# Patient Record
Sex: MALE | Race: WHITE | NOT HISPANIC OR LATINO | Employment: UNEMPLOYED | URBAN - METROPOLITAN AREA
[De-identification: names, ages, dates, MRNs, and addresses within clinical notes are randomized per-mention and may not be internally consistent; named-entity substitution may affect disease eponyms.]

---

## 2017-03-30 ENCOUNTER — HOSPITAL ENCOUNTER (EMERGENCY)
Facility: HOSPITAL | Age: 27
Discharge: HOME | End: 2017-03-31
Attending: EMERGENCY MEDICINE
Payer: SUBSIDIZED

## 2017-03-30 VITALS — BODY MASS INDEX: 24.41 KG/M2 | WEIGHT: 175 LBS | TEMPERATURE: 99.4 F

## 2017-03-30 DIAGNOSIS — F32.A DEPRESSION: Primary | ICD-10-CM

## 2017-03-30 LAB
AMPHETAMINES SERPL QL SCN: NEGATIVE
ANION GAP SERPL CALCULATED.3IONS-SCNC: 9 MMOL/L (ref 4–13)
BARBITURATES UR QL: NEGATIVE
BASOPHILS # BLD AUTO: 0.1 THOUSANDS/ΜL (ref 0–0.1)
BASOPHILS NFR BLD AUTO: 1 % (ref 0–1)
BENZODIAZ UR QL: NEGATIVE
BUN SERPL-MCNC: 14 MG/DL (ref 5–25)
CALCIUM SERPL-MCNC: 8.3 MG/DL (ref 8.3–10.1)
CHLORIDE SERPL-SCNC: 103 MMOL/L (ref 100–108)
CLARITY, POC: NORMAL
CO2 SERPL-SCNC: 28 MMOL/L (ref 21–32)
COCAINE UR QL: NEGATIVE
COLOR, POC: NORMAL
CREAT SERPL-MCNC: 1.38 MG/DL (ref 0.6–1.3)
EOSINOPHIL # BLD AUTO: 0 THOUSAND/ΜL (ref 0–0.61)
EOSINOPHIL NFR BLD AUTO: 0 % (ref 0–6)
ERYTHROCYTE [DISTWIDTH] IN BLOOD BY AUTOMATED COUNT: 13.9 % (ref 11.6–15.1)
ETHANOL SERPL-MCNC: <3 MG/DL (ref 0–3)
EXT BLOOD URINE: NORMAL
EXT GLUCOSE, UA: NORMAL
EXT KETONES: 50
EXT NITRITE, UA: NORMAL
EXT PH, UA: 6
EXT PROTEIN, UA: 100
GFR SERPL CREATININE-BSD FRML MDRD: >60 ML/MIN/1.73SQ M
GLUCOSE SERPL-MCNC: 117 MG/DL (ref 65–140)
HCT VFR BLD AUTO: 46.7 % (ref 42–52)
HGB BLD-MCNC: 15.5 G/DL (ref 14–18)
LYMPHOCYTES # BLD AUTO: 1.3 THOUSANDS/ΜL (ref 0.6–4.47)
LYMPHOCYTES NFR BLD AUTO: 13 % (ref 14–44)
MCH RBC QN AUTO: 30.7 PG (ref 27–31)
MCHC RBC AUTO-ENTMCNC: 33.3 G/DL (ref 31.4–37.4)
MCV RBC AUTO: 92 FL (ref 82–98)
METHADONE UR QL: NEGATIVE
MONOCYTES # BLD AUTO: 0.4 THOUSAND/ΜL (ref 0.17–1.22)
MONOCYTES NFR BLD AUTO: 4 % (ref 4–12)
NEUTROPHILS # BLD AUTO: 8.3 THOUSANDS/ΜL (ref 1.85–7.62)
NEUTS SEG NFR BLD AUTO: 82 % (ref 43–75)
NRBC BLD AUTO-RTO: 0 /100 WBCS
OPIATES UR QL SCN: POSITIVE
PCP UR QL: NEGATIVE
PLATELET # BLD AUTO: 276 THOUSANDS/UL (ref 130–400)
PMV BLD AUTO: 8.4 FL (ref 8.9–12.7)
POTASSIUM SERPL-SCNC: 4.1 MMOL/L (ref 3.5–5.3)
RBC # BLD AUTO: 5.07 MILLION/UL (ref 4.7–6.1)
SODIUM SERPL-SCNC: 140 MMOL/L (ref 136–145)
THC UR QL: POSITIVE
WBC # BLD AUTO: 10 THOUSAND/UL (ref 4.8–10.8)
WBC # BLD EST: 25 10*3/UL

## 2017-03-30 PROCEDURE — 80320 DRUG SCREEN QUANTALCOHOLS: CPT | Performed by: EMERGENCY MEDICINE

## 2017-03-30 PROCEDURE — 85025 COMPLETE CBC W/AUTO DIFF WBC: CPT | Performed by: EMERGENCY MEDICINE

## 2017-03-30 PROCEDURE — 80307 DRUG TEST PRSMV CHEM ANLYZR: CPT | Performed by: EMERGENCY MEDICINE

## 2017-03-30 PROCEDURE — 36415 COLL VENOUS BLD VENIPUNCTURE: CPT | Performed by: EMERGENCY MEDICINE

## 2017-03-30 PROCEDURE — 80048 BASIC METABOLIC PNL TOTAL CA: CPT | Performed by: EMERGENCY MEDICINE

## 2017-03-30 PROCEDURE — 81002 URINALYSIS NONAUTO W/O SCOPE: CPT | Performed by: EMERGENCY MEDICINE

## 2017-03-30 RX ORDER — NICOTINE 21 MG/24HR
21 PATCH, TRANSDERMAL 24 HOURS TRANSDERMAL ONCE
Status: DISCONTINUED | OUTPATIENT
Start: 2017-03-30 | End: 2017-03-31 | Stop reason: HOSPADM

## 2017-03-30 RX ORDER — LORAZEPAM 1 MG/1
1 TABLET ORAL ONCE
Status: COMPLETED | OUTPATIENT
Start: 2017-03-30 | End: 2017-03-30

## 2017-03-30 RX ADMIN — NICOTINE 21 MG: 21 PATCH, EXTENDED RELEASE TRANSDERMAL at 21:45

## 2017-03-30 RX ADMIN — LORAZEPAM 1 MG: 1 TABLET ORAL at 21:45

## 2017-03-31 PROCEDURE — 99285 EMERGENCY DEPT VISIT HI MDM: CPT

## 2017-04-25 ENCOUNTER — HOSPITAL ENCOUNTER (EMERGENCY)
Facility: HOSPITAL | Age: 27
Discharge: HOME | End: 2017-04-25
Attending: EMERGENCY MEDICINE | Admitting: EMERGENCY MEDICINE

## 2017-04-25 VITALS
TEMPERATURE: 98.4 F | RESPIRATION RATE: 16 BRPM | SYSTOLIC BLOOD PRESSURE: 138 MMHG | OXYGEN SATURATION: 99 % | BODY MASS INDEX: 27.2 KG/M2 | HEART RATE: 74 BPM | DIASTOLIC BLOOD PRESSURE: 77 MMHG | HEIGHT: 70 IN | WEIGHT: 190 LBS

## 2017-04-25 DIAGNOSIS — Z76.0 MEDICATION REFILL: Primary | ICD-10-CM

## 2017-04-25 PROCEDURE — 99281 EMR DPT VST MAYX REQ PHY/QHP: CPT

## 2017-04-25 RX ORDER — QUETIAPINE FUMARATE 100 MG/1
100 TABLET, FILM COATED ORAL
Qty: 30 TABLET | Refills: 0 | Status: SHIPPED | OUTPATIENT
Start: 2017-04-25 | End: 2017-10-26

## 2017-04-25 RX ORDER — CARBAMAZEPINE 200 MG/1
200 TABLET ORAL 2 TIMES DAILY
Qty: 60 TABLET | Refills: 0 | Status: SHIPPED | OUTPATIENT
Start: 2017-04-25 | End: 2017-10-26

## 2017-10-26 ENCOUNTER — HOSPITAL ENCOUNTER (EMERGENCY)
Facility: HOSPITAL | Age: 27
Discharge: HOME | End: 2017-10-26
Admitting: EMERGENCY MEDICINE
Payer: COMMERCIAL

## 2017-10-26 VITALS
BODY MASS INDEX: 25.77 KG/M2 | SYSTOLIC BLOOD PRESSURE: 141 MMHG | OXYGEN SATURATION: 98 % | RESPIRATION RATE: 18 BRPM | DIASTOLIC BLOOD PRESSURE: 83 MMHG | WEIGHT: 180 LBS | HEIGHT: 70 IN | HEART RATE: 92 BPM | TEMPERATURE: 97.8 F

## 2017-10-26 DIAGNOSIS — R21 RASH AND NONSPECIFIC SKIN ERUPTION: ICD-10-CM

## 2017-10-26 DIAGNOSIS — R09.89 FOREIGN BODY SENSATION IN THROAT: ICD-10-CM

## 2017-10-26 DIAGNOSIS — J06.9 URI (UPPER RESPIRATORY INFECTION): Primary | ICD-10-CM

## 2017-10-26 PROCEDURE — 99283 EMERGENCY DEPT VISIT LOW MDM: CPT

## 2017-10-26 RX ORDER — SUCRALFATE 1 G/1
1 TABLET ORAL 4 TIMES DAILY
Qty: 40 TABLET | Refills: 0 | Status: SHIPPED | OUTPATIENT
Start: 2017-10-26 | End: 2019-03-04 | Stop reason: ALTCHOICE

## 2017-10-26 NOTE — DISCHARGE INSTRUCTIONS
Acute Rash   WHAT YOU NEED TO KNOW:   A rash is irritation, redness, or itchiness in the skin or mucus membranes  Mucus membranes are areas such as the lining of your nose or throat  Acute means the rash starts suddenly, worsens quickly, and lasts a short time  An acute rash may be caused by a disease, such as hepatitis or vasculitis  The rash may be a reaction to something you are allergic to, such as certain foods, or latex  Certain medicines, including antibiotics, NSAIDs, prescription pain medicine, and aspirin can also cause a rash  DISCHARGE INSTRUCTIONS:   Return to the emergency department if:   · You have sudden trouble breathing or chest pain  · You are vomiting, have a headache or muscle aches, and your throat hurts  Contact your healthcare provider if:   · You have a fever  · You get open wounds from scratching your skin, or you have a wound that is red, swollen, or painful  · Your rash lasts longer than 3 months  · You have swelling or pain in your joints  · You have questions or concerns about your condition or care  Medicines:  If your rash does not go away on its own, you may need the following medicines:  · Antihistamines  may be given to help decrease itching  · Steroids  may be given to decrease inflammation  · Antibiotics  help fight or prevent a bacterial infection  · Take your medicine as directed  Contact your healthcare provider if you think your medicine is not helping or if you have side effects  Tell him of her if you are allergic to any medicine  Keep a list of the medicines, vitamins, and herbs you take  Include the amounts, and when and why you take them  Bring the list or the pill bottles to follow-up visits  Carry your medicine list with you in case of an emergency  Prevent a rash or care for your skin when you have a rash:  Dry skin can lead to more problems  Do not scratch your skin if it itches  You may cause a skin infection by scratching   The following may prevent dry skin, and help your skin look better:  · Use thick cream lotions or petroleum jelly to help soothe your rash  These products work well on areas with thick skin, such as your feet  Cool compresses may also be used to soothe your skin  Apply a cool compress or a cool, wet towel, and then cover it with a dry towel  · Use lukewarm water when you bathe  Hot water may damage your skin more  Pat your skin dry  Do not rub your skin with a towel  · Use detergents, soaps, shampoos, and bubble baths made for sensitive skin  Wear clothes that are made of cotton instead of nylon or wool  Cotton is softer, so it will not hurt your skin as much  Follow up with your healthcare provider as directed: You may need to see a dermatologist if healthcare providers do not know what is causing your rash  You may also need to see a dermatologist if your rash does not get better even with treatment  You may need to see a dietitian if you have allergies to foods  Write down your questions so you remember to ask them during your visits  © 2017 [facility] Information is for End User's use only and may not be sold, redistributed or otherwise used for commercial purposes  All illustrations and images included in CareNotes® are the copyrighted property of [employer]  or [employer]  The above information is an  only  It is not intended as medical advice for individual conditions or treatments  Talk to your doctor, nurse or pharmacist before following any medical regimen to see if it is safe and effective for you

## 2017-10-26 NOTE — ED PROVIDER NOTES
History  Chief Complaint   Patient presents with    Cough     patient c/o cough x 2 weeks (initially green mucous, then brown), intermittent headache, dizziness, pimples on his back, and a rash on his right lateral ankle  also c/o getting a cough drop caught in his throat 1 week ago and states he has had trouble swallowing since then  also states he was shooting up steroids three months ago in his hip and is concerned he may have contracted staph  33 y/o male presenting with a mildly productive cough x 2 weeks that has overall gone better  States he had a cough drop stuck in his throat a week ago and feel as though he has had difficulty swallowing since that point  Has been able to tolerate liquids well and when swallowing food occasionally feels as though something is stuck in his throat  Has had intermittent lightheadedness as well as a headache  Also complaining of a diffuse rash thinking it may be eczema or a staph infection per patient  Rash on the right ankle is pruritic  rash on the back has been going on for the past 6 months  Concerned because he used to "shoot up steroids" and used a prior used needle and believes he may have staph due to this  No longer has any headaches  Denies fevers, nausea, vomiting, SOB, wheezing, diarrhea, constipation  Differential includes but is not limited to intracranial abnormality, vertigo, anemia, dehydration, eczema, staph infection, acne, insect bite, URI, pneumonia, acute bronchitis, strep pharyngitis  None       Past Medical History:   Diagnosis Date    ADHD (attention deficit hyperactivity disorder)     Bipolar 1 disorder ([facility] [address] )     Psychiatric disorder        History reviewed  No pertinent surgical history  History reviewed  No pertinent family history  I have reviewed and agree with the history as documented      Social History   Substance Use Topics    Smoking status: Current Every Day Smoker     Packs/day: 0 50     Types: Cigarettes  Smokeless tobacco: Never Used    Alcohol use No        Review of Systems   Constitutional: Negative  Negative for activity change, appetite change, chills and fever  HENT: Positive for sore throat  Negative for congestion, dental problem, ear pain, facial swelling, mouth sores, postnasal drip, sinus pressure and trouble swallowing  Eyes: Negative  Respiratory: Positive for cough  Negative for apnea, chest tightness, shortness of breath, wheezing and stridor  Cardiovascular: Negative  Negative for chest pain  Gastrointestinal: Negative for abdominal distention, abdominal pain, blood in stool, constipation, diarrhea, nausea and vomiting  Genitourinary: Negative  Musculoskeletal: Negative for arthralgias, neck pain and neck stiffness  Skin: Negative  Negative for rash  Neurological: Positive for dizziness and headaches  Negative for tremors, seizures, syncope, facial asymmetry, speech difficulty, weakness, light-headedness and numbness  All other systems reviewed and are negative  Physical Exam  ED Triage Vitals [10/26/17 1506]   Temperature Pulse Respirations Blood Pressure SpO2   97 8 °F (36 6 °C) 92 18 141/83 98 %      Temp Source Heart Rate Source Patient Position - Orthostatic VS BP Location FiO2 (%)   Tympanic Monitor Sitting -- --      Pain Score       6           Orthostatic Vital Signs  Vitals:    10/26/17 1506   BP: 141/83   Pulse: 92   Patient Position - Orthostatic VS: Sitting       Physical Exam   Constitutional: He appears well-developed and well-nourished  No distress  Patient appears anxious  However appears well  Ambulating without difficulty  Speaking in full sentences without difficulty  HENT:   Head: Normocephalic and atraumatic  Right Ear: External ear normal    Left Ear: External ear normal    Nose: Nose normal    Mouth/Throat: Oropharynx is clear and moist  No oropharyngeal exudate  Dry mucous membranes  Mild amount of erythema noted     No swelling, exudate, or uvula deviation noted of mouth  No discoloration, asymmetries or swelling of the face  No ulcerations, fluctuance, induration or drainage noted  No petechiae noted on palate  Able to swallow without difficulty  Full ROM of jaw  Eyes: Conjunctivae are normal  Pupils are equal, round, and reactive to light  Right eye exhibits no discharge  Left eye exhibits no discharge  No scleral icterus  Neck: Normal range of motion  Neck supple  No tracheal deviation present  Cardiovascular: Normal rate, regular rhythm, normal heart sounds and intact distal pulses  Exam reveals no friction rub  No murmur heard  Pulmonary/Chest: Effort normal and breath sounds normal  No stridor  No respiratory distress  He has no wheezes  He has no rales  He exhibits no tenderness  SpO2 is 98%, within normal limits, resting comfortably  No cyanosis or pallor  Abdominal: Soft  Bowel sounds are normal  He exhibits no distension  There is no tenderness  There is no rebound and no guarding  Lymphadenopathy:     He has no cervical adenopathy  Skin: Skin is warm and dry  Capillary refill takes less than 2 seconds  Rash noted  He is not diaphoretic  No erythema  No pallor  No sandpaper rash noted  No other rashes noted  Good coloration of skin  Nursing note and vitals reviewed        ED Medications  Medications - No data to display    Diagnostic Studies  Results Reviewed     None                 No orders to display              Procedures  Procedures       Phone Contacts  ED Phone Contact    ED Course  ED Course                                MDM  Number of Diagnoses or Management Options  Foreign body sensation in throat:   Rash and nonspecific skin eruption:   URI (upper respiratory infection):   Diagnosis management comments: Spoke with GI CAMILO Osorio would like patient to follow up in the outpatient office and endoscopies not warranted at this time as patient is tolerating foods and liquids and suggests carafate  Will start on Carafate and informed to eat soft foods  Discussed with patient that the lesions on his back do not appear to be MRSA were staff at this time and appear to be cystic acne  Rash on the right outer ankle appears to be more plaque-like and will have him continue using hydrocortisone  , possible eczema  Strict return precautions for any worsening of symptoms, otherwise would like patient to follow up with his PCP regarding these ongoing symptoms  Patient verbalizes understanding and agrees with the above assessment and plan  Amount and/or Complexity of Data Reviewed  Review and summarize past medical records: yes  Discuss the patient with other providers: yes  Independent visualization of images, tracings, or specimens: yes      CritCare Time    Disposition  Final diagnoses:   URI (upper respiratory infection)   Rash and nonspecific skin eruption   Foreign body sensation in throat     Time reflects when diagnosis was documented in both MDM as applicable and the Disposition within this note     Time User Action Codes Description Comment    10/26/2017  4:22 PM Liane Fus Add [J06 9] URI (upper respiratory infection)     10/26/2017  4:22 PM Liane Fus Add [R21] Rash and nonspecific skin eruption     10/26/2017  4:22 PM Liane Fus Add [R09 89] Foreign body sensation in throat       ED Disposition     ED Disposition Condition Comment    Discharge  [facility]  discharge to home/self care      Condition at discharge: Good        Follow-up Information     Follow up With Specialties Details Why Contact Info Additional [address] Emergency Department Emergency Medicine Go to If symptoms worsen [address] [facility]  [phone] [facility] ED, [place], Isid[address], DO Family Medicine Schedule an appointment as soon as possible for a visit As needed [address] [facility] [address] [facility]  [address] [facility] Heidy Heck MD Gastroenterology Schedule an appointment as soon as possible for a visit in 1 week  [address]   [phone]           Discharge Medication List as of 10/26/2017  4:32 PM      START taking these medications    Details   sucralfate (CARAFATE) 1 g tablet Take 1 tablet by mouth 4 (four) times a day for 10 days, Starting Thu 10/26/2017, Until Sun 11/5/2017, Print           No discharge procedures on file      ED Provider  Electronically Signed by           Christa Gunter PA-C  10/26/17 Jovanna Raya PA-C  10/26/17 8455

## 2017-11-01 ENCOUNTER — GENERIC CONVERSION - ENCOUNTER (OUTPATIENT)
Dept: OTHER | Facility: OTHER | Age: 27
End: 2017-11-01

## 2017-11-21 ENCOUNTER — GENERIC CONVERSION - ENCOUNTER (OUTPATIENT)
Dept: OTHER | Facility: OTHER | Age: 27
End: 2017-11-21

## 2018-01-09 NOTE — PROGRESS NOTES
Recorded as Task  Date: 11/20/2017 04:39 PM, Created By: Va July  Task Name: Care Coordination  Assigned To: Kemi Fontenot  Regarding Patient: Lou Frye, Status: Active  CommentMerlyernestina Gaines - 20 Nov 2017 4:39 PM    TASK CREATED  Caller: Self; Care Coordination; [phone] (Home)  DR KEMI FONTENOT - PT WANTS ORDERS TO SEE DERM  ALSO HE WOULD LIKE A SCRIPT FOR ADDERALL  HE WOULD LIKE TO SPEAK TO YOU  TODAY  Shandra,[place] - 20 Nov 2017 5:14 PM    TASK REASSIGNED: Previously Assigned To radha Mcguire Pritiben - 21 Nov 2017 8:57 AM    TASK REPLIED TO: Previously Assigned To Kemi Fontenot  Patient reports has been taking Adderall for a lifetime since age  8 or 4 was initiated by his child psychiatrist in [place]  Patient then moved to the [facility] and patient reports this being seen by family guidance  Patient reports has a history of ADHD and bipolar and last seen family guidance over an one year  as he lost insurance  Patient reports has been taking Adderall for over a year  Patient reports will be starting school in the fall and wants to initiate medications  Patient reports skin rash has improved however would like to see a dermatologist for  further recommendations  Discussed the patient will leave at the front  Patient also reports headacheesarree iimmproving  Discussed  with ptientt  Will need recorrds from [facility] and psychiatrist from [place]  Patient verbalized understanding  All questions answered  Discussed with Dr Gertrudis Harris  Electronically signed by:Kemi COPPOLA  Nov 21 2017  8:59AM EST Author       Electronically signed by: Rafael VILLASEÑOR    Nov 21 2017  9:19AM EST

## 2018-01-15 ENCOUNTER — GENERIC CONVERSION - ENCOUNTER (OUTPATIENT)
Dept: OTHER | Facility: OTHER | Age: 28
End: 2018-01-15

## 2018-01-22 VITALS
RESPIRATION RATE: 18 BRPM | DIASTOLIC BLOOD PRESSURE: 70 MMHG | BODY MASS INDEX: 25.77 KG/M2 | SYSTOLIC BLOOD PRESSURE: 110 MMHG | HEART RATE: 74 BPM | TEMPERATURE: 97.6 F | WEIGHT: 180 LBS | OXYGEN SATURATION: 99 % | HEIGHT: 70 IN

## 2018-01-23 NOTE — MISCELLANEOUS
Provider Comments  Provider Comments:   l/m about missed appt      Signatures   Electronically signed by : Yasir Bhatt MD; Jan 18 2018  3:59PM EST                       (Author)

## 2019-03-04 ENCOUNTER — HOSPITAL ENCOUNTER (EMERGENCY)
Facility: HOSPITAL | Age: 29
Discharge: HOME | End: 2019-03-04
Attending: EMERGENCY MEDICINE | Admitting: EMERGENCY MEDICINE
Payer: MEDICAID

## 2019-03-04 ENCOUNTER — APPOINTMENT (EMERGENCY)
Dept: RADIOLOGY | Facility: HOSPITAL | Age: 29
End: 2019-03-04
Payer: MEDICAID

## 2019-03-04 VITALS
BODY MASS INDEX: 27.46 KG/M2 | DIASTOLIC BLOOD PRESSURE: 79 MMHG | RESPIRATION RATE: 18 BRPM | SYSTOLIC BLOOD PRESSURE: 132 MMHG | HEART RATE: 80 BPM | WEIGHT: 190 LBS | TEMPERATURE: 99.2 F | OXYGEN SATURATION: 95 %

## 2019-03-04 DIAGNOSIS — R60.0 BILATERAL LOWER EXTREMITY EDEMA: Primary | ICD-10-CM

## 2019-03-04 LAB
ALBUMIN SERPL BCP-MCNC: 3.2 G/DL (ref 3.5–5)
ALP SERPL-CCNC: 56 U/L (ref 46–116)
ALT SERPL W P-5'-P-CCNC: 23 U/L (ref 12–78)
ANION GAP SERPL CALCULATED.3IONS-SCNC: 11 MMOL/L (ref 4–13)
APTT PPP: 30 SECONDS (ref 26–38)
AST SERPL W P-5'-P-CCNC: 41 U/L (ref 5–45)
ATRIAL RATE: 99 BPM
BASOPHILS # BLD AUTO: 0.04 THOUSANDS/ΜL (ref 0–0.1)
BASOPHILS NFR BLD AUTO: 0 % (ref 0–1)
BILIRUB SERPL-MCNC: 0.6 MG/DL (ref 0.2–1)
BUN SERPL-MCNC: 18 MG/DL (ref 5–25)
CALCIUM SERPL-MCNC: 8.6 MG/DL (ref 8.3–10.1)
CHLORIDE SERPL-SCNC: 98 MMOL/L (ref 100–108)
CO2 SERPL-SCNC: 28 MMOL/L (ref 21–32)
CREAT SERPL-MCNC: 1.26 MG/DL (ref 0.6–1.3)
EOSINOPHIL # BLD AUTO: 0.22 THOUSAND/ΜL (ref 0–0.61)
EOSINOPHIL NFR BLD AUTO: 2 % (ref 0–6)
ERYTHROCYTE [DISTWIDTH] IN BLOOD BY AUTOMATED COUNT: 12.9 % (ref 11.6–15.1)
GFR SERPL CREATININE-BSD FRML MDRD: 76 ML/MIN/1.73SQ M
GLUCOSE SERPL-MCNC: 108 MG/DL (ref 65–140)
HCT VFR BLD AUTO: 38.4 % (ref 36.5–49.3)
HGB BLD-MCNC: 12.6 G/DL (ref 12–17)
IMM GRANULOCYTES # BLD AUTO: 0.02 THOUSAND/UL (ref 0–0.2)
IMM GRANULOCYTES NFR BLD AUTO: 0 % (ref 0–2)
INR PPP: 0.96 (ref 0.86–1.16)
LACTATE SERPL-SCNC: 1.2 MMOL/L (ref 0.5–2)
LYMPHOCYTES # BLD AUTO: 1.18 THOUSANDS/ΜL (ref 0.6–4.47)
LYMPHOCYTES NFR BLD AUTO: 12 % (ref 14–44)
MCH RBC QN AUTO: 30.4 PG (ref 26.8–34.3)
MCHC RBC AUTO-ENTMCNC: 32.8 G/DL (ref 31.4–37.4)
MCV RBC AUTO: 93 FL (ref 82–98)
MONOCYTES # BLD AUTO: 0.96 THOUSAND/ΜL (ref 0.17–1.22)
MONOCYTES NFR BLD AUTO: 10 % (ref 4–12)
NEUTROPHILS # BLD AUTO: 7.35 THOUSANDS/ΜL (ref 1.85–7.62)
NEUTS SEG NFR BLD AUTO: 76 % (ref 43–75)
NRBC BLD AUTO-RTO: 0 /100 WBCS
NT-PROBNP SERPL-MCNC: 127 PG/ML
P AXIS: 17 DEGREES
PLATELET # BLD AUTO: 192 THOUSANDS/UL (ref 149–390)
PMV BLD AUTO: 10.7 FL (ref 8.9–12.7)
POTASSIUM SERPL-SCNC: 4.1 MMOL/L (ref 3.5–5.3)
PR INTERVAL: 148 MS
PROT SERPL-MCNC: 6.8 G/DL (ref 6.4–8.2)
PROTHROMBIN TIME: 10.1 SECONDS (ref 9.4–11.7)
QRS AXIS: 62 DEGREES
QRSD INTERVAL: 98 MS
QT INTERVAL: 330 MS
QTC INTERVAL: 423 MS
RBC # BLD AUTO: 4.14 MILLION/UL (ref 3.88–5.62)
SODIUM SERPL-SCNC: 137 MMOL/L (ref 136–145)
T WAVE AXIS: 37 DEGREES
VENTRICULAR RATE: 99 BPM
WBC # BLD AUTO: 9.77 THOUSAND/UL (ref 4.31–10.16)

## 2019-03-04 PROCEDURE — 87040 BLOOD CULTURE FOR BACTERIA: CPT | Performed by: PHYSICIAN ASSISTANT

## 2019-03-04 PROCEDURE — 93005 ELECTROCARDIOGRAM TRACING: CPT

## 2019-03-04 PROCEDURE — 36415 COLL VENOUS BLD VENIPUNCTURE: CPT | Performed by: PHYSICIAN ASSISTANT

## 2019-03-04 PROCEDURE — 80053 COMPREHEN METABOLIC PANEL: CPT | Performed by: PHYSICIAN ASSISTANT

## 2019-03-04 PROCEDURE — 96374 THER/PROPH/DIAG INJ IV PUSH: CPT

## 2019-03-04 PROCEDURE — 83605 ASSAY OF LACTIC ACID: CPT | Performed by: PHYSICIAN ASSISTANT

## 2019-03-04 PROCEDURE — 85730 THROMBOPLASTIN TIME PARTIAL: CPT | Performed by: PHYSICIAN ASSISTANT

## 2019-03-04 PROCEDURE — 85610 PROTHROMBIN TIME: CPT | Performed by: PHYSICIAN ASSISTANT

## 2019-03-04 PROCEDURE — 93970 EXTREMITY STUDY: CPT | Performed by: SURGERY

## 2019-03-04 PROCEDURE — 93970 EXTREMITY STUDY: CPT

## 2019-03-04 PROCEDURE — 83880 ASSAY OF NATRIURETIC PEPTIDE: CPT | Performed by: PHYSICIAN ASSISTANT

## 2019-03-04 PROCEDURE — 99284 EMERGENCY DEPT VISIT MOD MDM: CPT

## 2019-03-04 PROCEDURE — 85025 COMPLETE CBC W/AUTO DIFF WBC: CPT | Performed by: PHYSICIAN ASSISTANT

## 2019-03-04 PROCEDURE — 96361 HYDRATE IV INFUSION ADD-ON: CPT

## 2019-03-04 PROCEDURE — 93010 ELECTROCARDIOGRAM REPORT: CPT | Performed by: INTERNAL MEDICINE

## 2019-03-04 RX ORDER — KETOROLAC TROMETHAMINE 30 MG/ML
30 INJECTION, SOLUTION INTRAMUSCULAR; INTRAVENOUS ONCE
Status: COMPLETED | OUTPATIENT
Start: 2019-03-04 | End: 2019-03-04

## 2019-03-04 RX ORDER — SENNOSIDES 8.6 MG
650 CAPSULE ORAL EVERY 8 HOURS PRN
Qty: 30 TABLET | Refills: 0 | Status: SHIPPED | OUTPATIENT
Start: 2019-03-04 | End: 2019-03-04 | Stop reason: CLARIF

## 2019-03-04 RX ORDER — TRAMADOL HYDROCHLORIDE 50 MG/1
50 TABLET ORAL EVERY 6 HOURS PRN
Qty: 12 TABLET | Refills: 0 | Status: SHIPPED | OUTPATIENT
Start: 2019-03-04 | End: 2019-03-07

## 2019-03-04 RX ORDER — IBUPROFEN 800 MG/1
800 TABLET ORAL 3 TIMES DAILY
Qty: 21 TABLET | Refills: 0 | Status: SHIPPED | OUTPATIENT
Start: 2019-03-04 | End: 2021-03-27

## 2019-03-04 RX ORDER — SENNOSIDES 8.6 MG
650 CAPSULE ORAL EVERY 8 HOURS PRN
Qty: 30 TABLET | Refills: 0 | Status: SHIPPED | OUTPATIENT
Start: 2019-03-04 | End: 2021-03-27

## 2019-03-04 RX ORDER — IBUPROFEN 800 MG/1
800 TABLET ORAL 3 TIMES DAILY
Qty: 21 TABLET | Refills: 0 | Status: SHIPPED | OUTPATIENT
Start: 2019-03-04 | End: 2019-03-04 | Stop reason: CLARIF

## 2019-03-04 RX ADMIN — KETOROLAC TROMETHAMINE 30 MG: 30 INJECTION, SOLUTION INTRAMUSCULAR at 13:38

## 2019-03-04 RX ADMIN — SODIUM CHLORIDE 1000 ML: 0.9 INJECTION, SOLUTION INTRAVENOUS at 13:38

## 2019-03-04 NOTE — ED NOTES
Pt refused wheelchair that was offered and ambulated without difficulty to room 13 from the waiting room       [facility] [address], RN  03/04/19 7739

## 2019-03-04 NOTE — ED NOTES
Pt annoyed that he is going to be out of work, it was explained to pt that the swelling in his legs will not go away overnight and will take some time to go away, to take the medications and follow up with his PCP if he needs to    Pt states he was, "misdiagnosed here before and I had to go to [facility] "       Jeff Regan (R), RN  03/04/19 3106

## 2019-03-04 NOTE — ED NOTES
Dr Wynn Likens to pts bedside, pt not in room and belongings not at bedside         [address], RN  03/04/19 1730

## 2019-03-04 NOTE — ED NOTES
Pt agitated, demands to talk to the dr Dr Yolette Osorio made aware        Luz Maria Chawla, RN  03/04/19 8308

## 2019-03-04 NOTE — ED PROVIDER NOTES
History  Chief Complaint   Patient presents with    Leg Swelling     Pt states he started with bilateral leg swelling/redness on Saturday  75-year-old male presents with bilateral leg swelling x3 days  He first noticed his ankles were swollen a month ago  He started having redness move up his legs  He states his legs are painful  He has some closed sores on his feet  The pain is worsened with prolonged standing  He states he noticed his feet having an odor about a month ago  He states he wore his shoes without socks a few days ago and got multiple blisters on his feet  He states that has aggravated the odor  No fever, chills, chest pain, shortness of breath, difficulty breathing, abdominal pain, nausea, vomiting, diarrhea, constipation, headache, dizziness, lightheadedness  None       Past Medical History:   Diagnosis Date    ADHD (attention deficit hyperactivity disorder)     Anxiety     last assessed 3/23/15    Bipolar 1 disorder ([facility] [address] )     Genital warts     last assessed 12/7/15, resolved 11/1/17     Psychiatric disorder        History reviewed  No pertinent surgical history  Family History   Problem Relation Age of Onset    No Known Problems Father      I have reviewed and agree with the history as documented  Social History     Tobacco Use    Smoking status: Current Every Day Smoker     Packs/day: 0 50     Types: Cigarettes    Smokeless tobacco: Never Used   Substance Use Topics    Alcohol use: No    Drug use: Yes     Comment: states was "shooting up steroids" 3 months ago (3/4/19 states this was 3 years ago)        Review of Systems   Constitutional: Negative for chills and fever  HENT: Negative for sneezing and sore throat  Respiratory: Negative for cough and shortness of breath  Cardiovascular: Positive for leg swelling  Negative for chest pain and palpitations  Gastrointestinal: Negative for abdominal pain, constipation, diarrhea, nausea and vomiting  Musculoskeletal: Positive for myalgias  Negative for back pain, gait problem and joint swelling  Skin: Positive for color change  Negative for pallor, rash and wound  Neurological: Negative for dizziness, syncope, weakness, light-headedness, numbness and headaches  All other systems reviewed and are negative  Physical Exam  Physical Exam   Constitutional: He appears well-developed and well-nourished  No distress  HENT:   Head: Normocephalic and atraumatic  Nose: Nose normal    Eyes: EOM are normal    Neck: Normal range of motion  Cardiovascular: Normal rate, regular rhythm, normal heart sounds and intact distal pulses  Exam reveals no gallop and no friction rub  No murmur heard  Pulmonary/Chest: Effort normal and breath sounds normal  No stridor  No respiratory distress  He has no wheezes  He has no rales  Sp02 is 94% indicating adequate oxygenation on room air   Musculoskeletal: He exhibits edema and tenderness  Legs:  Skin: Skin is warm and dry  Capillary refill takes less than 2 seconds  No rash noted  He is not diaphoretic  No erythema  No pallor  Nursing note and vitals reviewed        Vital Signs  ED Triage Vitals [03/04/19 1309]   Temperature Pulse Respirations Blood Pressure SpO2   99 6 °F (37 6 °C) (!) 107 18 130/79 94 %      Temp Source Heart Rate Source Patient Position - Orthostatic VS BP Location FiO2 (%)   Tympanic Monitor Sitting Right arm --      Pain Score       Worst Possible Pain           Vitals:    03/04/19 1309 03/04/19 1621   BP: 130/79 132/79   Pulse: (!) 107 80   Patient Position - Orthostatic VS: Sitting Sitting       Visual Acuity      ED Medications  Medications   sodium chloride 0 9 % bolus 1,000 mL (0 mL Intravenous Stopped 3/4/19 1525)   ketorolac (TORADOL) injection 30 mg (30 mg Intravenous Given 3/4/19 1338)       Diagnostic Studies  Results Reviewed     Procedure Component Value Units Date/Time    B-type natriuretic peptide [[id number]]  (Abnormal) Collected:  03/04/19 1337    Lab Status:  Final result Specimen:  Blood from Arm, Right Updated:  03/04/19 1414     NT-proBNP 127 pg/mL     Comprehensive metabolic panel [[id number]]  (Abnormal) Collected:  03/04/19 1337    Lab Status:  Final result Specimen:  Blood from Arm, Right Updated:  03/04/19 1411     Sodium 137 mmol/L      Potassium 4 1 mmol/L      Chloride 98 mmol/L      CO2 28 mmol/L      ANION GAP 11 mmol/L      BUN 18 mg/dL      Creatinine 1 26 mg/dL      Glucose 108 mg/dL      Calcium 8 6 mg/dL      AST 41 U/L      ALT 23 U/L      Alkaline Phosphatase 56 U/L      Total Protein 6 8 g/dL      Albumin 3 2 g/dL      Total Bilirubin 0 60 mg/dL      eGFR 76 ml/min/1 73sq m     Narrative:       National Kidney Disease Education Program recommendations are as follows:  GFR calculation is accurate only with a steady state creatinine  Chronic Kidney disease less than 60 ml/min/1 73 sq  meters  Kidney failure less than 15 ml/min/1 73 sq  meters  Lactic acid, plasma [[id number]]  (Normal) Collected:  03/04/19 1337    Lab Status:  Final result Specimen:  Blood from Arm, Right Updated:  03/04/19 1410     LACTIC ACID 1 2 mmol/L     Narrative:       Result may be elevated if tourniquet was used during collection      APTT [[id number]]  (Normal) Collected:  03/04/19 1337    Lab Status:  Final result Specimen:  Blood from Arm, Right Updated:  03/04/19 1404     PTT 30 seconds     Protime-INR [[id number]]  (Normal) Collected:  03/04/19 1337    Lab Status:  Final result Specimen:  Blood from Arm, Right Updated:  03/04/19 1404     Protime 10 1 seconds      INR 0 96    CBC and differential [[id number]]  (Abnormal) Collected:  03/04/19 1337    Lab Status:  Final result Specimen:  Blood from Arm, Right Updated:  03/04/19 1349     WBC 9 77 Thousand/uL      RBC 4 14 Million/uL      Hemoglobin 12 6 g/dL      Hematocrit 38 4 %      MCV 93 fL      MCH 30 4 pg      MCHC 32 8 g/dL      RDW 12 9 %      MPV 10 7 fL      Platelets 365 Thousands/uL      nRBC 0 /100 WBCs      Neutrophils Relative 76 %      Immat GRANS % 0 %      Lymphocytes Relative 12 %      Monocytes Relative 10 %      Eosinophils Relative 2 %      Basophils Relative 0 %      Neutrophils Absolute 7 35 Thousands/µL      Immature Grans Absolute 0 02 Thousand/uL      Lymphocytes Absolute 1 18 Thousands/µL      Monocytes Absolute 0 96 Thousand/µL      Eosinophils Absolute 0 22 Thousand/µL      Basophils Absolute 0 04 Thousands/µL     Blood culture #1 [[id number]] Collected:  03/04/19 1337    Lab Status: In process Specimen:  Blood from Arm, Right Updated:  03/04/19 1346    Blood culture #2 [[id number]] Collected:  03/04/19 1337    Lab Status: In process Specimen:  Blood from Hand, Left Updated:  03/04/19 1346                 VAS lower limb venous duplex study, complete bilateral   Final Result by Alphonso Sanchez MD (03/04 8299)                 Procedures  ECG 12 Lead Documentation  Date/Time: 3/4/2019 1:42 PM  Performed by: Marcos Sharp PA-C  Authorized by:  Marcos Sharp PA-C     Indications / Diagnosis:  Leg swelling  Previous ECG:     Previous ECG:  Unavailable  Interpretation:     Interpretation: normal    Rate:     ECG rate:  99    ECG rate assessment: normal    Rhythm:     Rhythm: sinus rhythm    Ectopy:     Ectopy: none    QRS:     QRS axis:  Normal    QRS intervals:  Normal  Conduction:     Conduction: normal    ST segments:     ST segments:  Normal  T waves:     T waves: normal             Phone Contacts  ED Phone Contact    ED Course  ED Course as of Mar 04 1825   Mon Mar 04, 2019   1430 Vas at bedside      1449 Negative DVT study bilaterally                                  MDM  Number of Diagnoses or Management Options  Bilateral lower extremity edema:   Diagnosis management comments: Patient well appearing, in no acute distress, afebrile  Negative DVT study, no signs of infection on labs, ECG normal sinus rhythm, /79  Advised to raise legs while at home, use compression stockings, decrease salt intake, follow up pcp  Can take anti-inflammatories for pain and swelling  Given infolink number  Gave patient proper education regarding diagnosis  Answered all questions  Return to ED for any worsening of symptoms otherwise follow up with primary care physician for re-evaluation  Discussed plan with patient who verbalized understanding and agreed to plan  Amount and/or Complexity of Data Reviewed  Clinical lab tests: ordered and reviewed  Tests in the radiology section of CPT®: ordered and reviewed  Tests in the medicine section of CPT®: ordered and reviewed  Discussion of test results with the performing providers: yes  Review and summarize past medical records: yes  Discuss the patient with other providers: yes (Discussed case with Dr Marci Pittman who also saw patient)  Independent visualization of images, tracings, or specimens: yes        Disposition  Final diagnoses:   Bilateral lower extremity edema     Time reflects when diagnosis was documented in both MDM as applicable and the Disposition within this note     Time User Action Codes Description Comment    3/4/2019  3:06 PM Veronique Aguilar Add [R60 0] Bilateral lower extremity edema       ED Disposition     ED Disposition Condition Date/Time Comment    Discharge Good Mon Mar 4, 2019  3:06 PM Irma Alicea  discharge to home/self care              Follow-up Information     Follow up With Specialties Details Why Contact Info Additional Information    [employer]  Call today to make follow up appointment for leg swelling, if symptoms worsen [phone]       [address] [facility] Emergency Department Emergency Medicine Go to  As needed [address] [facility]  [phone] [place], [place], [address] [facility] Emergency Department Emergency Medicine  If symptoms worsen [address] [facility]  [phone] [facility] [place], [address]          Discharge Medication List as of 3/4/2019  4:28 PM      START taking these medications    Details   acetaminophen (TYLENOL) 650 mg CR tablet Take 1 tablet (650 mg total) by mouth every 8 (eight) hours as needed for mild pain, Starting Mon 3/4/2019, Print      ibuprofen (MOTRIN) 800 mg tablet Take 1 tablet (800 mg total) by mouth 3 (three) times a day, Starting Mon 3/4/2019, Print      traMADol (ULTRAM) 50 mg tablet Take 1 tablet (50 mg total) by mouth every 6 (six) hours as needed for moderate pain for up to 3 days, Starting Mon 3/4/2019, Until Thu 3/7/2019, Print           No discharge procedures on file      ED Provider  Electronically Signed by           Fredrick Rutledge PA-C  03/04/19 0683

## 2019-03-09 LAB
BACTERIA BLD CULT: NORMAL
BACTERIA BLD CULT: NORMAL

## 2019-07-29 ENCOUNTER — TELEPHONE (OUTPATIENT)
Dept: NEUROLOGY | Facility: CLINIC | Age: 29
End: 2019-07-29

## 2020-12-07 ENCOUNTER — TELEPHONE (OUTPATIENT)
Dept: SURGICAL ONCOLOGY | Facility: CLINIC | Age: 30
End: 2020-12-07

## 2021-03-27 ENCOUNTER — HOSPITAL ENCOUNTER (EMERGENCY)
Facility: HOSPITAL | Age: 31
Discharge: LEFT BEFORE DISCHARGE | End: 2021-03-27
Attending: EMERGENCY MEDICINE | Admitting: EMERGENCY MEDICINE
Payer: COMMERCIAL

## 2021-03-27 ENCOUNTER — APPOINTMENT (EMERGENCY)
Dept: RADIOLOGY | Facility: HOSPITAL | Age: 31
End: 2021-03-27
Payer: COMMERCIAL

## 2021-03-27 VITALS
TEMPERATURE: 97.9 F | DIASTOLIC BLOOD PRESSURE: 78 MMHG | SYSTOLIC BLOOD PRESSURE: 131 MMHG | OXYGEN SATURATION: 100 % | HEART RATE: 87 BPM | RESPIRATION RATE: 16 BRPM

## 2021-03-27 DIAGNOSIS — L03.90 CELLULITIS: Primary | ICD-10-CM

## 2021-03-27 DIAGNOSIS — M25.471 RIGHT ANKLE SWELLING: ICD-10-CM

## 2021-03-27 DIAGNOSIS — M25.472 LEFT ANKLE SWELLING: ICD-10-CM

## 2021-03-27 PROCEDURE — 99284 EMERGENCY DEPT VISIT MOD MDM: CPT | Performed by: EMERGENCY MEDICINE

## 2021-03-27 PROCEDURE — 99284 EMERGENCY DEPT VISIT MOD MDM: CPT

## 2021-03-27 RX ORDER — CLINDAMYCIN PHOSPHATE 600 MG/50ML
600 INJECTION INTRAVENOUS ONCE
Status: DISCONTINUED | OUTPATIENT
Start: 2021-03-27 | End: 2021-03-27 | Stop reason: HOSPADM

## 2021-03-27 NOTE — ED PROVIDER NOTES
History  Chief Complaint   Patient presents with    Ankle Swelling     Pt states that he has been "getting cellulitis off and on for the past year"       Pt in the ER with c/o b/l LE cellulitis and b/l ankle pain x 1 day  He denies trauma  Pt with recurrent cellulitis over the past year, but denies recent abx use  Pt denies drug abuse, although a recent note from [facility] states that he abuses opiates  History provided by:  Patient   used: No    Ankle Swelling  Location:  Ankle  Injury: no    Ankle location:  L ankle and R ankle  Pain details:     Quality:  Aching    Radiates to:  Does not radiate    Severity:  Moderate    Onset quality:  Sudden    Timing:  Constant    Progression:  Worsening  Chronicity:  New  Dislocation: no    Foreign body present:  No foreign bodies  Tetanus status:  Unknown  Relieved by:  None tried  Worsened by:  Bearing weight  Ineffective treatments:  None tried  Associated symptoms: no back pain and no fever        None       Past Medical History:   Diagnosis Date    ADHD (attention deficit hyperactivity disorder)     Anxiety     last assessed 3/23/15    Bipolar 1 disorder ([facility] [address] )     Genital warts     last assessed 12/7/15, resolved 11/1/17     Psychiatric disorder        History reviewed  No pertinent surgical history  Family History   Problem Relation Age of Onset    No Known Problems Father      I have reviewed and agree with the history as documented  E-Cigarette/Vaping     E-Cigarette/Vaping Substances     Social History     Tobacco Use    Smoking status: Current Every Day Smoker     Packs/day: 0 50     Types: Cigarettes    Smokeless tobacco: Never Used   Substance Use Topics    Alcohol use: No    Drug use: Yes     Comment: states was "shooting up steroids" 3 months ago (3/4/19 states this was 3 years ago)       Review of Systems   Constitutional: Negative for chills and fever  Respiratory: Negative for cough, shortness of breath and wheezing  Cardiovascular: Negative for chest pain and palpitations  Gastrointestinal: Negative for abdominal pain, constipation, diarrhea, nausea and vomiting  Genitourinary: Negative for dysuria, flank pain, hematuria and urgency  Musculoskeletal: Positive for gait problem and joint swelling  Negative for back pain  Skin: Positive for color change  Negative for pallor, rash and wound  All other systems reviewed and are negative  Physical Exam  Physical Exam  Vitals signs and nursing note reviewed  Constitutional:       Appearance: He is well-developed  HENT:      Head: Normocephalic and atraumatic  Eyes:      Extraocular Movements: Extraocular movements intact  Pupils: Pupils are equal, round, and reactive to light  Neck:      Musculoskeletal: Normal range of motion and neck supple  Cardiovascular:      Rate and Rhythm: Normal rate and regular rhythm  Heart sounds: Normal heart sounds  Pulmonary:      Effort: Pulmonary effort is normal       Breath sounds: Normal breath sounds  Abdominal:      General: Bowel sounds are normal  There is no distension  Palpations: Abdomen is soft  There is no mass  Tenderness: There is no abdominal tenderness  There is no guarding or rebound  Musculoskeletal:         General: Swelling and tenderness present  No signs of injury  Right ankle: He exhibits decreased range of motion and swelling  He exhibits no deformity and no laceration  Tenderness  Lateral malleolus and medial malleolus tenderness found  Achilles tendon normal       Left ankle: He exhibits decreased range of motion and swelling  He exhibits no ecchymosis, no deformity, no laceration and normal pulse  Tenderness  Lateral malleolus and medial malleolus tenderness found  Achilles tendon normal       Right lower leg: He exhibits tenderness, bony tenderness and swelling  He exhibits no deformity and no laceration  Legs:    Skin:     General: Skin is warm and dry  Capillary Refill: Capillary refill takes less than 2 seconds  Neurological:      Mental Status: He is alert and oriented to person, place, and time  Psychiatric:         Behavior: Behavior normal          Thought Content: Thought content normal          Judgment: Judgment normal          Vital Signs  ED Triage Vitals [03/27/21 1703]   Temperature Pulse Respirations Blood Pressure SpO2   97 9 °F (36 6 °C) 87 16 131/78 100 %      Temp Source Heart Rate Source Patient Position - Orthostatic VS BP Location FiO2 (%)   Tympanic Monitor Sitting Right arm --      Pain Score       Worst Possible Pain           Vitals:    03/27/21 1703   BP: 131/78   Pulse: 87   Patient Position - Orthostatic VS: Sitting         Visual Acuity      ED Medications  Medications - No data to display    Diagnostic Studies  Results Reviewed     Procedure Component Value Units Date/Time    Blood culture #1 [[id number]]     Lab Status: No result Specimen: Blood     Blood culture #2 [[id number]]     Lab Status: No result Specimen: Blood                  No orders to display              Procedures  Procedures         ED Course                                           MDM  Number of Diagnoses or Management Options  Cellulitis: new and requires workup  Left ankle swelling: new and requires workup  Right ankle swelling: new and requires workup  Diagnosis management comments: Pt in the ER with c/o b/l LE swelling and pain  Concern for septic arthritis  Labs, xrays and IV abx ordered  Pt refused all testing , got dressed and ambulated out of the ER with a steady gait          Amount and/or Complexity of Data Reviewed  Clinical lab tests: ordered  Tests in the radiology section of CPT®: ordered    Risk of Complications, Morbidity, and/or Mortality  Presenting problems: high  Diagnostic procedures: high  Management options: high    Patient Progress  Patient progress: other (comment) (unchanged)      Disposition  Final diagnoses:   Cellulitis   Left ankle swelling   Right ankle swelling     Time reflects when diagnosis was documented in both MDM as applicable and the Disposition within this note     Time User Action Codes Description Comment    3/27/2021  9:11 PM Johana Huston Add [L03 90] Cellulitis     3/27/2021  9:11 PM Johana Huston Add [M25 472] Left ankle swelling     3/27/2021  9:11 PM Johana Huston Add [M25 471] Right ankle swelling       ED Disposition     ED Disposition Condition Date/Time Comment    Left from Room after Provider Exam  Sat Mar 27, 2021  6:13 PM       Follow-up Information    None         There are no discharge medications for this patient  No discharge procedures on file      PDMP Review     None          ED Provider  Electronically Signed by           Tyler George DO  03/27/21 1669

## 2021-03-27 NOTE — ED NOTES
Pt is refusing to get off phone to allow nurse to take blood and place an IV    Pt is refusing to wear mask and states "fine than I'll just fucking leave, I hate this [facility]"  Pt than continues to curse at this nurse, "why are you so fucking stupid, I just want to talk to my fucking sister"     Alena Gonzalez, LORIN  03/27/21 6849

## 2021-10-28 ENCOUNTER — HOSPITAL ENCOUNTER (EMERGENCY)
Facility: HOSPITAL | Age: 31
Discharge: LEFT BEFORE DISCHARGE | End: 2021-10-28
Attending: EMERGENCY MEDICINE
Payer: COMMERCIAL

## 2021-10-28 ENCOUNTER — APPOINTMENT (EMERGENCY)
Dept: RADIOLOGY | Facility: HOSPITAL | Age: 31
End: 2021-10-28
Payer: COMMERCIAL

## 2021-10-28 VITALS
SYSTOLIC BLOOD PRESSURE: 127 MMHG | RESPIRATION RATE: 19 BRPM | HEART RATE: 108 BPM | DIASTOLIC BLOOD PRESSURE: 89 MMHG | TEMPERATURE: 96.8 F | OXYGEN SATURATION: 91 %

## 2021-10-28 DIAGNOSIS — N17.9 AKI (ACUTE KIDNEY INJURY) (HCC): ICD-10-CM

## 2021-10-28 DIAGNOSIS — F19.10 SUBSTANCE ABUSE (HCC): ICD-10-CM

## 2021-10-28 DIAGNOSIS — M62.82 RHABDOMYOLYSIS: Primary | ICD-10-CM

## 2021-10-28 LAB
ALBUMIN SERPL BCP-MCNC: 4 G/DL (ref 3.5–5)
ALP SERPL-CCNC: 89 U/L (ref 46–116)
ALT SERPL W P-5'-P-CCNC: 37 U/L (ref 12–78)
AMPHETAMINES SERPL QL SCN: POSITIVE
ANION GAP SERPL CALCULATED.3IONS-SCNC: 9 MMOL/L (ref 4–13)
APAP SERPL-MCNC: <2 UG/ML (ref 10–20)
AST SERPL W P-5'-P-CCNC: 51 U/L (ref 5–45)
BACTERIA UR QL AUTO: ABNORMAL /HPF
BARBITURATES UR QL: NEGATIVE
BASOPHILS # BLD AUTO: 0.08 THOUSANDS/ΜL (ref 0–0.1)
BASOPHILS NFR BLD AUTO: 1 % (ref 0–1)
BENZODIAZ UR QL: NEGATIVE
BILIRUB SERPL-MCNC: 0.51 MG/DL (ref 0.2–1)
BILIRUB UR QL STRIP: NEGATIVE
BUN SERPL-MCNC: 33 MG/DL (ref 5–25)
CALCIUM SERPL-MCNC: 8.5 MG/DL (ref 8.3–10.1)
CHLORIDE SERPL-SCNC: 103 MMOL/L (ref 100–108)
CK MB SERPL-MCNC: 1 % (ref 0–2.5)
CK MB SERPL-MCNC: 12.4 NG/ML (ref 0–5)
CK SERPL-CCNC: 1277 U/L (ref 39–308)
CLARITY UR: CLEAR
CO2 SERPL-SCNC: 27 MMOL/L (ref 21–32)
COCAINE UR QL: NEGATIVE
COLOR UR: YELLOW
CREAT SERPL-MCNC: 1.47 MG/DL (ref 0.6–1.3)
EOSINOPHIL # BLD AUTO: 0.2 THOUSAND/ΜL (ref 0–0.61)
EOSINOPHIL NFR BLD AUTO: 2 % (ref 0–6)
ERYTHROCYTE [DISTWIDTH] IN BLOOD BY AUTOMATED COUNT: 12.4 % (ref 11.6–15.1)
ETHANOL SERPL-MCNC: <3 MG/DL (ref 0–3)
GFR SERPL CREATININE-BSD FRML MDRD: 62 ML/MIN/1.73SQ M
GLUCOSE SERPL-MCNC: 70 MG/DL (ref 65–140)
GLUCOSE UR STRIP-MCNC: NEGATIVE MG/DL
HCT VFR BLD AUTO: 40.1 % (ref 36.5–49.3)
HGB BLD-MCNC: 13.1 G/DL (ref 12–17)
HGB UR QL STRIP.AUTO: NEGATIVE
HYALINE CASTS #/AREA URNS LPF: ABNORMAL /LPF
IMM GRANULOCYTES # BLD AUTO: 0.02 THOUSAND/UL (ref 0–0.2)
IMM GRANULOCYTES NFR BLD AUTO: 0 % (ref 0–2)
KETONES UR STRIP-MCNC: ABNORMAL MG/DL
LEUKOCYTE ESTERASE UR QL STRIP: NEGATIVE
LIPASE SERPL-CCNC: 59 U/L (ref 73–393)
LYMPHOCYTES # BLD AUTO: 2.52 THOUSANDS/ΜL (ref 0.6–4.47)
LYMPHOCYTES NFR BLD AUTO: 26 % (ref 14–44)
MAGNESIUM SERPL-MCNC: 2.5 MG/DL (ref 1.6–2.6)
MCH RBC QN AUTO: 31 PG (ref 26.8–34.3)
MCHC RBC AUTO-ENTMCNC: 32.7 G/DL (ref 31.4–37.4)
MCV RBC AUTO: 95 FL (ref 82–98)
METHADONE UR QL: NEGATIVE
MONOCYTES # BLD AUTO: 0.8 THOUSAND/ΜL (ref 0.17–1.22)
MONOCYTES NFR BLD AUTO: 8 % (ref 4–12)
NEUTROPHILS # BLD AUTO: 6.05 THOUSANDS/ΜL (ref 1.85–7.62)
NEUTS SEG NFR BLD AUTO: 63 % (ref 43–75)
NITRITE UR QL STRIP: NEGATIVE
NON-SQ EPI CELLS URNS QL MICRO: ABNORMAL /HPF
NRBC BLD AUTO-RTO: 0 /100 WBCS
OPIATES UR QL SCN: NEGATIVE
OXYCODONE+OXYMORPHONE UR QL SCN: NEGATIVE
PCP UR QL: NEGATIVE
PH UR STRIP.AUTO: 5 [PH]
PLATELET # BLD AUTO: 232 THOUSANDS/UL (ref 149–390)
PMV BLD AUTO: 9.8 FL (ref 8.9–12.7)
POTASSIUM SERPL-SCNC: 4.9 MMOL/L (ref 3.5–5.3)
PROT SERPL-MCNC: 6.7 G/DL (ref 6.4–8.2)
PROT UR STRIP-MCNC: ABNORMAL MG/DL
RBC # BLD AUTO: 4.22 MILLION/UL (ref 3.88–5.62)
RBC #/AREA URNS AUTO: ABNORMAL /HPF
SALICYLATES SERPL-MCNC: 3.1 MG/DL (ref 3–20)
SODIUM SERPL-SCNC: 139 MMOL/L (ref 136–145)
SP GR UR STRIP.AUTO: >=1.03 (ref 1–1.03)
THC UR QL: NEGATIVE
TROPONIN I SERPL-MCNC: <0.02 NG/ML
UROBILINOGEN UR QL STRIP.AUTO: 0.2 E.U./DL
WBC # BLD AUTO: 9.67 THOUSAND/UL (ref 4.31–10.16)
WBC #/AREA URNS AUTO: ABNORMAL /HPF

## 2021-10-28 PROCEDURE — 80053 COMPREHEN METABOLIC PANEL: CPT | Performed by: EMERGENCY MEDICINE

## 2021-10-28 PROCEDURE — 83735 ASSAY OF MAGNESIUM: CPT | Performed by: EMERGENCY MEDICINE

## 2021-10-28 PROCEDURE — 82553 CREATINE MB FRACTION: CPT | Performed by: EMERGENCY MEDICINE

## 2021-10-28 PROCEDURE — 80143 DRUG ASSAY ACETAMINOPHEN: CPT | Performed by: EMERGENCY MEDICINE

## 2021-10-28 PROCEDURE — 93005 ELECTROCARDIOGRAM TRACING: CPT

## 2021-10-28 PROCEDURE — 70450 CT HEAD/BRAIN W/O DYE: CPT

## 2021-10-28 PROCEDURE — 84484 ASSAY OF TROPONIN QUANT: CPT | Performed by: EMERGENCY MEDICINE

## 2021-10-28 PROCEDURE — 82550 ASSAY OF CK (CPK): CPT | Performed by: EMERGENCY MEDICINE

## 2021-10-28 PROCEDURE — 36415 COLL VENOUS BLD VENIPUNCTURE: CPT | Performed by: EMERGENCY MEDICINE

## 2021-10-28 PROCEDURE — 99285 EMERGENCY DEPT VISIT HI MDM: CPT

## 2021-10-28 PROCEDURE — 96361 HYDRATE IV INFUSION ADD-ON: CPT

## 2021-10-28 PROCEDURE — 80179 DRUG ASSAY SALICYLATE: CPT | Performed by: EMERGENCY MEDICINE

## 2021-10-28 PROCEDURE — 85025 COMPLETE CBC W/AUTO DIFF WBC: CPT | Performed by: EMERGENCY MEDICINE

## 2021-10-28 PROCEDURE — 80307 DRUG TEST PRSMV CHEM ANLYZR: CPT | Performed by: EMERGENCY MEDICINE

## 2021-10-28 PROCEDURE — 96360 HYDRATION IV INFUSION INIT: CPT

## 2021-10-28 PROCEDURE — 82077 ASSAY SPEC XCP UR&BREATH IA: CPT | Performed by: EMERGENCY MEDICINE

## 2021-10-28 PROCEDURE — 83690 ASSAY OF LIPASE: CPT | Performed by: EMERGENCY MEDICINE

## 2021-10-28 PROCEDURE — 99285 EMERGENCY DEPT VISIT HI MDM: CPT | Performed by: EMERGENCY MEDICINE

## 2021-10-28 RX ORDER — ESCITALOPRAM OXALATE 10 MG/1
10 TABLET ORAL DAILY
COMMUNITY

## 2021-10-28 RX ORDER — GABAPENTIN 300 MG/1
300 CAPSULE ORAL 3 TIMES DAILY
COMMUNITY

## 2021-10-28 RX ORDER — CARBAMAZEPINE 200 MG/1
200 TABLET ORAL
COMMUNITY

## 2021-10-28 RX ORDER — QUETIAPINE FUMARATE 100 MG/1
100 TABLET, FILM COATED ORAL
COMMUNITY

## 2021-10-28 RX ADMIN — SODIUM CHLORIDE 1000 ML: 0.9 INJECTION, SOLUTION INTRAVENOUS at 10:59

## 2021-10-28 RX ADMIN — SODIUM CHLORIDE 1000 ML: 0.9 INJECTION, SOLUTION INTRAVENOUS at 12:33

## 2021-11-11 LAB
ATRIAL RATE: 98 BPM
P AXIS: 36 DEGREES
PR INTERVAL: 122 MS
QRS AXIS: 75 DEGREES
QRSD INTERVAL: 108 MS
QT INTERVAL: 360 MS
QTC INTERVAL: 459 MS
T WAVE AXIS: 44 DEGREES
VENTRICULAR RATE: 98 BPM

## 2021-11-11 PROCEDURE — 93010 ELECTROCARDIOGRAM REPORT: CPT | Performed by: INTERNAL MEDICINE

## 2022-07-17 ENCOUNTER — APPOINTMENT (EMERGENCY)
Dept: RADIOLOGY | Facility: HOSPITAL | Age: 32
End: 2022-07-17
Payer: COMMERCIAL

## 2022-07-17 ENCOUNTER — HOSPITAL ENCOUNTER (EMERGENCY)
Facility: HOSPITAL | Age: 32
Discharge: HOME | End: 2022-07-18
Attending: EMERGENCY MEDICINE | Admitting: EMERGENCY MEDICINE
Payer: COMMERCIAL

## 2022-07-17 DIAGNOSIS — T50.901A OVERDOSE: Primary | ICD-10-CM

## 2022-07-17 LAB
ALBUMIN SERPL BCP-MCNC: 4.1 G/DL (ref 3.5–5)
ALP SERPL-CCNC: 79 U/L (ref 46–116)
ALT SERPL W P-5'-P-CCNC: 28 U/L (ref 12–78)
ANION GAP SERPL CALCULATED.3IONS-SCNC: 12 MMOL/L (ref 4–13)
AST SERPL W P-5'-P-CCNC: 28 U/L (ref 5–45)
BASOPHILS # BLD MANUAL: 0.29 THOUSAND/UL (ref 0–0.1)
BASOPHILS NFR MAR MANUAL: 2 % (ref 0–1)
BILIRUB SERPL-MCNC: 0.86 MG/DL (ref 0.2–1)
BUN SERPL-MCNC: 24 MG/DL (ref 5–25)
CALCIUM SERPL-MCNC: 8.2 MG/DL (ref 8.3–10.1)
CHLORIDE SERPL-SCNC: 107 MMOL/L (ref 100–108)
CO2 SERPL-SCNC: 26 MMOL/L (ref 21–32)
CREAT SERPL-MCNC: 1.39 MG/DL (ref 0.6–1.3)
EOSINOPHIL # BLD MANUAL: 0.29 THOUSAND/UL (ref 0–0.4)
EOSINOPHIL NFR BLD MANUAL: 2 % (ref 0–6)
ERYTHROCYTE [DISTWIDTH] IN BLOOD BY AUTOMATED COUNT: 13.1 % (ref 11.6–15.1)
GFR SERPL CREATININE-BSD FRML MDRD: 66 ML/MIN/1.73SQ M
GLUCOSE SERPL-MCNC: 110 MG/DL (ref 65–140)
GLUCOSE SERPL-MCNC: 169 MG/DL (ref 65–140)
HCT VFR BLD AUTO: 44.1 % (ref 36.5–49.3)
HGB BLD-MCNC: 14.4 G/DL (ref 12–17)
LYMPHOCYTES # BLD AUTO: 32 % (ref 14–44)
LYMPHOCYTES # BLD AUTO: 4.62 THOUSAND/UL (ref 0.6–4.47)
MCH RBC QN AUTO: 31.1 PG (ref 26.8–34.3)
MCHC RBC AUTO-ENTMCNC: 32.7 G/DL (ref 31.4–37.4)
MCV RBC AUTO: 95 FL (ref 82–98)
MONOCYTES # BLD AUTO: 0.29 THOUSAND/UL (ref 0–1.22)
MONOCYTES NFR BLD: 2 % (ref 4–12)
NEUTROPHILS # BLD MANUAL: 7.51 THOUSAND/UL (ref 1.85–7.62)
NEUTS BAND NFR BLD MANUAL: 4 % (ref 0–8)
NEUTS SEG NFR BLD AUTO: 48 % (ref 43–75)
PLATELET # BLD AUTO: 263 THOUSANDS/UL (ref 149–390)
PLATELET BLD QL SMEAR: ADEQUATE
PMV BLD AUTO: 10.6 FL (ref 8.9–12.7)
POTASSIUM SERPL-SCNC: 3.3 MMOL/L (ref 3.5–5.3)
PROT SERPL-MCNC: 7.1 G/DL (ref 6.4–8.2)
RBC # BLD AUTO: 4.63 MILLION/UL (ref 3.88–5.62)
RBC MORPH BLD: NORMAL
SMUDGE CELLS BLD QL SMEAR: PRESENT
SODIUM SERPL-SCNC: 145 MMOL/L (ref 136–145)
VARIANT LYMPHS # BLD AUTO: 10 %
WBC # BLD AUTO: 14.44 THOUSAND/UL (ref 4.31–10.16)

## 2022-07-17 PROCEDURE — 99285 EMERGENCY DEPT VISIT HI MDM: CPT

## 2022-07-17 PROCEDURE — G1004 CDSM NDSC: HCPCS

## 2022-07-17 PROCEDURE — 85027 COMPLETE CBC AUTOMATED: CPT | Performed by: EMERGENCY MEDICINE

## 2022-07-17 PROCEDURE — 36415 COLL VENOUS BLD VENIPUNCTURE: CPT | Performed by: EMERGENCY MEDICINE

## 2022-07-17 PROCEDURE — 96361 HYDRATE IV INFUSION ADD-ON: CPT

## 2022-07-17 PROCEDURE — 82948 REAGENT STRIP/BLOOD GLUCOSE: CPT

## 2022-07-17 PROCEDURE — 93005 ELECTROCARDIOGRAM TRACING: CPT

## 2022-07-17 PROCEDURE — 80053 COMPREHEN METABOLIC PANEL: CPT | Performed by: EMERGENCY MEDICINE

## 2022-07-17 PROCEDURE — 85007 BL SMEAR W/DIFF WBC COUNT: CPT | Performed by: EMERGENCY MEDICINE

## 2022-07-17 PROCEDURE — 70450 CT HEAD/BRAIN W/O DYE: CPT

## 2022-07-17 PROCEDURE — 99285 EMERGENCY DEPT VISIT HI MDM: CPT | Performed by: EMERGENCY MEDICINE

## 2022-07-17 RX ORDER — NALOXONE HYDROCHLORIDE 1 MG/ML
1 INJECTION PARENTERAL ONCE
Status: COMPLETED | OUTPATIENT
Start: 2022-07-17 | End: 2022-07-17

## 2022-07-17 RX ADMIN — SODIUM CHLORIDE 1000 ML: 0.9 INJECTION, SOLUTION INTRAVENOUS at 23:27

## 2022-07-18 ENCOUNTER — APPOINTMENT (EMERGENCY)
Dept: RADIOLOGY | Facility: HOSPITAL | Age: 32
End: 2022-07-18
Payer: COMMERCIAL

## 2022-07-18 VITALS
OXYGEN SATURATION: 100 % | DIASTOLIC BLOOD PRESSURE: 80 MMHG | SYSTOLIC BLOOD PRESSURE: 114 MMHG | RESPIRATION RATE: 17 BRPM | HEART RATE: 93 BPM

## 2022-07-18 LAB
ATRIAL RATE: 101 BPM
ATRIAL RATE: 101 BPM
P AXIS: 26 DEGREES
P AXIS: 38 DEGREES
PR INTERVAL: 128 MS
PR INTERVAL: 136 MS
QRS AXIS: 80 DEGREES
QRS AXIS: 81 DEGREES
QRSD INTERVAL: 110 MS
QRSD INTERVAL: 114 MS
QT INTERVAL: 378 MS
QT INTERVAL: 380 MS
QTC INTERVAL: 490 MS
QTC INTERVAL: 492 MS
T WAVE AXIS: 69 DEGREES
T WAVE AXIS: 70 DEGREES
VENTRICULAR RATE: 101 BPM
VENTRICULAR RATE: 101 BPM

## 2022-07-18 PROCEDURE — 72125 CT NECK SPINE W/O DYE: CPT

## 2022-07-18 PROCEDURE — 96374 THER/PROPH/DIAG INJ IV PUSH: CPT

## 2022-07-18 PROCEDURE — 93010 ELECTROCARDIOGRAM REPORT: CPT | Performed by: INTERNAL MEDICINE

## 2022-07-18 PROCEDURE — 96375 TX/PRO/DX INJ NEW DRUG ADDON: CPT

## 2022-07-18 PROCEDURE — G1004 CDSM NDSC: HCPCS

## 2022-07-18 RX ORDER — NALOXONE HYDROCHLORIDE 4 MG/.1ML
SPRAY NASAL
Qty: 2 EACH | Refills: 0 | Status: SHIPPED | OUTPATIENT
Start: 2022-07-18

## 2022-07-18 RX ORDER — METOCLOPRAMIDE HYDROCHLORIDE 5 MG/ML
10 INJECTION INTRAMUSCULAR; INTRAVENOUS ONCE
Status: COMPLETED | OUTPATIENT
Start: 2022-07-18 | End: 2022-07-18

## 2022-07-18 RX ORDER — ONDANSETRON 2 MG/ML
4 INJECTION INTRAMUSCULAR; INTRAVENOUS ONCE
Status: COMPLETED | OUTPATIENT
Start: 2022-07-18 | End: 2022-07-18

## 2022-07-18 RX ORDER — ONDANSETRON 4 MG/1
4 TABLET, ORALLY DISINTEGRATING ORAL EVERY 6 HOURS PRN
Qty: 20 TABLET | Refills: 0 | Status: SHIPPED | OUTPATIENT
Start: 2022-07-18

## 2022-07-18 RX ADMIN — ONDANSETRON 4 MG: 2 INJECTION INTRAMUSCULAR; INTRAVENOUS at 02:36

## 2022-07-18 RX ADMIN — METOCLOPRAMIDE HYDROCHLORIDE 10 MG: 5 INJECTION INTRAMUSCULAR; INTRAVENOUS at 02:36

## 2022-07-18 NOTE — ED NOTES
Pt seen, assessed and d/c by provider  Pt appeared to be in no acute distress upon discharge  Pt able to ambulate well without assistance upon exiting        Lourdes Garcia RN  07/18/22 6743

## 2022-07-18 NOTE — ED PROVIDER NOTES
History  Chief Complaint   Patient presents with    Overdose - Accidental     Pt found down unresponsive for EMS  Per ems pt witnessed to be staggering down road, fell, hit his head  Patient became unresponsive and a concerned neighbor called to report patient had fallen on his face  Patient was transported with EMS  Pupils with pinpoint, respirations were shallow and quite low  Patient was being bagged and had been administered Narcan intranasally prior to arrival   Patient arrived unresponsive to voice or noxious stimuli  We assisted respiration with BVM until lines were established and Narcan was given both intramuscularly and intravenous with eventual return to full alertness          None       No past medical history on file  No past surgical history on file  No family history on file  I have reviewed and agree with the history as documented  No existing history information found  No existing history information found  Review of Systems   Unable to perform ROS: Patient unresponsive       Physical Exam  Physical Exam  Vitals and nursing note reviewed  Constitutional:       Appearance: He is diaphoretic  Comments: Unresponsive   HENT:      Head: Normocephalic  Right Ear: External ear normal       Left Ear: External ear normal       Nose: Nose normal       Mouth/Throat:      Pharynx: Oropharynx is clear  Eyes:      Comments: Pupils pinpoint unresponsive   Neck:      Comments: C-collar applied  Cardiovascular:      Rate and Rhythm: Normal rate and regular rhythm  Pulmonary:      Comments: Shallow breath approximately 4 per minute  Abdominal:      Palpations: Abdomen is soft  Tenderness: There is no abdominal tenderness  Musculoskeletal:         General: No swelling  Cervical back: No rigidity  Right lower leg: No edema  Left lower leg: No edema  Lymphadenopathy:      Cervical: No cervical adenopathy  Skin:     General: Skin is warm  Capillary Refill: Capillary refill takes less than 2 seconds  Neurological:      Comments: Unresponsive to verbal or noxious stimuli   Psychiatric:      Comments: Unresponsive nonverbal         Vital Signs  ED Triage Vitals   Temp Pulse Respirations Blood Pressure SpO2   -- 07/17/22 2300 07/17/22 2300 07/17/22 2300 07/17/22 2300    104 (!) 4 105/55 100 %      Temp src Heart Rate Source Patient Position - Orthostatic VS BP Location FiO2 (%)   -- 07/17/22 2304 07/17/22 2304 07/17/22 2304 --    Monitor Lying Left arm       Pain Score       --                  Vitals:    07/17/22 2315 07/17/22 2330 07/17/22 2345 07/18/22 0030   BP: 113/58 115/59  114/80   Pulse: 104 97 95 93   Patient Position - Orthostatic VS:             Visual Acuity      ED Medications  Medications   naloxone (FOR EMS ONLY) (NARCAN) 2 MG/2ML injection 2 mg (0 mg Does not apply Given to EMS 7/17/22 2320)   sodium chloride 0 9 % bolus 1,000 mL (0 mL Intravenous Stopped 7/18/22 0025)   ondansetron (ZOFRAN) injection 4 mg (4 mg Intravenous Given 7/18/22 0236)   metoclopramide (REGLAN) injection 10 mg (10 mg Intravenous Given 7/18/22 0236)       Diagnostic Studies  Results Reviewed     Procedure Component Value Units Date/Time    CBC and differential [[id number]]  (Abnormal) Collected: 07/17/22 2327    Lab Status: Final result Specimen: Blood from Arm, Right Updated: 07/17/22 2351     WBC 14 44 Thousand/uL      RBC 4 63 Million/uL      Hemoglobin 14 4 g/dL      Hematocrit 44 1 %      MCV 95 fL      MCH 31 1 pg      MCHC 32 7 g/dL      RDW 13 1 %      MPV 10 6 fL      Platelets 285 Thousands/uL     Narrative: This is an appended report  These results have been appended to a previously verified report      Manual Differential(PHLEBS Do Not Order) [[id number]]  (Abnormal) Collected: 07/17/22 2327    Lab Status: Final result Specimen: Blood from Arm, Right Updated: 07/17/22 2351     Segmented % 48 %      Bands % 4 %      Lymphocytes % 32 % Monocytes % 2 %      Eosinophils, % 2 %      Basophils % 2 %      Atypical Lymphocytes % 10 %      Absolute Neutrophils 7 51 Thousand/uL      Lymphocytes Absolute 4 62 Thousand/uL      Monocytes Absolute 0 29 Thousand/uL      Eosinophils Absolute 0 29 Thousand/uL      Basophils Absolute 0 29 Thousand/uL      Total Counted --     Smudge Cells Present     RBC Morphology Normal     Platelet Estimate Adequate    Comprehensive metabolic panel [[id number]]  (Abnormal) Collected: 07/17/22 2327    Lab Status: Final result Specimen: Blood from Arm, Right Updated: 07/17/22 2348     Sodium 145 mmol/L      Potassium 3 3 mmol/L      Chloride 107 mmol/L      CO2 26 mmol/L      ANION GAP 12 mmol/L      BUN 24 mg/dL      Creatinine 1 39 mg/dL      Glucose 169 mg/dL      Calcium 8 2 mg/dL      AST 28 U/L      ALT 28 U/L      Alkaline Phosphatase 79 U/L      Total Protein 7 1 g/dL      Albumin 4 1 g/dL      Total Bilirubin 0 86 mg/dL      eGFR 66 ml/min/1 73sq m     Narrative:      [facility] guidelines for Chronic Kidney Disease (CKD):     Stage 1 with normal or high GFR (GFR > 90 mL/min/1 73 square meters)    Stage 2 Mild CKD (GFR = 60-89 mL/min/1 73 square meters)    Stage 3A Moderate CKD (GFR = 45-59 mL/min/1 73 square meters)    Stage 3B Moderate CKD (GFR = 30-44 mL/min/1 73 square meters)    Stage 4 Severe CKD (GFR = 15-29 mL/min/1 73 square meters)    Stage 5 End Stage CKD (GFR <15 mL/min/1 73 square meters)  Note: GFR calculation is accurate only with a steady state creatinine    Fingerstick Glucose (POCT) [[id number]]  (Normal) Collected: 07/17/22 2259    Lab Status: Final result Updated: 07/17/22 2342     POC Glucose 110 mg/dl                  CT cervical spine without contrast   Final Result by Charlie Espana DO (07/18 7893)      No cervical spine fracture or traumatic malalignment                     Workstation performed: [id number]         CT head without contrast   Final Result by Charlie Espana DO (07/18 0044)      No acute intracranial abnormality  Workstation performed: [id number]                    Procedures  ECG 12 Lead Documentation Only    Date/Time: 7/17/2022 11:21 PM  Performed by: Carl Montague MD  Authorized by: Carl Montague MD     Indications / Diagnosis:  Unresponsiveness  ECG reviewed by me, the ED Provider: yes    Patient location:  ED  Interpretation:     Interpretation: normal    Rate:     ECG rate:  101    ECG rate assessment: tachycardic    Rhythm:     Rhythm: sinus tachycardia    Ectopy:     Ectopy: none    QRS:     QRS axis:  Normal    QRS intervals:  Normal  Conduction:     Conduction: normal    ST segments:     ST segments:  Normal  T waves:     T waves: normal    Other findings:     Other findings: LVH               ED Course                                             MDM  Number of Diagnoses or Management Options  Diagnosis management comments: Suspect opiate overdose likely fentanyl due to  necessity for high-dose Narcan  Disposition  Final diagnoses:   Overdose     Time reflects when diagnosis was documented in both MDM as applicable and the Disposition within this note     Time User Action Codes Description Comment    7/18/2022  2:46 AM Lillie Mora [A46 900L] Overdose       ED Disposition     ED Disposition   Discharge    Condition   Stable    Date/Time   Mon Jul 18, 2022  2:46 AM    Comment   Camilo Green  discharge to home/self care  Follow-up Information     Follow up With Specialties Details Why [address] [id number] [facility] [id number] [phone]            Discharge Medication List as of 7/18/2022  2:48 AM      START taking these medications    Details   naloxone (NARCAN) 4 mg/0 1 mL nasal spray Administer 1 spray into a nostril   If no response after 2-3 minutes, give another dose in the other nostril using a new spray , Normal      ondansetron (Zofran ODT) 4 mg disintegrating tablet Take 1 tablet (4 mg total) by mouth every 6 (six) hours as needed for nausea or vomiting, Starting Mon 7/18/2022, Normal             No discharge procedures on file      PDMP Review     None          ED Provider  Electronically Signed by           Cr Paris MD  07/18/22 0112       Cr Paris MD  07/21/22 9767

## 2022-07-18 NOTE — ED NOTES
Representative from [facility] spoke with patient and he declines drug rehab assistance         Ova REKHA Betancur  07/18/22 4592

## 2022-08-02 ENCOUNTER — HOSPITAL ENCOUNTER (EMERGENCY)
Facility: HOSPITAL | Age: 32
End: 2022-08-03
Attending: EMERGENCY MEDICINE | Admitting: EMERGENCY MEDICINE
Payer: COMMERCIAL

## 2022-08-02 ENCOUNTER — APPOINTMENT (EMERGENCY)
Dept: RADIOLOGY | Facility: HOSPITAL | Age: 32
End: 2022-08-02
Payer: COMMERCIAL

## 2022-08-02 DIAGNOSIS — F31.9 BIPOLAR 1 DISORDER (HCC): ICD-10-CM

## 2022-08-02 DIAGNOSIS — F19.10 POLYSUBSTANCE ABUSE (HCC): ICD-10-CM

## 2022-08-02 DIAGNOSIS — T50.901A ACCIDENTAL OVERDOSE, INITIAL ENCOUNTER: Primary | ICD-10-CM

## 2022-08-02 LAB
ALBUMIN SERPL BCP-MCNC: 3.9 G/DL (ref 3.5–5)
ALP SERPL-CCNC: 78 U/L (ref 46–116)
ALT SERPL W P-5'-P-CCNC: 68 U/L (ref 12–78)
AMPHETAMINES SERPL QL SCN: POSITIVE
ANION GAP SERPL CALCULATED.3IONS-SCNC: 9 MMOL/L (ref 4–13)
APAP SERPL-MCNC: <2 UG/ML (ref 10–20)
AST SERPL W P-5'-P-CCNC: 51 U/L (ref 5–45)
BARBITURATES UR QL: NEGATIVE
BASOPHILS # BLD AUTO: 0.07 THOUSANDS/ΜL (ref 0–0.1)
BASOPHILS NFR BLD AUTO: 1 % (ref 0–1)
BENZODIAZ UR QL: NEGATIVE
BILIRUB SERPL-MCNC: 0.69 MG/DL (ref 0.2–1)
BILIRUB UR QL STRIP: NEGATIVE
BUN SERPL-MCNC: 19 MG/DL (ref 5–25)
CALCIUM SERPL-MCNC: 8.6 MG/DL (ref 8.3–10.1)
CHLORIDE SERPL-SCNC: 105 MMOL/L (ref 96–108)
CLARITY UR: CLEAR
CO2 SERPL-SCNC: 28 MMOL/L (ref 21–32)
COCAINE UR QL: NEGATIVE
COLOR UR: NORMAL
CREAT SERPL-MCNC: 1.12 MG/DL (ref 0.6–1.3)
EOSINOPHIL # BLD AUTO: 0.28 THOUSAND/ΜL (ref 0–0.61)
EOSINOPHIL NFR BLD AUTO: 3 % (ref 0–6)
ERYTHROCYTE [DISTWIDTH] IN BLOOD BY AUTOMATED COUNT: 12.9 % (ref 11.6–15.1)
ETHANOL SERPL-MCNC: <3 MG/DL (ref 0–3)
GFR SERPL CREATININE-BSD FRML MDRD: 85 ML/MIN/1.73SQ M
GLUCOSE SERPL-MCNC: 143 MG/DL (ref 65–140)
GLUCOSE UR STRIP-MCNC: NEGATIVE MG/DL
HCT VFR BLD AUTO: 41.2 % (ref 36.5–49.3)
HGB BLD-MCNC: 13.6 G/DL (ref 12–17)
HGB UR QL STRIP.AUTO: NEGATIVE
IMM GRANULOCYTES # BLD AUTO: 0.02 THOUSAND/UL (ref 0–0.2)
IMM GRANULOCYTES NFR BLD AUTO: 0 % (ref 0–2)
KETONES UR STRIP-MCNC: NEGATIVE MG/DL
LEUKOCYTE ESTERASE UR QL STRIP: NEGATIVE
LYMPHOCYTES # BLD AUTO: 2.97 THOUSANDS/ΜL (ref 0.6–4.47)
LYMPHOCYTES NFR BLD AUTO: 33 % (ref 14–44)
MCH RBC QN AUTO: 31.2 PG (ref 26.8–34.3)
MCHC RBC AUTO-ENTMCNC: 33 G/DL (ref 31.4–37.4)
MCV RBC AUTO: 95 FL (ref 82–98)
METHADONE UR QL: NEGATIVE
MONOCYTES # BLD AUTO: 0.55 THOUSAND/ΜL (ref 0.17–1.22)
MONOCYTES NFR BLD AUTO: 6 % (ref 4–12)
NEUTROPHILS # BLD AUTO: 5.03 THOUSANDS/ΜL (ref 1.85–7.62)
NEUTS SEG NFR BLD AUTO: 57 % (ref 43–75)
NITRITE UR QL STRIP: NEGATIVE
NRBC BLD AUTO-RTO: 0 /100 WBCS
OPIATES UR QL SCN: NEGATIVE
OXYCODONE+OXYMORPHONE UR QL SCN: NEGATIVE
PCP UR QL: NEGATIVE
PH UR STRIP.AUTO: 6 [PH]
PLATELET # BLD AUTO: 250 THOUSANDS/UL (ref 149–390)
PMV BLD AUTO: 9.7 FL (ref 8.9–12.7)
POTASSIUM SERPL-SCNC: 4.2 MMOL/L (ref 3.5–5.3)
PROT SERPL-MCNC: 6.7 G/DL (ref 6.4–8.4)
PROT UR STRIP-MCNC: NEGATIVE MG/DL
RBC # BLD AUTO: 4.36 MILLION/UL (ref 3.88–5.62)
SALICYLATES SERPL-MCNC: 3 MG/DL (ref 3–20)
SODIUM SERPL-SCNC: 142 MMOL/L (ref 135–147)
SP GR UR STRIP.AUTO: >=1.03 (ref 1–1.03)
THC UR QL: POSITIVE
UROBILINOGEN UR QL STRIP.AUTO: 0.2 E.U./DL
WBC # BLD AUTO: 8.92 THOUSAND/UL (ref 4.31–10.16)

## 2022-08-02 PROCEDURE — 80143 DRUG ASSAY ACETAMINOPHEN: CPT | Performed by: EMERGENCY MEDICINE

## 2022-08-02 PROCEDURE — 81003 URINALYSIS AUTO W/O SCOPE: CPT | Performed by: EMERGENCY MEDICINE

## 2022-08-02 PROCEDURE — 71045 X-RAY EXAM CHEST 1 VIEW: CPT

## 2022-08-02 PROCEDURE — 80053 COMPREHEN METABOLIC PANEL: CPT | Performed by: EMERGENCY MEDICINE

## 2022-08-02 PROCEDURE — 82077 ASSAY SPEC XCP UR&BREATH IA: CPT | Performed by: EMERGENCY MEDICINE

## 2022-08-02 PROCEDURE — 96361 HYDRATE IV INFUSION ADD-ON: CPT

## 2022-08-02 PROCEDURE — 80307 DRUG TEST PRSMV CHEM ANLYZR: CPT | Performed by: EMERGENCY MEDICINE

## 2022-08-02 PROCEDURE — 80179 DRUG ASSAY SALICYLATE: CPT | Performed by: EMERGENCY MEDICINE

## 2022-08-02 PROCEDURE — 85025 COMPLETE CBC W/AUTO DIFF WBC: CPT | Performed by: EMERGENCY MEDICINE

## 2022-08-02 PROCEDURE — 36415 COLL VENOUS BLD VENIPUNCTURE: CPT | Performed by: EMERGENCY MEDICINE

## 2022-08-02 PROCEDURE — 99285 EMERGENCY DEPT VISIT HI MDM: CPT

## 2022-08-02 PROCEDURE — 99285 EMERGENCY DEPT VISIT HI MDM: CPT | Performed by: EMERGENCY MEDICINE

## 2022-08-02 PROCEDURE — 93005 ELECTROCARDIOGRAM TRACING: CPT

## 2022-08-02 PROCEDURE — 96360 HYDRATION IV INFUSION INIT: CPT

## 2022-08-02 RX ORDER — NALOXONE HYDROCHLORIDE 1 MG/ML
INJECTION INTRAMUSCULAR; INTRAVENOUS; SUBCUTANEOUS
Status: COMPLETED
Start: 2022-08-02 | End: 2022-08-02

## 2022-08-02 RX ADMIN — SODIUM CHLORIDE 1000 ML: 0.9 INJECTION, SOLUTION INTRAVENOUS at 19:42

## 2022-08-02 RX ADMIN — NALOXONE HYDROCHLORIDE: 1 INJECTION PARENTERAL at 19:49

## 2022-08-02 NOTE — ED NOTES
Patient states he'd rather die than live when ask by Sergio Loay RN if he wanted to get help to recover from overdosing but states he does not want it         Charlene Hong RN  08/02/22 9588

## 2022-08-02 NOTE — ED PROVIDER NOTES
History  Chief Complaint   Patient presents with    Overdose - Accidental     Accidental overdose on heroin, patient found un responsive with agonal breathing  2 mg Narcan given nasal asp  As per ems patient had fallen and hit his face  Abrasion noted on nose  Patient awake and alert with PO2 of 95% on RA upon arrival       78-year-old male presents the ED with complaints from EMS that he overdosed on heroin  Patient was given 1 mg of Narcan intranasally  He was ventilated by way of ambulance bag on the way the hospital and then woke up is awake alert here in the ED  Does have some contusions on the face  EMS feels that he may have fallen on his face  Patient does not complain of any pain to his face at any point does have 1 loose tooth right upper no other complaints her signs of injury  Patient did states to me when I mentioned that he almost  he stated that that was a peaceful way to go  He states that he has been dealing with a lot at home and is not afraid of dying and felt like that would be a relief at this point  He states he he would either go out or take somebody with him asked him what he meant by that he stated that lot of people have been aggravating him and he feels that he could take 1 of those people out with him  History provided by:  Patient and EMS personnel   used: No        Prior to Admission Medications   Prescriptions Last Dose Informant Patient Reported? Taking?    QUEtiapine (SEROquel) 100 mg tablet Not Taking at Unknown time  Yes No   Sig: Take 100 mg by mouth daily at bedtime   Patient not taking: Reported on 2022   carBAMazepine (TEGretol) 200 mg tablet Not Taking at Unknown time  Yes No   Sig: Take 200 mg by mouth daily at bedtime   Patient not taking: Reported on 2022   escitalopram (LEXAPRO) 10 mg tablet Not Taking at Unknown time  Yes No   Sig: Take 10 mg by mouth daily   Patient not taking: Reported on 2022   gabapentin (NEURONTIN) 300 mg capsule Not Taking at Unknown time  Yes No   Sig: Take 300 mg by mouth 3 (three) times a day   Patient not taking: Reported on 8/2/2022      Facility-Administered Medications: None       Past Medical History:   Diagnosis Date    ADHD (attention deficit hyperactivity disorder)     Anxiety     last assessed 3/23/15    Bipolar 1 disorder ([facility] [address] )     Genital warts     last assessed 12/7/15, resolved 11/1/17     Psychiatric disorder        History reviewed  No pertinent surgical history  Family History   Problem Relation Age of Onset    No Known Problems Father      I have reviewed and agree with the history as documented  E-Cigarette/Vaping    E-Cigarette Use Never User      E-Cigarette/Vaping Substances    Nicotine No     THC No     CBD No     Flavoring No     Other No     Unknown No      Social History     Tobacco Use    Smoking status: Current Every Day Smoker     Packs/day: 0 50     Types: Cigarettes    Smokeless tobacco: Never Used   Vaping Use    Vaping Use: Never used   Substance Use Topics    Alcohol use: Yes     Comment: occ   Drug use: Yes     Types: Heroin, Marijuana, Methamphetamines     Comment: states was "shooting up steroids" 3 months ago (3/4/19 states this was 3 years ago)       Review of Systems   Constitutional: Negative for activity change, chills, diaphoresis and fever  HENT: Negative for congestion, ear pain, nosebleeds, sore throat, trouble swallowing and voice change  Eyes: Negative for pain, discharge and redness  Respiratory: Negative for apnea, cough, choking, shortness of breath, wheezing and stridor  Cardiovascular: Negative for chest pain and palpitations  Gastrointestinal: Negative for abdominal distention, abdominal pain, constipation, diarrhea, nausea and vomiting  Endocrine: Negative for polydipsia  Genitourinary: Negative for difficulty urinating, dysuria, flank pain, frequency, hematuria and urgency     Musculoskeletal: Negative for back pain, gait problem, joint swelling, myalgias, neck pain and neck stiffness  Skin: Negative for pallor and rash  Neurological: Negative for dizziness, tremors, syncope, speech difficulty, weakness, numbness and headaches  Hematological: Negative for adenopathy  Psychiatric/Behavioral: Negative for confusion, hallucinations, self-injury and suicidal ideas  The patient is not nervous/anxious  Physical Exam  Physical Exam  Vitals and nursing note reviewed  Constitutional:       General: He is not in acute distress  Appearance: He is well-developed  He is not diaphoretic  HENT:      Head: Normocephalic and atraumatic  Comments: Few facial abrasions     Right Ear: External ear normal       Left Ear: External ear normal       Nose: Nose normal    Eyes:      Conjunctiva/sclera: Conjunctivae normal       Pupils: Pupils are equal, round, and reactive to light  Cardiovascular:      Rate and Rhythm: Normal rate and regular rhythm  Heart sounds: Normal heart sounds  Pulmonary:      Effort: Pulmonary effort is normal       Breath sounds: Normal breath sounds  Abdominal:      General: Bowel sounds are normal       Palpations: Abdomen is soft  Musculoskeletal:         General: Normal range of motion  Cervical back: Normal range of motion and neck supple  Skin:     General: Skin is warm and dry  Neurological:      Mental Status: He is alert and oriented to person, place, and time  Deep Tendon Reflexes: Reflexes are normal and symmetric  Psychiatric:         Behavior: Behavior is cooperative           Vital Signs  ED Triage Vitals [08/02/22 1938]   Temperature Pulse Respirations Blood Pressure SpO2   99 1 °F (37 3 °C) (!) 110 18 120/69 96 %      Temp Source Heart Rate Source Patient Position - Orthostatic VS BP Location FiO2 (%)   Oral Monitor Lying Right arm --      Pain Score       No Pain           Vitals:    08/02/22 1938 08/02/22 1945 08/02/22 2210 08/03/22 0839   BP: 120/69 120/69 129/69 108/70   Pulse: (!) 110 104 81 89   Patient Position - Orthostatic VS: Lying  Sitting Sitting         Visual Acuity      ED Medications  Medications   naloxone (NARCAN) 2 MG/2ML injection **ADS Override Pull** (  Given 8/2/22 1949)   sodium chloride 0 9 % bolus 1,000 mL (0 mL Intravenous Stopped 8/2/22 2124)       Diagnostic Studies  Results Reviewed     Procedure Component Value Units Date/Time    COVID only [[id number]]  (Normal) Collected: 08/03/22 0708    Lab Status: Final result Specimen: Nares from Nose Updated: 08/03/22 0841     SARS-CoV-2 Negative    Narrative:      FOR PEDIATRIC PATIENTS - copy/paste COVID Guidelines URL to browser: [email or web address]  [employer]    SARS-CoV-2 assay is a Nucleic Acid Amplification assay intended for the  qualitative detection of nucleic acid from SARS-CoV-2 in nasopharyngeal  swabs  Results are for the presumptive identification of SARS-CoV-2 RNA  Positive results are indicative of infection with SARS-CoV-2, the virus  causing COVID-19, but do not rule out bacterial infection or co-infection  with other viruses  Laboratories within the [place] and its  territories are required to report all positive results to the appropriate  public health authorities  Negative results do not preclude SARS-CoV-2  infection and should not be used as the sole basis for treatment or other  patient management decisions  Negative results must be combined with  clinical observations, patient history, and epidemiological information  This test has not been FDA cleared or approved  This test has been authorized by FDA under an Emergency Use Authorization  (EUA)   This test is only authorized for the duration of time the  declaration that circumstances exist justifying the authorization of the  emergency use of an in vitro diagnostic tests for detection of SARS-CoV-2  virus and/or diagnosis of COVID-19 infection under section 564(b)(1) of  the Act, 21 U  S C  468JMA-6(L)(5), unless the authorization is terminated  or revoked sooner  The test has been validated but independent review by FDA  and CLIA is pending  Test performed using [employer] GeneXpert: This RT-PCR assay targets N2,  a region unique to SARS-CoV-2  A conserved region in the E-gene was chosen  for pan-Sarbecovirus detection which includes SARS-CoV-2  Rapid drug screen, urine [[id number]]  (Abnormal) Collected: 08/02/22 2219    Lab Status: Final result Specimen: Urine, Clean Catch Updated: 08/02/22 2239     Amph/Meth UR Positive     Barbiturate Ur Negative     Benzodiazepine Urine Negative     Cocaine Urine Negative     Methadone Urine Negative     Opiate Urine Negative     PCP Ur Negative     THC Urine Positive     Oxycodone Urine Negative    Narrative:      Presumptive report  If requested, specimen will be sent to reference lab for confirmation  FOR MEDICAL PURPOSES ONLY  IF CONFIRMATION NEEDED PLEASE CONTACT THE LAB WITHIN 5 DAYS      Drug Screen Cutoff Levels:  AMPHETAMINE/METHAMPHETAMINES  1000 ng/mL  BARBITURATES     200 ng/mL  BENZODIAZEPINES     200 ng/mL  COCAINE      300 ng/mL  METHADONE      300 ng/mL  OPIATES      300 ng/mL  PHENCYCLIDINE     25 ng/mL  THC       50 ng/mL  OXYCODONE      100 ng/mL    UA (URINE) with reflex to Scope [[id number]] Collected: 08/02/22 2219    Lab Status: Final result Specimen: Urine, Clean Catch Updated: 08/02/22 2226     Color, UA Light Yellow     Clarity, UA Clear     Specific Gravity, UA >=1 030     pH, UA 6 0     Leukocytes, UA Negative     Nitrite, UA Negative     Protein, UA Negative mg/dl      Glucose, UA Negative mg/dl      Ketones, UA Negative mg/dl      Urobilinogen, UA 0 2 E U /dl      Bilirubin, UA Negative     Occult Blood, UA Negative    Salicylate level [[id number]]  (Normal) Collected: 08/02/22 1941    Lab Status: Final result Specimen: Blood from Arm, Left Updated: 30/00/61 6391     Salicylate Lvl 3 0 mg/dL Acetaminophen level-If concentration is detectable, please discuss with medical  on call   [[id number]]  (Abnormal) Collected: 08/02/22 1941    Lab Status: Final result Specimen: Blood from Arm, Left Updated: 08/02/22 2030     Acetaminophen Level <2 0 ug/mL     Comprehensive metabolic panel [[id number]]  (Abnormal) Collected: 08/02/22 1941    Lab Status: Final result Specimen: Blood from Arm, Left Updated: 08/02/22 2020     Sodium 142 mmol/L      Potassium 4 2 mmol/L      Chloride 105 mmol/L      CO2 28 mmol/L      ANION GAP 9 mmol/L      BUN 19 mg/dL      Creatinine 1 12 mg/dL      Glucose 143 mg/dL      Calcium 8 6 mg/dL      AST 51 U/L      ALT 68 U/L      Alkaline Phosphatase 78 U/L      Total Protein 6 7 g/dL      Albumin 3 9 g/dL      Total Bilirubin 0 69 mg/dL      eGFR 85 ml/min/1 73sq m     Narrative:      [facility] guidelines for Chronic Kidney Disease (CKD):     Stage 1 with normal or high GFR (GFR > 90 mL/min/1 73 square meters)    Stage 2 Mild CKD (GFR = 60-89 mL/min/1 73 square meters)    Stage 3A Moderate CKD (GFR = 45-59 mL/min/1 73 square meters)    Stage 3B Moderate CKD (GFR = 30-44 mL/min/1 73 square meters)    Stage 4 Severe CKD (GFR = 15-29 mL/min/1 73 square meters)    Stage 5 End Stage CKD (GFR <15 mL/min/1 73 square meters)  Note: GFR calculation is accurate only with a steady state creatinine    Ethanol [[id number]]  (Normal) Collected: 08/02/22 1941    Lab Status: Final result Specimen: Blood from Arm, Left Updated: 08/02/22 1959     Ethanol Lvl <3 mg/dL     CBC and differential [[id number]] Collected: 08/02/22 1941    Lab Status: Final result Specimen: Blood from Arm, Left Updated: 08/02/22 1947     WBC 8 92 Thousand/uL      RBC 4 36 Million/uL      Hemoglobin 13 6 g/dL      Hematocrit 41 2 %      MCV 95 fL      MCH 31 2 pg      MCHC 33 0 g/dL      RDW 12 9 %      MPV 9 7 fL      Platelets 261 Thousands/uL      nRBC 0 /100 WBCs      Neutrophils Relative 57 %      Immat GRANS % 0 %      Lymphocytes Relative 33 %      Monocytes Relative 6 %      Eosinophils Relative 3 %      Basophils Relative 1 %      Neutrophils Absolute 5 03 Thousands/µL      Immature Grans Absolute 0 02 Thousand/uL      Lymphocytes Absolute 2 97 Thousands/µL      Monocytes Absolute 0 55 Thousand/µL      Eosinophils Absolute 0 28 Thousand/µL      Basophils Absolute 0 07 Thousands/µL                  XR chest 1 view portable   Final Result by Roslyn Rios MD (08/03 9045)      No acute cardiopulmonary disease  Workstation performed: [id number]                    Procedures  Procedures         ED Course                                             MDM  Number of Diagnoses or Management Options  Diagnosis management comments: Patient is medically clear for psychiatric treatment and or transport  Disposition  Final diagnoses:   Accidental overdose, initial encounter   Bipolar 1 disorder ([facility] [address] )   Polysubstance abuse ([facility] [address] )     Time reflects when diagnosis was documented in both MDM as applicable and the Disposition within this note     Time User Action Codes Description Comment    8/3/2022 10:16 AM Venessa SEGURA Add [T50 901A] Accidental overdose, initial encounter     8/3/2022 10:16 AM Venessa SEGURA Add [F31 9] Bipolar 1 disorder ([facility] [address] )     8/3/2022 10:16 AM Perla Parra Add [F19 10] Polysubstance abuse [facility])       ED Disposition     ED Disposition   Transfer to Behavioral Health Condition   --    Date/Time   Wed Aug 3, 2022 10:16 AM    Comment   Hussein Lerma  should be transferred out to [facility] and has been medically cleared             MD Documentation    [place] [facility] Most Recent Value   Patient Condition The patient has been stabilized such that within reasonable medical probability, no material deterioration of the patient condition or the condition of the unborn child(kaiden) is likely to result from the transfer   Reason for Transfer Level of Care needed not available at this facility   Benefits of Transfer Specialized equipment and/or services available at the receiving facility (Include comment)________________________   Risks of Transfer Potential for delay in receiving treatment   Accepting Physician Dr Sage Tripp Name, Bunny Khalil   Sending MD Dr Barrios Good   Provider Certification General risk, such as traffic hazards, adverse weather conditions, rough terrain or turbulence, possible failure of equipment (including vehicle or aircraft), or consequences of actions of persons outside the control of the transport personnel      RN Documentation    [address] Name, Bunny Khalil      Follow-up Information    None         Discharge Medication List as of 8/3/2022  2:58 PM      CONTINUE these medications which have NOT CHANGED    Details   carBAMazepine (TEGretol) 200 mg tablet Take 200 mg by mouth daily at bedtime, Historical Med      escitalopram (LEXAPRO) 10 mg tablet Take 10 mg by mouth daily, Historical Med      gabapentin (NEURONTIN) 300 mg capsule Take 300 mg by mouth 3 (three) times a day, Historical Med      QUEtiapine (SEROquel) 100 mg tablet Take 100 mg by mouth daily at bedtime, Historical Med             No discharge procedures on file      PDMP Review     None          ED Provider  Electronically Signed by           Obdulia Berry DO  08/04/22 2509

## 2022-08-03 VITALS
HEART RATE: 89 BPM | RESPIRATION RATE: 20 BRPM | SYSTOLIC BLOOD PRESSURE: 108 MMHG | OXYGEN SATURATION: 97 % | TEMPERATURE: 97.7 F | DIASTOLIC BLOOD PRESSURE: 70 MMHG

## 2022-08-03 LAB
ATRIAL RATE: 112 BPM
P AXIS: 69 DEGREES
PR INTERVAL: 126 MS
QRS AXIS: 70 DEGREES
QRSD INTERVAL: 98 MS
QT INTERVAL: 332 MS
QTC INTERVAL: 453 MS
SARS-COV-2 RNA RESP QL NAA+PROBE: NEGATIVE
T WAVE AXIS: 53 DEGREES
VENTRICULAR RATE: 112 BPM

## 2022-08-03 PROCEDURE — U0005 INFEC AGEN DETEC AMPLI PROBE: HCPCS | Performed by: EMERGENCY MEDICINE

## 2022-08-03 PROCEDURE — U0003 INFECTIOUS AGENT DETECTION BY NUCLEIC ACID (DNA OR RNA); SEVERE ACUTE RESPIRATORY SYNDROME CORONAVIRUS 2 (SARS-COV-2) (CORONAVIRUS DISEASE [COVID-19]), AMPLIFIED PROBE TECHNIQUE, MAKING USE OF HIGH THROUGHPUT TECHNOLOGIES AS DESCRIBED BY CMS-2020-01-R: HCPCS | Performed by: EMERGENCY MEDICINE

## 2022-08-03 PROCEDURE — 93010 ELECTROCARDIOGRAM REPORT: CPT | Performed by: INTERNAL MEDICINE

## 2022-08-03 NOTE — ED NOTES
Patient in secured holding area on observation with sitter present       Beena Wellington RN  08/03/22 0757

## 2022-08-03 NOTE — ED NOTES
8/3/22 @ 0730:  PES notes that patient was accepted at [facility], but no details  PES awaiting results from COVID-19 test, which will be forwarded to [facility]  At that point, PES will acquire all necessary information to complete precert  Maria MS    0845:  PES received and printed COVID-19 negative results; called [facility] for acceptance information and was informed that, "we want the patient and will take him, but we don't actually have a bed yet; we will know after 1000  PES was asked to hold off on precert until notified that patient actually has a bed  PES will await call from [facility] before proceeding  MariaMS    1013: PES received call from Heike at [facility]:  Patient is accepted at [facility]  Patient is accepted by Dr Brooke Parekh per [facility]  Nurse report is to be called to [phone], ask for unit 1 and admissions will transfer to unit prior to patient transfer  [facility] [address] [facility]: PES called [facility] for transport: Will call back with time; PES faxed medical necessity form to Pierre De Anda @ [phone]  Maria MS    Transportation is arranged with **     Transportation is scheduled for **  Patient may go to the floor at **

## 2022-08-03 NOTE — ED NOTES
Pt  Requests more food  [place] ([facility]) sandwich given  This pt  Is pleasant and cooperative  Appears sad  When asked if he feels suicidal , he does not reply  Comfort measures taken        Kemal Odonnell RN  08/03/22 6881

## 2022-08-03 NOTE — ED NOTES
Eli Angel from crisis in the room talking to the patient  Responsive and calm    RN rounded on this patient     Simon Melendrez RN  08/02/22 2033
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
## 2022-08-03 NOTE — ED NOTES
Patient belonging placed by Rita Saldivar at [facility] and has been accounted for in [facility]  08/02/22 2045

## 2022-08-03 NOTE — ED CARE HANDOFF
Emergency Department Sign Out Note        Sign out and transfer of care from previous provider See Separate Emergency Department note  The patient, Abdifatah Loomis , was evaluated by the previous provider for accidental overdose, psychiatric evaluation  Workup Completed:  Patient is medically cleared    ED Course / Workup Pending (followup): Awaiting placement on voluntary basis                                     Procedures  MDM        Disposition  Final diagnoses:   None     ED Disposition     None      Follow-up Information    None       Patient's Medications   Discharge Prescriptions    No medications on file     No discharge procedures on file         ED Provider  Electronically Signed by     Sarah Davidson MD  08/03/22 4401

## 2022-08-03 NOTE — ED NOTES
1:1 sitter in place  Patient is still talking on the phone with his sister from a cellphone of a male visitor which is also a family member       Sharmila Musa RN  08/02/22 1671

## 2022-08-03 NOTE — EMTALA/ACUTE CARE TRANSFER
[address] [facility] [facility] [address]  [place] [address]  Dept: [phone]      EMTALA TRANSFER CONSENT    NAME Mirza Denney   1990                              MRN [id number]    I have been informed of my rights regarding examination, treatment, and transfer   by Dr Sandip Tom MD    Benefits: Specialized equipment and/or services available at the receiving facility (Include comment)________________________    Risks: Potential for delay in receiving treatment      Transfer Request   I acknowledge that my medical condition has been evaluated and explained to me by the emergency department physician or other qualified medical person and/or my attending physician who has recommended and offered to me further medical examination and treatment  I understand the Hospital's obligation with respect to the treatment and stabilization of my emergency medical condition  I nevertheless request to be transferred  I release the Hospital, the doctor, and any other persons caring for me from all responsibility or liability for any injury or ill effects that may result from my transfer and agree to accept all responsibility for the consequences of my choice to transfer, rather than receive stabilizing treatment at the Hospital  I understand that because the transfer is my request, my insurance may not provide reimbursement for the services  The Hospital will assist and direct me and my family in how to make arrangements for transfer, but the hospital is not liable for any fees charged by the transport service  In spite of this understanding, I refuse to consent to further medical examination and treatment which has been offered to me, and request transfer to  Julietet Phillips Name, [address] : Niecy Quinteros   I authorize the performance of emergency medical procedures and treatments upon me in both transit and upon arrival at the receiving facility  Additionally, I authorize the release of any and all medical records to the receiving facility and request they be transported with me, if possible  I authorize the performance of emergency medical procedures and treatments upon me in both transit and upon arrival at the receiving facility  Additionally, I authorize the release of any and all medical records to the receiving facility and request they be transported with me, if possible  I understand that the safest mode of transportation during a medical emergency is an ambulance and that the Hospital advocates the use of this mode of transport  Risks of traveling to the receiving facility by car, including absence of medical control, life sustaining equipment, such as oxygen, and medical personnel has been explained to me and I fully understand them  (KIEL CORRECT BOX BELOW)  [  ]  I consent to the stated transfer and to be transported by ambulance/helicopter  [  ]  I consent to the stated transfer, but refuse transportation by ambulance and accept full responsibility for my transportation by car  I understand the risks of non-ambulance transfers and I exonerate the Hospital and its staff from any deterioration in my condition that results from this refusal     X___________________________________________    DATE  22  TIME________  Signature of patient or legally responsible individual signing on patient behalf           RELATIONSHIP TO PATIENT_________________________          Provider Certification    NAME Vielka Gibbs  [facility] 1990                              MRN [id number]    A medical screening exam was performed on the above named patient  Based on the examination:    Condition Necessitating Transfer The primary encounter diagnosis was Accidental overdose, initial encounter  Diagnoses of Bipolar 1 disorder ([facility] [address] ) and Polysubstance abuse ([facility] [address] ) were also pertinent to this visit      Patient Condition: The patient has been stabilized such that within reasonable medical probability, no material deterioration of the patient condition or the condition of the unborn child(kaiden) is likely to result from the transfer    Reason for Transfer: Level of Care needed not available at this facility    Transfer Requirements: [address]   · Space available and qualified personnel available for treatment as acknowledged by    · Agreed to accept transfer and to provide appropriate medical treatment as acknowledged by       Dr Ml Bedolla  · Appropriate medical records of the examination and treatment of the patient are provided at the time of transfer   [address], [address] _______  · Transfer will be performed by qualified personnel from    and appropriate transfer equipment as required, including the use of necessary and appropriate life support measures  Provider Certification: I have examined the patient and explained the following risks and benefits of being transferred/refusing transfer to the patient/family:  General risk, such as traffic hazards, adverse weather conditions, rough terrain or turbulence, possible failure of equipment (including vehicle or aircraft), or consequences of actions of persons outside the control of the transport personnel      Based on these reasonable risks and benefits to the patient and/or the unborn child(kaiden), and based upon the information available at the time of the patients examination, I certify that the medical benefits reasonably to be expected from the provision of appropriate medical treatments at another medical facility outweigh the increasing risks, if any, to the individuals medical condition, and in the case of labor to the unborn child, from effecting the transfer      X____________________________________________ DATE 08/03/22        TIME_______      ORIGINAL - SEND TO MEDICAL RECORDS   COPY - SEND WITH PATIENT DURING TRANSFER

## 2022-08-03 NOTE — ED NOTES
Patient is calm and not in distress at the moment  1:1 sitter will be in place for SI and safety  RN assigned is doing a close monitoring on patient  At the moment       Barbara Wright RN  08/02/22 2009

## 2022-08-03 NOTE — ED NOTES
Patient on 1:1 observation for SI in secured holding area with sitter present       Kenn Douglas RN  08/03/22 4427

## 2022-08-03 NOTE — ED NOTES
Pt  Prepared for transfer  Consent obtained from pt   Awaiting transport time     Kiel Hernandez RN  08/03/22 5037

## 2022-08-03 NOTE — ED NOTES
[facility] for bed availability @ 21:55 - no answer  [address] - voice mail was left  [facility] - willing to accept the referral   [facility] - voice mail was left  Faxed chart to: [facility]; [address]; [facility] & [facility] @ 22:45 - called each to verify receipt

## 2022-08-14 ENCOUNTER — APPOINTMENT (EMERGENCY)
Dept: RADIOLOGY | Facility: HOSPITAL | Age: 32
End: 2022-08-14
Payer: COMMERCIAL

## 2022-08-14 ENCOUNTER — HOSPITAL ENCOUNTER (EMERGENCY)
Facility: HOSPITAL | Age: 32
Discharge: HOME | End: 2022-08-15
Attending: EMERGENCY MEDICINE
Payer: COMMERCIAL

## 2022-08-14 DIAGNOSIS — F19.10 DRUG ABUSE (HCC): ICD-10-CM

## 2022-08-14 DIAGNOSIS — R41.82 ALTERED MENTAL STATUS: Primary | ICD-10-CM

## 2022-08-14 LAB
ALBUMIN SERPL BCP-MCNC: 4 G/DL (ref 3.5–5)
ALP SERPL-CCNC: 82 U/L (ref 46–116)
ALT SERPL W P-5'-P-CCNC: 26 U/L (ref 12–78)
ANION GAP SERPL CALCULATED.3IONS-SCNC: 11 MMOL/L (ref 4–13)
APAP SERPL-MCNC: <2 UG/ML (ref 10–20)
AST SERPL W P-5'-P-CCNC: 18 U/L (ref 5–45)
BASOPHILS # BLD AUTO: 0.06 THOUSANDS/ΜL (ref 0–0.1)
BASOPHILS NFR BLD AUTO: 1 % (ref 0–1)
BILIRUB SERPL-MCNC: 0.58 MG/DL (ref 0.2–1)
BUN SERPL-MCNC: 15 MG/DL (ref 5–25)
CALCIUM SERPL-MCNC: 8.6 MG/DL (ref 8.3–10.1)
CHLORIDE SERPL-SCNC: 109 MMOL/L (ref 96–108)
CK SERPL-CCNC: 161 U/L (ref 39–308)
CO2 SERPL-SCNC: 27 MMOL/L (ref 21–32)
CREAT SERPL-MCNC: 1.31 MG/DL (ref 0.6–1.3)
EOSINOPHIL # BLD AUTO: 0.29 THOUSAND/ΜL (ref 0–0.61)
EOSINOPHIL NFR BLD AUTO: 3 % (ref 0–6)
ERYTHROCYTE [DISTWIDTH] IN BLOOD BY AUTOMATED COUNT: 12.8 % (ref 11.6–15.1)
ETHANOL SERPL-MCNC: <3 MG/DL (ref 0–3)
GFR SERPL CREATININE-BSD FRML MDRD: 70 ML/MIN/1.73SQ M
GLUCOSE SERPL-MCNC: 75 MG/DL (ref 65–140)
GLUCOSE SERPL-MCNC: 80 MG/DL (ref 65–140)
HCT VFR BLD AUTO: 39.4 % (ref 36.5–49.3)
HGB BLD-MCNC: 13.3 G/DL (ref 12–17)
IMM GRANULOCYTES # BLD AUTO: 0.03 THOUSAND/UL (ref 0–0.2)
IMM GRANULOCYTES NFR BLD AUTO: 0 % (ref 0–2)
LYMPHOCYTES # BLD AUTO: 2.36 THOUSANDS/ΜL (ref 0.6–4.47)
LYMPHOCYTES NFR BLD AUTO: 27 % (ref 14–44)
MAGNESIUM SERPL-MCNC: 2.4 MG/DL (ref 1.6–2.6)
MCH RBC QN AUTO: 31.1 PG (ref 26.8–34.3)
MCHC RBC AUTO-ENTMCNC: 33.8 G/DL (ref 31.4–37.4)
MCV RBC AUTO: 92 FL (ref 82–98)
MONOCYTES # BLD AUTO: 0.55 THOUSAND/ΜL (ref 0.17–1.22)
MONOCYTES NFR BLD AUTO: 6 % (ref 4–12)
NEUTROPHILS # BLD AUTO: 5.55 THOUSANDS/ΜL (ref 1.85–7.62)
NEUTS SEG NFR BLD AUTO: 63 % (ref 43–75)
NRBC BLD AUTO-RTO: 0 /100 WBCS
PLATELET # BLD AUTO: 228 THOUSANDS/UL (ref 149–390)
PMV BLD AUTO: 10 FL (ref 8.9–12.7)
POTASSIUM SERPL-SCNC: 3.8 MMOL/L (ref 3.5–5.3)
PROT SERPL-MCNC: 6.5 G/DL (ref 6.4–8.4)
RBC # BLD AUTO: 4.28 MILLION/UL (ref 3.88–5.62)
SALICYLATES SERPL-MCNC: <3 MG/DL (ref 3–20)
SODIUM SERPL-SCNC: 147 MMOL/L (ref 135–147)
WBC # BLD AUTO: 8.84 THOUSAND/UL (ref 4.31–10.16)

## 2022-08-14 PROCEDURE — 80053 COMPREHEN METABOLIC PANEL: CPT | Performed by: EMERGENCY MEDICINE

## 2022-08-14 PROCEDURE — 82077 ASSAY SPEC XCP UR&BREATH IA: CPT | Performed by: EMERGENCY MEDICINE

## 2022-08-14 PROCEDURE — 70450 CT HEAD/BRAIN W/O DYE: CPT

## 2022-08-14 PROCEDURE — G1004 CDSM NDSC: HCPCS

## 2022-08-14 PROCEDURE — 82948 REAGENT STRIP/BLOOD GLUCOSE: CPT

## 2022-08-14 PROCEDURE — 82550 ASSAY OF CK (CPK): CPT | Performed by: EMERGENCY MEDICINE

## 2022-08-14 PROCEDURE — 36415 COLL VENOUS BLD VENIPUNCTURE: CPT | Performed by: EMERGENCY MEDICINE

## 2022-08-14 PROCEDURE — 85025 COMPLETE CBC W/AUTO DIFF WBC: CPT | Performed by: EMERGENCY MEDICINE

## 2022-08-14 PROCEDURE — 82553 CREATINE MB FRACTION: CPT | Performed by: EMERGENCY MEDICINE

## 2022-08-14 PROCEDURE — 96360 HYDRATION IV INFUSION INIT: CPT

## 2022-08-14 PROCEDURE — 99285 EMERGENCY DEPT VISIT HI MDM: CPT

## 2022-08-14 PROCEDURE — 83735 ASSAY OF MAGNESIUM: CPT | Performed by: EMERGENCY MEDICINE

## 2022-08-14 PROCEDURE — 96361 HYDRATE IV INFUSION ADD-ON: CPT

## 2022-08-14 PROCEDURE — 80179 DRUG ASSAY SALICYLATE: CPT | Performed by: EMERGENCY MEDICINE

## 2022-08-14 PROCEDURE — 80143 DRUG ASSAY ACETAMINOPHEN: CPT | Performed by: EMERGENCY MEDICINE

## 2022-08-14 RX ADMIN — SODIUM CHLORIDE 1000 ML: 0.9 INJECTION, SOLUTION INTRAVENOUS at 22:50

## 2022-08-15 VITALS
HEIGHT: 69 IN | BODY MASS INDEX: 28.14 KG/M2 | RESPIRATION RATE: 22 BRPM | DIASTOLIC BLOOD PRESSURE: 80 MMHG | WEIGHT: 190 LBS | TEMPERATURE: 97.9 F | SYSTOLIC BLOOD PRESSURE: 121 MMHG | OXYGEN SATURATION: 99 % | HEART RATE: 82 BPM

## 2022-08-15 LAB
AMPHETAMINES SERPL QL SCN: POSITIVE
AMPHETAMINES SERPL QL SCN: POSITIVE
BARBITURATES UR QL: NEGATIVE
BARBITURATES UR QL: NEGATIVE
BENZODIAZ UR QL: NEGATIVE
BENZODIAZ UR QL: NEGATIVE
BILIRUB UR QL STRIP: NEGATIVE
CK MB SERPL-MCNC: 1.2 % (ref 0–2.5)
CK MB SERPL-MCNC: 2 NG/ML (ref 0–5)
CLARITY UR: CLEAR
COCAINE UR QL: NEGATIVE
COCAINE UR QL: NEGATIVE
COLOR UR: YELLOW
GLUCOSE UR STRIP-MCNC: NEGATIVE MG/DL
HGB UR QL STRIP.AUTO: NEGATIVE
KETONES UR STRIP-MCNC: ABNORMAL MG/DL
LEUKOCYTE ESTERASE UR QL STRIP: NEGATIVE
METHADONE UR QL: NEGATIVE
METHADONE UR QL: NEGATIVE
NITRITE UR QL STRIP: NEGATIVE
OPIATES UR QL SCN: NEGATIVE
OXYCODONE+OXYMORPHONE UR QL SCN: NEGATIVE
OXYCODONE+OXYMORPHONE UR QL SCN: NEGATIVE
PCP UR QL: NEGATIVE
PCP UR QL: NEGATIVE
PH UR STRIP.AUTO: 6 [PH]
PROT UR STRIP-MCNC: NEGATIVE MG/DL
SP GR UR STRIP.AUTO: >=1.03 (ref 1–1.03)
THC UR QL: POSITIVE
THC UR QL: POSITIVE
UROBILINOGEN UR QL STRIP.AUTO: 0.2 E.U./DL

## 2022-08-15 PROCEDURE — 96361 HYDRATE IV INFUSION ADD-ON: CPT

## 2022-08-15 PROCEDURE — 99284 EMERGENCY DEPT VISIT MOD MDM: CPT | Performed by: EMERGENCY MEDICINE

## 2022-08-15 PROCEDURE — 80307 DRUG TEST PRSMV CHEM ANLYZR: CPT | Performed by: EMERGENCY MEDICINE

## 2022-08-15 PROCEDURE — 81003 URINALYSIS AUTO W/O SCOPE: CPT | Performed by: EMERGENCY MEDICINE

## 2022-08-15 NOTE — ED NOTES
Patient noted to be off stretcher, standing next to stretcher, drinking fluids, no distress noted, girlfriend at bedside       Geoffrey Kruse RN  08/15/22 9428
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
## 2022-09-07 ENCOUNTER — HOSPITAL ENCOUNTER (EMERGENCY)
Facility: HOSPITAL | Age: 32
Discharge: HOME | End: 2022-09-08
Attending: EMERGENCY MEDICINE
Payer: COMMERCIAL

## 2022-09-07 DIAGNOSIS — F19.10 POLYSUBSTANCE ABUSE (HCC): Primary | ICD-10-CM

## 2022-09-07 LAB
ALBUMIN SERPL BCP-MCNC: 3.9 G/DL (ref 3.5–5)
ALP SERPL-CCNC: 73 U/L (ref 46–116)
ALT SERPL W P-5'-P-CCNC: 25 U/L (ref 12–78)
ANION GAP SERPL CALCULATED.3IONS-SCNC: 7 MMOL/L (ref 4–13)
AST SERPL W P-5'-P-CCNC: 24 U/L (ref 5–45)
BASOPHILS # BLD AUTO: 0.07 THOUSANDS/ΜL (ref 0–0.1)
BASOPHILS NFR BLD AUTO: 1 % (ref 0–1)
BILIRUB SERPL-MCNC: 0.59 MG/DL (ref 0.2–1)
BUN SERPL-MCNC: 22 MG/DL (ref 5–25)
CALCIUM SERPL-MCNC: 7.9 MG/DL (ref 8.3–10.1)
CHLORIDE SERPL-SCNC: 109 MMOL/L (ref 96–108)
CO2 SERPL-SCNC: 30 MMOL/L (ref 21–32)
CREAT SERPL-MCNC: 1.2 MG/DL (ref 0.6–1.3)
EOSINOPHIL # BLD AUTO: 0.22 THOUSAND/ΜL (ref 0–0.61)
EOSINOPHIL NFR BLD AUTO: 2 % (ref 0–6)
ERYTHROCYTE [DISTWIDTH] IN BLOOD BY AUTOMATED COUNT: 12.6 % (ref 11.6–15.1)
ETHANOL SERPL-MCNC: <3 MG/DL (ref 0–3)
GFR SERPL CREATININE-BSD FRML MDRD: 78 ML/MIN/1.73SQ M
GLUCOSE SERPL-MCNC: 100 MG/DL (ref 65–140)
HCT VFR BLD AUTO: 38.9 % (ref 36.5–49.3)
HGB BLD-MCNC: 12.6 G/DL (ref 12–17)
IMM GRANULOCYTES # BLD AUTO: 0.03 THOUSAND/UL (ref 0–0.2)
IMM GRANULOCYTES NFR BLD AUTO: 0 % (ref 0–2)
LYMPHOCYTES # BLD AUTO: 3.81 THOUSANDS/ΜL (ref 0.6–4.47)
LYMPHOCYTES NFR BLD AUTO: 31 % (ref 14–44)
MCH RBC QN AUTO: 30.7 PG (ref 26.8–34.3)
MCHC RBC AUTO-ENTMCNC: 32.4 G/DL (ref 31.4–37.4)
MCV RBC AUTO: 95 FL (ref 82–98)
MONOCYTES # BLD AUTO: 0.86 THOUSAND/ΜL (ref 0.17–1.22)
MONOCYTES NFR BLD AUTO: 7 % (ref 4–12)
NEUTROPHILS # BLD AUTO: 7.24 THOUSANDS/ΜL (ref 1.85–7.62)
NEUTS SEG NFR BLD AUTO: 59 % (ref 43–75)
NRBC BLD AUTO-RTO: 0 /100 WBCS
PLATELET # BLD AUTO: 233 THOUSANDS/UL (ref 149–390)
PMV BLD AUTO: 9.8 FL (ref 8.9–12.7)
POTASSIUM SERPL-SCNC: 3.7 MMOL/L (ref 3.5–5.3)
PROT SERPL-MCNC: 6.3 G/DL (ref 6.4–8.4)
RBC # BLD AUTO: 4.11 MILLION/UL (ref 3.88–5.62)
SALICYLATES SERPL-MCNC: <3 MG/DL (ref 3–20)
SODIUM SERPL-SCNC: 146 MMOL/L (ref 135–147)
WBC # BLD AUTO: 12.23 THOUSAND/UL (ref 4.31–10.16)

## 2022-09-07 PROCEDURE — 96361 HYDRATE IV INFUSION ADD-ON: CPT

## 2022-09-07 PROCEDURE — 80143 DRUG ASSAY ACETAMINOPHEN: CPT | Performed by: EMERGENCY MEDICINE

## 2022-09-07 PROCEDURE — 85025 COMPLETE CBC W/AUTO DIFF WBC: CPT | Performed by: EMERGENCY MEDICINE

## 2022-09-07 PROCEDURE — 82550 ASSAY OF CK (CPK): CPT | Performed by: EMERGENCY MEDICINE

## 2022-09-07 PROCEDURE — 96372 THER/PROPH/DIAG INJ SC/IM: CPT

## 2022-09-07 PROCEDURE — 80053 COMPREHEN METABOLIC PANEL: CPT | Performed by: EMERGENCY MEDICINE

## 2022-09-07 PROCEDURE — 99291 CRITICAL CARE FIRST HOUR: CPT | Performed by: EMERGENCY MEDICINE

## 2022-09-07 PROCEDURE — 82077 ASSAY SPEC XCP UR&BREATH IA: CPT | Performed by: EMERGENCY MEDICINE

## 2022-09-07 PROCEDURE — 36415 COLL VENOUS BLD VENIPUNCTURE: CPT | Performed by: EMERGENCY MEDICINE

## 2022-09-07 PROCEDURE — 80179 DRUG ASSAY SALICYLATE: CPT | Performed by: EMERGENCY MEDICINE

## 2022-09-07 PROCEDURE — 99285 EMERGENCY DEPT VISIT HI MDM: CPT

## 2022-09-07 PROCEDURE — 93005 ELECTROCARDIOGRAM TRACING: CPT

## 2022-09-07 PROCEDURE — 82553 CREATINE MB FRACTION: CPT | Performed by: EMERGENCY MEDICINE

## 2022-09-07 RX ORDER — MIDAZOLAM HYDROCHLORIDE 2 MG/2ML
4 INJECTION, SOLUTION INTRAMUSCULAR; INTRAVENOUS ONCE
Status: COMPLETED | OUTPATIENT
Start: 2022-09-07 | End: 2022-09-07

## 2022-09-07 RX ADMIN — MIDAZOLAM 4 MG: 1 INJECTION INTRAMUSCULAR; INTRAVENOUS at 23:05

## 2022-09-07 RX ADMIN — SODIUM CHLORIDE 1000 ML: 0.9 INJECTION, SOLUTION INTRAVENOUS at 23:41

## 2022-09-08 VITALS
OXYGEN SATURATION: 97 % | BODY MASS INDEX: 28.06 KG/M2 | SYSTOLIC BLOOD PRESSURE: 112 MMHG | RESPIRATION RATE: 16 BRPM | DIASTOLIC BLOOD PRESSURE: 68 MMHG | WEIGHT: 190 LBS | HEART RATE: 78 BPM | TEMPERATURE: 98 F

## 2022-09-08 LAB
AMPHETAMINES SERPL QL SCN: POSITIVE
APAP SERPL-MCNC: <2 UG/ML (ref 10–20)
ATRIAL RATE: 113 BPM
BARBITURATES UR QL: NEGATIVE
BENZODIAZ UR QL: POSITIVE
BILIRUB UR QL STRIP: NEGATIVE
CK MB SERPL-MCNC: 1.5 % (ref 0–2.5)
CK MB SERPL-MCNC: 4.1 NG/ML (ref 0–5)
CK SERPL-CCNC: 278 U/L (ref 39–308)
CLARITY UR: NORMAL
COCAINE UR QL: NEGATIVE
COLOR UR: YELLOW
GLUCOSE UR STRIP-MCNC: NEGATIVE MG/DL
HGB UR QL STRIP.AUTO: NEGATIVE
KETONES UR STRIP-MCNC: NEGATIVE MG/DL
LEUKOCYTE ESTERASE UR QL STRIP: NEGATIVE
METHADONE UR QL: NEGATIVE
NITRITE UR QL STRIP: NEGATIVE
OPIATES UR QL SCN: NEGATIVE
OXYCODONE+OXYMORPHONE UR QL SCN: NEGATIVE
P AXIS: 50 DEGREES
PCP UR QL: NEGATIVE
PH UR STRIP.AUTO: 6 [PH]
PR INTERVAL: 144 MS
PROT UR STRIP-MCNC: NEGATIVE MG/DL
QRS AXIS: 64 DEGREES
QRSD INTERVAL: 112 MS
QT INTERVAL: 346 MS
QTC INTERVAL: 474 MS
SP GR UR STRIP.AUTO: >=1.03 (ref 1–1.03)
T WAVE AXIS: 46 DEGREES
THC UR QL: POSITIVE
UROBILINOGEN UR QL STRIP.AUTO: 0.2 E.U./DL
VENTRICULAR RATE: 113 BPM

## 2022-09-08 PROCEDURE — 80307 DRUG TEST PRSMV CHEM ANLYZR: CPT | Performed by: EMERGENCY MEDICINE

## 2022-09-08 PROCEDURE — 96374 THER/PROPH/DIAG INJ IV PUSH: CPT

## 2022-09-08 PROCEDURE — 81003 URINALYSIS AUTO W/O SCOPE: CPT | Performed by: EMERGENCY MEDICINE

## 2022-09-08 PROCEDURE — 93010 ELECTROCARDIOGRAM REPORT: CPT | Performed by: INTERNAL MEDICINE

## 2022-09-08 PROCEDURE — 96361 HYDRATE IV INFUSION ADD-ON: CPT

## 2022-09-08 PROCEDURE — 96372 THER/PROPH/DIAG INJ SC/IM: CPT

## 2022-09-08 RX ORDER — MIDAZOLAM HYDROCHLORIDE 2 MG/2ML
2 INJECTION, SOLUTION INTRAMUSCULAR; INTRAVENOUS ONCE
Status: COMPLETED | OUTPATIENT
Start: 2022-09-08 | End: 2022-09-08

## 2022-09-08 RX ORDER — OLANZAPINE 10 MG/1
10 INJECTION, POWDER, LYOPHILIZED, FOR SOLUTION INTRAMUSCULAR ONCE
Status: COMPLETED | OUTPATIENT
Start: 2022-09-08 | End: 2022-09-08

## 2022-09-08 RX ADMIN — SODIUM CHLORIDE 1000 ML: 0.9 INJECTION, SOLUTION INTRAVENOUS at 02:53

## 2022-09-08 RX ADMIN — MIDAZOLAM 2 MG: 1 INJECTION INTRAMUSCULAR; INTRAVENOUS at 02:16

## 2022-09-08 RX ADMIN — OLANZAPINE 10 MG: 10 INJECTION, POWDER, FOR SOLUTION INTRAMUSCULAR at 02:54

## 2022-09-08 NOTE — ED NOTES
After discussion with attending, patient 1:1 discontinued  Patient remains calm and cooperative  Patient continues to fall in and out of sleep   Will continue to monitor for appropriateness of discharge     Enmanuel Garsia RN  09/08/22 0932

## 2022-09-08 NOTE — ED NOTES
Patient continues resting at this time  Cannot stay awake during a conversation  DO aware  Will continue to monitor       Cecelia Daly RN  09/08/22 3358
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
## 2022-09-08 NOTE — ED NOTES
Patient refusing to wait for Eugenio Garcia RN and DO aware     Liz [place] with [facility] contacted to cancel Eugenio Moser RN  09/08/22 0219

## 2022-09-08 NOTE — ED NOTES
DO assessed patient  Patient appropriate for discharge  All belongings including shoes, phone, wallet, money, 2x chains, and cards etc returned to patient and patient verified completion  Patient requesting lyft home  AVS reviewed  No questions at this time        Amalia Talbot RN  09/08/22 7621

## 2022-09-08 NOTE — ED NOTES
DO at bedside for evaluation for discharge   Patient continues to refuse ORP or other substance abuse assisstance     Laura Moser RN  09/08/22 5469

## 2022-09-08 NOTE — ED NOTES
Attempted to speak to patient about discharge, but patient is falling in and out of sleep at this time  DO made aware        Rick Juarez RN  09/08/22 6670

## 2022-09-08 NOTE — ED CARE HANDOFF
Emergency Department Sign Out Note        Sign out and transfer of care from Dr Antwan Martinez  See Separate Emergency Department note  The patient, Val Veras , was evaluated by the previous provider for drug abuse  Workup Completed:  Labs/Eval    ED Course / Workup Pending (followup): Sobriety    Patient received in sign-out, pending sobriety  Patient now awake and alert, ambulating with a steady gait without assistance  He tolerated breakfast and will have a snack  Patient will be discharged to home to follow-up primary care physician  Patient offered Venessa Wright, however he declines at this time                                     Procedures  MDM        Disposition  Final diagnoses:   Polysubstance abuse ([facility] [address] )     Time reflects when diagnosis was documented in both MDM as applicable and the Disposition within this note     Time User Action Codes Description Comment    9/8/2022 12:01 PM Abbie Steen Add [F19 10] Polysubstance abuse [facility])       ED Disposition     ED Disposition   Discharge    Condition   Stable    Date/Time   Thu Sep 8, 2022 12:01 PM    Comment   Val Veras  discharge to home/self care  Follow-up Information     Follow up With Specialties Details Why Brisa Schedule an appointment as soon as possible for a visit in 2 days for follow up [address]  [phone]          Patient's Medications   Discharge Prescriptions    No medications on file     No discharge procedures on file         ED Provider  Electronically Signed by     Jhon Disla DO  09/09/22 5277

## 2022-09-08 NOTE — ED NOTES
This RN was able to speak to patient at start of shift  Patient unsure of the events of the previous night  Pt states he does not recall using meth last night, but he took meth a few days ago  Patient states he is unsure why there his urine is positive for benzodiazepines  Patient is agreeable to be cooperative and security was able to remove restraints with no incident  Patient appears to be cooperative at this time  Patient does appear sad but is calm at this time        Laura Moser RN  09/08/22 8252

## 2022-09-08 NOTE — ED PROCEDURE NOTE
PROCEDURE  ECG 12 Lead Documentation Only    Date/Time: 9/7/2022 11:37 PM  Performed by: Danny Dong DO  Authorized by: Danny Dong DO     ECG reviewed by me, the ED Provider: yes    Patient location:  ED  Interpretation:     Interpretation: normal    Rate:     ECG rate:  113    ECG rate assessment: tachycardic    Rhythm:     Rhythm: sinus rhythm    Ectopy:     Ectopy: none    ST segments:     ST segments:  Normal  T waves:     T waves: normal           Danny Dong DO  09/07/22 2333

## 2022-09-08 NOTE — RESTRAINT FACE TO FACE
Restraint Face to Face   Wilfred Carrel  27 y o  male MRN: [id number]  Unit/Bed#: ED CT1 Encounter: [id number]      Physical Evaluation agitated/intoxicated  Purpose for Restraints/ Seclusion High risk for causing significant disruption of treatment environment   Patient's reaction to the intervention agitated  Patient's medical condition intoxicated  Patient's Behavioral condition agitated  Restraints to be Continued

## 2022-10-30 ENCOUNTER — HOSPITAL ENCOUNTER (EMERGENCY)
Facility: HOSPITAL | Age: 32
Discharge: HOME | End: 2022-10-30
Attending: EMERGENCY MEDICINE

## 2022-10-30 VITALS
OXYGEN SATURATION: 99 % | WEIGHT: 170 LBS | HEIGHT: 70 IN | HEART RATE: 76 BPM | RESPIRATION RATE: 18 BRPM | BODY MASS INDEX: 24.34 KG/M2 | DIASTOLIC BLOOD PRESSURE: 83 MMHG | TEMPERATURE: 97.8 F | SYSTOLIC BLOOD PRESSURE: 124 MMHG

## 2022-10-30 DIAGNOSIS — F31.9 BIPOLAR 1 DISORDER (HCC): Primary | ICD-10-CM

## 2022-10-30 DIAGNOSIS — F15.91 HISTORY OF METHAMPHETAMINE USE: ICD-10-CM

## 2022-10-30 DIAGNOSIS — F29 PSYCHOSIS (HCC): ICD-10-CM

## 2022-10-30 LAB
ALBUMIN SERPL BCP-MCNC: 4 G/DL (ref 3.5–5)
ALP SERPL-CCNC: 84 U/L (ref 34–104)
ALT SERPL W P-5'-P-CCNC: 15 U/L (ref 7–52)
AMPHETAMINES SERPL QL SCN: POSITIVE
ANION GAP SERPL CALCULATED.3IONS-SCNC: 8 MMOL/L (ref 4–13)
AST SERPL W P-5'-P-CCNC: 18 U/L (ref 13–39)
BARBITURATES UR QL: NEGATIVE
BASOPHILS # BLD AUTO: 0.05 THOUSANDS/ÂΜL (ref 0–0.1)
BASOPHILS NFR BLD AUTO: 0 % (ref 0–1)
BENZODIAZ UR QL: NEGATIVE
BILIRUB SERPL-MCNC: 0.36 MG/DL (ref 0.2–1)
BILIRUB UR QL STRIP: NEGATIVE
BUN SERPL-MCNC: 25 MG/DL (ref 5–25)
CALCIUM SERPL-MCNC: 8.7 MG/DL (ref 8.4–10.2)
CHLORIDE SERPL-SCNC: 102 MMOL/L (ref 96–108)
CLARITY UR: CLEAR
CO2 SERPL-SCNC: 28 MMOL/L (ref 21–32)
COCAINE UR QL: NEGATIVE
COLOR UR: YELLOW
CREAT SERPL-MCNC: 1.15 MG/DL (ref 0.6–1.3)
EOSINOPHIL # BLD AUTO: 0.48 THOUSAND/ÂΜL (ref 0–0.61)
EOSINOPHIL NFR BLD AUTO: 4 % (ref 0–6)
ERYTHROCYTE [DISTWIDTH] IN BLOOD BY AUTOMATED COUNT: 12 % (ref 11.6–15.1)
ETHANOL SERPL-MCNC: <10 MG/DL
GFR SERPL CREATININE-BSD FRML MDRD: 82 ML/MIN/1.73SQ M
GLUCOSE SERPL-MCNC: 97 MG/DL (ref 65–140)
GLUCOSE UR STRIP-MCNC: NEGATIVE MG/DL
HCT VFR BLD AUTO: 37.1 % (ref 36.5–49.3)
HGB BLD-MCNC: 12.9 G/DL (ref 12–17)
HGB UR QL STRIP.AUTO: NEGATIVE
IMM GRANULOCYTES # BLD AUTO: 0.02 THOUSAND/UL (ref 0–0.2)
IMM GRANULOCYTES NFR BLD AUTO: 0 % (ref 0–2)
KETONES UR STRIP-MCNC: NEGATIVE MG/DL
LACTATE SERPL-SCNC: 0.5 MMOL/L (ref 0.5–2)
LEUKOCYTE ESTERASE UR QL STRIP: NEGATIVE
LYMPHOCYTES # BLD AUTO: 2.41 THOUSANDS/ÂΜL (ref 0.6–4.47)
LYMPHOCYTES NFR BLD AUTO: 21 % (ref 14–44)
MCH RBC QN AUTO: 31.5 PG (ref 26.8–34.3)
MCHC RBC AUTO-ENTMCNC: 34.8 G/DL (ref 31.4–37.4)
MCV RBC AUTO: 91 FL (ref 82–98)
METHADONE UR QL: NEGATIVE
MONOCYTES # BLD AUTO: 0.6 THOUSAND/ÂΜL (ref 0.17–1.22)
MONOCYTES NFR BLD AUTO: 5 % (ref 4–12)
NEUTROPHILS # BLD AUTO: 7.73 THOUSANDS/ÂΜL (ref 1.85–7.62)
NEUTS SEG NFR BLD AUTO: 70 % (ref 43–75)
NITRITE UR QL STRIP: NEGATIVE
NRBC BLD AUTO-RTO: 0 /100 WBCS
OPIATES UR QL SCN: NEGATIVE
OXYCODONE+OXYMORPHONE UR QL SCN: NEGATIVE
PCP UR QL: NEGATIVE
PH UR STRIP.AUTO: 6 [PH]
PLATELET # BLD AUTO: 251 THOUSANDS/UL (ref 149–390)
PMV BLD AUTO: 9.8 FL (ref 8.9–12.7)
POTASSIUM SERPL-SCNC: 4 MMOL/L (ref 3.5–5.3)
PROT SERPL-MCNC: 6.7 G/DL (ref 6.4–8.4)
PROT UR STRIP-MCNC: NEGATIVE MG/DL
RBC # BLD AUTO: 4.09 MILLION/UL (ref 3.88–5.62)
SARS-COV-2 RNA RESP QL NAA+PROBE: NEGATIVE
SODIUM SERPL-SCNC: 138 MMOL/L (ref 135–147)
SP GR UR STRIP.AUTO: >=1.03 (ref 1–1.03)
THC UR QL: NEGATIVE
UROBILINOGEN UR QL STRIP.AUTO: 0.2 E.U./DL
WBC # BLD AUTO: 11.29 THOUSAND/UL (ref 4.31–10.16)

## 2022-10-30 RX ORDER — HALOPERIDOL 5 MG/ML
5 INJECTION INTRAMUSCULAR ONCE
Status: DISCONTINUED | OUTPATIENT
Start: 2022-10-30 | End: 2022-10-30 | Stop reason: HOSPADM

## 2022-10-30 RX ORDER — LORAZEPAM 2 MG/ML
2 INJECTION INTRAMUSCULAR ONCE
Status: DISCONTINUED | OUTPATIENT
Start: 2022-10-30 | End: 2022-10-30 | Stop reason: HOSPADM

## 2022-10-30 RX ADMIN — SODIUM CHLORIDE 1000 ML: 0.9 INJECTION, SOLUTION INTRAVENOUS at 04:27

## 2022-10-30 NOTE — ED NOTES
Patient requested removal of IV  Discussed with Provider who informed patient is medically cleared       [place] Lopes  10/30/22 2674

## 2022-10-30 NOTE — ED NOTES
Pt upon arrival had strong odor  Nurses asked patient if he would like to shower pt agreed to shower  Pt made statement he is homeless and hasn't showered in weeks       May Orlando  10/30/22 1878

## 2022-10-30 NOTE — ED NOTES
This writer discussed the patients current presentation and recommended discharge plan with Jessica Del Rio  They agree with the patient being discharged at this time with referrals and/or information about Patient denied  The patient was Instructed to follow up with their patient denied   The patient was provided with referral information for:   Emergency numbers     The patient declined to complete a safety plan however a blank plan was provided for future use  SAFETY PLAN  Warning Signs (thoughts, images, mood, behavior, situations) of a potential crisis:     Coping Skills (what can I do to take my mind off the problem, or to keep myself safe):      Outside Support (who can I reach out to for support and help):         National Suicide Prevention Hotline:  [address]: [facility]: arez[address] [facility] [address] [phone] - Crisis   [phone] - Peer [address] [facility] (1-9pm daily)  [phone] - Teen Support Talk Line (1-9pm daily)  [address] ([place]) [phone] - [address] [facility]

## 2022-10-30 NOTE — DISCHARGE INSTRUCTIONS
SAFETY PLAN  Warning Signs (thoughts, images, mood, behavior, situations) of a potential crisis:     Coping Skills (what can I do to take my mind off the problem, or to keep myself safe):      Outside Support (who can I reach out to for support and help):         National Suicide Prevention Hotline:  [address]: [facility]: [facility] [address] [facility] [address] [place] [address] [address] [phone] - Crisis   [phone] - Peer [address] [facility] (1-9pm daily)  [phone] - Teen Support Talk Line (1-9pm daily)  [address] [place] ([place]) [phone] - [address] [facility]

## 2022-10-30 NOTE — ED CARE HANDOFF
Emergency Department Sign Out Note        Sign out and transfer of care from Dr Linda Ji  See Separate Emergency Department note  The patient, Chas Yusuf , was evaluated by the previous provider for acute psychosis and history of methamphetamine use  Workup Completed:  Blood work and urine drug screen was ordered which showed the patient had a leukocytosis and tested positive for methamphetamines  The patient was medically cleared for crisis evaluation  ED Course / Workup Pending (followup): The patient was signed out to me pending evaluation by the crisis counselor  The patient was re-evaluated and he was denying SI, HI and AVH  The patient had the capacity to make his own medical decisions and did not want to sign himself in for inpatient behavioral health treatment  No grounds for a 302 at this point  Patient requesting discharge and will be discharged per his request   Return precautions were discussed  Patient agrees with the plan for discharge and feels comfortable to go home with proper f/u  Advised to return for worsening or additional problems  Diagnostic tests were reviewed and questions answered  Diagnosis, care plan and treatment options were discussed  The patient understands instructions and will follow up as directed                                      ED Course as of 10/30/22 1602   Sun Oct 30, 2022   4398 SO: Pending evaluation by crisis     Procedures  MDM        Disposition  Final diagnoses:   Bipolar 1 disorder ([facility] [address] )   [facility]   History of methamphetamine use     Time reflects when diagnosis was documented in both MDM as applicable and the Disposition within this note     Time User Action Codes Description Comment    10/30/2022  2:24 AM Jacinto Georges Add [F31 9] Bipolar 1 disorder ([facility] [address] )     10/30/2022  7:30 AM Bhavin Edge Add [F29] Psychosis ([facility] [address] )     10/30/2022  7:31 AM Bhavin Edge Add [F15 91] History of methamphetamine use       ED Disposition ED Disposition   Discharge    Condition   Stable    Date/Time   Sun Oct 30, 2022 12:39 PM    Comment   Eugenia Rene  discharge to home/self care  MD Documentation    [address] Most Recent Value   Sending MD dr Keny Anderson up With Specialties Details Why Contact Info Additional Information    Lianne Chou  Schedule an appointment as soon as possible for a visit   [address] [facility] ([place])    [phone]     [address] Emergency Department Emergency Medicine Go to  If symptoms worsen [address] [facility],[address] [id number]  [address] [facility] Emergency Department, [address], [address] [facility] [address]        Discharge Medication List as of 10/30/2022 12:42 PM      CONTINUE these medications which have NOT CHANGED    Details   carBAMazepine (TEGretol) 200 mg tablet Take 200 mg by mouth daily at bedtime, Historical Med      escitalopram (LEXAPRO) 10 mg tablet Take 10 mg by mouth daily, Historical Med      gabapentin (NEURONTIN) 300 mg capsule Take 300 mg by mouth 3 (three) times a day, Historical Med      QUEtiapine (SEROquel) 100 mg tablet Take 100 mg by mouth daily at bedtime, Historical Med           No discharge procedures on file         ED Provider  Electronically Signed by     Andria Carpenter MD  10/30/22 4725

## 2022-10-30 NOTE — ED NOTES
Patient observed responding to an internal stimuli, observed patient mumbling to self     Keaton Tidwell, [place]  10/30/22 7137

## 2022-10-30 NOTE — ED NOTES
Pt observed awake sitting up in bed speaking to [facility] crisis couselor     Roula Rowe, [facility]  10/30/22 1225

## 2022-10-30 NOTE — ED NOTES
Met with patient to complete the crisis intake assessments  Patient tested positive for Meth and confirmed usage  Patient presented as tangential, paranoid and delusional but was oriented x4  Patient denied SI, HI, AVH, anxiety, and depression  Patient did not want rehab or inpatient treatment  He contracted for safety and did not appear to be a threat to himself or others at this time  Patient did not want a discharge plan  Under advisement of EDMD, patient was discharged

## 2022-10-30 NOTE — ED PROVIDER NOTES
History  Chief Complaint   Patient presents with   • Psychiatric Evaluation     Patient brought in via ems  Patient was found on the road screaming  Claims he did not do drugs tonight  Patient believes the government is after him and that the gov killed his parents  Patient made a statement of Si to ems  Patient brought in by EMS after he was found out in the community screaming that he believes the government is trying to kill him and that he is extremely afraid for his life  Patient has a history of polysubstance abuse but claims that he does not use any drugs recently  He is fully very poor historian at this time  Prior to Admission Medications   Prescriptions Last Dose Informant Patient Reported? Taking? QUEtiapine (SEROquel) 100 mg tablet   Yes No   Sig: Take 100 mg by mouth daily at bedtime   Patient not taking: Reported on 8/2/2022   carBAMazepine (TEGretol) 200 mg tablet   Yes No   Sig: Take 200 mg by mouth daily at bedtime   Patient not taking: Reported on 8/2/2022   escitalopram (LEXAPRO) 10 mg tablet   Yes No   Sig: Take 10 mg by mouth daily   Patient not taking: Reported on 8/2/2022   gabapentin (NEURONTIN) 300 mg capsule   Yes No   Sig: Take 300 mg by mouth 3 (three) times a day   Patient not taking: Reported on 8/2/2022      Facility-Administered Medications: None       Past Medical History:   Diagnosis Date   • ADHD (attention deficit hyperactivity disorder)    • Anxiety     last assessed 3/23/15   • Bipolar 1 disorder ([facility] [address] )    • Genital warts     last assessed 12/7/15, resolved 11/1/17    • Psychiatric disorder        History reviewed  No pertinent surgical history  Family History   Problem Relation Age of Onset   • No Known Problems Father      I have reviewed and agree with the history as documented      E-Cigarette/Vaping   • E-Cigarette Use Never User      E-Cigarette/Vaping Substances   • Nicotine No    • THC No    • CBD No    • Flavoring No    • Other No    • Unknown No Social History     Tobacco Use   • Smoking status: Current Every Day Smoker     Packs/day: 0 50     Types: Cigarettes   • Smokeless tobacco: Never Used   Vaping Use   • Vaping Use: Never used   Substance Use Topics   • Alcohol use: Yes     Comment: occ  • Drug use: Yes     Types: Heroin, Marijuana, Methamphetamines, Fentanyl     Comment: states was "shooting up steroids" 3 months ago (3/4/19 states this was 3 years ago)       Review of Systems   Unable to perform ROS: Mental status change       Physical Exam  Physical Exam  Vitals and nursing note reviewed  Constitutional:       Appearance: He is well-developed  Comments: Poor overall hygiene   HENT:      Head: Normocephalic and atraumatic  Eyes:      Conjunctiva/sclera: Conjunctivae normal    Cardiovascular:      Rate and Rhythm: Normal rate and regular rhythm  Heart sounds: No murmur heard  Pulmonary:      Effort: Pulmonary effort is normal  No respiratory distress  Breath sounds: Normal breath sounds  Abdominal:      Palpations: Abdomen is soft  Tenderness: There is no abdominal tenderness  Musculoskeletal:      Cervical back: Neck supple  Feet:      Right foot:      Skin integrity: Skin breakdown and erythema present  Toenail Condition: Right toenails are normal       Left foot:      Skin integrity: Skin breakdown and erythema present  Toenail Condition: Left toenails are normal    Skin:     General: Skin is warm and dry  Neurological:      Mental Status: He is alert           Vital Signs  ED Triage Vitals   Temperature Pulse Respirations Blood Pressure SpO2   10/30/22 0047 10/30/22 0047 10/30/22 0047 10/30/22 0211 10/30/22 0047   99 3 °F (37 4 °C) (!) 116 20 129/78 97 %      Temp Source Heart Rate Source Patient Position - Orthostatic VS BP Location FiO2 (%)   10/30/22 0047 10/30/22 0047 10/30/22 0047 10/30/22 0047 --   Oral Monitor Sitting Left arm       Pain Score       --                  Vitals:    10/30/22 0047 10/30/22 0211 10/30/22 0327   BP:  129/78 102/63   Pulse: (!) 116 (!) 106 (!) 111   Patient Position - Orthostatic VS: Sitting Lying Sitting         Visual Acuity      ED Medications  Medications   haloperidol lactate (HALDOL) injection 5 mg (0 mg Intramuscular Hold 10/30/22 0242)   LORazepam (ATIVAN) injection 2 mg (0 mg Intravenous Hold 10/30/22 0243)   sodium chloride 0 9 % bolus 1,000 mL (0 mL Intravenous Stopped 10/30/22 0524)       Diagnostic Studies  Results Reviewed     Procedure Component Value Units Date/Time    Rapid drug screen, urine [[id number]]  (Abnormal) Collected: 10/30/22 0525    Lab Status: Final result Specimen: Urine, Clean Catch Updated: 10/30/22 0603     Amph/Meth UR Positive     Barbiturate Ur Negative     Benzodiazepine Urine Negative     Cocaine Urine Negative     Methadone Urine Negative     Opiate Urine Negative     PCP Ur Negative     THC Urine Negative     Oxycodone Urine Negative    Narrative:      FOR MEDICAL PURPOSES ONLY  IF CONFIRMATION NEEDED PLEASE CONTACT THE LAB WITHIN 5 DAYS      Drug Screen Cutoff Levels:  AMPHETAMINE/METHAMPHETAMINES  1000 ng/mL  BARBITURATES     200 ng/mL  BENZODIAZEPINES     200 ng/mL  COCAINE      300 ng/mL  METHADONE      300 ng/mL  OPIATES      300 ng/mL  PHENCYCLIDINE     25 ng/mL  THC       50 ng/mL  OXYCODONE      100 ng/mL    UA w Reflex to Microscopic w Reflex to Culture [[id number]]  (Normal) Collected: 10/30/22 0525    Lab Status: Final result Specimen: Urine, Clean Catch Updated: 10/30/22 0540     Color, UA Yellow     Clarity, UA Clear     Specific Gravity, UA >=1 030     pH, UA 6 0     Leukocytes, UA Negative     Nitrite, UA Negative     Protein, UA Negative mg/dl      Glucose, UA Negative mg/dl      Ketones, UA Negative mg/dl      Urobilinogen, UA 0 2 E U /dl      Bilirubin, UA Negative     Occult Blood, UA Negative    COVID only [[id number]]  (Normal) Collected: 10/30/22 0226    Lab Status: Final result Specimen: Nares from Nose Updated: 10/30/22 0312     SARS-CoV-2 Negative    Narrative:      FOR PEDIATRIC PATIENTS - copy/paste COVID Guidelines URL to browser: [email or web address]    SARS-CoV-2 assay is a Nucleic Acid Amplification assay intended for the  qualitative detection of nucleic acid from SARS-CoV-2 in nasopharyngeal  swabs  Results are for the presumptive identification of SARS-CoV-2 RNA  Positive results are indicative of infection with SARS-CoV-2, the virus  causing COVID-19, but do not rule out bacterial infection or co-infection  with other viruses  Laboratories within the [place] and its  territories are required to report all positive results to the appropriate  public health authorities  Negative results do not preclude SARS-CoV-2  infection and should not be used as the sole basis for treatment or other  patient management decisions  Negative results must be combined with  clinical observations, patient history, and epidemiological information  This test has not been FDA cleared or approved  This test has been authorized by FDA under an Emergency Use Authorization  (EUA)  This test is only authorized for the duration of time the  declaration that circumstances exist justifying the authorization of the  emergency use of an in vitro diagnostic tests for detection of SARS-CoV-2  virus and/or diagnosis of COVID-19 infection under section 564(b)(1) of  the Act, 21 U  S C  250PMU-0(T)(2), unless the authorization is terminated  or revoked sooner  The test has been validated but independent review by FDA  and CLIA is pending  Test performed using [employer] GeneXpert: This RT-PCR assay targets N2,  a region unique to SARS-CoV-2  A conserved region in the E-gene was chosen  for pan-Sarbecovirus detection which includes SARS-CoV-2  According to CMS-2020-01-R, this platform meets the definition of high-Phosphagenics technology      Ethanol [[id number]]  (Normal) Collected: 10/30/22 0115    Lab Status: Final result Specimen: Blood from Arm, Right Updated: 10/30/22 0148     Ethanol Lvl <10 mg/dL     Comprehensive metabolic panel [[id number]] Collected: 10/30/22 0115    Lab Status: Final result Specimen: Blood from Arm, Right Updated: 10/30/22 0148     Sodium 138 mmol/L      Potassium 4 0 mmol/L      Chloride 102 mmol/L      CO2 28 mmol/L      ANION GAP 8 mmol/L      BUN 25 mg/dL      Creatinine 1 15 mg/dL      Glucose 97 mg/dL      Calcium 8 7 mg/dL      AST 18 U/L      ALT 15 U/L      Alkaline Phosphatase 84 U/L      Total Protein 6 7 g/dL      Albumin 4 0 g/dL      Total Bilirubin 0 36 mg/dL      eGFR 82 ml/min/1 73sq m     Narrative:      [place] guidelines for Chronic Kidney Disease (CKD):   •  Stage 1 with normal or high GFR (GFR > 90 mL/min/1 73 square meters)  •  Stage 2 Mild CKD (GFR = 60-89 mL/min/1 73 square meters)  •  Stage 3A Moderate CKD (GFR = 45-59 mL/min/1 73 square meters)  •  Stage 3B Moderate CKD (GFR = 30-44 mL/min/1 73 square meters)  •  Stage 4 Severe CKD (GFR = 15-29 mL/min/1 73 square meters)  •  Stage 5 End Stage CKD (GFR <15 mL/min/1 73 square meters)  Note: GFR calculation is accurate only with a steady state creatinine    Lactic acid [[id number]]  (Normal) Collected: 10/30/22 0115    Lab Status: Final result Specimen: Blood from Arm, Right Updated: 10/30/22 0148     LACTIC ACID 0 5 mmol/L     Narrative:      Result may be elevated if tourniquet was used during collection      CBC and differential [[id number]]  (Abnormal) Collected: 10/30/22 0115    Lab Status: Final result Specimen: Blood from Arm, Right Updated: 10/30/22 0129     WBC 11 29 Thousand/uL      RBC 4 09 Million/uL      Hemoglobin 12 9 g/dL      Hematocrit 37 1 %      MCV 91 fL      MCH 31 5 pg      MCHC 34 8 g/dL      RDW 12 0 %      MPV 9 8 fL      Platelets 462 Thousands/uL      nRBC 0 /100 WBCs      Neutrophils Relative 70 %      Immat GRANS % 0 %      Lymphocytes Relative 21 %      Monocytes Relative 5 %      Eosinophils Relative 4 %      Basophils Relative 0 %      Neutrophils Absolute 7 73 Thousands/µL      Immature Grans Absolute 0 02 Thousand/uL      Lymphocytes Absolute 2 41 Thousands/µL      Monocytes Absolute 0 60 Thousand/µL      Eosinophils Absolute 0 48 Thousand/µL      Basophils Absolute 0 05 Thousands/µL     Blood culture #2 [[id number]] Collected: 10/30/22 0108    Lab Status: In process Specimen: Blood from Arm, Left Updated: 10/30/22 0127    Blood culture #1 [[id number]] Collected: 10/30/22 0115    Lab Status: In process Specimen: Blood from Arm, Right Updated: 10/30/22 0126                 No orders to display              Procedures  Procedures         ED Course  ED Course as of 10/30/22 0730   Sun Oct 30, 2022   1659 Labs thus far review  Patient resting comfortably  1473 Patient's urine from to be positive for methamphetamines  He is still resting comfortably on a stretcher after a shower and clothing change  He is provided with dry socks for his feet as well  SBIRT 20yo+    Flowsheet Row Most Recent Value   SBIRT (25 yo +)    In order to provide better care to our patients, we are screening all of our patients for alcohol and drug use  Would it be okay to ask you these screening questions? Unable to answer at this time Filed at: 10/30/2022 0152                    MDM  Number of Diagnoses or Management Options     Amount and/or Complexity of Data Reviewed  Clinical lab tests: reviewed and ordered  Review and summarize past medical records: yes    Risk of Complications, Morbidity, and/or Mortality  Presenting problems: moderate  Diagnostic procedures: moderate  Management options: low  General comments: Patient is a 15-year-old male with a history of psychiatric illness and chronic methamphetamine abuse who presents to the emergency department and a possible psychotic break versus substance induced psychosis    He is hemodynamically stable and resting comfortably at this time  All labs reviewed  Patient did also make some comments suggesting suicidal thoughts/ideation  He has some clinical evidence of early trench foot and he is provided with skin care as well as dry clean socks  Plan is for him to be seen by crisis once he is clinically sober  Case will be endorsed to Dr Jeaneth Alegria as shift change pending crisis evaluation and disposition  Disposition  Final diagnoses:   Bipolar 1 disorder ([facility] [address] )     Time reflects when diagnosis was documented in both MDM as applicable and the Disposition within this note     Time User Action Codes Description Comment    10/30/2022  2:24 AM Gillermina Hatchet Add [F31 9] Bipolar 1 disorder [facility])       ED Disposition     None      Follow-up Information    None         Patient's Medications   Discharge Prescriptions    No medications on file       No discharge procedures on file      PDMP Review     None          ED Provider  Electronically Signed by           Yury Costa MD  10/30/22 7030

## 2022-10-30 NOTE — ED NOTES
Patient observed sleeping in bed; no distress noted  1:1 observation by staff maintained       [place]  10/30/22 0888

## 2022-11-01 LAB
ATRIAL RATE: 108 BPM
P AXIS: 16 DEGREES
PR INTERVAL: 129 MS
QRS AXIS: 58 DEGREES
QRSD INTERVAL: 99 MS
QT INTERVAL: 324 MS
QTC INTERVAL: 435 MS
T WAVE AXIS: 50 DEGREES
VENTRICULAR RATE: 108 BPM

## 2022-11-02 ENCOUNTER — APPOINTMENT (EMERGENCY)
Dept: RADIOLOGY | Facility: HOSPITAL | Age: 32
End: 2022-11-02

## 2022-11-02 ENCOUNTER — APPOINTMENT (EMERGENCY)
Dept: CT IMAGING | Facility: HOSPITAL | Age: 32
End: 2022-11-02

## 2022-11-02 ENCOUNTER — HOSPITAL ENCOUNTER (EMERGENCY)
Facility: HOSPITAL | Age: 32
Discharge: HOME | End: 2022-11-03
Attending: EMERGENCY MEDICINE

## 2022-11-02 DIAGNOSIS — R40.0 SOMNOLENCE: Primary | ICD-10-CM

## 2022-11-02 LAB
ALBUMIN SERPL BCP-MCNC: 4.1 G/DL (ref 3.5–5)
ALP SERPL-CCNC: 77 U/L (ref 34–104)
ALT SERPL W P-5'-P-CCNC: 11 U/L (ref 7–52)
ANION GAP SERPL CALCULATED.3IONS-SCNC: 7 MMOL/L (ref 4–13)
APAP SERPL-MCNC: <10 UG/ML (ref 10–20)
AST SERPL W P-5'-P-CCNC: 14 U/L (ref 13–39)
BASOPHILS # BLD AUTO: 0.07 THOUSANDS/ÂΜL (ref 0–0.1)
BASOPHILS NFR BLD AUTO: 1 % (ref 0–1)
BILIRUB SERPL-MCNC: 0.25 MG/DL (ref 0.2–1)
BUN SERPL-MCNC: 25 MG/DL (ref 5–25)
CALCIUM SERPL-MCNC: 8.7 MG/DL (ref 8.4–10.2)
CHLORIDE SERPL-SCNC: 106 MMOL/L (ref 96–108)
CO2 SERPL-SCNC: 26 MMOL/L (ref 21–32)
CREAT SERPL-MCNC: 1.06 MG/DL (ref 0.6–1.3)
EOSINOPHIL # BLD AUTO: 0.31 THOUSAND/ÂΜL (ref 0–0.61)
EOSINOPHIL NFR BLD AUTO: 2 % (ref 0–6)
ERYTHROCYTE [DISTWIDTH] IN BLOOD BY AUTOMATED COUNT: 12.2 % (ref 11.6–15.1)
ETHANOL EXG-MCNC: 0 MG/DL
ETHANOL SERPL-MCNC: <10 MG/DL
GFR SERPL CREATININE-BSD FRML MDRD: 91 ML/MIN/1.73SQ M
GLUCOSE SERPL-MCNC: 73 MG/DL (ref 65–140)
GLUCOSE SERPL-MCNC: 97 MG/DL (ref 65–140)
HCT VFR BLD AUTO: 42.8 % (ref 36.5–49.3)
HGB BLD-MCNC: 14.5 G/DL (ref 12–17)
IMM GRANULOCYTES # BLD AUTO: 0.03 THOUSAND/UL (ref 0–0.2)
IMM GRANULOCYTES NFR BLD AUTO: 0 % (ref 0–2)
LYMPHOCYTES # BLD AUTO: 2.91 THOUSANDS/ÂΜL (ref 0.6–4.47)
LYMPHOCYTES NFR BLD AUTO: 23 % (ref 14–44)
MAGNESIUM SERPL-MCNC: 2.1 MG/DL (ref 1.9–2.7)
MCH RBC QN AUTO: 31.4 PG (ref 26.8–34.3)
MCHC RBC AUTO-ENTMCNC: 33.9 G/DL (ref 31.4–37.4)
MCV RBC AUTO: 93 FL (ref 82–98)
MONOCYTES # BLD AUTO: 0.7 THOUSAND/ÂΜL (ref 0.17–1.22)
MONOCYTES NFR BLD AUTO: 6 % (ref 4–12)
NEUTROPHILS # BLD AUTO: 8.75 THOUSANDS/ÂΜL (ref 1.85–7.62)
NEUTS SEG NFR BLD AUTO: 68 % (ref 43–75)
NRBC BLD AUTO-RTO: 0 /100 WBCS
PLATELET # BLD AUTO: 326 THOUSANDS/UL (ref 149–390)
PMV BLD AUTO: 9.7 FL (ref 8.9–12.7)
POTASSIUM SERPL-SCNC: 4.1 MMOL/L (ref 3.5–5.3)
PROT SERPL-MCNC: 6.8 G/DL (ref 6.4–8.4)
RBC # BLD AUTO: 4.62 MILLION/UL (ref 3.88–5.62)
SALICYLATES SERPL-MCNC: <5 MG/DL (ref 3–20)
SODIUM SERPL-SCNC: 139 MMOL/L (ref 135–147)
WBC # BLD AUTO: 12.77 THOUSAND/UL (ref 4.31–10.16)

## 2022-11-02 RX ORDER — LORAZEPAM 2 MG/ML
0.5 INJECTION INTRAMUSCULAR ONCE
Status: DISCONTINUED | OUTPATIENT
Start: 2022-11-02 | End: 2022-11-02

## 2022-11-02 RX ORDER — NALOXONE HYDROCHLORIDE 0.4 MG/ML
0.1 INJECTION, SOLUTION INTRAMUSCULAR; INTRAVENOUS; SUBCUTANEOUS ONCE
Status: COMPLETED | OUTPATIENT
Start: 2022-11-02 | End: 2022-11-02

## 2022-11-02 RX ADMIN — NALXONE HYDROCHLORIDE 0.1 MG: 0.4 INJECTION INTRAMUSCULAR; INTRAVENOUS; SUBCUTANEOUS at 20:22

## 2022-11-02 RX ADMIN — SODIUM CHLORIDE 1000 ML: 0.9 INJECTION, SOLUTION INTRAVENOUS at 20:14

## 2022-11-03 VITALS
TEMPERATURE: 98.2 F | SYSTOLIC BLOOD PRESSURE: 97 MMHG | WEIGHT: 170 LBS | OXYGEN SATURATION: 99 % | DIASTOLIC BLOOD PRESSURE: 53 MMHG | RESPIRATION RATE: 18 BRPM | HEART RATE: 82 BPM | BODY MASS INDEX: 24.34 KG/M2 | HEIGHT: 70 IN

## 2022-11-03 LAB
AMPHETAMINES SERPL QL SCN: NEGATIVE
ATRIAL RATE: 96 BPM
BARBITURATES UR QL: NEGATIVE
BENZODIAZ UR QL: NEGATIVE
BILIRUB UR QL STRIP: NEGATIVE
CARBAMAZEPINE SERPL-MCNC: <0.5 UG/ML (ref 4–12)
CLARITY UR: CLEAR
COCAINE UR QL: NEGATIVE
COLOR UR: YELLOW
GLUCOSE UR STRIP-MCNC: NEGATIVE MG/DL
HGB UR QL STRIP.AUTO: NEGATIVE
KETONES UR STRIP-MCNC: NEGATIVE MG/DL
LEUKOCYTE ESTERASE UR QL STRIP: NEGATIVE
METHADONE UR QL: NEGATIVE
NITRITE UR QL STRIP: NEGATIVE
OPIATES UR QL SCN: NEGATIVE
OXYCODONE+OXYMORPHONE UR QL SCN: NEGATIVE
P AXIS: 3 DEGREES
PCP UR QL: NEGATIVE
PH UR STRIP.AUTO: 6 [PH]
PR INTERVAL: 134 MS
PROT UR STRIP-MCNC: NEGATIVE MG/DL
QRS AXIS: 66 DEGREES
QRSD INTERVAL: 102 MS
QT INTERVAL: 324 MS
QTC INTERVAL: 409 MS
SP GR UR STRIP.AUTO: >=1.03 (ref 1–1.03)
T WAVE AXIS: 65 DEGREES
THC UR QL: POSITIVE
UROBILINOGEN UR QL STRIP.AUTO: 0.2 E.U./DL
VENTRICULAR RATE: 96 BPM

## 2022-11-03 RX ADMIN — NALOXONE HYDROCHLORIDE 4 MG: 4 SPRAY NASAL at 05:38

## 2022-11-03 NOTE — ED PROVIDER NOTES
History  Chief Complaint   Patient presents with   • Alcohol Intoxication     PT ARRIVING VIA EMS  Police were called to the scene for bizarre behavior  Pt cooperative per ems drug use hx and smelled of ETOH     Patient is a 78-year-old male seen in the emergency department brought by EMS with concern for odd behavior  Patient states that he does not recall using drugs this evening, but states that he is not sure exactly what happened tonight  Review of records shows the patient has history of visits for substance abuse  Patient notes no chest pain, shortness of breath, abdominal pain, nausea, vomiting  Patient is oriented to person, place, and time, but is still very confused  Patient does not provide any additional coherent history  Prior to Admission Medications   Prescriptions Last Dose Informant Patient Reported? Taking? QUEtiapine (SEROquel) 100 mg tablet   Yes No   Sig: Take 100 mg by mouth daily at bedtime   Patient not taking: Reported on 8/2/2022   carBAMazepine (TEGretol) 200 mg tablet   Yes No   Sig: Take 200 mg by mouth daily at bedtime   Patient not taking: Reported on 8/2/2022   escitalopram (LEXAPRO) 10 mg tablet   Yes No   Sig: Take 10 mg by mouth daily   Patient not taking: Reported on 8/2/2022   gabapentin (NEURONTIN) 300 mg capsule   Yes No   Sig: Take 300 mg by mouth 3 (three) times a day   Patient not taking: Reported on 8/2/2022      Facility-Administered Medications: None       Past Medical History:   Diagnosis Date   • ADHD (attention deficit hyperactivity disorder)    • Anxiety     last assessed 3/23/15   • Bipolar 1 disorder ([facility] [address] )    • Genital warts     last assessed 12/7/15, resolved 11/1/17    • Psychiatric disorder        History reviewed  No pertinent surgical history  Family History   Problem Relation Age of Onset   • No Known Problems Father      I have reviewed and agree with the history as documented      E-Cigarette/Vaping   • E-Cigarette Use Never User E-Cigarette/Vaping Substances   • Nicotine No    • THC No    • CBD No    • Flavoring No    • Other No    • Unknown No      Social History     Tobacco Use   • Smoking status: Current Every Day Smoker     Packs/day: 0 50     Types: Cigarettes   • Smokeless tobacco: Never Used   Vaping Use   • Vaping Use: Never used   Substance Use Topics   • Alcohol use: Yes     Comment: occ  • Drug use: Yes     Types: Heroin, Marijuana, Methamphetamines, Fentanyl     Comment: states was "shooting up steroids" 3 months ago (3/4/19 states this was 3 years ago)       Review of Systems   Constitutional: Negative for chills and fever  HENT: Negative for ear pain and sore throat  Eyes: Negative for pain and visual disturbance  Respiratory: Negative for cough and shortness of breath  Cardiovascular: Negative for chest pain and palpitations  Gastrointestinal: Negative for abdominal pain and vomiting  Genitourinary: Negative for decreased urine volume and difficulty urinating  Musculoskeletal: Negative for arthralgias and back pain  Skin: Negative for color change and rash  Neurological: Negative for weakness and numbness  Psychiatric/Behavioral: Positive for confusion  Negative for suicidal ideas  All other systems reviewed and are negative  Physical Exam  Physical Exam  Vitals and nursing note reviewed  Constitutional:       General: He is not in acute distress  Appearance: He is well-developed  HENT:      Head: Normocephalic and atraumatic  Right Ear: External ear normal       Left Ear: External ear normal       Nose: Nose normal       Mouth/Throat:      Pharynx: Oropharynx is clear  Eyes:      General: No scleral icterus  Conjunctiva/sclera: Conjunctivae normal    Cardiovascular:      Rate and Rhythm: Regular rhythm  Tachycardia present  Heart sounds: No murmur heard  Pulmonary:      Effort: Pulmonary effort is normal  No respiratory distress        Breath sounds: Normal breath sounds  Abdominal:      General: There is no distension  Palpations: Abdomen is soft  Tenderness: There is no abdominal tenderness  Musculoskeletal:         General: No deformity or signs of injury  Cervical back: Normal range of motion and neck supple  Skin:     General: Skin is warm and dry  Neurological:      Mental Status: He is alert and oriented to person, place, and time  Cranial Nerves: No cranial nerve deficit  Sensory: No sensory deficit  Comments: somnolent, arousable to voice/light touch   Psychiatric:      Comments: Odd affect, no homicidal or suicidal ideation         Vital Signs  ED Triage Vitals   Temperature Pulse Respirations Blood Pressure SpO2   11/02/22 2002 11/02/22 2002 11/02/22 2002 11/02/22 2002 11/02/22 2000   98 2 °F (36 8 °C) (!) 112 14 108/70 (!) 86 %      Temp Source Heart Rate Source Patient Position - Orthostatic VS BP Location FiO2 (%)   11/02/22 2002 11/02/22 2002 11/02/22 2002 11/02/22 2002 --   Oral Monitor Sitting Right arm       Pain Score       11/02/22 2002       No Pain           Vitals:    11/02/22 2026 11/02/22 2228 11/03/22 0014 11/03/22 0220   BP:    97/53   Pulse: 104 95 86 82   Patient Position - Orthostatic VS:    Lying         Visual Acuity      ED Medications  Medications   sodium chloride 0 9 % bolus 1,000 mL (0 mL Intravenous Stopped 11/2/22 2228)   naloxone (NARCAN) injection 0 1 mg (0 1 mg Intravenous Given 11/2/22 2022)   naloxone nasal- Given to patient by provider at discharge   (NARCAN) 4 mg/0 1 mL nasal spray 4 mg (4 mg Does not apply Given by Other 11/3/22 0538)       Diagnostic Studies  Results Reviewed     Procedure Component Value Units Date/Time    Rapid drug screen, urine [[id number]]  (Abnormal) Collected: 11/03/22 0240    Lab Status: Final result Specimen: Urine, Clean Catch Updated: 11/03/22 0306     Amph/Meth UR Negative     Barbiturate Ur Negative     Benzodiazepine Urine Negative     Cocaine Urine Negative Methadone Urine Negative     Opiate Urine Negative     PCP Ur Negative     THC Urine Positive     Oxycodone Urine Negative    Narrative:      Presumptive report  If requested, specimen will be sent to reference lab for confirmation  FOR MEDICAL PURPOSES ONLY  IF CONFIRMATION NEEDED PLEASE CONTACT THE LAB WITHIN 5 DAYS      Drug Screen Cutoff Levels:  AMPHETAMINE/METHAMPHETAMINES  1000 ng/mL  BARBITURATES     200 ng/mL  BENZODIAZEPINES     200 ng/mL  COCAINE      300 ng/mL  METHADONE      300 ng/mL  OPIATES      300 ng/mL  PHENCYCLIDINE     25 ng/mL  THC       50 ng/mL  OXYCODONE      100 ng/mL    UA w Reflex to Microscopic w Reflex to Culture [[id number]]  (Normal) Collected: 11/03/22 0240    Lab Status: Final result Specimen: Urine, Clean Catch Updated: 11/03/22 0247     Color, UA Yellow     Clarity, UA Clear     Specific Gravity, UA >=1 030     pH, UA 6 0     Leukocytes, UA Negative     Nitrite, UA Negative     Protein, UA Negative mg/dl      Glucose, UA Negative mg/dl      Ketones, UA Negative mg/dl      Urobilinogen, UA 0 2 E U /dl      Bilirubin, UA Negative     Occult Blood, UA Negative    Carbamazepine level, total [[id number]]  (Abnormal) Collected: 11/02/22 2015    Lab Status: Final result Specimen: Blood from Arm, Right Updated: 11/03/22 0052     Carbamazepine Lvl <0 5 ug/mL     Fingerstick Glucose (POCT) [[id number]]  (Normal) Collected: 11/02/22 2049    Lab Status: Final result Updated: 11/02/22 2050     POC Glucose 73 mg/dl     Comprehensive metabolic panel [[id number]] Collected: 11/02/22 2015    Lab Status: Final result Specimen: Blood from Arm, Right Updated: 11/02/22 2042     Sodium 139 mmol/L      Potassium 4 1 mmol/L      Chloride 106 mmol/L      CO2 26 mmol/L      ANION GAP 7 mmol/L      BUN 25 mg/dL      Creatinine 1 06 mg/dL      Glucose 97 mg/dL      Calcium 8 7 mg/dL      AST 14 U/L      ALT 11 U/L      Alkaline Phosphatase 77 U/L      Total Protein 6 8 g/dL      Albumin 4 1 g/dL      Total Bilirubin 0 25 mg/dL      eGFR 91 ml/min/1 73sq m     Narrative:      [place] guidelines for Chronic Kidney Disease (CKD):   •  Stage 1 with normal or high GFR (GFR > 90 mL/min/1 73 square meters)  •  Stage 2 Mild CKD (GFR = 60-89 mL/min/1 73 square meters)  •  Stage 3A Moderate CKD (GFR = 45-59 mL/min/1 73 square meters)  •  Stage 3B Moderate CKD (GFR = 30-44 mL/min/1 73 square meters)  •  Stage 4 Severe CKD (GFR = 15-29 mL/min/1 73 square meters)  •  Stage 5 End Stage CKD (GFR <15 mL/min/1 73 square meters)  Note: GFR calculation is accurate only with a steady state creatinine    Magnesium [[id number]]  (Normal) Collected: 11/02/22 2015    Lab Status: Final result Specimen: Blood from Arm, Right Updated: 11/02/22 2042     Magnesium 2 1 mg/dL     Ethanol [[id number]]  (Normal) Collected: 11/02/22 2015    Lab Status: Final result Specimen: Blood from Arm, Right Updated: 11/02/22 2041     Ethanol Lvl <07 mg/dL     Salicylate level [[id number]]  (Normal) Collected: 11/02/22 2015    Lab Status: Final result Specimen: Blood from Arm, Right Updated: 03/31/44 3477     Salicylate Lvl <5 mg/dL     Acetaminophen level-"If concentration is detectable, please discuss with medical  on call " [[id number]]  (Abnormal) Collected: 11/02/22 2015    Lab Status: Final result Specimen: Blood from Arm, Right Updated: 11/02/22 2040     Acetaminophen Level <10 ug/mL     POCT alcohol breath test [[id number]]  (Normal) Resulted: 11/02/22 2038    Lab Status: Final result Updated: 11/02/22 2038     EXTBreath Alcohol 0 000    CBC and differential [[id number]]  (Abnormal) Collected: 11/02/22 2015    Lab Status: Final result Specimen: Blood from Arm, Right Updated: 11/02/22 2020     WBC 12 77 Thousand/uL      RBC 4 62 Million/uL      Hemoglobin 14 5 g/dL      Hematocrit 42 8 %      MCV 93 fL      MCH 31 4 pg      MCHC 33 9 g/dL      RDW 12 2 %      MPV 9 7 fL      Platelets 157 Thousands/uL      nRBC 0 /100 WBCs Neutrophils Relative 68 %      Immat GRANS % 0 %      Lymphocytes Relative 23 %      Monocytes Relative 6 %      Eosinophils Relative 2 %      Basophils Relative 1 %      Neutrophils Absolute 8 75 Thousands/µL      Immature Grans Absolute 0 03 Thousand/uL      Lymphocytes Absolute 2 91 Thousands/µL      Monocytes Absolute 0 70 Thousand/µL      Eosinophils Absolute 0 31 Thousand/µL      Basophils Absolute 0 07 Thousands/µL                  CT head without contrast   Final Result by Vern Elder DO (11/02 2131)   No acute intracranial abnormality                    Workstation performed: [id number]         XR chest 1 view portable   ED Interpretation by Naheed Perkins MD (11/02 2108)   No infiltrate or pneumothorax                 Procedures  ECG 12 Lead Documentation Only    Date/Time: 11/2/2022 8:30 PM  Performed by: Naheed Perkins MD  Authorized by: Naheed Perkins MD     Indications / Diagnosis:  Altered mental status  ECG reviewed by me, the ED Provider: yes    Patient location:  ED  Rate:     ECG rate:  96    ECG rate assessment: normal    Rhythm:     Rhythm: sinus rhythm    QRS:     QRS axis:  Normal  ST segments:     ST segments:  Normal  T waves:     T waves: normal    Comments:      Normal sinus rhythm at 96, normal axis, , , QTc 409, no ST-T wave abnormality, no evidence of acute ischemia    CriticalCare Time  Performed by: Naheed Perkins MD  Authorized by: Naheed Perkins MD     Critical care provider statement:     Critical care time (minutes):  30    Critical care start time:  11/2/2022 8:45 PM    Critical care end time:  11/2/2022 9:15 PM    Critical care time was exclusive of:  Separately billable procedures and treating other patients    Critical care was necessary to treat or prevent imminent or life-threatening deterioration of the following conditions:  Respiratory failure and toxidrome    Critical care was time spent personally by me on the following activities: Obtaining history from patient or surrogate, evaluation of patient's response to treatment, examination of patient, review of old charts, re-evaluation of patient's condition, ordering and review of laboratory studies, ordering and review of radiographic studies and ordering and performing treatments and interventions    I assumed direction of critical care for this patient from another provider in my specialty: no               ED Course  ED Course as of 11/03/22 0605   Wed Nov 02, 2022 2222 CT BRAIN - WITHOUT CONTRAST     INDICATION:   altered mental status      COMPARISON:  August 14, 2022     TECHNIQUE:  CT examination of the brain was performed  In addition to axial images, sagittal and coronal 2D reformatted images were created and submitted for interpretation      Radiation dose length product (DLP) for this visit:  747 mGy-cm   This examination, like all CT scans performed in the [facility], was performed utilizing techniques to minimize radiation dose exposure, including the use of iterative   reconstruction and automated exposure control        IMAGE QUALITY:  Diagnostic      FINDINGS:  PARENCHYMA: Decreased attenuation is noted in periventricular and subcortical white matter demonstrating an appearance that is statistically most likely to represent mild microangiopathic change      No CT signs of acute infarction  No intracranial mass, mass effect or midline shift  No acute parenchymal hemorrhage      VENTRICLES AND EXTRA-AXIAL SPACES:  Normal for the patient's age      VISUALIZED ORBITS AND PARANASAL SINUSES:  Unremarkable      CALVARIUM AND EXTRACRANIAL SOFT TISSUES:  Normal         IMPRESSION:  No acute intracranial abnormality                                   SBIRT 22yo+    Flowsheet Row Most Recent Value   SBIRT (23 yo +)    In order to provide better care to our patients, we are screening all of our patients for alcohol and drug use   Would it be okay to ask you these screening questions? No Filed at: 11/02/2022 2006                    [facility]  Number of Diagnoses or Management Options  Somnolence  Diagnosis management comments: Patient is a 78-year-old male seen in the emergency department with concern for odd behavior, altered mental status, somnolence  EKG was obtained and noted  Chest x-ray showed no infiltrate or pneumothorax  CT head showed no acute intracranial abnormality  Laboratory evaluation remarkable for elevated white blood cell count of 12 77, urine drug screen positive for THC  Patient was treated with naloxone for low respiratory rate/hypoxia, with improvement of symptoms noted  Evaluation is consistent with likely intoxication  Patient was monitored in the emergency department to clinically sober, ambulating with steady gait, and stable for discharge home  Patient denies suicidal and homicidal ideation, and appears to be at no imminent risk to himself or others  Patient declined evaluation for substance abuse  Discharge instructions were reviewed with patient  A referral call was made to [facility]  Amount and/or Complexity of Data Reviewed  Clinical lab tests: ordered and reviewed  Tests in the radiology section of CPT®: ordered and reviewed  Tests in the medicine section of CPT®: ordered and reviewed        Disposition  Final diagnoses:   Somnolence     Time reflects when diagnosis was documented in both MDM as applicable and the Disposition within this note     Time User Action Codes Description Comment    11/2/2022  9:16 PM Saundra Ya Add [R40 0] Somnolence       ED Disposition     ED Disposition   Discharge    Condition   Stable    Date/Time   Thu Nov 3, 2022  5:37 AM    Comment   Yarelis Tomlin  discharge to home/self care                 Follow-up Information     Follow up With Specialties Details Why Contact Info Additional Information    Your primary doctor  Call in 1 day       [place] Call  As needed [address] [id number]  [address] , [facility], [place], [place], [address]          Discharge Medication List as of 11/3/2022  5:37 AM      CONTINUE these medications which have NOT CHANGED    Details   carBAMazepine (TEGretol) 200 mg tablet Take 200 mg by mouth daily at bedtime, Historical Med      escitalopram (LEXAPRO) 10 mg tablet Take 10 mg by mouth daily, Historical Med      gabapentin (NEURONTIN) 300 mg capsule Take 300 mg by mouth 3 (three) times a day, Historical Med      QUEtiapine (SEROquel) 100 mg tablet Take 100 mg by mouth daily at bedtime, Historical Med             No discharge procedures on file      PDMP Review     None          ED Provider  Electronically Signed by           Christie Owen MD  11/03/22 315 Arturo Arvizu MD  11/03/22 1267

## 2022-11-04 LAB
BACTERIA BLD CULT: NORMAL
BACTERIA BLD CULT: NORMAL

## 2022-11-09 ENCOUNTER — HOSPITAL ENCOUNTER (EMERGENCY)
Facility: HOSPITAL | Age: 32
Discharge: HOME | End: 2022-11-09
Attending: EMERGENCY MEDICINE

## 2022-11-09 VITALS
BODY MASS INDEX: 22.96 KG/M2 | TEMPERATURE: 97.9 F | DIASTOLIC BLOOD PRESSURE: 56 MMHG | WEIGHT: 160 LBS | RESPIRATION RATE: 16 BRPM | HEART RATE: 72 BPM | SYSTOLIC BLOOD PRESSURE: 119 MMHG | OXYGEN SATURATION: 99 %

## 2022-11-09 VITALS
TEMPERATURE: 98.2 F | OXYGEN SATURATION: 100 % | HEART RATE: 70 BPM | SYSTOLIC BLOOD PRESSURE: 123 MMHG | RESPIRATION RATE: 20 BRPM | DIASTOLIC BLOOD PRESSURE: 74 MMHG

## 2022-11-09 DIAGNOSIS — R41.82 ALTERED MENTAL STATUS: Primary | ICD-10-CM

## 2022-11-09 DIAGNOSIS — Z00.8 ENCOUNTER FOR MEDICAL ASSESSMENT: Primary | ICD-10-CM

## 2022-11-09 DIAGNOSIS — M62.82 RHABDOMYOLYSIS: ICD-10-CM

## 2022-11-09 LAB
ALBUMIN SERPL BCP-MCNC: 3.3 G/DL (ref 3.5–5)
ALP SERPL-CCNC: 86 U/L (ref 46–116)
ALT SERPL W P-5'-P-CCNC: 31 U/L (ref 12–78)
ANION GAP SERPL CALCULATED.3IONS-SCNC: 5 MMOL/L (ref 4–13)
BASOPHILS # BLD AUTO: 0.07 THOUSANDS/ÂΜL (ref 0–0.1)
BASOPHILS NFR BLD AUTO: 1 % (ref 0–1)
BILIRUB SERPL-MCNC: 0.23 MG/DL (ref 0.2–1)
BUN SERPL-MCNC: 16 MG/DL (ref 5–25)
CALCIUM ALBUM COR SERPL-MCNC: 8.8 MG/DL (ref 8.3–10.1)
CALCIUM SERPL-MCNC: 8.2 MG/DL (ref 8.3–10.1)
CHLORIDE SERPL-SCNC: 109 MMOL/L (ref 96–108)
CK MB SERPL-MCNC: 2.2 % (ref 0–2.5)
CK MB SERPL-MCNC: 7.1 NG/ML (ref 0–5)
CK SERPL-CCNC: 322 U/L (ref 39–308)
CO2 SERPL-SCNC: 27 MMOL/L (ref 21–32)
CREAT SERPL-MCNC: 0.96 MG/DL (ref 0.6–1.3)
EOSINOPHIL # BLD AUTO: 0.38 THOUSAND/ÂΜL (ref 0–0.61)
EOSINOPHIL NFR BLD AUTO: 4 % (ref 0–6)
ERYTHROCYTE [DISTWIDTH] IN BLOOD BY AUTOMATED COUNT: 12.4 % (ref 11.6–15.1)
ETHANOL SERPL-MCNC: <3 MG/DL (ref 0–3)
GFR SERPL CREATININE-BSD FRML MDRD: 102 ML/MIN/1.73SQ M
GLUCOSE SERPL-MCNC: 101 MG/DL (ref 65–140)
GLUCOSE SERPL-MCNC: 99 MG/DL (ref 65–140)
HCT VFR BLD AUTO: 37.2 % (ref 36.5–49.3)
HGB BLD-MCNC: 12.3 G/DL (ref 12–17)
IMM GRANULOCYTES # BLD AUTO: 0.02 THOUSAND/UL (ref 0–0.2)
IMM GRANULOCYTES NFR BLD AUTO: 0 % (ref 0–2)
LYMPHOCYTES # BLD AUTO: 2.71 THOUSANDS/ÂΜL (ref 0.6–4.47)
LYMPHOCYTES NFR BLD AUTO: 31 % (ref 14–44)
MAGNESIUM SERPL-MCNC: 2 MG/DL (ref 1.6–2.6)
MCH RBC QN AUTO: 31.4 PG (ref 26.8–34.3)
MCHC RBC AUTO-ENTMCNC: 33.1 G/DL (ref 31.4–37.4)
MCV RBC AUTO: 95 FL (ref 82–98)
MONOCYTES # BLD AUTO: 0.51 THOUSAND/ÂΜL (ref 0.17–1.22)
MONOCYTES NFR BLD AUTO: 6 % (ref 4–12)
NEUTROPHILS # BLD AUTO: 5.17 THOUSANDS/ÂΜL (ref 1.85–7.62)
NEUTS SEG NFR BLD AUTO: 58 % (ref 43–75)
NRBC BLD AUTO-RTO: 0 /100 WBCS
PLATELET # BLD AUTO: 240 THOUSANDS/UL (ref 149–390)
PMV BLD AUTO: 9.3 FL (ref 8.9–12.7)
POTASSIUM SERPL-SCNC: 4.3 MMOL/L (ref 3.5–5.3)
PROT SERPL-MCNC: 6.2 G/DL (ref 6.4–8.4)
RBC # BLD AUTO: 3.92 MILLION/UL (ref 3.88–5.62)
SODIUM SERPL-SCNC: 141 MMOL/L (ref 135–147)
WBC # BLD AUTO: 8.86 THOUSAND/UL (ref 4.31–10.16)

## 2022-11-09 RX ORDER — NALOXONE HYDROCHLORIDE 1 MG/ML
2 INJECTION INTRAMUSCULAR; INTRAVENOUS; SUBCUTANEOUS ONCE
Status: COMPLETED | OUTPATIENT
Start: 2022-11-09 | End: 2022-11-09

## 2022-11-09 RX ORDER — ONDANSETRON 2 MG/ML
4 INJECTION INTRAMUSCULAR; INTRAVENOUS ONCE
Status: COMPLETED | OUTPATIENT
Start: 2022-11-09 | End: 2022-11-09

## 2022-11-09 RX ORDER — NALOXONE HYDROCHLORIDE 1 MG/ML
0.4 INJECTION INTRAMUSCULAR; INTRAVENOUS; SUBCUTANEOUS ONCE
Status: DISCONTINUED | OUTPATIENT
Start: 2022-11-09 | End: 2022-11-09

## 2022-11-09 RX ORDER — ONDANSETRON 2 MG/ML
INJECTION INTRAMUSCULAR; INTRAVENOUS
Status: COMPLETED
Start: 2022-11-09 | End: 2022-11-09

## 2022-11-09 RX ORDER — NALOXONE HYDROCHLORIDE 1 MG/ML
INJECTION INTRAMUSCULAR; INTRAVENOUS; SUBCUTANEOUS
Status: DISCONTINUED
Start: 2022-11-09 | End: 2022-11-09 | Stop reason: HOSPADM

## 2022-11-09 RX ORDER — LORAZEPAM 2 MG/ML
1 INJECTION INTRAMUSCULAR ONCE
Status: DISCONTINUED | OUTPATIENT
Start: 2022-11-09 | End: 2022-11-09

## 2022-11-09 RX ADMIN — NALOXONE HYDROCHLORIDE 2 MG: 1 INJECTION PARENTERAL at 10:02

## 2022-11-09 RX ADMIN — ONDANSETRON 4 MG: 2 INJECTION INTRAMUSCULAR; INTRAVENOUS at 10:03

## 2022-11-09 RX ADMIN — SODIUM CHLORIDE 1000 ML: 0.9 INJECTION, SOLUTION INTRAVENOUS at 10:07

## 2022-11-09 NOTE — ED PROVIDER NOTES
History  Chief Complaint   Patient presents with   • Altered Mental Status     Pt was founf by [employer] [place] front [employer] the [employer] altered and not making sense  26-year-old male who uses heroin and methamphetamines presents after he took some of these illicit drugs altered  He was seen in the ER today a few hours ago for the same thing currently somnolent  No signs of trauma  History provided by:  Patient   used: No        Prior to Admission Medications   Prescriptions Last Dose Informant Patient Reported? Taking? QUEtiapine (SEROquel) 100 mg tablet   Yes No   Sig: Take 100 mg by mouth daily at bedtime   Patient not taking: Reported on 8/2/2022   carBAMazepine (TEGretol) 200 mg tablet   Yes No   Sig: Take 200 mg by mouth daily at bedtime   Patient not taking: Reported on 8/2/2022   escitalopram (LEXAPRO) 10 mg tablet   Yes No   Sig: Take 10 mg by mouth daily   Patient not taking: Reported on 8/2/2022   gabapentin (NEURONTIN) 300 mg capsule   Yes No   Sig: Take 300 mg by mouth 3 (three) times a day   Patient not taking: Reported on 8/2/2022   naloxone (NARCAN) 4 mg/0 1 mL nasal spray   No No   Sig: Administer 1 spray into a nostril  If no response after 2-3 minutes, give another dose in the other nostril using a new spray  ondansetron (Zofran ODT) 4 mg disintegrating tablet   No No   Sig: Take 1 tablet (4 mg total) by mouth every 6 (six) hours as needed for nausea or vomiting      Facility-Administered Medications: None       Past Medical History:   Diagnosis Date   • ADHD (attention deficit hyperactivity disorder)    • Anxiety     last assessed 3/23/15   • Bipolar 1 disorder ([address] )    • Genital warts     last assessed 12/7/15, resolved 11/1/17    • Psychiatric disorder        No past surgical history on file  Family History   Problem Relation Age of Onset   • No Known Problems Father      I have reviewed and agree with the history as documented      E-Cigarette/Vaping   • E-Cigarette Use Never User      E-Cigarette/Vaping Substances   • Nicotine No    • THC No    • CBD No    • Flavoring No    • Other No    • Unknown No      Social History     Tobacco Use   • Smoking status: Current Every Day Smoker     Packs/day: 0 50     Types: Cigarettes   • Smokeless tobacco: Never Used   Vaping Use   • Vaping Use: Never used   Substance Use Topics   • Alcohol use: Yes     Comment: occ  • Drug use: Yes     Types: Heroin, Marijuana, Methamphetamines, Fentanyl     Comment: states was "shooting up steroids" 3 months ago (3/4/19 states this was 3 years ago)       Review of Systems   Constitutional: Negative  HENT: Negative  Eyes: Negative  Respiratory: Negative  Cardiovascular: Negative  Gastrointestinal: Negative  Endocrine: Negative  Genitourinary: Negative  Musculoskeletal: Negative  Skin: Negative  Allergic/Immunologic: Negative  Neurological: Negative  Hematological: Negative  Psychiatric/Behavioral: Positive for agitation, decreased concentration and dysphoric mood  Negative for confusion  All other systems reviewed and are negative  Physical Exam  Physical Exam  Constitutional:       Appearance: Normal appearance  HENT:      Head: Normocephalic and atraumatic  Nose: Nose normal       Mouth/Throat:      Mouth: Mucous membranes are moist    Eyes:      Extraocular Movements: Extraocular movements intact  Pupils: Pupils are equal, round, and reactive to light  Cardiovascular:      Rate and Rhythm: Normal rate and regular rhythm  Pulmonary:      Effort: Pulmonary effort is normal       Breath sounds: Normal breath sounds  Abdominal:      General: Abdomen is flat  Bowel sounds are normal       Palpations: Abdomen is soft  Musculoskeletal:         General: Normal range of motion  Cervical back: Normal range of motion and neck supple  Skin:     General: Skin is warm        Capillary Refill: Capillary refill takes less than 2 seconds  Neurological:      General: No focal deficit present  Mental Status: He is alert and oriented to person, place, and time  Mental status is at baseline  Psychiatric:         Mood and Affect: Mood normal          Thought Content: Thought content normal       Comments: Patient is somnolent arouses to verbal stimuli is agitated    Diaphoretic         Vital Signs  ED Triage Vitals [11/09/22 0926]   Temperature Pulse Respirations Blood Pressure SpO2   98 2 °F (36 8 °C) 70 20 123/74 100 %      Temp Source Heart Rate Source Patient Position - Orthostatic VS BP Location FiO2 (%)   Tympanic -- Lying Left arm --      Pain Score       --           Vitals:    11/09/22 0926   BP: 123/74   Pulse: 70   Patient Position - Orthostatic VS: Lying         Visual Acuity      ED Medications  Medications   sodium chloride 0 9 % bolus 1,000 mL (0 mL Intravenous Stopped 11/9/22 1226)   ondansetron (ZOFRAN) injection 4 mg (4 mg Intravenous Given 11/9/22 1003)   naloxone (NARCAN) injection 2 mg (2 mg Intravenous Given 11/9/22 1002)       Diagnostic Studies  Results Reviewed     Procedure Component Value Units Date/Time    CKMB [[id number]]  (Abnormal) Collected: 11/09/22 1007    Lab Status: Final result Specimen: Blood from Arm, Right Updated: 11/09/22 1205     CK-MB Index 2 2 %      CK-MB 7 1 ng/mL     Comprehensive metabolic panel [[id number]]  (Abnormal) Collected: 11/09/22 1007    Lab Status: Final result Specimen: Blood from Arm, Right Updated: 11/09/22 1120     Sodium 141 mmol/L      Potassium 4 3 mmol/L      Chloride 109 mmol/L      CO2 27 mmol/L      ANION GAP 5 mmol/L      BUN 16 mg/dL      Creatinine 0 96 mg/dL      Glucose 99 mg/dL      Calcium 8 2 mg/dL      Corrected Calcium 8 8 mg/dL      AST --     ALT 31 U/L      Alkaline Phosphatase 86 U/L      Total Protein 6 2 g/dL      Albumin 3 3 g/dL      Total Bilirubin 0 23 mg/dL      eGFR 102 ml/min/1 73sq m     Narrative:      [facility] guidelines for Chronic Kidney Disease (CKD):   •  Stage 1 with normal or high GFR (GFR > 90 mL/min/1 73 square meters)  •  Stage 2 Mild CKD (GFR = 60-89 mL/min/1 73 square meters)  •  Stage 3A Moderate CKD (GFR = 45-59 mL/min/1 73 square meters)  •  Stage 3B Moderate CKD (GFR = 30-44 mL/min/1 73 square meters)  •  Stage 4 Severe CKD (GFR = 15-29 mL/min/1 73 square meters)  •  Stage 5 End Stage CKD (GFR <15 mL/min/1 73 square meters)  Note: GFR calculation is accurate only with a steady state creatinine    Magnesium [[id number]]  (Normal) Collected: 11/09/22 1007    Lab Status: Final result Specimen: Blood from Arm, Right Updated: 11/09/22 1117     Magnesium 2 0 mg/dL     CK Total with Reflex CKMB [[id number]]  (Abnormal) Collected: 11/09/22 1007    Lab Status: Final result Specimen: Blood from Arm, Right Updated: 11/09/22 1117     Total  U/L     Ethanol [[id number]]  (Normal) Collected: 11/09/22 1007    Lab Status: Final result Specimen: Blood from Arm, Right Updated: 11/09/22 1052     Ethanol Lvl <3 0 mg/dL     CBC and differential [[id number]] Collected: 11/09/22 1007    Lab Status: Final result Specimen: Blood from Arm, Right Updated: 11/09/22 1013     WBC 8 86 Thousand/uL      RBC 3 92 Million/uL      Hemoglobin 12 3 g/dL      Hematocrit 37 2 %      MCV 95 fL      MCH 31 4 pg      MCHC 33 1 g/dL      RDW 12 4 %      MPV 9 3 fL      Platelets 243 Thousands/uL      nRBC 0 /100 WBCs      Neutrophils Relative 58 %      Immat GRANS % 0 %      Lymphocytes Relative 31 %      Monocytes Relative 6 %      Eosinophils Relative 4 %      Basophils Relative 1 %      Neutrophils Absolute 5 17 Thousands/µL      Immature Grans Absolute 0 02 Thousand/uL      Lymphocytes Absolute 2 71 Thousands/µL      Monocytes Absolute 0 51 Thousand/µL      Eosinophils Absolute 0 38 Thousand/µL      Basophils Absolute 0 07 Thousands/µL     Fingerstick Glucose (POCT) [[id number]]  (Normal) Collected: 11/09/22 0955    Lab Status: Final result Updated: 11/09/22 0956     POC Glucose 101 mg/dl                  No orders to display              Procedures  ECG 12 Lead Documentation Only    Date/Time: 11/9/2022 9:55 AM  Performed by: Timothy Guajardo DO  Authorized by: Timothy Guajardo DO     ECG reviewed by me, the ED Provider: yes    Patient location:  ED  Previous ECG:     Previous ECG:  Unavailable    Comparison to cardiac monitor: Yes    Interpretation:     Interpretation: non-specific    Rate:     ECG rate assessment: normal    Rhythm:     Rhythm: sinus rhythm    Ectopy:     Ectopy: none    QRS:     QRS axis:  Normal  Conduction:     Conduction: normal    ST segments:     ST segments:  Non-specific  T waves:     T waves: non-specific               ED Course                                             MDM  Number of Diagnoses or Management Options     Amount and/or Complexity of Data Reviewed  Clinical lab tests: ordered and reviewed  Tests in the medicine section of CPT®: ordered and reviewed    Patient Progress  Patient progress: stable      Disposition  Final diagnoses: Altered mental status   Rhabdomyolysis     Time reflects when diagnosis was documented in both MDM as applicable and the Disposition within this note     Time User Action Codes Description Comment    11/9/2022 11:39 AM Sandra Davis Add [R41 82] Altered mental status     11/9/2022 11:39 AM Jacqueline Davis Add [M62 82] Rhabdomyolysis       ED Disposition     ED Disposition   Discharge    Condition   Stable    Date/Time   Wed Nov 9, 2022 11:39 AM    Comment   Marcelene Lombard  discharge to home/self care                 Follow-up Information     Follow up With Specialties Details Why Contact Info Additional Information    Romayne Duet  Schedule an appointment as soon as possible for a visit   [address] [place] [address] Emergency Department Emergency Medicine  If symptoms worsen [address] [place] [address]  [phone] [facility] Emergency Department, [place], [place], [address]          Discharge Medication List as of 11/9/2022 11:51 AM      CONTINUE these medications which have NOT CHANGED    Details   carBAMazepine (TEGretol) 200 mg tablet Take 200 mg by mouth daily at bedtime, Historical Med      escitalopram (LEXAPRO) 10 mg tablet Take 10 mg by mouth daily, Historical Med      gabapentin (NEURONTIN) 300 mg capsule Take 300 mg by mouth 3 (three) times a day, Historical Med      naloxone (NARCAN) 4 mg/0 1 mL nasal spray Administer 1 spray into a nostril  If no response after 2-3 minutes, give another dose in the other nostril using a new spray , Normal      ondansetron (Zofran ODT) 4 mg disintegrating tablet Take 1 tablet (4 mg total) by mouth every 6 (six) hours as needed for nausea or vomiting, Starting Mon 7/18/2022, Normal      QUEtiapine (SEROquel) 100 mg tablet Take 100 mg by mouth daily at bedtime, Historical Med             No discharge procedures on file      PDMP Review     None          ED Provider  Electronically Signed by           Matthew Hunt DO  11/09/22 5162

## 2022-11-09 NOTE — ED NOTES
Patient has been noted to be sleeping for the past two hours in room, no distress noted       Jayme Virgen, LORIN  11/09/22 4801

## 2022-11-09 NOTE — ED NOTES
Pt eating lunch  Readily returns to sleep  Po tolerated well   Will continue to proceed towards discharge/     Pam Green RN  11/09/22 1100

## 2022-11-09 NOTE — ED PROVIDER NOTES
Final Diagnosis:  1  Encounter for medical assessment        Chief Complaint   Patient presents with   • Weakness - Generalized     Patient found laying on street, denies any recent drug use, patient unable to keep eyes open during triage, reports does use, meth and fentanyl at times     HPI  Patient presents after he was picked up by police  Reports he was using meth few d ago and now is super fatigued  Went to sleep on the side of the road  Lives in a houuse with a few guys that he doesn't like that keep him awake too  Denies now drug use  After sleeping here for a little while he's able to hold a normal convo  No hypopnea no desats on RA  Pupils are small  No narcan given  Patient has no medical issues he'd like to address   He reports "nobody can help me if I can't help myself "  Ultimately he's able to put on his clothes and await daylight d/c  In my estimation he's sober but bizarre  - No language barrier    - History obtained from patient  - There are no limitations to the history obtained  - Previous charting underwent limited review with attention to last ED visits, labs, ekgs, and prior imaging  PMH:   has a past medical history of ADHD (attention deficit hyperactivity disorder), Anxiety, Bipolar 1 disorder ([address] ), Genital warts, and Psychiatric disorder  PSH:   has no past surgical history on file  Social History:    Tobacco Use: High Risk   • Smoking Tobacco Use: Current Every Day Smoker   • Smokeless Tobacco Use: Never Used     Alcohol Use: Not on file     Patient with no illicit use    ROS:    Pertinent positives/negatives:   Review of Systems   Psychiatric/Behavioral: Positive for sleep disturbance  CONSTITUTIONAL:  No dizziness  No weakness  No unexpected weight loss  EYES:  No pain, erythema, or discharge  No loss of vision  ENT:  No tinnitus, decreased hearing  No epistaxis/purulent drainage  No voice change, airway closing, trismus  CARDIOVASCULAR:  No chest pain   No palpitations  No new lower extremity edema  RESPIRATORY:  No purulent cough  No hemoptysis  No dyspnea  No paroxysmal nocturnal dyspnea  No stridor, audible wheezing bedside  GASTROINTESTINAL:  Normal appetite  No vomiting, diarrhea  No pain  No bloating  No melena  GENITOURINARY:  No frequency, urgency, nocturia  No hematuria or dysuria  No discharge  No sores/adenopathy  MUSCULOSKELETAL:  No arthralgias or myalgias that are new  INTEGUMENTARY:  No swelling  No unexpected contusions  No abrasions  No lymphangitis  NEUROLOGIC:  No meningismus  No numbness of the extremities  No new focal weakness  No postural instability  PSYCHIATRIC:  No SI HI AVH  HEMATOLOGICAL:  No bleeding  No petechiae  No bruising  ALLERGIES:  No urticaria  No sudden abd cramping  No stridor  PE:     Physical exam highlights:   Physical Exam       Vitals:    11/09/22 0316   BP: 119/56   BP Location: Right arm   Pulse: 72   Resp: 16   Temp: 97 9 °F (36 6 °C)   TempSrc: Tympanic   SpO2: 99%   Weight: 72 6 kg (160 lb)     Vitals reviewed by me  Nursing note reviewed  Chaperone present for all sensitive exam   Const: No acute distress  Alert  Nontoxic  Not diaphoretic  HEENT: External ears normal  No protrusion drainage swelling  Nose normal  No drainage/traumatic deformity  MMM  Mouth with baseline/symmetric movement  No trismus  Eyes: No squinting  No icterus  Tracks through the room with normal EOM  No tearing/swelling/drainage  Neck: ROM normal  No rigidity  No meningismus  Cards: Rate as per vitals  Compared to monitor sinus unless documented above  Regular  Well perfused  Pulm: able to verbalize without additional effort  Effort and excursion normal  No disress  No audible wheezing/ stridor  Normal resp rate  Abd: No distension beyond baseline  No fluctuant wave  Patient without peritoneal pain with shifting/bumping the bed  MSK: ROM normal and baseline  No deformity  Skin: No new rashes visible  Well perfused  Neuro: Nonfocal  Baseline  CN grossly intact  Moving all four with coordination  Psych: Normal behavior but bizarre affect  A:  - Nursing note reviewed  Ddx and MDM  Post meth fatigue  Fentanyl abuse?    obs    AM d/c                         No orders to display     No orders of the defined types were placed in this encounter  Labs Reviewed - No data to display    Final Diagnosis:  1  Encounter for medical assessment        P:  - hospital tx includes   Medications - No data to display      - disposition  Time reflects when diagnosis was documented in both MDM as applicable and the Disposition within this note     Time User Action Codes Description Comment    11/9/2022  6:31 AM Saul Munson [Z00 8] Encounter for medical assessment       ED Disposition     ED Disposition   Discharge    Condition   Stable    Date/Time   Wed Nov 9, 2022  6:31 AM    Comment   Tay Kumar  discharge to home/self care  Follow-up Information     Follow up With Specialties Details Why [address] [place] [address]  [phone]            - patient will call their PCP to let them know they were in the emergency department  We discuss return precautions       - additional tx intended, if consistent with primary provider:  - patient to follow with :      Discharge Medication List as of 11/9/2022  6:32 AM      CONTINUE these medications which have NOT CHANGED    Details   carBAMazepine (TEGretol) 200 mg tablet Take 200 mg by mouth daily at bedtime, Historical Med      escitalopram (LEXAPRO) 10 mg tablet Take 10 mg by mouth daily, Historical Med      gabapentin (NEURONTIN) 300 mg capsule Take 300 mg by mouth 3 (three) times a day, Historical Med      naloxone (NARCAN) 4 mg/0 1 mL nasal spray Administer 1 spray into a nostril   If no response after 2-3 minutes, give another dose in the other nostril using a new spray , Normal      ondansetron (Zofran ODT) 4 mg disintegrating tablet Take 1 tablet (4 mg total) by mouth every 6 (six) hours as needed for nausea or vomiting, Starting Mon 7/18/2022, Normal      QUEtiapine (SEROquel) 100 mg tablet Take 100 mg by mouth daily at bedtime, Historical Med           No discharge procedures on file  Prior to Admission Medications   Prescriptions Last Dose Informant Patient Reported? Taking? QUEtiapine (SEROquel) 100 mg tablet   Yes No   Sig: Take 100 mg by mouth daily at bedtime   Patient not taking: Reported on 8/2/2022   carBAMazepine (TEGretol) 200 mg tablet   Yes No   Sig: Take 200 mg by mouth daily at bedtime   Patient not taking: Reported on 8/2/2022   escitalopram (LEXAPRO) 10 mg tablet   Yes No   Sig: Take 10 mg by mouth daily   Patient not taking: Reported on 8/2/2022   gabapentin (NEURONTIN) 300 mg capsule   Yes No   Sig: Take 300 mg by mouth 3 (three) times a day   Patient not taking: Reported on 8/2/2022   naloxone (NARCAN) 4 mg/0 1 mL nasal spray   No No   Sig: Administer 1 spray into a nostril  If no response after 2-3 minutes, give another dose in the other nostril using a new spray  ondansetron (Zofran ODT) 4 mg disintegrating tablet   No No   Sig: Take 1 tablet (4 mg total) by mouth every 6 (six) hours as needed for nausea or vomiting      Facility-Administered Medications: None       Portions of the record may have been created with voice recognition software  Occasional wrong word or "sound a like" substitutions may have occurred due to the inherent limitations of voice recognition software  Read the chart carefully and recognize, using context, where substitutions have occurred      Electronically signed by:  MD Lianne Phan MD  11/10/22 9455

## 2022-11-11 LAB
ATRIAL RATE: 72 BPM
P AXIS: -14 DEGREES
PR INTERVAL: 146 MS
QRS AXIS: 69 DEGREES
QRSD INTERVAL: 110 MS
QT INTERVAL: 414 MS
QTC INTERVAL: 453 MS
T WAVE AXIS: 72 DEGREES
VENTRICULAR RATE: 72 BPM

## 2022-11-15 ENCOUNTER — HOSPITAL ENCOUNTER (EMERGENCY)
Facility: HOSPITAL | Age: 32
Discharge: HOME | End: 2022-11-15
Attending: EMERGENCY MEDICINE

## 2022-11-15 VITALS
HEART RATE: 100 BPM | WEIGHT: 170 LBS | HEIGHT: 70 IN | SYSTOLIC BLOOD PRESSURE: 138 MMHG | DIASTOLIC BLOOD PRESSURE: 72 MMHG | TEMPERATURE: 97.1 F | BODY MASS INDEX: 24.34 KG/M2 | OXYGEN SATURATION: 97 % | RESPIRATION RATE: 18 BRPM

## 2022-11-15 DIAGNOSIS — H10.9 CONJUNCTIVITIS: ICD-10-CM

## 2022-11-15 DIAGNOSIS — S05.00XA CORNEAL ABRASION: Primary | ICD-10-CM

## 2022-11-15 RX ORDER — TOBRAMYCIN 3 MG/ML
1 SOLUTION/ DROPS OPHTHALMIC ONCE
Status: COMPLETED | OUTPATIENT
Start: 2022-11-15 | End: 2022-11-15

## 2022-11-15 RX ORDER — TETRACAINE HYDROCHLORIDE 5 MG/ML
1 SOLUTION OPHTHALMIC ONCE
Status: COMPLETED | OUTPATIENT
Start: 2022-11-15 | End: 2022-11-15

## 2022-11-15 RX ORDER — NAPROXEN 500 MG/1
500 TABLET ORAL ONCE
Status: COMPLETED | OUTPATIENT
Start: 2022-11-15 | End: 2022-11-15

## 2022-11-15 RX ORDER — KETOROLAC TROMETHAMINE 5 MG/ML
1 SOLUTION OPHTHALMIC 4 TIMES DAILY
Status: DISCONTINUED | OUTPATIENT
Start: 2022-11-15 | End: 2022-11-15 | Stop reason: HOSPADM

## 2022-11-15 RX ADMIN — KETOROLAC TROMETHAMINE 1 DROP: 5 SOLUTION OPHTHALMIC at 04:07

## 2022-11-15 RX ADMIN — TOBRAMYCIN 1 DROP: 3 SOLUTION/ DROPS OPHTHALMIC at 03:18

## 2022-11-15 RX ADMIN — NAPROXEN 500 MG: 500 TABLET ORAL at 03:17

## 2022-11-15 RX ADMIN — TETRACAINE HYDROCHLORIDE 1 DROP: 5 SOLUTION OPHTHALMIC at 03:18

## 2022-11-15 RX ADMIN — FLUORESCEIN SODIUM 1 STRIP: 1 STRIP OPHTHALMIC at 03:18

## 2022-11-15 NOTE — ED PROVIDER NOTES
Final Diagnosis:  1  Corneal abrasion    2  Conjunctivitis        Chief Complaint   Patient presents with   • Eye Pain     Pt states l eye got itchy something might have gotten in it while wearing contacts yesterday morning and it got worst, took the contacts four hours ago  Redness noted and blurry vision     HPI  Patient notes l eye itching and foreignbody sensation  Wears contacts, near sighted, has no glasses  Scratching eye  I encouragenot to  Has that contact removed  Conj injection  Has blurry vision but contact out  Pupil reactive symmetric  No photophobia  Improves with tetracaine  No keyhole deformity  When stained can appreciate no abrasion  Has some crusting in medial canthus  Right eye w/ contact in and w/o symptoms      - No language barrier    - History obtained from patient  - There are no limitations to the history obtained  - Previous charting underwent limited review with attention to last ED visits, labs, ekgs, and prior imaging  PMH:   has a past medical history of ADHD (attention deficit hyperactivity disorder), Anxiety, Bipolar 1 disorder ([address] ), Genital warts, and Psychiatric disorder  PSH:   has no past surgical history on file  Social History:    Tobacco Use: High Risk   • Smoking Tobacco Use: Every Day   • Smokeless Tobacco Use: Never   • Passive Exposure: Not on file     Alcohol Use: Not on file     Patient with no illicit use    ROS:    Pertinent positives/negatives:   Review of Systems   Eyes: Positive for pain, discharge, redness and itching  CONSTITUTIONAL:  No dizziness  No weakness  No unexpected weight loss  EYES:  No pain, erythema, or discharge  No loss of vision  ENT:  No tinnitus, decreased hearing  No epistaxis/purulent drainage  No voice change, airway closing, trismus  CARDIOVASCULAR:  No chest pain  No palpitations  No new lower extremity edema  RESPIRATORY:  No purulent cough  No hemoptysis  No dyspnea  No paroxysmal nocturnal dyspnea   No stridor, audible wheezing bedside  GASTROINTESTINAL:  Normal appetite  No vomiting, diarrhea  No pain  No bloating  No melena  GENITOURINARY:  No frequency, urgency, nocturia  No hematuria or dysuria  No discharge  No sores/adenopathy  MUSCULOSKELETAL:  No arthralgias or myalgias that are new  INTEGUMENTARY:  No swelling  No unexpected contusions  No abrasions  No lymphangitis  NEUROLOGIC:  No meningismus  No numbness of the extremities  No new focal weakness  No postural instability  PSYCHIATRIC:  No SI HI AVH  HEMATOLOGICAL:  No bleeding  No petechiae  No bruising  ALLERGIES:  No urticaria  No sudden abd cramping  No stridor  PE:     Physical exam highlights:   Physical Exam       Vitals:    11/15/22 0258   BP: 138/72   BP Location: Right arm   Pulse: 100   Resp: 18   Temp: (!) 97 1 °F (36 2 °C)   TempSrc: Tympanic   SpO2: 97%   Weight: 77 1 kg (170 lb)   Height: 5' 10" (1 778 m)     Vitals reviewed by me  Nursing note reviewed  Chaperone present for all sensitive exam   Const: No acute distress  Alert  Nontoxic  Not diaphoretic  HEENT: External ears normal  No protrusion drainage swelling  Nose normal  No drainage/traumatic deformity  MMM  Mouth with baseline/symmetric movement  No trismus  Eyes: No squinting  No icterus  Tracks through the room with normal EOM  No tearing/swelling/drainage  Neck: ROM normal  No rigidity  No meningismus  Cards: Rate as per vitals  Compared to monitor sinus unless documented above  Regular  Well perfused  Pulm: able to verbalize without additional effort  Effort and excursion normal  No disress  No audible wheezing/ stridor  Normal resp rate  Abd: No distension beyond baseline  No fluctuant wave  Patient without peritoneal pain with shifting/bumping the bed  MSK: ROM normal and baseline  No deformity  Skin: No new rashes visible  Well perfused  Neuro: Nonfocal  Baseline  CN grossly intact  Moving all four with coordination     Psych: Normal behavior and affect  A:  - Nursing note reviewed  Ddx and MDM  flourescein - negative tracy  No appreciated abrasion ulcer    Contact out  Keep out  Tobramycin - cover pseudomonal species    Conjunctivitis likely                            No orders to display     No orders of the defined types were placed in this encounter  Labs Reviewed - No data to display    Final Diagnosis:  1  Corneal abrasion    2  Conjunctivitis        P:  - hospital tx includes   Medications   fluorescein sodium sterile ophthalmic strip 1 strip (1 strip Both Eyes Given 11/15/22 0318)   tobramycin (TOBREX) 0 3 % ophthalmic solution 1 drop (1 drop Left Eye Given 11/15/22 0318)   naproxen (NAPROSYN) tablet 500 mg (500 mg Oral Given 11/15/22 0317)   tetracaine 0 5 % ophthalmic solution 1 drop (1 drop Left Eye Given 11/15/22 0318)         - disposition  Time reflects when diagnosis was documented in both MDM as applicable and the Disposition within this note     Time User Action Codes Description Comment    11/15/2022  3:55 AM Daniele Grier Add [S05 00XA] Corneal abrasion     11/15/2022  3:55 AM Daniele Grier Add [H10 9] Conjunctivitis       ED Disposition     ED Disposition   Discharge    Condition   Stable    Date/Time   Tue Nov 15, 2022  3:55 AM    Comment   Abdifatah Loomis  discharge to home/self care  Follow-up Information     Follow up With Specialties Details Why Contact Info    César Yoo MD Ophthalmology   [place] [address]  [address] [address]  [phone]            - patient will call their PCP to let them know they were in the emergency department   We discuss return precautions       - additional tx intended, if consistent with primary provider:  - patient to follow with :      Discharge Medication List as of 11/15/2022  3:56 AM      CONTINUE these medications which have NOT CHANGED    Details   carBAMazepine (TEGretol) 200 mg tablet Take 200 mg by mouth daily at bedtime, Historical Med escitalopram (LEXAPRO) 10 mg tablet Take 10 mg by mouth daily, Historical Med      gabapentin (NEURONTIN) 300 mg capsule Take 300 mg by mouth 3 (three) times a day, Historical Med      naloxone (NARCAN) 4 mg/0 1 mL nasal spray Administer 1 spray into a nostril  If no response after 2-3 minutes, give another dose in the other nostril using a new spray , Normal      ondansetron (Zofran ODT) 4 mg disintegrating tablet Take 1 tablet (4 mg total) by mouth every 6 (six) hours as needed for nausea or vomiting, Starting Mon 7/18/2022, Normal      QUEtiapine (SEROquel) 100 mg tablet Take 100 mg by mouth daily at bedtime, Historical Med           No discharge procedures on file  Prior to Admission Medications   Prescriptions Last Dose Informant Patient Reported? Taking? QUEtiapine (SEROquel) 100 mg tablet   Yes No   Sig: Take 100 mg by mouth daily at bedtime   Patient not taking: Reported on 8/2/2022   carBAMazepine (TEGretol) 200 mg tablet   Yes No   Sig: Take 200 mg by mouth daily at bedtime   Patient not taking: Reported on 8/2/2022   escitalopram (LEXAPRO) 10 mg tablet   Yes No   Sig: Take 10 mg by mouth daily   Patient not taking: Reported on 8/2/2022   gabapentin (NEURONTIN) 300 mg capsule   Yes No   Sig: Take 300 mg by mouth 3 (three) times a day   Patient not taking: Reported on 8/2/2022   naloxone (NARCAN) 4 mg/0 1 mL nasal spray   No No   Sig: Administer 1 spray into a nostril  If no response after 2-3 minutes, give another dose in the other nostril using a new spray  ondansetron (Zofran ODT) 4 mg disintegrating tablet   No No   Sig: Take 1 tablet (4 mg total) by mouth every 6 (six) hours as needed for nausea or vomiting      Facility-Administered Medications: None       Portions of the record may have been created with voice recognition software  Occasional wrong word or "sound a like" substitutions may have occurred due to the inherent limitations of voice recognition software   Read the chart carefully and recognize, using context, where substitutions have occurred      Electronically signed by:  MD Barbi Douglass MD  11/16/22 3391

## 2022-11-30 ENCOUNTER — APPOINTMENT (EMERGENCY)
Dept: RADIOLOGY | Facility: HOSPITAL | Age: 32
End: 2022-11-30
Attending: EMERGENCY MEDICINE

## 2022-11-30 ENCOUNTER — HOSPITAL ENCOUNTER (EMERGENCY)
Facility: HOSPITAL | Age: 32
Discharge: HOME | End: 2022-11-30
Attending: EMERGENCY MEDICINE

## 2022-11-30 VITALS
DIASTOLIC BLOOD PRESSURE: 59 MMHG | TEMPERATURE: 98.4 F | BODY MASS INDEX: 24.39 KG/M2 | WEIGHT: 170 LBS | RESPIRATION RATE: 20 BRPM | OXYGEN SATURATION: 98 % | SYSTOLIC BLOOD PRESSURE: 111 MMHG | HEART RATE: 74 BPM

## 2022-11-30 DIAGNOSIS — M79.605 LEFT LEG PAIN: Primary | ICD-10-CM

## 2022-11-30 DIAGNOSIS — R11.2 NAUSEA & VOMITING: ICD-10-CM

## 2022-11-30 LAB
ALBUMIN SERPL BCP-MCNC: 3.4 G/DL (ref 3.5–5)
ALP SERPL-CCNC: 97 U/L (ref 46–116)
ALT SERPL W P-5'-P-CCNC: 36 U/L (ref 12–78)
ANION GAP SERPL CALCULATED.3IONS-SCNC: 14 MMOL/L (ref 4–13)
AST SERPL W P-5'-P-CCNC: 72 U/L (ref 5–45)
BASOPHILS # BLD MANUAL: 0 THOUSAND/UL (ref 0–0.1)
BASOPHILS NFR MAR MANUAL: 0 % (ref 0–1)
BILIRUB SERPL-MCNC: 0.77 MG/DL (ref 0.2–1)
BUN SERPL-MCNC: 36 MG/DL (ref 5–25)
CALCIUM ALBUM COR SERPL-MCNC: 8.8 MG/DL (ref 8.3–10.1)
CALCIUM SERPL-MCNC: 8.3 MG/DL (ref 8.3–10.1)
CHLORIDE SERPL-SCNC: 107 MMOL/L (ref 96–108)
CK MB SERPL-MCNC: 1.5 % (ref 0–2.5)
CK MB SERPL-MCNC: 9 NG/ML (ref 0–5)
CK SERPL-CCNC: 584 U/L (ref 39–308)
CO2 SERPL-SCNC: 24 MMOL/L (ref 21–32)
CREAT SERPL-MCNC: 1.33 MG/DL (ref 0.6–1.3)
EOSINOPHIL # BLD MANUAL: 0 THOUSAND/UL (ref 0–0.4)
EOSINOPHIL NFR BLD MANUAL: 0 % (ref 0–6)
ERYTHROCYTE [DISTWIDTH] IN BLOOD BY AUTOMATED COUNT: 12.4 % (ref 11.6–15.1)
ETHANOL SERPL-MCNC: <3 MG/DL (ref 0–3)
FLUAV RNA RESP QL NAA+PROBE: NEGATIVE
FLUBV RNA RESP QL NAA+PROBE: NEGATIVE
GFR SERPL CREATININE-BSD FRML MDRD: 69 ML/MIN/1.73SQ M
GLUCOSE SERPL-MCNC: 106 MG/DL (ref 65–140)
HCT VFR BLD AUTO: 39 % (ref 36.5–49.3)
HGB BLD-MCNC: 13.4 G/DL (ref 12–17)
LYMPHOCYTES # BLD AUTO: 0.75 THOUSAND/UL (ref 0.6–4.47)
LYMPHOCYTES # BLD AUTO: 10 % (ref 14–44)
MAGNESIUM SERPL-MCNC: 1.7 MG/DL (ref 1.6–2.6)
MCH RBC QN AUTO: 31.4 PG (ref 26.8–34.3)
MCHC RBC AUTO-ENTMCNC: 34.4 G/DL (ref 31.4–37.4)
MCV RBC AUTO: 91 FL (ref 82–98)
MONOCYTES # BLD AUTO: 0 THOUSAND/UL (ref 0–1.22)
MONOCYTES NFR BLD: 0 % (ref 4–12)
NEUTROPHILS # BLD MANUAL: 6.47 THOUSAND/UL (ref 1.85–7.62)
NEUTS BAND NFR BLD MANUAL: 45 % (ref 0–8)
NEUTS SEG NFR BLD AUTO: 41 % (ref 43–75)
NRBC BLD AUTO-RTO: 1 /100 WBC (ref 0–2)
PLATELET # BLD AUTO: 200 THOUSANDS/UL (ref 149–390)
PLATELET BLD QL SMEAR: ADEQUATE
PMV BLD AUTO: 9.1 FL (ref 8.9–12.7)
POTASSIUM SERPL-SCNC: 3.5 MMOL/L (ref 3.5–5.3)
PROT SERPL-MCNC: 6.4 G/DL (ref 6.4–8.4)
RBC # BLD AUTO: 4.27 MILLION/UL (ref 3.88–5.62)
RBC MORPH BLD: NORMAL
RSV RNA RESP QL NAA+PROBE: NEGATIVE
SARS-COV-2 RNA RESP QL NAA+PROBE: NEGATIVE
SODIUM SERPL-SCNC: 145 MMOL/L (ref 135–147)
VARIANT LYMPHS # BLD AUTO: 4 %
WBC # BLD AUTO: 7.52 THOUSAND/UL (ref 4.31–10.16)

## 2022-11-30 RX ORDER — KETOROLAC TROMETHAMINE 10 MG/1
10 TABLET, FILM COATED ORAL EVERY 6 HOURS PRN
Qty: 9 TABLET | Refills: 0 | Status: SHIPPED | OUTPATIENT
Start: 2022-11-30 | End: 2022-12-03

## 2022-11-30 RX ORDER — ONDANSETRON 2 MG/ML
4 INJECTION INTRAMUSCULAR; INTRAVENOUS ONCE
Status: COMPLETED | OUTPATIENT
Start: 2022-11-30 | End: 2022-11-30

## 2022-11-30 RX ORDER — LORAZEPAM 2 MG/ML
1 INJECTION INTRAMUSCULAR ONCE
Status: COMPLETED | OUTPATIENT
Start: 2022-11-30 | End: 2022-11-30

## 2022-11-30 RX ORDER — ONDANSETRON 4 MG/1
4 TABLET, ORALLY DISINTEGRATING ORAL ONCE
Status: COMPLETED | OUTPATIENT
Start: 2022-11-30 | End: 2022-11-30

## 2022-11-30 RX ORDER — HYDROMORPHONE HCL/PF 1 MG/ML
1 SYRINGE (ML) INJECTION ONCE
Status: DISCONTINUED | OUTPATIENT
Start: 2022-11-30 | End: 2022-11-30

## 2022-11-30 RX ORDER — ACETAMINOPHEN 325 MG/1
650 TABLET ORAL ONCE
Status: DISCONTINUED | OUTPATIENT
Start: 2022-11-30 | End: 2022-11-30

## 2022-11-30 RX ORDER — LORAZEPAM 2 MG/ML
2 INJECTION INTRAMUSCULAR ONCE
Status: DISCONTINUED | OUTPATIENT
Start: 2022-11-30 | End: 2022-11-30

## 2022-11-30 RX ORDER — IBUPROFEN 600 MG/1
600 TABLET ORAL ONCE
Status: DISCONTINUED | OUTPATIENT
Start: 2022-11-30 | End: 2022-11-30

## 2022-11-30 RX ORDER — HYDROXYZINE HYDROCHLORIDE 25 MG/1
25 TABLET, FILM COATED ORAL EVERY 6 HOURS
Qty: 12 TABLET | Refills: 0 | Status: SHIPPED | OUTPATIENT
Start: 2022-11-30

## 2022-11-30 RX ORDER — KETOROLAC TROMETHAMINE 30 MG/ML
15 INJECTION, SOLUTION INTRAMUSCULAR; INTRAVENOUS ONCE
Status: COMPLETED | OUTPATIENT
Start: 2022-11-30 | End: 2022-11-30

## 2022-11-30 RX ORDER — ONDANSETRON 4 MG/1
4 TABLET, ORALLY DISINTEGRATING ORAL EVERY 6 HOURS PRN
Qty: 9 TABLET | Refills: 0 | Status: SHIPPED | OUTPATIENT
Start: 2022-11-30 | End: 2022-12-03

## 2022-11-30 RX ORDER — MORPHINE SULFATE 4 MG/ML
4 INJECTION, SOLUTION INTRAMUSCULAR; INTRAVENOUS ONCE
Status: COMPLETED | OUTPATIENT
Start: 2022-11-30 | End: 2022-11-30

## 2022-11-30 RX ADMIN — ONDANSETRON 4 MG: 2 INJECTION INTRAMUSCULAR; INTRAVENOUS at 08:52

## 2022-11-30 RX ADMIN — SODIUM CHLORIDE 1000 ML: 0.9 INJECTION, SOLUTION INTRAVENOUS at 08:51

## 2022-11-30 RX ADMIN — KETOROLAC TROMETHAMINE 15 MG: 30 INJECTION, SOLUTION INTRAMUSCULAR at 09:01

## 2022-11-30 RX ADMIN — ONDANSETRON 4 MG: 4 TABLET, ORALLY DISINTEGRATING ORAL at 08:23

## 2022-11-30 RX ADMIN — LORAZEPAM 1 MG: 2 INJECTION INTRAMUSCULAR; INTRAVENOUS at 09:58

## 2022-11-30 RX ADMIN — MORPHINE SULFATE 4 MG: 4 INJECTION INTRAVENOUS at 09:00

## 2022-11-30 RX ADMIN — LORAZEPAM 1 MG: 2 INJECTION INTRAMUSCULAR; INTRAVENOUS at 08:53

## 2022-11-30 NOTE — ED PROVIDER NOTES
History  Chief Complaint   Patient presents with   • Leg Pain   • Vomiting     Pt c/o l leg pain witjh swelling an hour ago,     40-year-old female presents with restlessness vomiting bilateral leg pain and cramping nausea since this morning  He says he drank something that his friend gave in started with the symptoms her denies any trauma  Denies any alcohol use no trauma noted  History provided by:  Patient   used: No        Prior to Admission Medications   Prescriptions Last Dose Informant Patient Reported? Taking? QUEtiapine (SEROquel) 100 mg tablet   Yes No   Sig: Take 100 mg by mouth daily at bedtime   Patient not taking: Reported on 8/2/2022   carBAMazepine (TEGretol) 200 mg tablet   Yes No   Sig: Take 200 mg by mouth daily at bedtime   Patient not taking: Reported on 8/2/2022   escitalopram (LEXAPRO) 10 mg tablet   Yes No   Sig: Take 10 mg by mouth daily   Patient not taking: Reported on 8/2/2022   gabapentin (NEURONTIN) 300 mg capsule   Yes No   Sig: Take 300 mg by mouth 3 (three) times a day   Patient not taking: Reported on 8/2/2022   naloxone (NARCAN) 4 mg/0 1 mL nasal spray   No No   Sig: Administer 1 spray into a nostril  If no response after 2-3 minutes, give another dose in the other nostril using a new spray  ondansetron (Zofran ODT) 4 mg disintegrating tablet   No No   Sig: Take 1 tablet (4 mg total) by mouth every 6 (six) hours as needed for nausea or vomiting      Facility-Administered Medications: None       Past Medical History:   Diagnosis Date   • ADHD (attention deficit hyperactivity disorder)    • Anxiety     last assessed 3/23/15   • Bipolar 1 disorder ([facility] [address] )    • Genital warts     last assessed 12/7/15, resolved 11/1/17    • Psychiatric disorder        History reviewed  No pertinent surgical history      Family History   Problem Relation Age of Onset   • No Known Problems Father      I have reviewed and agree with the history as documented  E-Cigarette/Vaping   • E-Cigarette Use Never User      E-Cigarette/Vaping Substances   • Nicotine No    • THC No    • CBD No    • Flavoring No    • Other No    • Unknown No      Social History     Tobacco Use   • Smoking status: Every Day     Packs/day: 0 50     Types: Cigarettes   • Smokeless tobacco: Never   Vaping Use   • Vaping Use: Never used   Substance Use Topics   • Alcohol use: Yes     Comment: occ  • Drug use: Not Currently     Types: Heroin, Marijuana, Methamphetamines, Fentanyl     Comment: states was "shooting up steroids" 3 months ago (3/4/19 states this was 3 years ago)       Review of Systems   Constitutional: Negative  HENT: Negative  Eyes: Negative  Respiratory: Negative  Cardiovascular: Negative  Gastrointestinal: Negative  Endocrine: Negative  Genitourinary: Negative  Musculoskeletal: Positive for arthralgias and myalgias  Skin: Negative  Allergic/Immunologic: Negative  Neurological: Negative  Hematological: Negative  Psychiatric/Behavioral: Positive for agitation  All other systems reviewed and are negative  Physical Exam  Physical Exam  Constitutional:       Appearance: Normal appearance  HENT:      Head: Normocephalic and atraumatic  Nose: Nose normal       Mouth/Throat:      Mouth: Mucous membranes are moist    Eyes:      Extraocular Movements: Extraocular movements intact  Pupils: Pupils are equal, round, and reactive to light  Cardiovascular:      Rate and Rhythm: Normal rate and regular rhythm  Pulmonary:      Effort: Pulmonary effort is normal       Breath sounds: Normal breath sounds  Abdominal:      General: Abdomen is flat  Bowel sounds are normal       Palpations: Abdomen is soft  Musculoskeletal:         General: Normal range of motion  Cervical back: Normal range of motion and neck supple  Skin:     General: Skin is warm  Capillary Refill: Capillary refill takes less than 2 seconds  Neurological:      General: No focal deficit present  Mental Status: He is alert and oriented to person, place, and time  Mental status is at baseline  Psychiatric:         Mood and Affect: Mood normal          Thought Content:  Thought content normal          Vital Signs  ED Triage Vitals   Temperature Pulse Respirations Blood Pressure SpO2   11/30/22 0735 11/30/22 0735 11/30/22 0735 11/30/22 0735 11/30/22 0735   98 4 °F (36 9 °C) 74 20 111/59 98 %      Temp Source Heart Rate Source Patient Position - Orthostatic VS BP Location FiO2 (%)   11/30/22 0735 11/30/22 0735 -- -- --   Tympanic Monitor         Pain Score       11/30/22 0900       10 - Worst Possible Pain           Vitals:    11/30/22 0735   BP: 111/59   Pulse: 74         Visual Acuity      ED Medications  Medications   ondansetron (ZOFRAN-ODT) dispersible tablet 4 mg (4 mg Oral Given 11/30/22 0823)   LORazepam (ATIVAN) injection 1 mg (1 mg Intravenous Given 11/30/22 0853)   sodium chloride 0 9 % bolus 1,000 mL (0 mL Intravenous Stopped 11/30/22 1017)   ondansetron (ZOFRAN) injection 4 mg (4 mg Intravenous Given 11/30/22 0852)   morphine injection 4 mg (4 mg Intravenous Given 11/30/22 0900)   ketorolac (TORADOL) injection 15 mg (15 mg Intravenous Given 11/30/22 0901)   LORazepam (ATIVAN) injection 1 mg (1 mg Intravenous Given 11/30/22 0958)       Diagnostic Studies  Results Reviewed     Procedure Component Value Units Date/Time    CKMB [[id number]]  (Abnormal) Collected: 11/30/22 0853    Lab Status: Final result Specimen: Blood from Arm, Left Updated: 11/30/22 1101     CK-MB Index 1 5 %      CK-MB 9 0 ng/mL     CBC and differential [[id number]]  (Normal) Collected: 11/30/22 0853    Lab Status: Final result Specimen: Blood from Arm, Left Updated: 11/30/22 1043     WBC 7 52 Thousand/uL      RBC 4 27 Million/uL      Hemoglobin 13 4 g/dL      Hematocrit 39 0 %      MCV 91 fL      MCH 31 4 pg      MCHC 34 4 g/dL      RDW 12 4 %      MPV 9 1 fL Platelets 621 Thousands/uL     Narrative: This is an appended report  These results have been appended to a previously verified report      Manual Differential(PHLEBS Do Not Order) [[id number]]  (Abnormal) Collected: 11/30/22 0853    Lab Status: Final result Specimen: Blood from Arm, Left Updated: 11/30/22 1043     Segmented % 41 %      Bands % 45 %      Lymphocytes % 10 %      Monocytes % 0 %      Eosinophils, % 0 %      Basophils % 0 %      Atypical Lymphocytes % 4 %      Absolute Neutrophils 6 47 Thousand/uL      Lymphocytes Absolute 0 75 Thousand/uL      Monocytes Absolute 0 00 Thousand/uL      Eosinophils Absolute 0 00 Thousand/uL      Basophils Absolute 0 00 Thousand/uL      Total Counted --     nRBC 1 /100 WBC      RBC Morphology Normal     Platelet Estimate Adequate    Ethanol [[id number]]  (Normal) Collected: 11/30/22 0853    Lab Status: Final result Specimen: Blood from Arm, Left Updated: 11/30/22 0955     Ethanol Lvl <3 mg/dL     Magnesium [[id number]]  (Normal) Collected: 11/30/22 0853    Lab Status: Final result Specimen: Blood from Arm, Left Updated: 11/30/22 0953     Magnesium 1 7 mg/dL     CK (with reflex to MB) [[id number]]  (Abnormal) Collected: 11/30/22 0853    Lab Status: Final result Specimen: Blood from Arm, Left Updated: 11/30/22 0953     Total  U/L     Comprehensive metabolic panel [[id number]]  (Abnormal) Collected: 11/30/22 0853    Lab Status: Final result Specimen: Blood from Arm, Left Updated: 11/30/22 0939     Sodium 145 mmol/L      Potassium 3 5 mmol/L      Chloride 107 mmol/L      CO2 24 mmol/L      ANION GAP 14 mmol/L      BUN 36 mg/dL      Creatinine 1 33 mg/dL      Glucose 106 mg/dL      Calcium 8 3 mg/dL      Corrected Calcium 8 8 mg/dL      AST 72 U/L      ALT 36 U/L      Alkaline Phosphatase 97 U/L      Total Protein 6 4 g/dL      Albumin 3 4 g/dL      Total Bilirubin 0 77 mg/dL      eGFR 69 ml/min/1 73sq m     Narrative:      [place] guidelines for Chronic Kidney Disease (CKD):   •  Stage 1 with normal or high GFR (GFR > 90 mL/min/1 73 square meters)  •  Stage 2 Mild CKD (GFR = 60-89 mL/min/1 73 square meters)  •  Stage 3A Moderate CKD (GFR = 45-59 mL/min/1 73 square meters)  •  Stage 3B Moderate CKD (GFR = 30-44 mL/min/1 73 square meters)  •  Stage 4 Severe CKD (GFR = 15-29 mL/min/1 73 square meters)  •  Stage 5 End Stage CKD (GFR <15 mL/min/1 73 square meters)  Note: GFR calculation is accurate only with a steady state creatinine    FLU/RSV/COVID - if FLU/RSV clinically relevant [[id number]]  (Normal) Collected: 11/30/22 0830    Lab Status: Final result Specimen: Nares from Nose Updated: 11/30/22 0916     SARS-CoV-2 Negative     INFLUENZA A PCR Negative     INFLUENZA B PCR Negative     RSV PCR Negative    Narrative:      FOR PEDIATRIC PATIENTS - copy/paste COVID Guidelines URL to browser: [email or web address]    SARS-CoV-2 assay is a Nucleic Acid Amplification assay intended for the  qualitative detection of nucleic acid from SARS-CoV-2 in nasopharyngeal  swabs  Results are for the presumptive identification of SARS-CoV-2 RNA  Positive results are indicative of infection with SARS-CoV-2, the virus  causing COVID-19, but do not rule out bacterial infection or co-infection  with other viruses  Laboratories within the [place] and its  territories are required to report all positive results to the appropriate  public health authorities  Negative results do not preclude SARS-CoV-2  infection and should not be used as the sole basis for treatment or other  patient management decisions  Negative results must be combined with  clinical observations, patient history, and epidemiological information  This test has not been FDA cleared or approved  This test has been authorized by FDA under an Emergency Use Authorization  (EUA)   This test is only authorized for the duration of time the  declaration that circumstances exist justifying the authorization of the  emergency use of an in vitro diagnostic tests for detection of SARS-CoV-2  virus and/or diagnosis of COVID-19 infection under section 564(b)(1) of  the Act, 21 U  S C  753EOL-5(N)(0), unless the authorization is terminated  or revoked sooner  The test has been validated but independent review by FDA  and CLIA is pending  Test performed using [employer] GeneXpert: This RT-PCR assay targets N2,  a region unique to SARS-CoV-2  A conserved region in the E-gene was chosen  for pan-Sarbecovirus detection which includes SARS-CoV-2  According to CMS-2020-01-R, this platform meets the definition of high-throughput technology  No orders to display              Procedures  Procedures         ED Course                                             MDM  Number of Diagnoses or Management Options     Amount and/or Complexity of Data Reviewed  Clinical lab tests: ordered and reviewed  Tests in the medicine section of CPT®: ordered and reviewed    Patient Progress  Patient progress: stable      Disposition  Final diagnoses:   Left leg pain   Nausea & vomiting     Time reflects when diagnosis was documented in both MDM as applicable and the Disposition within this note     Time User Action Codes Description Comment    11/30/2022 10:09 AM Sandra Davis [M79 605] Left leg pain     11/30/2022 10:09 AM Sandra Davis Add [R11 2] Nausea & vomiting       ED Disposition     ED Disposition   Discharge    Condition   Stable    Date/Time   Wed Nov 30, 2022 10:09 AM    Comment   Desiree Hansen  discharge to home/self care                 Follow-up Information     Follow up With Specialties Details Why Contact Info Additional Information    Gisselle Estrada  Schedule an appointment as soon as possible for a visit   [address] [place] [address] Ave       [address] [place] [address] Emergency Department Emergency Medicine  If symptoms worsen [address] [place] [address] Gino Keith [address]  [address] [address] Emergency Department, [place], [place], [address]          Discharge Medication List as of 11/30/2022 10:10 AM      START taking these medications    Details   hydrOXYzine HCL (ATARAX) 25 mg tablet Take 1 tablet (25 mg total) by mouth every 6 (six) hours, Starting Wed 11/30/2022, Normal      ketorolac (TORADOL) 10 mg tablet Take 1 tablet (10 mg total) by mouth every 6 (six) hours as needed for moderate pain for up to 3 days, Starting Wed 11/30/2022, Until Sat 12/3/2022 at 2359, Normal      !! ondansetron (ZOFRAN-ODT) 4 mg disintegrating tablet Take 1 tablet (4 mg total) by mouth every 6 (six) hours as needed for nausea for up to 3 days, Starting Wed 11/30/2022, Until Sat 12/3/2022 at 2359, Normal       !! - Potential duplicate medications found  Please discuss with provider  CONTINUE these medications which have NOT CHANGED    Details   carBAMazepine (TEGretol) 200 mg tablet Take 200 mg by mouth daily at bedtime, Historical Med      escitalopram (LEXAPRO) 10 mg tablet Take 10 mg by mouth daily, Historical Med      gabapentin (NEURONTIN) 300 mg capsule Take 300 mg by mouth 3 (three) times a day, Historical Med      naloxone (NARCAN) 4 mg/0 1 mL nasal spray Administer 1 spray into a nostril  If no response after 2-3 minutes, give another dose in the other nostril using a new spray , Normal      !! ondansetron (Zofran ODT) 4 mg disintegrating tablet Take 1 tablet (4 mg total) by mouth every 6 (six) hours as needed for nausea or vomiting, Starting Mon 7/18/2022, Normal      QUEtiapine (SEROquel) 100 mg tablet Take 100 mg by mouth daily at bedtime, Historical Med       !! - Potential duplicate medications found  Please discuss with provider  No discharge procedures on file      PDMP Review     None          ED Provider  Electronically Signed by           Tawnya Klinefelter, DO  12/04/22 5773

## 2022-12-27 ENCOUNTER — APPOINTMENT (EMERGENCY)
Dept: RADIOLOGY | Facility: HOSPITAL | Age: 32
End: 2022-12-27

## 2022-12-27 ENCOUNTER — HOSPITAL ENCOUNTER (OUTPATIENT)
Facility: HOSPITAL | Age: 32
Setting detail: OBSERVATION
Discharge: LEFT BEFORE DISCHARGE | End: 2022-12-27
Attending: EMERGENCY MEDICINE | Admitting: INTERNAL MEDICINE

## 2022-12-27 VITALS
BODY MASS INDEX: 23.8 KG/M2 | RESPIRATION RATE: 18 BRPM | HEIGHT: 71 IN | WEIGHT: 170 LBS | HEART RATE: 87 BPM | SYSTOLIC BLOOD PRESSURE: 124 MMHG | OXYGEN SATURATION: 99 % | DIASTOLIC BLOOD PRESSURE: 61 MMHG | TEMPERATURE: 97.8 F

## 2022-12-27 DIAGNOSIS — M25.471 ANKLE EFFUSION, RIGHT: Primary | ICD-10-CM

## 2022-12-27 DIAGNOSIS — L03.115 CELLULITIS OF RIGHT LEG: ICD-10-CM

## 2022-12-27 LAB
ANION GAP SERPL CALCULATED.3IONS-SCNC: 6 MMOL/L (ref 4–13)
BASOPHILS # BLD AUTO: 0.05 THOUSANDS/ÂΜL (ref 0–0.1)
BASOPHILS NFR BLD AUTO: 0 % (ref 0–1)
BUN SERPL-MCNC: 20 MG/DL (ref 5–25)
CALCIUM SERPL-MCNC: 8.2 MG/DL (ref 8.3–10.1)
CHLORIDE SERPL-SCNC: 99 MMOL/L (ref 96–108)
CO2 SERPL-SCNC: 28 MMOL/L (ref 21–32)
CREAT SERPL-MCNC: 0.91 MG/DL (ref 0.6–1.3)
CRP SERPL QL: 84.5 MG/L
D DIMER PPP FEU-MCNC: 0.48 UG/ML FEU
EOSINOPHIL # BLD AUTO: 0.34 THOUSAND/ÂΜL (ref 0–0.61)
EOSINOPHIL NFR BLD AUTO: 3 % (ref 0–6)
ERYTHROCYTE [DISTWIDTH] IN BLOOD BY AUTOMATED COUNT: 12.5 % (ref 11.6–15.1)
ERYTHROCYTE [SEDIMENTATION RATE] IN BLOOD: 11 MM/HOUR (ref 0–14)
FLUAV RNA RESP QL NAA+PROBE: NEGATIVE
FLUBV RNA RESP QL NAA+PROBE: NEGATIVE
GFR SERPL CREATININE-BSD FRML MDRD: 109 ML/MIN/1.73SQ M
GLUCOSE SERPL-MCNC: 88 MG/DL (ref 65–140)
HCT VFR BLD AUTO: 38.4 % (ref 36.5–49.3)
HGB BLD-MCNC: 12.9 G/DL (ref 12–17)
IMM GRANULOCYTES # BLD AUTO: 0.05 THOUSAND/UL (ref 0–0.2)
IMM GRANULOCYTES NFR BLD AUTO: 0 % (ref 0–2)
LYMPHOCYTES # BLD AUTO: 1.87 THOUSANDS/ÂΜL (ref 0.6–4.47)
LYMPHOCYTES NFR BLD AUTO: 14 % (ref 14–44)
MCH RBC QN AUTO: 31 PG (ref 26.8–34.3)
MCHC RBC AUTO-ENTMCNC: 33.6 G/DL (ref 31.4–37.4)
MCV RBC AUTO: 92 FL (ref 82–98)
MONOCYTES # BLD AUTO: 1.08 THOUSAND/ÂΜL (ref 0.17–1.22)
MONOCYTES NFR BLD AUTO: 8 % (ref 4–12)
NEUTROPHILS # BLD AUTO: 10.12 THOUSANDS/ÂΜL (ref 1.85–7.62)
NEUTS SEG NFR BLD AUTO: 75 % (ref 43–75)
NRBC BLD AUTO-RTO: 0 /100 WBCS
PLATELET # BLD AUTO: 220 THOUSANDS/UL (ref 149–390)
PMV BLD AUTO: 9.7 FL (ref 8.9–12.7)
POTASSIUM SERPL-SCNC: 4.3 MMOL/L (ref 3.5–5.3)
RBC # BLD AUTO: 4.16 MILLION/UL (ref 3.88–5.62)
RSV RNA RESP QL NAA+PROBE: NEGATIVE
SARS-COV-2 RNA RESP QL NAA+PROBE: NEGATIVE
SODIUM SERPL-SCNC: 133 MMOL/L (ref 135–147)
WBC # BLD AUTO: 13.51 THOUSAND/UL (ref 4.31–10.16)

## 2022-12-27 RX ORDER — CEFAZOLIN SODIUM 2 G/50ML
2000 SOLUTION INTRAVENOUS ONCE
Status: COMPLETED | OUTPATIENT
Start: 2022-12-27 | End: 2022-12-27

## 2022-12-27 RX ORDER — MORPHINE SULFATE 4 MG/ML
4 INJECTION, SOLUTION INTRAMUSCULAR; INTRAVENOUS ONCE
Status: COMPLETED | OUTPATIENT
Start: 2022-12-27 | End: 2022-12-27

## 2022-12-27 RX ADMIN — MORPHINE SULFATE 4 MG: 4 INJECTION INTRAVENOUS at 09:26

## 2022-12-27 RX ADMIN — CEFAZOLIN SODIUM 2000 MG: 2 SOLUTION INTRAVENOUS at 05:26

## 2022-12-27 NOTE — Clinical Note
Case was discussed with PHILIPPE and the patient's admission status was agreed to be Admission Status: observation status to the service of Dr Montserrat Davies

## 2022-12-27 NOTE — DISCHARGE INSTRUCTIONS
Please understand the risk of signing out against medical advice your joint infected and lose function  Return for worsening pain,fevers

## 2022-12-27 NOTE — Clinical Note
Grace Weiss was seen and treated in our emergency department on 12/27/2022  Diagnosis:     Amisha Johnson    He may return on this date: If you have any questions or concerns, please don't hesitate to call        Sandra Davis DO    ______________________________           _______________          _______________  Hospital Representative                              Date                                Time

## 2022-12-27 NOTE — PLAN OF CARE
Problem: PAIN - ADULT  Goal: Verbalizes/displays adequate comfort level or baseline comfort level  Description: Interventions:  - Encourage patient to monitor pain and request assistance  - Assess pain using appropriate pain scale  - Administer analgesics based on type and severity of pain and evaluate response  - Implement non-pharmacological measures as appropriate and evaluate response  - Consider cultural and social influences on pain and pain management  - Notify physician/advanced practitioner if interventions unsuccessful or patient reports new pain  Outcome: Progressing     Problem: INFECTION - ADULT  Goal: Absence or prevention of progression during hospitalization  Description: INTERVENTIONS:  - Assess and monitor for signs and symptoms of infection  - Monitor lab/diagnostic results  - Monitor all insertion sites, i e  indwelling lines, tubes, and drains  - Monitor endotracheal if appropriate and nasal secretions for changes in amount and color  - [place] appropriate cooling/warming therapies per order  - Administer medications as ordered  - Instruct and encourage patient and family to use good hand hygiene technique  - Identify and instruct in appropriate isolation precautions for identified infection/condition  Outcome: Progressing     Problem: SAFETY ADULT  Goal: Patient will remain free of falls  Description: INTERVENTIONS:  - Educate patient/family on patient safety including physical limitations  - Instruct patient to call for assistance with activity   - Consult OT/PT to assist with strengthening/mobility   - Keep Call bell within reach  - Keep bed low and locked with side rails adjusted as appropriate  - Keep care items and personal belongings within reach  - Initiate and maintain comfort rounds  - Make Fall Risk Sign visible to staff  - Offer Toileting every 2 Hours, in advance of need  - Initiate/Maintain bed/chair alarm  - Obtain necessary fall risk management equipment: bed/chair alarm  - Apply yellow socks and bracelet for high fall risk patients  - Consider moving patient to room near nurses station  Outcome: Progressing  Goal: Maintain or return to baseline ADL function  Description: INTERVENTIONS:  -  Assess patient's ability to carry out ADLs; assess patient's baseline for ADL function and identify physical deficits which impact ability to perform ADLs (bathing, care of mouth/teeth, toileting, grooming, dressing, etc )  - Assess/evaluate cause of self-care deficits   - Assess range of motion  - Assess patient's mobility; develop plan if impaired  - Assess patient's need for assistive devices and provide as appropriate  - Encourage maximum independence but intervene and supervise when necessary  - Involve family in performance of ADLs  - Assess for home care needs following discharge   - Consider OT consult to assist with ADL evaluation and planning for discharge  - Provide patient education as appropriate  Outcome: Progressing  Goal: Maintains/Returns to pre admission functional level  Description: INTERVENTIONS:  - Perform BMAT or MOVE assessment daily    - Set and communicate daily mobility goal to care team and patient/family/caregiver  - Collaborate with rehabilitation services on mobility goals if consulted  - Perform Range of Motion 3 times a day  - Reposition patient every 3 hours    - Dangle patient 3 times a day  - Stand patient 3 times a day  - Ambulate patient 3 times a day  - Out of bed to chair 3 times a day   - Out of bed for meals 3 times a day  - Out of bed for toileting  - Record patient progress and toleration of activity level   Outcome: Progressing     Problem: DISCHARGE PLANNING  Goal: Discharge to home or other facility with appropriate resources  Description: INTERVENTIONS:  - Identify barriers to discharge w/patient and caregiver  - Arrange for needed discharge resources and transportation as appropriate  - Identify discharge learning needs (meds, wound care, etc )  - Arrange for interpretive services to assist at discharge as needed  - Refer to Case Management Department for coordinating discharge planning if the patient needs post-hospital services based on physician/advanced practitioner order or complex needs related to functional status, cognitive ability, or social support system  Outcome: Progressing     Problem: Knowledge Deficit  Goal: Patient/family/caregiver demonstrates understanding of disease process, treatment plan, medications, and discharge instructions  Description: Complete learning assessment and assess knowledge base    Interventions:  - Provide teaching at level of understanding  - Provide teaching via preferred learning methods  Outcome: Progressing

## 2022-12-31 ENCOUNTER — HOSPITAL ENCOUNTER (EMERGENCY)
Facility: HOSPITAL | Age: 32
Discharge: HOME | End: 2022-12-31
Attending: EMERGENCY MEDICINE

## 2022-12-31 VITALS
DIASTOLIC BLOOD PRESSURE: 67 MMHG | BODY MASS INDEX: 23.8 KG/M2 | WEIGHT: 170 LBS | TEMPERATURE: 97.7 F | RESPIRATION RATE: 20 BRPM | OXYGEN SATURATION: 98 % | HEART RATE: 97 BPM | SYSTOLIC BLOOD PRESSURE: 140 MMHG | HEIGHT: 71 IN

## 2022-12-31 DIAGNOSIS — M79.672 PAIN IN BOTH FEET: Primary | ICD-10-CM

## 2022-12-31 DIAGNOSIS — M79.671 PAIN IN BOTH FEET: Primary | ICD-10-CM

## 2022-12-31 LAB
ALBUMIN SERPL BCP-MCNC: 3.3 G/DL (ref 3.5–5)
ALP SERPL-CCNC: 98 U/L (ref 46–116)
ALT SERPL W P-5'-P-CCNC: 22 U/L (ref 12–78)
ANION GAP SERPL CALCULATED.3IONS-SCNC: 8 MMOL/L (ref 4–13)
AST SERPL W P-5'-P-CCNC: 27 U/L (ref 5–45)
BASOPHILS # BLD AUTO: 0.08 THOUSANDS/ÂΜL (ref 0–0.1)
BASOPHILS NFR BLD AUTO: 1 % (ref 0–1)
BILIRUB SERPL-MCNC: 0.3 MG/DL (ref 0.2–1)
BUN SERPL-MCNC: 24 MG/DL (ref 5–25)
CALCIUM ALBUM COR SERPL-MCNC: 9.1 MG/DL (ref 8.3–10.1)
CALCIUM SERPL-MCNC: 8.5 MG/DL (ref 8.3–10.1)
CHLORIDE SERPL-SCNC: 105 MMOL/L (ref 96–108)
CO2 SERPL-SCNC: 26 MMOL/L (ref 21–32)
CREAT SERPL-MCNC: 0.85 MG/DL (ref 0.6–1.3)
EOSINOPHIL # BLD AUTO: 0.28 THOUSAND/ÂΜL (ref 0–0.61)
EOSINOPHIL NFR BLD AUTO: 3 % (ref 0–6)
ERYTHROCYTE [DISTWIDTH] IN BLOOD BY AUTOMATED COUNT: 12.2 % (ref 11.6–15.1)
GFR SERPL CREATININE-BSD FRML MDRD: 113 ML/MIN/1.73SQ M
GLUCOSE SERPL-MCNC: 123 MG/DL (ref 65–140)
HCT VFR BLD AUTO: 35.9 % (ref 36.5–49.3)
HGB BLD-MCNC: 12.3 G/DL (ref 12–17)
IMM GRANULOCYTES # BLD AUTO: 0.03 THOUSAND/UL (ref 0–0.2)
IMM GRANULOCYTES NFR BLD AUTO: 0 % (ref 0–2)
LYMPHOCYTES # BLD AUTO: 2.51 THOUSANDS/ÂΜL (ref 0.6–4.47)
LYMPHOCYTES NFR BLD AUTO: 24 % (ref 14–44)
MCH RBC QN AUTO: 31.2 PG (ref 26.8–34.3)
MCHC RBC AUTO-ENTMCNC: 34.3 G/DL (ref 31.4–37.4)
MCV RBC AUTO: 91 FL (ref 82–98)
MONOCYTES # BLD AUTO: 0.81 THOUSAND/ÂΜL (ref 0.17–1.22)
MONOCYTES NFR BLD AUTO: 8 % (ref 4–12)
NEUTROPHILS # BLD AUTO: 6.86 THOUSANDS/ÂΜL (ref 1.85–7.62)
NEUTS SEG NFR BLD AUTO: 64 % (ref 43–75)
NRBC BLD AUTO-RTO: 0 /100 WBCS
PLATELET # BLD AUTO: 253 THOUSANDS/UL (ref 149–390)
PMV BLD AUTO: 9.6 FL (ref 8.9–12.7)
POTASSIUM SERPL-SCNC: 3.5 MMOL/L (ref 3.5–5.3)
PROT SERPL-MCNC: 6.4 G/DL (ref 6.4–8.4)
RBC # BLD AUTO: 3.94 MILLION/UL (ref 3.88–5.62)
SODIUM SERPL-SCNC: 139 MMOL/L (ref 135–147)
WBC # BLD AUTO: 10.57 THOUSAND/UL (ref 4.31–10.16)

## 2022-12-31 NOTE — ED NOTES
Pt seen, assessed and d/c by provider  Pt appeared to be in no acute distress upon discharge  Pt able to ambulate well without assistance upon exiting        Mary Lai RN  12/31/22 3255

## 2022-12-31 NOTE — ED NOTES
Pt presented to ED dishelveled with very poor hygiene  Pt offered shower and given towels along with hygiene products  Pt accepting and understands this RN will get blood work when he is out       [address], RN  12/31/22 0699

## 2022-12-31 NOTE — ED PROVIDER NOTES
Final Diagnosis:  1  Ankle effusion, right    2  Cellulitis of right leg        Chief Complaint   Patient presents with   • Ankle Pain     Pt states that couple hours ago he was walking to [facility] and felt like he twisted his ankle 10/10 pain  Swelling and redness noted  No other complaints     HPI  reece presents w/ right aknle effusion and overlying erythema/cellulitis  He noticed a couple hours ago  Despite triage he tells me there's no trauma or twist  He's intoxicated w/ meth vs alcohol vs manic by speech  However capable of making medical decisions  Just eccentric  Alert and oriented however  H/o drug abuse denies any recent  Underside of foot w/ what looks like trench foot from wet shoes on right  Right ankle swollen and warm and red  Decreased but present ROM passive  Able tto ambulate on it  No fevers  Wouldn't be able to tap joint because of overlying ellulitis  Does have hx of IV drug use     - No language barrier    - History obtained from roni  - There are no limitations to the history obtained  - Previous charting underwent limited review with attention to last ED visits, labs, ekgs, and prior imaging  PMH:   has a past medical history of ADHD (attention deficit hyperactivity disorder), Anxiety, Bipolar 1 disorder ([address] ), Genital warts, and Psychiatric disorder  PSH:   has no past surgical history on file  Social History:    Tobacco Use: High Risk   • Smoking Tobacco Use: Every Day   • Smokeless Tobacco Use: Never   • Passive Exposure: Not on file     Alcohol Use: Not on file     Patient with no illicit use    ROS:    Pertinent positives/negatives:   Review of Systems   Constitutional: Negative for fever  Musculoskeletal: Positive for arthralgias, gait problem and joint swelling  Skin: Positive for color change, rash and wound  CONSTITUTIONAL:  No dizziness  No weakness  No unexpected weight loss  EYES:  No pain, erythema, or discharge  No loss of vision    ENT:  No tinnitus, decreased hearing  No epistaxis/purulent drainage  No voice change, airway closing, trismus  CARDIOVASCULAR:  No chest pain  No palpitations  No new lower extremity edema  RESPIRATORY:  No purulent cough  No hemoptysis  No dyspnea  No paroxysmal nocturnal dyspnea  No stridor, audible wheezing bedside  GASTROINTESTINAL:  Normal appetite  No vomiting, diarrhea  No pain  No bloating  No melena  GENITOURINARY:  No frequency, urgency, nocturia  No hematuria or dysuria  No discharge  No sores/adenopathy  MUSCULOSKELETAL:  No arthralgias or myalgias that are new  INTEGUMENTARY:  No swelling  No unexpected contusions  No abrasions  No lymphangitis  NEUROLOGIC:  No meningismus  No numbness of the extremities  No new focal weakness  No postural instability  PSYCHIATRIC:  No SI HI AVH  HEMATOLOGICAL:  No bleeding  No petechiae  No bruising  ALLERGIES:  No urticaria  No sudden abd cramping  No stridor  PE:     Physical exam highlights:   Physical Exam     Right ankle  Passive ROM intact  Warm effusion overlying cellulitis to mid shin into foot  Vitals:    12/27/22 0451   BP: 124/61   BP Location: Right arm   Pulse: 87   Resp: 18   Temp: 97 8 °F (36 6 °C)   TempSrc: Oral   SpO2: 99%   Weight: 77 1 kg (170 lb)   Height: 5' 11" (1 803 m)     Vitals reviewed by me  Nursing note reviewed  Chaperone present for all sensitive exam   Const: No acute distress  Alert  Nontoxic  Not diaphoretic  HEENT: External ears normal  No protrusion drainage swelling  Nose normal  No drainage/traumatic deformity  MMM  Mouth with baseline/symmetric movement  No trismus  Eyes: No squinting  No icterus  Tracks through the room with normal EOM  No tearing/swelling/drainage  Neck: ROM normal  No rigidity  No meningismus  Cards: Rate as per vitals  Compared to monitor sinus unless documented above  Regular  Well perfused  Pulm: able to verbalize without additional effort  Effort and excursion normal  No disress   No audible wheezing/ stridor  Normal resp rate  Abd: No distension beyond baseline  No fluctuant wave  Patient without peritoneal pain with shifting/bumping the bed  MSK: ROM normal and baseline  No deformity  Skin: No new rashes visible  Well perfused  Neuro: Nonfocal  Baseline  CN grossly intact  Moving all four with coordination  Psych: Normal behavior and affect  A:  - Nursing note reviewed  Ddx and MDM    Must r/o septic arthritis     Ho injection, warm swollen decreased but some ROM  Give ancef, check inflamm markers    Trauma  xr    Cellulitis  abx                   ED Course as of 12/30/22 2342   Tue Dec 27, 2022   0622 Patient declining xr  Either manic or methamphetamine     XR ankle 3+ views RIGHT   Final Result      No acute osseous abnormality              Workstation performed: [id number]           Orders Placed This Encounter   Procedures   • FLU/RSV/COVID - if FLU/RSV clinically relevant   • XR ankle 3+ views RIGHT   • CBC and differential   • Basic metabolic panel   • D-dimer, quantitative   • Sedimentation rate, automated   • C-reactive protein     Labs Reviewed   CBC AND DIFFERENTIAL - Abnormal       Result Value Ref Range Status    WBC 13 51 (*) 4 31 - 10 16 Thousand/uL Final    RBC 4 16  3 88 - 5 62 Million/uL Final    Hemoglobin 12 9  12 0 - 17 0 g/dL Final    Hematocrit 38 4  36 5 - 49 3 % Final    MCV 92  82 - 98 fL Final    MCH 31 0  26 8 - 34 3 pg Final    MCHC 33 6  31 4 - 37 4 g/dL Final    RDW 12 5  11 6 - 15 1 % Final    MPV 9 7  8 9 - 12 7 fL Final    Platelets 090  903 - 390 Thousands/uL Final    nRBC 0  /100 WBCs Final    Neutrophils Relative 75  43 - 75 % Final    Immat GRANS % 0  0 - 2 % Final    Lymphocytes Relative 14  14 - 44 % Final    Monocytes Relative 8  4 - 12 % Final    Eosinophils Relative 3  0 - 6 % Final    Basophils Relative 0  0 - 1 % Final    Neutrophils Absolute 10 12 (*) 1 85 - 7 62 Thousands/µL Final    Immature Grans Absolute 0 05  0 00 - 0 20 Thousand/uL Final    Lymphocytes Absolute 1 87  0 60 - 4 47 Thousands/µL Final    Monocytes Absolute 1 08  0 17 - 1 22 Thousand/µL Final    Eosinophils Absolute 0 34  0 00 - 0 61 Thousand/µL Final    Basophils Absolute 0 05  0 00 - 0 10 Thousands/µL Final   BASIC METABOLIC PANEL - Abnormal    Sodium 133 (*) 135 - 147 mmol/L Final    Potassium 4 3  3 5 - 5 3 mmol/L Final    Chloride 99  96 - 108 mmol/L Final    CO2 28  21 - 32 mmol/L Final    ANION GAP 6  4 - 13 mmol/L Final    BUN 20  5 - 25 mg/dL Final    Creatinine 0 91  0 60 - 1 30 mg/dL Final    Comment: Standardized to IDMS reference method    Glucose 88  65 - 140 mg/dL Final    Comment: If the patient is fasting, the ADA then defines impaired fasting glucose as > 100 mg/dL and diabetes as > or equal to 123 mg/dL  Specimen collection should occur prior to Sulfasalazine administration due to the potential for falsely depressed results  Specimen collection should occur prior to Sulfapyridine administration due to the potential for falsely elevated results      Calcium 8 2 (*) 8 3 - 10 1 mg/dL Final    eGFR 109  ml/min/1 73sq m Final    Narrative:     [facility] guidelines for Chronic Kidney Disease (CKD):   •  Stage 1 with normal or high GFR (GFR > 90 mL/min/1 73 square meters)  •  Stage 2 Mild CKD (GFR = 60-89 mL/min/1 73 square meters)  •  Stage 3A Moderate CKD (GFR = 45-59 mL/min/1 73 square meters)  •  Stage 3B Moderate CKD (GFR = 30-44 mL/min/1 73 square meters)  •  Stage 4 Severe CKD (GFR = 15-29 mL/min/1 73 square meters)  •  Stage 5 End Stage CKD (GFR <15 mL/min/1 73 square meters)  Note: GFR calculation is accurate only with a steady state creatinine   C-REACTIVE PROTEIN - Abnormal    CRP 84 5 (*) <3 0 mg/L Final   COVID19, INFLUENZA A/B, RSV PCR, [facility] - Normal    SARS-CoV-2 Negative  Negative Final    INFLUENZA A PCR Negative  Negative Final    INFLUENZA B PCR Negative  Negative Final    RSV PCR Negative  Negative Final    Narrative: FOR PEDIATRIC PATIENTS - copy/paste COVID Guidelines URL to browser: [email or web address]  [email or web address]    SARS-CoV-2 assay is a Nucleic Acid Amplification assay intended for the  qualitative detection of nucleic acid from SARS-CoV-2 in nasopharyngeal  swabs  Results are for the presumptive identification of SARS-CoV-2 RNA  Positive results are indicative of infection with SARS-CoV-2, the virus  causing COVID-19, but do not rule out bacterial infection or co-infection  with other viruses  Laboratories within the [place] and its  territories are required to report all positive results to the appropriate  public health authorities  Negative results do not preclude SARS-CoV-2  infection and should not be used as the sole basis for treatment or other  patient management decisions  Negative results must be combined with  clinical observations, patient history, and epidemiological information  This test has not been FDA cleared or approved  This test has been authorized by FDA under an Emergency Use Authorization  (EUA)  This test is only authorized for the duration of time the  declaration that circumstances exist justifying the authorization of the  emergency use of an in vitro diagnostic tests for detection of SARS-CoV-2  virus and/or diagnosis of COVID-19 infection under section 564(b)(1) of  the Act, 21 U  S C  449BUM-5(E)(4), unless the authorization is terminated  or revoked sooner  The test has been validated but independent review by FDA  and CLIA is pending  Test performed using [employer] GeneXpert: This RT-PCR assay targets N2,  a region unique to SARS-CoV-2  A conserved region in the E-gene was chosen  for pan-Sarbecovirus detection which includes SARS-CoV-2  According to CMS-2020-01-R, this platform meets the definition of high-throughput technology     D-DIMER, QUANTITATIVE - Normal    D-Dimer, Quant 0 48  <0 50 ug/ml FEU Final    Comment: Reference and upper limits to exclude DVT and PE are the same  Do not use to exclude if clinical symptoms are present  SEDIMENTATION RATE - Normal    Sed Rate 11  0 - 14 mm/hour Final       Final Diagnosis:  1  Ankle effusion, right    2  Cellulitis of right leg        P:  - hospital tx includes   Medications   ceFAZolin (ANCEF) IVPB (premix in dextrose) 2,000 mg 50 mL (0 mg Intravenous Stopped 12/27/22 1114)   morphine injection 4 mg (4 mg Intravenous Given 12/27/22 1297)         - disposition  Time reflects when diagnosis was documented in both MDM as applicable and the Disposition within this note     Time User Action Codes Description Comment    12/27/2022  7:53 AM Sara Rowland Add [[id number]] Ankle effusion, right     12/27/2022  7:53 AM Sara Rowland Add [[id number]] Cellulitis of right leg       ED Disposition     ED Disposition   AMA    Condition   --    Date/Time   Tue Dec 27, 2022 11:10 AM    Comment   Case was discussed with PHILIPPE and the patient's admission status was agreed to be Admission Status: observation status to the service of Dr Jf Espinoza   Follow-up Information    None         - patient will call their PCP to let them know they were in the emergency department   We discuss return precautions       - additional tx intended, if consistent with primary provider:  - patient to follow with :      Discharge Medication List as of 12/27/2022 11:11 AM      CONTINUE these medications which have NOT CHANGED    Details   carBAMazepine (TEGretol) 200 mg tablet Take 200 mg by mouth daily at bedtime, Historical Med      escitalopram (LEXAPRO) 10 mg tablet Take 10 mg by mouth daily, Historical Med      gabapentin (NEURONTIN) 300 mg capsule Take 300 mg by mouth 3 (three) times a day, Historical Med      hydrOXYzine HCL (ATARAX) 25 mg tablet Take 1 tablet (25 mg total) by mouth every 6 (six) hours, Starting Wed 11/30/2022, Normal      ketorolac (TORADOL) 10 mg tablet Take 1 tablet (10 mg total) by mouth every 6 (six) hours as needed for moderate pain for up to 3 days, Starting Wed 11/30/2022, Until Sat 12/3/2022 at 2359, Normal      naloxone (NARCAN) 4 mg/0 1 mL nasal spray Administer 1 spray into a nostril  If no response after 2-3 minutes, give another dose in the other nostril using a new spray , Normal      ondansetron (Zofran ODT) 4 mg disintegrating tablet Take 1 tablet (4 mg total) by mouth every 6 (six) hours as needed for nausea or vomiting, Starting Mon 7/18/2022, Normal      QUEtiapine (SEROquel) 100 mg tablet Take 100 mg by mouth daily at bedtime, Historical Med           No discharge procedures on file  Prior to Admission Medications   Prescriptions Last Dose Informant Patient Reported? Taking? QUEtiapine (SEROquel) 100 mg tablet   Yes No   Sig: Take 100 mg by mouth daily at bedtime   Patient not taking: Reported on 8/2/2022   carBAMazepine (TEGretol) 200 mg tablet   Yes No   Sig: Take 200 mg by mouth daily at bedtime   Patient not taking: Reported on 8/2/2022   escitalopram (LEXAPRO) 10 mg tablet   Yes No   Sig: Take 10 mg by mouth daily   Patient not taking: Reported on 8/2/2022   gabapentin (NEURONTIN) 300 mg capsule   Yes No   Sig: Take 300 mg by mouth 3 (three) times a day   Patient not taking: Reported on 8/2/2022   hydrOXYzine HCL (ATARAX) 25 mg tablet   No No   Sig: Take 1 tablet (25 mg total) by mouth every 6 (six) hours   ketorolac (TORADOL) 10 mg tablet   No No   Sig: Take 1 tablet (10 mg total) by mouth every 6 (six) hours as needed for moderate pain for up to 3 days   naloxone (NARCAN) 4 mg/0 1 mL nasal spray   No No   Sig: Administer 1 spray into a nostril  If no response after 2-3 minutes, give another dose in the other nostril using a new spray     ondansetron (ZOFRAN-ODT) 4 mg disintegrating tablet   No No   Sig: Take 1 tablet (4 mg total) by mouth every 6 (six) hours as needed for nausea for up to 3 days   ondansetron (Zofran ODT) 4 mg disintegrating tablet   No No   Sig: Take 1 tablet (4 mg total) by mouth every 6 (six) hours as needed for nausea or vomiting      Facility-Administered Medications: None       Portions of the record may have been created with voice recognition software  Occasional wrong word or "sound a like" substitutions may have occurred due to the inherent limitations of voice recognition software  Read the chart carefully and recognize, using context, where substitutions have occurred      Electronically signed by:  MD Regan Clay MD  12/30/22 7983

## 2023-01-01 ENCOUNTER — HOSPITAL ENCOUNTER (EMERGENCY)
Facility: HOSPITAL | Age: 33
Discharge: HOME | End: 2023-01-02
Attending: EMERGENCY MEDICINE

## 2023-01-01 VITALS
OXYGEN SATURATION: 99 % | RESPIRATION RATE: 18 BRPM | TEMPERATURE: 97 F | DIASTOLIC BLOOD PRESSURE: 91 MMHG | SYSTOLIC BLOOD PRESSURE: 146 MMHG | HEART RATE: 104 BPM

## 2023-01-01 DIAGNOSIS — F19.10 SUBSTANCE ABUSE (HCC): ICD-10-CM

## 2023-01-01 DIAGNOSIS — T40.1X1A HEROIN OVERDOSE (HCC): Primary | ICD-10-CM

## 2023-01-01 NOTE — ED PROVIDER NOTES
History  Chief Complaint   Patient presents with   • Overdose - Accidental     Pt found unresponsive on friends porch, narcan given by bystanders unknown dose  Pt awake and alert during triage      75-year-old male brought in by EMS found unresponsive on his porch by friends  Narcan given by bystanders unknown dose shortly thereafter when EMS was arriving patient was waking up  Patient currently is awake and alert no other complaints  No noticeable injuries  States he is this is happened before with heroin overdose  Patient willing to see [facility]      History provided by:  Patient   used: No        Prior to Admission Medications   Prescriptions Last Dose Informant Patient Reported? Taking? QUEtiapine (SEROquel) 100 mg tablet   Yes No   Sig: Take 100 mg by mouth daily at bedtime   Patient not taking: Reported on 8/2/2022   carBAMazepine (TEGretol) 200 mg tablet   Yes No   Sig: Take 200 mg by mouth daily at bedtime   Patient not taking: Reported on 8/2/2022   escitalopram (LEXAPRO) 10 mg tablet   Yes No   Sig: Take 10 mg by mouth daily   Patient not taking: Reported on 8/2/2022   gabapentin (NEURONTIN) 300 mg capsule   Yes No   Sig: Take 300 mg by mouth 3 (three) times a day   Patient not taking: Reported on 8/2/2022   hydrOXYzine HCL (ATARAX) 25 mg tablet   No No   Sig: Take 1 tablet (25 mg total) by mouth every 6 (six) hours   ketorolac (TORADOL) 10 mg tablet   No No   Sig: Take 1 tablet (10 mg total) by mouth every 6 (six) hours as needed for moderate pain for up to 3 days   naloxone (NARCAN) 4 mg/0 1 mL nasal spray   No No   Sig: Administer 1 spray into a nostril  If no response after 2-3 minutes, give another dose in the other nostril using a new spray     ondansetron (ZOFRAN-ODT) 4 mg disintegrating tablet   No No   Sig: Take 1 tablet (4 mg total) by mouth every 6 (six) hours as needed for nausea for up to 3 days   ondansetron (Zofran ODT) 4 mg disintegrating tablet   No No   Sig: Take 1 tablet (4 mg total) by mouth every 6 (six) hours as needed for nausea or vomiting      Facility-Administered Medications: None       Past Medical History:   Diagnosis Date   • ADHD (attention deficit hyperactivity disorder)    • Anxiety     last assessed 3/23/15   • Bipolar 1 disorder ([facility] [address] )    • Genital warts     last assessed 12/7/15, resolved 11/1/17    • Psychiatric disorder        History reviewed  No pertinent surgical history  Family History   Problem Relation Age of Onset   • No Known Problems Father      I have reviewed and agree with the history as documented  E-Cigarette/Vaping   • E-Cigarette Use Never User      E-Cigarette/Vaping Substances   • Nicotine No    • THC No    • CBD No    • Flavoring No    • Other No    • Unknown No      Social History     Tobacco Use   • Smoking status: Every Day     Packs/day: 0 50     Types: Cigarettes   • Smokeless tobacco: Never   Vaping Use   • Vaping Use: Never used   Substance Use Topics   • Alcohol use: Yes     Comment: occ  • Drug use: Not Currently     Types: Heroin, Marijuana, Methamphetamines, Fentanyl     Comment: states was "shooting up steroids" 3 months ago (3/4/19 states this was 3 years ago)       Review of Systems   Constitutional: Negative for activity change, chills, diaphoresis and fever  HENT: Negative for congestion, ear pain, nosebleeds, sore throat, trouble swallowing and voice change  Eyes: Negative for pain, discharge and redness  Respiratory: Negative for apnea, cough, choking, shortness of breath, wheezing and stridor  Cardiovascular: Negative for chest pain and palpitations  Gastrointestinal: Negative for abdominal distention, abdominal pain, constipation, diarrhea, nausea and vomiting  Endocrine: Negative for polydipsia  Genitourinary: Negative for difficulty urinating, dysuria, flank pain, frequency, hematuria and urgency     Musculoskeletal: Negative for back pain, gait problem, joint swelling, myalgias, neck pain and neck stiffness  Skin: Negative for pallor and rash  Neurological: Positive for syncope  Negative for dizziness, tremors, speech difficulty, weakness, numbness and headaches  Hematological: Negative for adenopathy  Psychiatric/Behavioral: Negative for confusion, hallucinations, self-injury and suicidal ideas  The patient is not nervous/anxious  Physical Exam  Physical Exam  Vitals and nursing note reviewed  Constitutional:       General: He is not in acute distress  Appearance: He is well-developed  He is not diaphoretic  HENT:      Head: Normocephalic and atraumatic  Right Ear: External ear normal       Left Ear: External ear normal       Nose: Nose normal    Eyes:      Conjunctiva/sclera: Conjunctivae normal       Pupils: Pupils are equal, round, and reactive to light  Cardiovascular:      Rate and Rhythm: Normal rate and regular rhythm  Heart sounds: Normal heart sounds  Pulmonary:      Effort: Pulmonary effort is normal       Breath sounds: Normal breath sounds  Abdominal:      General: Bowel sounds are normal       Palpations: Abdomen is soft  Musculoskeletal:         General: Normal range of motion  Cervical back: Normal range of motion and neck supple  Skin:     General: Skin is warm and dry  Neurological:      Mental Status: He is alert and oriented to person, place, and time  Deep Tendon Reflexes: Reflexes are normal and symmetric  Psychiatric:         Behavior: Behavior is cooperative           Vital Signs  ED Triage Vitals [01/01/23 1745]   Temperature Pulse Respirations Blood Pressure SpO2   (!) 97 °F (36 1 °C) 104 18 146/91 99 %      Temp Source Heart Rate Source Patient Position - Orthostatic VS BP Location FiO2 (%)   Tympanic Monitor Lying Right arm --      Pain Score       --           Vitals:    01/01/23 1745   BP: 146/91   Pulse: 104   Patient Position - Orthostatic VS: Lying         Visual Acuity      ED Medications  Medications - No data to display    Diagnostic Studies  Results Reviewed     None                 No orders to display              Procedures  Procedures         ED Course                               SBIRT 20yo+    Flowsheet Row Most Recent Value   SBIRT (23 yo +)    In order to provide better care to our patients, we are screening all of our patients for alcohol and drug use  Would it be okay to ask you these screening questions? No Filed at: 01/01/2023 1955                    Medical Decision Making  Amount and/or Complexity of Data Reviewed  Independent Historian: EMS     Details: gave narcan  Discussion of management or test interpretation with external provider(s): Monitor airway and have warm handoff to [facility]        Disposition  Final diagnoses:   Heroin overdose ([facility] [address] )   Substance abuse ([facility] [address] )     Time reflects when diagnosis was documented in both MDM as applicable and the Disposition within this note     Time User Action Codes Description Comment    1/2/2023 10:51 AM Carson ROLAND Add [T40 1X1A] Heroin overdose ([facility] [address] )     1/2/2023 11:02 AM Ehsan Marshall Add [F19 10] Substance abuse [facility])       ED Disposition     ED Disposition   Discharge    Condition   Stable    Date/Time   Mon Jan 2, 2023 10:51 AM    Comment   Guille Fontana  discharge to home/self care                 Follow-up Information     Follow up With Specialties Details Why Contact Info    [facility]  Schedule an appointment as soon as possible for a visit  As needed [facility]  [phone]      [facility]  Schedule an appointment as soon as possible for a visit   [facility] [employer] [facility]  [phone]      [facility]              Discharge Medication List as of 1/2/2023 11:02 AM      CONTINUE these medications which have NOT CHANGED    Details   carBAMazepine (TEGretol) 200 mg tablet Take 200 mg by mouth daily at bedtime, Historical Med      escitalopram (LEXAPRO) 10 mg tablet Take 10 mg by mouth daily, Historical Med      gabapentin (NEURONTIN) 300 mg capsule Take 300 mg by mouth 3 (three) times a day, Historical Med      hydrOXYzine HCL (ATARAX) 25 mg tablet Take 1 tablet (25 mg total) by mouth every 6 (six) hours, Starting Wed 11/30/2022, Normal      ketorolac (TORADOL) 10 mg tablet Take 1 tablet (10 mg total) by mouth every 6 (six) hours as needed for moderate pain for up to 3 days, Starting Wed 11/30/2022, Until Sat 12/3/2022 at 2359, Normal      naloxone (NARCAN) 4 mg/0 1 mL nasal spray Administer 1 spray into a nostril  If no response after 2-3 minutes, give another dose in the other nostril using a new spray , Normal      ondansetron (Zofran ODT) 4 mg disintegrating tablet Take 1 tablet (4 mg total) by mouth every 6 (six) hours as needed for nausea or vomiting, Starting Mon 7/18/2022, Normal      QUEtiapine (SEROquel) 100 mg tablet Take 100 mg by mouth daily at bedtime, Historical Med             No discharge procedures on file      PDMP Review     None          ED Provider  Electronically Signed by           [facility], DO  01/02/23 SHERRY/Juan Jane, DO  01/03/23 4054

## 2023-01-01 NOTE — ED NOTES
1/1/23 @ 1815:  LIEN met with patient who was a Narcan reversal and is willing to speak to Gino Leavitt  Although patient said, "I don't have a drug problem," he was willing to hear what [facility] has to offer  [facility] and Woodward Dancer will reach out to Gino EMMANUEL notified ED staff    Rufino Burt, MS

## 2023-01-02 NOTE — ED NOTES
Pt sleeping soundly at this time  Per report, patient denies SI/HI, states here for accidental overdose  Pt states recreational drug use, but denies addiction problems  No continual observation indicated, awaiting [facility] for voluntary assessment        Tamara Castro RN  01/02/23 9410

## 2023-01-02 NOTE — ED PROVIDER NOTES
History  Chief Complaint   Patient presents with   • Pain     Pt stating he's having pain from poison in his feet  Patient presents for evaluation of bilateral foot pain  Patient states he is having poison from his feet  Patient was here recently and told he had an infection was supposed to be admitted but he signed out [facility]  History provided by:  Patient   used: No        Prior to Admission Medications   Prescriptions Last Dose Informant Patient Reported? Taking? QUEtiapine (SEROquel) 100 mg tablet   Yes No   Sig: Take 100 mg by mouth daily at bedtime   Patient not taking: Reported on 8/2/2022   carBAMazepine (TEGretol) 200 mg tablet   Yes No   Sig: Take 200 mg by mouth daily at bedtime   Patient not taking: Reported on 8/2/2022   escitalopram (LEXAPRO) 10 mg tablet   Yes No   Sig: Take 10 mg by mouth daily   Patient not taking: Reported on 8/2/2022   gabapentin (NEURONTIN) 300 mg capsule   Yes No   Sig: Take 300 mg by mouth 3 (three) times a day   Patient not taking: Reported on 8/2/2022   hydrOXYzine HCL (ATARAX) 25 mg tablet   No No   Sig: Take 1 tablet (25 mg total) by mouth every 6 (six) hours   ketorolac (TORADOL) 10 mg tablet   No No   Sig: Take 1 tablet (10 mg total) by mouth every 6 (six) hours as needed for moderate pain for up to 3 days   naloxone (NARCAN) 4 mg/0 1 mL nasal spray   No No   Sig: Administer 1 spray into a nostril  If no response after 2-3 minutes, give another dose in the other nostril using a new spray     ondansetron (ZOFRAN-ODT) 4 mg disintegrating tablet   No No   Sig: Take 1 tablet (4 mg total) by mouth every 6 (six) hours as needed for nausea for up to 3 days   ondansetron (Zofran ODT) 4 mg disintegrating tablet   No No   Sig: Take 1 tablet (4 mg total) by mouth every 6 (six) hours as needed for nausea or vomiting      Facility-Administered Medications: None       Past Medical History:   Diagnosis Date   • ADHD (attention deficit hyperactivity disorder)    • Anxiety     last assessed 3/23/15   • Bipolar 1 disorder ([facility] [address] )    • Genital warts     last assessed 12/7/15, resolved 11/1/17    • Psychiatric disorder        History reviewed  No pertinent surgical history  Family History   Problem Relation Age of Onset   • No Known Problems Father      I have reviewed and agree with the history as documented  E-Cigarette/Vaping   • E-Cigarette Use Never User      E-Cigarette/Vaping Substances   • Nicotine No    • THC No    • CBD No    • Flavoring No    • Other No    • Unknown No      Social History     Tobacco Use   • Smoking status: Every Day     Packs/day: 0 50     Types: Cigarettes   • Smokeless tobacco: Never   Vaping Use   • Vaping Use: Never used   Substance Use Topics   • Alcohol use: Yes     Comment: occ  • Drug use: Not Currently     Types: Heroin, Marijuana, Methamphetamines, Fentanyl     Comment: states was "shooting up steroids" 3 months ago (3/4/19 states this was 3 years ago)       Review of Systems   Constitutional: Negative for chills and fever  HENT: Negative for ear pain and sore throat  Eyes: Negative for pain and visual disturbance  Respiratory: Negative for cough and shortness of breath  Cardiovascular: Negative for chest pain and palpitations  Gastrointestinal: Negative for abdominal pain and vomiting  Genitourinary: Negative for dysuria and hematuria  Musculoskeletal: Positive for arthralgias  Negative for back pain  Skin: Negative for color change and rash  Neurological: Negative for seizures and syncope  All other systems reviewed and are negative  Physical Exam  Physical Exam  Vitals and nursing note reviewed  Constitutional:       General: He is not in acute distress  HENT:      Head: Atraumatic  Right Ear: External ear normal       Left Ear: External ear normal       Nose: Nose normal       Mouth/Throat:      Mouth: Mucous membranes are moist       Pharynx: Oropharynx is clear     Eyes: General: No scleral icterus  Conjunctiva/sclera: Conjunctivae normal    Cardiovascular:      Rate and Rhythm: Normal rate and regular rhythm  Pulses: Normal pulses  Pulmonary:      Effort: Pulmonary effort is normal  No respiratory distress  Breath sounds: Normal breath sounds  Musculoskeletal:         General: No deformity  Normal range of motion  Feet:    Skin:     Capillary Refill: Capillary refill takes less than 2 seconds  Findings: No erythema or rash  Neurological:      General: No focal deficit present  Mental Status: He is alert and oriented to person, place, and time  Vital Signs  ED Triage Vitals [12/31/22 0106]   Temperature Pulse Respirations Blood Pressure SpO2   97 7 °F (36 5 °C) 97 20 140/67 98 %      Temp Source Heart Rate Source Patient Position - Orthostatic VS BP Location FiO2 (%)   Oral Monitor Sitting Left arm --      Pain Score       10 - Worst Possible Pain           Vitals:    12/31/22 0106   BP: 140/67   Pulse: 97   Patient Position - Orthostatic VS: Sitting         Visual Acuity      ED Medications  Medications - No data to display    Diagnostic Studies  Results Reviewed     Procedure Component Value Units Date/Time    Blood culture #1 [[id number]] Collected: 12/31/22 0248    Lab Status: Preliminary result Specimen: Blood from Arm, Right Updated: 01/01/23 1201     Blood Culture No Growth at 24 hrs  Blood culture #2 [[id number]] Collected: 12/31/22 0248    Lab Status: Preliminary result Specimen: Blood from Arm, Left Updated: 01/01/23 1201     Blood Culture No Growth at 24 hrs      Comprehensive metabolic panel [[id number]]  (Abnormal) Collected: 12/31/22 0248    Lab Status: Final result Specimen: Blood from Arm, Left Updated: 12/31/22 0310     Sodium 139 mmol/L      Potassium 3 5 mmol/L      Chloride 105 mmol/L      CO2 26 mmol/L      ANION GAP 8 mmol/L      BUN 24 mg/dL      Creatinine 0 85 mg/dL      Glucose 123 mg/dL      Calcium 8 5 mg/dL Corrected Calcium 9 1 mg/dL      AST 27 U/L      ALT 22 U/L      Alkaline Phosphatase 98 U/L      Total Protein 6 4 g/dL      Albumin 3 3 g/dL      Total Bilirubin 0 30 mg/dL      eGFR 113 ml/min/1 73sq m     Narrative:      National Kidney Disease Foundation guidelines for Chronic Kidney Disease (CKD):   •  Stage 1 with normal or high GFR (GFR > 90 mL/min/1 73 square meters)  •  Stage 2 Mild CKD (GFR = 60-89 mL/min/1 73 square meters)  •  Stage 3A Moderate CKD (GFR = 45-59 mL/min/1 73 square meters)  •  Stage 3B Moderate CKD (GFR = 30-44 mL/min/1 73 square meters)  •  Stage 4 Severe CKD (GFR = 15-29 mL/min/1 73 square meters)  •  Stage 5 End Stage CKD (GFR <15 mL/min/1 73 square meters)  Note: GFR calculation is accurate only with a steady state creatinine    CBC and differential [[id number]]  (Abnormal) Collected: 12/31/22 0248    Lab Status: Final result Specimen: Blood from Arm, Left Updated: 12/31/22 0254     WBC 10 57 Thousand/uL      RBC 3 94 Million/uL      Hemoglobin 12 3 g/dL      Hematocrit 35 9 %      MCV 91 fL      MCH 31 2 pg      MCHC 34 3 g/dL      RDW 12 2 %      MPV 9 6 fL      Platelets 576 Thousands/uL      nRBC 0 /100 WBCs      Neutrophils Relative 64 %      Immat GRANS % 0 %      Lymphocytes Relative 24 %      Monocytes Relative 8 %      Eosinophils Relative 3 %      Basophils Relative 1 %      Neutrophils Absolute 6 86 Thousands/µL      Immature Grans Absolute 0 03 Thousand/uL      Lymphocytes Absolute 2 51 Thousands/µL      Monocytes Absolute 0 81 Thousand/µL      Eosinophils Absolute 0 28 Thousand/µL      Basophils Absolute 0 08 Thousands/µL                  No orders to display              Procedures  Procedures         ED Course                                             Medical Decision Making  Pulse ox 98% on room air indicating adequate oxygenation        Reviewed previous note and there was concern for a had decreased range of motion of the ankle joint as well as erythematous representing cellulitis  Exam at this time is not consistent with cellulitis he has normal range of movement movement of the joint with no signs of effusion  We will recheck the blood work  Recheck of the blood work showed white count had decreased from prior visit  Patient showered in the ER was able to perform foot hygiene and was given fresh socks  Amount and/or Complexity of Data Reviewed  Labs: ordered  Disposition  Final diagnoses:   Pain in both feet     Time reflects when diagnosis was documented in both MDM as applicable and the Disposition within this note     Time User Action Codes Description Comment    12/31/2022  3:16 AM Montana Maynard Add [P60 445,  M79 672] Pain in both feet       ED Disposition     ED Disposition   Discharge    Condition   Stable    Date/Time   Sat Dec 31, 2022  3:16 AM    Comment   Christina Andrews  discharge to home/self care  Follow-up Information     Follow up With Specialties Details Why [address]  In 1 week  [address] [facility]  [phone]            Discharge Medication List as of 12/31/2022  3:17 AM      CONTINUE these medications which have NOT CHANGED    Details   carBAMazepine (TEGretol) 200 mg tablet Take 200 mg by mouth daily at bedtime, Historical Med      escitalopram (LEXAPRO) 10 mg tablet Take 10 mg by mouth daily, Historical Med      gabapentin (NEURONTIN) 300 mg capsule Take 300 mg by mouth 3 (three) times a day, Historical Med      hydrOXYzine HCL (ATARAX) 25 mg tablet Take 1 tablet (25 mg total) by mouth every 6 (six) hours, Starting Wed 11/30/2022, Normal      ketorolac (TORADOL) 10 mg tablet Take 1 tablet (10 mg total) by mouth every 6 (six) hours as needed for moderate pain for up to 3 days, Starting Wed 11/30/2022, Until Sat 12/3/2022 at 2359, Normal      naloxone (NARCAN) 4 mg/0 1 mL nasal spray Administer 1 spray into a nostril   If no response after 2-3 minutes, give another dose in the other nostril using a new spray , Normal      ondansetron (Zofran ODT) 4 mg disintegrating tablet Take 1 tablet (4 mg total) by mouth every 6 (six) hours as needed for nausea or vomiting, Starting Mon 7/18/2022, Normal      QUEtiapine (SEROquel) 100 mg tablet Take 100 mg by mouth daily at bedtime, Historical Med             No discharge procedures on file      PDMP Review     None          ED Provider  Electronically Signed by           Matt Turner DO  01/02/23 9594

## 2023-01-02 NOTE — ED CARE HANDOFF
Emergency Department Sign Out Note        Sign out and transfer of care from previous provider  See Separate Emergency Department note  The patient, Abdifatah Loomis , was evaluated by the previous provider for substance abuse evaluation  Workup Completed:      ED Course / Workup Pending (followup): Inpt rehab placement  ED Course as of 01/02/23 1107   Mon Jan 02, 2023   5102 Patient pending inpatient drug rehab placement but [facility]     1032 Patient was seen by our crisis worker this morning  Crisis worker reached out to [facility] staff who is currently working on inpatient drug rehab placement for patient at this time placement will take longer than anticipated  Patient can be discharged home and [facility] will follow-up in the community with patient  Procedures  Medical Decision Making  Patient was seen by our crisis worker this morning  Crisis worker reached out to [facility] staff who is currently working on inpatient drug rehab placement for patient at this time placement will take longer than anticipated  Patient can be discharged home and [facility] will follow-up in the community with patient  This time patient is discharged home with follow-up to PCP as well as [facility] staff  Close return instructions given to return to the ER for any worsening symptoms  Patient agrees with discharge plan  Patient well appearing at time of discharge  Please Note: Fluency Direct voice recognition software may have been used in the creation of this document  Wrong words or sound a like substitutions may have occurred due to the inherent limitations of the voice software                 Disposition  Final diagnoses:   Heroin overdose ([address] )   Substance abuse ([facility] [address] )     Time reflects when diagnosis was documented in both MDM as applicable and the Disposition within this note     Time User Action Codes Description Comment    1/2/2023 10:51 AM Eduardo ROLAND Add [T40 1X1A] Heroin overdose ([facility] [address] ) 1/2/2023 11:02 AM Bret Layne Add [F19 10] Substance abuse [facility])       ED Disposition     ED Disposition   Discharge    Condition   Stable    Date/Time   Mon Jan 2, 2023 10:51 AM    Todd Dueñas  discharge to home/self care  Follow-up Information     Follow up With Specialties Details Why Contact Info    Radha Cesar  Schedule an appointment as soon as possible for a visit  As needed [facility]  [phone]      Radha Cesar  Schedule an appointment as soon as possible for a visit   [facility] [employer]  [phone]      [facility]            Patient's Medications   Discharge Prescriptions    No medications on file     No discharge procedures on file         ED Provider  Electronically Signed by     Manuela Sanchez DO  01/02/23 1109

## 2023-01-02 NOTE — ED NOTES
1/2/23 @ [phone]:  [facility], and PES asked for [facility] follow up  Lisbet emery will contact [facility] on call  [address]: Belle Alfredito from [facility] called and reported that Simon Winchester is working on admission to [facility] and will update shortly  [address]: PES spoke to Simon Annabella from [facility] and she reported that admission will take longer than anticipated, so she suggested discharging patient home, and [facility] will work with patient in the community while continuing efforts to send to rehab at either [facility] or [facility]  PES discussed with ED MD, who was in agreement with discharge plan; patient discharged home and provide [address]        Opioid abuse disorder, moderate:  F11 20    Maria MS

## 2023-01-05 LAB
BACTERIA BLD CULT: NORMAL
BACTERIA BLD CULT: NORMAL

## 2023-01-22 ENCOUNTER — HOSPITAL ENCOUNTER (EMERGENCY)
Facility: HOSPITAL | Age: 33
Discharge: HOME | End: 2023-01-23
Attending: EMERGENCY MEDICINE

## 2023-01-22 DIAGNOSIS — F43.10 PTSD (POST-TRAUMATIC STRESS DISORDER): ICD-10-CM

## 2023-01-22 DIAGNOSIS — F19.10 POLYSUBSTANCE ABUSE (HCC): Primary | ICD-10-CM

## 2023-01-22 LAB
BASOPHILS # BLD AUTO: 0.07 THOUSANDS/ÂΜL (ref 0–0.1)
BASOPHILS NFR BLD AUTO: 1 % (ref 0–1)
EOSINOPHIL # BLD AUTO: 0.4 THOUSAND/ÂΜL (ref 0–0.61)
EOSINOPHIL NFR BLD AUTO: 5 % (ref 0–6)
ERYTHROCYTE [DISTWIDTH] IN BLOOD BY AUTOMATED COUNT: 12.8 % (ref 11.6–15.1)
HCT VFR BLD AUTO: 37.5 % (ref 36.5–49.3)
HGB BLD-MCNC: 12.4 G/DL (ref 12–17)
IMM GRANULOCYTES # BLD AUTO: 0.02 THOUSAND/UL (ref 0–0.2)
IMM GRANULOCYTES NFR BLD AUTO: 0 % (ref 0–2)
LYMPHOCYTES # BLD AUTO: 3.52 THOUSANDS/ÂΜL (ref 0.6–4.47)
LYMPHOCYTES NFR BLD AUTO: 40 % (ref 14–44)
MCH RBC QN AUTO: 30.8 PG (ref 26.8–34.3)
MCHC RBC AUTO-ENTMCNC: 33.1 G/DL (ref 31.4–37.4)
MCV RBC AUTO: 93 FL (ref 82–98)
MONOCYTES # BLD AUTO: 0.65 THOUSAND/ÂΜL (ref 0.17–1.22)
MONOCYTES NFR BLD AUTO: 7 % (ref 4–12)
NEUTROPHILS # BLD AUTO: 4.22 THOUSANDS/ÂΜL (ref 1.85–7.62)
NEUTS SEG NFR BLD AUTO: 47 % (ref 43–75)
NRBC BLD AUTO-RTO: 0 /100 WBCS
PLATELET # BLD AUTO: 219 THOUSANDS/UL (ref 149–390)
PMV BLD AUTO: 10 FL (ref 8.9–12.7)
RBC # BLD AUTO: 4.02 MILLION/UL (ref 3.88–5.62)
WBC # BLD AUTO: 8.88 THOUSAND/UL (ref 4.31–10.16)

## 2023-01-22 RX ORDER — DIAZEPAM 5 MG/1
5 TABLET ORAL ONCE
Status: DISCONTINUED | OUTPATIENT
Start: 2023-01-22 | End: 2023-01-23

## 2023-01-23 LAB
ALBUMIN SERPL BCP-MCNC: 3.7 G/DL (ref 3.5–5)
ALP SERPL-CCNC: 84 U/L (ref 46–116)
ALT SERPL W P-5'-P-CCNC: 30 U/L (ref 12–78)
AMPHETAMINES SERPL QL SCN: POSITIVE
ANION GAP SERPL CALCULATED.3IONS-SCNC: 9 MMOL/L (ref 4–13)
AST SERPL W P-5'-P-CCNC: 28 U/L (ref 5–45)
BARBITURATES UR QL: NEGATIVE
BENZODIAZ UR QL: NEGATIVE
BILIRUB SERPL-MCNC: 0.4 MG/DL (ref 0.2–1)
BUN SERPL-MCNC: 22 MG/DL (ref 5–25)
CALCIUM SERPL-MCNC: 8.3 MG/DL (ref 8.3–10.1)
CHLORIDE SERPL-SCNC: 106 MMOL/L (ref 96–108)
CK MB SERPL-MCNC: 1.8 % (ref 0–2.5)
CK MB SERPL-MCNC: 6.4 NG/ML (ref 0–5)
CK SERPL-CCNC: 352 U/L (ref 39–308)
CO2 SERPL-SCNC: 25 MMOL/L (ref 21–32)
COCAINE UR QL: NEGATIVE
CREAT SERPL-MCNC: 0.92 MG/DL (ref 0.6–1.3)
ETHANOL SERPL-MCNC: <3 MG/DL (ref 0–3)
GFR SERPL CREATININE-BSD FRML MDRD: 108 ML/MIN/1.73SQ M
GLUCOSE SERPL-MCNC: 83 MG/DL (ref 65–140)
LIPASE SERPL-CCNC: 534 U/L (ref 73–393)
METHADONE UR QL: NEGATIVE
OPIATES UR QL SCN: NEGATIVE
OXYCODONE+OXYMORPHONE UR QL SCN: NEGATIVE
PCP UR QL: NEGATIVE
POTASSIUM SERPL-SCNC: 4.6 MMOL/L (ref 3.5–5.3)
PROT SERPL-MCNC: 6 G/DL (ref 6.4–8.4)
SODIUM SERPL-SCNC: 140 MMOL/L (ref 135–147)
THC UR QL: POSITIVE

## 2023-01-23 RX ORDER — MIDAZOLAM HYDROCHLORIDE 2 MG/2ML
5 INJECTION, SOLUTION INTRAMUSCULAR; INTRAVENOUS ONCE
Status: DISCONTINUED | OUTPATIENT
Start: 2023-01-23 | End: 2023-01-23

## 2023-01-23 RX ORDER — MIDAZOLAM HYDROCHLORIDE 2 MG/2ML
5 INJECTION, SOLUTION INTRAMUSCULAR; INTRAVENOUS ONCE
Status: COMPLETED | OUTPATIENT
Start: 2023-01-23 | End: 2023-01-23

## 2023-01-23 RX ORDER — OLANZAPINE 10 MG/1
10 INJECTION, POWDER, LYOPHILIZED, FOR SOLUTION INTRAMUSCULAR ONCE
Status: COMPLETED | OUTPATIENT
Start: 2023-01-23 | End: 2023-01-23

## 2023-01-23 RX ADMIN — OLANZAPINE 10 MG: 10 INJECTION, POWDER, FOR SOLUTION INTRAMUSCULAR at 03:49

## 2023-01-23 RX ADMIN — MIDAZOLAM 5 MG: 1 INJECTION INTRAMUSCULAR; INTRAVENOUS at 04:27

## 2023-01-23 NOTE — ED NOTES
1/23/23 @ 1145:  PES met with patient, who was still drowsy, but able to participate in conversation  PES offered patient assistance with his drug problem, but patient says, "I don't have a drug problem; the Police made me come here; I will talk to a counselor because I've had some trauma in my life, but that's all I want "  Patient denies SI/HI at this time  PES didn't complete crisis assessment because there weren't any notes indicating need  PES provides patient with list of [facility] resources  Patient to be discharged home    Patient refused [facility] referral   Buddy Abarca, MS Patient : Sylvie Lyons Age: 20 year old Sex: female   MRN: [id number] Encounter Date: 12/7/2017      History     Chief Complaint   Patient presents with   • Abdominal Pain     HPI   20-year-old female here with the complaint of lower abdominal pain. He is a sharp pain in her suprapubic region which sometimes radiates to the left lower quadrant. He had 2 days ago tonight and More severe. Pain is intermittent without any triggering factors. She's never had similar pain before. Sometimes when the pain is bad she feels nauseous she is not currently nauseous and there's been no vomiting, she's had normal bowel movements, she has no dysuria or frequency last period was little over 3 weeks ago she's not having any vaginal bleeding or discharge she denies any history of STDs or PID. No previous abdominal surgeries. . She did report that she had some spontaneous lactation this week but did not believe she was pregnant    No Known Allergies    Prior to Admission Medications    BUPROPION (WELLBUTRIN XL) 300 MG 24 HR TABLET    Take 1 tablet by mouth daily.       New Prescriptions    No medications on file       Past Medical History:   Diagnosis Date   • High cholesterol    • Miscarriage 9/8/2017       No past surgical history on file.    Family History   Problem Relation Age of Onset   • Diabetes Maternal Grandmother    • Diabetes Maternal Grandfather    • Cancer Maternal Grandfather    • Cancer Paternal Grandmother      Breast   • High blood pressure Father    • High cholesterol Father    • Ulcerative Colitis Brother        Social History   Substance Use Topics   • Smoking status: Former Smoker     Packs/day: 0.25     Start date: 1/1/2014     Quit date: 8/8/2017   • Smokeless tobacco: Never Used   • Alcohol use No      Comment: rare        Review of Systems   Constitutional: Negative for fever.   HENT: Negative for congestion.    Respiratory: Negative for cough and shortness of breath.    Cardiovascular: Negative for chest pain.    Gastrointestinal: Positive for abdominal pain. Negative for abdominal distention, diarrhea and vomiting.   Genitourinary: Negative for flank pain, frequency, vaginal bleeding and vaginal discharge.   Skin: Negative for rash.   Neurological: Negative for headaches.   Psychiatric/Behavioral: The patient is not nervous/anxious.        Physical Exam     ED Triage Vitals [12/07/17 0021]   ED Triage Vitals Group      Temp 98 °F (36.7 °C)      Pulse 110      Resp 18      /85      SpO2 95 %      EtCO2 mmHg       Height       Weight       Weight Scale Used        Physical Exam   Constitutional: No distress.   Patient is awake and alert speech and mentation are normal she appears comfortable   HENT:   Mouth/Throat: Oropharynx is clear and moist.   Eyes: Conjunctivae are normal.   Neck: No JVD present.   Cardiovascular: Regular rhythm.    No murmur heard.  Pulmonary/Chest: Effort normal and breath sounds normal.   Abdominal: Soft. She exhibits no distension. There is tenderness.   There is mild suprapubic and left lower quadrant tenderness no guarding no hernias no rashes, no CVA tenderness   Genitourinary:   Genitourinary Comments: Pelvic exam was done with nurse alessandro chaperoning, sternal  looks normal, there is a very minimal clear discharge at the os and mild cervical motion tenderness but no chandelier sign, no palpable mass   Musculoskeletal: She exhibits no edema or tenderness.   Neurological: She is alert.   Skin: Skin is warm and dry. No rash noted.       ED Course     Procedures    Lab Results     Results for orders placed or performed during the hospital encounter of 12/07/17   Urinalysis & Reflex Micro with Culture if Indicated   Result Value Ref Range    COLOR YELLOW YELLOW    APPEARANCE CLEAR     GLUCOSE(URINE) NEGATIVE NEGATIVE mg/dL    BILIRUBIN NEGATIVE NEGATIVE    KETONES NEGATIVE NEGATIVE mg/dL    SPECIFIC GRAVITY <1.005 (L) 1.005 - 1.030    BLOOD NEGATIVE NEGATIVE    pH 6.5 5.0 - 7.0 Units     PROTEIN(URINE) NEGATIVE NEGATIVE mg/dL    UROBILINOGEN 0.2 0.0 - 1.0 mg/dL    NITRITE NEGATIVE NEGATIVE    LEUKOCYTE ESTERASE NEGATIVE NEGATIVE    SPECIMEN TYPE URINE, CLEAN CATCH/MIDSTREAM    CBC & Auto Differential   Result Value Ref Range    WBC 9.1 4.2 - 11.0 K/mcL    RBC 4.38 4.00 - 5.20 mil/mcL    HGB 13.3 12.0 - 15.5 g/dL    HCT 37.9 36.0 - 46.5 %    MCV 86.5 78.0 - 100.0 fl    MCH 30.4 26.0 - 34.0 pg    MCHC 35.1 32.0 - 36.5 g/dL    RDW-CV 12.8 11.0 - 15.0 %     140 - 450 K/mcL    DIFF TYPE AUTOMATED DIFFERENTIAL     Neutrophil 53 %    LYMPH 37 %    MONO 7 %    EOSIN 3 %    BASO 0 %    Absolute Neutrophil 4.9 1.8 - 7.7 K/mcL    Absolute Lymph 3.4 1.0 - 4.8 K/mcL    Absolute Mono 0.6 0.3 - 0.9 K/mcL    Absolute Eos 0.3 0.1 - 0.5 K/mcL    Absolute Baso 0.0 0.0 - 0.3 K/mcL   Comprehensive Metabolic Panel   Result Value Ref Range    Sodium 139 135 - 145 mmol/L    Potassium 3.5 3.4 - 5.1 mmol/L    Chloride 102 98 - 107 mmol/L    Carbon Dioxide 27 21 - 32 mmol/L    Anion Gap 14 10 - 20 mmol/L    Glucose 113 (H) 65 - 99 mg/dL    BUN 9 6 - 20 mg/dL    Creatinine 0.65 0.51 - 0.95 mg/dL    GFR Estimate,  >90     GFR Estimate, Non African American >90     BUN/Creatinine Ratio 14 7 - 25    CALCIUM 9.8 8.4 - 10.2 mg/dL    TOTAL BILIRUBIN 0.3 0.2 - 1.0 mg/dL    AST/SGOT 20 <38 Units/L    ALT/SGPT 40 <79 Units/L    ALK PHOSPHATASE 101 45 - 117 Units/L    TOTAL PROTEIN 7.1 6.4 - 8.2 g/dL    Albumin 3.7 3.6 - 5.1 g/dL    GLOBULIN 3.4 2.0 - 4.0 g/dL    A/G Ratio, Serum 1.1 1.0 - 2.4   Wet Mount   Result Value Ref Range    YEAST NONE SEEN NONE SEEN    Trichomonas NONE SEEN NONE SEEN    Clue Cells NONE SEEN NONE SEEN   Urine pregnancy POC   Result Value Ref Range    URINE PREGNANCY,QUAL NEGATIVE          Radiology Results     Imaging Results          CT Abdomen Pelvis w/ IV contrast only (Final result)  Result time 12/07/17 02:20:34    Final result                 Impression:    IMPRESSION:    No acute  abnormality in the abdomen or pelvis.               Narrative:    EXAM:  CT ABDOMEN PELVIS W CONTRAST    CLINICAL HISTORY:  ABDOMINAL PAIN.     COMPARISON:  None.    TECHNIQUE:  Following the administration of 125 mL of Isovue-300 IV  contrast material , CT images are obtained through the abdomen and pelvis  in the portal venous phase. Data were reformatted into coronal and sagittal  series for review.    FINDINGS:     Lung bases are clear. No pleural effusion or pneumothorax. Heart base and  pericardium are unremarkable. No hiatal hernia.    Liver, decompressed gallbladder, biliary tree, pancreas, spleen, and  adrenal glands appear unremarkable.    Kidneys, ureters, and urinary bladder appear unremarkable.    Uterus and adnexal structures appear unremarkable. Small amount of free  pelvic fluid, within normal physiologic limits. Stomach, small bowel,  colon, and appendix appear unremarkable.    Abdominal and pelvic vascular structures appear unremarkable. No mesenteric  or retroperitoneal lymphadenopathy.    Small fat-containing umbilical hernia. No other evident hernia.    Osseous structures appear unremarkable.                                ED Medication Orders     None            MDM   patient with lower abdominal pain she does have some suprapubic and left lower quadrant tenderness, we will rule out pregnancy and UTI, consider ovarian cyst or mass, fibroids, consider possible PID-lower exam is not particularly suggestive of this at this time. or diverticulitis or other intra-abdominal pathology    Pregnancy test is negative, still with some suprapubic and left lower quadrant tenderness we will order a CT scan, we did discuss the importance of following up with her primary care physician regarding her recent spontaneous lactation and we will continue to workup her abdominal pain here with the CT and serial examinations.     150, laboratory studies are nondiagnostic and benign appearing, cervical GC probe is of  course pending, she still declines the need for any pain medicine    Recheck at 235, CT scan is completely negative per the radiologist, patient appears comfortable minimal suprapubic tenderness, I don't see any red flags at this point in time prep she just has bad menstrual cramps or some endometriosis with let her go home if pain seems to be getting worse or is associated with fevers or vomiting she is going to come back for evaluation otherwise she'll check in with her primary care physician for this problem and her other issue such as the unexplained lactation she had earlier this week    Clinical Impression     ED Diagnosis   1. Lower abdominal pain         Disposition        Discharge 12/7/2017  2:35 AM  Sylvie Lyons discharge to home/self care.                    Jignesh Garcia MD  12/07/17 0233

## 2023-01-23 NOTE — ED NOTES
Patient pulled IV out and pacing in room, stamping feet, patient advised he will be medicated with intramuscular injections     Stephanie Arrieta RN  01/23/23 5243

## 2023-01-23 NOTE — ED NOTES
Patient allowed an IV to be started however is refusing medications and fluids     Ally Otto RN  01/22/23 6831

## 2023-01-23 NOTE — ED CARE HANDOFF
Emergency Department Sign Out Note        Sign out and transfer of care from previous provider  See Separate Emergency Department note  The patient, Valerie Fontana , was evaluated by the previous provider for psychiatric evaluation  Workup Completed:  Medical clearance work-up    ED Course / Workup Pending (followup): Awaiting sobriety as well as crisis evaluation                                  ED Course as of 01/23/23 1525   Mon Jan 23, 2023   9523 Patient is currently medically cleared for psychiatric admission and evaluation  Patient is currently awaiting crisis evaluation  Patient denied any suicidal or homicidal ideation  Patient admitted to be sedated for agitation earlier  When patient wakes up, if patient wants to be discharged without psych evaluation then patient can be discharged  1523 Patient is evaluated by our crisis worker  At this time patient continues to deny any suicidal or homicidal ideation  Patient does not want any help with his polysubstance abuse  Patient does note that he has history of PTSD and he would like to see somebody on an outpatient basis  Patient was given resources to therapist and psychiatrist to follow-up with on an outpatient basis  At this time patient is discharged home with follow-up to PCP as well as his outpatient resources  Procedures  Medical Decision Making  After patient was more clinically sober and awake, patient was evaluated by our crisis worker  At this time patient continues to deny any suicidal or homicidal ideation  Patient does not want any help with his polysubstance abuse  Patient does note that he has history of PTSD and he would like to see somebody on an outpatient basis  Patient was given resources to therapist and psychiatrist to follow-up with on an outpatient basis  At this time patient is discharged home with follow-up to PCP as well as his outpatient resources    Close return instructions given to return to the ER for any worsening symptoms  Patient agrees with discharge plan  Patient well appearing at time of discharge  Please Note: Fluency Direct voice recognition software may have been used in the creation of this document  Wrong words or sound a like substitutions may have occurred due to the inherent limitations of the voice software  Polysubstance abuse ([facility] [address] ): complicated acute illness or injury  PTSD (post-traumatic stress disorder): complicated acute illness or injury  Amount and/or Complexity of Data Reviewed  Labs: ordered  Risk  OTC drugs  Prescription drug management  Disposition  Final diagnoses:   Polysubstance abuse ([address] )   PTSD (post-traumatic stress disorder)     Time reflects when diagnosis was documented in both MDM as applicable and the Disposition within this note     Time User Action Codes Description Comment    1/23/2023  3:21 PM Delta Hugger Add [F19 10] Polysubstance abuse ([facility] [address] )     1/23/2023  3:21 PM Delta Hugger Add [F43 10] PTSD (post-traumatic stress disorder)       ED Disposition     ED Disposition   Discharge    Condition   Stable    Date/Time   Mon Jan 23, 2023  3:21 PM    Comment   Corin Randall  discharge to home/self care  Follow-up Information     Follow up With Specialties Details Why [address]  In 2 days  [address] [facility]  [phone]          Please follow-up with the resources we gave you today for further evaluation of a PTSD  Patient's Medications   Discharge Prescriptions    No medications on file     No discharge procedures on file         ED Provider  Electronically Signed by     Jose Sanches DO  01/23/23 4852

## 2023-01-23 NOTE — ED NOTES
Pt continues to sleep, but is rousable to light stimulation        Mohamud Jean-Baptiste, LORIN  01/23/23 6760

## 2023-01-27 VITALS
TEMPERATURE: 98.4 F | DIASTOLIC BLOOD PRESSURE: 98 MMHG | HEART RATE: 94 BPM | RESPIRATION RATE: 26 BRPM | BODY MASS INDEX: 23.71 KG/M2 | SYSTOLIC BLOOD PRESSURE: 126 MMHG | WEIGHT: 170 LBS | OXYGEN SATURATION: 95 %
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
## 2023-02-16 ENCOUNTER — HOSPITAL ENCOUNTER (EMERGENCY)
Facility: HOSPITAL | Age: 33
Discharge: HOME | End: 2023-02-16
Attending: EMERGENCY MEDICINE

## 2023-02-16 VITALS
OXYGEN SATURATION: 97 % | TEMPERATURE: 98.5 F | DIASTOLIC BLOOD PRESSURE: 68 MMHG | WEIGHT: 170 LBS | HEART RATE: 99 BPM | RESPIRATION RATE: 16 BRPM | SYSTOLIC BLOOD PRESSURE: 115 MMHG | BODY MASS INDEX: 23.71 KG/M2

## 2023-02-16 DIAGNOSIS — T50.901A OVERDOSE: Primary | ICD-10-CM

## 2023-02-16 DIAGNOSIS — F15.10 METHAMPHETAMINE ABUSE (HCC): ICD-10-CM

## 2023-02-16 LAB
ALBUMIN SERPL BCP-MCNC: 3.9 G/DL (ref 3.5–5)
ALP SERPL-CCNC: 77 U/L (ref 34–104)
ALT SERPL W P-5'-P-CCNC: 37 U/L (ref 7–52)
AMPHETAMINES SERPL QL SCN: POSITIVE
ANION GAP SERPL CALCULATED.3IONS-SCNC: 9 MMOL/L (ref 4–13)
AST SERPL W P-5'-P-CCNC: 33 U/L (ref 13–39)
ATRIAL RATE: 97 BPM
BARBITURATES UR QL: NEGATIVE
BASOPHILS # BLD AUTO: 0.03 THOUSANDS/ÂΜL (ref 0–0.1)
BASOPHILS NFR BLD AUTO: 1 % (ref 0–1)
BENZODIAZ UR QL: NEGATIVE
BILIRUB SERPL-MCNC: 0.37 MG/DL (ref 0.2–1)
BUN SERPL-MCNC: 33 MG/DL (ref 5–25)
CALCIUM SERPL-MCNC: 8 MG/DL (ref 8.4–10.2)
CHLORIDE SERPL-SCNC: 99 MMOL/L (ref 96–108)
CO2 SERPL-SCNC: 25 MMOL/L (ref 21–32)
COCAINE UR QL: NEGATIVE
CREAT SERPL-MCNC: 1.22 MG/DL (ref 0.6–1.3)
EOSINOPHIL # BLD AUTO: 0.15 THOUSAND/ÂΜL (ref 0–0.61)
EOSINOPHIL NFR BLD AUTO: 2 % (ref 0–6)
ERYTHROCYTE [DISTWIDTH] IN BLOOD BY AUTOMATED COUNT: 13.5 % (ref 11.6–15.1)
ETHANOL SERPL-MCNC: <10 MG/DL
GFR SERPL CREATININE-BSD FRML MDRD: 76 ML/MIN/1.73SQ M
GLUCOSE SERPL-MCNC: 75 MG/DL (ref 65–140)
GLUCOSE SERPL-MCNC: 81 MG/DL (ref 65–140)
HCT VFR BLD AUTO: 41.1 % (ref 36.5–49.3)
HGB BLD-MCNC: 13.5 G/DL (ref 12–17)
IMM GRANULOCYTES # BLD AUTO: 0.03 THOUSAND/UL (ref 0–0.2)
IMM GRANULOCYTES NFR BLD AUTO: 1 % (ref 0–2)
LYMPHOCYTES # BLD AUTO: 1.91 THOUSANDS/ÂΜL (ref 0.6–4.47)
LYMPHOCYTES NFR BLD AUTO: 30 % (ref 14–44)
MAGNESIUM SERPL-MCNC: 2.3 MG/DL (ref 1.9–2.7)
MCH RBC QN AUTO: 30.1 PG (ref 26.8–34.3)
MCHC RBC AUTO-ENTMCNC: 32.8 G/DL (ref 31.4–37.4)
MCV RBC AUTO: 92 FL (ref 82–98)
METHADONE UR QL: NEGATIVE
MONOCYTES # BLD AUTO: 0.98 THOUSAND/ÂΜL (ref 0.17–1.22)
MONOCYTES NFR BLD AUTO: 15 % (ref 4–12)
NEUTROPHILS # BLD AUTO: 3.27 THOUSANDS/ÂΜL (ref 1.85–7.62)
NEUTS SEG NFR BLD AUTO: 51 % (ref 43–75)
NRBC BLD AUTO-RTO: 0 /100 WBCS
OPIATES UR QL SCN: NEGATIVE
OXYCODONE+OXYMORPHONE UR QL SCN: NEGATIVE
P AXIS: -15 DEGREES
PCP UR QL: NEGATIVE
PLATELET # BLD AUTO: 234 THOUSANDS/UL (ref 149–390)
PMV BLD AUTO: 10.1 FL (ref 8.9–12.7)
POTASSIUM SERPL-SCNC: 4.2 MMOL/L (ref 3.5–5.3)
PR INTERVAL: 124 MS
PROT SERPL-MCNC: 6.5 G/DL (ref 6.4–8.4)
QRS AXIS: 73 DEGREES
QRSD INTERVAL: 106 MS
QT INTERVAL: 354 MS
QTC INTERVAL: 449 MS
RBC # BLD AUTO: 4.48 MILLION/UL (ref 3.88–5.62)
SODIUM SERPL-SCNC: 133 MMOL/L (ref 135–147)
T WAVE AXIS: 77 DEGREES
THC UR QL: POSITIVE
VENTRICULAR RATE: 97 BPM
WBC # BLD AUTO: 6.37 THOUSAND/UL (ref 4.31–10.16)

## 2023-02-16 RX ORDER — LORAZEPAM 2 MG/ML
2 INJECTION INTRAMUSCULAR ONCE
Status: COMPLETED | OUTPATIENT
Start: 2023-02-16 | End: 2023-02-16

## 2023-02-16 RX ORDER — ONDANSETRON 2 MG/ML
4 INJECTION INTRAMUSCULAR; INTRAVENOUS ONCE
Status: DISCONTINUED | OUTPATIENT
Start: 2023-02-16 | End: 2023-02-16 | Stop reason: HOSPADM

## 2023-02-16 RX ADMIN — LORAZEPAM 2 MG: 2 INJECTION INTRAMUSCULAR; INTRAVENOUS at 01:30

## 2023-02-16 RX ADMIN — SODIUM CHLORIDE 1000 ML: 0.9 INJECTION, SOLUTION INTRAVENOUS at 01:33

## 2023-02-16 NOTE — ED PROVIDER NOTES
History  Chief Complaint   Patient presents with   • Overdose - Accidental     Patient arrives squad and police, found on street incoherent     49-year-old male presents with police found on the road, after doing drugs  He was incoherent  No evidence of trauma noted  He is well-known to our ER department for drug abuse  He answers questions appropriately when asked  He is moaning currently  No evidence of alcohol use  History provided by:  Patient   used: No        Prior to Admission Medications   Prescriptions Last Dose Informant Patient Reported? Taking? QUEtiapine (SEROquel) 100 mg tablet   Yes No   Sig: Take 100 mg by mouth daily at bedtime   Patient not taking: Reported on 8/2/2022   carBAMazepine (TEGretol) 200 mg tablet   Yes No   Sig: Take 200 mg by mouth daily at bedtime   Patient not taking: Reported on 8/2/2022   escitalopram (LEXAPRO) 10 mg tablet   Yes No   Sig: Take 10 mg by mouth daily   Patient not taking: Reported on 8/2/2022   gabapentin (NEURONTIN) 300 mg capsule   Yes No   Sig: Take 300 mg by mouth 3 (three) times a day   Patient not taking: Reported on 8/2/2022   hydrOXYzine HCL (ATARAX) 25 mg tablet   No No   Sig: Take 1 tablet (25 mg total) by mouth every 6 (six) hours   ketorolac (TORADOL) 10 mg tablet   No No   Sig: Take 1 tablet (10 mg total) by mouth every 6 (six) hours as needed for moderate pain for up to 3 days   naloxone (NARCAN) 4 mg/0 1 mL nasal spray   No No   Sig: Administer 1 spray into a nostril  If no response after 2-3 minutes, give another dose in the other nostril using a new spray     ondansetron (ZOFRAN-ODT) 4 mg disintegrating tablet   No No   Sig: Take 1 tablet (4 mg total) by mouth every 6 (six) hours as needed for nausea for up to 3 days   ondansetron (Zofran ODT) 4 mg disintegrating tablet   No No   Sig: Take 1 tablet (4 mg total) by mouth every 6 (six) hours as needed for nausea or vomiting      Facility-Administered Medications: None Past Medical History:   Diagnosis Date   • ADHD (attention deficit hyperactivity disorder)    • Anxiety     last assessed 3/23/15   • Bipolar 1 disorder ([address] )    • Genital warts     last assessed 12/7/15, resolved 11/1/17    • Psychiatric disorder        History reviewed  No pertinent surgical history  Family History   Problem Relation Age of Onset   • No Known Problems Father      I have reviewed and agree with the history as documented  E-Cigarette/Vaping   • E-Cigarette Use Never User      E-Cigarette/Vaping Substances   • Nicotine No    • THC No    • CBD No    • Flavoring No    • Other No    • Unknown No      Social History     Tobacco Use   • Smoking status: Every Day     Packs/day: 0 50     Types: Cigarettes   • Smokeless tobacco: Never   Vaping Use   • Vaping Use: Never used   Substance Use Topics   • Alcohol use: Yes     Comment: occ  • Drug use: Not Currently     Types: Heroin, Marijuana, Methamphetamines, Fentanyl     Comment: states was "shooting up steroids" 3 months ago (3/4/19 states this was 3 years ago)       Review of Systems   Constitutional: Negative  HENT: Negative  Eyes: Negative  Respiratory: Negative  Cardiovascular: Negative  Gastrointestinal: Negative  Endocrine: Negative  Genitourinary: Negative  Musculoskeletal: Negative  Skin: Negative  Allergic/Immunologic: Negative  Neurological: Negative  Hematological: Negative  Psychiatric/Behavioral: Positive for agitation and decreased concentration  Negative for behavioral problems  All other systems reviewed and are negative  Physical Exam  Physical Exam  Constitutional:       Appearance: Normal appearance  HENT:      Head: Normocephalic and atraumatic  Nose: Nose normal       Mouth/Throat:      Mouth: Mucous membranes are moist    Eyes:      Extraocular Movements: Extraocular movements intact  Pupils: Pupils are equal, round, and reactive to light     Cardiovascular: Rate and Rhythm: Normal rate and regular rhythm  Pulmonary:      Effort: Pulmonary effort is normal       Breath sounds: Normal breath sounds  Abdominal:      General: Abdomen is flat  Bowel sounds are normal       Palpations: Abdomen is soft  Musculoskeletal:         General: Normal range of motion  Cervical back: Normal range of motion and neck supple  Skin:     General: Skin is warm  Capillary Refill: Capillary refill takes less than 2 seconds  Neurological:      General: No focal deficit present  Mental Status: He is alert and oriented to person, place, and time  Mental status is at baseline  Cranial Nerves: No cranial nerve deficit  Sensory: No sensory deficit  Motor: No weakness  Coordination: Coordination normal       Gait: Gait normal       Deep Tendon Reflexes: Reflexes normal    Psychiatric:         Mood and Affect: Mood normal          Thought Content:  Thought content normal          Vital Signs  ED Triage Vitals   Temp Pulse Respirations Blood Pressure SpO2   -- 02/16/23 0101 02/16/23 0101 02/16/23 0101 02/16/23 0101    (!) 109 18 110/62 99 %      Temp src Heart Rate Source Patient Position - Orthostatic VS BP Location FiO2 (%)   -- 02/16/23 0101 02/16/23 0101 02/16/23 0101 --    Monitor Lying Right arm       Pain Score       02/16/23 0500       10 - Worst Possible Pain           Vitals:    02/16/23 0101 02/16/23 0229 02/16/23 0245 02/16/23 0500   BP: 110/62 115/74  108/72   Pulse: (!) 109 103 104 104   Patient Position - Orthostatic VS: Lying Lying  Lying         Visual Acuity      ED Medications  Medications   ondansetron (ZOFRAN) injection 4 mg (has no administration in time range)   sodium chloride 0 9 % bolus 1,000 mL (1,000 mL Intravenous New Bag 2/16/23 0133)   LORazepam (ATIVAN) injection 2 mg (2 mg Intravenous Given 2/16/23 0130)       Diagnostic Studies  Results Reviewed     Procedure Component Value Units Date/Time    Rapid drug screen, urine [[id number]]  (Abnormal) Collected: 02/16/23 0500    Lab Status: Final result Specimen: Urine, Other Updated: 02/16/23 0528     Amph/Meth UR Positive     Barbiturate Ur Negative     Benzodiazepine Urine Negative     Cocaine Urine Negative     Methadone Urine Negative     Opiate Urine Negative     PCP Ur Negative     THC Urine Positive     Oxycodone Urine Negative    Narrative:      Presumptive report  If requested, specimen will be sent to reference lab for confirmation  FOR MEDICAL PURPOSES ONLY  IF CONFIRMATION NEEDED PLEASE CONTACT THE LAB WITHIN 5 DAYS      Drug Screen Cutoff Levels:  AMPHETAMINE/METHAMPHETAMINES  1000 ng/mL  BARBITURATES     200 ng/mL  BENZODIAZEPINES     200 ng/mL  COCAINE      300 ng/mL  METHADONE      300 ng/mL  OPIATES      300 ng/mL  PHENCYCLIDINE     25 ng/mL  THC       50 ng/mL  OXYCODONE      100 ng/mL    Ethanol [[id number]]  (Normal) Collected: 02/16/23 0233    Lab Status: Final result Specimen: Blood from Arm, Right Updated: 02/16/23 0256     Ethanol Lvl <10 mg/dL     Comprehensive metabolic panel [[id number]]  (Abnormal) Collected: 02/16/23 0128    Lab Status: Final result Specimen: Blood from Arm, Right Updated: 02/16/23 0158     Sodium 133 mmol/L      Potassium 4 2 mmol/L      Chloride 99 mmol/L      CO2 25 mmol/L      ANION GAP 9 mmol/L      BUN 33 mg/dL      Creatinine 1 22 mg/dL      Glucose 75 mg/dL      Calcium 8 0 mg/dL      AST 33 U/L      ALT 37 U/L      Alkaline Phosphatase 77 U/L      Total Protein 6 5 g/dL      Albumin 3 9 g/dL      Total Bilirubin 0 37 mg/dL      eGFR 76 ml/min/1 73sq m     Narrative:      [facility] guidelines for Chronic Kidney Disease (CKD):   •  Stage 1 with normal or high GFR (GFR > 90 mL/min/1 73 square meters)  •  Stage 2 Mild CKD (GFR = 60-89 mL/min/1 73 square meters)  •  Stage 3A Moderate CKD (GFR = 45-59 mL/min/1 73 square meters)  •  Stage 3B Moderate CKD (GFR = 30-44 mL/min/1 73 square meters)  •  Stage 4 Severe CKD (GFR = 15-29 mL/min/1 73 square meters)  •  Stage 5 End Stage CKD (GFR <15 mL/min/1 73 square meters)  Note: GFR calculation is accurate only with a steady state creatinine    Magnesium [[id number]]  (Normal) Collected: 02/16/23 0128    Lab Status: Final result Specimen: Blood from Arm, Right Updated: 02/16/23 0158     Magnesium 2 3 mg/dL     CBC and differential [[id number]]  (Abnormal) Collected: 02/16/23 0128    Lab Status: Final result Specimen: Blood from Arm, Right Updated: 02/16/23 0132     WBC 6 37 Thousand/uL      RBC 4 48 Million/uL      Hemoglobin 13 5 g/dL      Hematocrit 41 1 %      MCV 92 fL      MCH 30 1 pg      MCHC 32 8 g/dL      RDW 13 5 %      MPV 10 1 fL      Platelets 468 Thousands/uL      nRBC 0 /100 WBCs      Neutrophils Relative 51 %      Immat GRANS % 1 %      Lymphocytes Relative 30 %      Monocytes Relative 15 %      Eosinophils Relative 2 %      Basophils Relative 1 %      Neutrophils Absolute 3 27 Thousands/µL      Immature Grans Absolute 0 03 Thousand/uL      Lymphocytes Absolute 1 91 Thousands/µL      Monocytes Absolute 0 98 Thousand/µL      Eosinophils Absolute 0 15 Thousand/µL      Basophils Absolute 0 03 Thousands/µL     Fingerstick Glucose (POCT) [[id number]]  (Normal) Collected: 02/16/23 0106    Lab Status: Final result Updated: 02/16/23 0108     POC Glucose 81 mg/dl                  No orders to display              Procedures  ECG 12 Lead Documentation Only  Performed by: Agnes Armendariz DO  Authorized by: Agnes Armendariz DO     ECG reviewed by me, the ED Provider: yes    Patient location:  ED  Previous ECG:     Previous ECG:  Unavailable    Comparison to cardiac monitor: Yes    Interpretation:     Interpretation: non-specific    Rate:     ECG rate assessment: normal    Rhythm:     Rhythm: sinus rhythm    Ectopy:     Ectopy: none    QRS:     QRS axis:  Normal  Conduction:     Conduction: normal    ST segments:     ST segments:  Non-specific  T waves:     T waves: non-specific ED Course                                             Medical Decision Making  Patient was given 2 mg of IV Ativan following which he was much calmer sedate and sleeping through the night  Patient to wake up in the morning reassessed and discharged  OORP should be offered should the patient is interested in rehab    Amount and/or Complexity of Data Reviewed  Labs: ordered  Decision-making details documented in ED Course  Risk  Prescription drug management  Disposition  Final diagnoses:   Overdose   Methamphetamine abuse ([address] )     Time reflects when diagnosis was documented in both MDM as applicable and the Disposition within this note     Time User Action Codes Description Comment    2/16/2023  7:17 AM Sandra Davis Add [T50 901A] Overdose     2/16/2023  7:17 AM Anepu, Vick Koyanagi Add [F15 10] Methamphetamine abuse [facility])       ED Disposition     ED Disposition   Discharge    Condition   Stable    Date/Time   Thu Feb 16, 2023  7:17 AM    Comment   Valerie Fontana  discharge to home/self care  Follow-up Information     Follow up With Specialties Details Why Contact Info Additional Information    Radha Hanson  Schedule an appointment as soon as possible for a visit   [address] [place] [address] Emergency Department Emergency Medicine  If symptoms worsen [address] [address] Emergency Department, [facility], [facility]          Patient's Medications   Discharge Prescriptions    No medications on file       No discharge procedures on file      PDMP Review     None          ED Provider  Electronically Signed by           Johnathon Raygoza DO  02/16/23 5481

## 2023-04-28 ENCOUNTER — HOSPITAL ENCOUNTER (EMERGENCY)
Facility: HOSPITAL | Age: 33
Discharge: HOME | End: 2023-04-28
Attending: EMERGENCY MEDICINE

## 2023-04-28 ENCOUNTER — APPOINTMENT (EMERGENCY)
Dept: RADIOLOGY | Facility: HOSPITAL | Age: 33
End: 2023-04-28

## 2023-04-28 VITALS
RESPIRATION RATE: 20 BRPM | BODY MASS INDEX: 23.71 KG/M2 | DIASTOLIC BLOOD PRESSURE: 58 MMHG | TEMPERATURE: 98.7 F | OXYGEN SATURATION: 96 % | HEIGHT: 71 IN | HEART RATE: 114 BPM | SYSTOLIC BLOOD PRESSURE: 126 MMHG

## 2023-04-28 DIAGNOSIS — F19.10 SUBSTANCE ABUSE (HCC): Primary | ICD-10-CM

## 2023-04-28 NOTE — ED PROVIDER NOTES
"History  Chief Complaint   Patient presents with   • Overdose - Accidental     32yoM hx bipolar disorder, meth/heroin abuse, brought in by EMS after gf noted he collapsed and was shaky  No seizure disorder  Pt admits to sniffing \"something  \"  No SI  No traumatic injury or c/o pain  No narcan required en route  Prior to Admission Medications   Prescriptions Last Dose Informant Patient Reported? Taking? QUEtiapine (SEROquel) 100 mg tablet   Yes No   Sig: Take 100 mg by mouth daily at bedtime   Patient not taking: Reported on 8/2/2022   carBAMazepine (TEGretol) 200 mg tablet   Yes No   Sig: Take 200 mg by mouth daily at bedtime   Patient not taking: Reported on 8/2/2022   escitalopram (LEXAPRO) 10 mg tablet   Yes No   Sig: Take 10 mg by mouth daily   Patient not taking: Reported on 8/2/2022   gabapentin (NEURONTIN) 300 mg capsule   Yes No   Sig: Take 300 mg by mouth 3 (three) times a day   Patient not taking: Reported on 8/2/2022   hydrOXYzine HCL (ATARAX) 25 mg tablet   No No   Sig: Take 1 tablet (25 mg total) by mouth every 6 (six) hours   ketorolac (TORADOL) 10 mg tablet   No No   Sig: Take 1 tablet (10 mg total) by mouth every 6 (six) hours as needed for moderate pain for up to 3 days   naloxone (NARCAN) 4 mg/0 1 mL nasal spray   No No   Sig: Administer 1 spray into a nostril  If no response after 2-3 minutes, give another dose in the other nostril using a new spray     ondansetron (ZOFRAN-ODT) 4 mg disintegrating tablet   No No   Sig: Take 1 tablet (4 mg total) by mouth every 6 (six) hours as needed for nausea for up to 3 days   ondansetron (Zofran ODT) 4 mg disintegrating tablet   No No   Sig: Take 1 tablet (4 mg total) by mouth every 6 (six) hours as needed for nausea or vomiting      Facility-Administered Medications: None       Past Medical History:   Diagnosis Date   • ADHD (attention deficit hyperactivity disorder)    • Anxiety     last assessed 3/23/15   • Bipolar 1 disorder ([facility] [address] )    • " "Genital warts     last assessed 12/7/15, resolved 11/1/17    • Psychiatric disorder        History reviewed  No pertinent surgical history  Family History   Problem Relation Age of Onset   • No Known Problems Father      I have reviewed and agree with the history as documented  E-Cigarette/Vaping   • E-Cigarette Use Never User      E-Cigarette/Vaping Substances   • Nicotine No    • THC No    • CBD No    • Flavoring No    • Other No    • Unknown No      Social History     Tobacco Use   • Smoking status: Every Day     Packs/day: 0 50     Types: Cigarettes   • Smokeless tobacco: Never   Vaping Use   • Vaping Use: Never used   Substance Use Topics   • Alcohol use: Yes     Comment: occ  • Drug use: Yes     Types: Heroin, Marijuana, Methamphetamines, Fentanyl     Comment: states was \"shooting up steroids\" 3 months ago (3/4/19 states this was 3 years ago)       Review of Systems   Cardiovascular: Negative for chest pain  Neurological: Negative for headaches  Physical Exam  Physical Exam  Vitals reviewed  Constitutional:       Appearance: He is well-developed  HENT:      Head: Normocephalic and atraumatic  Right Ear: External ear normal       Left Ear: External ear normal       Nose: Nose normal  No rhinorrhea  Mouth/Throat:      Mouth: Mucous membranes are moist    Eyes:      Conjunctiva/sclera: Conjunctivae normal       Comments: pinpoint   Cardiovascular:      Rate and Rhythm: Normal rate and regular rhythm  Pulmonary:      Effort: Pulmonary effort is normal       Breath sounds: Normal breath sounds  No wheezing or rales  Abdominal:      Palpations: Abdomen is soft  Tenderness: There is no abdominal tenderness  Musculoskeletal:      Cervical back: Neck supple  Right lower leg: No edema  Left lower leg: No edema  Skin:     General: Skin is warm and dry  Neurological:      Mental Status: He is alert and oriented to person, place, and time     Psychiatric:         Mood " and Affect: Mood normal          Vital Signs  ED Triage Vitals [04/28/23 1120]   Temperature Pulse Respirations Blood Pressure SpO2   98 4 °F (36 9 °C) (!) 124 20 124/61 98 %      Temp Source Heart Rate Source Patient Position - Orthostatic VS BP Location FiO2 (%)   Oral Monitor Lying Left arm --      Pain Score       3           Vitals:    04/28/23 1120 04/28/23 1230   BP: 124/61 126/58   Pulse: (!) 124 (!) 114   Patient Position - Orthostatic VS: Lying Lying         Visual Acuity      ED Medications  Medications - No data to display    Diagnostic Studies  Results Reviewed     None                 CT head without contrast   Final Result by Any To MD (04/28 1254)      No acute intracranial abnormality  Workstation performed: [id number]                    Procedures  CriticalCare Time    Date/Time: 4/28/2023 11:23 AM  Performed by: Travis Alberto DO  Authorized by: Travis Alberto DO     Critical care provider statement:     Critical care time (minutes):  45    Critical care was time spent personally by me on the following activities:  Ordering and review of radiographic studies, re-evaluation of patient's condition, ordering and performing treatments and interventions and obtaining history from patient or surrogate             ED Course                               SBIRT 22yo+    Flowsheet Row Most Recent Value   Initial Alcohol Screen: US AUDIT-C     1  How often do you have a drink containing alcohol? 1 Filed at: 04/28/2023 1124   2  How many drinks containing alcohol do you have on a typical day you are drinking? 0 Filed at: 04/28/2023 1124   3a  Male UNDER 65: How often do you have five or more drinks on one occasion? 0 Filed at: 04/28/2023 1124   Audit-C Score 1 Filed at: 04/28/2023 1124   AP: How many times in the past year have you    Used an illegal drug or used a prescription medication for non-medical reasons?  Daily or Almost Daily Filed at: 04/28/2023 1124   DAST-10: "In the past 12 months       1  Have you used drugs other than those required for medical reasons? 1 Filed at: 04/28/2023 1124   2  Do you use more than one drug at a time? 1 Filed at: 04/28/2023 1124   3  Have you had medical problems as a result of your drug use (e g , memory loss, hepatitis, convulsions, bleeding, etc )? 1 Filed at: 04/28/2023 1124   4  Have you had \"blackouts\" or \"flashbacks\" as a result of drug use? YesNo 1 Filed at: 04/28/2023 1124   5  Do you ever feel bad or guilty about your drug use? 0 Filed at: 04/28/2023 1124   6  Does your spouse (or parent) ever complain about your involvement with drugs? 0 Filed at: 04/28/2023 1124   7  Have you neglected your family because of your use of drugs? 0 Filed at: 04/28/2023 1124   8  Have you engaged in illegal activities in order to obtain drugs? 0 Filed at: 04/28/2023 1124   9  Have you ever experienced withdrawal symptoms (felt sick) when you stopped taking drugs? 1 Filed at: 04/28/2023 1124   10  Are you always able to stop using drugs when you want to? 1 Filed at: 04/28/2023 1124   DAST-10 Score 6 Filed at: 04/28/2023 1124                    Medical Decision Making  Pt arrives after opiate use and fall at home  No obvious traumatic injury  CT head neg  Pt observed until more awake for safe discharge  Amount and/or Complexity of Data Reviewed  Radiology: ordered  Disposition  Final diagnoses:   Substance abuse ([address] )     Time reflects when diagnosis was documented in both MDM as applicable and the Disposition within this note     Time User Action Codes Description Comment    4/28/2023  2:04 PM Erma Finch Add [F19 10] Substance abuse [facility])       ED Disposition     ED Disposition   Discharge    Condition   Stable    Date/Time   Fri Apr 28, 2023  2:04 PM    [facility]  discharge to home/self care                 Follow-up Information     Follow up With Specialties Details Why Contact Jayleen Lozano  In 1 week  9 " [address]  [phone]            Patient's Medications   Discharge Prescriptions    No medications on file       No discharge procedures on file      PDMP Review     None          ED Provider  Electronically Signed by           Jerlyn Fabry, DO  04/28/23 2687

## 2023-06-24 ENCOUNTER — HOSPITAL ENCOUNTER (EMERGENCY)
Facility: HOSPITAL | Age: 33
Discharge: HOME | End: 2023-06-24
Attending: EMERGENCY MEDICINE
Payer: COMMERCIAL

## 2023-06-24 VITALS
RESPIRATION RATE: 16 BRPM | WEIGHT: 165 LBS | TEMPERATURE: 98.1 F | HEIGHT: 70 IN | HEART RATE: 90 BPM | OXYGEN SATURATION: 92 % | BODY MASS INDEX: 23.62 KG/M2 | SYSTOLIC BLOOD PRESSURE: 117 MMHG | DIASTOLIC BLOOD PRESSURE: 58 MMHG

## 2023-06-24 DIAGNOSIS — R53.83 LETHARGY: Primary | ICD-10-CM

## 2023-06-24 DIAGNOSIS — Z00.8 ENCOUNTER FOR MEDICAL ASSESSMENT: ICD-10-CM

## 2023-06-24 PROCEDURE — 99283 EMERGENCY DEPT VISIT LOW MDM: CPT

## 2023-06-24 PROCEDURE — 93005 ELECTROCARDIOGRAM TRACING: CPT

## 2023-06-25 LAB
ATRIAL RATE: 79 BPM
P AXIS: 1 DEGREES
PR INTERVAL: 158 MS
QRS AXIS: 67 DEGREES
QRSD INTERVAL: 102 MS
QT INTERVAL: 374 MS
QTC INTERVAL: 428 MS
T WAVE AXIS: 70 DEGREES
VENTRICULAR RATE: 79 BPM

## 2023-06-25 PROCEDURE — 93010 ELECTROCARDIOGRAM REPORT: CPT | Performed by: INTERNAL MEDICINE

## 2023-06-25 NOTE — ED PROVIDER NOTES
"History  Chief Complaint   Patient presents with   • Personal Problem     EMS called due to pt \"drifting off [facility]\"  Pt denies CP and SOB  53-year-old male with history of substance abuse brought in by ambulance for medical evaluation  Per EMS the patient was found outside of a [address] sleeping  EMS states that police were called to the scene and had a difficult time arousing the patient  Per EMS the patient denied using any illicit substances to them  The patient was oriented for EMS and did not want any treatments  Narcan was not given prior to arrival   On arrival the patient is sleepy but arousable  He is oriented x4  The patient reports that he has been feeling tired and has not been sleeping well but he does not believe he needs to be treated here in the emergency department  He denies drinking any alcohol today or using any illicit substances  Patient is agreeable to an EKG but states that he does not want any other medical treatments  Risks were discussed and the patient stated understanding the risks  Prior to Admission Medications   Prescriptions Last Dose Informant Patient Reported? Taking?    QUEtiapine (SEROquel) 100 mg tablet   Yes No   Sig: Take 100 mg by mouth daily at bedtime   Patient not taking: Reported on 8/2/2022   carBAMazepine (TEGretol) 200 mg tablet   Yes No   Sig: Take 200 mg by mouth daily at bedtime   Patient not taking: Reported on 8/2/2022   escitalopram (LEXAPRO) 10 mg tablet   Yes No   Sig: Take 10 mg by mouth daily   Patient not taking: Reported on 8/2/2022   gabapentin (NEURONTIN) 300 mg capsule   Yes No   Sig: Take 300 mg by mouth 3 (three) times a day   Patient not taking: Reported on 8/2/2022   hydrOXYzine HCL (ATARAX) 25 mg tablet Not Taking  No No   Sig: Take 1 tablet (25 mg total) by mouth every 6 (six) hours   Patient not taking: Reported on 6/24/2023   ketorolac (TORADOL) 10 mg tablet   No No   Sig: Take 1 tablet (10 mg total) by mouth every 6 " "(six) hours as needed for moderate pain for up to 3 days   naloxone (NARCAN) 4 mg/0 1 mL nasal spray   No No   Sig: Administer 1 spray into a nostril  If no response after 2-3 minutes, give another dose in the other nostril using a new spray  ondansetron (ZOFRAN-ODT) 4 mg disintegrating tablet   No No   Sig: Take 1 tablet (4 mg total) by mouth every 6 (six) hours as needed for nausea for up to 3 days   ondansetron (Zofran ODT) 4 mg disintegrating tablet   No No   Sig: Take 1 tablet (4 mg total) by mouth every 6 (six) hours as needed for nausea or vomiting      Facility-Administered Medications: None       Past Medical History:   Diagnosis Date   • ADHD (attention deficit hyperactivity disorder)    • Anxiety     last assessed 3/23/15   • Bipolar 1 disorder ([facility] [address] )    • Genital warts     last assessed 12/7/15, resolved 11/1/17    • Psychiatric disorder        No past surgical history on file  Family History   Problem Relation Age of Onset   • No Known Problems Father      I have reviewed and agree with the history as documented  E-Cigarette/Vaping   • E-Cigarette Use Never User      E-Cigarette/Vaping Substances   • Nicotine No    • THC No    • CBD No    • Flavoring No    • Other No    • Unknown No      Social History     Tobacco Use   • Smoking status: Every Day     Packs/day: 1 50     Types: Cigarettes   • Smokeless tobacco: Never   Vaping Use   • Vaping Use: Never used   Substance Use Topics   • Alcohol use: Yes     Comment: occ  • Drug use: Yes     Types: Heroin, Marijuana, Methamphetamines, Fentanyl     Comment: states was \"shooting up steroids\" 3 months ago (3/4/19 states this was 3 years ago)       Review of Systems   Constitutional: Negative for chills and fever  HENT: Negative for ear pain and sore throat  Eyes: Negative for pain and visual disturbance  Respiratory: Negative for cough and shortness of breath  Cardiovascular: Negative for chest pain and palpitations     Gastrointestinal: " Negative for abdominal pain and vomiting  Genitourinary: Negative for dysuria and hematuria  Musculoskeletal: Negative for arthralgias and back pain  Skin: Negative for color change and rash  Neurological: Negative for seizures and syncope  Psychiatric/Behavioral: Positive for sleep disturbance  Negative for suicidal ideas  All other systems reviewed and are negative  Physical Exam  Physical Exam  Vitals and nursing note reviewed  Constitutional:       General: He is not in acute distress  Appearance: He is well-developed  HENT:      Head: Normocephalic and atraumatic  Eyes:      Conjunctiva/sclera: Conjunctivae normal    Cardiovascular:      Rate and Rhythm: Normal rate and regular rhythm  Heart sounds: No murmur heard  Pulmonary:      Effort: Pulmonary effort is normal  No respiratory distress  Breath sounds: Normal breath sounds  Abdominal:      Palpations: Abdomen is soft  Tenderness: There is no abdominal tenderness  Musculoskeletal:         General: No swelling  Cervical back: Neck supple  Skin:     General: Skin is warm and dry  Capillary Refill: Capillary refill takes less than 2 seconds  Neurological:      Mental Status: He is oriented to person, place, and time  GCS: GCS eye subscore is 4  GCS verbal subscore is 5  GCS motor subscore is 6  Cranial Nerves: Cranial nerves 2-12 are intact  Sensory: Sensation is intact  Motor: Motor function is intact  Coordination: Coordination is intact  Gait: Gait is intact        Comments: Sleepy but easily arousable   Psychiatric:         Mood and Affect: Mood normal          Vital Signs  ED Triage Vitals [06/24/23 2129]   Temperature Pulse Respirations Blood Pressure SpO2   98 1 °F (36 7 °C) 90 16 117/58 92 %      Temp Source Heart Rate Source Patient Position - Orthostatic VS BP Location FiO2 (%)   Oral Monitor Sitting Left arm --      Pain Score       No Pain           Vitals: 06/24/23 2129   BP: 117/58   Pulse: 90   Patient Position - Orthostatic VS: Sitting         Visual Acuity  Visual Acuity    Flowsheet Row Most Recent Value   L Pupil Size (mm) 2   R Pupil Size (mm) 2          ED Medications  Medications - No data to display    Diagnostic Studies  Results Reviewed     None                 No orders to display              Procedures  ECG 12 Lead Documentation Only    Date/Time: 6/24/2023 9:55 PM    Performed by: Shanda Figueroa MD  Authorized by: Shanda Figueroa MD    ECG reviewed by me, the ED Provider: yes    Patient location:  ED  Previous ECG:     Previous ECG:  Unavailable    Comparison to cardiac monitor: Yes    Interpretation:     Interpretation: normal    Rate:     ECG rate assessment: normal    Rhythm:     Rhythm: sinus rhythm    Ectopy:     Ectopy: none    QRS:     QRS axis:  Normal  Conduction:     Conduction: normal    ST segments:     ST segments:  Normal  T waves:     T waves: normal               ED Course                           Medical Decision Making  55-year-old male presenting to the emergency department for evaluation of lethargy  On arrival the patient was awake and in no acute distress  On exam the patient was sleepy but he was easily arousable  Patient was alert and oriented x4  Patient was agreeable to an EKG but stated that he did not want any other medical treatments  Risks were discussed and the patient stated understanding the risks including worsening condition, respiratory failure, permanent disability and death  EKG was ordered to evaluate for arrhythmia/ischemia  EKG on my interpretation with a normal sinus rhythm with no ischemic changes  The patient was observed here in the emergency department for approximately 1 hour  The patient was provided with food and drinks  Patient tolerated p o  without difficulty    Again the patient was offered a further evaluation but he declined and stated that he would sign out   [facility] ADVICE  Patient was alert and oriented the entire time he was in the emergency department  He has the capacity to make his own medical decisions and understands the risks of signing out [address]  The patient was told that he could return to the emergency department at any point for further evaluation  Encounter for medical assessment: acute illness or injury  Lethargy: acute illness or injury  Amount and/or Complexity of Data Reviewed  ECG/medicine tests: ordered and independent interpretation performed  Disposition  Final diagnoses:   Encounter for medical assessment   Lethargy     Time reflects when diagnosis was documented in both MDM as applicable and the Disposition within this note     Time User Action Codes Description Comment    6/24/2023 10:34 PM Minor Peers Add [Z00 8] Encounter for medical assessment     6/24/2023 10:36 PM Rufina Carbone [R53 83] Lethargy     6/24/2023 10:36 PM Minor Peers Modify [Z00 8] Encounter for medical assessment     6/24/2023 10:36 PM Minor Peers Modify [T12 40] Lethargy       ED Disposition     ED Disposition   AMA    Condition   --    Date/Time   Sat Jun 24, 2023 10:34 PM    Comment   Date: 6/24/2023  Patient: Watson Lozano  Admitted: 6/24/2023  9:26 PM  Attending Provider: Maira Conley MD    Watson Lozano  or his authorized caregiver has made the decision for the patient to leave the emergency department against th e advice of his attending physician  He or his authorized caregiver has been informed and understands the inherent risks, including death, permanent disability and worsening condition  He or his authorized caregiver has decided to accept the respons ibility for this decision  Watson Lozano  and all necessary parties have been advised that he may return for further evaluation or treatment  His condition at time of discharge was stable    Watson Lozano  had current vital signs as "follows:  BP  117/58 (BP Location: Left arm)   Pulse 90   Temp 98 1 °F (36 7 °C) (Oral)   Resp 16   Ht 5' 10\" (1 778 m)   Wt 74 8 kg (165 lb)            Follow-up Information     Follow up With Specialties Details Why Contact Info Additional Information    Rachel Wilson  Schedule an appointment as soon as possible for a visit   [facility] [address] Emergency Department Emergency Medicine Go to  If symptoms worsen [address] [facility],[address] [id number]  [address] [place] [address] Emergency Department, [address], [address]          Discharge Medication List as of 6/24/2023 10:40 PM      CONTINUE these medications which have NOT CHANGED    Details   carBAMazepine (TEGretol) 200 mg tablet Take 200 mg by mouth daily at bedtime, Historical Med      escitalopram (LEXAPRO) 10 mg tablet Take 10 mg by mouth daily, Historical Med      gabapentin (NEURONTIN) 300 mg capsule Take 300 mg by mouth 3 (three) times a day, Historical Med      hydrOXYzine HCL (ATARAX) 25 mg tablet Take 1 tablet (25 mg total) by mouth every 6 (six) hours, Starting Wed 11/30/2022, Normal      ketorolac (TORADOL) 10 mg tablet Take 1 tablet (10 mg total) by mouth every 6 (six) hours as needed for moderate pain for up to 3 days, Starting Wed 11/30/2022, Until Sat 12/3/2022 at 2359, Normal      naloxone (NARCAN) 4 mg/0 1 mL nasal spray Administer 1 spray into a nostril  If no response after 2-3 minutes, give another dose in the other nostril using a new spray , Normal      ondansetron (Zofran ODT) 4 mg disintegrating tablet Take 1 tablet (4 mg total) by mouth every 6 (six) hours as needed for nausea or vomiting, Starting Mon 7/18/2022, Normal      QUEtiapine (SEROquel) 100 mg tablet Take 100 mg by mouth daily at bedtime, Historical Med             No discharge procedures on file      PDMP Review     None          ED Provider  Electronically Signed " by           Carlos Garcia MD  06/25/23 3577

## 2023-06-25 NOTE — ED NOTES
"Pt falling asleep while attending speaking with him   Pt adamant that his \"oxygen is dropping b/c I am really upset about some things that are going on in my life\"      Ximena Massey RN  06/24/23 5889    "

## 2023-07-21 ENCOUNTER — HOSPITAL ENCOUNTER (EMERGENCY)
Facility: HOSPITAL | Age: 33
Discharge: LEFT BEFORE DISCHARGE | End: 2023-07-21
Attending: STUDENT IN AN ORGANIZED HEALTH CARE EDUCATION/TRAINING PROGRAM | Admitting: STUDENT IN AN ORGANIZED HEALTH CARE EDUCATION/TRAINING PROGRAM
Payer: COMMERCIAL

## 2023-07-21 VITALS
HEIGHT: 70 IN | DIASTOLIC BLOOD PRESSURE: 72 MMHG | RESPIRATION RATE: 14 BRPM | TEMPERATURE: 98.2 F | WEIGHT: 165 LBS | BODY MASS INDEX: 23.62 KG/M2 | OXYGEN SATURATION: 94 % | SYSTOLIC BLOOD PRESSURE: 121 MMHG | HEART RATE: 109 BPM

## 2023-07-21 DIAGNOSIS — T50.901A ACCIDENTAL DRUG OVERDOSE, INITIAL ENCOUNTER: Primary | ICD-10-CM

## 2023-07-21 PROCEDURE — 99283 EMERGENCY DEPT VISIT LOW MDM: CPT

## 2023-07-21 PROCEDURE — 99284 EMERGENCY DEPT VISIT MOD MDM: CPT | Performed by: STUDENT IN AN ORGANIZED HEALTH CARE EDUCATION/TRAINING PROGRAM

## 2023-07-21 NOTE — DISCHARGE INSTRUCTIONS
You were seen in the emergency department for an accidental overdose. As discussed please refrain from using further drugs. Return to the emergency department for any new or concerning symptoms.

## 2023-07-22 NOTE — ED PROVIDER NOTES
History  Chief Complaint   Patient presents with   • Overdose - Accidental     EMS states OD on Fentanyl with 1 narcan given by bystander, pt unsure what he took and states no SI     Patient is a 38-year-old male, past medical history of bipolar disorder, anxiety, and ADHD, who presents to the emergency room for an accidental overdose. Per report, patient was found unresponsive by a bystander. He was given 1 of Narcan with subsequent improvement in mental status. On EMS arrival patient denied any drug use. Denied any suicidal ideation. Brought to the emergency department for further evaluation. Patient does admit to drug use but denies any drug use today. States he did take a pill yesterday. No other complaints or concerns at this time. Prior to Admission Medications   Prescriptions Last Dose Informant Patient Reported? Taking? QUEtiapine (SEROquel) 100 mg tablet   Yes No   Sig: Take 100 mg by mouth daily at bedtime   Patient not taking: Reported on 8/2/2022   carBAMazepine (TEGretol) 200 mg tablet   Yes No   Sig: Take 200 mg by mouth daily at bedtime   Patient not taking: Reported on 8/2/2022   escitalopram (LEXAPRO) 10 mg tablet   Yes No   Sig: Take 10 mg by mouth daily   Patient not taking: Reported on 8/2/2022   gabapentin (NEURONTIN) 300 mg capsule   Yes No   Sig: Take 300 mg by mouth 3 (three) times a day   Patient not taking: Reported on 8/2/2022   hydrOXYzine HCL (ATARAX) 25 mg tablet   No No   Sig: Take 1 tablet (25 mg total) by mouth every 6 (six) hours   Patient not taking: Reported on 6/24/2023   ketorolac (TORADOL) 10 mg tablet   No No   Sig: Take 1 tablet (10 mg total) by mouth every 6 (six) hours as needed for moderate pain for up to 3 days   naloxone (NARCAN) 4 mg/0.1 mL nasal spray   No No   Sig: Administer 1 spray into a nostril. If no response after 2-3 minutes, give another dose in the other nostril using a new spray.    ondansetron (ZOFRAN-ODT) 4 mg disintegrating tablet   No No   Sig: Take 1 tablet (4 mg total) by mouth every 6 (six) hours as needed for nausea for up to 3 days   ondansetron (Zofran ODT) 4 mg disintegrating tablet   No No   Sig: Take 1 tablet (4 mg total) by mouth every 6 (six) hours as needed for nausea or vomiting      Facility-Administered Medications: None       Past Medical History:   Diagnosis Date   • ADHD (attention deficit hyperactivity disorder)    • Anxiety     last assessed 3/23/15   • Bipolar 1 disorder ([address])    • Genital warts     last assessed 12/7/15, resolved 11/1/17    • Psychiatric disorder        History reviewed. No pertinent surgical history. Family History   Problem Relation Age of Onset   • No Known Problems Father      I have reviewed and agree with the history as documented. E-Cigarette/Vaping   • E-Cigarette Use Never User      E-Cigarette/Vaping Substances   • Nicotine No    • THC No    • CBD No    • Flavoring No    • Other No    • Unknown No      Social History     Tobacco Use   • Smoking status: Every Day     Packs/day: 1.50     Types: Cigarettes   • Smokeless tobacco: Never   Vaping Use   • Vaping Use: Never used   Substance Use Topics   • Alcohol use: Yes     Comment: occ. • Drug use: Yes     Types: Heroin, Marijuana, Methamphetamines, Fentanyl     Comment: states was "shooting up steroids" 3 months ago (3/4/19 states this was 3 years ago)       Review of Systems   Constitutional: Negative for chills and fever. Respiratory: Negative for shortness of breath. Cardiovascular: Negative for chest pain. Gastrointestinal: Negative for nausea and vomiting. All other systems reviewed and are negative. Physical Exam  Physical Exam  Vitals and nursing note reviewed. Constitutional:       General: He is not in acute distress. Appearance: He is well-developed. He is not diaphoretic. HENT:      Head: Normocephalic and atraumatic.       Right Ear: External ear normal.      Left Ear: External ear normal.      Nose: Nose normal.   Eyes:      General: Lids are normal. No scleral icterus. Comments: Pinpoint pupils bilaterally   Cardiovascular:      Rate and Rhythm: Normal rate and regular rhythm. Heart sounds: Normal heart sounds. No murmur heard. No friction rub. No gallop. Pulmonary:      Effort: Pulmonary effort is normal. No respiratory distress. Breath sounds: Normal breath sounds. No wheezing or rales. Musculoskeletal:         General: No deformity. Normal range of motion. Cervical back: Normal range of motion and neck supple. Skin:     General: Skin is warm and dry. Neurological:      General: No focal deficit present. Mental Status: He is alert. Psychiatric:         Mood and Affect: Mood normal.         Behavior: Behavior normal.         Vital Signs  ED Triage Vitals [07/21/23 1844]   Temperature Pulse Respirations Blood Pressure SpO2   98.2 °F (36.8 °C) (!) 109 14 121/72 94 %      Temp src Heart Rate Source Patient Position - Orthostatic VS BP Location FiO2 (%)   -- -- -- -- --      Pain Score       No Pain           Vitals:    07/21/23 1844   BP: 121/72   Pulse: (!) 109         Visual Acuity      ED Medications  Medications - No data to display    Diagnostic Studies  Results Reviewed     None                 No orders to display              Procedures  Procedures         ED Course                               SBIRT 22yo+    Flowsheet Row Most Recent Value   Initial Alcohol Screen: US AUDIT-C     1. How often do you have a drink containing alcohol? 0 Filed at: 07/21/2023 1846   2. How many drinks containing alcohol do you have on a typical day you are drinking? 0 Filed at: 07/21/2023 1846   3a. Male UNDER 65: How often do you have five or more drinks on one occasion? 0 Filed at: 07/21/2023 1846   3b. FEMALE Any Age, or MALE 65+: How often do you have 4 or more drinks on one occassion?  0 Filed at: 07/21/2023 1846   Audit-C Score 0 Filed at: 07/21/2023 1846   AP: How many times in the past year have you. .. Used an illegal drug or used a prescription medication for non-medical reasons? Never Filed at: 07/21/2023 1846                    Medical Decision Making  Patient is a 27 y.o. male who presents to the ED after an accidental drug overdose. Patient is nontoxic, well-appearing. He is mildly tachycardic but otherwise vitals are stable. Physical exam is unremarkable. Clinical impression is accidental opioid overdose. Patient declining OORP. Plan: Observation, discharge with PCP follow-up. Portions of the record may have been created with voice recognition software. Occasional wrong word or "sound a like" substitutions may have occurred due to the inherent limitations of voice recognition software. Read the chart carefully and recognize, using context, where substitutions have occurred. Accidental drug overdose, initial encounter: acute illness or injury      Disposition  Final diagnoses:   Accidental drug overdose, initial encounter     Time reflects when diagnosis was documented in both MDM as applicable and the Disposition within this note     Time User Action Codes Description Comment    7/21/2023  7:09 PM Junior Page Accidental drug overdose, initial encounter       ED Disposition     ED Disposition   Discharge    Condition   Stable    Date/Time   Fri Jul 21, 2023 [address] [facility]. discharge to home/self care.                Follow-up Information     Follow up With Specialties Details Why Contact Info Additional Information    Kan Adair    [address]  [phone]       [address] [place] [address] Emergency Department Emergency Medicine  As needed [address] [address]  [address] [facility] Emergency Department, [address] [facility] [address] , [facility], [address]          Discharge Medication List as of 7/21/2023  7:10 PM      CONTINUE these medications which have NOT CHANGED    Details   carBAMazepine (TEGretol) 200 mg tablet Take 200 mg by mouth daily at bedtime, Historical Med      escitalopram (LEXAPRO) 10 mg tablet Take 10 mg by mouth daily, Historical Med      gabapentin (NEURONTIN) 300 mg capsule Take 300 mg by mouth 3 (three) times a day, Historical Med      hydrOXYzine HCL (ATARAX) 25 mg tablet Take 1 tablet (25 mg total) by mouth every 6 (six) hours, Starting Wed 11/30/2022, Normal      ketorolac (TORADOL) 10 mg tablet Take 1 tablet (10 mg total) by mouth every 6 (six) hours as needed for moderate pain for up to 3 days, Starting Wed 11/30/2022, Until Sat 12/3/2022 at 2359, Normal      naloxone (NARCAN) 4 mg/0.1 mL nasal spray Administer 1 spray into a nostril. If no response after 2-3 minutes, give another dose in the other nostril using a new spray., Normal      ondansetron (Zofran ODT) 4 mg disintegrating tablet Take 1 tablet (4 mg total) by mouth every 6 (six) hours as needed for nausea or vomiting, Starting Mon 7/18/2022, Normal      QUEtiapine (SEROquel) 100 mg tablet Take 100 mg by mouth daily at bedtime, Historical Med             No discharge procedures on file.     PDMP Review     None          ED Provider  Electronically Signed by           Marito Lee,   07/21/23 [address] [facility], DO  07/21/23 7512

## 2023-08-08 ENCOUNTER — HOSPITAL ENCOUNTER (EMERGENCY)
Facility: HOSPITAL | Age: 33
Discharge: HOME | End: 2023-08-08
Attending: EMERGENCY MEDICINE
Payer: COMMERCIAL

## 2023-08-08 VITALS
RESPIRATION RATE: 16 BRPM | OXYGEN SATURATION: 97 % | TEMPERATURE: 98.9 F | SYSTOLIC BLOOD PRESSURE: 114 MMHG | DIASTOLIC BLOOD PRESSURE: 68 MMHG | HEART RATE: 77 BPM

## 2023-08-08 DIAGNOSIS — F19.10 POLYSUBSTANCE ABUSE (HCC): ICD-10-CM

## 2023-08-08 DIAGNOSIS — T50.901A ACCIDENTAL OVERDOSE, INITIAL ENCOUNTER: Primary | ICD-10-CM

## 2023-08-08 LAB
ALBUMIN SERPL BCP-MCNC: 3.8 G/DL (ref 3.5–5)
ALP SERPL-CCNC: 79 U/L (ref 34–104)
ALT SERPL W P-5'-P-CCNC: 12 U/L (ref 7–52)
AMPHETAMINES SERPL QL SCN: POSITIVE
ANION GAP SERPL CALCULATED.3IONS-SCNC: 8 MMOL/L
APAP SERPL-MCNC: <10 UG/ML (ref 10–20)
AST SERPL W P-5'-P-CCNC: 16 U/L (ref 13–39)
BACTERIA UR QL AUTO: ABNORMAL /HPF
BARBITURATES UR QL: NEGATIVE
BASOPHILS # BLD AUTO: 0.05 THOUSANDS/ÂΜL (ref 0–0.1)
BASOPHILS NFR BLD AUTO: 0 % (ref 0–1)
BENZODIAZ UR QL: NEGATIVE
BILIRUB SERPL-MCNC: 0.29 MG/DL (ref 0.2–1)
BILIRUB UR QL STRIP: NEGATIVE
BUN SERPL-MCNC: 18 MG/DL (ref 5–25)
CALCIUM SERPL-MCNC: 8.5 MG/DL (ref 8.4–10.2)
CHLORIDE SERPL-SCNC: 106 MMOL/L (ref 96–108)
CLARITY UR: CLEAR
CO2 SERPL-SCNC: 26 MMOL/L (ref 21–32)
COCAINE UR QL: POSITIVE
COLOR UR: YELLOW
CREAT SERPL-MCNC: 1.15 MG/DL (ref 0.6–1.3)
EOSINOPHIL # BLD AUTO: 0.13 THOUSAND/ÂΜL (ref 0–0.61)
EOSINOPHIL NFR BLD AUTO: 1 % (ref 0–6)
ERYTHROCYTE [DISTWIDTH] IN BLOOD BY AUTOMATED COUNT: 13.4 % (ref 11.6–15.1)
ETHANOL SERPL-MCNC: <10 MG/DL
GFR SERPL CREATININE-BSD FRML MDRD: 81 ML/MIN/1.73SQ M
GLUCOSE SERPL-MCNC: 101 MG/DL (ref 65–140)
GLUCOSE SERPL-MCNC: 107 MG/DL (ref 65–140)
GLUCOSE UR STRIP-MCNC: NEGATIVE MG/DL
HCT VFR BLD AUTO: 37.6 % (ref 36.5–49.3)
HGB BLD-MCNC: 12.3 G/DL (ref 12–17)
HGB UR QL STRIP.AUTO: NEGATIVE
HYALINE CASTS #/AREA URNS LPF: ABNORMAL /LPF
IMM GRANULOCYTES # BLD AUTO: 0.08 THOUSAND/UL (ref 0–0.2)
IMM GRANULOCYTES NFR BLD AUTO: 1 % (ref 0–2)
KETONES UR STRIP-MCNC: NEGATIVE MG/DL
LEUKOCYTE ESTERASE UR QL STRIP: NEGATIVE
LYMPHOCYTES # BLD AUTO: 1.86 THOUSANDS/ÂΜL (ref 0.6–4.47)
LYMPHOCYTES NFR BLD AUTO: 11 % (ref 14–44)
MCH RBC QN AUTO: 30.2 PG (ref 26.8–34.3)
MCHC RBC AUTO-ENTMCNC: 32.7 G/DL (ref 31.4–37.4)
MCV RBC AUTO: 92 FL (ref 82–98)
METHADONE UR QL: NEGATIVE
MONOCYTES # BLD AUTO: 0.62 THOUSAND/ÂΜL (ref 0.17–1.22)
MONOCYTES NFR BLD AUTO: 4 % (ref 4–12)
MUCOUS THREADS UR QL AUTO: ABNORMAL
NEUTROPHILS # BLD AUTO: 13.63 THOUSANDS/ÂΜL (ref 1.85–7.62)
NEUTS SEG NFR BLD AUTO: 83 % (ref 43–75)
NITRITE UR QL STRIP: NEGATIVE
NON-SQ EPI CELLS URNS QL MICRO: ABNORMAL /HPF
NRBC BLD AUTO-RTO: 0 /100 WBCS
OPIATES UR QL SCN: NEGATIVE
OXYCODONE+OXYMORPHONE UR QL SCN: NEGATIVE
PCP UR QL: NEGATIVE
PH UR STRIP.AUTO: 6.5 [PH]
PLATELET # BLD AUTO: 316 THOUSANDS/UL (ref 149–390)
PMV BLD AUTO: 9.9 FL (ref 8.9–12.7)
POTASSIUM SERPL-SCNC: 4.3 MMOL/L (ref 3.5–5.3)
PROT SERPL-MCNC: 6.3 G/DL (ref 6.4–8.4)
PROT UR STRIP-MCNC: ABNORMAL MG/DL
RBC # BLD AUTO: 4.07 MILLION/UL (ref 3.88–5.62)
RBC #/AREA URNS AUTO: ABNORMAL /HPF
SALICYLATES SERPL-MCNC: <5 MG/DL (ref 3–20)
SODIUM SERPL-SCNC: 140 MMOL/L (ref 135–147)
SP GR UR STRIP.AUTO: 1.02 (ref 1–1.03)
THC UR QL: POSITIVE
UROBILINOGEN UR QL STRIP.AUTO: 0.2 E.U./DL
WBC # BLD AUTO: 16.37 THOUSAND/UL (ref 4.31–10.16)
WBC #/AREA URNS AUTO: ABNORMAL /HPF

## 2023-08-08 PROCEDURE — 82077 ASSAY SPEC XCP UR&BREATH IA: CPT | Performed by: EMERGENCY MEDICINE

## 2023-08-08 PROCEDURE — 80307 DRUG TEST PRSMV CHEM ANLYZR: CPT | Performed by: EMERGENCY MEDICINE

## 2023-08-08 PROCEDURE — 81001 URINALYSIS AUTO W/SCOPE: CPT | Performed by: EMERGENCY MEDICINE

## 2023-08-08 PROCEDURE — 82948 REAGENT STRIP/BLOOD GLUCOSE: CPT

## 2023-08-08 PROCEDURE — 80053 COMPREHEN METABOLIC PANEL: CPT | Performed by: EMERGENCY MEDICINE

## 2023-08-08 PROCEDURE — 85025 COMPLETE CBC W/AUTO DIFF WBC: CPT | Performed by: EMERGENCY MEDICINE

## 2023-08-08 PROCEDURE — 80179 DRUG ASSAY SALICYLATE: CPT | Performed by: EMERGENCY MEDICINE

## 2023-08-08 PROCEDURE — 80143 DRUG ASSAY ACETAMINOPHEN: CPT | Performed by: EMERGENCY MEDICINE

## 2023-08-08 PROCEDURE — 93005 ELECTROCARDIOGRAM TRACING: CPT

## 2023-08-08 PROCEDURE — 36415 COLL VENOUS BLD VENIPUNCTURE: CPT | Performed by: EMERGENCY MEDICINE

## 2023-08-08 RX ORDER — NALOXONE HYDROCHLORIDE 1 MG/ML
1 INJECTION INTRAMUSCULAR; INTRAVENOUS; SUBCUTANEOUS ONCE
Status: COMPLETED | OUTPATIENT
Start: 2023-08-08 | End: 2023-08-08

## 2023-08-08 RX ORDER — ONDANSETRON 2 MG/ML
4 INJECTION INTRAMUSCULAR; INTRAVENOUS ONCE
Status: COMPLETED | OUTPATIENT
Start: 2023-08-08 | End: 2023-08-08

## 2023-08-08 RX ADMIN — ONDANSETRON 4 MG: 2 INJECTION INTRAMUSCULAR; INTRAVENOUS at 21:25

## 2023-08-08 RX ADMIN — SODIUM CHLORIDE 1000 ML: 0.9 INJECTION, SOLUTION INTRAVENOUS at 21:20

## 2023-08-08 RX ADMIN — NALOXONE HYDROCHLORIDE 1 MG: 1 INJECTION PARENTERAL at 21:25

## 2023-08-09 LAB
ATRIAL RATE: 111 BPM
P AXIS: 42 DEGREES
PR INTERVAL: 126 MS
QRS AXIS: 78 DEGREES
QRSD INTERVAL: 92 MS
QT INTERVAL: 320 MS
QTC INTERVAL: 435 MS
T WAVE AXIS: 39 DEGREES
VENTRICULAR RATE: 111 BPM

## 2023-08-09 PROCEDURE — 93010 ELECTROCARDIOGRAM REPORT: CPT | Performed by: INTERNAL MEDICINE

## 2023-08-09 NOTE — ED PROVIDER NOTES
History  Chief Complaint   Patient presents with   • Overdose - Accidental     Pt arrived via BLS due to overdose. Pt told BLS he took heroin. Pt able to maintain airway upon arrival.     Patient brought in by BLS for evaluation of potential overdose. Patient is a known substance abuser and was found unresponsive on the ground. He was responsive to painful stimuli and did not receive any Narcan prior to arrival.  Denies any suicidal ideations. History provided by:  Patient and EMS personnel   used: No        Prior to Admission Medications   Prescriptions Last Dose Informant Patient Reported? Taking? QUEtiapine (SEROquel) 100 mg tablet   Yes No   Sig: Take 100 mg by mouth daily at bedtime   Patient not taking: Reported on 8/2/2022   carBAMazepine (TEGretol) 200 mg tablet   Yes No   Sig: Take 200 mg by mouth daily at bedtime   Patient not taking: Reported on 8/2/2022   escitalopram (LEXAPRO) 10 mg tablet   Yes No   Sig: Take 10 mg by mouth daily   Patient not taking: Reported on 8/2/2022   gabapentin (NEURONTIN) 300 mg capsule   Yes No   Sig: Take 300 mg by mouth 3 (three) times a day   Patient not taking: Reported on 8/2/2022   hydrOXYzine HCL (ATARAX) 25 mg tablet   No No   Sig: Take 1 tablet (25 mg total) by mouth every 6 (six) hours   Patient not taking: Reported on 6/24/2023   ketorolac (TORADOL) 10 mg tablet   No No   Sig: Take 1 tablet (10 mg total) by mouth every 6 (six) hours as needed for moderate pain for up to 3 days   naloxone (NARCAN) 4 mg/0.1 mL nasal spray   No No   Sig: Administer 1 spray into a nostril. If no response after 2-3 minutes, give another dose in the other nostril using a new spray.    ondansetron (ZOFRAN-ODT) 4 mg disintegrating tablet   No No   Sig: Take 1 tablet (4 mg total) by mouth every 6 (six) hours as needed for nausea for up to 3 days   ondansetron (Zofran ODT) 4 mg disintegrating tablet   No No   Sig: Take 1 tablet (4 mg total) by mouth every 6 (six) hours as needed for nausea or vomiting      Facility-Administered Medications: None       Past Medical History:   Diagnosis Date   • ADHD (attention deficit hyperactivity disorder)    • Anxiety     last assessed 3/23/15   • Bipolar 1 disorder ([address])    • Genital warts     last assessed 12/7/15, resolved 11/1/17    • Psychiatric disorder        History reviewed. No pertinent surgical history. Family History   Problem Relation Age of Onset   • No Known Problems Father      I have reviewed and agree with the history as documented. E-Cigarette/Vaping   • E-Cigarette Use Never User      E-Cigarette/Vaping Substances   • Nicotine No    • THC No    • CBD No    • Flavoring No    • Other No    • Unknown No      Social History     Tobacco Use   • Smoking status: Every Day     Packs/day: 1.50     Types: Cigarettes   • Smokeless tobacco: Never   Vaping Use   • Vaping Use: Never used   Substance Use Topics   • Alcohol use: Yes     Comment: occ. • Drug use: Yes     Types: Heroin, Marijuana, Methamphetamines, Fentanyl     Comment: states was "shooting up steroids" 3 months ago (3/4/19 states this was 3 years ago)       Review of Systems   Constitutional: Negative for chills and fever. HENT: Negative for ear pain and sore throat. Eyes: Negative for pain and visual disturbance. Respiratory: Negative for cough and shortness of breath. Cardiovascular: Negative for chest pain and palpitations. Gastrointestinal: Negative for abdominal pain and vomiting. Genitourinary: Negative for dysuria and hematuria. Musculoskeletal: Negative for arthralgias and back pain. Skin: Negative for color change and rash. Neurological: Negative for seizures and syncope. All other systems reviewed and are negative. Physical Exam  Physical Exam  Vitals and nursing note reviewed. Constitutional:       General: He is in acute distress.       Comments: Patient is disheveled and covered in dirt, lethargic   HENT:      Head: Atraumatic. Eyes:      General: No scleral icterus. Conjunctiva/sclera: Conjunctivae normal.      Comments: Pupils are pinpoint bilaterally   Cardiovascular:      Rate and Rhythm: Normal rate and regular rhythm. Pulmonary:      Effort: Respiratory distress present. Breath sounds: Normal breath sounds. Comments: Breathing is shallow and irregular  Skin:     Capillary Refill: Capillary refill takes less than 2 seconds. Findings: No rash. Neurological:      General: No focal deficit present. Mental Status: He is oriented to person, place, and time.       Comments: Patient is lethargic and keeps falling asleep but will arouse to verbal and painful stimuli         Vital Signs  ED Triage Vitals   Temperature Pulse Respirations Blood Pressure SpO2   08/08/23 2150 08/08/23 2113 08/08/23 2113 08/08/23 2113 08/08/23 2113   98.9 °F (37.2 °C) (!) 135 20 114/57 95 %      Temp Source Heart Rate Source Patient Position - Orthostatic VS BP Location FiO2 (%)   08/08/23 2113 08/08/23 2113 08/08/23 2113 08/08/23 2113 --   Tympanic Monitor Sitting Left arm       Pain Score       08/08/23 2113       No Pain           Vitals:    08/08/23 2113 08/08/23 2300   BP: 114/57 114/68   Pulse: (!) 135 77   Patient Position - Orthostatic VS: Sitting Lying         Visual Acuity      ED Medications  Medications   sodium chloride 0.9 % bolus 1,000 mL (0 mL Intravenous Stopped 8/8/23 2220)   ondansetron (ZOFRAN) injection 4 mg (4 mg Intravenous Given 8/8/23 2125)   naloxone (NARCAN) injection 1 mg (1 mg Intravenous Given 8/8/23 2125)       Diagnostic Studies  Results Reviewed     Procedure Component Value Units Date/Time    Salicylate level [[id number]]  (Normal) Collected: 08/08/23 2120    Lab Status: Final result Specimen: Blood from Arm, Right Updated: 22/49/47 1615     Salicylate Lvl <5 mg/dL     Comprehensive metabolic panel [[id number]]  (Abnormal) Collected: 08/08/23 2120    Lab Status: Final result Specimen: Blood from Arm, Right Updated: 08/08/23 2203     Sodium 140 mmol/L      Potassium 4.3 mmol/L      Chloride 106 mmol/L      CO2 26 mmol/L      ANION GAP 8 mmol/L      BUN 18 mg/dL      Creatinine 1.15 mg/dL      Glucose 101 mg/dL      Calcium 8.5 mg/dL      AST 16 U/L      ALT 12 U/L      Alkaline Phosphatase 79 U/L      Total Protein 6.3 g/dL      Albumin 3.8 g/dL      Total Bilirubin 0.29 mg/dL      eGFR 81 ml/min/1.73sq m     Narrative:      [place] guidelines for Chronic Kidney Disease (CKD):   •  Stage 1 with normal or high GFR (GFR > 90 mL/min/1.73 square meters)  •  Stage 2 Mild CKD (GFR = 60-89 mL/min/1.73 square meters)  •  Stage 3A Moderate CKD (GFR = 45-59 mL/min/1.73 square meters)  •  Stage 3B Moderate CKD (GFR = 30-44 mL/min/1.73 square meters)  •  Stage 4 Severe CKD (GFR = 15-29 mL/min/1.73 square meters)  •  Stage 5 End Stage CKD (GFR <15 mL/min/1.73 square meters)  Note: GFR calculation is accurate only with a steady state creatinine    Acetaminophen level-If concentration is detectable, please discuss with medical  on call. [[id number]]  (Abnormal) Collected: 08/08/23 2120    Lab Status: Final result Specimen: Blood from Arm, Right Updated: 08/08/23 2203     Acetaminophen Level <10 ug/mL     Rapid drug screen, urine [[id number]]  (Abnormal) Collected: 08/08/23 2124    Lab Status: Final result Specimen: Urine, Clean Catch Updated: 08/08/23 2159     Amph/Meth UR Positive     Barbiturate Ur Negative     Benzodiazepine Urine Negative     Cocaine Urine Positive     Methadone Urine Negative     Opiate Urine Negative     PCP Ur Negative     THC Urine Positive     Oxycodone Urine Negative    Narrative:      Presumptive report. If requested, specimen will be sent to reference lab for confirmation. FOR MEDICAL PURPOSES ONLY. IF CONFIRMATION NEEDED PLEASE CONTACT THE LAB WITHIN 5 DAYS.     Drug Screen Cutoff Levels:  AMPHETAMINE/METHAMPHETAMINES  1000 ng/mL  BARBITURATES     200 ng/mL  BENZODIAZEPINES     200 ng/mL  COCAINE      300 ng/mL  METHADONE      300 ng/mL  OPIATES      300 ng/mL  PHENCYCLIDINE     25 ng/mL  THC       50 ng/mL  OXYCODONE      100 ng/mL    Ethanol [[id number]]  (Normal) Collected: 08/08/23 2120    Lab Status: Final result Specimen: Blood from Arm, Right Updated: 08/08/23 2158     Ethanol Lvl <10 mg/dL     Urine Microscopic [[id number]]  (Abnormal) Collected: 08/08/23 2124    Lab Status: Final result Specimen: Urine, Clean Catch Updated: 08/08/23 2144     RBC, UA 0-1 /hpf      WBC, UA 2-4 /hpf      Epithelial Cells None Seen /hpf      Bacteria, UA Occasional /hpf      Hyaline Casts, UA 1-2 /lpf      MUCUS THREADS Moderate    CBC and differential [[id number]]  (Abnormal) Collected: 08/08/23 2120    Lab Status: Final result Specimen: Blood from Arm, Right Updated: 08/08/23 2139     WBC 16.37 Thousand/uL      RBC 4.07 Million/uL      Hemoglobin 12.3 g/dL      Hematocrit 37.6 %      MCV 92 fL      MCH 30.2 pg      MCHC 32.7 g/dL      RDW 13.4 %      MPV 9.9 fL      Platelets 173 Thousands/uL      nRBC 0 /100 WBCs      Neutrophils Relative 83 %      Immat GRANS % 1 %      Lymphocytes Relative 11 %      Monocytes Relative 4 %      Eosinophils Relative 1 %      Basophils Relative 0 %      Neutrophils Absolute 13.63 Thousands/µL      Immature Grans Absolute 0.08 Thousand/uL      Lymphocytes Absolute 1.86 Thousands/µL      Monocytes Absolute 0.62 Thousand/µL      Eosinophils Absolute 0.13 Thousand/µL      Basophils Absolute 0.05 Thousands/µL     UA (URINE) with reflex to Scope [[id number]]  (Abnormal) Collected: 08/08/23 2124    Lab Status: Final result Specimen: Urine, Clean Catch Updated: 08/08/23 2137     Color, UA Yellow     Clarity, UA Clear     Specific Gravity, UA 1.025     pH, UA 6.5     Leukocytes, UA Negative     Nitrite, UA Negative     Protein, UA Trace mg/dl      Glucose, UA Negative mg/dl      Ketones, UA Negative mg/dl      Urobilinogen, UA 0.2 E.U./dl      Bilirubin, UA Negative     Occult Blood, UA Negative    Fingerstick Glucose (POCT) [[id number]]  (Normal) Collected: 08/08/23 2114    Lab Status: Final result Updated: 08/08/23 2115     POC Glucose 107 mg/dl                  No orders to display              Procedures  ECG 12 Lead Documentation Only    Date/Time: 8/8/2023 9:24 PM    Performed by: Henry Askew DO  Authorized by: Henry Askew DO    ECG reviewed by me, the ED Provider: yes    Patient location:  ED  Interpretation:     Interpretation: abnormal    Rate:     ECG rate:  111    ECG rate assessment: tachycardic    Rhythm:     Rhythm: sinus rhythm    Ectopy:     Ectopy: none    ST segments:     ST segments:  Normal  T waves:     T waves: normal    Other findings:     Other findings: LVH      CriticalCare Time    Date/Time: 8/8/2023 11:39 PM    Performed by: Henry Askew DO  Authorized by: Henry Askew DO    Critical care provider statement:     Critical care time (minutes):  40    Critical care time was exclusive of:  Separately billable procedures and treating other patients    Critical care was necessary to treat or prevent imminent or life-threatening deterioration of the following conditions:  Respiratory failure, toxidrome and CNS failure or compromise    Critical care was time spent personally by me on the following activities:  Blood draw for specimens, obtaining history from patient or surrogate, development of treatment plan with patient or surrogate, evaluation of patient's response to treatment, examination of patient, review of old charts, re-evaluation of patient's condition, ordering and review of radiographic studies, ordering and review of laboratory studies, ordering and performing treatments and interventions and interpretation of cardiac output measurements             ED Course                                             Medical Decision Making  Pulse ox is 88% on room air indicating poor oxygenation  Pulse ox 97% on room air indicating adequate oxygenation. Patient was lethargic on arrival with shallow bleeding and low pulse ox verbal and painful stimuli he was able to get up and take a deeper breath and oxygenation improved patient was given 1 mg Narcan IV and improved his mental status and breathing. [facility] was consulted. Patient eventually returned to baseline mental status and is able to ambulate with a steady gait at that time and requesting discharge. Accidental overdose, initial encounter: acute illness or injury  Polysubstance abuse [facility]): acute illness or injury  Amount and/or Complexity of Data Reviewed  Labs: ordered. ECG/medicine tests: ordered and independent interpretation performed. Risk  Prescription drug management. Disposition  Final diagnoses:   Accidental overdose, initial encounter   Polysubstance abuse ([address])     Time reflects when diagnosis was documented in both MDM as applicable and the Disposition within this note     Time User Action Codes Description Comment    8/8/2023 11:29 PM Lety, [address] Accidental overdose, initial encounter     8/8/2023 11:29 PM Johnathon Shaw Add [F19.10] Polysubstance abuse [facility])       ED Disposition     ED Disposition   Discharge    Condition   Stable    Date/Time   Tue Aug 8, 2023 11:29 PM    [facility]. discharge to home/self care.                Follow-up Information     Follow up With Specialties Details Why Contact Info Additional Information    Damon Cure  In 1 week  [address] [place] [address] Rd       [address] Emergency Department Emergency Medicine  If symptoms worsen [address] [address]  [address] [facility] Emergency Department, [facility], [address]          Discharge Medication List as of 8/8/2023 11:33 PM      CONTINUE these medications which have NOT CHANGED    Details   carBAMazepine (TEGretol) 200 mg tablet Take 200 mg by mouth daily at bedtime, Historical Med      escitalopram (LEXAPRO) 10 mg tablet Take 10 mg by mouth daily, Historical Med      gabapentin (NEURONTIN) 300 mg capsule Take 300 mg by mouth 3 (three) times a day, Historical Med      hydrOXYzine HCL (ATARAX) 25 mg tablet Take 1 tablet (25 mg total) by mouth every 6 (six) hours, Starting Wed 11/30/2022, Normal      ketorolac (TORADOL) 10 mg tablet Take 1 tablet (10 mg total) by mouth every 6 (six) hours as needed for moderate pain for up to 3 days, Starting Wed 11/30/2022, Until Sat 12/3/2022 at 2359, Normal      naloxone (NARCAN) 4 mg/0.1 mL nasal spray Administer 1 spray into a nostril. If no response after 2-3 minutes, give another dose in the other nostril using a new spray., Normal      ondansetron (Zofran ODT) 4 mg disintegrating tablet Take 1 tablet (4 mg total) by mouth every 6 (six) hours as needed for nausea or vomiting, Starting Mon 7/18/2022, Normal      QUEtiapine (SEROquel) 100 mg tablet Take 100 mg by mouth daily at bedtime, Historical Med             No discharge procedures on file.     PDMP Review     None          ED Provider  Electronically Signed by           Vinh Araujo DO  08/08/23 2679

## 2023-08-09 NOTE — ED NOTES
21:30 - PES called Dolores to inform her that PES would call her back in about 10 minutes from a hospital cordless phone - Patient was just given Narcan in the ER and woke up. 21:40 - PES called Dolores and gave the handset to the patient to speak with her. Deandre Palma called PES back - she asked that PES give the patient her cell phone # so they could text each other later - patient was accepting of this; ER staff advised.

## 2023-08-10 NOTE — ED CARE HANDOFF
[address] [facility] Warm Handoff Outcome Note    Patient name Katerin Lopez. Location ED CT1/ED CT1 MRN [id number]  Age: 26 y.o. Plan Type: Warm Handoff                                                                                    Plan Date: 8/10/2023  Service:  ED Warm Handoff      Substance Use History:  Poly Sub    Warm Handoff Update:  Discharged, unkown outcome.     Warm Handoff Outcome: Treatment Related Resources

## 2023-08-14 ENCOUNTER — APPOINTMENT (EMERGENCY)
Dept: RADIOLOGY | Facility: HOSPITAL | Age: 33
DRG: 724 | End: 2023-08-14
Payer: COMMERCIAL

## 2023-08-14 ENCOUNTER — HOSPITAL ENCOUNTER (INPATIENT)
Facility: HOSPITAL | Age: 33
LOS: 9 days | Discharge: HOME | DRG: 724 | End: 2023-08-24
Attending: EMERGENCY MEDICINE | Admitting: STUDENT IN AN ORGANIZED HEALTH CARE EDUCATION/TRAINING PROGRAM
Payer: COMMERCIAL

## 2023-08-14 DIAGNOSIS — R78.81 BACTEREMIA: Primary | ICD-10-CM

## 2023-08-14 DIAGNOSIS — F31.9 BIPOLAR 1 DISORDER (HCC): ICD-10-CM

## 2023-08-14 DIAGNOSIS — A41.9 SEPSIS (HCC): ICD-10-CM

## 2023-08-14 DIAGNOSIS — R50.9 FEVER: ICD-10-CM

## 2023-08-14 DIAGNOSIS — R65.10 SIRS (SYSTEMIC INFLAMMATORY RESPONSE SYNDROME) (HCC): ICD-10-CM

## 2023-08-14 DIAGNOSIS — F19.11 HISTORY OF DRUG ABUSE (HCC): ICD-10-CM

## 2023-08-14 LAB
ALBUMIN SERPL BCP-MCNC: 3.8 G/DL (ref 3.5–5)
ALP SERPL-CCNC: 95 U/L (ref 34–104)
ALT SERPL W P-5'-P-CCNC: 14 U/L (ref 7–52)
ANION GAP SERPL CALCULATED.3IONS-SCNC: 12 MMOL/L
AST SERPL W P-5'-P-CCNC: 22 U/L (ref 13–39)
ATRIAL RATE: 108 BPM
BASOPHILS # BLD MANUAL: 0 THOUSAND/UL (ref 0–0.1)
BASOPHILS NFR MAR MANUAL: 0 % (ref 0–1)
BILIRUB SERPL-MCNC: 0.73 MG/DL (ref 0.2–1)
BUN SERPL-MCNC: 24 MG/DL (ref 5–25)
CALCIUM SERPL-MCNC: 8.9 MG/DL (ref 8.4–10.2)
CHLORIDE SERPL-SCNC: 103 MMOL/L (ref 96–108)
CK SERPL-CCNC: 102 U/L (ref 39–308)
CO2 SERPL-SCNC: 21 MMOL/L (ref 21–32)
CREAT SERPL-MCNC: 1.08 MG/DL (ref 0.6–1.3)
EOSINOPHIL # BLD MANUAL: 0 THOUSAND/UL (ref 0–0.4)
EOSINOPHIL NFR BLD MANUAL: 0 % (ref 0–6)
ERYTHROCYTE [DISTWIDTH] IN BLOOD BY AUTOMATED COUNT: 13.3 % (ref 11.6–15.1)
ETHANOL SERPL-MCNC: <10 MG/DL
FOLATE SERPL-MCNC: 21.8 NG/ML
GFR SERPL CREATININE-BSD FRML MDRD: 88 ML/MIN/1.73SQ M
GLUCOSE SERPL-MCNC: 99 MG/DL (ref 65–140)
HCT VFR BLD AUTO: 37.5 % (ref 36.5–49.3)
HGB BLD-MCNC: 12.8 G/DL (ref 12–17)
LACTATE SERPL-SCNC: 1.3 MMOL/L (ref 0.5–2)
LACTATE SERPL-SCNC: 3.5 MMOL/L (ref 0.5–2)
LG PLATELETS BLD QL SMEAR: PRESENT
LYMPHOCYTES # BLD AUTO: 0.37 THOUSAND/UL (ref 0.6–4.47)
LYMPHOCYTES # BLD AUTO: 3 % (ref 14–44)
MCH RBC QN AUTO: 30.4 PG (ref 26.8–34.3)
MCHC RBC AUTO-ENTMCNC: 34.1 G/DL (ref 31.4–37.4)
MCV RBC AUTO: 89 FL (ref 82–98)
MONOCYTES # BLD AUTO: 0 THOUSAND/UL (ref 0–1.22)
MONOCYTES NFR BLD: 0 % (ref 4–12)
NEUTROPHILS # BLD MANUAL: 8.88 THOUSAND/UL (ref 1.85–7.62)
NEUTS BAND NFR BLD MANUAL: 3 % (ref 0–8)
NEUTS SEG NFR BLD AUTO: 93 % (ref 43–75)
P AXIS: -9 DEGREES
PLATELET # BLD AUTO: 268 THOUSANDS/UL (ref 149–390)
PLATELET BLD QL SMEAR: ADEQUATE
PMV BLD AUTO: 9.9 FL (ref 8.9–12.7)
POTASSIUM SERPL-SCNC: 3.6 MMOL/L (ref 3.5–5.3)
PR INTERVAL: 148 MS
PROCALCITONIN SERPL-MCNC: 12.28 NG/ML
PROT SERPL-MCNC: 6.4 G/DL (ref 6.4–8.4)
QRS AXIS: 75 DEGREES
QRSD INTERVAL: 102 MS
QT INTERVAL: 352 MS
QTC INTERVAL: 471 MS
RBC # BLD AUTO: 4.21 MILLION/UL (ref 3.88–5.62)
RBC MORPH BLD: NORMAL
SARS-COV-2 RNA RESP QL NAA+PROBE: NEGATIVE
SODIUM SERPL-SCNC: 136 MMOL/L (ref 135–147)
T WAVE AXIS: 79 DEGREES
TOXIC GRANULES BLD QL SMEAR: PRESENT
VARIANT LYMPHS # BLD AUTO: 1 %
VENTRICULAR RATE: 108 BPM
VIT B12 SERPL-MCNC: 284 PG/ML (ref 180–914)
WBC # BLD AUTO: 9.25 THOUSAND/UL (ref 4.31–10.16)

## 2023-08-14 PROCEDURE — 85027 COMPLETE CBC AUTOMATED: CPT | Performed by: EMERGENCY MEDICINE

## 2023-08-14 PROCEDURE — 87154 CUL TYP ID BLD PTHGN 6+ TRGT: CPT | Performed by: EMERGENCY MEDICINE

## 2023-08-14 PROCEDURE — 99284 EMERGENCY DEPT VISIT MOD MDM: CPT

## 2023-08-14 PROCEDURE — 99285 EMERGENCY DEPT VISIT HI MDM: CPT | Performed by: EMERGENCY MEDICINE

## 2023-08-14 PROCEDURE — 71045 X-RAY EXAM CHEST 1 VIEW: CPT

## 2023-08-14 PROCEDURE — 87040 BLOOD CULTURE FOR BACTERIA: CPT | Performed by: EMERGENCY MEDICINE

## 2023-08-14 PROCEDURE — 82746 ASSAY OF FOLIC ACID SERUM: CPT | Performed by: STUDENT IN AN ORGANIZED HEALTH CARE EDUCATION/TRAINING PROGRAM

## 2023-08-14 PROCEDURE — 87077 CULTURE AEROBIC IDENTIFY: CPT | Performed by: EMERGENCY MEDICINE

## 2023-08-14 PROCEDURE — 83605 ASSAY OF LACTIC ACID: CPT | Performed by: EMERGENCY MEDICINE

## 2023-08-14 PROCEDURE — 82550 ASSAY OF CK (CPK): CPT | Performed by: STUDENT IN AN ORGANIZED HEALTH CARE EDUCATION/TRAINING PROGRAM

## 2023-08-14 PROCEDURE — 82607 VITAMIN B-12: CPT

## 2023-08-14 PROCEDURE — 87635 SARS-COV-2 COVID-19 AMP PRB: CPT | Performed by: EMERGENCY MEDICINE

## 2023-08-14 PROCEDURE — 96365 THER/PROPH/DIAG IV INF INIT: CPT

## 2023-08-14 PROCEDURE — 93005 ELECTROCARDIOGRAM TRACING: CPT

## 2023-08-14 PROCEDURE — 87186 SC STD MICRODIL/AGAR DIL: CPT | Performed by: EMERGENCY MEDICINE

## 2023-08-14 PROCEDURE — 93010 ELECTROCARDIOGRAM REPORT: CPT | Performed by: INTERNAL MEDICINE

## 2023-08-14 PROCEDURE — 85007 BL SMEAR W/DIFF WBC COUNT: CPT | Performed by: EMERGENCY MEDICINE

## 2023-08-14 PROCEDURE — NC001 PR NO CHARGE: Performed by: EMERGENCY MEDICINE

## 2023-08-14 PROCEDURE — 36415 COLL VENOUS BLD VENIPUNCTURE: CPT | Performed by: EMERGENCY MEDICINE

## 2023-08-14 PROCEDURE — 99223 1ST HOSP IP/OBS HIGH 75: CPT | Performed by: STUDENT IN AN ORGANIZED HEALTH CARE EDUCATION/TRAINING PROGRAM

## 2023-08-14 PROCEDURE — 80053 COMPREHEN METABOLIC PANEL: CPT | Performed by: EMERGENCY MEDICINE

## 2023-08-14 PROCEDURE — 84145 PROCALCITONIN (PCT): CPT | Performed by: EMERGENCY MEDICINE

## 2023-08-14 PROCEDURE — 82077 ASSAY SPEC XCP UR&BREATH IA: CPT | Performed by: EMERGENCY MEDICINE

## 2023-08-14 PROCEDURE — 96361 HYDRATE IV INFUSION ADD-ON: CPT

## 2023-08-14 RX ORDER — SODIUM CHLORIDE 9 MG/ML
150 INJECTION, SOLUTION INTRAVENOUS CONTINUOUS
Status: DISCONTINUED | OUTPATIENT
Start: 2023-08-14 | End: 2023-08-17

## 2023-08-14 RX ORDER — ACETAMINOPHEN 325 MG/1
650 TABLET ORAL ONCE
Status: COMPLETED | OUTPATIENT
Start: 2023-08-14 | End: 2023-08-14

## 2023-08-14 RX ORDER — ACETAMINOPHEN 325 MG/1
650 TABLET ORAL EVERY 6 HOURS PRN
Status: DISCONTINUED | OUTPATIENT
Start: 2023-08-14 | End: 2023-08-24 | Stop reason: HOSPADM

## 2023-08-14 RX ORDER — OLANZAPINE 10 MG/1
5 INJECTION, POWDER, LYOPHILIZED, FOR SOLUTION INTRAMUSCULAR EVERY 4 HOURS PRN
Status: DISCONTINUED | OUTPATIENT
Start: 2023-08-14 | End: 2023-08-24

## 2023-08-14 RX ORDER — LORAZEPAM 2 MG/ML
1 INJECTION INTRAMUSCULAR EVERY 6 HOURS PRN
Status: DISCONTINUED | OUTPATIENT
Start: 2023-08-14 | End: 2023-08-24

## 2023-08-14 RX ORDER — NICOTINE 21 MG/24HR
1 PATCH, TRANSDERMAL 24 HOURS TRANSDERMAL DAILY
Status: DISCONTINUED | OUTPATIENT
Start: 2023-08-15 | End: 2023-08-24 | Stop reason: HOSPADM

## 2023-08-14 RX ORDER — ONDANSETRON 2 MG/ML
4 INJECTION INTRAMUSCULAR; INTRAVENOUS EVERY 6 HOURS PRN
Status: DISCONTINUED | OUTPATIENT
Start: 2023-08-14 | End: 2023-08-24 | Stop reason: HOSPADM

## 2023-08-14 RX ADMIN — SODIUM CHLORIDE 1000 ML: 0.9 INJECTION, SOLUTION INTRAVENOUS at 12:48

## 2023-08-14 RX ADMIN — SODIUM CHLORIDE 100 ML/HR: 0.9 INJECTION, SOLUTION INTRAVENOUS at 17:36

## 2023-08-14 RX ADMIN — ACETAMINOPHEN 650 MG: 325 TABLET ORAL at 13:23

## 2023-08-14 RX ADMIN — PIPERACILLIN AND TAZOBACTAM 3.38 G: 36; 4.5 INJECTION, POWDER, FOR SOLUTION INTRAVENOUS at 20:45

## 2023-08-14 RX ADMIN — PIPERACILLIN AND TAZOBACTAM 3.38 G: 3; .375 INJECTION, POWDER, LYOPHILIZED, FOR SOLUTION INTRAVENOUS at 14:42

## 2023-08-14 NOTE — ASSESSMENT & PLAN NOTE
· Hx of bipolar disorder requiring inpatient admission in 2018 for suicidal ideation secondary to opioid withdrawal  · Patient has not seen psychiatrist in a few years  · Used to be taking carbamazepine, escitalopram, hydroxyzine, gabapentin, and quetiapine  · Patient reports he is no longer taking these medications  · Psychiatry consulted

## 2023-08-14 NOTE — ASSESSMENT & PLAN NOTE
SIRS vs sepsis POA as evidenced by fever 102.3, tachycardia, unclear source at this time  · Patient brought in by police after found rolling around in the grass complaining of pain all over  · History of drug abuse, UDS on 8/8 positive for methamphetamine, cocaine, and THC  · UDS pending  · CXR: no acute cardiopulmonary disease  · procalcitonin 12.28  · Lactate 3. 5 > 1.3 s/p IVF  · UA pending  · Blood cultures pending  · Continue IVF  · Received zosyn in ED, continue for now
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
## 2023-08-14 NOTE — ED NOTES
Pt was not able oe willing to stay still in order to properly acquire an EKG.  MD made aware, will try again      Valdo Hampton  08/14/23 1400

## 2023-08-14 NOTE — ASSESSMENT & PLAN NOTE
· History of polysubstance abuse  · UDS 8/8/23 positive for methamphetamines, cocaine, and THC  · UDS pending  · Patient reports he used heroine and meth a few days ago  · Psychiatry consulted

## 2023-08-14 NOTE — PLAN OF CARE
Problem: PAIN - ADULT  Goal: Verbalizes/displays adequate comfort level or baseline comfort level  Description: Interventions:  - Encourage patient to monitor pain and request assistance  - Assess pain using appropriate pain scale  - Administer analgesics based on type and severity of pain and evaluate response  - Implement non-pharmacological measures as appropriate and evaluate response  - Consider cultural and social influences on pain and pain management  - Notify physician/advanced practitioner if interventions unsuccessful or patient reports new pain  Outcome: Progressing     Problem: INFECTION - ADULT  Goal: Absence or prevention of progression during hospitalization  Description: INTERVENTIONS:  - Assess and monitor for signs and symptoms of infection  - Monitor lab/diagnostic results  - Monitor all insertion sites, i.e. indwelling lines, tubes, and drains  - Monitor endotracheal if appropriate and nasal secretions for changes in amount and color  - [place] appropriate cooling/warming therapies per order  - Administer medications as ordered  - Instruct and encourage patient and family to use good hand hygiene technique  - Identify and instruct in appropriate isolation precautions for identified infection/condition  Outcome: Progressing  Goal: Absence of fever/infection during neutropenic period  Description: INTERVENTIONS:  - Monitor WBC    Outcome: Progressing     Problem: SAFETY ADULT  Goal: Patient will remain free of falls  Description: INTERVENTIONS:  - Educate patient/family on patient safety including physical limitations  - Instruct patient to call for assistance with activity   - Consult OT/PT to assist with strengthening/mobility   - Keep Call bell within reach  - Keep bed low and locked with side rails adjusted as appropriate  - Keep care items and personal belongings within reach  - Initiate and maintain comfort rounds  - Make Fall Risk Sign visible to staff  - Offer Toileting every 2 Hours, in advance of need  - Initiate/Maintain bed alarm  - Obtain necessary fall risk management equipment: call bell   - Apply yellow socks and bracelet for high fall risk patients  - Consider moving patient to room near nurses station  Outcome: Progressing  Goal: Maintain or return to baseline ADL function  Description: INTERVENTIONS:  -  Assess patient's ability to carry out ADLs; assess patient's baseline for ADL function and identify physical deficits which impact ability to perform ADLs (bathing, care of mouth/teeth, toileting, grooming, dressing, etc.)  - Assess/evaluate cause of self-care deficits   - Assess range of motion  - Assess patient's mobility; develop plan if impaired  - Assess patient's need for assistive devices and provide as appropriate  - Encourage maximum independence but intervene and supervise when necessary  - Involve family in performance of ADLs  - Assess for home care needs following discharge   - Consider OT consult to assist with ADL evaluation and planning for discharge  - Provide patient education as appropriate  Outcome: Progressing  Goal: Maintains/Returns to pre admission functional level  Description: INTERVENTIONS:  - Perform BMAT or MOVE assessment daily.   - Set and communicate daily mobility goal to care team and patient/family/caregiver. - Collaborate with rehabilitation services on mobility goals if consulted  - Perform Range of Motion 3 times a day. - Reposition patient every 3 hours.   - Dangle patient 3 times a day  - Stand patient 3 times a day  - Ambulate patient 3 times a day  - Out of bed to chair 3 times a day   - Out of bed for meals 3 times a day  - Out of bed for toileting  - Record patient progress and toleration of activity level   Outcome: Progressing     Problem: DISCHARGE PLANNING  Goal: Discharge to home or other facility with appropriate resources  Description: INTERVENTIONS:  - Identify barriers to discharge w/patient and caregiver  - Arrange for needed discharge resources and transportation as appropriate  - Identify discharge learning needs (meds, wound care, etc.)  - Arrange for interpretive services to assist at discharge as needed  - Refer to Case Management Department for coordinating discharge planning if the patient needs post-hospital services based on physician/advanced practitioner order or complex needs related to functional status, cognitive ability, or social support system  Outcome: Progressing     Problem: Knowledge Deficit  Goal: Patient/family/caregiver demonstrates understanding of disease process, treatment plan, medications, and discharge instructions  Description: Complete learning assessment and assess knowledge base.   Interventions:  - Provide teaching at level of understanding  - Provide teaching via preferred learning methods  Outcome: Progressing

## 2023-08-14 NOTE — H&P
[address]  H&P  Name: Kathryn Chavarria. 26 y.o. male I MRN: [id number]  Unit/Bed#: [address] [place] [address] Date of Admission: 8/14/2023   Date of Service: 8/14/2023 I Hospital Day: 0      Assessment/Plan   * SIRS (systemic inflammatory response syndrome) ([address])  Assessment & Plan  SIRS vs sepsis POA as evidenced by fever 102.3, tachycardia, unclear source at this time  · Patient brought in by police after found rolling around in the grass complaining of pain all over  · History of drug abuse, UDS on 8/8 positive for methamphetamine, cocaine, and THC  · UDS pending  · CXR: no acute cardiopulmonary disease  · procalcitonin 12.28  · Lactate 3. 5 > 1.3 s/p IVF  · UA pending  · Blood cultures pending  · Continue IVF  · Received zosyn in ED, continue for now    Bipolar 1 disorder [facility])  Assessment & Plan  · Hx of bipolar disorder requiring inpatient admission in 2018 for suicidal ideation secondary to opioid withdrawal  · Patient has not seen psychiatrist in a few years  · Used to be taking carbamazepine, escitalopram, hydroxyzine, gabapentin, and quetiapine  · Patient reports he is no longer taking these medications  · Psychiatry consulted    History of drug abuse ([address])  Assessment & Plan  · History of polysubstance abuse  · UDS 8/8/23 positive for methamphetamines, cocaine, and THC  · UDS pending  · Patient reports he used heroine and meth a few days ago  · Psychiatry consulted       VTE Pharmacologic Prophylaxis: VTE Score: 1 Low Risk (Score 0-2) - Encourage Ambulation. Code Status: Level 1 - Full Code   Discussion with family: Patient declined call to . Anticipated Length of Stay: Patient will be admitted on an observation basis with an anticipated length of stay of less than 2 midnights secondary to sepsis.     Total Time Spent on Date of Encounter in care of patient: 65 minutes This time was spent on one or more of the following: performing physical exam; counseling and coordination of care; obtaining or reviewing history; documenting in the medical record; reviewing/ordering tests, medications or procedures; communicating with other healthcare professionals and discussing with patient's family/caregivers. Chief Complaint: brought in by police, reports pain all over    History of Present Illness:  Malgorzata Gaspar is a 26 y.o. male with a PMH of IV drug abuse, bipolar 1 disorder who was brought into the ED by police after they report he was rolling around in the grass and complaining of pain all over. In the ED, patient was noted to have fever and tachycardia with elevated lactate and procalcitonin. CXR was negative. Patient reports history of IV drug use and reports he used meth and heroin a few days ago. He could not recall the events that brought him into the ED. He used to see a psychiatrist but reports he hasn't seen one in years and has not been on psychiatric medications in years. He reports he has been feeling pain everywhere including a headache, chest pain, pain in all limbs, and shortness of breath. He reports he has felt like this since his dad . He reports there are people trying to poison him and that drugs are the only thing that helps with his pain and his head. He has been eating well. He denies fever, chills, current pain, nausea, vomiting, or urinary symptoms. Review of Systems:  Review of Systems   Constitutional: Negative for chills and fever. Respiratory: Positive for shortness of breath. Negative for cough. Cardiovascular: Negative for palpitations. Gastrointestinal: Negative for diarrhea, nausea and vomiting. Genitourinary: Negative for difficulty urinating, dysuria and hematuria. Musculoskeletal: Positive for arthralgias and myalgias. Neurological: Positive for headaches. Negative for dizziness. Psychiatric/Behavioral: The patient is nervous/anxious.         Past Medical and Surgical History:   Past Medical History:   Diagnosis Date   • ADHD (attention deficit hyperactivity disorder)    • Anxiety     last assessed 3/23/15   • Bipolar 1 disorder ([address])    • Genital warts     last assessed 12/7/15, resolved 11/1/17    • Psychiatric disorder        History reviewed. No pertinent surgical history. Meds/Allergies:  Prior to Admission medications    Medication Sig Start Date End Date Taking? Authorizing Provider   carBAMazepine (TEGretol) 200 mg tablet Take 200 mg by mouth daily at bedtime  Patient not taking: Reported on 8/2/2022    Historical Provider, MD   escitalopram (LEXAPRO) 10 mg tablet Take 10 mg by mouth daily  Patient not taking: Reported on 8/2/2022    Historical Provider, MD   gabapentin (NEURONTIN) 300 mg capsule Take 300 mg by mouth 3 (three) times a day  Patient not taking: Reported on 8/2/2022    Historical Provider, MD   hydrOXYzine HCL (ATARAX) 25 mg tablet Take 1 tablet (25 mg total) by mouth every 6 (six) hours  Patient not taking: Reported on 6/24/2023 11/30/22   Sandra Davis DO   ketorolac (TORADOL) 10 mg tablet Take 1 tablet (10 mg total) by mouth every 6 (six) hours as needed for moderate pain for up to 3 days 11/30/22 12/3/22  Sandra Davis DO   naloxone (NARCAN) 4 mg/0.1 mL nasal spray Administer 1 spray into a nostril. If no response after 2-3 minutes, give another dose in the other nostril using a new spray. 7/18/22   Cornelius Worley MD   ondansetron (Zofran ODT) 4 mg disintegrating tablet Take 1 tablet (4 mg total) by mouth every 6 (six) hours as needed for nausea or vomiting 7/18/22   Cornelius Worley MD   ondansetron (ZOFRAN-ODT) 4 mg disintegrating tablet Take 1 tablet (4 mg total) by mouth every 6 (six) hours as needed for nausea for up to 3 days 11/30/22 12/3/22  Sandra Davis DO   QUEtiapine (SEROquel) 100 mg tablet Take 100 mg by mouth daily at bedtime  Patient not taking: Reported on 8/2/2022    Historical Provider, MD ARSHAD have reviewed home medications with patient personally.     Allergies: Allergies   Allergen Reactions   • Lamotrigine Rash       Social History:  Marital Status: Single   Patient Pre-hospital Living Situation:   Patient Pre-hospital Level of Mobility: walks  Patient Pre-hospital Diet Restrictions: none  Substance Use History:   Social History     Substance and Sexual Activity   Alcohol Use Yes    Comment: occ. Social History     Tobacco Use   Smoking Status Every Day   • Packs/day: 1.50   • Types: Cigarettes   Smokeless Tobacco Never     Social History     Substance and Sexual Activity   Drug Use Yes   • Types: Heroin, Marijuana, Methamphetamines, Fentanyl    Comment: states was "shooting up steroids" 3 months ago (3/4/19 states this was 3 years ago)       Family History:  Family History   Problem Relation Age of Onset   • No Known Problems Father        Physical Exam:     Vitals:   Blood Pressure: 118/75 (08/14/23 1445)  Pulse: 105 (08/14/23 1445)  Temperature: (!) 102.3 °F (39.1 °C) (08/14/23 1308)  Temp Source: Tympanic (08/14/23 1308)  Respirations: 20 (08/14/23 1445)  Weight - Scale: 73 kg (160 lb 15 oz) (08/14/23 1228)  SpO2: 100 % (08/14/23 1445)    Physical Exam  Vitals and nursing note reviewed. Constitutional:       General: He is not in acute distress. Appearance: He is well-developed. Comments: Resting comfortably in bed, does not appear anxious or in acute distress   Cardiovascular:      Rate and Rhythm: Regular rhythm. Tachycardia present. Heart sounds: No murmur heard. Pulmonary:      Effort: Pulmonary effort is normal. No respiratory distress. Breath sounds: Normal breath sounds. Abdominal:      Palpations: Abdomen is soft. Tenderness: There is no abdominal tenderness. Musculoskeletal:         General: No swelling. Neurological:      Mental Status: He is alert. Psychiatric:         Mood and Affect: Mood normal.         Speech: Speech is tangential.         Thought Content: Thought content is paranoid.          Additional Data: Lab Results:  Results from last 7 days   Lab Units 08/14/23  1248 08/08/23  2120   WBC Thousand/uL 9.25 16.37*   HEMOGLOBIN g/dL 12.8 12.3   HEMATOCRIT % 37.5 37.6   PLATELETS Thousands/uL 268 316   BANDS PCT % 3  --    NEUTROS PCT %  --  83*   LYMPHS PCT %  --  11*   LYMPHO PCT % 3*  --    MONOS PCT %  --  4   MONO PCT % 0*  --    EOS PCT % 0 1     Results from last 7 days   Lab Units 08/14/23  1248   SODIUM mmol/L 136   POTASSIUM mmol/L 3.6   CHLORIDE mmol/L 103   CO2 mmol/L 21   BUN mg/dL 24   CREATININE mg/dL 1.08   ANION GAP mmol/L 12   CALCIUM mg/dL 8.9   ALBUMIN g/dL 3.8   TOTAL BILIRUBIN mg/dL 0.73   ALK PHOS U/L 95   ALT U/L 14   AST U/L 22   GLUCOSE RANDOM mg/dL 99         Results from last 7 days   Lab Units 08/08/23  2114   POC GLUCOSE mg/dl 107         Results from last 7 days   Lab Units 08/14/23  1448 08/14/23  1248   LACTIC ACID mmol/L 1.3 3.5*   PROCALCITONIN ng/ml  --  12.28*       Lines/Drains:  Invasive Devices     Peripheral Intravenous Line  Duration           Peripheral IV 08/14/23 Right;Upper;Ventral (anterior) Arm <1 day                    Imaging: Reviewed radiology reports from this admission including: chest xray  XR chest 1 view portable   Final Result by Collette Bramble, MD (08/14 1356)      No acute cardiopulmonary disease. Workstation performed: [id number]             EKG and Other Studies Reviewed on Admission:   · EKG: Sinus Tachycardia. . ** Please Note: This note has been constructed using a voice recognition system.  **

## 2023-08-15 ENCOUNTER — APPOINTMENT (INPATIENT)
Dept: RADIOLOGY | Facility: HOSPITAL | Age: 33
DRG: 724 | End: 2023-08-15
Payer: COMMERCIAL

## 2023-08-15 PROBLEM — R78.81 BACTEREMIA: Status: ACTIVE | Noted: 2023-08-15

## 2023-08-15 PROBLEM — A41.9 SEPSIS (HCC): Status: ACTIVE | Noted: 2023-08-14

## 2023-08-15 LAB
AMPHETAMINES SERPL QL SCN: POSITIVE
ANION GAP SERPL CALCULATED.3IONS-SCNC: 5 MMOL/L
BARBITURATES UR QL: NEGATIVE
BENZODIAZ UR QL: NEGATIVE
BILIRUB UR QL STRIP: NEGATIVE
BUN SERPL-MCNC: 17 MG/DL (ref 5–25)
CALCIUM SERPL-MCNC: 7.8 MG/DL (ref 8.4–10.2)
CHLORIDE SERPL-SCNC: 110 MMOL/L (ref 96–108)
CLARITY UR: CLEAR
CO2 SERPL-SCNC: 22 MMOL/L (ref 21–32)
COCAINE UR QL: NEGATIVE
COLOR UR: YELLOW
CREAT SERPL-MCNC: 0.89 MG/DL (ref 0.6–1.3)
ERYTHROCYTE [DISTWIDTH] IN BLOOD BY AUTOMATED COUNT: 13.9 % (ref 11.6–15.1)
GFR SERPL CREATININE-BSD FRML MDRD: 110 ML/MIN/1.73SQ M
GLUCOSE SERPL-MCNC: 111 MG/DL (ref 65–140)
GLUCOSE UR STRIP-MCNC: NEGATIVE MG/DL
HCT VFR BLD AUTO: 33.8 % (ref 36.5–49.3)
HGB BLD-MCNC: 11.4 G/DL (ref 12–17)
HGB UR QL STRIP.AUTO: NEGATIVE
KETONES UR STRIP-MCNC: NEGATIVE MG/DL
LEUKOCYTE ESTERASE UR QL STRIP: NEGATIVE
MCH RBC QN AUTO: 30.6 PG (ref 26.8–34.3)
MCHC RBC AUTO-ENTMCNC: 33.7 G/DL (ref 31.4–37.4)
MCV RBC AUTO: 91 FL (ref 82–98)
METHADONE UR QL: NEGATIVE
NITRITE UR QL STRIP: NEGATIVE
OPIATES UR QL SCN: NEGATIVE
OXYCODONE+OXYMORPHONE UR QL SCN: NEGATIVE
PCP UR QL: NEGATIVE
PH UR STRIP.AUTO: 6.5 [PH]
PLATELET # BLD AUTO: 203 THOUSANDS/UL (ref 149–390)
PMV BLD AUTO: 10 FL (ref 8.9–12.7)
POTASSIUM SERPL-SCNC: 3.7 MMOL/L (ref 3.5–5.3)
PROCALCITONIN SERPL-MCNC: 89.56 NG/ML
PROT UR STRIP-MCNC: NEGATIVE MG/DL
RBC # BLD AUTO: 3.72 MILLION/UL (ref 3.88–5.62)
SODIUM SERPL-SCNC: 137 MMOL/L (ref 135–147)
SP GR UR STRIP.AUTO: <=1.005 (ref 1–1.03)
T4 FREE SERPL-MCNC: 0.85 NG/DL (ref 0.61–1.12)
THC UR QL: NEGATIVE
TSH SERPL DL<=0.05 MIU/L-ACNC: 0.41 UIU/ML (ref 0.45–4.5)
UROBILINOGEN UR QL STRIP.AUTO: 0.2 E.U./DL
WBC # BLD AUTO: 37.29 THOUSAND/UL (ref 4.31–10.16)

## 2023-08-15 PROCEDURE — 81003 URINALYSIS AUTO W/O SCOPE: CPT | Performed by: EMERGENCY MEDICINE

## 2023-08-15 PROCEDURE — 80048 BASIC METABOLIC PNL TOTAL CA: CPT

## 2023-08-15 PROCEDURE — 74177 CT ABD & PELVIS W/CONTRAST: CPT

## 2023-08-15 PROCEDURE — 84439 ASSAY OF FREE THYROXINE: CPT | Performed by: STUDENT IN AN ORGANIZED HEALTH CARE EDUCATION/TRAINING PROGRAM

## 2023-08-15 PROCEDURE — 93005 ELECTROCARDIOGRAM TRACING: CPT

## 2023-08-15 PROCEDURE — 80307 DRUG TEST PRSMV CHEM ANLYZR: CPT | Performed by: EMERGENCY MEDICINE

## 2023-08-15 PROCEDURE — 99232 SBSQ HOSP IP/OBS MODERATE 35: CPT

## 2023-08-15 PROCEDURE — 84145 PROCALCITONIN (PCT): CPT

## 2023-08-15 PROCEDURE — 84443 ASSAY THYROID STIM HORMONE: CPT | Performed by: STUDENT IN AN ORGANIZED HEALTH CARE EDUCATION/TRAINING PROGRAM

## 2023-08-15 PROCEDURE — 99223 1ST HOSP IP/OBS HIGH 75: CPT | Performed by: PSYCHIATRY & NEUROLOGY

## 2023-08-15 PROCEDURE — 85025 COMPLETE CBC W/AUTO DIFF WBC: CPT

## 2023-08-15 PROCEDURE — G1004 CDSM NDSC: HCPCS

## 2023-08-15 PROCEDURE — 99223 1ST HOSP IP/OBS HIGH 75: CPT | Performed by: INTERNAL MEDICINE

## 2023-08-15 RX ORDER — VANCOMYCIN HYDROCHLORIDE 1 G/200ML
15 INJECTION, SOLUTION INTRAVENOUS EVERY 12 HOURS
Status: DISCONTINUED | OUTPATIENT
Start: 2023-08-15 | End: 2023-08-15

## 2023-08-15 RX ORDER — KETOROLAC TROMETHAMINE 30 MG/ML
15 INJECTION, SOLUTION INTRAMUSCULAR; INTRAVENOUS EVERY 6 HOURS PRN
Status: DISCONTINUED | OUTPATIENT
Start: 2023-08-15 | End: 2023-08-15

## 2023-08-15 RX ORDER — OXYCODONE HYDROCHLORIDE 5 MG/1
5 TABLET ORAL EVERY 4 HOURS PRN
Status: DISCONTINUED | OUTPATIENT
Start: 2023-08-15 | End: 2023-08-24

## 2023-08-15 RX ORDER — ARIPIPRAZOLE 5 MG/1
5 TABLET ORAL DAILY
Status: DISCONTINUED | OUTPATIENT
Start: 2023-08-15 | End: 2023-08-24 | Stop reason: HOSPADM

## 2023-08-15 RX ADMIN — ACETAMINOPHEN 650 MG: 325 TABLET ORAL at 04:59

## 2023-08-15 RX ADMIN — SODIUM CHLORIDE 150 ML/HR: 0.9 INJECTION, SOLUTION INTRAVENOUS at 22:48

## 2023-08-15 RX ADMIN — ARIPIPRAZOLE 5 MG: 5 TABLET ORAL at 11:56

## 2023-08-15 RX ADMIN — PIPERACILLIN AND TAZOBACTAM 3.38 G: 36; 4.5 INJECTION, POWDER, FOR SOLUTION INTRAVENOUS at 10:50

## 2023-08-15 RX ADMIN — NICOTINE 1 PATCH: 14 PATCH, EXTENDED RELEASE TRANSDERMAL at 08:30

## 2023-08-15 RX ADMIN — VANCOMYCIN HYDROCHLORIDE 1750 MG: 10 INJECTION, POWDER, LYOPHILIZED, FOR SOLUTION INTRAVENOUS at 07:47

## 2023-08-15 RX ADMIN — PIPERACILLIN AND TAZOBACTAM 3.38 G: 36; 4.5 INJECTION, POWDER, FOR SOLUTION INTRAVENOUS at 02:23

## 2023-08-15 RX ADMIN — SODIUM CHLORIDE 150 ML/HR: 0.9 INJECTION, SOLUTION INTRAVENOUS at 07:26

## 2023-08-15 RX ADMIN — IOHEXOL 100 ML: 350 INJECTION, SOLUTION INTRAVENOUS at 15:40

## 2023-08-15 RX ADMIN — KETOROLAC TROMETHAMINE 15 MG: 30 INJECTION, SOLUTION INTRAMUSCULAR at 07:48

## 2023-08-15 RX ADMIN — KETOROLAC TROMETHAMINE 15 MG: 30 INJECTION, SOLUTION INTRAMUSCULAR at 15:52

## 2023-08-15 RX ADMIN — NICOTINE POLACRILEX 2 MG: 2 GUM, CHEWING BUCCAL at 15:50

## 2023-08-15 RX ADMIN — CEFEPIME 2000 MG: 2 INJECTION, POWDER, FOR SOLUTION INTRAVENOUS at 12:58

## 2023-08-15 RX ADMIN — OXYCODONE HYDROCHLORIDE 5 MG: 5 TABLET ORAL at 18:09

## 2023-08-15 RX ADMIN — SODIUM CHLORIDE 150 ML/HR: 0.9 INJECTION, SOLUTION INTRAVENOUS at 00:50

## 2023-08-15 RX ADMIN — LORAZEPAM 1 MG: 2 INJECTION INTRAMUSCULAR; INTRAVENOUS at 14:56

## 2023-08-15 NOTE — PROGRESS NOTES
[id number] [facility]  Progress Note  Name: Mercedez Hernandez I  MRN: [id number]  Unit/Bed#: [address] [address] I Date of Admission: 8/14/2023   Date of Service: 8/15/2023 I Hospital Day: 0    Assessment/Plan   * Sepsis [facility])  Assessment & Plan  POA as evidenced by fever 102.3 and tachycardia with positive blood cultures  · Patient brought in by police after found rolling around in the grass complaining of pain all over  · History of drug abuse, UDS positive for methamphetamines  · CXR: no acute cardiopulmonary disease  · procalcitonin 12.28 > 89  · WBC 9 > 37  · Lactate 3. 5 > 1.3 s/p IVF  · UA negative  · 2/2 blood cultures positive for Enterobacter  · Continue IVF  · Continue cefepime    Bacteremia  Assessment & Plan  · 2/2 blood cultures growing Enterobacter  · ID consulted  · Started on cefepime  · WBC 9 > 37  · procal 12 > 89  · Follow up CT a/p    Bipolar 1 disorder ([address])  Assessment & Plan  · Hx of bipolar disorder requiring inpatient admission in 2018 for suicidal ideation secondary to opioid withdrawal  · Patient has not seen psychiatrist or taken psychiatric medications for a few years  · Psychiatry consulted  · Recommend continual observation and suicide precautions  · Started on Abilify  · Patient considering inpatient psychiatric admission     History of drug abuse ([address])  Assessment & Plan  · History of polysubstance abuse  · UDS 8/8/23 positive for methamphetamines, cocaine, and THC  · UDS this admission positive for methamphetamines  · Patient reports he used heroine and meth a few days ago  · Psychiatry consulted         VTE Pharmacologic Prophylaxis: VTE Score: 1 Low Risk (Score 0-2) - Encourage Ambulation. Patient Centered Rounds: I performed bedside rounds with nursing staff today. Discussions with Specialists or Other Care Team Provider: psychiatry, infectious disease, rn, cm    Education and Discussions with Family / Patient: Updated  (sister) at bedside.     Total Time Spent on Date of Encounter in care of patient: 55 minutes This time was spent on one or more of the following: performing physical exam; counseling and coordination of care; obtaining or reviewing history; documenting in the medical record; reviewing/ordering tests, medications or procedures; communicating with other healthcare professionals and discussing with patient's family/caregivers. Current Length of Stay: 0 day(s)  Current Patient Status: Inpatient   Certification Statement: The patient will continue to require additional inpatient hospital stay due to bactremeia, CT a/p, IV abx, monitor labs  Discharge Plan: Anticipate discharge in 48-72 hrs to pending course    Code Status: Level 1 - Full Code    Subjective:   Patient reports he feels tired today. He is reporting restless legs and "pain all over."  Denies fever, chills, chest pain, difficulty breathing, cough, nausea, vomiting, or diarrhea. Toradol did not help his leg pain. Patient requesting nicotine gum. Objective:     Vitals:   Temp (24hrs), Av.2 °F (36.8 °C), Min:97.9 °F (36.6 °C), Max:98.5 °F (36.9 °C)    Temp:  [97.9 °F (36.6 °C)-98.5 °F (36.9 °C)] 97.9 °F (36.6 °C)  HR:  [73-95] 82  Resp:  [16-18] 18  BP: ()/(58-76) 113/71  SpO2:  [97 %-100 %] 97 %  Body mass index is 23.09 kg/m². Input and Output Summary (last 24 hours): Intake/Output Summary (Last 24 hours) at 8/15/2023 1510  Last data filed at 8/15/2023 1250  Gross per 24 hour   Intake 2235 ml   Output 2500 ml   Net -265 ml       Physical Exam:   Physical Exam  Vitals and nursing note reviewed. Constitutional:       General: He is not in acute distress. Appearance: He is well-developed. Comments: Resting comfortably in bed  Does not appear agitated or in distress   Cardiovascular:      Rate and Rhythm: Normal rate and regular rhythm. Pulmonary:      Effort: Pulmonary effort is normal.      Breath sounds: Normal breath sounds.    Abdominal: Palpations: Abdomen is soft. Tenderness: There is no abdominal tenderness. Musculoskeletal:         General: No swelling. Cervical back: Neck supple. Skin:     General: Skin is warm and dry. Neurological:      Mental Status: He is alert. Psychiatric:         Mood and Affect: Mood normal.         Speech: Speech is tangential.         Thought Content: Thought content is paranoid.          Additional Data:     Labs:  Results from last 7 days   Lab Units 08/15/23  0457 08/14/23  1248 08/08/23  2120   WBC Thousand/uL 37.29* 9.25 16.37*   HEMOGLOBIN g/dL 11.4* 12.8 12.3   HEMATOCRIT % 33.8* 37.5 37.6   PLATELETS Thousands/uL 203 268 316   BANDS PCT %  --  3  --    NEUTROS PCT %  --   --  83*   LYMPHS PCT %  --   --  11*   LYMPHO PCT %  --  3*  --    MONOS PCT %  --   --  4   MONO PCT %  --  0*  --    EOS PCT %  --  0 1     Results from last 7 days   Lab Units 08/15/23  0457 08/14/23  1248   SODIUM mmol/L 137 136   POTASSIUM mmol/L 3.7 3.6   CHLORIDE mmol/L 110* 103   CO2 mmol/L 22 21   BUN mg/dL 17 24   CREATININE mg/dL 0.89 1.08   ANION GAP mmol/L 5 12   CALCIUM mg/dL 7.8* 8.9   ALBUMIN g/dL  --  3.8   TOTAL BILIRUBIN mg/dL  --  0.73   ALK PHOS U/L  --  95   ALT U/L  --  14   AST U/L  --  22   GLUCOSE RANDOM mg/dL 111 99         Results from last 7 days   Lab Units 08/08/23  2114   POC GLUCOSE mg/dl 107         Results from last 7 days   Lab Units 08/15/23  0457 08/14/23  1448 08/14/23  1248   LACTIC ACID mmol/L  --  1.3 3.5*   PROCALCITONIN ng/ml 89.56*  --  12.28*       Lines/Drains:  Invasive Devices     Peripheral Intravenous Line  Duration           Peripheral IV 08/14/23 Right;Upper;Ventral (anterior) Arm 1 day                      Imaging: Reviewed radiology reports from this admission including: chest xray    Recent Cultures (last 7 days):   Results from last 7 days   Lab Units 08/14/23  1303 08/14/23  1259   GRAM STAIN RESULT  Gram negative rods* Gram negative rods*       Last 24 Hours Medication List:   Current Facility-Administered Medications   Medication Dose Route Frequency Provider Last Rate   • acetaminophen  650 mg Oral Q6H PRN Estrella Chris PA-C     • ARIPiprazole  5 mg Oral Daily Nate Bar DO     • cefepime  2,000 mg Intravenous Q12H Leverne Aschoff, PA-C 2,000 mg (08/15/23 1258)   • ketorolac  15 mg Intravenous Q6H PRN Estrella Chris PA-C     • LORazepam  1 mg Intravenous Q6H PRN Tami Togersone, DO     • nicotine  1 patch Transdermal Daily Estrella Chris PA-C     • OLANZapine  5 mg Intramuscular Q4H PRN Estrella Chris PA-C     • ondansetron  4 mg Intravenous Q6H PRN Estrella Chris PA-C     • sodium chloride  150 mL/hr Intravenous Continuous Pandi Todhe,  mL/hr (08/15/23 0726)        Today, Patient Was Seen By: Estrella Chris PA-C    **Please Note: This note may have been constructed using a voice recognition system. **

## 2023-08-15 NOTE — ASSESSMENT & PLAN NOTE
· 2/2 blood cultures growing Enterobacter  · ID consulted  · Started on cefepime  · WBC 9 > 37  · procal 12 > 89  · Follow up CT a/p

## 2023-08-15 NOTE — CONSULTS
The patient's vancomycin therapy has been discontinued. Pharmacy will sign off now. Thank you for this consult.      Cuate Ward, Pharmacist
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
## 2023-08-15 NOTE — CONSULTS
Consultation - Infectious Disease   Francisca Mayorgaiago. 26 y.o. male MRN: [id number]  Unit/Bed#: [facility] 408-01 Encounter: [id number]      IMPRESSION & RECOMMENDATIONS:   1. Severe Sepsis, POA with fever, tachycardia, lactic acidosis, high leukocytosis in setting of #2  -antibiotic as below  -follow-up cultures and adjust antibiotics as needed  -monitor temperature and hemodynamics  -serial exam  -recheck CBC and BMP in a.m. 2. Gram Negative Bacteremia. Admission blood cultures 2 of 2 sets growing Gram Neg Rods prelimilarily ID'd on BCID assay as Enterobacter cloacae. #3 possible source  -discontinue Vancomycin and Zosyn  -switch to Cefepime 2 g IV q 12 hours  -follow up final blood cultures  -check CT A/P     3. IVDU. 8/8/23 UDS + for methamphetamines, cocaine, THC. Patient reports use in right AC 2 weeks ago. He consented to HIV/Hepatitis testing. Patient not a candidate for outpatient PICC line.  -Check HIV/Hepatitis Panel with am labs  -Patient not a candidate for outpatient PICC line. 4. Bipolar 1 disorder  -monitor symptoms  -symptomatic management per primary care team  -psychiatry consulted    Antibiotics:  Cefepime D1    I have discussed the above management plan in detail with patient, RN, and Daryle Forbes of the primary service. I have spent a total time of 80 minutes on 08/15/23 in caring for this patient including Diagnostic results, Prognosis, Risks and benefits of tx options, Instructions for management, Patient and family education, Importance of tx compliance, Risk factor reductions, Impressions, Counseling / Coordination of care, Documenting in the medical record, Reviewing / ordering tests, medicine, procedures  , Obtaining or reviewing history   and Communicating with other healthcare professionals . Extensive review of the medical records in epic including review of the notes, radiographs, and laboratory results     HISTORY OF PRESENT ILLNESS:  Reason for Consult: 1. Sepsis 2.  IVDU    HPI: Mercedez Arango. is a 26y.o. year old male with IV drug abuse, bipolar 1 disorder without taking psychiatric medication or follow up in years who was brought into the ED by police  after they report he was rolling around in the grass and complaining of pain all over. In the ED, patient was noted to have fever and tachycardia with elevated lactate and procalcitonin. 23 PCXR was negative. Patient reports he used meth and heroin a few days ago. Blood cultures were drawn and IVFs given. Lactic acid came down and patient was admitted on Vancomycin and Zosyn. Blood cultures are now positive. Infectious Disease is now being consulted regarding evaluation and management of Sepsis and IVDU. He reports he has been feeling pain everywhere including a headache, chest pain, pain and tingling in all limbs, and shortness of breath on admission. He reports he has felt like this since his dad  3.5 years ago. He reports there are people trying to poison him and that drugs are the only thing that helps him leave the house. He has been eating well. He had some fever, chills, sweats outpatient. Asking for a muscle relaxant. No nausea, vomiting, diarrhea or urinary symptoms or skin wounds. REVIEW OF SYSTEMS:  A complete review of systems is negative other than that noted in the HPI. PAST MEDICAL HISTORY:  Past Medical History:   Diagnosis Date   • ADHD (attention deficit hyperactivity disorder)    • Anxiety     last assessed 3/23/15   • Bipolar 1 disorder ([address])    • Genital warts     last assessed 12/7/15, resolved 17    • Psychiatric disorder      History reviewed. No pertinent surgical history. FAMILY HISTORY:  Non-contributory    SOCIAL HISTORY:  Social History   Social History     Substance and Sexual Activity   Alcohol Use Yes    Comment: occ.      Social History     Substance and Sexual Activity   Drug Use Yes   • Types: Heroin, Marijuana, Methamphetamines, Fentanyl    Comment: states was "shooting up steroids" 3 months ago (3/4/19 states this was 3 years ago)     Social History     Tobacco Use   Smoking Status Every Day   • Packs/day: 1.50   • Types: Cigarettes   Smokeless Tobacco Never       ALLERGIES:  Allergies   Allergen Reactions   • Lamotrigine Rash       MEDICATIONS:  All current active medications have been reviewed. PHYSICAL EXAM:  Temp:  [97.9 °F (36.6 °C)-102.3 °F (39.1 °C)] 97.9 °F (36.6 °C)  HR:  [] 76  Resp:  [16-22] 18  BP: ()/(58-98) 113/71  SpO2:  [98 %-100 %] 98 %  Temp (24hrs), Av.3 °F (37.4 °C), Min:97.9 °F (36.6 °C), Max:102.3 °F (39.1 °C)  Current: Temperature: 97.9 °F (36.6 °C)    Intake/Output Summary (Last 24 hours) at 8/15/2023 1153  Last data filed at 8/15/2023 1050  Gross per 24 hour   Intake 3055 ml   Output 2500 ml   Net 555 ml       General Appearance:  26year old male, propped in bed, tearful, in no acute resp distress   Head:  Normocephalic, without obvious abnormality, atraumatic   Eyes:  Conjunctiva pink and sclera anicteric, both eyes   Nose: Nares normal, mucosa normal, no drainage   Throat: Oropharynx moist without lesions. Some broken teeth   Neck: Supple, symmetrical, no adenopathy, no tenderness/mass/nodules   Back:   Symmetric, no curvature, ROM normal, no CVA tenderness   Lungs:   Clear to auscultation bilaterally, respirations unlabored   Chest Wall:  No tenderness or deformity   Heart:  RRR; no murmur, rub or gallop   Abdomen:   Soft, non-tender, non-distended, positive bowel sounds    Extremities: No cyanosis, clubbing or edema. Skin: No rashes or lesions. No draining wounds noted. Multiple scabs of legs/feet. Dry, cracked plantar feet skin. IV site nontender. Lymph nodes: Cervical, supraclavicular nodes normal   Neurologic: Alert and oriented times 3, extremity strength 5/5 and symmetric       LABS, IMAGING, & OTHER STUDIES:  Lab Results:  I have personally reviewed pertinent labs.   Results from last 7 days   Lab Units 08/15/23  3988 08/14/23  1248 08/08/23  2120   WBC Thousand/uL 37.29* 9.25 16.37*   HEMOGLOBIN g/dL 11.4* 12.8 12.3   PLATELETS Thousands/uL 203 268 316     Results from last 7 days   Lab Units 08/15/23  0457 08/14/23  1248 08/08/23  2120   SODIUM mmol/L 137 136 140   POTASSIUM mmol/L 3.7 3.6 4.3   CHLORIDE mmol/L 110* 103 106   CO2 mmol/L 22 21 26   BUN mg/dL 17 24 18   CREATININE mg/dL 0.89 1.08 1.15   EGFR ml/min/1.73sq m 110 88 81   CALCIUM mg/dL 7.8* 8.9 8.5   AST U/L  --  22 16   ALT U/L  --  14 12   ALK PHOS U/L  --  95 79     Results from last 7 days   Lab Units 08/14/23  1303 08/14/23  1259   GRAM STAIN RESULT  Gram negative rods* Gram negative rods*     Results from last 7 days   Lab Units 08/15/23  0457 08/14/23  1248   PROCALCITONIN ng/ml 89.56* 12.28*                   Imaging Studies:   Imaging study reports and images in PACS reviewed personally. 8/14/23 PCXR lungs clear    Other Studies:   I have personally reviewed pertinent reports.

## 2023-08-15 NOTE — PLAN OF CARE
Problem: PAIN - ADULT  Goal: Verbalizes/displays adequate comfort level or baseline comfort level  Description: Interventions:  - Encourage patient to monitor pain and request assistance  - Assess pain using appropriate pain scale  - Administer analgesics based on type and severity of pain and evaluate response  - Implement non-pharmacological measures as appropriate and evaluate response  - Consider cultural and social influences on pain and pain management  - Notify physician/advanced practitioner if interventions unsuccessful or patient reports new pain  Outcome: Progressing     Problem: INFECTION - ADULT  Goal: Absence or prevention of progression during hospitalization  Description: INTERVENTIONS:  - Assess and monitor for signs and symptoms of infection  - Monitor lab/diagnostic results  - Monitor all insertion sites, i.e. indwelling lines, tubes, and drains  - Monitor endotracheal if appropriate and nasal secretions for changes in amount and color  - [place] appropriate cooling/warming therapies per order  - Administer medications as ordered  - Instruct and encourage patient and family to use good hand hygiene technique  - Identify and instruct in appropriate isolation precautions for identified infection/condition  Outcome: Progressing  Goal: Absence of fever/infection during neutropenic period  Description: INTERVENTIONS:  - Monitor WBC    Outcome: Progressing     Problem: SAFETY ADULT  Goal: Patient will remain free of falls  Description: INTERVENTIONS:  - Educate patient/family on patient safety including physical limitations  - Instruct patient to call for assistance with activity   - Consult OT/PT to assist with strengthening/mobility   - Keep Call bell within reach  - Keep bed low and locked with side rails adjusted as appropriate  - Keep care items and personal belongings within reach  - Initiate and maintain comfort rounds  - Make Fall Risk Sign visible to staff  - Offer Toileting every 2 Hours, in advance of need  - Initiate/Maintain bed alarm  - Obtain necessary fall risk management equipment: yellow socks  - Apply yellow socks and bracelet for high fall risk patients  - Consider moving patient to room near nurses station  Outcome: Progressing  Goal: Maintain or return to baseline ADL function  Description: INTERVENTIONS:  -  Assess patient's ability to carry out ADLs; assess patient's baseline for ADL function and identify physical deficits which impact ability to perform ADLs (bathing, care of mouth/teeth, toileting, grooming, dressing, etc.)  - Assess/evaluate cause of self-care deficits   - Assess range of motion  - Assess patient's mobility; develop plan if impaired  - Assess patient's need for assistive devices and provide as appropriate  - Encourage maximum independence but intervene and supervise when necessary  - Involve family in performance of ADLs  - Assess for home care needs following discharge   - Consider OT consult to assist with ADL evaluation and planning for discharge  - Provide patient education as appropriate  Outcome: Progressing  Goal: Maintains/Returns to pre admission functional level  Description: INTERVENTIONS:  - Perform BMAT or MOVE assessment daily.   - Set and communicate daily mobility goal to care team and patient/family/caregiver. - Collaborate with rehabilitation services on mobility goals if consulted  - Perform Range of Motion 3 times a day. - Reposition patient every 2 hours.   - Dangle patient 3 times a day  - Stand patient 3 times a day  - Ambulate patient 3 times a day  - Out of bed to chair 3 times a day   - Out of bed for meals 3 times a day  - Out of bed for toileting  - Record patient progress and toleration of activity level   Outcome: Progressing     Problem: DISCHARGE PLANNING  Goal: Discharge to home or other facility with appropriate resources  Description: INTERVENTIONS:  - Identify barriers to discharge w/patient and caregiver  - Arrange for needed discharge resources and transportation as appropriate  - Identify discharge learning needs (meds, wound care, etc.)  - Arrange for interpretive services to assist at discharge as needed  - Refer to Case Management Department for coordinating discharge planning if the patient needs post-hospital services based on physician/advanced practitioner order or complex needs related to functional status, cognitive ability, or social support system  Outcome: Progressing     Problem: Knowledge Deficit  Goal: Patient/family/caregiver demonstrates understanding of disease process, treatment plan, medications, and discharge instructions  Description: Complete learning assessment and assess knowledge base.   Interventions:  - Provide teaching at level of understanding  - Provide teaching via preferred learning methods  Outcome: Progressing     Problem: METABOLIC, FLUID AND ELECTROLYTES - ADULT  Goal: Electrolytes maintained within normal limits  Description: INTERVENTIONS:  - Monitor labs and assess patient for signs and symptoms of electrolyte imbalances  - Administer electrolyte replacement as ordered  - Monitor response to electrolyte replacements, including repeat lab results as appropriate  - Instruct patient on fluid and nutrition as appropriate  Outcome: Progressing  Goal: Fluid balance maintained  Description: INTERVENTIONS:  - Monitor labs   - Monitor I/O and WT  - Instruct patient on fluid and nutrition as appropriate  - Assess for signs & symptoms of volume excess or deficit  Outcome: Progressing

## 2023-08-15 NOTE — PROGRESS NOTES
Carlos Kwok. is a 26 y.o. male who is currently ordered Vancomycin IV with management by the Pharmacy Consult service. Relevant clinical data and objective / subjective history reviewed. Vancomycin Assessment:  Indication and Goal AUC/Trough: Bacteremia (goal -600, trough >10)  Clinical Status: stable  Micro:     Renal Function:  SCr: 0.89 mg/dL  CrCl: 119.6 mL/min  Renal replacement: Not on dialysis  Days of Therapy: 1  Current Dose: 1750 mg IV for one dose (as loading dose)  Vancomycin Plan:  New Dosin mg IV q12h  Estimated AUC: 443 mcg*hr/mL  Estimated Trough: 13.1 mcg/mL  Next Level: 2023 at 0600  Renal Function Monitoring: Daily BMP and [place] will continue to follow closely for s/sx of nephrotoxicity, infusion reactions and appropriateness of therapy. BMP and CBC will be ordered per protocol. We will continue to follow the patient’s culture results and clinical progress daily.     Jason Vaughn, Pharmacist

## 2023-08-15 NOTE — PLAN OF CARE
Problem: PAIN - ADULT  Goal: Verbalizes/displays adequate comfort level or baseline comfort level  Description: Interventions:  - Encourage patient to monitor pain and request assistance  - Assess pain using appropriate pain scale  - Administer analgesics based on type and severity of pain and evaluate response  - Implement non-pharmacological measures as appropriate and evaluate response  - Consider cultural and social influences on pain and pain management  - Notify physician/advanced practitioner if interventions unsuccessful or patient reports new pain  8/15/2023 0313 by Susan Richardson RN  Outcome: Progressing  8/15/2023 0313 by Susan Richardson RN  Outcome: Progressing     Problem: INFECTION - ADULT  Goal: Absence or prevention of progression during hospitalization  Description: INTERVENTIONS:  - Assess and monitor for signs and symptoms of infection  - Monitor lab/diagnostic results  - Monitor all insertion sites, i.e. indwelling lines, tubes, and drains  - Monitor endotracheal if appropriate and nasal secretions for changes in amount and color  - [place] appropriate cooling/warming therapies per order  - Administer medications as ordered  - Instruct and encourage patient and family to use good hand hygiene technique  - Identify and instruct in appropriate isolation precautions for identified infection/condition  8/15/2023 0313 by Susan Richardson RN  Outcome: Progressing  8/15/2023 0313 by Susan Richardson RN  Outcome: Progressing     Problem: INFECTION - ADULT  Goal: Absence of fever/infection during neutropenic period  Description: INTERVENTIONS:  - Monitor WBC    8/15/2023 0313 by Susan Richardson RN  Outcome: Progressing  8/15/2023 0313 by Susan Richardson RN  Outcome: Progressing     Problem: SAFETY ADULT  Goal: Patient will remain free of falls  Description: INTERVENTIONS:  - Educate patient/family on patient safety including physical limitations  - Instruct patient to call for assistance with activity   - Consult OT/PT to assist with strengthening/mobility   - Keep Call bell within reach  - Keep bed low and locked with side rails adjusted as appropriate  - Keep care items and personal belongings within reach  - Initiate and maintain comfort rounds  - Make Fall Risk Sign visible to staff  - Offer Toileting every 2 Hours, in advance of need  - Initiate/Maintain bed alarm  - Obtain necessary fall risk management equipment:   - Apply yellow socks and bracelet for high fall risk patients  - Consider moving patient to room near nurses station  8/15/2023 0313 by Sheldon Powers RN  Outcome: Progressing  8/15/2023 0313 by Sheldon Powers RN  Outcome: Progressing     Problem: SAFETY ADULT  Goal: Maintain or return to baseline ADL function  Description: INTERVENTIONS:  -  Assess patient's ability to carry out ADLs; assess patient's baseline for ADL function and identify physical deficits which impact ability to perform ADLs (bathing, care of mouth/teeth, toileting, grooming, dressing, etc.)  - Assess/evaluate cause of self-care deficits   - Assess range of motion  - Assess patient's mobility; develop plan if impaired  - Assess patient's need for assistive devices and provide as appropriate  - Encourage maximum independence but intervene and supervise when necessary  - Involve family in performance of ADLs  - Assess for home care needs following discharge   - Consider OT consult to assist with ADL evaluation and planning for discharge  - Provide patient education as appropriate  8/15/2023 0313 by Sheldon Powers RN  Outcome: Progressing  8/15/2023 0313 by Sheldon Powers RN  Outcome: Progressing     Problem: SAFETY ADULT  Goal: Maintains/Returns to pre admission functional level  Description: INTERVENTIONS:  - Perform BMAT or MOVE assessment daily.   - Set and communicate daily mobility goal to care team and patient/family/caregiver.    - Collaborate with rehabilitation services on mobility goals if consulted  - Perform Range of Motion 3 times a day. - Actively turns self. - Dangle patient 3 times a day  - Stand patient 3 times a day  - Ambulate patient 3 times a day  - Out of bed to chair 3 times a day   - Out of bed for meals 3 times a day  - Out of bed for toileting  - Record patient progress and toleration of activity level   8/15/2023 0313 by Adiel Chang RN  Outcome: Progressing  8/15/2023 0313 by Adiel Chang RN  Outcome: Progressing     Problem: DISCHARGE PLANNING  Goal: Discharge to home or other facility with appropriate resources  Description: INTERVENTIONS:  - Identify barriers to discharge w/patient and caregiver  - Arrange for needed discharge resources and transportation as appropriate  - Identify discharge learning needs (meds, wound care, etc.)  - Arrange for interpretive services to assist at discharge as needed  - Refer to Case Management Department for coordinating discharge planning if the patient needs post-hospital services based on physician/advanced practitioner order or complex needs related to functional status, cognitive ability, or social support system  8/15/2023 0313 by Adiel Chang RN  Outcome: Progressing  8/15/2023 0313 by Adiel Chang RN  Outcome: Progressing     Problem: Knowledge Deficit  Goal: Patient/family/caregiver demonstrates understanding of disease process, treatment plan, medications, and discharge instructions  Description: Complete learning assessment and assess knowledge base.   Interventions:  - Provide teaching at level of understanding  - Provide teaching via preferred learning methods  8/15/2023 0313 by Adiel Chang RN  Outcome: Progressing  8/15/2023 0313 by Adiel Chang RN  Outcome: Progressing     Problem: METABOLIC, FLUID AND ELECTROLYTES - ADULT  Goal: Electrolytes maintained within normal limits  Description: INTERVENTIONS:  - Monitor labs and assess patient for signs and symptoms of electrolyte imbalances  - Administer electrolyte replacement as ordered  - Monitor response to electrolyte replacements, including repeat lab results as appropriate  - Instruct patient on fluid and nutrition as appropriate  Outcome: Progressing     Problem: METABOLIC, FLUID AND ELECTROLYTES - ADULT  Goal: Fluid balance maintained  Description: INTERVENTIONS:  - Monitor labs   - Monitor I/O and WT  - Instruct patient on fluid and nutrition as appropriate  - Assess for signs & symptoms of volume excess or deficit  Outcome: Progressing

## 2023-08-15 NOTE — ASSESSMENT & PLAN NOTE
· Hx of bipolar disorder requiring inpatient admission in 2018 for suicidal ideation secondary to opioid withdrawal  · Patient has not seen psychiatrist or taken psychiatric medications for a few years  · Psychiatry consulted  · Recommend continual observation and suicide precautions  · Started on Abilify  · Patient considering inpatient psychiatric admission

## 2023-08-15 NOTE — ASSESSMENT & PLAN NOTE
· History of polysubstance abuse  · UDS 8/8/23 positive for methamphetamines, cocaine, and THC  · UDS this admission positive for methamphetamines  · Patient reports he used heroine and meth a few days ago  · Psychiatry consulted

## 2023-08-15 NOTE — ASSESSMENT & PLAN NOTE
POA as evidenced by fever 102.3 and tachycardia with positive blood cultures  · Patient brought in by police after found rolling around in the grass complaining of pain all over  · History of drug abuse, UDS positive for methamphetamines  · CXR: no acute cardiopulmonary disease  · procalcitonin 12.28 > 89  · WBC 9 > 37  · Lactate 3. 5 > 1.3 s/p IVF  · UA negative  · 2/2 blood cultures positive for Enterobacter  · Continue IVF  · Continue cefepime

## 2023-08-15 NOTE — CASE MANAGEMENT
Case Management Assessment    Patient name Milton Schaumann. Location 4 [place] 408/4 [place] 408-* MRN [id number]  : 1990 Date 8/15/2023       Current Admission Date: 2023  Current Admission Diagnosis:SIRS (systemic inflammatory response syndrome) [facility])   Patient Active Problem List    Diagnosis Date Noted   • Bipolar 1 disorder ([address] ) 2023   • History of drug abuse ([address]) 2023   • SIRS (systemic inflammatory response syndrome) ([address] [facility]) 2023      LOS (days): 0  Geometric Mean LOS (GMLOS) (days):   Days to GMLOS:     OBJECTIVE:        Current admission status: Observation  Referral Reason: Other (Discharge planning)    Preferred Pharmacy:   [address] [place] [address] [id number] [facility], [address] [facility] ([address])  [address] [facility] ([address])  [facility]  Phone: [phone] Fax: [phone]    Primary Care Provider: Loi Coffman    Primary Insurance: Juliet SHULTZ  Secondary Insurance:     ASSESSMENT:  Isaac Proxies    There are no active Health Care Proxies on file. Obs Notice Signed: 08/15/23 (Notice reviewed with and signed by patient.   Copy given to patient and copy placed in scan bin for chart.)    Readmission Root Cause  30 Day Readmission: No    Patient Information  Admitted from[de-identified] Home  Mental Status: Alert  During Assessment patient was accompanied by: Not accompanied during assessment  Assessment information provided by[de-identified] Patient  Primary Caregiver: Self  Support Systems: Family members  [place] of Residence: [address] [place] [address] do you live in?: [address] [place] [address] of [facility] Prior to Admission  Functional Status: Independent  Completes ADLs independently?: Yes  Ambulates independently?: Yes  Does patient use assisted devices?: No  Does patient currently own DME?: No  Does the patient have a history of Short-Term Rehab?: No  Does patient have a history of HHC?: No  Does patient currently have [facility]?: No    Patient Information Continued  Income Source: SSI/SSD  Does patient have prescription coverage?: Yes  Does patient have a history of substance abuse?: Yes (per chart)  Does patient have a history of Mental Health Diagnosis?: Yes (Bipolar disorder per chart)     SW spoke with patient at bedside to introduce role of CM and review Observation Notice. Patient is independent with no use of DME. Patient stated that emergency contact Mariana Bowen should be removed as his primary contact and requested that his sister Annika Reaves be listed as his contact. SW made the change in Epic. He declined a call to his sister at this time. Patient has a history of bipolar disorder and drug abuse. Psychiatry has been consulted to assess mental health needs. Patient has not been medically cleared and SW will follow with discharge planning needs.

## 2023-08-15 NOTE — UTILIZATION REVIEW
Initial Clinical Review  PATIENT ADMITTED OBSERVATION STATUS 8/14/23 CONVERTED TO INPATIENT STATUS FOR SEVERE SEPSIS POA , GM NEGATIVE BACTEREMIA CONTINUE IV ABX    Admission: Date/Time/Statement:   Admission Orders (From admission, onward)     Ordered        08/15/23 1506  Inpatient Admission  Once            08/14/23 1530  Place in Observation  Once                      Orders Placed This Encounter   Procedures   • Place in Observation     Standing Status:   Standing     Number of Occurrences:   1     Order Specific Question:   Level of Care     Answer:   Med Surg [16]   • Inpatient Admission     Standing Status:   Standing     Number of Occurrences:   1     Order Specific Question:   Level of Care     Answer:   Med Surg [16]     Order Specific Question:   Estimated length of stay     Answer:   More than 2 Midnights     Order Specific Question:   Certification     Answer:   I certify that inpatient services are medically necessary for this patient for a duration of greater than two midnights. See H&P and MD Progress Notes for additional information about the patient's course of treatment. ED Arrival Information     Expected   -    Arrival   8/14/2023 12:24    Acuity   Urgent            Means of arrival   Ambulance    Escorted by   [facility] Hospital[facility]    Admission type   Emergency            Arrival complaint   leg pain           Chief Complaint   Patient presents with   • Pain     Pt. Crying and moaning. Unable to understand pt complaint. Initial Presentation: 26 y.o. male PMH IV drug use, bipolar disorder brought to ED by police after found rolling on ground c/o pain all over. In ED found + fever,tachycardia and elevated lactic acidosis. He states that he is being controlled and being helped by his roommate. He states he cannot leave roommate is a cyber securiyt expert and has all his credentials.   Diaphoretic, tachcardia, Psych tangential speech, pressured speech, anxious   SIRS no source of infection at this point, s/p 1L IVF, IV Zosyn  Urine + methamphetaime . Found altered concern for aspiration pneumonia . Continue IVF and IV Zosyn. Follow up blood cx. Consult Psych prn zyprexa and ativan       Date:  8/15/23  Day 2: Converted to Inpatient sepsis bacteremia + blood cx and change IV Abx  ID CONSULT  Severe Sepsis POA fever tachycardia, lactic acidosis high leukocytosis in setting Gram Negative Bacteremia , 2 sets GM Neg rods . D/c vanco/zosyn started on IV Cefepime q12 f/u final bld cx. Check CT A/P  IVDU UDS + for methamphetamines, cocaine thc. Reports use right AC 2 weeks ago. Consented hiv hepatitis panel. Pt not candidate for PICC line  Bipolar1  disorder psychiatry consulted  Psychiatry Consult  Mood disorder unsp. Psychotic disorder unspec. Bipolar disorder ,severe with psychotic features vs substance induced mood/psychotic disorder vs schizoaffective   Continue medical mgt. recommend 1:1 observation due to reporting recent overdose  As being intentional. Start medications. Pt considering inpt psychiatric hosp tx. Pt cannot leave AMA at this time.  Repeat EKG for QT monitoring   Attending:  Continue IVF and IV Abx per ID  Bipolar per psych 1:1 observation for suicide precautions meds started  Poly substance abuse psychiatry  ED Triage Vitals   Temperature Pulse Respirations Blood Pressure SpO2   08/14/23 1228 08/14/23 1228 08/14/23 1228 08/14/23 1228 08/14/23 1228   100.5 °F (38.1 °C) (!) 115 22 125/98 100 %      Temp Source Heart Rate Source Patient Position - Orthostatic VS BP Location FiO2 (%)   08/14/23 1308 08/14/23 1430 08/14/23 1445 08/14/23 1445 --   Tympanic Monitor Lying Left arm       Pain Score       08/14/23 1228       10 - Worst Possible Pain          Wt Readings from Last 1 Encounters:   08/14/23 73 kg (160 lb 15 oz)     Additional Vital Signs:   08/14/23 1430 -- 109 Abnormal  22 -- -- 99 % None (Room air) --   08/14/23 1326 -- -- -- -- -- -- None (Room air) --   08/14/23 1308 102.3 °F (39.1 °C) Abnormal  --           08/15/23 07:24:19 97.9 °F (36.6 °C) 76 -- 113/71 85 98 % -- --   08/15/23 03:27:51 -- 73 16 95/58 70 98 % -- --   08/14/23 22:16:48 98.2 °F (36.8 °C) 92 18 110/76 87 100 % -- --   08/14/23 2015 -- -- -- -- -- -- None (Room air) --   08/14/23 18:05:18 98.5 °F (36.9 °C) 95 18 110/75 87 99 % None (Room air) Lying       Pertinent Labs/Diagnostic Test Results:   XR chest 1 view portable   Final Result by Aishwarya Beltran MD (08/14 1356)      No acute cardiopulmonary disease.                   Workstation performed: [id number]         CT abdomen pelvis w contrast    (Results Pending)     Results from last 7 days   Lab Units 08/14/23  1248   SARS-COV-2  Negative     Results from last 7 days   Lab Units 08/15/23  0457 08/14/23  1248 08/08/23  2120   WBC Thousand/uL 37.29* 9.25 16.37*   HEMOGLOBIN g/dL 11.4* 12.8 12.3   HEMATOCRIT % 33.8* 37.5 37.6   PLATELETS Thousands/uL 203 268 316   NEUTROS ABS Thousands/µL  --   --  13.63*   BANDS PCT %  --  3  --      Results from last 7 days   Lab Units 08/15/23  0457 08/14/23  1248 08/08/23  2120   SODIUM mmol/L 137 136 140   POTASSIUM mmol/L 3.7 3.6 4.3   CHLORIDE mmol/L 110* 103 106   CO2 mmol/L 22 21 26   ANION GAP mmol/L 5 12 8   BUN mg/dL 17 24 18   CREATININE mg/dL 0.89 1.08 1.15   EGFR ml/min/1.73sq m 110 88 81   CALCIUM mg/dL 7.8* 8.9 8.5     Results from last 7 days   Lab Units 08/14/23  1248 08/08/23  2120   AST U/L 22 16   ALT U/L 14 12   ALK PHOS U/L 95 79   TOTAL PROTEIN g/dL 6.4 6.3*   ALBUMIN g/dL 3.8 3.8   TOTAL BILIRUBIN mg/dL 0.73 0.29     Results from last 7 days   Lab Units 08/08/23  2114   POC GLUCOSE mg/dl 107     Results from last 7 days   Lab Units 08/15/23  0457 08/14/23  1248 08/08/23  2120   GLUCOSE RANDOM mg/dL 111 99 101     Results from last 7 days   Lab Units 08/14/23  1248   CK TOTAL U/L 102     Results from last 7 days   Lab Units 08/15/23  0457 08/14/23  1248   PROCALCITONIN ng/ml 89.56* 12.28*     Results from last 7 days   Lab Units 08/14/23  1448 08/14/23  1248   LACTIC ACID mmol/L 1.3 3.5*     Results from last 7 days   Lab Units 08/15/23  0321 08/08/23  2124   CLARITY UA  Clear Clear   COLOR UA  Yellow Yellow   SPEC GRAV UA  <=1.005 1.025   PH UA  6.5 6.5   GLUCOSE UA mg/dl Negative Negative   KETONES UA mg/dl Negative Negative   BLOOD UA  Negative Negative   PROTEIN UA mg/dl Negative Trace*   NITRITE UA  Negative Negative   BILIRUBIN UA  Negative Negative   UROBILINOGEN UA E.U./dl 0.2 0.2   LEUKOCYTES UA  Negative Negative   WBC UA /hpf  --  2-4   RBC UA /hpf  --  0-1   BACTERIA UA /hpf  --  Occasional   EPITHELIAL CELLS WET PREP /hpf  --  None Seen   MUCUS THREADS   --  Moderate*     Results from last 7 days   Lab Units 08/15/23  0321   AMPH/METH  Positive*   BARBITURATE UR  Negative   BENZODIAZEPINE UR  Negative   COCAINE UR  Negative   METHADONE URINE  Negative   OPIATE UR  Negative   PCP UR  Negative   THC UR  Negative     Results from last 7 days   Lab Units 08/14/23  1248 08/08/23  2120   ETHANOL LVL mg/dL <10 <10   ACETAMINOPHEN LVL ug/mL  --  <55*   SALICYLATE LVL mg/dL  --  <5     Results from last 7 days   Lab Units 08/14/23  1303 08/14/23  1259   GRAM STAIN RESULT  Gram negative rods* Gram negative rods*     ED Treatment:   Medication Administration from 08/14/2023 1224 to 08/14/2023 1606       Date/Time Order Dose Route Action Action by Comments     08/14/2023 1346 EDT sodium chloride 0.9 % bolus 1,000 mL 0 mL Intravenous Stopped Trae Carcamo RN --     08/14/2023 1248 EDT sodium chloride 0.9 % bolus 1,000 mL 1,000 mL Intravenous New Bag Trae Carcamo RN --     08/14/2023 1323 EDT acetaminophen (TYLENOL) tablet 650 mg 650 mg Oral Given Donovan Zaragoza RN --     08/14/2023 1246 EDT acetaminophen (TYLENOL) tablet 650 mg 650 mg Oral Not Given Trae Carcamo, LORIN --     08/14/2023 1512 EDT piperacillin-tazobactam (ZOSYN) IVPB 3.375 g 0 g Intravenous Akiko Paul Leanna Ricks RN --     08/14/2023 1442 EDT piperacillin-tazobactam (ZOSYN) IVPB 3.375 g 3.375 g Intravenous New Bag Elaine Sy RN --        Past Medical History:   Diagnosis Date   • ADHD (attention deficit hyperactivity disorder)    • Anxiety     last assessed 3/23/15   • Bipolar 1 disorder ([address] [facility])    • Genital warts     last assessed 12/7/15, resolved 11/1/17    • Psychiatric disorder      Present on Admission:  **None**      Admitting Diagnosis: Pain [R52]  Fever [R50.9]  Sepsis ([address]) [A41.9]  Age/Sex: 26 y.o. male  Admission Orders:  Med surg  1:1 observation for suicide precautions  HIV   Chronic hepatitis panel  Bmp cbc diff  procalcitonin T4,free  tsh 3rd generation free T4  CT abd pelvis   Scheduled Medications:  ARIPiprazole, 5 mg, Oral, Daily  cefepime, 2,000 mg, Intravenous, Q12H  nicotine, 1 patch, Transdermal, Daily      Continuous IV Infusions:  sodium chloride, 150 mL/hr, Intravenous, Continuous      PRN Meds:  acetaminophen, 650 mg, Oral, Q6H PRN  ketorolac, 15 mg, Intravenous, Q6H PRN  LORazepam, 1 mg, Intravenous, Q6H PRN  nicotine polacrilex, 2 mg, Oral, Q2H PRN  OLANZapine, 5 mg, Intramuscular, Q4H PRN  ondansetron, 4 mg, Intravenous, Q6H PRN        IP CONSULT TO PSYCHIATRY  IP CONSULT TO PHARMACY  IP CONSULT TO INFECTIOUS DISEASES    Network Utilization Review Department  ATTENTION: Please call with any questions or concerns to [phone] and carefully listen to the prompts so that you are directed to the right person. All voicemails are confidential.  Mor Landry all requests for admission clinical reviews, approved or denied determinations and any other requests to dedicated fax number below belonging to the campus where the patient is receiving treatment.  List of dedicated fax numbers for the Facilities:  [place] DENIALS (Administrative/Medical Necessity) [phone] [address] [facility] (Maternity/NICU/Pediatrics) [phone]   [address] [place] [address] [place] [phone]   [facility] [phone]   [address] [facility] [address] [address] [facility] [address] [place] [address] [phone]

## 2023-08-16 LAB
ANION GAP SERPL CALCULATED.3IONS-SCNC: 6 MMOL/L
BASOPHILS # BLD AUTO: 0.06 THOUSANDS/ÂΜL (ref 0–0.1)
BASOPHILS NFR BLD AUTO: 0 % (ref 0–1)
BUN SERPL-MCNC: 11 MG/DL (ref 5–25)
CALCIUM SERPL-MCNC: 7.9 MG/DL (ref 8.4–10.2)
CHLORIDE SERPL-SCNC: 113 MMOL/L (ref 96–108)
CO2 SERPL-SCNC: 21 MMOL/L (ref 21–32)
CREAT SERPL-MCNC: 0.69 MG/DL (ref 0.6–1.3)
EOSINOPHIL # BLD AUTO: 0.35 THOUSAND/ÂΜL (ref 0–0.61)
EOSINOPHIL NFR BLD AUTO: 2 % (ref 0–6)
ERYTHROCYTE [DISTWIDTH] IN BLOOD BY AUTOMATED COUNT: 14.1 % (ref 11.6–15.1)
GFR SERPL CREATININE-BSD FRML MDRD: 122 ML/MIN/1.73SQ M
GLUCOSE SERPL-MCNC: 98 MG/DL (ref 65–140)
HBV CORE AB SER QL: REACTIVE
HBV CORE IGM SER QL: ABNORMAL
HBV SURFACE AG SER QL: ABNORMAL
HCT VFR BLD AUTO: 33.2 % (ref 36.5–49.3)
HCV AB SER QL: ABNORMAL
HGB BLD-MCNC: 11 G/DL (ref 12–17)
HIV 1+2 AB+HIV1 P24 AG SERPL QL IA: NORMAL
HIV 2 AB SERPL QL IA: NORMAL
HIV1 AB SERPL QL IA: NORMAL
HIV1 P24 AG SERPL QL IA: NORMAL
IMM GRANULOCYTES # BLD AUTO: 0.09 THOUSAND/UL (ref 0–0.2)
IMM GRANULOCYTES NFR BLD AUTO: 1 % (ref 0–2)
LYMPHOCYTES # BLD AUTO: 1.99 THOUSANDS/ÂΜL (ref 0.6–4.47)
LYMPHOCYTES NFR BLD AUTO: 10 % (ref 14–44)
MCH RBC QN AUTO: 30.3 PG (ref 26.8–34.3)
MCHC RBC AUTO-ENTMCNC: 33.1 G/DL (ref 31.4–37.4)
MCV RBC AUTO: 92 FL (ref 82–98)
MONOCYTES # BLD AUTO: 0.71 THOUSAND/ÂΜL (ref 0.17–1.22)
MONOCYTES NFR BLD AUTO: 4 % (ref 4–12)
NEUTROPHILS # BLD AUTO: 16.28 THOUSANDS/ÂΜL (ref 1.85–7.62)
NEUTS SEG NFR BLD AUTO: 83 % (ref 43–75)
NRBC BLD AUTO-RTO: 0 /100 WBCS
PLATELET # BLD AUTO: 192 THOUSANDS/UL (ref 149–390)
PMV BLD AUTO: 10.2 FL (ref 8.9–12.7)
POTASSIUM SERPL-SCNC: 3.5 MMOL/L (ref 3.5–5.3)
PROCALCITONIN SERPL-MCNC: 41.89 NG/ML
RBC # BLD AUTO: 3.63 MILLION/UL (ref 3.88–5.62)
SODIUM SERPL-SCNC: 140 MMOL/L (ref 135–147)
WBC # BLD AUTO: 19.48 THOUSAND/UL (ref 4.31–10.16)

## 2023-08-16 PROCEDURE — 80048 BASIC METABOLIC PNL TOTAL CA: CPT

## 2023-08-16 PROCEDURE — 86803 HEPATITIS C AB TEST: CPT | Performed by: PHYSICIAN ASSISTANT

## 2023-08-16 PROCEDURE — 99232 SBSQ HOSP IP/OBS MODERATE 35: CPT | Performed by: NURSE PRACTITIONER

## 2023-08-16 PROCEDURE — 84145 PROCALCITONIN (PCT): CPT

## 2023-08-16 PROCEDURE — 99232 SBSQ HOSP IP/OBS MODERATE 35: CPT | Performed by: PSYCHIATRY & NEUROLOGY

## 2023-08-16 PROCEDURE — 86704 HEP B CORE ANTIBODY TOTAL: CPT | Performed by: PHYSICIAN ASSISTANT

## 2023-08-16 PROCEDURE — 99232 SBSQ HOSP IP/OBS MODERATE 35: CPT | Performed by: PHYSICIAN ASSISTANT

## 2023-08-16 PROCEDURE — 86705 HEP B CORE ANTIBODY IGM: CPT | Performed by: PHYSICIAN ASSISTANT

## 2023-08-16 PROCEDURE — 85025 COMPLETE CBC W/AUTO DIFF WBC: CPT

## 2023-08-16 PROCEDURE — 87389 HIV-1 AG W/HIV-1&-2 AB AG IA: CPT | Performed by: PHYSICIAN ASSISTANT

## 2023-08-16 PROCEDURE — 87340 HEPATITIS B SURFACE AG IA: CPT | Performed by: PHYSICIAN ASSISTANT

## 2023-08-16 RX ORDER — DIAPER,BRIEF,INFANT-TODD,DISP
EACH MISCELLANEOUS 4 TIMES DAILY PRN
Status: DISCONTINUED | OUTPATIENT
Start: 2023-08-16 | End: 2023-08-24 | Stop reason: HOSPADM

## 2023-08-16 RX ADMIN — SODIUM CHLORIDE 150 ML/HR: 0.9 INJECTION, SOLUTION INTRAVENOUS at 16:24

## 2023-08-16 RX ADMIN — SODIUM CHLORIDE 150 ML/HR: 0.9 INJECTION, SOLUTION INTRAVENOUS at 02:53

## 2023-08-16 RX ADMIN — OXYCODONE HYDROCHLORIDE 5 MG: 5 TABLET ORAL at 04:47

## 2023-08-16 RX ADMIN — HYDROCORTISONE: 1 CREAM TOPICAL at 16:16

## 2023-08-16 RX ADMIN — SODIUM CHLORIDE 150 ML/HR: 0.9 INJECTION, SOLUTION INTRAVENOUS at 23:49

## 2023-08-16 RX ADMIN — ARIPIPRAZOLE 5 MG: 5 TABLET ORAL at 08:22

## 2023-08-16 RX ADMIN — CEFEPIME 2000 MG: 2 INJECTION, POWDER, FOR SOLUTION INTRAVENOUS at 00:47

## 2023-08-16 RX ADMIN — SODIUM CHLORIDE 150 ML/HR: 0.9 INJECTION, SOLUTION INTRAVENOUS at 09:23

## 2023-08-16 RX ADMIN — NICOTINE 1 PATCH: 14 PATCH, EXTENDED RELEASE TRANSDERMAL at 08:22

## 2023-08-16 RX ADMIN — CEFEPIME 2000 MG: 2 INJECTION, POWDER, FOR SOLUTION INTRAVENOUS at 13:17

## 2023-08-16 RX ADMIN — OXYCODONE HYDROCHLORIDE 5 MG: 5 TABLET ORAL at 13:42

## 2023-08-16 RX ADMIN — MORPHINE SULFATE 2 MG: 2 INJECTION, SOLUTION INTRAMUSCULAR; INTRAVENOUS at 15:40

## 2023-08-16 NOTE — PROGRESS NOTES
[address] [facility]  Progress Note  Name: Nereyda Napier I  MRN: [id number]  Unit/Bed#: [address] [place] [address]-01 I Date of Admission: 8/14/2023   Date of Service: 8/16/2023 I Hospital Day: 1    Assessment/Plan   * Sepsis [facility])  Assessment & Plan  POA as evidenced by fever 102.3 and tachycardia with positive blood cultures  · Patient brought in by police after found rolling around in the grass complaining of pain all over  · History of drug abuse, UDS positive for methamphetamines  · CXR: no acute cardiopulmonary disease  · procalcitonin 12.28 > 89>42  · WBC 9 > 37>19.48  · Lactate 3. 5 > 1.3 s/p IVF  · UA negative  · 2/2 blood cultures positive for Enterobacter  · Continue IVF  · ID following  · Continue cefepime    Bacteremia  Assessment & Plan  · 2/2 blood cultures growing Enterobacter  · ID consulted  · Started on cefepime  · WBC 9 > 37> 19.48  · procal 12 > 89> 42  · CT a/p negative    History of drug abuse ([address])  Assessment & Plan  · History of polysubstance abuse  · UDS 8/8/23 positive for methamphetamines, cocaine, and THC  · UDS this admission positive for methamphetamines  · Patient reports he used heroine and meth a few days ago  · Psychiatry consulted, when medically cleared recommending inpatient psychiatry admission which patient is agreeable to at this point    Bipolar 1 disorder ([address])  Assessment & Plan  · Hx of bipolar disorder requiring inpatient admission in 2018 for suicidal ideation secondary to opioid withdrawal  · Patient has not seen psychiatrist or taken psychiatric medications for a few years  · Psychiatry consulted  · Recommend continual observation and suicide precautions  · Started on Abilify  · Patient currently agreeable to inpatient psychiatric admission when medically cleared                VTE Pharmacologic Prophylaxis: VTE Score: 1 Low Risk (Score 0-2) - Encourage Ambulation. Patient Centered Rounds: I performed bedside rounds with nursing staff today.   Discussions with Specialists or Other Care Team Provider: Psychiatry    Education and Discussions with Family / Patient: Patient declined call to . Total Time Spent on Date of Encounter in care of patient: 45 minutes This time was spent on one or more of the following: performing physical exam; counseling and coordination of care; obtaining or reviewing history; documenting in the medical record; reviewing/ordering tests, medications or procedures; communicating with other healthcare professionals and discussing with patient's family/caregivers. Current Length of Stay: 1 day(s)  Current Patient Status: Inpatient   Certification Statement: The patient will continue to require additional inpatient hospital stay due to IV antibiotics, pending medical clearance we will have crisis evaluate patient for inpatient psychiatry admission  Discharge Plan: Anticipate discharge in 24-48 hrs to Possible inpatient psychiatric admission    Code Status: Level 1 - Full Code    Subjective:   Patient seen Monisha Bartholomew in bed sleeping, awakens when spoken to. Pleasant cooperative with exam.  Reports that he does have overall body aches and is hopeful that he can have something for the pain. No nausea or vomiting. Objective:     Vitals:   Temp (24hrs), Av.1 °F (36.7 °C), Min:98 °F (36.7 °C), Max:98.3 °F (36.8 °C)    Temp:  [98 °F (36.7 °C)-98.3 °F (36.8 °C)] 98.1 °F (36.7 °C)  HR:  [58-81] 72  Resp:  [18] 18  BP: (112-126)/(65-83) 126/80  SpO2:  [96 %-98 %] 96 %  Body mass index is 23.09 kg/m². Input and Output Summary (last 24 hours): Intake/Output Summary (Last 24 hours) at 2023  Last data filed at 2023 0253  Gross per 24 hour   Intake 3907.5 ml   Output --   Net 3907.5 ml       Physical Exam:   Physical Exam  Vitals and nursing note reviewed. HENT:      Head: Normocephalic.       Nose: Nose normal.      Mouth/Throat:      Mouth: Mucous membranes are moist.   Eyes:      Extraocular Movements: Extraocular movements intact. Conjunctiva/sclera: Conjunctivae normal.   Cardiovascular:      Rate and Rhythm: Normal rate and regular rhythm. Pulses: Normal pulses. Heart sounds: Normal heart sounds. Pulmonary:      Effort: Pulmonary effort is normal.      Breath sounds: Normal breath sounds. Abdominal:      General: Bowel sounds are normal. There is no distension. Palpations: Abdomen is soft. Tenderness: There is no abdominal tenderness. Genitourinary:     Comments: Voiding spontaneously  Musculoskeletal:      Cervical back: Normal range of motion. Comments: Complains of overall body aches   Skin:     General: Skin is warm and dry. Capillary Refill: Capillary refill takes less than 2 seconds. Neurological:      General: No focal deficit present. Mental Status: He is alert and oriented to person, place, and time. Psychiatric:         Mood and Affect: Mood normal.         Behavior: Behavior normal.      Comments: Polite and cooperative with exam           Additional Data:     Labs:  Results from last 7 days   Lab Units 08/16/23  0442 08/15/23  0457 08/14/23  1248   WBC Thousand/uL 19.48*   < > 9.25   HEMOGLOBIN g/dL 11.0*   < > 12.8   HEMATOCRIT % 33.2*   < > 37.5   PLATELETS Thousands/uL 192   < > 268   BANDS PCT %  --   --  3   NEUTROS PCT % 83*  --   --    LYMPHS PCT % 10*  --   --    LYMPHO PCT %  --   --  3*   MONOS PCT % 4  --   --    MONO PCT %  --   --  0*   EOS PCT % 2  --  0    < > = values in this interval not displayed.      Results from last 7 days   Lab Units 08/16/23  0442 08/15/23  0457 08/14/23  1248   SODIUM mmol/L 140   < > 136   POTASSIUM mmol/L 3.5   < > 3.6   CHLORIDE mmol/L 113*   < > 103   CO2 mmol/L 21   < > 21   BUN mg/dL 11   < > 24   CREATININE mg/dL 0.69   < > 1.08   ANION GAP mmol/L 6   < > 12   CALCIUM mg/dL 7.9*   < > 8.9   ALBUMIN g/dL  --   --  3.8   TOTAL BILIRUBIN mg/dL  --   --  0.73   ALK PHOS U/L  --   --  95   ALT U/L  --   --  14   AST U/L --   --  22   GLUCOSE RANDOM mg/dL 98   < > 99    < > = values in this interval not displayed. Results from last 7 days   Lab Units 08/16/23  0442 08/15/23  0457 08/14/23  1448 08/14/23  1248   LACTIC ACID mmol/L  --   --  1.3 3.5*   PROCALCITONIN ng/ml 41.89* 89.56*  --  12.28*       Lines/Drains:  Invasive Devices     Peripheral Intravenous Line  Duration           Peripheral IV 08/14/23 Right;Upper;Ventral (anterior) Arm 2 days                      Imaging: Reviewed radiology reports from this admission including: MRI abdomen/MRCP    Recent Cultures (last 7 days):   Results from last 7 days   Lab Units 08/14/23  1303 08/14/23  1259   BLOOD CULTURE  Enterobacter cloacae complex* Klebsiella oxytoca*  Lactococcus lactis*   GRAM STAIN RESULT  Gram negative rods* Gram negative rods*  Gram positive cocci in pairs*       Last 24 Hours Medication List:   Current Facility-Administered Medications   Medication Dose Route Frequency Provider Last Rate   • acetaminophen  650 mg Oral Q6H PRN Daisy Reis, PA-C     • ARIPiprazole  5 mg Oral Daily Azeem Espinal DO     • cefepime  2,000 mg Intravenous Q12H Darwin Powers PA-C 2,000 mg (08/16/23 1317)   • hydrocortisone   Topical 4x Daily PRN JEOVANNY Harris     • LORazepam  1 mg Intravenous Q6H PRN John Togersone, DO     • nicotine  1 patch Transdermal Daily Daisy McKinley, PA-C     • nicotine polacrilex  2 mg Oral Q2H PRN Daisy Smiley, PA-C     • OLANZapine  5 mg Intramuscular Q4H PRN Daisy Smiley, PA-C     • ondansetron  4 mg Intravenous Q6H PRN Daisy Smiley, PA-C     • oxyCODONE  5 mg Oral Q4H PRN Daisy McKinley, PA-C     • sodium chloride  150 mL/hr Intravenous Continuous John Togersone,  mL/hr (08/16/23 1624)        Today, Patient Was Seen By: JEOVANNY Harris    **Please Note: This note may have been constructed using a voice recognition system. **

## 2023-08-16 NOTE — PLAN OF CARE
Problem: PAIN - ADULT  Goal: Verbalizes/displays adequate comfort level or baseline comfort level  Description: Interventions:  - Encourage patient to monitor pain and request assistance  - Assess pain using appropriate pain scale  - Administer analgesics based on type and severity of pain and evaluate response  - Implement non-pharmacological measures as appropriate and evaluate response  - Consider cultural and social influences on pain and pain management  - Notify physician/advanced practitioner if interventions unsuccessful or patient reports new pain  Outcome: Progressing     Problem: INFECTION - ADULT  Goal: Absence or prevention of progression during hospitalization  Description: INTERVENTIONS:  - Assess and monitor for signs and symptoms of infection  - Monitor lab/diagnostic results  - Monitor all insertion sites, i.e. indwelling lines, tubes, and drains  - Monitor endotracheal if appropriate and nasal secretions for changes in amount and color  - [place] appropriate cooling/warming therapies per order  - Administer medications as ordered  - Instruct and encourage patient and family to use good hand hygiene technique  - Identify and instruct in appropriate isolation precautions for identified infection/condition  Outcome: Progressing  Goal: Absence of fever/infection during neutropenic period  Description: INTERVENTIONS:  - Monitor WBC    Outcome: Progressing     Problem: DISCHARGE PLANNING  Goal: Discharge to home or other facility with appropriate resources  Description: INTERVENTIONS:  - Identify barriers to discharge w/patient and caregiver  - Arrange for needed discharge resources and transportation as appropriate  - Identify discharge learning needs (meds, wound care, etc.)  - Arrange for interpretive services to assist at discharge as needed  - Refer to Case Management Department for coordinating discharge planning if the patient needs post-hospital services based on physician/advanced practitioner order or complex needs related to functional status, cognitive ability, or social support system  Outcome: Progressing     Problem: Knowledge Deficit  Goal: Patient/family/caregiver demonstrates understanding of disease process, treatment plan, medications, and discharge instructions  Description: Complete learning assessment and assess knowledge base.   Interventions:  - Provide teaching at level of understanding  - Provide teaching via preferred learning methods  Outcome: Progressing     Problem: SAFETY ADULT  Goal: Patient will remain free of falls  Description: INTERVENTIONS:  - Educate patient/family on patient safety including physical limitations  - Instruct patient to call for assistance with activity   - Consult OT/PT to assist with strengthening/mobility   - Keep Call bell within reach  - Keep bed low and locked with side rails adjusted as appropriate  - Keep care items and personal belongings within reach  - Initiate and maintain comfort rounds  - Make Fall Risk Sign visible to staff  - Offer Toileting every *Hours, in advance of need  - Initiate/Maintain ***alarm  - Obtain necessary fall risk management equipment: ***  - Apply yellow socks and bracelet for high fall risk patients  - Consider moving patient to room near nurses station  Outcome: Progressing

## 2023-08-16 NOTE — PROGRESS NOTES
Progress Note - Shweta Larson. 26 y.o. male MRN: [id number]  Unit/Bed#: 913 [facility] [place] 408-01 Encounter: [id number]          I came to see the patient today for continuity of care. This patient reports that his mood is "irritable" today however would not discuss why. He reports anxiety. Denies feeling depressed. He reports having poor sleep overnight because his "legs hurt" and states that they are "restless". Reports decreased energy. He denies auditory or visual hallucinations. Denies suicidal or homicidal ideation, plan, or intent. He did not want to discuss his roommate that he was discussing yesterday. He reports tolerating the Abilify and denies experiencing adverse effects. He reports that he is interested in dual diagnosis psychiatric and substance abuse treatment. Behavior over the last 24 hours: Slight improvement  Sleep: Decreased  Appetite: normal  Medication side effects: Denies  ROS: Reports "legs hurt" and are "restless". All other systems reviewed and are negative    Mental Status Evaluation:  Appearance:  age appropriate, bearded and tattooed   Behavior:  Calm and cooperative   Speech:  Soft, slowed, scant   Mood:  Irritable, anxious   Affect:  blunted   Language: naming objects and repeating phrases   Thought Process:  Poverty of thought   Associations: intact associations   Thought Content:  No delusions elicited today   Perceptual Disturbances: Denies auditory or visual hallucinations. Appears to be internally preoccupied   Risk Potential: Denies current suicidal or homicidal ideation, plan, or intent. Sensorium:  person and place   Memory:  Unable to assess due to patient factors   Cognition:  Unable to assess due to patient factors   Consciousness:  alert and awake    Attention: attention span appeared shorter than expected for age   Intellect: within normal limits   Fund of Knowledge: awareness of current events: Moderate, past history:  Moderate and vocabulary: Fair   Insight:  Poor   Judgment: Poor   Muscle Strength and Tone: Not assessed   Gait/Station: Not assessed, patient in bed   Motor Activity: no abnormal movements         Assessment/Plan   Bryan Peña is a 26 y.o. male with past medical history of bipolar 1 disorder, polysubstance abuse, previous overdoses who presented to the ED. Per ED documentation, patient had been found by police on a grassy area near a gas station and patient was reportedly rolling around on the grass complaining of body aches. Patient is admitted for SIRS. Patient was evaluated yesterday by psychiatry. Patient is psychotic and had reported feeling depressed and endorsed symptoms of depression. He denies suicidal or homicidal ideation, plan, or intent however had made comments such as "I hate life", " I don't care if I live or die", and reports that recent presentations to the ED for overdoses were not accidental but rather intentional and states that "I've  many times" and that it is "peaceful", and that overdosing on drugs is an "easy way to get away". Patient reports feeling "irritable" and anxious today. Denies feeling depressed today. He was cooperative with interview however appears to have poverty of thought. He appears to be internally preoccupied. He was scant in speech and did not want to discuss the delusional content that he had discussed yesterday regarding his roommate. He reports tolerating Abilify and denies experiencing adverse effects. He reports interest in obtaining dual diagnosis psychiatric and substance abuse inpatient treatment. Recommend inpatient psychiatric hospitalization. Patient's EKG from yesterday shows QTc of 456, which is decreased from QTc of 471 on previous EKG on 2023.     Diagnosis:  Mood disorder unspecified  Psychotic disorder unspecified  Differential diagnoses include: Bipolar disorder, depressed, severe with psychotic features versus Major depressive disorder, recurrent, severe with psychotic features versus substance-induced mood/psychotic disorder versus schizoaffective disorder    Recommended Treatment:   Continue medical management  Continue one-to-one observation  Continue Abilify 5 mg daily for psychosis and mood  Patient requires inpatient psychiatric hospitalization  Crisis to evaluate patient regarding inpatient psychiatric hospitalization (voluntary versus involuntary commitment) once patient is medically cleared  Patient cannot leave AMA. If patient tries to leave please reconsult psychiatry  Discussed with the primary team  Discussed with crisis  I will follow-up as necessary, please contact with questions. For off hours/weekends please consult [facility] for psychiatric purposes        Medications:   all current active meds have been reviewed, continue current psychiatric medications and current meds:   Current Facility-Administered Medications   Medication Dose Route Frequency   • acetaminophen (TYLENOL) tablet 650 mg  650 mg Oral Q6H PRN   • ARIPiprazole (ABILIFY) tablet 5 mg  5 mg Oral Daily   • cefepime (MAXIPIME) 2 g/50 mL dextrose IVPB  2,000 mg Intravenous Q12H   • hydrocortisone 1 % cream   Topical 4x Daily PRN   • LORazepam (ATIVAN) injection 1 mg  1 mg Intravenous Q6H PRN   • nicotine (NICODERM CQ) 14 mg/24hr TD 24 hr patch 1 patch  1 patch Transdermal Daily   • nicotine polacrilex (NICORETTE) gum 2 mg  2 mg Oral Q2H PRN   • OLANZapine (ZyPREXA) IM injection 5 mg  5 mg Intramuscular Q4H PRN   • ondansetron (ZOFRAN) injection 4 mg  4 mg Intravenous Q6H PRN   • oxyCODONE (ROXICODONE) IR tablet 5 mg  5 mg Oral Q4H PRN   • sodium chloride 0.9 % infusion  150 mL/hr Intravenous Continuous         Risks, benefits and possible side effects of Medications:   Risks, benefits, and possible side effects of medications have been previously explained to the patient. No new medication changes today. Labs: I have personally reviewed all pertinent laboratory results.      I have personally reviewed all pertinent laboratory/tests results.   Labs in last 72 hours:   Recent Labs     08/14/23  1248 08/15/23  0457 08/16/23  0442   WBC 9.25 37.29* 19.48*   RBC 4.21 3.72* 3.63*   HGB 12.8 11.4* 11.0*   HCT 37.5 33.8* 33.2*    203 192   RDW 13.3 13.9 14.1   TOTANEUTABS 8.88*  --   --    NEUTROABS  --   --  16.28*   SODIUM 136 137 140   K 3.6 3.7 3.5    110* 113*   CO2 21 22 21   BUN 24 17 11   CREATININE 1.08 0.89 0.69   GLUC 99 111 98   CALCIUM 8.9 7.8* 7.9*   AST 22  --   --    ALT 14  --   --    ALKPHOS 95  --   --    TP 6.4  --   --    ALB 3.8  --   --    TBILI 0.73  --   --    [id number]  --  0.407*  --    FREET4  --  0.85  --      EKG   Lab Results   Component Value Date    VENTRATE 78 08/15/2023    ATRIALRATE 78 08/15/2023    PRINT 142 08/15/2023    QRSDINT 116 08/15/2023    QTINT 400 08/15/2023    QTCINT 456 08/15/2023    PAXIS -25 08/15/2023    QRSAXIS 79 08/15/2023    TWAVEAXIS 73 08/15/2023       Gertrudis Willis DO

## 2023-08-16 NOTE — PROGRESS NOTES
Progress Note - Infectious Disease   Hermelinda Gutierrez. 26 y.o. male MRN: [id number]  Unit/Bed#: 913 [facility] 408-01 Encounter: [id number]      Impression/Plan:  1. Severe Sepsis, POA with fever, tachycardia, lactic acidosis, high leukocytosis in setting of #2  -antibiotic as below  -follow-up cultures and adjust antibiotics as needed  -monitor temperature and hemodynamics  -serial exam  -recheck CBC and BMP in a.m.     2. Polymicrobic Bacteremia. Admission blood cultures 2 of 2 sets growing Enterobacter cloacae, Klebsiella oxytoca, and Lactococcus lactis prelimilarily ID'd on BCID assay. #3 possible source. 23 CT A/P no acute infection.   -continues Cefepime 2 g IV q 12 hours  -follow up final blood cultures     3. IVDU. 23 UDS + for methamphetamines, cocaine, THC. Patient reports use in right AC 2 weeks ago. He consented to HIV/Hepatitis testing. Patient not a candidate for outpatient PICC line. 23 HIV/Hep C screen negative  -Patient not a candidate for outpatient PICC line.     4. Bipolar 1 disorder  -monitor symptoms  -symptomatic management per primary care team  -psychiatry follow up     Antibiotics:  Cefepime D2     I have discussed the above management plan in detail with patient, RN, and the primary service. Subjective:  Patient has no fever, chills, sweats overnight; no nausea, vomiting, diarrhea; no cough, shortness of breath; pain all over. Objective:  Vitals:  Temp:  [97.9 °F (36.6 °C)-98.1 °F (36.7 °C)] 98 °F (36.7 °C)  HR:  [68-82] 68  Resp:  [18] 18  BP: (112-125)/(65-83) 125/83  SpO2:  [96 %-97 %] 97 %  Temp (24hrs), Av °F (36.7 °C), Min:97.9 °F (36.6 °C), Max:98.1 °F (36.7 °C)  Current: Temperature: 98 °F (36.7 °C)    Physical Exam:   General Appearance:  26year old male chronically debilitated, resting in bed, in no acute distress.    HEENT: Atraumatic normocephalic   Throat: Oropharynx moist. Poor dentition   Pulmonary:   Normal respiratory excursion without accessory muscle use   Cardiac:  RRR   Abdomen:   Soft, non-tender, non-distended   Extremities: No edema   : No rao, no SPT   Psychiatric: Awake, cooperative   Skin: No new rashes. IV site nontender.         Labs, Imaging, & Other studies:   All pertinent labs and imaging studies were personally reviewed  Results from last 7 days   Lab Units 08/16/23  0442 08/15/23  0457 08/14/23  1248   WBC Thousand/uL 19.48* 37.29* 9.25   HEMOGLOBIN g/dL 11.0* 11.4* 12.8   PLATELETS Thousands/uL 192 203 268     Results from last 7 days   Lab Units 08/16/23  0442 08/15/23  0457 08/14/23  1248   SODIUM mmol/L 140 137 136   POTASSIUM mmol/L 3.5 3.7 3.6   CHLORIDE mmol/L 113* 110* 103   CO2 mmol/L 21 22 21   BUN mg/dL 11 17 24   CREATININE mg/dL 0.69 0.89 1.08   EGFR ml/min/1.73sq m 122 110 88   CALCIUM mg/dL 7.9* 7.8* 8.9   AST U/L  --   --  22   ALT U/L  --   --  14   ALK PHOS U/L  --   --  95     Results from last 7 days   Lab Units 08/14/23  1303 08/14/23  1259   BLOOD CULTURE  Enterobacter cloacae complex* Klebsiella oxytoca*  Lactococcus lactis*   GRAM STAIN RESULT  Gram negative rods* Gram negative rods*  Gram positive cocci in pairs*     Results from last 7 days   Lab Units 08/16/23  0442 08/15/23  0457 08/14/23  1248   PROCALCITONIN ng/ml 41.89* 89.56* 12.28*

## 2023-08-16 NOTE — ASSESSMENT & PLAN NOTE
· 2/2 blood cultures growing Enterobacter  · ID consulted  · Started on cefepime  · WBC 9 > 37> 19.48  · procal 12 > 89> 42  · CT a/p negative

## 2023-08-16 NOTE — ASSESSMENT & PLAN NOTE
· History of polysubstance abuse  · UDS 8/8/23 positive for methamphetamines, cocaine, and THC  · UDS this admission positive for methamphetamines  · Patient reports he used heroine and meth a few days ago  · Psychiatry consulted, when medically cleared recommending inpatient psychiatry admission which patient is agreeable to at this point

## 2023-08-16 NOTE — ASSESSMENT & PLAN NOTE
POA as evidenced by fever 102.3 and tachycardia with positive blood cultures  · Patient brought in by police after found rolling around in the grass complaining of pain all over  · History of drug abuse, UDS positive for methamphetamines  · CXR: no acute cardiopulmonary disease  · procalcitonin 12.28 > 89>42  · WBC 9 > 37>19.48  · Lactate 3. 5 > 1.3 s/p IVF  · UA negative  · 2/2 blood cultures positive for Enterobacter  · Continue IVF  · ID following  · Continue cefepime

## 2023-08-16 NOTE — ASSESSMENT & PLAN NOTE
· Hx of bipolar disorder requiring inpatient admission in 2018 for suicidal ideation secondary to opioid withdrawal  · Patient has not seen psychiatrist or taken psychiatric medications for a few years  · Psychiatry consulted  · Recommend continual observation and suicide precautions  · Started on Abilify  · Patient currently agreeable to inpatient psychiatric admission when medically cleared

## 2023-08-16 NOTE — PLAN OF CARE
Problem: PAIN - ADULT  Goal: Verbalizes/displays adequate comfort level or baseline comfort level  Description: Interventions:  - Encourage patient to monitor pain and request assistance  - Assess pain using appropriate pain scale  - Administer analgesics based on type and severity of pain and evaluate response  - Implement non-pharmacological measures as appropriate and evaluate response  - Consider cultural and social influences on pain and pain management  - Notify physician/advanced practitioner if interventions unsuccessful or patient reports new pain  Outcome: Progressing     Problem: INFECTION - ADULT  Goal: Absence or prevention of progression during hospitalization  Description: INTERVENTIONS:  - Assess and monitor for signs and symptoms of infection  - Monitor lab/diagnostic results  - Monitor all insertion sites, i.e. indwelling lines, tubes, and drains  - Monitor endotracheal if appropriate and nasal secretions for changes in amount and color  - [place] appropriate cooling/warming therapies per order  - Administer medications as ordered  - Instruct and encourage patient and family to use good hand hygiene technique  - Identify and instruct in appropriate isolation precautions for identified infection/condition  Outcome: Progressing     Problem: SAFETY ADULT  Goal: Patient will remain free of falls  Description: INTERVENTIONS:  - Educate patient/family on patient safety including physical limitations  - Instruct patient to call for assistance with activity   - Consult OT/PT to assist with strengthening/mobility   - Keep Call bell within reach  - Keep bed low and locked with side rails adjusted as appropriate  - Keep care items and personal belongings within reach  - Initiate and maintain comfort rounds  - Make Fall Risk Sign visible to staff  - Offer Toileting every 2 Hours, in advance of need  - Initiate/Maintain bed alarm  - Obtain necessary fall risk management equipment: yellow socks  - Apply yellow socks and bracelet for high fall risk patients  - Consider moving patient to room near nurses station  Outcome: Progressing  Goal: Maintain or return to baseline ADL function  Description: INTERVENTIONS:  -  Assess patient's ability to carry out ADLs; assess patient's baseline for ADL function and identify physical deficits which impact ability to perform ADLs (bathing, care of mouth/teeth, toileting, grooming, dressing, etc.)  - Assess/evaluate cause of self-care deficits   - Assess range of motion  - Assess patient's mobility; develop plan if impaired  - Assess patient's need for assistive devices and provide as appropriate  - Encourage maximum independence but intervene and supervise when necessary  - Involve family in performance of ADLs  - Assess for home care needs following discharge   - Consider OT consult to assist with ADL evaluation and planning for discharge  - Provide patient education as appropriate  Outcome: Progressing  Goal: Maintains/Returns to pre admission functional level  Description: INTERVENTIONS:  - Perform BMAT or MOVE assessment daily.   - Set and communicate daily mobility goal to care team and patient/family/caregiver. - Collaborate with rehabilitation services on mobility goals if consulted  - Perform Range of Motion 3 times a day. - Reposition patient every 2 hours. - Dangle patient 3 times a day  - Stand patient 3 times a day  - Ambulate patient 3 times a day  - Out of bed to chair 3 times a day   - Out of bed for meals 3 times a day  - Out of bed for toileting  - Record patient progress and toleration of activity level   Outcome: Progressing     Problem: Knowledge Deficit  Goal: Patient/family/caregiver demonstrates understanding of disease process, treatment plan, medications, and discharge instructions  Description: Complete learning assessment and assess knowledge base.   Interventions:  - Provide teaching at level of understanding  - Provide teaching via preferred learning methods  Outcome: Progressing     Problem: METABOLIC, FLUID AND ELECTROLYTES - ADULT  Goal: Electrolytes maintained within normal limits  Description: INTERVENTIONS:  - Monitor labs and assess patient for signs and symptoms of electrolyte imbalances  - Administer electrolyte replacement as ordered  - Monitor response to electrolyte replacements, including repeat lab results as appropriate  - Instruct patient on fluid and nutrition as appropriate  Outcome: Progressing  Goal: Fluid balance maintained  Description: INTERVENTIONS:  - Monitor labs   - Monitor I/O and WT  - Instruct patient on fluid and nutrition as appropriate  - Assess for signs & symptoms of volume excess or deficit  Outcome: Progressing

## 2023-08-16 NOTE — UTILIZATION REVIEW
Continued Stay Review    Date: 8- 16-23    Day 2                    Current Patient Class: inpatient  Current Level of Care: med surg     HPI:33 y.o. male initially admitted on 8-14-23     Assessment/Plan:   WBC 37.29> 19.48. PROCAL 81> 49. Preliminary Blood cultures positive x2  for enterobacter. Continue iv fluids 150/hr and iv cefepime. Patient remains on suicide precautions and continual observation. Patient may not leave A unless psychiatry re-consulted. He is considering inpatient psychiatric dual diagnosis hospitalization. Vital Signs:  Patient Vitals for the past 24 hrs:   BP Temp Pulse Resp SpO2   08/16/23 0700 125/83 98 °F (36.7 °C) 68 18 97 %   08/15/23 2100 112/65 98.1 °F (36.7 °C) 81 18 97 %   08/15/23 1809 114/65 97.9 °F (36.6 °C) 82 18 96 %   08/15/23 1500 121/73 97.9 °F (36.6 °C) 80 18 97 %   08/15/23 1401 -- 97.9 °F (36.6 °C) 82 -- 97 %       Pertinent Labs/Diagnostic Results:     CT ABDOMEN AND PELVIS WITH IV CONTRAST     INDICATION:   Bacteremia    1. No evidence of bowel obstruction, colitis, diverticulitis or appendicitis. 2. No evidence of pyonephritis or obstructive uropathy. 3. Small amount of fluid in the dependent portion of the lower abdomen/pelvis, nonspecific.        Results from last 7 days   Lab Units 08/14/23  1248   SARS-COV-2  Negative     Results from last 7 days   Lab Units 08/16/23  0442 08/15/23  0457 08/14/23  1248   WBC Thousand/uL 19.48* 37.29* 9.25   HEMOGLOBIN g/dL 11.0* 11.4* 12.8   HEMATOCRIT % 33.2* 33.8* 37.5   PLATELETS Thousands/uL 192 203 268   NEUTROS ABS Thousands/µL 16.28*  --   --    BANDS PCT %  --   --  3         Results from last 7 days   Lab Units 08/16/23  0442 08/15/23  0457 08/14/23  1248   SODIUM mmol/L 140 137 136   POTASSIUM mmol/L 3.5 3.7 3.6   CHLORIDE mmol/L 113* 110* 103   CO2 mmol/L 21 22 21   ANION GAP mmol/L 6 5 12   BUN mg/dL 11 17 24   CREATININE mg/dL 0.69 0.89 1.08   EGFR ml/min/1.73sq m 122 110 88   CALCIUM mg/dL 7.9* 7. 8* 8.9     Results from last 7 days   Lab Units 08/14/23  1248   AST U/L 22   ALT U/L 14   ALK PHOS U/L 95   TOTAL PROTEIN g/dL 6.4   ALBUMIN g/dL 3.8   TOTAL BILIRUBIN mg/dL 0.73         Results from last 7 days   Lab Units 08/16/23  0442 08/15/23  0457 08/14/23  1248   GLUCOSE RANDOM mg/dL 98 111 99       Results from last 7 days   Lab Units 08/14/23  1248   CK TOTAL U/L 102       Results from last 7 days   Lab Units 08/15/23  0457   TSH 3RD GENERATON uIU/mL 0.407*     Results from last 7 days   Lab Units 08/16/23  0442 08/15/23  0457 08/14/23  1248   PROCALCITONIN ng/ml 41.89* 89.56* 12.28*     Results from last 7 days   Lab Units 08/14/23  1448 08/14/23  1248   LACTIC ACID mmol/L 1.3 3.5*       Results from last 7 days   Lab Units 08/16/23  0442   HEP B S AG  Non-reactive   HEP C AB  Non-reactive   HEP B C IGM  Non-reactive   HEP B C TOTAL AB  Reactive*       Results from last 7 days   Lab Units 08/15/23  0321   CLARITY UA  Clear   COLOR UA  Yellow   SPEC GRAV UA  <=1.005   PH UA  6.5   GLUCOSE UA mg/dl Negative   KETONES UA mg/dl Negative   BLOOD UA  Negative   PROTEIN UA mg/dl Negative   NITRITE UA  Negative   BILIRUBIN UA  Negative   UROBILINOGEN UA E.U./dl 0.2   LEUKOCYTES UA  Negative             Results from last 7 days   Lab Units 08/15/23  0321   AMPH/METH  Positive*   BARBITURATE UR  Negative   BENZODIAZEPINE UR  Negative   COCAINE UR  Negative   METHADONE URINE  Negative   OPIATE UR  Negative   PCP UR  Negative   THC UR  Negative     Results from last 7 days   Lab Units 08/14/23  1248   ETHANOL LVL mg/dL <10       Results from last 7 days   Lab Units 08/14/23  1303 08/14/23  1259   BLOOD CULTURE  Enterobacter cloacae complex* Klebsiella-Enterobacter  group*  Alpha Hemolytic Streptococcus*   GRAM STAIN RESULT  Gram negative rods* Gram negative rods*  Gram positive cocci in pairs*                   Medications:   Scheduled Medications:  ARIPiprazole, 5 mg, Oral, Daily  cefepime, 2,000 mg, Intravenous, Q12H  nicotine, 1 patch, Transdermal, Daily      Continuous IV Infusions:  sodium chloride, 150 mL/hr, Intravenous, Continuous      PRN Meds:  acetaminophen, 650 mg, Oral, Q6H PRN  LORazepam, 1 mg, Intravenous, Q6H PRN  nicotine polacrilex, 2 mg, Oral, Q2H PRN  OLANZapine, 5 mg, Intramuscular, Q4H PRN  ondansetron, 4 mg, Intravenous, Q6H PRN  oxyCODONE, 5 mg, Oral, Q4H PRN        Discharge Plan: to be determined     Network Utilization Review Department  ATTENTION: Please call with any questions or concerns to [phone] and carefully listen to the prompts so that you are directed to the right person. All voicemails are confidential.  [facility] all requests for admission clinical reviews, approved or denied determinations and any other requests to dedicated fax number below belonging to the campus where the patient is receiving treatment.  List of dedicated fax numbers for the Facilities:  [place] DENIALS (Administrative/Medical Necessity) [phone]   [facility] (Maternity/NICU/Pediatrics) [phone]   [address] [place] [address] [phone]   [facility] [address] [facility] [phone]   [address] [facility] [address] [address] [facility] [address] [id number] [place] [address] [phone]   [address] [facility] [address] [facility]  [address]  [phone]

## 2023-08-17 ENCOUNTER — APPOINTMENT (INPATIENT)
Dept: NON INVASIVE DIAGNOSTICS | Facility: HOSPITAL | Age: 33
DRG: 724 | End: 2023-08-17
Payer: COMMERCIAL

## 2023-08-17 LAB
ALL TARGETS: NOT DETECTED
ANION GAP SERPL CALCULATED.3IONS-SCNC: 7 MMOL/L
AORTIC ROOT: 3.3 CM
APICAL FOUR CHAMBER EJECTION FRACTION: 42 %
BACTERIA BLD CULT: ABNORMAL
BUN SERPL-MCNC: 8 MG/DL (ref 5–25)
CALCIUM SERPL-MCNC: 8 MG/DL (ref 8.4–10.2)
CHLORIDE SERPL-SCNC: 112 MMOL/L (ref 96–108)
CO2 SERPL-SCNC: 22 MMOL/L (ref 21–32)
CREAT SERPL-MCNC: 0.71 MG/DL (ref 0.6–1.3)
E CLOAC COMP DNA BLD POS NAA+NON-PROBE: DETECTED
E WAVE DECELERATION TIME: 197 MS
ERYTHROCYTE [DISTWIDTH] IN BLOOD BY AUTOMATED COUNT: 13.8 % (ref 11.6–15.1)
FRACTIONAL SHORTENING: 22 % (ref 28–44)
GFR SERPL CREATININE-BSD FRML MDRD: 121 ML/MIN/1.73SQ M
GLUCOSE SERPL-MCNC: 93 MG/DL (ref 65–140)
GRAM STN SPEC: ABNORMAL
HCT VFR BLD AUTO: 32.9 % (ref 36.5–49.3)
HGB BLD-MCNC: 10.8 G/DL (ref 12–17)
INTERVENTRICULAR SEPTUM IN DIASTOLE (PARASTERNAL SHORT AXIS VIEW): 1 CM
INTERVENTRICULAR SEPTUM: 1 CM (ref 0.6–1.1)
LAAS-AP2: 25 CM2
LAAS-AP4: 29.1 CM2
LEFT ATRIUM SIZE: 4.9 CM
LEFT ATRIUM VOLUME (MOD BIPLANE): 104 ML
LEFT INTERNAL DIMENSION IN SYSTOLE: 5.3 CM (ref 2.1–4)
LEFT VENTRICULAR INTERNAL DIMENSION IN DIASTOLE: 6.8 CM (ref 3.5–6)
LEFT VENTRICULAR POSTERIOR WALL IN END DIASTOLE: 1 CM
LEFT VENTRICULAR STROKE VOLUME: 105 ML
LVSV (TEICH): 105 ML
MCH RBC QN AUTO: 29.8 PG (ref 26.8–34.3)
MCHC RBC AUTO-ENTMCNC: 32.8 G/DL (ref 31.4–37.4)
MCV RBC AUTO: 91 FL (ref 82–98)
MV E'TISSUE VEL-LAT: 11 CM/S
MV E'TISSUE VEL-SEP: 9 CM/S
MV PEAK A VEL: 0.58 M/S
MV PEAK E VEL: 89 CM/S
MV STENOSIS PRESSURE HALF TIME: 57 MS
MV VALVE AREA P 1/2 METHOD: 3.86 CM2
PLATELET # BLD AUTO: 198 THOUSANDS/UL (ref 149–390)
PMV BLD AUTO: 10.2 FL (ref 8.9–12.7)
POTASSIUM SERPL-SCNC: 3.6 MMOL/L (ref 3.5–5.3)
RBC # BLD AUTO: 3.62 MILLION/UL (ref 3.88–5.62)
RIGHT ATRIUM AREA SYSTOLE A4C: 18.3 CM2
RIGHT VENTRICLE ID DIMENSION: 3.5 CM
SL CV LEFT ATRIUM LENGTH A2C: 5.3 CM
SL CV LV EF: 40
SL CV PED ECHO LEFT VENTRICLE DIASTOLIC VOLUME (MOD BIPLANE) 2D: 238 ML
SL CV PED ECHO LEFT VENTRICLE SYSTOLIC VOLUME (MOD BIPLANE) 2D: 133 ML
SODIUM SERPL-SCNC: 141 MMOL/L (ref 135–147)
TR MAX PG: 23 MMHG
TR PEAK VELOCITY: 2.4 M/S
TRICUSPID ANNULAR PLANE SYSTOLIC EXCURSION: 1.6 CM
TRICUSPID VALVE PEAK REGURGITATION VELOCITY: 2.42 M/S
WBC # BLD AUTO: 11.54 THOUSAND/UL (ref 4.31–10.16)

## 2023-08-17 PROCEDURE — 93306 TTE W/DOPPLER COMPLETE: CPT | Performed by: INTERNAL MEDICINE

## 2023-08-17 PROCEDURE — 80048 BASIC METABOLIC PNL TOTAL CA: CPT | Performed by: NURSE PRACTITIONER

## 2023-08-17 PROCEDURE — 99232 SBSQ HOSP IP/OBS MODERATE 35: CPT | Performed by: NURSE PRACTITIONER

## 2023-08-17 PROCEDURE — 87040 BLOOD CULTURE FOR BACTERIA: CPT | Performed by: INTERNAL MEDICINE

## 2023-08-17 PROCEDURE — 93306 TTE W/DOPPLER COMPLETE: CPT

## 2023-08-17 PROCEDURE — 99232 SBSQ HOSP IP/OBS MODERATE 35: CPT | Performed by: PHYSICIAN ASSISTANT

## 2023-08-17 PROCEDURE — 85027 COMPLETE CBC AUTOMATED: CPT | Performed by: NURSE PRACTITIONER

## 2023-08-17 RX ADMIN — ARIPIPRAZOLE 5 MG: 5 TABLET ORAL at 08:24

## 2023-08-17 RX ADMIN — CEFEPIME 2000 MG: 2 INJECTION, POWDER, FOR SOLUTION INTRAVENOUS at 14:18

## 2023-08-17 RX ADMIN — CEFEPIME 2000 MG: 2 INJECTION, POWDER, FOR SOLUTION INTRAVENOUS at 00:59

## 2023-08-17 RX ADMIN — OXYCODONE HYDROCHLORIDE 5 MG: 5 TABLET ORAL at 08:28

## 2023-08-17 RX ADMIN — OXYCODONE HYDROCHLORIDE 5 MG: 5 TABLET ORAL at 15:18

## 2023-08-17 NOTE — ASSESSMENT & PLAN NOTE
POA as evidenced by fever 102.3 and tachycardia with positive blood cultures  · Patient brought in by police after found rolling around in the grass complaining of pain all over  · History of drug abuse, UDS positive for methamphetamines  · CXR: no acute cardiopulmonary disease  · procalcitonin 12.28 > 89>42-41.89  · WBC 9 > 37>19.48-11.54  · Lactate 3. 5 > 1.3 s/p IVF  · UA negative  · 2/2 blood cultures positive for Enterobacter; repeat blood cultures drawn on 8/17 to ensure clearance  · Discontinue IV fluids as patient is eating and drinking well  · ID following  · Continue cefepime

## 2023-08-17 NOTE — PROGRESS NOTES
Progress Note - Infectious Disease   [facility]. 26 y.o. male MRN: [id number]  Unit/Bed#: [address] [place] [address] Encounter: [id number]      Impression/Plan:  1. Severe Sepsis, POA with fever, tachycardia, lactic acidosis, high leukocytosis in setting of #2. WBC 37 K > 11.5 K  -antibiotic as below  -follow-up cultures and adjust antibiotics as indicated  -monitor temperature and hemodynamics  -serial exam  -monitor serial labs     2. Polymicrobic Bacteremia. Admission blood cultures 2 of 2 sets growing Enterobacter cloacae, Klebsiella oxytoca, and Lactococcus lactis prelimilarily ID'd on BCID assay. #3 possible source. 23 CT A/P no acute infection.   -continues Cefepime 2 g IV q 12 hours  -follow up final blood cultures  -follow up repeat blood cultures  -follow up TTE     3. IVDU. 23 UDS + for methamphetamines, cocaine, THC. Patient reports use in right AC 2 weeks ago. He consented to HIV/Hepatitis testing. Patient not a candidate for outpatient PICC line. 23 HIV/Hep C screen negative  -Patient not a candidate for outpatient PICC line.     4. Bipolar 1 disorder  -monitor symptoms  -symptomatic management per primary care team  -psychiatry follow up     Antibiotics:  Cefepime D3     I have discussed the above management plan in detail with patient, RN, and the primary service. Subjective:  Patient has no fever, chills, sweats overnight; no nausea, vomiting, diarrhea; no cough, shortness of breath; no pain complaints at present. Lunch has arrived. Objective:  Vitals:  Temp:  [98.1 °F (36.7 °C)-98.3 °F (36.8 °C)] 98.1 °F (36.7 °C)  HR:  [58-76] 76  Resp:  [18] 18  BP: (119-126)/(76-82) 119/76  SpO2:  [96 %-98 %] 98 %  Temp (24hrs), Av.2 °F (36.8 °C), Min:98.1 °F (36.7 °C), Max:98.3 °F (36.8 °C)  Current: Temperature: 98.1 °F (36.7 °C)    Physical Exam:   General Appearance:  26year old male chronically debilitated, resting in bed, in no acute distress.    HEENT: Atraumatic normocephalic   Throat: Oropharynx moist. Poor dentition   Pulmonary:   Normal respiratory excursion without accessory muscle use   Cardiac:  RRR   Abdomen:   Soft, non-tender, non-distended   Extremities: No edema   : No rao, no SPT   Psychiatric: Awake, cooperative   Skin: No new rashes. IV site nontender. Labs, Imaging, & Other studies:   All pertinent labs and imaging studies were personally reviewed  Results from last 7 days   Lab Units 08/17/23  0509 08/16/23  0442 08/15/23  0457   WBC Thousand/uL 11.54* 19.48* 37.29*   HEMOGLOBIN g/dL 10.8* 11.0* 11.4*   PLATELETS Thousands/uL 198 192 203     Results from last 7 days   Lab Units 08/17/23  0509 08/16/23  0442 08/15/23  0457 08/14/23  1248   SODIUM mmol/L 141 140 137 136   POTASSIUM mmol/L 3.6 3.5 3.7 3.6   CHLORIDE mmol/L 112* 113* 110* 103   CO2 mmol/L 22 21 22 21   BUN mg/dL 8 11 17 24   CREATININE mg/dL 0.71 0.69 0.89 1.08   EGFR ml/min/1.73sq m 121 122 110 88   CALCIUM mg/dL 8.0* 7.9* 7.8* 8.9   AST U/L  --   --   --  22   ALT U/L  --   --   --  14   ALK PHOS U/L  --   --   --  95     Results from last 7 days   Lab Units 08/17/23  0509 08/14/23  1303 08/14/23  1259   BLOOD CULTURE  Received in Microbiology Lab. Culture in Progress. Received in Microbiology Lab. Culture in Progress.  Enterobacter cloacae complex* Klebsiella oxytoca*  Lactococcus lactis*   GRAM STAIN RESULT   --  Gram negative rods* Gram negative rods*  Gram positive cocci in pairs*     Results from last 7 days   Lab Units 08/16/23  0442 08/15/23  0457 08/14/23  1248   PROCALCITONIN ng/ml 41.89* 89.56* 12.28*

## 2023-08-17 NOTE — ASSESSMENT & PLAN NOTE
· 2/2 blood cultures growing Enterobacter  · ID consulted  · Started on cefepime  · WBC 9 > 37> 19.48> 11.54  · procal 12 > 89> 42> 41.89  · CT a/p negative  · 2D echo performed shows EF 40%, left atrium severely dilated, systolic function moderately reduced, moderate global hypokinesis, mild MVR, mild TVR, PA pressure 30, trace to mild pulmonic valve regurgitation, no obvious large vegetation noted as per cardiology report.

## 2023-08-17 NOTE — PLAN OF CARE
Problem: PAIN - ADULT  Goal: Verbalizes/displays adequate comfort level or baseline comfort level  Description: Interventions:  - Encourage patient to monitor pain and request assistance  - Assess pain using appropriate pain scale  - Administer analgesics based on type and severity of pain and evaluate response  - Implement non-pharmacological measures as appropriate and evaluate response  - Consider cultural and social influences on pain and pain management  - Notify physician/advanced practitioner if interventions unsuccessful or patient reports new pain  Outcome: Progressing     Problem: INFECTION - ADULT  Goal: Absence or prevention of progression during hospitalization  Description: INTERVENTIONS:  - Assess and monitor for signs and symptoms of infection  - Monitor lab/diagnostic results  - Monitor all insertion sites, i.e. indwelling lines, tubes, and drains  - Monitor endotracheal if appropriate and nasal secretions for changes in amount and color  - [place] appropriate cooling/warming therapies per order  - Administer medications as ordered  - Instruct and encourage patient and family to use good hand hygiene technique  - Identify and instruct in appropriate isolation precautions for identified infection/condition  Outcome: Progressing     Problem: SAFETY ADULT  Goal: Patient will remain free of falls  Description: INTERVENTIONS:  - Educate patient/family on patient safety including physical limitations  - Instruct patient to call for assistance with activity   - Consult OT/PT to assist with strengthening/mobility   - Keep Call bell within reach  - Keep bed low and locked with side rails adjusted as appropriate  - Keep care items and personal belongings within reach  - Initiate and maintain comfort rounds  - Make Fall Risk Sign visible to staff  - Offer Toileting every 2 Hours, in advance of need  - Initiate/Maintain bed alarm.   Problem: DISCHARGE PLANNING  Goal: Discharge to home or other facility with appropriate resources  Description: INTERVENTIONS:  - Identify barriers to discharge w/patient and caregiver  - Arrange for needed discharge resources and transportation as appropriate  - Identify discharge learning needs (meds, wound care, etc.)  - Arrange for interpretive services to assist at discharge as needed  - Refer to Case Management Department for coordinating discharge planning if the patient needs post-hospital services based on physician/advanced practitioner order or complex needs related to functional status, cognitive ability, or social support system  Outcome: Progressing     Problem: Knowledge Deficit  Goal: Patient/family/caregiver demonstrates understanding of disease process, treatment plan, medications, and discharge instructions  Description: Complete learning assessment and assess knowledge base.   Interventions:  - Provide teaching at level of understanding  - Provide teaching via preferred learning methods  Outcome: Progressing     Problem: METABOLIC, FLUID AND ELECTROLYTES - ADULT  Goal: Electrolytes maintained within normal limits  Description: INTERVENTIONS:  - Monitor labs and assess patient for signs and symptoms of electrolyte imbalances  - Administer electrolyte replacement as ordered  - Monitor response to electrolyte replacements, including repeat lab results as appropriate  - Instruct patient on fluid and nutrition as appropriate  Outcome: Progressing  Goal: Fluid balance maintained  Description: INTERVENTIONS:  - Monitor labs   - Monitor I/O and WT  - Instruct patient on fluid and nutrition as appropriate  - Assess for signs & symptoms of volume excess or deficit  Outcome: Progressing     - Apply yellow socks and bracelet for high fall risk patients  - Consider moving patient to room near nurses station  Outcome: Progressing

## 2023-08-17 NOTE — PROGRESS NOTES
[facility]  Progress Note  Name: Jacqui José I  MRN: [id number]  Unit/Bed#: 4 [place] 408-01 I Date of Admission: 8/14/2023   Date of Service: 8/17/2023 I Hospital Day: 2    Assessment/Plan   * Sepsis [facility])  Assessment & Plan  POA as evidenced by fever 102.3 and tachycardia with positive blood cultures  · Patient brought in by police after found rolling around in the grass complaining of pain all over  · History of drug abuse, UDS positive for methamphetamines  · CXR: no acute cardiopulmonary disease  · procalcitonin 12.28 > 89>42-41.89  · WBC 9 > 37>19.48-11.54  · Lactate 3. 5 > 1.3 s/p IVF  · UA negative  · 2/2 blood cultures positive for Enterobacter; repeat blood cultures drawn on 8/17 to ensure clearance  · Discontinue IV fluids as patient is eating and drinking well  · ID following  · Continue cefepime    Bacteremia  Assessment & Plan  · 2/2 blood cultures growing Enterobacter  · ID consulted  · Started on cefepime  · WBC 9 > 37> 19.48> 11.54  · procal 12 > 89> 42> 41.89  · CT a/p negative  · 2D echo performed shows EF 40%, left atrium severely dilated, systolic function moderately reduced, moderate global hypokinesis, mild MVR, mild TVR, PA pressure 30, trace to mild pulmonic valve regurgitation, no obvious large vegetation noted as per cardiology report.     History of drug abuse ([address])  Assessment & Plan  · History of polysubstance abuse  · UDS 8/8/23 positive for methamphetamines, cocaine, and THC  · UDS this admission positive for methamphetamines  · Patient reports he used heroine and meth a few days ago  · Psychiatry consulted, when medically cleared recommending inpatient psychiatry admission which patient is agreeable to at this point    Bipolar 1 disorder [facility])  Assessment & Plan  · Hx of bipolar disorder requiring inpatient admission in 2018 for suicidal ideation secondary to opioid withdrawal  · Patient has not seen psychiatrist or taken psychiatric medications for a few years  · Psychiatry consulted  · Recommend continual observation and suicide precautions  · Started on Abilify  · Patient currently agreeable to inpatient psychiatric admission when medically cleared                VTE Pharmacologic Prophylaxis: VTE Score: 1 Low Risk (Score 0-2) - Encourage Ambulation. Patient Centered Rounds: I performed bedside rounds with nursing staff today. Discussions with Specialists or Other Care Team Provider: multidisciplinary team     Education and Discussions with Family / Patient: Patient declined call to . Total Time Spent on Date of Encounter in care of patient: 45 minutes This time was spent on one or more of the following: performing physical exam; counseling and coordination of care; obtaining or reviewing history; documenting in the medical record; reviewing/ordering tests, medications or procedures; communicating with other healthcare professionals and discussing with patient's family/caregivers. Current Length of Stay: 2 day(s)  Current Patient Status: Inpatient   Certification Statement: The patient will continue to require additional inpatient hospital stay due to continued IV abx, repeat labs   Discharge Plan: Anticipate discharge in 48-72 hrs to inpatient psychiatry admission when medically stable     Code Status: Level 1 - Full Code    Subjective:   Patient seen Carmita Mclaughlin in bed resting comfortably. Has been ambulating in the room to the bathroom, no difficulties. Denies any fevers or chills. Good appetite no nausea or vomiting. Objective:     Vitals:   Temp (24hrs), Av.1 °F (36.7 °C), Min:98.1 °F (36.7 °C), Max:98.1 °F (36.7 °C)    Temp:  [98.1 °F (36.7 °C)] 98.1 °F (36.7 °C)  HR:  [72-76] 76  Resp:  [18] 18  BP: (119-126)/(76-80) 119/76  SpO2:  [96 %-98 %] 98 %  Body mass index is 23.09 kg/m². Input and Output Summary (last 24 hours):      Intake/Output Summary (Last 24 hours) at 2023 3502  Last data filed at 2023 0055  Gross per 24 hour   Intake --   Output 600 ml   Net -600 ml       Physical Exam:   Physical Exam  Vitals and nursing note reviewed. Constitutional:       Appearance: Normal appearance. HENT:      Head: Normocephalic. Nose: Nose normal.      Mouth/Throat:      Mouth: Mucous membranes are moist.   Eyes:      Extraocular Movements: Extraocular movements intact. Conjunctiva/sclera: Conjunctivae normal.   Cardiovascular:      Rate and Rhythm: Normal rate and regular rhythm. Heart sounds: Murmur heard. Pulmonary:      Effort: Pulmonary effort is normal.      Breath sounds: Normal breath sounds. Abdominal:      General: Bowel sounds are normal. There is no distension. Palpations: Abdomen is soft. Tenderness: There is no abdominal tenderness. Genitourinary:     Comments: Voiding spontaneously   Musculoskeletal:         General: Normal range of motion. Cervical back: Normal range of motion. Right lower leg: No edema. Left lower leg: No edema. Skin:     General: Skin is warm and dry. Capillary Refill: Capillary refill takes less than 2 seconds. Neurological:      General: No focal deficit present. Mental Status: He is alert and oriented to person, place, and time. Psychiatric:         Mood and Affect: Mood normal.         Behavior: Behavior normal.         Thought Content:  Thought content normal.         Judgment: Judgment normal.          Additional Data:     Labs:  Results from last 7 days   Lab Units 08/17/23  0509 08/16/23  0442 08/15/23  0457 08/14/23  1248   WBC Thousand/uL 11.54* 19.48*   < > 9.25   HEMOGLOBIN g/dL 10.8* 11.0*   < > 12.8   HEMATOCRIT % 32.9* 33.2*   < > 37.5   PLATELETS Thousands/uL 198 192   < > 268   BANDS PCT %  --   --   --  3   NEUTROS PCT %  --  83*  --   --    LYMPHS PCT %  --  10*  --   --    LYMPHO PCT %  --   --   --  3*   MONOS PCT %  --  4  --   --    MONO PCT %  --   --   --  0*   EOS PCT %  --  2  --  0    < > = values in this interval not displayed. Results from last 7 days   Lab Units 08/17/23  0509 08/15/23  0457 08/14/23  1248   SODIUM mmol/L 141   < > 136   POTASSIUM mmol/L 3.6   < > 3.6   CHLORIDE mmol/L 112*   < > 103   CO2 mmol/L 22   < > 21   BUN mg/dL 8   < > 24   CREATININE mg/dL 0.71   < > 1.08   ANION GAP mmol/L 7   < > 12   CALCIUM mg/dL 8.0*   < > 8.9   ALBUMIN g/dL  --   --  3.8   TOTAL BILIRUBIN mg/dL  --   --  0.73   ALK PHOS U/L  --   --  95   ALT U/L  --   --  14   AST U/L  --   --  22   GLUCOSE RANDOM mg/dL 93   < > 99    < > = values in this interval not displayed. Results from last 7 days   Lab Units 08/16/23  0442 08/15/23  0457 08/14/23  1448 08/14/23  1248   LACTIC ACID mmol/L  --   --  1.3 3.5*   PROCALCITONIN ng/ml 41.89* 89.56*  --  12.28*       Lines/Drains:  Invasive Devices     Peripheral Intravenous Line  Duration           Peripheral IV 08/14/23 Right;Upper;Ventral (anterior) Arm 3 days                      Imaging: No pertinent imaging reviewed. Recent Cultures (last 7 days):   Results from last 7 days   Lab Units 08/17/23  0509 08/14/23  1303 08/14/23  1259   BLOOD CULTURE  Received in Microbiology Lab. Culture in Progress. Received in Microbiology Lab. Culture in Progress.  Enterobacter cloacae* Klebsiella oxytoca*  Lactococcus lactis*   GRAM STAIN RESULT   --  Gram negative rods* Gram negative rods*  Gram positive cocci in pairs*       Last 24 Hours Medication List:   Current Facility-Administered Medications   Medication Dose Route Frequency Provider Last Rate   • acetaminophen  650 mg Oral Q6H PRN Perez Valles PA-C     • ARIPiprazole  5 mg Oral Daily Azeem Espinal DO     • cefepime  2,000 mg Intravenous Q12H Luis Garcia PA-C 2,000 mg (08/17/23 1418)   • hydrocortisone   Topical 4x Daily PRN JEOVANNY King     • LORazepam  1 mg Intravenous Q6H PRN John Hammonds DO     • nicotine  1 patch Transdermal Daily Perez Valles PA-C     • nicotine polacrilex  2 mg Oral Q2H PRN Bill Christianson PA-C     • OLANZapine  5 mg Intramuscular Q4H PRN Bill Christianson PA-C     • ondansetron  4 mg Intravenous Q6H PRN Bill Christianson PA-C     • oxyCODONE  5 mg Oral Q4H PRN Bill Christianson PA-C          Today, Patient Was Seen By: JEOVANNY Dominique    **Please Note: This note may have been constructed using a voice recognition system. **

## 2023-08-17 NOTE — PLAN OF CARE
Problem: PAIN - ADULT  Goal: Verbalizes/displays adequate comfort level or baseline comfort level  Description: Interventions:  - Encourage patient to monitor pain and request assistance  - Assess pain using appropriate pain scale  - Administer analgesics based on type and severity of pain and evaluate response  - Implement non-pharmacological measures as appropriate and evaluate response  - Consider cultural and social influences on pain and pain management  - Notify physician/advanced practitioner if interventions unsuccessful or patient reports new pain  Outcome: Progressing       Problem: INFECTION - ADULT  Goal: Absence of fever/infection during neutropenic period  Description: INTERVENTIONS:  - Monitor WBC    Outcome: Progressing

## 2023-08-18 LAB
ANION GAP SERPL CALCULATED.3IONS-SCNC: 7 MMOL/L
ATRIAL RATE: 78 BPM
BUN SERPL-MCNC: 10 MG/DL (ref 5–25)
CALCIUM SERPL-MCNC: 8.3 MG/DL (ref 8.4–10.2)
CHLORIDE SERPL-SCNC: 108 MMOL/L (ref 96–108)
CO2 SERPL-SCNC: 23 MMOL/L (ref 21–32)
CREAT SERPL-MCNC: 0.77 MG/DL (ref 0.6–1.3)
ERYTHROCYTE [DISTWIDTH] IN BLOOD BY AUTOMATED COUNT: 13.6 % (ref 11.6–15.1)
GFR SERPL CREATININE-BSD FRML MDRD: 117 ML/MIN/1.73SQ M
GLUCOSE SERPL-MCNC: 92 MG/DL (ref 65–140)
HCT VFR BLD AUTO: 36.7 % (ref 36.5–49.3)
HGB BLD-MCNC: 12 G/DL (ref 12–17)
MCH RBC QN AUTO: 29.3 PG (ref 26.8–34.3)
MCHC RBC AUTO-ENTMCNC: 32.7 G/DL (ref 31.4–37.4)
MCV RBC AUTO: 90 FL (ref 82–98)
P AXIS: -25 DEGREES
PLATELET # BLD AUTO: 227 THOUSANDS/UL (ref 149–390)
PMV BLD AUTO: 10.1 FL (ref 8.9–12.7)
POTASSIUM SERPL-SCNC: 3.7 MMOL/L (ref 3.5–5.3)
PR INTERVAL: 142 MS
QRS AXIS: 79 DEGREES
QRSD INTERVAL: 116 MS
QT INTERVAL: 400 MS
QTC INTERVAL: 456 MS
RBC # BLD AUTO: 4.1 MILLION/UL (ref 3.88–5.62)
SODIUM SERPL-SCNC: 138 MMOL/L (ref 135–147)
T WAVE AXIS: 73 DEGREES
VENTRICULAR RATE: 78 BPM
WBC # BLD AUTO: 10.37 THOUSAND/UL (ref 4.31–10.16)

## 2023-08-18 PROCEDURE — 99233 SBSQ HOSP IP/OBS HIGH 50: CPT | Performed by: PHYSICIAN ASSISTANT

## 2023-08-18 PROCEDURE — 93010 ELECTROCARDIOGRAM REPORT: CPT | Performed by: INTERNAL MEDICINE

## 2023-08-18 PROCEDURE — 80048 BASIC METABOLIC PNL TOTAL CA: CPT | Performed by: NURSE PRACTITIONER

## 2023-08-18 PROCEDURE — 99232 SBSQ HOSP IP/OBS MODERATE 35: CPT | Performed by: STUDENT IN AN ORGANIZED HEALTH CARE EDUCATION/TRAINING PROGRAM

## 2023-08-18 PROCEDURE — 85027 COMPLETE CBC AUTOMATED: CPT | Performed by: NURSE PRACTITIONER

## 2023-08-18 RX ORDER — DIPHENHYDRAMINE HYDROCHLORIDE, ZINC ACETATE 2; .1 G/100G; G/100G
CREAM TOPICAL 3 TIMES DAILY PRN
Status: DISCONTINUED | OUTPATIENT
Start: 2023-08-18 | End: 2023-08-24

## 2023-08-18 RX ADMIN — CEFEPIME 2000 MG: 2 INJECTION, POWDER, FOR SOLUTION INTRAVENOUS at 01:44

## 2023-08-18 RX ADMIN — OXYCODONE HYDROCHLORIDE 5 MG: 5 TABLET ORAL at 10:08

## 2023-08-18 RX ADMIN — DIPHENHYDRAMINE HYDROCHLORIDE, ZINC ACETATE 1 APPLICATION: 2; .1 CREAM TOPICAL at 16:27

## 2023-08-18 RX ADMIN — CEFEPIME 2000 MG: 2 INJECTION, POWDER, FOR SOLUTION INTRAVENOUS at 12:12

## 2023-08-18 RX ADMIN — ACETAMINOPHEN 650 MG: 325 TABLET ORAL at 10:08

## 2023-08-18 RX ADMIN — LORAZEPAM 1 MG: 2 INJECTION INTRAMUSCULAR; INTRAVENOUS at 15:30

## 2023-08-18 RX ADMIN — ARIPIPRAZOLE 5 MG: 5 TABLET ORAL at 09:12

## 2023-08-18 NOTE — PROGRESS NOTES
[facility]  Progress Note  Name: Jacqui José I  MRN: [id number]  Unit/Bed#: [address] [place] [address] Date of Admission: 8/14/2023   Date of Service: 8/18/2023 I Hospital Day: 3    Assessment/Plan   * Sepsis [facility])  Assessment & Plan  POA as evidenced by fever 102.3 and tachycardia with positive blood cultures  · Patient brought in by police after found rolling around in the grass complaining of pain all over  · History of drug abuse, UDS positive for methamphetamines  · CXR: no acute cardiopulmonary disease  · procalcitonin 12.28 > 89>42-41.89  · WBC 9 > 37>19.48-11.54  · Lactate 3. 5 > 1.3 s/p IVF  · UA negative  · 2/2 blood cultures positive for Enterobacter; repeat blood cultures drawn on 8/17 to ensure clearance  · Discontinue IV fluids as patient is eating and drinking well  · ID recs:   · Continue IV cefepime  · Follow-up repeat blood cultures to ensure clearance of bacteremia. · AMY to definitively rule out endocarditis and help determine antibiotic duration. · Continue cefepime    Bacteremia  Assessment & Plan  · 2/2 blood cultures: Polymicrobial Enterobacter, Klebsiella, lacto coccus lactose  · ID following see sepsis  · Started on cefepime  · WBC 9 > 37> 19.48> 11.54  · procal 12 > 89> 42> 41.89  · CT a/p negative  · 2D echo performed shows EF 40%, left atrium severely dilated, systolic function moderately reduced, moderate global hypokinesis, mild MVR, mild TVR, PA pressure 30, trace to mild pulmonic valve regurgitation, no obvious large vegetation noted as per cardiology report.     History of drug abuse [facility])  Assessment & Plan  History of polysubstance abuse  · UDS 8/8/23 positive for methamphetamines, cocaine, and THC  · UDS this admission positive for methamphetamines  · Patient reports he used heroine and meth a few days ago  · Psychiatry recs:   · when medically cleared recommending inpatient psychiatry admission which patient is agreeable to at this point    Bipolar 1 disorder [facility])  Assessment & Plan  · Hx of bipolar disorder requiring inpatient admission in 2018 for suicidal ideation secondary to opioid withdrawal  · Patient has not seen psychiatrist or taken psychiatric medications for a few years  · Psychiatry consulted  · Recommend continual observation and suicide precautions  · Started on Abilify  · Patient currently agreeable to inpatient psychiatric admission when medically cleared              VTE Pharmacologic Prophylaxis: VTE Score: 1 Low Risk (Score 0-2) - Encourage Ambulation. Patient Centered Rounds: I performed bedside rounds with nursing staff today. Discussions with Specialists or Other Care Team Provider: multidisciplinary team     Education and Discussions with Family / Patient: Patient declined call to . Total Time Spent on Date of Encounter in care of patient: 45 minutes This time was spent on one or more of the following: performing physical exam; counseling and coordination of care; obtaining or reviewing history; documenting in the medical record; reviewing/ordering tests, medications or procedures; communicating with other healthcare professionals and discussing with patient's family/caregivers. Current Length of Stay: 3 day(s)  Current Patient Status: Inpatient   Certification Statement: The patient will continue to require additional inpatient hospital stay due to continued IV abx, repeat labs   Discharge Plan: Anticipate discharge in 48-72 hrs to inpatient psychiatry admission when medically stable     Code Status: Level 1 - Full Code    Subjective:   Patient seen and examined at bedside. Patient reported that he had foot pain, denied any headache, blurry vision chest pain or shortness of breath. Patient denied any suicidal ideations.   Objective:     Vitals:   Temp (24hrs), Av.1 °F (36.7 °C), Min:98 °F (36.7 °C), Max:98.1 °F (36.7 °C)    Temp:  [98 °F (36.7 °C)-98.1 °F (36.7 °C)] 98 °F (36.7 °C)  HR:  [71-73] 73  Resp: [18] 18  BP: (119-125)/(70-81) 125/81  SpO2:  [98 %] 98 %  Body mass index is 23.09 kg/m². Input and Output Summary (last 24 hours): Intake/Output Summary (Last 24 hours) at 8/18/2023 1751  Last data filed at 8/18/2023 0456  Gross per 24 hour   Intake --   Output 400 ml   Net -400 ml       Physical Exam:   Physical Exam  Vitals and nursing note reviewed. Constitutional:       General: He is not in acute distress. Appearance: He is well-developed. HENT:      Head: Normocephalic and atraumatic. Eyes:      Conjunctiva/sclera: Conjunctivae normal.   Cardiovascular:      Rate and Rhythm: Normal rate and regular rhythm. Heart sounds: No murmur heard. Pulmonary:      Effort: Pulmonary effort is normal. No respiratory distress. Breath sounds: Normal breath sounds. Abdominal:      Palpations: Abdomen is soft. Tenderness: There is no abdominal tenderness. Musculoskeletal:         General: No swelling. Cervical back: Neck supple. Skin:     General: Skin is warm and dry. Capillary Refill: Capillary refill takes less than 2 seconds. Neurological:      Mental Status: He is alert. Psychiatric:         Mood and Affect: Mood normal.          Additional Data:     Labs:  Results from last 7 days   Lab Units 08/18/23 0455 08/17/23  0509 08/16/23  0442 08/15/23  0457 08/14/23  1248   WBC Thousand/uL 10.37*   < > 19.48*   < > 9.25   HEMOGLOBIN g/dL 12.0   < > 11.0*   < > 12.8   HEMATOCRIT % 36.7   < > 33.2*   < > 37.5   PLATELETS Thousands/uL 227   < > 192   < > 268   BANDS PCT %  --   --   --   --  3   NEUTROS PCT %  --   --  83*  --   --    LYMPHS PCT %  --   --  10*  --   --    LYMPHO PCT %  --   --   --   --  3*   MONOS PCT %  --   --  4  --   --    MONO PCT %  --   --   --   --  0*   EOS PCT %  --   --  2  --  0    < > = values in this interval not displayed.      Results from last 7 days   Lab Units 08/18/23  0455 08/15/23  0457 08/14/23  1248   SODIUM mmol/L 138   < > 136 POTASSIUM mmol/L 3.7   < > 3.6   CHLORIDE mmol/L 108   < > 103   CO2 mmol/L 23   < > 21   BUN mg/dL 10   < > 24   CREATININE mg/dL 0.77   < > 1.08   ANION GAP mmol/L 7   < > 12   CALCIUM mg/dL 8.3*   < > 8.9   ALBUMIN g/dL  --   --  3.8   TOTAL BILIRUBIN mg/dL  --   --  0.73   ALK PHOS U/L  --   --  95   ALT U/L  --   --  14   AST U/L  --   --  22   GLUCOSE RANDOM mg/dL 92   < > 99    < > = values in this interval not displayed. Results from last 7 days   Lab Units 08/16/23  0442 08/15/23  0457 08/14/23  1448 08/14/23  1248   LACTIC ACID mmol/L  --   --  1.3 3.5*   PROCALCITONIN ng/ml 41.89* 89.56*  --  12.28*       Lines/Drains:  Invasive Devices     Peripheral Intravenous Line  Duration           Peripheral IV 08/18/23 Right;Ventral (anterior) Forearm <1 day              Imaging: Reviewed radiology reports from this admission including: abdominal/pelvic CT    Recent Cultures (last 7 days):   Results from last 7 days   Lab Units 08/17/23  0509 08/14/23  1303 08/14/23  1259   BLOOD CULTURE  No Growth at 24 hrs. No Growth at 24 hrs.  Enterobacter cloacae* Klebsiella oxytoca*  Lactococcus lactis*   GRAM STAIN RESULT   --  Gram negative rods* Gram negative rods*  Gram positive cocci in pairs*       Last 24 Hours Medication List:   Current Facility-Administered Medications   Medication Dose Route Frequency Provider Last Rate   • acetaminophen  650 mg Oral Q6H PRN Roxanna Gaxiola PA-C     • ARIPiprazole  5 mg Oral Daily Azeem Espinal DO     • cefepime  2,000 mg Intravenous Q12H Mechelle Sweeney PA-C 2,000 mg (08/18/23 1212)   • diphenhydrAMINE-zinc acetate   Topical TID PRN Mechelle Sweeney PA-C     • hydrocortisone   Topical 4x Daily PRN JEOVANNY Scott     • LORazepam  1 mg Intravenous Q6H PRN John Hammonds DO     • nicotine  1 patch Transdermal Daily Roxanna Gaxiola PA-C     • nicotine polacrilex  2 mg Oral Q2H PRN Roxanna Gaxiola PA-C     • OLANZapine  5 mg Intramuscular Q4H PRN Vinnie Gallardo Olivia Camacho PA-C     • ondansetron  4 mg Intravenous Q6H PRN Nusrat Billy PA-C     • oxyCODONE  5 mg Oral Q4H PRN Nusrat Billy PA-C          Today, Patient Was Seen By: Beto Elizondo MD    **Please Note: This note may have been constructed using a voice recognition system. **

## 2023-08-18 NOTE — ASSESSMENT & PLAN NOTE
History of polysubstance abuse  · UDS 8/8/23 positive for methamphetamines, cocaine, and THC  · UDS this admission positive for methamphetamines  · Patient reports he used heroine and meth a few days ago  · Counseled

## 2023-08-18 NOTE — ASSESSMENT & PLAN NOTE
· Hx of bipolar disorder requiring inpatient admission in 2018 for suicidal ideation secondary to opioid withdrawal  · Patient has not seen psychiatrist or taken psychiatric medications for a few years  · Psychiatry consulted  · Recommend continual observation and suicide precautions  · Started on Abilify  · Patient is currently medically optimal for further psychiatric/crisis evaluation for further treatment

## 2023-08-18 NOTE — PROGRESS NOTES
Progress Note - Infectious Disease   Anatoly Delacruz 26 y.o. male MRN: [id number]  Unit/Bed#: [address] [place] [address] Encounter: [id number]      Impression/Plan:  1. Severe Sepsis, POA with fever, tachycardia, lactic acidosis, high leukocytosis in setting of #2. WBC 37 K > 10.3 K  -antibiotic as below  -follow-up cultures and adjust antibiotics as indicated  -monitor temperature and hemodynamics  -serial exam  -monitor serial labs     2. Polymicrobic Bacteremia. Admission blood cultures 2 of 2 sets growing Enterobacter cloacae, Klebsiella oxytoca, and Lactococcus lactis. #3 possible source. 8/14/23 CT A/P no acute infection. 8/17/23 TTE no obvious large vegetation seen  -continues Cefepime 2 g IV q 12 hours  -follow up final blood cultures  -follow up repeat blood cultures  -AMY recommended to facilitate antibiotic duration determination     3. IVDU. 8/8/23 UDS + for methamphetamines, cocaine, THC. Patient reports use in right AC 2 weeks ago. He consented to HIV/Hepatitis testing. Patient not a candidate for outpatient PICC line. 8/16/23 HIV/Hep C screen negative  -Patient not a candidate for outpatient PICC line.     4. Bipolar 1 disorder  -monitor symptoms  -symptomatic management per primary care team  -psychiatry follow up     5. Tape adhesive eruptions.   -Benadryl cream to left forearm and right hand sites as needed.  -serial exam    Antibiotics:  Cefepime D4     I have discussed the above management plan in detail with patient, RN, and Barbra Peabody from the primary service. We will see patient again 8/21/23 if here. Please call ID on call physician in meantime if questions. Subjective:  Patient has no fever, chills, sweats overnight; no nausea, vomiting, diarrhea; no cough, shortness of breath; no pain complaints at present. Has 2 areas that itch on his left forearm and right dorsal lateral hand at prior IV sites, possible adhesive tape related.      Objective:  Vitals:  Temp:  [98.1 °F (36.7 °C)] 98.1 °F (36.7 °C)  HR:  [71] 71  Resp:  [18] 18  BP: (119)/(70) 119/70  SpO2:  [98 %] 98 %  Temp (24hrs), Av.1 °F (36.7 °C), Min:98.1 °F (36.7 °C), Max:98.1 °F (36.7 °C)  Current: Temperature: 98.1 °F (36.7 °C)    Physical Exam:   General Appearance:  26year old male chronically debilitated, resting in bed, in no acute distress. HEENT: Atraumatic normocephalic   Throat: Oropharynx moist. Poor dentition   Pulmonary:   Normal respiratory excursion without accessory muscle use   Cardiac:  RRR   Abdomen:   Soft, non-tender, non-distended   Extremities: No edema   : No rao, no SPT   Psychiatric: Awake, cooperative   Skin: Scratched irritation at left forearm and right dorsal lateral hand to prior IV sites. No diffuse rash. IV site nontender. Labs, Imaging, & Other studies:   All pertinent labs and imaging studies were personally reviewed  Results from last 7 days   Lab Units 23  0455 23  0509 23  0442   WBC Thousand/uL 10.37* 11.54* 19.48*   HEMOGLOBIN g/dL 12.0 10.8* 11.0*   PLATELETS Thousands/uL 227 198 192     Results from last 7 days   Lab Units 23  0455 23  0509 23  0442 08/15/23  0457 23  1248   SODIUM mmol/L 138 141 140   < > 136   POTASSIUM mmol/L 3.7 3.6 3.5   < > 3.6   CHLORIDE mmol/L 108 112* 113*   < > 103   CO2 mmol/L 23 22 21   < > 21   BUN mg/dL 10 8 11   < > 24   CREATININE mg/dL 0.77 0.71 0.69   < > 1.08   EGFR ml/min/1.73sq m 117 121 122   < > 88   CALCIUM mg/dL 8.3* 8.0* 7.9*   < > 8.9   AST U/L  --   --   --   --  22   ALT U/L  --   --   --   --  14   ALK PHOS U/L  --   --   --   --  95    < > = values in this interval not displayed. Results from last 7 days   Lab Units 23  0509 23  1303 23  1259   BLOOD CULTURE  No Growth at 24 hrs. No Growth at 24 hrs.  Enterobacter cloacae* Klebsiella oxytoca*  Lactococcus lactis*   GRAM STAIN RESULT   --  Gram negative rods* Gram negative rods*  Gram positive cocci in pairs*     Results from last 7 days   Lab Units 08/16/23  0442 08/15/23  0457 08/14/23  1248   PROCALCITONIN ng/ml 41.89* 89.56* 12.28*

## 2023-08-18 NOTE — ASSESSMENT & PLAN NOTE
2/2 blood cultures: Polymicrobial Enterobacter, Klebsiella, lacto coccus lactose  Source unclear but history of IVDU  · CT a/p negative  · 2D echo performed shows EF 40%, left atrium severely dilated, systolic function moderately reduced, moderate global hypokinesis, mild MVR, mild TVR, PA pressure 30, trace to mild pulmonic valve regurgitation, no obvious large vegetation noted as per cardiology report. · AMY negative  · Repeat blood cultures negative  · Antibiotics as above  · Discussed regarding risk of bacteremia and sequela due to IVDU.   Counseled for substance abuse abstinence

## 2023-08-18 NOTE — PLAN OF CARE
Problem: PAIN - ADULT  Goal: Verbalizes/displays adequate comfort level or baseline comfort level  Description: Interventions:  - Encourage patient to monitor pain and request assistance  - Assess pain using appropriate pain scale  - Administer analgesics based on type and severity of pain and evaluate response  - Implement non-pharmacological measures as appropriate and evaluate response  - Consider cultural and social influences on pain and pain management  - Notify physician/advanced practitioner if interventions unsuccessful or patient reports new pain  Outcome: Progressing     Problem: INFECTION - ADULT  Goal: Absence or prevention of progression during hospitalization  Description: INTERVENTIONS:  - Assess and monitor for signs and symptoms of infection  - Monitor lab/diagnostic results  - Monitor all insertion sites, i.e. indwelling lines, tubes, and drains  - Monitor endotracheal if appropriate and nasal secretions for changes in amount and color  - [place] appropriate cooling/warming therapies per order  - Administer medications as ordered  - Instruct and encourage patient and family to use good hand hygiene technique  - Identify and instruct in appropriate isolation precautions for identified infection/condition  Outcome: Progressing  Goal: Absence of fever/infection during neutropenic period  Description: INTERVENTIONS:  - Monitor WBC    Outcome: Progressing     Problem: SAFETY ADULT  Goal: Patient will remain free of falls  Description: INTERVENTIONS:  - Educate patient/family on patient safety including physical limitations  - Instruct patient to call for assistance with activity   - Consult OT/PT to assist with strengthening/mobility   - Keep Call bell within reach  - Keep bed low and locked with side rails adjusted as appropriate  - Keep care items and personal belongings within reach  - Initiate and maintain comfort rounds  - Make Fall Risk Sign visible to staff  - Offer Toileting every 2  Hours, in advance of need  - Initiate/Maintain bed alarm  - Apply yellow socks and bracelet for high fall risk patients  - Consider moving patient to room near nurses station  Outcome: Progressing  Goal: Maintain or return to baseline ADL function  Description: INTERVENTIONS:  -  Assess patient's ability to carry out ADLs; assess patient's baseline for ADL function and identify physical deficits which impact ability to perform ADLs (bathing, care of mouth/teeth, toileting, grooming, dressing, etc.)  - Assess/evaluate cause of self-care deficits   - Assess range of motion  - Assess patient's mobility; develop plan if impaired  - Assess patient's need for assistive devices and provide as appropriate  - Encourage maximum independence but intervene and supervise when necessary  - Involve family in performance of ADLs  - Assess for home care needs following discharge   - Consider OT consult to assist with ADL evaluation and planning for discharge  - Provide patient education as appropriate  Outcome: Progressing  Goal: Maintains/Returns to pre admission functional level  Description: INTERVENTIONS:  - Perform BMAT or MOVE assessment daily.   - Set and communicate daily mobility goal to care team and patient/family/caregiver. - Collaborate with rehabilitation services on mobility goals if consulted  day. Problem: Knowledge Deficit  Goal: Patient/family/caregiver demonstrates understanding of disease process, treatment plan, medications, and discharge instructions  Description: Complete learning assessment and assess knowledge base.   Interventions:  - Provide teaching at level of understanding  - Provide teaching via preferred learning methods  Outcome: Progressing  - Out of bed for toileting  - Record patient progress and toleration of activity level   Outcome: Progressing

## 2023-08-18 NOTE — ASSESSMENT & PLAN NOTE
POA as evidenced by fever 102.3 and tachycardia with positive blood cultures  · Patient brought in by police after found rolling around in the grass complaining of pain all over  · History of drug abuse, UDS positive for methamphetamines  · CXR: no acute cardiopulmonary disease  · procalcitonin 12.28 > 89>42-41.89  · WBC 9 > 37>19.48-13.18  · Lactate 3. 5 > 1.3 s/p IVF  · UA negative  · 2/2 blood cultures positive for Enterobacter/Klebsiella/lactococcus; repeat blood cultures on 8/17- negative so far  Likely source/risk factors secondary to IVDU. CT abdomen pelvis without any acute abnormality. BUN LFT unremarkable. Patient without any GI/ symptoms. Currently remains afebrile, vital signs stable.     Seen and followed by ID, input appreciated  · Repeat blood cultures negative  · AMY without any evidence of vegetation  · Treated with cefepime, subsequently de-escalated to ceftriaxone  · Now transitioning to high-dose amoxicillin 1 g every 8 hours till 8/28 to complete 2 weeks

## 2023-08-19 LAB
ANION GAP SERPL CALCULATED.3IONS-SCNC: 5 MMOL/L
BUN SERPL-MCNC: 11 MG/DL (ref 5–25)
CALCIUM SERPL-MCNC: 8.5 MG/DL (ref 8.4–10.2)
CHLORIDE SERPL-SCNC: 108 MMOL/L (ref 96–108)
CO2 SERPL-SCNC: 25 MMOL/L (ref 21–32)
CREAT SERPL-MCNC: 0.76 MG/DL (ref 0.6–1.3)
ERYTHROCYTE [DISTWIDTH] IN BLOOD BY AUTOMATED COUNT: 13.4 % (ref 11.6–15.1)
GFR SERPL CREATININE-BSD FRML MDRD: 118 ML/MIN/1.73SQ M
GLUCOSE SERPL-MCNC: 96 MG/DL (ref 65–140)
HCT VFR BLD AUTO: 38.5 % (ref 36.5–49.3)
HGB BLD-MCNC: 13.3 G/DL (ref 12–17)
MCH RBC QN AUTO: 30.9 PG (ref 26.8–34.3)
MCHC RBC AUTO-ENTMCNC: 34.5 G/DL (ref 31.4–37.4)
MCV RBC AUTO: 89 FL (ref 82–98)
PLATELET # BLD AUTO: 228 THOUSANDS/UL (ref 149–390)
PMV BLD AUTO: 10.2 FL (ref 8.9–12.7)
POTASSIUM SERPL-SCNC: 3.8 MMOL/L (ref 3.5–5.3)
RBC # BLD AUTO: 4.31 MILLION/UL (ref 3.88–5.62)
SODIUM SERPL-SCNC: 138 MMOL/L (ref 135–147)
WBC # BLD AUTO: 11.62 THOUSAND/UL (ref 4.31–10.16)

## 2023-08-19 PROCEDURE — 99232 SBSQ HOSP IP/OBS MODERATE 35: CPT | Performed by: STUDENT IN AN ORGANIZED HEALTH CARE EDUCATION/TRAINING PROGRAM

## 2023-08-19 PROCEDURE — 80048 BASIC METABOLIC PNL TOTAL CA: CPT | Performed by: STUDENT IN AN ORGANIZED HEALTH CARE EDUCATION/TRAINING PROGRAM

## 2023-08-19 PROCEDURE — 85027 COMPLETE CBC AUTOMATED: CPT | Performed by: STUDENT IN AN ORGANIZED HEALTH CARE EDUCATION/TRAINING PROGRAM

## 2023-08-19 RX ADMIN — OXYCODONE HYDROCHLORIDE 5 MG: 5 TABLET ORAL at 18:17

## 2023-08-19 RX ADMIN — ARIPIPRAZOLE 5 MG: 5 TABLET ORAL at 08:13

## 2023-08-19 RX ADMIN — OXYCODONE HYDROCHLORIDE 5 MG: 5 TABLET ORAL at 08:16

## 2023-08-19 RX ADMIN — OLANZAPINE 5 MG: 10 INJECTION, POWDER, FOR SOLUTION INTRAMUSCULAR at 18:39

## 2023-08-19 RX ADMIN — CEFEPIME 2000 MG: 2 INJECTION, POWDER, FOR SOLUTION INTRAVENOUS at 13:06

## 2023-08-19 RX ADMIN — LORAZEPAM 1 MG: 2 INJECTION INTRAMUSCULAR; INTRAVENOUS at 15:41

## 2023-08-19 RX ADMIN — LORAZEPAM 1 MG: 2 INJECTION INTRAMUSCULAR; INTRAVENOUS at 00:03

## 2023-08-19 RX ADMIN — CEFEPIME 2000 MG: 2 INJECTION, POWDER, FOR SOLUTION INTRAVENOUS at 00:03

## 2023-08-19 NOTE — PROGRESS NOTES
[address]  Progress Note  Name: Hermelinda Sharma I  MRN: [id number]  Unit/Bed#: 4 [address]  Date of Admission: 8/14/2023   Date of Service: 8/19/2023 I Hospital Day: 4    Assessment/Plan   * Sepsis [facility])  Assessment & Plan  POA as evidenced by fever 102.3 and tachycardia with positive blood cultures  · Patient brought in by police after found rolling around in the grass complaining of pain all over  · History of drug abuse, UDS positive for methamphetamines  · CXR: no acute cardiopulmonary disease  · procalcitonin 12.28 > 89>42-41.89  · WBC 9 > 37>19.48-11.54  · Lactate 3. 5 > 1.3 s/p IVF  · UA negative  · 2/2 blood cultures positive for Enterobacter; repeat blood cultures drawn on 8/17 to ensure clearance  · Discontinue IV fluids as patient is eating and drinking well  · ID recs:   · Continue IV cefepime  · Follow-up repeat blood cultures to ensure clearance of bacteremia. · AMY to definitively rule out endocarditis and help determine antibiotic duration. · Continue cefepime    Bacteremia  Assessment & Plan  · 2/2 blood cultures: Polymicrobial Enterobacter, Klebsiella, lacto coccus lactose  · ID following see sepsis  · Started on cefepime  · WBC 9 > 37> 19.48> 11.54  · procal 12 > 89> 42> 41.89  · CT a/p negative  · 2D echo performed shows EF 40%, left atrium severely dilated, systolic function moderately reduced, moderate global hypokinesis, mild MVR, mild TVR, PA pressure 30, trace to mild pulmonic valve regurgitation, no obvious large vegetation noted as per cardiology report.     History of drug abuse [facility])  Assessment & Plan  History of polysubstance abuse  · UDS 8/8/23 positive for methamphetamines, cocaine, and THC  · UDS this admission positive for methamphetamines  · Patient reports he used heroine and meth a few days ago  · Psychiatry recs:   · when medically cleared recommending inpatient psychiatry admission which patient is agreeable to at this point    Bipolar 1 disorder [facility])  Assessment & Plan  · Hx of bipolar disorder requiring inpatient admission in 2018 for suicidal ideation secondary to opioid withdrawal  · Patient has not seen psychiatrist or taken psychiatric medications for a few years  · Psychiatry consulted  · Recommend continual observation and suicide precautions  · Started on Abilify  · Patient currently agreeable to inpatient psychiatric admission when medically cleared              VTE Pharmacologic Prophylaxis: VTE Score: 1 Low Risk (Score 0-2) - Encourage Ambulation. Patient Centered Rounds: I performed bedside rounds with nursing staff today. Discussions with Specialists or Other Care Team Provider: multidisciplinary team     Education and Discussions with Family / Patient: Patient declined call to . Total Time Spent on Date of Encounter in care of patient: 45 minutes This time was spent on one or more of the following: performing physical exam; counseling and coordination of care; obtaining or reviewing history; documenting in the medical record; reviewing/ordering tests, medications or procedures; communicating with other healthcare professionals and discussing with patient's family/caregivers. Current Length of Stay: 4 day(s)  Current Patient Status: Inpatient   Certification Statement: The patient will continue to require additional inpatient hospital stay due to continued IV abx, repeat labs   Discharge Plan: Anticipate discharge in 48-72 hrs to inpatient psychiatry admission when medically stable     Code Status: Level 1 - Full Code    Subjective:   Patient seen and examined at bedside. Patient reported no headache, blurry vision, chest pain or shortness of breath. Nursing reported no events.     Objective:     Vitals:   Temp (24hrs), Av.2 °F (36.8 °C), Min:98 °F (36.7 °C), Max:98.4 °F (36.9 °C)    Temp:  [98 °F (36.7 °C)-98.4 °F (36.9 °C)] 98 °F (36.7 °C)  HR:  [77-86] 86  Resp:  [16-18] 16  BP: (117-122)/(70-84) 122/70  SpO2:  [97 %-98 %] 97 %  Body mass index is 23.09 kg/m². Input and Output Summary (last 24 hours):   No intake or output data in the 24 hours ending 08/19/23 1732    Physical Exam:   Physical Exam  Vitals and nursing note reviewed. Constitutional:       General: He is not in acute distress. Appearance: He is well-developed. HENT:      Head: Normocephalic and atraumatic. Eyes:      Conjunctiva/sclera: Conjunctivae normal.   Cardiovascular:      Rate and Rhythm: Normal rate and regular rhythm. Heart sounds: No murmur heard. Pulmonary:      Effort: Pulmonary effort is normal. No respiratory distress. Breath sounds: Normal breath sounds. Abdominal:      Palpations: Abdomen is soft. Tenderness: There is no abdominal tenderness. Musculoskeletal:         General: No swelling. Cervical back: Neck supple. Skin:     General: Skin is warm and dry. Capillary Refill: Capillary refill takes less than 2 seconds. Neurological:      Mental Status: He is alert and oriented to person, place, and time. Psychiatric:         Mood and Affect: Mood normal.         Thought Content: Thought content does not include suicidal ideation. Additional Data:     Labs:  Results from last 7 days   Lab Units 08/19/23  0533 08/17/23  0509 08/16/23  0442 08/15/23  0457 08/14/23  1248   WBC Thousand/uL 11.62*   < > 19.48*   < > 9.25   HEMOGLOBIN g/dL 13.3   < > 11.0*   < > 12.8   HEMATOCRIT % 38.5   < > 33.2*   < > 37.5   PLATELETS Thousands/uL 228   < > 192   < > 268   BANDS PCT %  --   --   --   --  3   NEUTROS PCT %  --   --  83*  --   --    LYMPHS PCT %  --   --  10*  --   --    LYMPHO PCT %  --   --   --   --  3*   MONOS PCT %  --   --  4  --   --    MONO PCT %  --   --   --   --  0*   EOS PCT %  --   --  2  --  0    < > = values in this interval not displayed.      Results from last 7 days   Lab Units 08/19/23  0533 08/15/23  0457 08/14/23  1248   SODIUM mmol/L 138   < > 136 POTASSIUM mmol/L 3.8   < > 3.6   CHLORIDE mmol/L 108   < > 103   CO2 mmol/L 25   < > 21   BUN mg/dL 11   < > 24   CREATININE mg/dL 0.76   < > 1.08   ANION GAP mmol/L 5   < > 12   CALCIUM mg/dL 8.5   < > 8.9   ALBUMIN g/dL  --   --  3.8   TOTAL BILIRUBIN mg/dL  --   --  0.73   ALK PHOS U/L  --   --  95   ALT U/L  --   --  14   AST U/L  --   --  22   GLUCOSE RANDOM mg/dL 96   < > 99    < > = values in this interval not displayed. Results from last 7 days   Lab Units 08/16/23  0442 08/15/23  0457 08/14/23  1448 08/14/23  1248   LACTIC ACID mmol/L  --   --  1.3 3.5*   PROCALCITONIN ng/ml 41.89* 89.56*  --  12.28*       Lines/Drains:  Invasive Devices     Peripheral Intravenous Line  Duration           Peripheral IV 08/18/23 Right;Ventral (anterior) Forearm 1 day              Imaging: Reviewed radiology reports from this admission including: abdominal/pelvic CT    Recent Cultures (last 7 days):   Results from last 7 days   Lab Units 08/17/23  0509 08/14/23  1303 08/14/23  1259   BLOOD CULTURE  No Growth at 48 hrs. No Growth at 48 hrs.  Enterobacter cloacae* Klebsiella oxytoca*  Lactococcus lactis*   GRAM STAIN RESULT   --  Gram negative rods* Gram negative rods*  Gram positive cocci in pairs*       Last 24 Hours Medication List:   Current Facility-Administered Medications   Medication Dose Route Frequency Provider Last Rate   • acetaminophen  650 mg Oral Q6H PRN Mare Andersen PA-C     • ARIPiprazole  5 mg Oral Daily Azeem Espinal DO     • cefepime  2,000 mg Intravenous Q12H Brenda Reyes PA-C 2,000 mg (08/19/23 1306)   • diphenhydrAMINE-zinc acetate   Topical TID PRN Brenda Reyes PA-C     • hydrocortisone   Topical 4x Daily PRN JEOVANNY Thakkar     • LORazepam  1 mg Intravenous Q6H PRN John Hammonds DO     • nicotine  1 patch Transdermal Daily Mare Andersen PA-C     • nicotine polacrilex  2 mg Oral Q2H PRN Mare Andersen PA-C     • OLANZapine  5 mg Intramuscular Q4H PRN Mare Andersen, KASANDRA     • ondansetron  4 mg Intravenous Q6H PRN Vester BumpKASANDRA     • oxyCODONE  5 mg Oral Q4H PRN Vester Andreina, KASANDRA          Today, Patient Was Seen By: Valentine Killian MD    **Please Note: This note may have been constructed using a voice recognition system. **

## 2023-08-19 NOTE — PLAN OF CARE
Problem: PAIN - ADULT  Goal: Verbalizes/displays adequate comfort level or baseline comfort level  Description: Interventions:  - Encourage patient to monitor pain and request assistance  - Assess pain using appropriate pain scale  - Administer analgesics based on type and severity of pain and evaluate response  - Implement non-pharmacological measures as appropriate and evaluate response  - Consider cultural and social influences on pain and pain management  - Notify physician/advanced practitioner if interventions unsuccessful or patient reports new pain  Outcome: Progressing     Problem: INFECTION - ADULT  Goal: Absence or prevention of progression during hospitalization  Description: INTERVENTIONS:  - Assess and monitor for signs and symptoms of infection  - Monitor lab/diagnostic results  - Monitor all insertion sites, i.e. indwelling lines, tubes, and drains  - Monitor endotracheal if appropriate and nasal secretions for changes in amount and color  - [place] appropriate cooling/warming therapies per order  - Administer medications as ordered  - Instruct and encourage patient and family to use good hand hygiene technique  - Identify and instruct in appropriate isolation precautions for identified infection/condition  Outcome: Progressing  Goal: Absence of fever/infection during neutropenic period  Description: INTERVENTIONS:  - Monitor WBC    Outcome: Progressing     Problem: SAFETY ADULT  Goal: Patient will remain free of falls  Description: INTERVENTIONS:  - Educate patient/family on patient safety including physical limitations  - Instruct patient to call for assistance with activity   - Consult OT/PT to assist with strengthening/mobility   - Keep Call bell within reach  - Keep bed low and locked with side rails adjusted as appropriate  - Keep care items and personal belongings within reach  - Initiate and maintain comfort rounds  - Make Fall Risk Sign visible to staff  - Offer Toileting every 2 Hours, in advance of need  - Initiate/Maintain bed alarm  - Obtain necessary fall risk management equipment: yellow socks  - Apply yellow socks and bracelet for high fall risk patients  - Consider moving patient to room near nurses station  Outcome: Progressing  Goal: Maintain or return to baseline ADL function  Description: INTERVENTIONS:  -  Assess patient's ability to carry out ADLs; assess patient's baseline for ADL function and identify physical deficits which impact ability to perform ADLs (bathing, care of mouth/teeth, toileting, grooming, dressing, etc.)  - Assess/evaluate cause of self-care deficits   - Assess range of motion  - Assess patient's mobility; develop plan if impaired  - Assess patient's need for assistive devices and provide as appropriate  - Encourage maximum independence but intervene and supervise when necessary  - Involve family in performance of ADLs  - Assess for home care needs following discharge   - Consider OT consult to assist with ADL evaluation and planning for discharge  - Provide patient education as appropriate  Outcome: Progressing  Goal: Maintains/Returns to pre admission functional level  Description: INTERVENTIONS:  - Perform BMAT or MOVE assessment daily.   - Set and communicate daily mobility goal to care team and patient/family/caregiver. - Collaborate with rehabilitation services on mobility goals if consulted  - Perform Range of Motion 2 times a day. - Reposition patient every 2 hours.   - Dangle patient 2 times a day  - Stand patient 2 times a day  - Ambulate patient 2 times a day  - Out of bed to chair 2 times a day   - Out of bed for meals 2 times a day  - Out of bed for toileting  - Record patient progress and toleration of activity level   Outcome: Progressing     Problem: DISCHARGE PLANNING  Goal: Discharge to home or other facility with appropriate resources  Description: INTERVENTIONS:  - Identify barriers to discharge w/patient and caregiver  - Arrange for needed discharge resources and transportation as appropriate  - Identify discharge learning needs (meds, wound care, etc.)  - Arrange for interpretive services to assist at discharge as needed  - Refer to Case Management Department for coordinating discharge planning if the patient needs post-hospital services based on physician/advanced practitioner order or complex needs related to functional status, cognitive ability, or social support system  Outcome: Progressing     Problem: Knowledge Deficit  Goal: Patient/family/caregiver demonstrates understanding of disease process, treatment plan, medications, and discharge instructions  Description: Complete learning assessment and assess knowledge base.   Interventions:  - Provide teaching at level of understanding  - Provide teaching via preferred learning methods  Outcome: Progressing     Problem: METABOLIC, FLUID AND ELECTROLYTES - ADULT  Goal: Electrolytes maintained within normal limits  Description: INTERVENTIONS:  - Monitor labs and assess patient for signs and symptoms of electrolyte imbalances  - Administer electrolyte replacement as ordered  - Monitor response to electrolyte replacements, including repeat lab results as appropriate  - Instruct patient on fluid and nutrition as appropriate  Outcome: Progressing  Goal: Fluid balance maintained  Description: INTERVENTIONS:  - Monitor labs   - Monitor I/O and WT  - Instruct patient on fluid and nutrition as appropriate  - Assess for signs & symptoms of volume excess or deficit  Outcome: Progressing

## 2023-08-20 LAB
ANION GAP SERPL CALCULATED.3IONS-SCNC: 7 MMOL/L
BUN SERPL-MCNC: 15 MG/DL (ref 5–25)
CALCIUM SERPL-MCNC: 8.7 MG/DL (ref 8.4–10.2)
CHLORIDE SERPL-SCNC: 108 MMOL/L (ref 96–108)
CO2 SERPL-SCNC: 24 MMOL/L (ref 21–32)
CREAT SERPL-MCNC: 0.75 MG/DL (ref 0.6–1.3)
ERYTHROCYTE [DISTWIDTH] IN BLOOD BY AUTOMATED COUNT: 13.8 % (ref 11.6–15.1)
GFR SERPL CREATININE-BSD FRML MDRD: 118 ML/MIN/1.73SQ M
GLUCOSE SERPL-MCNC: 114 MG/DL (ref 65–140)
HCT VFR BLD AUTO: 40.9 % (ref 36.5–49.3)
HGB BLD-MCNC: 13.6 G/DL (ref 12–17)
MCH RBC QN AUTO: 30.3 PG (ref 26.8–34.3)
MCHC RBC AUTO-ENTMCNC: 33.3 G/DL (ref 31.4–37.4)
MCV RBC AUTO: 91 FL (ref 82–98)
PLATELET # BLD AUTO: 240 THOUSANDS/UL (ref 149–390)
PMV BLD AUTO: 10.7 FL (ref 8.9–12.7)
POTASSIUM SERPL-SCNC: 3.9 MMOL/L (ref 3.5–5.3)
RBC # BLD AUTO: 4.49 MILLION/UL (ref 3.88–5.62)
SODIUM SERPL-SCNC: 139 MMOL/L (ref 135–147)
WBC # BLD AUTO: 13.82 THOUSAND/UL (ref 4.31–10.16)

## 2023-08-20 PROCEDURE — 80048 BASIC METABOLIC PNL TOTAL CA: CPT | Performed by: STUDENT IN AN ORGANIZED HEALTH CARE EDUCATION/TRAINING PROGRAM

## 2023-08-20 PROCEDURE — 99232 SBSQ HOSP IP/OBS MODERATE 35: CPT | Performed by: STUDENT IN AN ORGANIZED HEALTH CARE EDUCATION/TRAINING PROGRAM

## 2023-08-20 PROCEDURE — 85027 COMPLETE CBC AUTOMATED: CPT | Performed by: STUDENT IN AN ORGANIZED HEALTH CARE EDUCATION/TRAINING PROGRAM

## 2023-08-20 RX ADMIN — CEFEPIME 2000 MG: 2 INJECTION, POWDER, FOR SOLUTION INTRAVENOUS at 14:48

## 2023-08-20 RX ADMIN — ARIPIPRAZOLE 5 MG: 5 TABLET ORAL at 08:42

## 2023-08-20 RX ADMIN — OXYCODONE HYDROCHLORIDE 5 MG: 5 TABLET ORAL at 16:36

## 2023-08-20 RX ADMIN — OLANZAPINE 5 MG: 10 INJECTION, POWDER, FOR SOLUTION INTRAMUSCULAR at 14:11

## 2023-08-20 RX ADMIN — CEFEPIME 2000 MG: 2 INJECTION, POWDER, FOR SOLUTION INTRAVENOUS at 00:09

## 2023-08-20 RX ADMIN — LORAZEPAM 1 MG: 2 INJECTION INTRAMUSCULAR; INTRAVENOUS at 22:12

## 2023-08-20 RX ADMIN — NICOTINE POLACRILEX 2 MG: 2 GUM, CHEWING BUCCAL at 13:18

## 2023-08-20 RX ADMIN — OXYCODONE HYDROCHLORIDE 5 MG: 5 TABLET ORAL at 20:52

## 2023-08-20 RX ADMIN — LORAZEPAM 1 MG: 2 INJECTION INTRAMUSCULAR; INTRAVENOUS at 13:11

## 2023-08-20 RX ADMIN — ACETAMINOPHEN 650 MG: 325 TABLET ORAL at 06:06

## 2023-08-20 NOTE — PLAN OF CARE
Problem: PAIN - ADULT  Goal: Verbalizes/displays adequate comfort level or baseline comfort level  Description: Interventions:  - Encourage patient to monitor pain and request assistance  - Assess pain using appropriate pain scale  - Administer analgesics based on type and severity of pain and evaluate response  - Implement non-pharmacological measures as appropriate and evaluate response  - Consider cultural and social influences on pain and pain management  - Notify physician/advanced practitioner if interventions unsuccessful or patient reports new pain  Outcome: Progressing     Problem: INFECTION - ADULT  Goal: Absence or prevention of progression during hospitalization  Description: INTERVENTIONS:  - Assess and monitor for signs and symptoms of infection  - Monitor lab/diagnostic results  - Monitor all insertion sites, i.e. indwelling lines, tubes, and drains  - Monitor endotracheal if appropriate and nasal secretions for changes in amount and color  - [place] appropriate cooling/warming therapies per order  - Administer medications as ordered  - Instruct and encourage patient and family to use good hand hygiene technique  - Identify and instruct in appropriate isolation precautions for identified infection/condition  Outcome: Progressing  Goal: Absence of fever/infection during neutropenic period  Description: INTERVENTIONS:  - Monitor WBC    Outcome: Progressing     Problem: SAFETY ADULT  Goal: Patient will remain free of falls  Description: INTERVENTIONS:  - Educate patient/family on patient safety including physical limitations  - Instruct patient to call for assistance with activity   - Consult OT/PT to assist with strengthening/mobility   - Keep Call bell within reach  - Keep bed low and locked with side rails adjusted as appropriate  - Keep care items and personal belongings within reach  - Initiate and maintain comfort rounds  - Make Fall Risk Sign visible to staff  - Offer Toileting every 2 Hours, in advance of need  - Initiate/Maintain bed alarm  - Obtain necessary fall risk management equipment  - Apply yellow socks and bracelet for high fall risk patients  - Consider moving patient to room near nurses station  Outcome: Progressing  Goal: Maintain or return to baseline ADL function  Description: INTERVENTIONS:  -  Assess patient's ability to carry out ADLs; assess patient's baseline for ADL function and identify physical deficits which impact ability to perform ADLs (bathing, care of mouth/teeth, toileting, grooming, dressing, etc.)  - Assess/evaluate cause of self-care deficits   - Assess range of motion  - Assess patient's mobility; develop plan if impaired  - Assess patient's need for assistive devices and provide as appropriate  - Encourage maximum independence but intervene and supervise when necessary  - Involve family in performance of ADLs  - Assess for home care needs following discharge   - Consider OT consult to assist with ADL evaluation and planning for discharge  - Provide patient education as appropriate  Outcome: Progressing  Goal: Maintains/Returns to pre admission functional level  Description: INTERVENTIONS:  - Perform BMAT or MOVE assessment daily.   - Set and communicate daily mobility goal to care team and patient/family/caregiver. - Collaborate with rehabilitation services on mobility goals if consulted  - Perform Range of Motion 2 times a day. - Reposition patient every 2 hours.   - Dangle patient 3 times a day  - Stand patient 3 times a day  - Ambulate patient 3 times a day  - Out of bed to chair 3 times a day   - Out of bed for meals 3 times a day  - Out of bed for toileting  - Record patient progress and toleration of activity level   Outcome: Progressing     Problem: DISCHARGE PLANNING  Goal: Discharge to home or other facility with appropriate resources  Description: INTERVENTIONS:  - Identify barriers to discharge w/patient and caregiver  - Arrange for needed discharge resources and transportation as appropriate  - Identify discharge learning needs (meds, wound care, etc.)  - Arrange for interpretive services to assist at discharge as needed  - Refer to Case Management Department for coordinating discharge planning if the patient needs post-hospital services based on physician/advanced practitioner order or complex needs related to functional status, cognitive ability, or social support system  Outcome: Progressing     Problem: Knowledge Deficit  Goal: Patient/family/caregiver demonstrates understanding of disease process, treatment plan, medications, and discharge instructions  Description: Complete learning assessment and assess knowledge base.   Interventions:  - Provide teaching at level of understanding  - Provide teaching via preferred learning methods  Outcome: Progressing     Problem: METABOLIC, FLUID AND ELECTROLYTES - ADULT  Goal: Electrolytes maintained within normal limits  Description: INTERVENTIONS:  - Monitor labs and assess patient for signs and symptoms of electrolyte imbalances  - Administer electrolyte replacement as ordered  - Monitor response to electrolyte replacements, including repeat lab results as appropriate  - Instruct patient on fluid and nutrition as appropriate  Outcome: Progressing  Goal: Fluid balance maintained  Description: INTERVENTIONS:  - Monitor labs   - Monitor I/O and WT  - Instruct patient on fluid and nutrition as appropriate  - Assess for signs & symptoms of volume excess or deficit  Outcome: Progressing

## 2023-08-20 NOTE — PROGRESS NOTES
[address]  Progress Note  Name: Ian Delacruz I  MRN: [id number]  Unit/Bed#: 4 [place] [address]-01 I Date of Admission: 8/14/2023   Date of Service: 8/20/2023 I Hospital Day: 5    Assessment/Plan   * Sepsis [facility])  Assessment & Plan  POA as evidenced by fever 102.3 and tachycardia with positive blood cultures  · Patient brought in by police after found rolling around in the grass complaining of pain all over  · History of drug abuse, UDS positive for methamphetamines  · CXR: no acute cardiopulmonary disease  · procalcitonin 12.28 > 89>42-41.89  · WBC 9 > 37>19.48-13.18  · Lactate 3. 5 > 1.3 s/p IVF  · UA negative  · 2/2 blood cultures positive for Enterobacter; repeat blood cultures on 8/17-NGTD 72 H  · Discontinue IV fluids as patient is eating and drinking well  · ID recs:   · Continue IV cefepime  · Follow-up repeat blood cultures to ensure clearance of bacteremia. · Repeat AMY 8/21 to definitively rule out endocarditis and help determine antibiotic duration. · Continue cefepime    Bacteremia  Assessment & Plan  · 2/2 blood cultures: Polymicrobial Enterobacter, Klebsiella, lacto coccus lactose  · ID following see sepsis  · Started on cefepime  · WBC 9 > 37> 19.48> 13.18  · procal 12 > 89> 42> 41.89  · CT a/p negative  · 2D echo performed shows EF 40%, left atrium severely dilated, systolic function moderately reduced, moderate global hypokinesis, mild MVR, mild TVR, PA pressure 30, trace to mild pulmonic valve regurgitation, no obvious large vegetation noted as per cardiology report.   · Repeat AMY 8/21    History of drug abuse ([address])  Assessment & Plan  History of polysubstance abuse  · UDS 8/8/23 positive for methamphetamines, cocaine, and THC  · UDS this admission positive for methamphetamines  · Patient reports he used heroine and meth a few days ago  · Psychiatry recs:   · when medically cleared recommending inpatient psychiatry admission which patient is agreeable to at this point    Bipolar 1 disorder [facility])  Assessment & Plan  · Hx of bipolar disorder requiring inpatient admission in 2018 for suicidal ideation secondary to opioid withdrawal  · Patient has not seen psychiatrist or taken psychiatric medications for a few years  · Psychiatry consulted  · Recommend continual observation and suicide precautions  · Started on Abilify  · Patient currently agreeable to inpatient psychiatric admission when medically cleared              VTE Pharmacologic Prophylaxis: VTE Score: 1 Low Risk (Score 0-2) - Encourage Ambulation. Patient Centered Rounds: I performed bedside rounds with nursing staff today. Discussions with Specialists or Other Care Team Provider: multidisciplinary team     Education and Discussions with Family / Patient: Patient declined call to . Total Time Spent on Date of Encounter in care of patient: 45 minutes This time was spent on one or more of the following: performing physical exam; counseling and coordination of care; obtaining or reviewing history; documenting in the medical record; reviewing/ordering tests, medications or procedures; communicating with other healthcare professionals and discussing with patient's family/caregivers. Current Length of Stay: 5 day(s)  Current Patient Status: Inpatient   Certification Statement: The patient will continue to require additional inpatient hospital stay due to continued IV abx, repeat labs   Discharge Plan: Anticipate discharge in 48-72 hrs to inpatient psychiatry admission when medically stable     Code Status: Level 1 - Full Code    Subjective:   Patient seen and examined at bedside. Patient reported that he was ready to go home, reported that he just wants to sleep because he keeps getting waking up while trying to rest.  Therefore required care. Patient denied any suicidal or homicidal ideations at this time.     Objective:     Vitals:   Temp (24hrs), Av °F (36.7 °C), Min:98 °F (36.7 °C), Max:98 °F (36.7 °C)    Temp:  [98 °F (36.7 °C)] 98 °F (36.7 °C)  HR:  [80-95] 80  Resp:  [16-18] 18  BP: (112-122)/(67-70) 119/67  SpO2:  [96 %-98 %] 96 %  Body mass index is 23.09 kg/m². Input and Output Summary (last 24 hours): Intake/Output Summary (Last 24 hours) at 8/20/2023 1445  Last data filed at 8/19/2023 2015  Gross per 24 hour   Intake 10 ml   Output --   Net 10 ml       Physical Exam:   Physical Exam  Vitals and nursing note reviewed. Constitutional:       General: He is not in acute distress. Appearance: He is well-developed. HENT:      Head: Normocephalic and atraumatic. Eyes:      Conjunctiva/sclera: Conjunctivae normal.   Cardiovascular:      Rate and Rhythm: Normal rate and regular rhythm. Heart sounds: No murmur heard. Pulmonary:      Effort: Pulmonary effort is normal. No respiratory distress. Breath sounds: Normal breath sounds. Abdominal:      Palpations: Abdomen is soft. Tenderness: There is no abdominal tenderness. Musculoskeletal:         General: No swelling. Cervical back: Neck supple. Skin:     General: Skin is warm and dry. Capillary Refill: Capillary refill takes less than 2 seconds. Neurological:      Mental Status: He is alert and oriented to person, place, and time.    Psychiatric:         Mood and Affect: Mood normal.         Behavior: Behavior normal.          Additional Data:     Labs:  Results from last 7 days   Lab Units 08/20/23  0913 08/17/23  0509 08/16/23  0442 08/15/23  0457 08/14/23  1248   WBC Thousand/uL 13.82*   < > 19.48*   < > 9.25   HEMOGLOBIN g/dL 13.6   < > 11.0*   < > 12.8   HEMATOCRIT % 40.9   < > 33.2*   < > 37.5   PLATELETS Thousands/uL 240   < > 192   < > 268   BANDS PCT %  --   --   --   --  3   NEUTROS PCT %  --   --  83*  --   --    LYMPHS PCT %  --   --  10*  --   --    LYMPHO PCT %  --   --   --   --  3*   MONOS PCT %  --   --  4  --   --    MONO PCT %  --   --   --   --  0*   EOS PCT %  --   --  2  --  0    < > = values in this interval not displayed. Results from last 7 days   Lab Units 08/20/23  0913 08/15/23  0457 08/14/23  1248   SODIUM mmol/L 139   < > 136   POTASSIUM mmol/L 3.9   < > 3.6   CHLORIDE mmol/L 108   < > 103   CO2 mmol/L 24   < > 21   BUN mg/dL 15   < > 24   CREATININE mg/dL 0.75   < > 1.08   ANION GAP mmol/L 7   < > 12   CALCIUM mg/dL 8.7   < > 8.9   ALBUMIN g/dL  --   --  3.8   TOTAL BILIRUBIN mg/dL  --   --  0.73   ALK PHOS U/L  --   --  95   ALT U/L  --   --  14   AST U/L  --   --  22   GLUCOSE RANDOM mg/dL 114   < > 99    < > = values in this interval not displayed. Results from last 7 days   Lab Units 08/16/23  0442 08/15/23  0457 08/14/23  1448 08/14/23  1248   LACTIC ACID mmol/L  --   --  1.3 3.5*   PROCALCITONIN ng/ml 41.89* 89.56*  --  12.28*       Lines/Drains:  Invasive Devices     Peripheral Intravenous Line  Duration           Peripheral IV 08/18/23 Right;Ventral (anterior) Forearm 2 days              Imaging: Reviewed radiology reports from this admission including: abdominal/pelvic CT    Recent Cultures (last 7 days):   Results from last 7 days   Lab Units 08/17/23  0509 08/14/23  1303 08/14/23  1259   BLOOD CULTURE  No Growth at 72 hrs. No Growth at 72 hrs.  Enterobacter cloacae* Klebsiella oxytoca*  Lactococcus lactis*   GRAM STAIN RESULT   --  Gram negative rods* Gram negative rods*  Gram positive cocci in pairs*       Last 24 Hours Medication List:   Current Facility-Administered Medications   Medication Dose Route Frequency Provider Last Rate   • acetaminophen  650 mg Oral Q6H PRN Estrella Chris PA-C     • ARIPiprazole  5 mg Oral Daily Nate Bar DO     • cefepime  2,000 mg Intravenous Q12H Leverne Aschoff, PA-C 2,000 mg (08/20/23 0009)   • diphenhydrAMINE-zinc acetate   Topical TID PRN Leverne Aschoff, PA-C     • hydrocortisone   Topical 4x Daily PRN Colen Cipro, CRNP     • LORazepam  1 mg Intravenous Q6H PRN John Hammonds DO     • nicotine  1 patch Transdermal Daily Vidal Edgar PA-C     • nicotine polacrilex  2 mg Oral Q2H PRN Alvebenny Edgar PA-C     • OLANZapine  5 mg Intramuscular Q4H PRN Alvebenny Edgar PA-C     • ondansetron  4 mg Intravenous Q6H PRN Alvebenny Edgar PA-C     • oxyCODONE  5 mg Oral Q4H PRN Vidal Edgar PA-C          Today, Patient Was Seen By: Buzz Younger MD    **Please Note: This note may have been constructed using a voice recognition system. **

## 2023-08-21 ENCOUNTER — APPOINTMENT (INPATIENT)
Dept: NON INVASIVE DIAGNOSTICS | Facility: HOSPITAL | Age: 33
DRG: 724 | End: 2023-08-21
Payer: COMMERCIAL

## 2023-08-21 LAB
ANION GAP SERPL CALCULATED.3IONS-SCNC: 7 MMOL/L
BUN SERPL-MCNC: 14 MG/DL (ref 5–25)
CALCIUM SERPL-MCNC: 8.6 MG/DL (ref 8.4–10.2)
CHLORIDE SERPL-SCNC: 108 MMOL/L (ref 96–108)
CO2 SERPL-SCNC: 24 MMOL/L (ref 21–32)
CREAT SERPL-MCNC: 0.77 MG/DL (ref 0.6–1.3)
ERYTHROCYTE [DISTWIDTH] IN BLOOD BY AUTOMATED COUNT: 14.1 % (ref 11.6–15.1)
GFR SERPL CREATININE-BSD FRML MDRD: 117 ML/MIN/1.73SQ M
GLUCOSE SERPL-MCNC: 97 MG/DL (ref 65–140)
HCT VFR BLD AUTO: 38.8 % (ref 36.5–49.3)
HGB BLD-MCNC: 12.8 G/DL (ref 12–17)
MCH RBC QN AUTO: 30.3 PG (ref 26.8–34.3)
MCHC RBC AUTO-ENTMCNC: 33 G/DL (ref 31.4–37.4)
MCV RBC AUTO: 92 FL (ref 82–98)
PLATELET # BLD AUTO: 229 THOUSANDS/UL (ref 149–390)
PMV BLD AUTO: 10.3 FL (ref 8.9–12.7)
POTASSIUM SERPL-SCNC: 3.6 MMOL/L (ref 3.5–5.3)
RBC # BLD AUTO: 4.22 MILLION/UL (ref 3.88–5.62)
SODIUM SERPL-SCNC: 139 MMOL/L (ref 135–147)
WBC # BLD AUTO: 13.77 THOUSAND/UL (ref 4.31–10.16)

## 2023-08-21 PROCEDURE — 80048 BASIC METABOLIC PNL TOTAL CA: CPT | Performed by: STUDENT IN AN ORGANIZED HEALTH CARE EDUCATION/TRAINING PROGRAM

## 2023-08-21 PROCEDURE — 85027 COMPLETE CBC AUTOMATED: CPT | Performed by: STUDENT IN AN ORGANIZED HEALTH CARE EDUCATION/TRAINING PROGRAM

## 2023-08-21 PROCEDURE — 99232 SBSQ HOSP IP/OBS MODERATE 35: CPT | Performed by: STUDENT IN AN ORGANIZED HEALTH CARE EDUCATION/TRAINING PROGRAM

## 2023-08-21 PROCEDURE — 99232 SBSQ HOSP IP/OBS MODERATE 35: CPT | Performed by: INTERNAL MEDICINE

## 2023-08-21 RX ADMIN — CEFEPIME 2000 MG: 2 INJECTION, POWDER, FOR SOLUTION INTRAVENOUS at 00:13

## 2023-08-21 RX ADMIN — ARIPIPRAZOLE 5 MG: 5 TABLET ORAL at 08:25

## 2023-08-21 RX ADMIN — OLANZAPINE 5 MG: 10 INJECTION, POWDER, FOR SOLUTION INTRAMUSCULAR at 16:45

## 2023-08-21 RX ADMIN — OXYCODONE HYDROCHLORIDE 5 MG: 5 TABLET ORAL at 08:28

## 2023-08-21 RX ADMIN — CEFEPIME 2000 MG: 2 INJECTION, POWDER, FOR SOLUTION INTRAVENOUS at 13:17

## 2023-08-21 RX ADMIN — OXYCODONE HYDROCHLORIDE 5 MG: 5 TABLET ORAL at 17:24

## 2023-08-21 NOTE — PLAN OF CARE
Problem: PAIN - ADULT  Goal: Verbalizes/displays adequate comfort level or baseline comfort level  Description: Interventions:  - Encourage patient to monitor pain and request assistance  - Assess pain using appropriate pain scale  - Administer analgesics based on type and severity of pain and evaluate response  - Implement non-pharmacological measures as appropriate and evaluate response  - Consider cultural and social influences on pain and pain management  - Notify physician/advanced practitioner if interventions unsuccessful or patient reports new pain  Outcome: Progressing     Problem: INFECTION - ADULT  Goal: Absence or prevention of progression during hospitalization  Description: INTERVENTIONS:  - Assess and monitor for signs and symptoms of infection  - Monitor lab/diagnostic results  - Monitor all insertion sites, i.e. indwelling lines, tubes, and drains  - Monitor endotracheal if appropriate and nasal secretions for changes in amount and color  - Patterson appropriate cooling/warming therapies per order  - Administer medications as ordered  - Instruct and encourage patient and family to use good hand hygiene technique  - Identify and instruct in appropriate isolation precautions for identified infection/condition  Outcome: Progressing  Goal: Absence of fever/infection during neutropenic period  Description: INTERVENTIONS:  - Monitor WBC    Outcome: Progressing     Problem: SAFETY ADULT  Goal: Patient will remain free of falls  Description: INTERVENTIONS:  - Educate patient/family on patient safety including physical limitations  - Instruct patient to call for assistance with activity   - Consult OT/PT to assist with strengthening/mobility   - Keep Call bell within reach  - Keep bed low and locked with side rails adjusted as appropriate  - Keep care items and personal belongings within reach  - Initiate and maintain comfort rounds  - Make Fall Risk Sign visible to staff  - Offer Toileting every 2 Hours, in advance of need  - Initiate/Maintain bed alarm  - Obtain necessary fall risk management equipment:   - Apply yellow socks and bracelet for high fall risk patients  - Consider moving patient to room near nurses station  Outcome: Progressing  Goal: Maintain or return to baseline ADL function  Description: INTERVENTIONS:  -  Assess patient's ability to carry out ADLs; assess patient's baseline for ADL function and identify physical deficits which impact ability to perform ADLs (bathing, care of mouth/teeth, toileting, grooming, dressing, etc.)  - Assess/evaluate cause of self-care deficits   - Assess range of motion  - Assess patient's mobility; develop plan if impaired  - Assess patient's need for assistive devices and provide as appropriate  - Encourage maximum independence but intervene and supervise when necessary  - Involve family in performance of ADLs  - Assess for home care needs following discharge   - Consider OT consult to assist with ADL evaluation and planning for discharge  - Provide patient education as appropriate  Outcome: Progressing  Goal: Maintains/Returns to pre admission functional level  Description: INTERVENTIONS:  - Perform BMAT or MOVE assessment daily.   - Set and communicate daily mobility goal to care team and patient/family/caregiver. - Collaborate with rehabilitation services on mobility goals if consulted  - Perform Range of Motion 2 times a day. - Reposition patient every 2 hours.   - Dangle patient 2 times a day  - Stand patient 2 times a day  - Ambulate patient 2 times a day  - Out of bed to chair 3 times a day   - Out of bed for meals 3 times a day  - Out of bed for toileting  - Record patient progress and toleration of activity level   Outcome: Progressing     Problem: DISCHARGE PLANNING  Goal: Discharge to home or other facility with appropriate resources  Description: INTERVENTIONS:  - Identify barriers to discharge w/patient and caregiver  - Arrange for needed discharge resources and transportation as appropriate  - Identify discharge learning needs (meds, wound care, etc.)  - Arrange for interpretive services to assist at discharge as needed  - Refer to Case Management Department for coordinating discharge planning if the patient needs post-hospital services based on physician/advanced practitioner order or complex needs related to functional status, cognitive ability, or social support system  Outcome: Progressing     Problem: Knowledge Deficit  Goal: Patient/family/caregiver demonstrates understanding of disease process, treatment plan, medications, and discharge instructions  Description: Complete learning assessment and assess knowledge base.   Interventions:  - Provide teaching at level of understanding  - Provide teaching via preferred learning methods  Outcome: Progressing     Problem: METABOLIC, FLUID AND ELECTROLYTES - ADULT  Goal: Electrolytes maintained within normal limits  Description: INTERVENTIONS:  - Monitor labs and assess patient for signs and symptoms of electrolyte imbalances  - Administer electrolyte replacement as ordered  - Monitor response to electrolyte replacements, including repeat lab results as appropriate  - Instruct patient on fluid and nutrition as appropriate  Outcome: Progressing  Goal: Fluid balance maintained  Description: INTERVENTIONS:  - Monitor labs   - Monitor I/O and WT  - Instruct patient on fluid and nutrition as appropriate  - Assess for signs & symptoms of volume excess or deficit  Outcome: Progressing

## 2023-08-21 NOTE — ED NOTES
8/21/23 @ 1400:  PES informed that patient is cleared and he "wants to leave."  PES updated Dr. Corrinne Artist and if PES isn't able to convince patient that he needs inpatient treatment, but a screening will be requested. [address], [address]: Chelle from [facility] completed screening; patient to be scheduled for telepsych; on wait list for [facility] and [id number] [address].     [address], [place]

## 2023-08-21 NOTE — PROGRESS NOTES
[address]  Progress Note  Name: Anatoly Delacruz I  MRN: [id number]  Unit/Bed#: 4 [place] [address]-01 I Date of Admission: 8/14/2023   Date of Service: 8/21/2023 I Hospital Day: 6    Assessment/Plan   * Sepsis [facility])  Assessment & Plan  POA as evidenced by fever 102.3 and tachycardia with positive blood cultures  · Patient brought in by police after found rolling around in the grass complaining of pain all over  · History of drug abuse, UDS positive for methamphetamines  · CXR: no acute cardiopulmonary disease  · procalcitonin 12.28 > 89>42-41.89  · WBC 9 > 37>19.48-13.18  · Lactate 3. 5 > 1.3 s/p IVF  · UA negative  · 2/2 blood cultures positive for Enterobacter; repeat blood cultures on 8/17-NGTD 4-day  · Discontinue IV fluids as patient is eating and drinking well  · ID recs:   · Continue IV cefepime  · Follow-up repeat blood cultures to ensure clearance of bacteremia. · Repeat AMY 8/22 to definitively rule out endocarditis and help determine antibiotic duration. Bacteremia  Assessment & Plan  · 2/2 blood cultures: Polymicrobial Enterobacter, Klebsiella, lacto coccus lactose  · ID following see sepsis  · Started on cefepime  · WBC 9 > 37> 19.48> 13.18  · procal 12 > 89> 42> 41.89  · CT a/p negative  · 2D echo performed shows EF 40%, left atrium severely dilated, systolic function moderately reduced, moderate global hypokinesis, mild MVR, mild TVR, PA pressure 30, trace to mild pulmonic valve regurgitation, no obvious large vegetation noted as per cardiology report.   · Repeat AMY 8/22    History of drug abuse ([address])  Assessment & Plan  History of polysubstance abuse  · UDS 8/8/23 positive for methamphetamines, cocaine, and THC  · UDS this admission positive for methamphetamines  · Patient reports he used heroine and meth a few days ago  · Psychiatry recs:   · when medically cleared recommending inpatient psychiatry admission which patient is agreeable to at this point    Bipolar 1 disorder [facility])  Assessment & Plan  · Hx of bipolar disorder requiring inpatient admission in 2018 for suicidal ideation secondary to opioid withdrawal  · Patient has not seen psychiatrist or taken psychiatric medications for a few years  · Psychiatry consulted  · Recommend continual observation and suicide precautions  · Started on Abilify  · Patient currently agreeable to inpatient psychiatric admission when medically cleared              VTE Pharmacologic Prophylaxis: VTE Score: 1 Low Risk (Score 0-2) - Encourage Ambulation. Patient Centered Rounds: I performed bedside rounds with nursing staff today. Discussions with Specialists or Other Care Team Provider: multidisciplinary team     Education and Discussions with Family / Patient: Patient declined call to . Total Time Spent on Date of Encounter in care of patient: 45 minutes This time was spent on one or more of the following: performing physical exam; counseling and coordination of care; obtaining or reviewing history; documenting in the medical record; reviewing/ordering tests, medications or procedures; communicating with other healthcare professionals and discussing with patient's family/caregivers. Current Length of Stay: 6 day(s)  Current Patient Status: Inpatient   Certification Statement: The patient will continue to require additional inpatient hospital stay due to continued IV abx, repeat labs   Discharge Plan: Anticipate discharge in 48-72 hrs to inpatient psychiatry admission when medically stable     Code Status: Level 1 - Full Code    Subjective:   Patient seen and examined at bedside reported no headache blurry vision, chest pain or shortness of breath gave update regarding plan of care and specialist recommendations. Later multiple Tiger text by nurse reported patient is stated he was ready to go, counseled that he had to wait for medical clearance prior to being evaluated by crisis before discharge planning.   Patient was resistant and told he cannot leave AMA and security would be called. Meeting with ID, nurse supervisor, charge nurse and staff nurse with patient to discuss plan of care. Patient was very resistant to echo and informed would lead to delay of discharge and that he needs to comply with medical recommendations. Patient made aware echo is needed to determine if vegetations are present from his sepsis secondary to IVDU and would determine length of course for antibiotics. Patient later agreed to echo, due to lateness of the day echo was already gone and was informed he will get the procedure tomorrow and then crisis could evaluate him once to get a medical clearance from infectious disease. Patient also made aware that he cannot become hostile and aggressive towards staff and agreed not to. Objective:     Vitals:   Temp (24hrs), Av.3 °F (36.8 °C), Min:98.1 °F (36.7 °C), Max:98.4 °F (36.9 °C)    Temp:  [98.1 °F (36.7 °C)-98.4 °F (36.9 °C)] 98.4 °F (36.9 °C)  HR:  [] 78  Resp:  [16-18] 18  BP: (108-128)/(65-70) 128/70  SpO2:  [98 %] 98 %  Body mass index is 23.09 kg/m². Input and Output Summary (last 24 hours): Intake/Output Summary (Last 24 hours) at 2023 1819  Last data filed at 2023 0901  Gross per 24 hour   Intake 240 ml   Output --   Net 240 ml       Physical Exam:   Physical Exam  Vitals and nursing note reviewed. Constitutional:       General: He is not in acute distress. Appearance: He is well-developed. HENT:      Head: Normocephalic and atraumatic. Eyes:      Conjunctiva/sclera: Conjunctivae normal.   Cardiovascular:      Rate and Rhythm: Normal rate and regular rhythm. Heart sounds: No murmur heard. Pulmonary:      Effort: Pulmonary effort is normal. No respiratory distress. Breath sounds: Normal breath sounds. Abdominal:      Palpations: Abdomen is soft. Tenderness: There is no abdominal tenderness.    Musculoskeletal:         General: No swelling. Cervical back: Neck supple. Skin:     General: Skin is warm and dry. Capillary Refill: Capillary refill takes less than 2 seconds. Neurological:      Mental Status: He is alert and oriented to person, place, and time. Psychiatric:         Mood and Affect: Mood normal.         Behavior: Behavior normal.          Additional Data:     Labs:  Results from last 7 days   Lab Units 08/21/23  0454 08/17/23  0509 08/16/23  0442   WBC Thousand/uL 13.77*   < > 19.48*   HEMOGLOBIN g/dL 12.8   < > 11.0*   HEMATOCRIT % 38.8   < > 33.2*   PLATELETS Thousands/uL 229   < > 192   NEUTROS PCT %  --   --  83*   LYMPHS PCT %  --   --  10*   MONOS PCT %  --   --  4   EOS PCT %  --   --  2    < > = values in this interval not displayed. Results from last 7 days   Lab Units 08/21/23  0454   SODIUM mmol/L 139   POTASSIUM mmol/L 3.6   CHLORIDE mmol/L 108   CO2 mmol/L 24   BUN mg/dL 14   CREATININE mg/dL 0.77   ANION GAP mmol/L 7   CALCIUM mg/dL 8.6   GLUCOSE RANDOM mg/dL 97                 Results from last 7 days   Lab Units 08/16/23  0442 08/15/23  0457   PROCALCITONIN ng/ml 41.89* 89.56*       Lines/Drains:  Invasive Devices     Peripheral Intravenous Line  Duration           Peripheral IV 08/18/23 Right;Ventral (anterior) Forearm 3 days              Imaging: Reviewed radiology reports from this admission including: abdominal/pelvic CT    Recent Cultures (last 7 days):   Results from last 7 days   Lab Units 08/17/23  0509   BLOOD CULTURE  No Growth After 4 Days. No Growth After 4 Days.        Last 24 Hours Medication List:   Current Facility-Administered Medications   Medication Dose Route Frequency Provider Last Rate   • acetaminophen  650 mg Oral Q6H PRN Larisa Roland PA-C     • ARIPiprazole  5 mg Oral Daily Juliet Jacobs,      • cefepime  2,000 mg Intravenous Q12H Sarah Farah PA-C 2,000 mg (08/21/23 1317)   • diphenhydrAMINE-zinc acetate   Topical TID PRN Sarah Farah PA-C     • hydrocortisone Topical 4x Daily PRN JEOVANNY King     • LORazepam  1 mg Intravenous Q6H PRN John Hammonds DO     • nicotine  1 patch Transdermal Daily Perez Valles PA-C     • nicotine polacrilex  2 mg Oral Q2H PRN Perez Valles PA-C     • OLANZapine  5 mg Intramuscular Q4H PRN Perez Valles PA-C     • ondansetron  4 mg Intravenous Q6H PRN Perez Valles PA-C     • oxyCODONE  5 mg Oral Q4H PRN Perez Valles PA-C          Today, Patient Was Seen By: Tad Canchola MD    **Please Note: This note may have been constructed using a voice recognition system. **

## 2023-08-21 NOTE — CASE MANAGEMENT
Case Management Progress Note    Patient name Sarah Chacon. Location [address]4 [place] 408-* MRN [id number]  : 1990 Date 2023       LOS (days): 6  Geometric Mean LOS (GMLOS) (days):   Days to GMLOS:        OBJECTIVE:      Current admission status: Inpatient  Preferred Pharmacy:   [address] [place] [address] #[id number] [facility], [address] ([address])  [address] [facility] ([address])  [address] [facility] [address]  Phone: [phone] Fax: [phone]    Primary Care Provider: Akhil Weiss    Primary Insurance: Juliet SHULTZ  Secondary Insurance:     PROGRESS NOTE:    Patient has not yet been medically cleared per attending. Plan is for crisis consult when medically cleared. SW will continue to follow.

## 2023-08-21 NOTE — PROGRESS NOTES
Progress Note - Infectious Disease   Jacinto Sanchez. 26 y.o. male MRN: [id number]  Unit/Bed#: [address] [place] [address] Encounter: [id number]      Impression/Plan:  1. Severe Sepsis, POA with fever, tachycardia, lactic acidosis, high leukocytosis in setting of #2. WBC 37 K > 13.7 K  -antibiotic as below  -follow-up cultures and adjust antibiotics as indicated  -monitor temperature and hemodynamics  -serial exam  -monitor serial labs     2. Polymicrobic Bacteremia. Admission blood cultures 2 of 2 sets growing Enterobacter cloacae, Klebsiella oxytoca, and Lactococcus lactis. #3 possible source. 8/14/23 CT A/P no acute infection. 8/17/23 TTE no obvious large vegetation seen. 8/17/23 Repeat blood cultures negative to date  -continues Cefepime 2 g IV q 12 hours  -follow up repeat blood cultures  -AMY recommended to facilitate antibiotic duration determination     3. IVDU. 8/8/23 UDS + for methamphetamines, cocaine, THC. Patient reports use in right AC 2 weeks ago. He consented to HIV/Hepatitis testing. Patient not a candidate for outpatient PICC line. 8/16/23 HIV/Hep C screen negative  -Patient not a candidate for outpatient PICC line.     4. Bipolar 1 disorder  -monitor symptoms  -symptomatic management per primary care team  -psychiatry follow up     5. Tape adhesive eruptions.   -Benadryl cream to left forearm and right hand sites as needed.  -serial exam    Antibiotics:  Cefepime D7     I have discussed the above management plan in detail with patient, RN, and Dr. Lauren Sher from the primary service. Subjective:  Patient has no fever, chills, sweats overnight; no nausea, vomiting, diarrhea; no cough, shortness of breath; no pain complaints at present. Itching of arms resolved. Patient reports having a job opportunity come up starting tomorrow and wants to be discharged.     Objective:  Vitals:  Temp:  [98.1 °F (36.7 °C)-98.4 °F (36.9 °C)] 98.4 °F (36.9 °C)  HR:  [] 78  Resp:  [16-18] 18  BP: (108-128)/(65-70) 128/70  SpO2:  [98 %] 98 %  Temp (24hrs), Av.3 °F (36.8 °C), Min:98.1 °F (36.7 °C), Max:98.4 °F (36.9 °C)  Current: Temperature: 98.4 °F (36.9 °C)    Physical Exam:   General Appearance:  26year old male chronically debilitated, resting in bed, in no acute distress. HEENT: Atraumatic normocephalic   Throat: Oropharynx moist. Poor dentition   Pulmonary:   Normal respiratory excursion without accessory muscle use   Cardiac:  RRR   Abdomen:   Soft, non-tender, non-distended   Extremities: No edema   : No rao, no SPT   Psychiatric: Awake, cooperative   Skin: No arm eruptions at present. No diffuse rash. IV site nontender. Labs, Imaging, & Other studies:   All pertinent labs and imaging studies were personally reviewed  Results from last 7 days   Lab Units 23  0454 23  0913 23  0533   WBC Thousand/uL 13.77* 13.82* 11.62*   HEMOGLOBIN g/dL 12.8 13.6 13.3   PLATELETS Thousands/uL 229 240 228     Results from last 7 days   Lab Units 23  0454 23  0913 23  0533 08/15/23  0457 23  1248   SODIUM mmol/L 139 139 138   < > 136   POTASSIUM mmol/L 3.6 3.9 3.8   < > 3.6   CHLORIDE mmol/L 108 108 108   < > 103   CO2 mmol/L 24 24 25   < > 21   BUN mg/dL 14 15 11   < > 24   CREATININE mg/dL 0.77 0.75 0.76   < > 1.08   EGFR ml/min/1.73sq m 117 118 118   < > 88   CALCIUM mg/dL 8.6 8.7 8.5   < > 8.9   AST U/L  --   --   --   --  22   ALT U/L  --   --   --   --  14   ALK PHOS U/L  --   --   --   --  95    < > = values in this interval not displayed. Results from last 7 days   Lab Units 23  0509 23  1303 23  1259   BLOOD CULTURE  No Growth After 4 Days. No Growth After 4 Days.  Enterobacter cloacae* Klebsiella oxytoca*  Lactococcus lactis*   GRAM STAIN RESULT   --  Gram negative rods* Gram negative rods*  Gram positive cocci in pairs*     Results from last 7 days   Lab Units 23  0442 08/15/23  0457 23  1248   PROCALCITONIN ng/ml 41.89* 89.56* 12.28*

## 2023-08-21 NOTE — PLAN OF CARE
Problem: PAIN - ADULT  Goal: Verbalizes/displays adequate comfort level or baseline comfort level  Description: Interventions:  - Encourage patient to monitor pain and request assistance  - Assess pain using appropriate pain scale  - Administer analgesics based on type and severity of pain and evaluate response  - Implement non-pharmacological measures as appropriate and evaluate response  - Consider cultural and social influences on pain and pain management  - Notify physician/advanced practitioner if interventions unsuccessful or patient reports new pain  Outcome: Progressing     Problem: INFECTION - ADULT  Goal: Absence or prevention of progression during hospitalization  Description: INTERVENTIONS:  - Assess and monitor for signs and symptoms of infection  - Monitor lab/diagnostic results  - Monitor all insertion sites, i.e. indwelling lines, tubes, and drains  - Monitor endotracheal if appropriate and nasal secretions for changes in amount and color  - [place] appropriate cooling/warming therapies per order  - Administer medications as ordered  - Instruct and encourage patient and family to use good hand hygiene technique  - Identify and instruct in appropriate isolation precautions for identified infection/condition  Outcome: Progressing  Goal: Absence of fever/infection during neutropenic period  Description: INTERVENTIONS:  - Monitor WBC    Outcome: Progressing     Problem: SAFETY ADULT  Goal: Patient will remain free of falls  Description: INTERVENTIONS:  - Educate patient/family on patient safety including physical limitations  - Instruct patient to call for assistance with activity   - Consult OT/PT to assist with strengthening/mobility   - Keep Call bell within reach  - Keep bed low and locked with side rails adjusted as appropriate  - Keep care items and personal belongings within reach  - Initiate and maintain comfort rounds  - Make Fall Risk Sign visible to staff  - Offer Toileting every 2 Hours, in advance of need  - Initiate/Maintain bed alarm  - Obtain necessary fall risk management equipment: yellow socks  - Apply yellow socks and bracelet for high fall risk patients  - Consider moving patient to room near nurses station  Outcome: Progressing  Goal: Maintain or return to baseline ADL function  Description: INTERVENTIONS:  -  Assess patient's ability to carry out ADLs; assess patient's baseline for ADL function and identify physical deficits which impact ability to perform ADLs (bathing, care of mouth/teeth, toileting, grooming, dressing, etc.)  - Assess/evaluate cause of self-care deficits   - Assess range of motion  - Assess patient's mobility; develop plan if impaired  - Assess patient's need for assistive devices and provide as appropriate  - Encourage maximum independence but intervene and supervise when necessary  - Involve family in performance of ADLs  - Assess for home care needs following discharge   - Consider OT consult to assist with ADL evaluation and planning for discharge  - Provide patient education as appropriate  Outcome: Progressing  Goal: Maintains/Returns to pre admission functional level  Description: INTERVENTIONS:  - Perform BMAT or MOVE assessment daily.   - Set and communicate daily mobility goal to care team and patient/family/caregiver. - Collaborate with rehabilitation services on mobility goals if consulted  - Perform Range of Motion 2 times a day. - Reposition patient every 2 hours.   - Dangle patient 2 times a day  - Stand patient 2 times a day  - Ambulate patient 2 times a day  - Out of bed to chair 2 times a day   - Out of bed for meals 2 times a day  - Out of bed for toileting  - Record patient progress and toleration of activity level   Outcome: Progressing     Problem: DISCHARGE PLANNING  Goal: Discharge to home or other facility with appropriate resources  Description: INTERVENTIONS:  - Identify barriers to discharge w/patient and caregiver  - Arrange for needed discharge resources and transportation as appropriate  - Identify discharge learning needs (meds, wound care, etc.)  - Arrange for interpretive services to assist at discharge as needed  - Refer to Case Management Department for coordinating discharge planning if the patient needs post-hospital services based on physician/advanced practitioner order or complex needs related to functional status, cognitive ability, or social support system  Outcome: Progressing     Problem: Knowledge Deficit  Goal: Patient/family/caregiver demonstrates understanding of disease process, treatment plan, medications, and discharge instructions  Description: Complete learning assessment and assess knowledge base.   Interventions:  - Provide teaching at level of understanding  - Provide teaching via preferred learning methods  Outcome: Progressing     Problem: METABOLIC, FLUID AND ELECTROLYTES - ADULT  Goal: Electrolytes maintained within normal limits  Description: INTERVENTIONS:  - Monitor labs and assess patient for signs and symptoms of electrolyte imbalances  - Administer electrolyte replacement as ordered  - Monitor response to electrolyte replacements, including repeat lab results as appropriate  - Instruct patient on fluid and nutrition as appropriate  Outcome: Progressing  Goal: Fluid balance maintained  Description: INTERVENTIONS:  - Monitor labs   - Monitor I/O and WT  - Instruct patient on fluid and nutrition as appropriate  - Assess for signs & symptoms of volume excess or deficit  Outcome: Progressing

## 2023-08-21 NOTE — ED NOTES
14:25 - PES met with patient in his room - not interested in an inpt admission @ this time. Patient explained a screening request will be made and why - verbalized understanding. 14:30 - called CFS to inform of screening request.    14:45 - called 4N Rn - there is not a note of medical clearance in the chart - PES can not move forward. 16:45 - PES notified there is an "echo" scheduled for the morning - medical clearance will follow after that is completed; KEY/Annmarie was advised.

## 2023-08-22 ENCOUNTER — APPOINTMENT (INPATIENT)
Dept: NON INVASIVE DIAGNOSTICS | Facility: HOSPITAL | Age: 33
DRG: 724 | End: 2023-08-22
Payer: COMMERCIAL

## 2023-08-22 LAB
ANION GAP SERPL CALCULATED.3IONS-SCNC: 6 MMOL/L
BACTERIA BLD CULT: NORMAL
BACTERIA BLD CULT: NORMAL
BUN SERPL-MCNC: 16 MG/DL (ref 5–25)
CALCIUM SERPL-MCNC: 8.7 MG/DL (ref 8.4–10.2)
CHLORIDE SERPL-SCNC: 107 MMOL/L (ref 96–108)
CO2 SERPL-SCNC: 25 MMOL/L (ref 21–32)
CREAT SERPL-MCNC: 0.74 MG/DL (ref 0.6–1.3)
ERYTHROCYTE [DISTWIDTH] IN BLOOD BY AUTOMATED COUNT: 14.3 % (ref 11.6–15.1)
GFR SERPL CREATININE-BSD FRML MDRD: 119 ML/MIN/1.73SQ M
GLUCOSE SERPL-MCNC: 96 MG/DL (ref 65–140)
HCT VFR BLD AUTO: 39.4 % (ref 36.5–49.3)
HGB BLD-MCNC: 12.6 G/DL (ref 12–17)
MCH RBC QN AUTO: 29.4 PG (ref 26.8–34.3)
MCHC RBC AUTO-ENTMCNC: 32 G/DL (ref 31.4–37.4)
MCV RBC AUTO: 92 FL (ref 82–98)
PLATELET # BLD AUTO: 227 THOUSANDS/UL (ref 149–390)
PMV BLD AUTO: 10.4 FL (ref 8.9–12.7)
POTASSIUM SERPL-SCNC: 4.4 MMOL/L (ref 3.5–5.3)
RBC # BLD AUTO: 4.29 MILLION/UL (ref 3.88–5.62)
SODIUM SERPL-SCNC: 138 MMOL/L (ref 135–147)
WBC # BLD AUTO: 11.87 THOUSAND/UL (ref 4.31–10.16)

## 2023-08-22 PROCEDURE — 85027 COMPLETE CBC AUTOMATED: CPT | Performed by: STUDENT IN AN ORGANIZED HEALTH CARE EDUCATION/TRAINING PROGRAM

## 2023-08-22 PROCEDURE — 99232 SBSQ HOSP IP/OBS MODERATE 35: CPT | Performed by: INTERNAL MEDICINE

## 2023-08-22 PROCEDURE — 80048 BASIC METABOLIC PNL TOTAL CA: CPT | Performed by: STUDENT IN AN ORGANIZED HEALTH CARE EDUCATION/TRAINING PROGRAM

## 2023-08-22 RX ADMIN — LORAZEPAM 1 MG: 2 INJECTION INTRAMUSCULAR; INTRAVENOUS at 14:58

## 2023-08-22 RX ADMIN — ARIPIPRAZOLE 5 MG: 5 TABLET ORAL at 09:31

## 2023-08-22 RX ADMIN — CEFEPIME 2000 MG: 2 INJECTION, POWDER, FOR SOLUTION INTRAVENOUS at 13:20

## 2023-08-22 RX ADMIN — OLANZAPINE 5 MG: 10 INJECTION, POWDER, FOR SOLUTION INTRAMUSCULAR at 19:22

## 2023-08-22 RX ADMIN — CEFEPIME 2000 MG: 2 INJECTION, POWDER, FOR SOLUTION INTRAVENOUS at 00:15

## 2023-08-22 NOTE — PROGRESS NOTES
[facility] Internal Medicine Progress Note  Patient: Minor Pat. 26 y.o. male   MRN: [id number]  PCP: Natalie Kilpatrick  Unit/Bed#: [address] [place] [address] Encounter: [id number]  Date Of Visit: 08/22/23    Problem List:    Principal Problem:    Sepsis ([address])  Active Problems:    Bacteremia    Bipolar 1 disorder ([address])    History of drug abuse ([address])      Assessment & Plan:    * Sepsis ([address])  Assessment & Plan  POA as evidenced by fever 102.3 and tachycardia with positive blood cultures  · Patient brought in by police after found rolling around in the grass complaining of pain all over  · History of drug abuse, UDS positive for methamphetamines  · CXR: no acute cardiopulmonary disease  · procalcitonin 12.28 > 89>42-41.89  · WBC 9 > 37>19.48-13.18  · Lactate 3. 5 > 1.3 s/p IVF  · UA negative  · 2/2 blood cultures positive for Enterobacter; repeat blood cultures on 8/17- negative so far  Currently remains afebrile, vital signs stable. · ID recs:   · Continue IV cefepime  · Follow-up repeat blood cultures to ensure clearance of bacteremia. · AMY to definitively rule out endocarditis and help determine antibiotic duration. Bacteremia  Assessment & Plan  2/2 blood cultures: Polymicrobial Enterobacter, Klebsiella, lacto coccus lactose  Source unclear but history of IVDU  · CT a/p negative  · 2D echo performed shows EF 40%, left atrium severely dilated, systolic function moderately reduced, moderate global hypokinesis, mild MVR, mild TVR, PA pressure 30, trace to mild pulmonic valve regurgitation, no obvious large vegetation noted as per cardiology report.   · Continue cefepime  ·  follow-up culture sensitivities  · Follow-up AMY    History of drug abuse [facility])  Assessment & Plan  History of polysubstance abuse  · UDS 8/8/23 positive for methamphetamines, cocaine, and THC  · UDS this admission positive for methamphetamines  · Patient reports he used heroine and meth a few days ago  · Psychiatry recs:   · when medically cleared recommending inpatient psychiatry admission    Bipolar 1 disorder [facility])  Assessment & Plan  · Hx of bipolar disorder requiring inpatient admission in 2018 for suicidal ideation secondary to opioid withdrawal  · Patient has not seen psychiatrist or taken psychiatric medications for a few years  · Psychiatry consulted  · Recommend continual observation and suicide precautions  · Started on Abilify          VTE Pharmacologic Prophylaxis: VTE Score: 1 Low Risk (Score 0-2) - Encourage Ambulation. Patient Centered Rounds: I performed bedside rounds with nursing staff today. Discussions with Specialists or Other Care Team Provider: ID    Education and Discussions with Family / Patient: Discussed with the patient. Time Spent for Care: 45 minutes. More than 50% of total time spent on counseling and coordination of care as described above. Current Length of Stay: 7 day(s)  Current Patient Status: Inpatient   Certification Statement: The patient will continue to require additional inpatient hospital stay due to IV antibiotics  Discharge Plan: Anticipate discharge in 48-72 hrs to inpatient psych. Code Status: Level 1 - Full Code    Subjective:   Denies any fever chills or pain  Reports good appetite    Objective:     Vitals:   Temp (24hrs), Av.6 °F (37 °C), Min:98.1 °F (36.7 °C), Max:99.1 °F (37.3 °C)    Temp:  [98.1 °F (36.7 °C)-99.1 °F (37.3 °C)] 98.1 °F (36.7 °C)  HR:  [85-86] 85  Resp:  [16] 16  BP: (113-122)/(62-79) 122/79  SpO2:  [95 %] 95 %  Body mass index is 23.09 kg/m². Input and Output Summary (last 24 hours): Intake/Output Summary (Last 24 hours) at 2023 0919  Last data filed at 2023 1900  Gross per 24 hour   Intake 240 ml   Output --   Net 240 ml       Physical Exam:   Physical Exam  Constitutional:       General: He is not in acute distress. Comments: Debilitated   HENT:      Head: Normocephalic and atraumatic. Cardiovascular:      Rate and Rhythm: Normal rate. Pulmonary:      Effort: Pulmonary effort is normal. No respiratory distress. Breath sounds: No wheezing, rhonchi or rales. Chest:      Chest wall: No tenderness. Abdominal:      General: Bowel sounds are normal. There is no distension. Palpations: Abdomen is soft. Tenderness: There is no abdominal tenderness. There is no guarding or rebound. Musculoskeletal:      Cervical back: Neck supple. Skin:     General: Skin is warm and dry. Findings: No rash. Neurological:      Mental Status: He is alert. Mental status is at baseline. Cranial Nerves: No cranial nerve deficit. Additional Data:     Labs:  Results from last 7 days   Lab Units 08/22/23  0634 08/17/23  0509 08/16/23  0442   WBC Thousand/uL 11.87*   < > 19.48*   HEMOGLOBIN g/dL 12.6   < > 11.0*   HEMATOCRIT % 39.4   < > 33.2*   PLATELETS Thousands/uL 227   < > 192   NEUTROS PCT %  --   --  83*   LYMPHS PCT %  --   --  10*   MONOS PCT %  --   --  4   EOS PCT %  --   --  2    < > = values in this interval not displayed. Results from last 7 days   Lab Units 08/22/23  0634   SODIUM mmol/L 138   POTASSIUM mmol/L 4.4   CHLORIDE mmol/L 107   CO2 mmol/L 25   BUN mg/dL 16   CREATININE mg/dL 0.74   ANION GAP mmol/L 6   CALCIUM mg/dL 8.7   GLUCOSE RANDOM mg/dL 96                 Results from last 7 days   Lab Units 08/16/23  0442   PROCALCITONIN ng/ml 41.89*       Lines/Drains:  Invasive Devices     Peripheral Intravenous Line  Duration           Peripheral IV 08/21/23 Dorsal (posterior); Right Forearm <1 day                      Imaging: Reviewed radiology reports from this admission including: abdominal/pelvic CT    Recent Cultures (last 7 days):   Results from last 7 days   Lab Units 08/17/23  0509   BLOOD CULTURE  No Growth After 4 Days. No Growth After 4 Days.        Last 24 Hours Medication List:   Current Facility-Administered Medications   Medication Dose Route Frequency Provider Last Rate   • acetaminophen  650 mg Oral Q6H PRN Cher Carrasco PA-C     • ARIPiprazole  5 mg Oral Daily Julianalaz Sanon, DO     • cefepime  2,000 mg Intravenous Q12H Tory Collado PA-C 2,000 mg (08/22/23 0015)   • diphenhydrAMINE-zinc acetate   Topical TID PRN Tory Collado PA-C     • hydrocortisone   Topical 4x Daily PRN Mar Asp, CRNP     • LORazepam  1 mg Intravenous Q6H PRN John Hammonds DO     • nicotine  1 patch Transdermal Daily Cher Carrasco PA-C     • nicotine polacrilex  2 mg Oral Q2H PRN Cher Carrasco PA-C     • OLANZapine  5 mg Intramuscular Q4H PRN Cher Carrasco PA-C     • ondansetron  4 mg Intravenous Q6H PRN Cher Carrasco PA-C     • oxyCODONE  5 mg Oral Q4H PRN Cher Carrasco PA-C          Today, Patient Was Seen By: Waylon Kaba MD    ** Please Note: "This note has been constructed using a voice recognition system. Therefore there may be syntax, spelling, and/or grammatical errors.  Please call if you have any questions. "**

## 2023-08-22 NOTE — UTILIZATION REVIEW
Continued Stay Review    Date: 8/22/23                          Current Patient Class: inpatient  Current Level of Care: med surg  HPI:33 y.o. male initially admitted on 8/15/23     Assessment/Plan:   Tmax 99.1  IVABT in progress per ID for sepsis, polymicrobic bacteremia. Klebsiella oxytoca/Enterobacter/lactococcus.  Risk factor appears to be active IVDU.  Negative UA.  Normal LFTs.  Negative contrast CT abdomen/pelvis.  No vegetations on 2D echo. Negative repeat blood cultures. Active IVDU.  Not a candidate for at home PICC line/IV antibiotic. AMY ordered to r/o endocarditis and help determine antibiotic duration. Psychiatry recs: when medically cleared recommending inpatient psychiatry admission which patient is agreeable to at this point    Vital Signs:   /79 (BP Location: Right arm)   Pulse 85   Temp 98.1 °F (36.7 °C) (Oral)   Resp 16   Ht 5' 10" (1.778 m)   Wt 73 kg (160 lb 15 oz)   SpO2 95%   BMI 23.09 kg/m²     Pertinent Labs/Diagnostic Results:     Results from last 7 days   Lab Units 08/22/23  0634 08/21/23  0454 08/20/23  0913 08/19/23  0533 08/18/23  0455 08/17/23  0509 08/16/23  0442   WBC Thousand/uL 11.87* 13.77* 13.82* 11.62* 10.37*   < > 19.48*   HEMOGLOBIN g/dL 12.6 12.8 13.6 13.3 12.0   < > 11.0*   HEMATOCRIT % 39.4 38.8 40.9 38.5 36.7   < > 33.2*   PLATELETS Thousands/uL 227 229 240 228 227   < > 192   NEUTROS ABS Thousands/µL  --   --   --   --   --   --  16.28*    < > = values in this interval not displayed.      Results from last 7 days   Lab Units 08/22/23  0634 08/21/23  0454 08/20/23  0913 08/19/23  0533 08/18/23  0455   SODIUM mmol/L 138 139 139 138 138   POTASSIUM mmol/L 4.4 3.6 3.9 3.8 3.7   CHLORIDE mmol/L 107 108 108 108 108   CO2 mmol/L 25 24 24 25 23   ANION GAP mmol/L 6 7 7 5 7   BUN mg/dL 16 14 15 11 10   CREATININE mg/dL 0.74 0.77 0.75 0.76 0.77   EGFR ml/min/1.73sq m 119 117 118 118 117   CALCIUM mg/dL 8.7 8.6 8.7 8.5 8.3*     Results from last 7 days   Lab Units 08/22/23  7540 08/21/23  0454 08/20/23  0913 08/19/23  0533 08/18/23  0455 08/17/23  0509 08/16/23  0442   GLUCOSE RANDOM mg/dL 96 97 114 96 92 93 98     Results from last 7 days   Lab Units 08/16/23  0442   PROCALCITONIN ng/ml 41.89*     Results from last 7 days   Lab Units 08/16/23  0442   HEP B S AG  Non-reactive   HEP C AB  Non-reactive   HEP B C IGM  Non-reactive   HEP B C TOTAL AB  Reactive*     Results from last 7 days   Lab Units 08/17/23  0509   BLOOD CULTURE  No Growth After 5 Days. No Growth After 5 Days. Medications:   Scheduled Medications:  ARIPiprazole, 5 mg, Oral, Daily  cefepime, 2,000 mg, Intravenous, Q12H  nicotine, 1 patch, Transdermal, Daily    PRN Meds:  acetaminophen, 650 mg, Oral, Q6H PRN  diphenhydrAMINE-zinc acetate, , Topical, TID PRN  hydrocortisone, , Topical, 4x Daily PRN  LORazepam, 1 mg, Intravenous, Q6H PRN  nicotine polacrilex, 2 mg, Oral, Q2H PRN  OLANZapine, 5 mg, Intramuscular, Q4H PRN  ondansetron, 4 mg, Intravenous, Q6H PRN  oxyCODONE, 5 mg, Oral, Q4H PRN    Discharge Plan: [facility]    Network Utilization Review Department  ATTENTION: Please call with any questions or concerns to [phone] and carefully listen to the prompts so that you are directed to the right person. All voicemails are confidential.  Zara Retanaence all requests for admission clinical reviews, approved or denied determinations and any other requests to dedicated fax number below belonging to the campus where the patient is receiving treatment.  List of dedicated fax numbers for the Facilities:  [place] DENIALS (Administrative/Medical Necessity) [phone]   [facility] ([facility]) [phone]   [address] 1000 [facility] [phone]   [facility] [address] [phone]   [facility] [address] [place] [address] [address] [address] [facility] [address] [facility] [address] [phone]

## 2023-08-22 NOTE — PLAN OF CARE
Problem: PAIN - ADULT  Goal: Verbalizes/displays adequate comfort level or baseline comfort level  Description: Interventions:  - Encourage patient to monitor pain and request assistance  - Assess pain using appropriate pain scale  - Administer analgesics based on type and severity of pain and evaluate response  - Implement non-pharmacological measures as appropriate and evaluate response  - Consider cultural and social influences on pain and pain management  - Notify physician/advanced practitioner if interventions unsuccessful or patient reports new pain  Outcome: Progressing     Problem: INFECTION - ADULT  Goal: Absence or prevention of progression during hospitalization  Description: INTERVENTIONS:  - Assess and monitor for signs and symptoms of infection  - Monitor lab/diagnostic results  - Monitor all insertion sites, i.e. indwelling lines, tubes, and drains  - Monitor endotracheal if appropriate and nasal secretions for changes in amount and color  - [place] appropriate cooling/warming therapies per order  - Administer medications as ordered  - Instruct and encourage patient and family to use good hand hygiene technique  - Identify and instruct in appropriate isolation precautions for identified infection/condition  Outcome: Progressing  Goal: Absence of fever/infection during neutropenic period  Description: INTERVENTIONS:  - Monitor WBC    Outcome: Progressing     Problem: SAFETY ADULT  Goal: Patient will remain free of falls  Description: INTERVENTIONS:  - Educate patient/family on patient safety including physical limitations  - Instruct patient to call for assistance with activity   - Consult OT/PT to assist with strengthening/mobility   - Keep Call bell within reach  - Keep bed low and locked with side rails adjusted as appropriate  - Keep care items and personal belongings within reach  - Initiate and maintain comfort rounds  - Make Fall Risk Sign visible to staff  - Offer Toileting every 2 Hours, in advance of need  - Initiate/Maintain bed alarm  - Obtain necessary fall risk management equipment: yellow socks  - Apply yellow socks and bracelet for high fall risk patients  - Consider moving patient to room near nurses station  Outcome: Progressing  Goal: Maintain or return to baseline ADL function  Description: INTERVENTIONS:  -  Assess patient's ability to carry out ADLs; assess patient's baseline for ADL function and identify physical deficits which impact ability to perform ADLs (bathing, care of mouth/teeth, toileting, grooming, dressing, etc.)  - Assess/evaluate cause of self-care deficits   - Assess range of motion  - Assess patient's mobility; develop plan if impaired  - Assess patient's need for assistive devices and provide as appropriate  - Encourage maximum independence but intervene and supervise when necessary  - Involve family in performance of ADLs  - Assess for home care needs following discharge   - Consider OT consult to assist with ADL evaluation and planning for discharge  - Provide patient education as appropriate  Outcome: Progressing  Goal: Maintains/Returns to pre admission functional level  Description: INTERVENTIONS:  - Perform BMAT or MOVE assessment daily.   - Set and communicate daily mobility goal to care team and patient/family/caregiver. - Collaborate with rehabilitation services on mobility goals if consulted  - Perform Range of Motion 2 times a day. - Reposition patient every 2 hours.   - Dangle patient 2 times a day  - Stand patient 2 times a day  - Ambulate patient 2 times a day  - Out of bed to chair 2 times a day   - Out of bed for meals 2 times a day  - Out of bed for toileting  - Record patient progress and toleration of activity level   Outcome: Progressing     Problem: DISCHARGE PLANNING  Goal: Discharge to home or other facility with appropriate resources  Description: INTERVENTIONS:  - Identify barriers to discharge w/patient and caregiver  - Arrange for needed discharge resources and transportation as appropriate  - Identify discharge learning needs (meds, wound care, etc.)  - Arrange for interpretive services to assist at discharge as needed  - Refer to Case Management Department for coordinating discharge planning if the patient needs post-hospital services based on physician/advanced practitioner order or complex needs related to functional status, cognitive ability, or social support system  Outcome: Progressing     Problem: Knowledge Deficit  Goal: Patient/family/caregiver demonstrates understanding of disease process, treatment plan, medications, and discharge instructions  Description: Complete learning assessment and assess knowledge base.   Interventions:  - Provide teaching at level of understanding  - Provide teaching via preferred learning methods  Outcome: Progressing     Problem: METABOLIC, FLUID AND ELECTROLYTES - ADULT  Goal: Electrolytes maintained within normal limits  Description: INTERVENTIONS:  - Monitor labs and assess patient for signs and symptoms of electrolyte imbalances  - Administer electrolyte replacement as ordered  - Monitor response to electrolyte replacements, including repeat lab results as appropriate  - Instruct patient on fluid and nutrition as appropriate  Outcome: Progressing  Goal: Fluid balance maintained  Description: INTERVENTIONS:  - Monitor labs   - Monitor I/O and WT  - Instruct patient on fluid and nutrition as appropriate  - Assess for signs & symptoms of volume excess or deficit  Outcome: Progressing

## 2023-08-22 NOTE — PROGRESS NOTES
Progress Note - Infectious Disease   Anatoly Delacruz 26 y.o. male MRN: [id number]  Unit/Bed#: [address] [place] [address] Encounter: [id number]      Impression/Plan:  1. Severe Sepsis, POA with fever, tachycardia, lactic acidosis, high leukocytosis in setting of #2. WBC 37 K > 11.8 K  -antibiotic as below  -follow-up cultures and adjust antibiotics as indicated  -monitor temperature and hemodynamics  -serial exam  -monitor serial labs     2. Polymicrobic Bacteremia. Admission blood cultures 2 of 2 sets growing Enterobacter cloacae, Klebsiella oxytoca, and Lactococcus lactis. #3 possible source. 8/14/23 CT A/P no acute infection. 8/17/23 TTE no obvious large vegetation seen. 8/17/23 Repeat blood cultures negative to date  -continues Cefepime 2 g IV q 12 hours  -AMY recommended to facilitate antibiotic duration determination and scheduled for 8/23/23  -patient NPO after midnight  -E test being set up for Lactococcus lactis today.     3. IVDU. 8/8/23 UDS + for methamphetamines, cocaine, THC. Patient reports use in right AC 2 weeks ago. He consented to HIV/Hepatitis testing. Patient not a candidate for outpatient PICC line. 8/16/23 HIV/Hep C screen negative  -Patient not a candidate for outpatient PICC line.     4. Bipolar 1 disorder  -monitor symptoms  -symptomatic management per primary care team  -psychiatry follow up     5. Tape adhesive eruptions. improved  -Benadryl cream to left forearm and right hand sites as needed.  -serial exam    Antibiotics:  Cefepime D8     I have discussed the above management plan in detail with patient, RN, microbiologist, and Dr. Benjamín Olson from the primary service.   I have spent a total time of 50 minutes on 08/22/23 in caring for this patient including Diagnostic results, Prognosis, Risks and benefits of tx options, Instructions for management, Patient and family education, Importance of tx compliance, Risk factor reductions, Impressions, Counseling / Coordination of care, Documenting in the medical record, Reviewing / ordering tests, medicine, procedures  , Obtaining or reviewing history   and Communicating with other healthcare professionals . Subjective:  Patient has no fever, chills, sweats overnight; no nausea, vomiting, diarrhea; no cough, shortness of breath; no pain complaints at present. Not agitated today. "feels a little anxious"    Objective:  Vitals:  Temp:  [98.1 °F (36.7 °C)-99.1 °F (37.3 °C)] 98.1 °F (36.7 °C)  HR:  [85-86] 85  Resp:  [16] 16  BP: (113-122)/(62-79) 122/79  SpO2:  [95 %] 95 %  Temp (24hrs), Av.6 °F (37 °C), Min:98.1 °F (36.7 °C), Max:99.1 °F (37.3 °C)  Current: Temperature: 98.1 °F (36.7 °C)    Physical Exam:   General Appearance:  26year old male chronically debilitated, resting in bed, in no acute distress. HEENT: Atraumatic normocephalic   Throat: Oropharynx moist. Poor dentition   Pulmonary:   Normal respiratory excursion without accessory muscle use   Cardiac:  RRR   Abdomen:   Soft, non-tender, non-distended   Extremities: No edema   : No rao, no SPT   Psychiatric: Awake, cooperative   Skin: No arm eruptions at present. No diffuse rash. IV site nontender. Labs, Imaging, & Other studies:   All pertinent labs and imaging studies were personally reviewed  Results from last 7 days   Lab Units 23  0634 23  0454 23  0913   WBC Thousand/uL 11.87* 13.77* 13.82*   HEMOGLOBIN g/dL 12.6 12.8 13.6   PLATELETS Thousands/uL 227 229 240     Results from last 7 days   Lab Units 23  0634 23  0454 23  0913   SODIUM mmol/L 138 139 139   POTASSIUM mmol/L 4.4 3.6 3.9   CHLORIDE mmol/L 107 108 108   CO2 mmol/L 25 24 24   BUN mg/dL 16 14 15   CREATININE mg/dL 0.74 0.77 0.75   EGFR ml/min/1.73sq m 119 117 118   CALCIUM mg/dL 8.7 8.6 8.7     Results from last 7 days   Lab Units 23  0509   BLOOD CULTURE  No Growth After 5 Days. No Growth After 5 Days.      Results from last 7 days   Lab Units 23  0442   PROCALCITONIN ng/ml 41.89*

## 2023-08-22 NOTE — PLAN OF CARE
Problem: PAIN - ADULT  Goal: Verbalizes/displays adequate comfort level or baseline comfort level  Description: Interventions:  - Encourage patient to monitor pain and request assistance  - Assess pain using appropriate pain scale  - Administer analgesics based on type and severity of pain and evaluate response  - Implement non-pharmacological measures as appropriate and evaluate response  - Consider cultural and social influences on pain and pain management  - Notify physician/advanced practitioner if interventions unsuccessful or patient reports new pain  Outcome: Progressing     Problem: INFECTION - ADULT  Goal: Absence or prevention of progression during hospitalization  Description: INTERVENTIONS:  - Assess and monitor for signs and symptoms of infection  - Monitor lab/diagnostic results  - Monitor all insertion sites, i.e. indwelling lines, tubes, and drains  - Monitor endotracheal if appropriate and nasal secretions for changes in amount and color  - [place] appropriate cooling/warming therapies per order  - Administer medications as ordered  - Instruct and encourage patient and family to use good hand hygiene technique  - Identify and instruct in appropriate isolation precautions for identified infection/condition  Outcome: Progressing  Goal: Absence of fever/infection during neutropenic period  Description: INTERVENTIONS:  - Monitor WBC    Outcome: Progressing     Problem: SAFETY ADULT  Goal: Patient will remain free of falls  Description: INTERVENTIONS:  - Educate patient/family on patient safety including physical limitations  - Instruct patient to call for assistance with activity   - Consult OT/PT to assist with strengthening/mobility   - Keep Call bell within reach  - Keep bed low and locked with side rails adjusted as appropriate  - Keep care items and personal belongings within reach  - Initiate and maintain comfort rounds  - Make Fall Risk Sign visible to staff  - Offer Toileting every 2 Hours, in advance of need  - Initiate/Maintain bed alarm  - Obtain necessary fall risk management equipment: fall risk sign on door  - Apply yellow socks and bracelet for high fall risk patients  - Consider moving patient to room near nurses station  Outcome: Progressing  Goal: Maintain or return to baseline ADL function  Description: INTERVENTIONS:  -  Assess patient's ability to carry out ADLs; assess patient's baseline for ADL function and identify physical deficits which impact ability to perform ADLs (bathing, care of mouth/teeth, toileting, grooming, dressing, etc.)  - Assess/evaluate cause of self-care deficits   - Assess range of motion  - Assess patient's mobility; develop plan if impaired  - Assess patient's need for assistive devices and provide as appropriate  - Encourage maximum independence but intervene and supervise when necessary  - Involve family in performance of ADLs  - Assess for home care needs following discharge   - Consider OT consult to assist with ADL evaluation and planning for discharge  - Provide patient education as appropriate  Outcome: Progressing  Goal: Maintains/Returns to pre admission functional level  Description: INTERVENTIONS:  - Perform BMAT or MOVE assessment daily.   - Set and communicate daily mobility goal to care team and patient/family/caregiver. - Collaborate with rehabilitation services on mobility goals if consulted  - Perform Range of Motion 2 times a day. - Reposition patient every 2 hours.   - Dangle patient 2 times a day  - Stand patient 2 times a day  - Ambulate patient 2 times a day  - Out of bed to chair 3 times a day   - Out of bed for meals 3 times a day  - Out of bed for toileting  - Record patient progress and toleration of activity level   Outcome: Progressing     Problem: DISCHARGE PLANNING  Goal: Discharge to home or other facility with appropriate resources  Description: INTERVENTIONS:  - Identify barriers to discharge w/patient and caregiver  - Arrange for needed discharge resources and transportation as appropriate  - Identify discharge learning needs (meds, wound care, etc.)  - Arrange for interpretive services to assist at discharge as needed  - Refer to Case Management Department for coordinating discharge planning if the patient needs post-hospital services based on physician/advanced practitioner order or complex needs related to functional status, cognitive ability, or social support system  Outcome: Progressing     Problem: Knowledge Deficit  Goal: Patient/family/caregiver demonstrates understanding of disease process, treatment plan, medications, and discharge instructions  Description: Complete learning assessment and assess knowledge base.   Interventions:  - Provide teaching at level of understanding  - Provide teaching via preferred learning methods  Outcome: Progressing     Problem: METABOLIC, FLUID AND ELECTROLYTES - ADULT  Goal: Electrolytes maintained within normal limits  Description: INTERVENTIONS:  - Monitor labs and assess patient for signs and symptoms of electrolyte imbalances  - Administer electrolyte replacement as ordered  - Monitor response to electrolyte replacements, including repeat lab results as appropriate  - Instruct patient on fluid and nutrition as appropriate  Outcome: Progressing  Goal: Fluid balance maintained  Description: INTERVENTIONS:  - Monitor labs   - Monitor I/O and WT  - Instruct patient on fluid and nutrition as appropriate  - Assess for signs & symptoms of volume excess or deficit  Outcome: Progressing

## 2023-08-23 ENCOUNTER — ANESTHESIA (INPATIENT)
Dept: NON INVASIVE DIAGNOSTICS | Facility: HOSPITAL | Age: 33
DRG: 724 | End: 2023-08-23
Payer: COMMERCIAL

## 2023-08-23 VITALS
HEART RATE: 95 BPM | DIASTOLIC BLOOD PRESSURE: 59 MMHG | SYSTOLIC BLOOD PRESSURE: 104 MMHG | TEMPERATURE: 98.1 F | BODY MASS INDEX: 22.9 KG/M2 | WEIGHT: 160 LBS | OXYGEN SATURATION: 97 % | RESPIRATION RATE: 18 BRPM | HEIGHT: 70 IN

## 2023-08-23 PROBLEM — F19.10 POLYSUBSTANCE ABUSE (HCC): Status: ACTIVE | Noted: 2020-11-17

## 2023-08-23 PROBLEM — F19.10 IV DRUG ABUSE (HCC): Status: ACTIVE | Noted: 2021-03-18

## 2023-08-23 LAB
ANION GAP SERPL CALCULATED.3IONS-SCNC: 6 MMOL/L
BUN SERPL-MCNC: 19 MG/DL (ref 5–25)
CALCIUM SERPL-MCNC: 8.8 MG/DL (ref 8.4–10.2)
CHLORIDE SERPL-SCNC: 106 MMOL/L (ref 96–108)
CO2 SERPL-SCNC: 25 MMOL/L (ref 21–32)
CREAT SERPL-MCNC: 0.64 MG/DL (ref 0.6–1.3)
ERYTHROCYTE [DISTWIDTH] IN BLOOD BY AUTOMATED COUNT: 14.2 % (ref 11.6–15.1)
GFR SERPL CREATININE-BSD FRML MDRD: 126 ML/MIN/1.73SQ M
GLUCOSE SERPL-MCNC: 96 MG/DL (ref 65–140)
HCT VFR BLD AUTO: 40 % (ref 36.5–49.3)
HGB BLD-MCNC: 13.2 G/DL (ref 12–17)
MCH RBC QN AUTO: 30.3 PG (ref 26.8–34.3)
MCHC RBC AUTO-ENTMCNC: 33 G/DL (ref 31.4–37.4)
MCV RBC AUTO: 92 FL (ref 82–98)
PLATELET # BLD AUTO: 214 THOUSANDS/UL (ref 149–390)
PMV BLD AUTO: 10.3 FL (ref 8.9–12.7)
POTASSIUM SERPL-SCNC: 4.2 MMOL/L (ref 3.5–5.3)
RBC # BLD AUTO: 4.35 MILLION/UL (ref 3.88–5.62)
SL CV LV EF: 40
SODIUM SERPL-SCNC: 137 MMOL/L (ref 135–147)
WBC # BLD AUTO: 8.53 THOUSAND/UL (ref 4.31–10.16)

## 2023-08-23 PROCEDURE — 93312 ECHO TRANSESOPHAGEAL: CPT

## 2023-08-23 PROCEDURE — 80048 BASIC METABOLIC PNL TOTAL CA: CPT | Performed by: STUDENT IN AN ORGANIZED HEALTH CARE EDUCATION/TRAINING PROGRAM

## 2023-08-23 PROCEDURE — 93312 ECHO TRANSESOPHAGEAL: CPT | Performed by: INTERNAL MEDICINE

## 2023-08-23 PROCEDURE — 99232 SBSQ HOSP IP/OBS MODERATE 35: CPT | Performed by: PHYSICIAN ASSISTANT

## 2023-08-23 PROCEDURE — 99232 SBSQ HOSP IP/OBS MODERATE 35: CPT | Performed by: INTERNAL MEDICINE

## 2023-08-23 PROCEDURE — 85027 COMPLETE CBC AUTOMATED: CPT | Performed by: STUDENT IN AN ORGANIZED HEALTH CARE EDUCATION/TRAINING PROGRAM

## 2023-08-23 PROCEDURE — 93320 DOPPLER ECHO COMPLETE: CPT | Performed by: INTERNAL MEDICINE

## 2023-08-23 PROCEDURE — 93325 DOPPLER ECHO COLOR FLOW MAPG: CPT | Performed by: INTERNAL MEDICINE

## 2023-08-23 RX ORDER — SODIUM CHLORIDE 9 MG/ML
INJECTION, SOLUTION INTRAVENOUS CONTINUOUS PRN
Status: DISCONTINUED | OUTPATIENT
Start: 2023-08-23 | End: 2023-08-25

## 2023-08-23 RX ORDER — PROPOFOL 10 MG/ML
INJECTION, EMULSION INTRAVENOUS AS NEEDED
Status: DISCONTINUED | OUTPATIENT
Start: 2023-08-23 | End: 2023-08-25

## 2023-08-23 RX ORDER — CEFTRIAXONE 2 G/50ML
2000 INJECTION, SOLUTION INTRAVENOUS EVERY 24 HOURS
Status: DISCONTINUED | OUTPATIENT
Start: 2023-08-24 | End: 2023-08-24

## 2023-08-23 RX ADMIN — CEFEPIME 2000 MG: 2 INJECTION, POWDER, FOR SOLUTION INTRAVENOUS at 13:10

## 2023-08-23 RX ADMIN — LORAZEPAM 1 MG: 2 INJECTION INTRAMUSCULAR; INTRAVENOUS at 16:43

## 2023-08-23 RX ADMIN — SODIUM CHLORIDE: 0.9 INJECTION, SOLUTION INTRAVENOUS at 10:55

## 2023-08-23 RX ADMIN — CEFEPIME 2000 MG: 2 INJECTION, POWDER, FOR SOLUTION INTRAVENOUS at 00:20

## 2023-08-23 RX ADMIN — PROPOFOL 100 MG: 10 INJECTION, EMULSION INTRAVENOUS at 11:12

## 2023-08-23 RX ADMIN — PROPOFOL 50 MG: 10 INJECTION, EMULSION INTRAVENOUS at 11:14

## 2023-08-23 RX ADMIN — ARIPIPRAZOLE 5 MG: 5 TABLET ORAL at 13:09

## 2023-08-23 RX ADMIN — PROPOFOL 150 MG: 10 INJECTION, EMULSION INTRAVENOUS at 11:09

## 2023-08-23 NOTE — PLAN OF CARE
Problem: PAIN - ADULT  Goal: Verbalizes/displays adequate comfort level or baseline comfort level  Description: Interventions:  - Encourage patient to monitor pain and request assistance  - Assess pain using appropriate pain scale  - Administer analgesics based on type and severity of pain and evaluate response  - Implement non-pharmacological measures as appropriate and evaluate response  - Consider cultural and social influences on pain and pain management  - Notify physician/advanced practitioner if interventions unsuccessful or patient reports new pain  Outcome: Progressing     Problem: INFECTION - ADULT  Goal: Absence or prevention of progression during hospitalization  Description: INTERVENTIONS:  - Assess and monitor for signs and symptoms of infection  - Monitor lab/diagnostic results  - Monitor all insertion sites, i.e. indwelling lines, tubes, and drains  - Monitor endotracheal if appropriate and nasal secretions for changes in amount and color  - [place] appropriate cooling/warming therapies per order  - Administer medications as ordered  - Instruct and encourage patient and family to use good hand hygiene technique  - Identify and instruct in appropriate isolation precautions for identified infection/condition  Outcome: Progressing

## 2023-08-23 NOTE — ANESTHESIA PREPROCEDURE EVALUATION
Procedure:  AMY    Relevant Problems   GI/HEPATIC   (+) GERD (gastroesophageal reflux disease)      Other   (+) Bacteremia   (+) Bipolar 1 disorder (HCC)   (+) History of drug abuse (HCC)   (+) IV drug abuse ([address])   (+) Polysubstance abuse (HCC)   (+) Sepsis (HCC)     R/o bacteremia     Physical Exam    Airway    Mallampati score: II  TM Distance: >3 FB  Neck ROM: full     Dental   Comment: Denies loose teeth,     Cardiovascular  Cardiovascular exam normal    Pulmonary  Pulmonary exam normal     Other Findings  Portions of exam deferred due to low yield and/or unknown COVID status      Anesthesia Plan  ASA Score- 3     Anesthesia Type- IV sedation with anesthesia with ASA Monitors. Additional Monitors:   Airway Plan:           Plan Factors-Exercise tolerance (METS): >4 METS. Chart reviewed. Existing labs reviewed. Patient summary reviewed. Patient is not a current smoker. Induction- intravenous. Postoperative Plan-     Informed Consent- Anesthetic plan and risks discussed with patient. I personally reviewed this patient with the CRNA. Discussed and agreed on the Anesthesia Plan with the CRNA. Lorne Frederick

## 2023-08-23 NOTE — PROGRESS NOTES
[facility] Internal Medicine Progress Note  Patient: Rex Mason. 26 y.o. male   MRN: [id number]  PCP: Sheree Haynes  Unit/Bed#: [address] [place] [address] Encounter: [id number]  Date Of Visit: 08/23/23    Problem List:    Principal Problem:    Sepsis ([address])  Active Problems:    Bacteremia    Bipolar 1 disorder ([address])    History of drug abuse ([address])      Assessment & Plan:    * Sepsis ([address])  Assessment & Plan  POA as evidenced by fever 102.3 and tachycardia with positive blood cultures  · Patient brought in by police after found rolling around in the grass complaining of pain all over  · History of drug abuse, UDS positive for methamphetamines  · CXR: no acute cardiopulmonary disease  · procalcitonin 12.28 > 89>42-41.89  · WBC 9 > 37>19.48-13.18  · Lactate 3. 5 > 1.3 s/p IVF  · UA negative  · 2/2 blood cultures positive for Enterobacter/Klebsiella/lactococcus; repeat blood cultures on 8/17- negative so far  Currently remains afebrile, vital signs stable. · ID recs:   · Continue IV cefepime  · Follow-up repeat blood cultures to ensure clearance of bacteremia. -Negative so far  · Status post AMY to definitively rule out endocarditis and help determine antibiotic duration today-we will follow-up results  · Follow-up final culture sensitivities, lactococcus e test      Bacteremia  Assessment & Plan  2/2 blood cultures: Polymicrobial Enterobacter, Klebsiella, lacto coccus lactose  Source unclear but history of IVDU  · CT a/p negative  · 2D echo performed shows EF 40%, left atrium severely dilated, systolic function moderately reduced, moderate global hypokinesis, mild MVR, mild TVR, PA pressure 30, trace to mild pulmonic valve regurgitation, no obvious large vegetation noted as per cardiology report.   · Continue cefepime  · AMY negative  · Antibiotics as above    History of drug abuse [facility])  Assessment & Plan  History of polysubstance abuse  · UDS 8/8/23 positive for methamphetamines, cocaine, and THC  · UDS this admission positive for methamphetamines  · Patient reports he used heroine and meth a few days ago  · Psychiatry recs:   · when medically cleared recommending inpatient psychiatry admission       Bipolar 1 disorder [facility])  Assessment & Plan  · Hx of bipolar disorder requiring inpatient admission in 2018 for suicidal ideation secondary to opioid withdrawal  · Patient has not seen psychiatrist or taken psychiatric medications for a few years  · Psychiatry consulted  · Recommend continual observation and suicide precautions  · Started on Abilify          VTE Pharmacologic Prophylaxis: VTE Score: 1 Low Risk (Score 0-2) - Encourage Ambulation. Patient Centered Rounds: I performed bedside rounds with nursing staff today. Discussions with Specialists or Other Care Team Provider: ID    Education and Discussions with Family / Patient: Discussed with the patient. Time Spent for Care: 45 minutes. More than 50% of total time spent on counseling and coordination of care as described above. Current Length of Stay: 8 day(s)  Current Patient Status: Inpatient   Certification Statement: The patient will continue to require additional inpatient hospital stay due to IV antibiotics  Discharge Plan: Anticipate discharge in 48-72 hrs to inpatient psych. Code Status: Level 1 - Full Code    Subjective:   Underwent AMY today  Denies any fever chills or pain  Reports that he is feeling well  Does not offer any complaints  Anxious to leave hospital    Objective:     Vitals:   Temp (24hrs), Av.1 °F (36.7 °C), Min:98.1 °F (36.7 °C), Max:98.1 °F (36.7 °C)    Temp:  [98.1 °F (36.7 °C)] 98.1 °F (36.7 °C)  HR:  [] 88  Resp:  [16-18] 16  BP: (116-133)/(63-75) 133/75  SpO2:  [96 %-98 %] 96 %  Body mass index is 23.09 kg/m². Input and Output Summary (last 24 hours):   No intake or output data in the 24 hours ending 23 0934    Physical Exam:   Physical Exam  Constitutional:       General: He is not in acute distress.   HENT: Head: Normocephalic and atraumatic. Cardiovascular:      Rate and Rhythm: Normal rate. Pulmonary:      Effort: Pulmonary effort is normal. No respiratory distress. Breath sounds: No wheezing, rhonchi or rales. Chest:      Chest wall: No tenderness. Abdominal:      General: Bowel sounds are normal. There is no distension. Palpations: Abdomen is soft. Tenderness: There is no abdominal tenderness. There is no guarding or rebound. Skin:     General: Skin is warm and dry. Findings: No rash. Neurological:      Mental Status: He is alert. Mental status is at baseline. Cranial Nerves: No cranial nerve deficit. Additional Data:     Labs:  Results from last 7 days   Lab Units 08/23/23  0545   WBC Thousand/uL 8.53   HEMOGLOBIN g/dL 13.2   HEMATOCRIT % 40.0   PLATELETS Thousands/uL 214     Results from last 7 days   Lab Units 08/23/23  0545   SODIUM mmol/L 137   POTASSIUM mmol/L 4.2   CHLORIDE mmol/L 106   CO2 mmol/L 25   BUN mg/dL 19   CREATININE mg/dL 0.64   ANION GAP mmol/L 6   CALCIUM mg/dL 8.8   GLUCOSE RANDOM mg/dL 96                       Lines/Drains:  Invasive Devices     Peripheral Intravenous Line  Duration           Peripheral IV 08/21/23 Dorsal (posterior); Right Forearm 1 day                      Imaging: Reviewed radiology reports from this admission including: abdominal/pelvic CT    Recent Cultures (last 7 days):   Results from last 7 days   Lab Units 08/17/23  0509   BLOOD CULTURE  No Growth After 5 Days. No Growth After 5 Days.        Last 24 Hours Medication List:   Current Facility-Administered Medications   Medication Dose Route Frequency Provider Last Rate   • acetaminophen  650 mg Oral Q6H PRN Larisa Roland PA-C     • ARIPiprazole  5 mg Oral Daily Juliet Jacobs, DO     • cefepime  2,000 mg Intravenous Q12H Sarah Farah PA-C 2,000 mg (08/23/23 0020)   • diphenhydrAMINE-zinc acetate   Topical TID PRN Sarah Farah PA-C     • hydrocortisone   Topical 4x Daily PRN JEOVANNY Hummel     • LORazepam  1 mg Intravenous Q6H PRN John Hammonds DO     • nicotine  1 patch Transdermal Daily Temo Santamaria PA-C     • nicotine polacrilex  2 mg Oral Q2H PRN Temo Santamaria PA-C     • OLANZapine  5 mg Intramuscular Q4H PRN Temo Santamaria PA-C     • ondansetron  4 mg Intravenous Q6H PRN Temo Santamaria PA-C     • oxyCODONE  5 mg Oral Q4H PRN Temo Santamaria PA-C          Today, Patient Was Seen By: Jessie Gramajo MD    ** Please Note: "This note has been constructed using a voice recognition system. Therefore there may be syntax, spelling, and/or grammatical errors.  Please call if you have any questions. "**

## 2023-08-23 NOTE — PROGRESS NOTES
Progress Note - Infectious Disease   Hermelinda Gutierrez. 26 y.o. male MRN: [id number]  Unit/Bed#: [address] [place] [address] Encounter: [id number]      Impression/Plan:  1. Severe Sepsis, POA with fever, tachycardia, lactic acidosis, high leukocytosis in setting of #2. WBC 37 K > 8.5 K  -antibiotic as below  -monitor temperature and hemodynamics  -serial exam  -monitor serial labs     2. Polymicrobic Bacteremia. Admission blood cultures 2 of 2 sets growing Enterobacter cloacae, Klebsiella oxytoca, and Lactococcus lactis. #3 possible source. 8/14/23 CT A/P no acute infection. 8/17/23 TTE no obvious large vegetation seen. 8/17/23 Repeat blood cultures negative to date  8/23/23 AMY no vegetation seen.  -continues Cefepime 2 g IV q 12 hours  -Awaiting E test results for Lactococcus lactis that were set up 8/22/23  -if Lactococcus sensitive to PCN, can transition to Amoxicillin 1 g PO q 8 hours through 8/28/23 to complete 2 weeks total antibiotics.     3. IVDU. 8/8/23 UDS + for methamphetamines, cocaine, THC. Patient reports use in right AC 2 weeks ago. He consented to HIV/Hepatitis testing. Patient not a candidate for outpatient PICC line. 8/16/23 HIV/Hep C screen negative  -Patient not a candidate for outpatient PICC line.     4. Bipolar 1 disorder  -monitor symptoms  -symptomatic management per primary care team  -psychiatry follow up     5. Tape adhesive eruptions. improved  -Benadryl cream to left forearm and right hand sites as needed.  -serial exam    Antibiotics:  Cefepime D9     I have discussed the above management plan in detail with patient, RN, microbiologist, and Dr. Kelly Juan from the primary service. Subjective:  Patient has no fever, chills, sweats overnight; no nausea, vomiting, diarrhea; no cough, shortness of breath; no pain complaints at present.  Dressed in street clothes and wants to go home asap    Objective:  Vitals:  Temp:  [98.1 °F (36.7 °C)] 98.1 °F (36.7 °C)  HR:  [] 103  Resp:  [16-18] 16  BP: (108-133)/(56-75) 108/58  SpO2:  [96 %-98 %] 98 %  Temp (24hrs), Av.1 °F (36.7 °C), Min:98.1 °F (36.7 °C), Max:98.1 °F (36.7 °C)  Current: Temperature: 98.1 °F (36.7 °C)    Physical Exam:   General Appearance:  26year old male chronically debilitated, dressed in street clothes and ambulatory in room, in no acute distress. HEENT: Atraumatic normocephalic   Throat: Oropharynx moist. Poor dentition   Pulmonary:   Normal respiratory excursion without accessory muscle use   Cardiac:  RRR   Abdomen:   Soft, non-tender, non-distended   Extremities: No edema   : No rao, no SPT   Psychiatric: Awake, cooperative   Skin: No arm eruptions at present. No diffuse rash. IV site nontender. Labs, Imaging, & Other studies:   All pertinent labs and imaging studies were personally reviewed  Results from last 7 days   Lab Units 23  0545 23  0634 23  0454   WBC Thousand/uL 8.53 11.87* 13.77*   HEMOGLOBIN g/dL 13.2 12.6 12.8   PLATELETS Thousands/uL 214 227 229     Results from last 7 days   Lab Units 23  0545 23  0634 23  0454   SODIUM mmol/L 137 138 139   POTASSIUM mmol/L 4.2 4.4 3.6   CHLORIDE mmol/L 106 107 108   CO2 mmol/L 25 25 24   BUN mg/dL 19 16 14   CREATININE mg/dL 0.64 0.74 0.77   EGFR ml/min/1.73sq m 126 119 117   CALCIUM mg/dL 8.8 8.7 8.6     Results from last 7 days   Lab Units 23  0509   BLOOD CULTURE  No Growth After 5 Days. No Growth After 5 Days.

## 2023-08-23 NOTE — ANESTHESIA POSTPROCEDURE EVALUATION
Post-Op Assessment Note    CV Status:  Stable    Pain management: adequate     Mental Status:  Somnolent   Hydration Status:  Euvolemic   PONV Controlled:  Controlled   Airway Patency:  Patent      Post Op Vitals Reviewed: Yes      Staff: CRNA         No notable events documented.     BP   118/65   Temp   98.6   Pulse  76   Resp   20   SpO2   99

## 2023-08-23 NOTE — PLAN OF CARE
Problem: PAIN - ADULT  Goal: Verbalizes/displays adequate comfort level or baseline comfort level  Description: Interventions:  - Encourage patient to monitor pain and request assistance  - Assess pain using appropriate pain scale  - Administer analgesics based on type and severity of pain and evaluate response  - Implement non-pharmacological measures as appropriate and evaluate response  - Consider cultural and social influences on pain and pain management  - Notify physician/advanced practitioner if interventions unsuccessful or patient reports new pain  Outcome: Progressing     Problem: INFECTION - ADULT  Goal: Absence or prevention of progression during hospitalization  Description: INTERVENTIONS:  - Assess and monitor for signs and symptoms of infection  - Monitor lab/diagnostic results  - Monitor all insertion sites, i.e. indwelling lines, tubes, and drains  - Monitor endotracheal if appropriate and nasal secretions for changes in amount and color  - Dow appropriate cooling/warming therapies per order  - Administer medications as ordered  - Instruct and encourage patient and family to use good hand hygiene technique  - Identify and instruct in appropriate isolation precautions for identified infection/condition  Outcome: Progressing     Problem: SAFETY ADULT  Goal: Patient will remain free of falls  Description: INTERVENTIONS:  - Educate patient/family on patient safety including physical limitations  - Instruct patient to call for assistance with activity   - Consult OT/PT to assist with strengthening/mobility   - Keep Call bell within reach  - Keep bed low and locked with side rails adjusted as appropriate  - Keep care items and personal belongings within reach  - Initiate and maintain comfort rounds  - Make Fall Risk Sign visible to staff  - Offer Toileting every 2 Hours, in advance of need  - Initiate/Maintain bed alarm  - Obtain necessary fall risk management equipment: fall risk sign  - Apply yellow socks and bracelet for high fall risk patients  - Consider moving patient to room near nurses station  Outcome: Progressing

## 2023-08-24 PROBLEM — A41.9 SEPSIS (HCC): Status: RESOLVED | Noted: 2023-08-14 | Resolved: 2023-08-24

## 2023-08-24 PROBLEM — R78.81 BACTEREMIA: Status: RESOLVED | Noted: 2023-08-15 | Resolved: 2023-08-24

## 2023-08-24 LAB
ANION GAP SERPL CALCULATED.3IONS-SCNC: 6 MMOL/L
BACTERIA BLD CULT: ABNORMAL
BACTERIA BLD CULT: ABNORMAL
BUN SERPL-MCNC: 17 MG/DL (ref 5–25)
CALCIUM SERPL-MCNC: 8.7 MG/DL (ref 8.4–10.2)
CHLORIDE SERPL-SCNC: 106 MMOL/L (ref 96–108)
CO2 SERPL-SCNC: 27 MMOL/L (ref 21–32)
CREAT SERPL-MCNC: 0.68 MG/DL (ref 0.6–1.3)
ERYTHROCYTE [DISTWIDTH] IN BLOOD BY AUTOMATED COUNT: 14 % (ref 11.6–15.1)
GFR SERPL CREATININE-BSD FRML MDRD: 123 ML/MIN/1.73SQ M
GLUCOSE SERPL-MCNC: 97 MG/DL (ref 65–140)
GRAM STN SPEC: ABNORMAL
GRAM STN SPEC: ABNORMAL
HCT VFR BLD AUTO: 40.2 % (ref 36.5–49.3)
HGB BLD-MCNC: 13 G/DL (ref 12–17)
MCH RBC QN AUTO: 29.8 PG (ref 26.8–34.3)
MCHC RBC AUTO-ENTMCNC: 32.3 G/DL (ref 31.4–37.4)
MCV RBC AUTO: 92 FL (ref 82–98)
PLATELET # BLD AUTO: 241 THOUSANDS/UL (ref 149–390)
PMV BLD AUTO: 10.2 FL (ref 8.9–12.7)
POTASSIUM SERPL-SCNC: 4.1 MMOL/L (ref 3.5–5.3)
RBC # BLD AUTO: 4.36 MILLION/UL (ref 3.88–5.62)
SODIUM SERPL-SCNC: 139 MMOL/L (ref 135–147)
WBC # BLD AUTO: 9.06 THOUSAND/UL (ref 4.31–10.16)

## 2023-08-24 PROCEDURE — 99232 SBSQ HOSP IP/OBS MODERATE 35: CPT | Performed by: INTERNAL MEDICINE

## 2023-08-24 PROCEDURE — 99239 HOSP IP/OBS DSCHRG MGMT >30: CPT | Performed by: INTERNAL MEDICINE

## 2023-08-24 PROCEDURE — 85027 COMPLETE CBC AUTOMATED: CPT | Performed by: STUDENT IN AN ORGANIZED HEALTH CARE EDUCATION/TRAINING PROGRAM

## 2023-08-24 PROCEDURE — 99232 SBSQ HOSP IP/OBS MODERATE 35: CPT | Performed by: PSYCHIATRY & NEUROLOGY

## 2023-08-24 PROCEDURE — 80048 BASIC METABOLIC PNL TOTAL CA: CPT | Performed by: STUDENT IN AN ORGANIZED HEALTH CARE EDUCATION/TRAINING PROGRAM

## 2023-08-24 RX ORDER — AMOXICILLIN 500 MG/1
1000 CAPSULE ORAL EVERY 8 HOURS SCHEDULED
Qty: 26 CAPSULE | Refills: 0 | Status: SHIPPED | OUTPATIENT
Start: 2023-08-24 | End: 2023-08-29

## 2023-08-24 RX ORDER — AMOXICILLIN 250 MG/1
1000 CAPSULE ORAL EVERY 8 HOURS SCHEDULED
Status: DISCONTINUED | OUTPATIENT
Start: 2023-08-24 | End: 2023-08-24 | Stop reason: HOSPADM

## 2023-08-24 RX ORDER — NICOTINE 21 MG/24HR
1 PATCH, TRANSDERMAL 24 HOURS TRANSDERMAL DAILY
Qty: 28 PATCH | Refills: 0 | Status: SHIPPED | OUTPATIENT
Start: 2023-08-25

## 2023-08-24 RX ORDER — ARIPIPRAZOLE 5 MG/1
5 TABLET ORAL DAILY
Qty: 30 TABLET | Refills: 0 | Status: SHIPPED | OUTPATIENT
Start: 2023-08-25

## 2023-08-24 RX ADMIN — CEFTRIAXONE 2000 MG: 2 INJECTION, SOLUTION INTRAVENOUS at 01:12

## 2023-08-24 RX ADMIN — ARIPIPRAZOLE 5 MG: 5 TABLET ORAL at 09:17

## 2023-08-24 NOTE — PROGRESS NOTES
Progress Note - Infectious Disease   John Geiger. 26 y.o. male MRN: [id number]  Unit/Bed#: [address] [place] [address] Encounter: [id number]      Impression/Plan:  1. Severe Sepsis, POA with fever, tachycardia, lactic acidosis, high leukocytosis in setting of #2. WBC 37 K > 9 K  -antibiotic as below  -monitor temperature and hemodynamics  -serial exam     2. Polymicrobic Bacteremia. Admission blood cultures 2 of 2 sets growing Enterobacter cloacae, Klebsiella oxytoca, and Lactococcus lactis with Etest to PCN 0.38. #3 possible source. 23 CT A/P no acute infection. 23 TTE no obvious large vegetation seen. 23 Repeat blood cultures negative to date  23 AMY no vegetation seen.   -continues Cefepime 2 g IV q 12 hours inhouse  -Can transition to Amoxicillin 1 g PO q 8 hours through 23 to complete 2 weeks total antibiotics.     3. IVDU. 23 UDS + for methamphetamines, cocaine, THC. Patient reports use in right AC 2 weeks ago. He consented to HIV/Hepatitis testing. Patient not a candidate for outpatient PICC line. 23 HIV/Hep C screen negative  -Patient not a candidate for outpatient PICC line.     4. Bipolar 1 disorder  -monitor symptoms  -symptomatic management per primary care team  -psychiatry follow up     5. Tape adhesive eruptions. improved  -Benadryl cream to left forearm and right hand sites as needed.  -serial exam    Antibiotics:  Cefepime D10     I have discussed the above management plan in detail with patient, RN, and Dr. Noemi Morales from the primary service. Subjective:  Patient has no fever, chills, sweats overnight; no nausea, vomiting, diarrhea; no cough, shortness of breath; no pain complaints at present. Dressed in street clothes and ready to go home if cleared by crisis.     Objective:  Vitals:  Temp:  [98 °F (36.7 °C)-98.1 °F (36.7 °C)] 98.1 °F (36.7 °C)  HR:  [91-95] 95  Resp:  [17-18] 18  BP: (104-111)/(59-66) 104/59  SpO2:  [97 %] 97 %  Temp (24hrs), Av.1 °F (36.7 °C), Min:98 °F (36.7 °C), Max:98.1 °F (36.7 °C)  Current: Temperature: 98.1 °F (36.7 °C)    Physical Exam:   General Appearance:  26year old male chronically debilitated, dressed in street clothes and resting comfortably in bed, in no acute distress. HEENT: Atraumatic normocephalic   Throat: Oropharynx moist. Poor dentition   Pulmonary:   Normal respiratory excursion without accessory muscle use   Cardiac:  RRR   Abdomen:   Soft, non-tender, non-distended   Extremities: No edema   : No rao, no SPT   Psychiatric: Awake, cooperative   Skin: No arm eruptions at present. No diffuse rash.         Labs, Imaging, & Other studies:   All pertinent labs and imaging studies were personally reviewed  Results from last 7 days   Lab Units 08/24/23  0558 08/23/23  0545 08/22/23  0634   WBC Thousand/uL 9.06 8.53 11.87*   HEMOGLOBIN g/dL 13.0 13.2 12.6   PLATELETS Thousands/uL 241 214 227     Results from last 7 days   Lab Units 08/24/23  0558 08/23/23  0545 08/22/23  0634   SODIUM mmol/L 139 137 138   POTASSIUM mmol/L 4.1 4.2 4.4   CHLORIDE mmol/L 106 106 107   CO2 mmol/L 27 25 25   BUN mg/dL 17 19 16   CREATININE mg/dL 0.68 0.64 0.74   EGFR ml/min/1.73sq m 123 126 119   CALCIUM mg/dL 8.7 8.8 8.7

## 2023-08-24 NOTE — DISCHARGE SUMMARY
Discharge Summary - [facility]    Patient Information: Sarah Chacon. 26 y.o. male MRN: [id number]  Unit/Bed#: [address] [facility] 408-01 Encounter: [id number]    Discharging Physician / Practitioner: Franky Osullivan MD  PCP: Akhil Weiss  Admission Date: 8/14/2023  Discharge Date: 08/24/23    Reason for Admission: Pain (Pt. Crying and moaning. Unable to understand pt complaint. )      Discharge Diagnoses:     Principal Problem (Resolved):    Sepsis ([address])  Active Problems:    Bipolar 1 disorder ([address])    History of drug abuse ([address])  Resolved Problems:    Bacteremia        * Sepsis (HCC)-resolved as of 8/24/2023  Assessment & Plan  POA as evidenced by fever 102.3 and tachycardia with positive blood cultures  · Patient brought in by police after found rolling around in the grass complaining of pain all over  · History of drug abuse, UDS positive for methamphetamines  · CXR: no acute cardiopulmonary disease  · procalcitonin 12.28 > 89>42-41.89  · WBC 9 > 37>19.48-13.18  · Lactate 3. 5 > 1.3 s/p IVF  · UA negative  · 2/2 blood cultures positive for Enterobacter/Klebsiella/lactococcus; repeat blood cultures on 8/17- negative so far  Likely source/risk factors secondary to IVDU. CT abdomen pelvis without any acute abnormality. BUN LFT unremarkable. Patient without any GI/ symptoms. Currently remains afebrile, vital signs stable.     Seen and followed by ID, input appreciated  · Repeat blood cultures negative  · AMY without any evidence of vegetation  · Treated with cefepime, subsequently de-escalated to ceftriaxone  · Now transitioning to high-dose amoxicillin 1 g every 8 hours till 8/28 to complete 2 weeks      Bacteremia-resolved as of 8/24/2023  Assessment & Plan  2/2 blood cultures: Polymicrobial Enterobacter, Klebsiella, lacto coccus lactose  Source unclear but history of IVDU  · CT a/p negative  · 2D echo performed shows EF 40%, left atrium severely dilated, systolic function moderately reduced, moderate global hypokinesis, mild MVR, mild TVR, PA pressure 30, trace to mild pulmonic valve regurgitation, no obvious large vegetation noted as per cardiology report. · AMY negative  · Repeat blood cultures negative  · Antibiotics as above  · Discussed regarding risk of bacteremia and sequela due to IVDU. Counseled for substance abuse abstinence    History of drug abuse ([address] )  Assessment & Plan  History of polysubstance abuse  · UDS 8/8/23 positive for methamphetamines, cocaine, and THC  · UDS this admission positive for methamphetamines  · Patient reports he used heroine and meth a few days ago  · Counseled    Bipolar 1 disorder ([address])  Assessment & Plan  · Hx of bipolar disorder requiring inpatient admission in 2018 for suicidal ideation secondary to opioid withdrawal  · Patient has not seen psychiatrist or taken psychiatric medications for a few years  · Psychiatry consulted  · Recommend continual observation and suicide precautions  · Started on Abilify  · Patient is currently medically optimal for further psychiatric/crisis evaluation for further treatment    Addendum  Patient was reevaluated by Dr. Letitia Fletcher, psychiatry. Input appreciated. Dr. Letitia Fletcher recommended discharge with outpatient follow-up with psych. He Instructed patient to call family guidance to make an appointment on Monday. Consultations During Hospital Stay:  [address] TO PHARMACY  IP CONSULT TO INFECTIOUS DISEASES    Procedures Performed:     · AMY    Significant Findings:     · Refer to hospital course and above listed diagnosis related plan for details    Imaging while in hospital:    AMY    Result Date: 8/23/2023  Narrative: •  Left Ventricle: Left ventricular cavity size is dilated. The left ventricular ejection fraction is 40-45% by visual estimation. . Systolic function is moderately reduced. There is moderate global hypokinesis. •  Right Ventricle: Systolic function is low normal. •  Left Atrium:  The atrium is dilated. There is mild, continuous spontaneous echo contrast. •  Atrial Septum: No patent foramen ovale detected using saline contrast injection with provocation by abdominal compression. Low suspicion study for evidence of valvular vegetation or abscess. Echo complete w/ contrast if indicated    Result Date: 8/17/2023  Narrative: •  Left Ventricle: Left ventricular cavity size is mildly dilated. Wall thickness is normal. The left ventricular ejection fraction is around 40% by visual estimation. . Systolic function is moderately reduced. There is moderate global hypokinesis. •  Left Atrium: The atrium is severely dilated. Left atrium end-systolic volume index is around 60 mm/m² •  Mitral Valve: There is mild regurgitation. •  Tricuspid Valve: There is mild regurgitation. Pulmonary artery pressure around 30 mmHg. •  Pulmonic Valve: There is trace to mild regurgitation. •  No old echo available for comparison •  No obvious large vegetation noted. The study is not sensitive enough to rule out small vegetation correlate clinically. CT abdomen pelvis w contrast    Result Date: 8/15/2023  Narrative: CT ABDOMEN AND PELVIS WITH IV CONTRAST INDICATION:   Bacteremia. COMPARISON: 8/14/2023 TECHNIQUE:  CT examination of the abdomen and pelvis was performed. Multiplanar 2D reformatted images were created from the source data. This examination, like all CT scans performed in the [facility], was performed utilizing techniques to minimize radiation dose exposure, including the use of iterative reconstruction and automated exposure control. Radiation dose length product (DLP) for this visit:  443.79 mGy-cm IV Contrast:  100 mL of iohexol (OMNIPAQUE) Enteric Contrast:  Enteric contrast was not administered. FINDINGS: ABDOMEN LOWER CHEST: Clear lung bases. LIVER/BILIARY TREE: Low-attenuation 2.2 cm lesion in the right lobe with subtle peripheral nodular enhancement suggesting a hemangioma. GALLBLADDER: Partially collapsed gallbladder without evidence of acute cholecystitis. SPLEEN:  Unremarkable. PANCREAS:  Unremarkable. ADRENAL GLANDS:  Unremarkable. KIDNEYS/URETERS: Symmetric nephrographic phase enhancement of the kidneys. No obstructive uropathy STOMACH AND BOWEL: Diverticulosis without evidence of diverticulitis or colitis. Decompressed small bowel APPENDIX:  No findings to suggest appendicitis. ABDOMINOPELVIC CAVITY: Small amount of fluid or ascites in the dependent portion of the lower abdomen and pelvis. No organized fluid collection or free intraperitoneal air. VESSELS: No abdominal aortic aneurysm. PELVIS REPRODUCTIVE ORGANS: No prostate enlargement URINARY BLADDER:  Unremarkable. ABDOMINAL WALL/INGUINAL REGIONS:  Unremarkable. OSSEOUS STRUCTURES:  No acute fracture or destructive osseous lesion. Impression: 1. No evidence of bowel obstruction, colitis, diverticulitis or appendicitis. 2. No evidence of pyonephritis or obstructive uropathy. 3. Small amount of fluid in the dependent portion of the lower abdomen/pelvis, nonspecific. Workstation performed: [id number]     XR chest 1 view portable    Result Date: 8/14/2023  Narrative: CHEST INDICATION:   Fever. COMPARISON: Chest radiograph 11/2/2022. EXAM PERFORMED/VIEWS:  XR CHEST PORTABLE FINDINGS: Cardiomediastinal silhouette appears unremarkable. The lungs are clear. No pneumothorax or pleural effusion. Osseous structures appear within normal limits for patient age. Impression: No acute cardiopulmonary disease. Workstation performed: [id number]       Incidental Findings:   · Echocardiogram revealed EF of 40-45%    Test Results Pending at Discharge (will require follow up):   · As per After Visit Summary     Outpatient Tests Requested:  · None    Complications:  Refer to hospital course and above listed diagnosis related plan, if any    Hospital Course:    As per HPI  Jimmy Damon is a 26 y.o. male patient with history of IV drug abuse, bipolar disorder who originally presented to the hospital on 2023 after he was brought in by the police. They report he was rolling around in the grass and complaining of pain all over. In the ED, patient was noted to have fever and tachycardia with elevated lactate and procalcitonin. CXR was negative. Patient reports history of IV drug use and reports he used meth and heroin a few days ago. He could not recall the events that brought him into the ED. He used to see a psychiatrist but reports he hasn't seen one in years and has not been on psychiatric medications in years. He reports he has been feeling pain everywhere including a headache, chest pain, pain in all limbs, and shortness of breath. He reports he has felt like this since his dad . He reports there are people trying to poison him and that drugs are the only thing that helps with his pain and his head. He has been eating well. He denies fever, chills, current pain, nausea, vomiting, or urinary symptoms. Patient was admitted to the hospital, started on broad-spectrum antibiotics pending culture results. Subsequently blood culture noted to be positive, initially noting Enterobacter and antibiotics were changed to cefepime. CT abdomen pelvis did not reveal any source of infection. Patient was also seen by psychiatry, recommended to continue medical management and continue on one-to-one observation for intentional overdose. Patient was also started on Abilify and inpatient psychiatric evaluation and treatment was recommended. Sequently blood cultures also revealed growth of Klebsiella and lacto coccus. Patient was seen and followed by infectious disease. Source of infection was considered active IVDU. No other source was noted as patient had normal UA and normal LFT without any GI or  symptoms. Echocardiogram was done which did not show any evidence of vegetations. Cultures were repeated. Which remain negative.   AMY was requested, patient was continued on IV cefepime. Patient underwent AMY which did not reveal any evidence of vegetation. EF noted to be 40 to 45%. Culture sensitivities were followed. Patient received appropriate treatment with IV cefepime for Enterobacter and antibiotics were subsequently de-escalated to ceftriaxone. Repeated cultures remain negative and AMY was negative. Based on final sensitivity antibiotics were changed to oral amoxicillin at a high dose of 1 g every 8 hours to complete 2 weeks on 8/28/2023. Patient is currently alert oriented x3 afebrile signs are stable. Patient denies any cardiorespiratory or GI/ symptoms. Ambulatory independently in room. Patient was counseled regarding substance abuse abstinence and associated risk. Patient was educated about incidental finding and follow-up. Patient was recommended to follow-up with PCP psychiatry and cardiology after discharge.     Patient is currently medically optimal for further inpatient psychiatric treatment and evaluation. Please see above list of diagnoses and related plan for additional information. Condition at Discharge: stable     Discharge Day Visit / Exam:     Subjective:    Ambulating in room  Anxious to leave  Denies any chest pain shortness of breath  Denies any fever or chills  Reports good appetite without any GI or  symptoms    Vitals: Blood Pressure: 104/59 (08/23/23 2233)  Pulse: 95 (08/23/23 2233)  Temperature: 98.1 °F (36.7 °C) (08/23/23 2233)  Temp Source: Tympanic (08/23/23 2233)  Respirations: 18 (08/23/23 2233)  Height: 5' 10" (177.8 cm) (08/23/23 1119)  Weight - Scale: 72.6 kg (160 lb) (08/23/23 1119)  SpO2: 97 % (08/23/23 2233)  Exam:   Physical Exam  Constitutional:       General: He is not in acute distress. HENT:      Head: Normocephalic and atraumatic. Cardiovascular:      Rate and Rhythm: Normal rate. Pulmonary:      Effort: Pulmonary effort is normal. No respiratory distress. Breath sounds: No wheezing, rhonchi or rales. Chest:      Chest wall: No tenderness. Abdominal:      General: Bowel sounds are normal. There is no distension. Palpations: Abdomen is soft. Tenderness: There is no abdominal tenderness. There is no guarding or rebound. Musculoskeletal:      Cervical back: Neck supple. Right lower leg: No edema. Left lower leg: No edema. Skin:     General: Skin is warm and dry. Findings: No rash. Neurological:      Mental Status: He is alert. Mental status is at baseline. Cranial Nerves: No cranial nerve deficit. Psychiatric:         Mood and Affect: Mood is anxious. Discharge instructions/Information to patient and family:(Discharge Medications and Follow up):   See after visit summary for information provided to patient and family. Provisions for Follow-Up Care:  See after visit summary for information related to follow-up care and any pertinent home health orders. Disposition: Per psychiatric evaluation and recommendation    Planned Readmission:  No     Discharge Statement:  I spent 40 minutes discharging the patient. This time was spent on the day of discharge. I had direct contact with the patient on the day of discharge. Greater than 50% of the total time was spent examining patient, answering all patient questions, arranging and discussing plan of care with patient as well as directly providing post-discharge instructions. Additional time then spent on discharge activities. Coordinated with infectious disease, discussed with crisis. Discussed with patient at length regarding treatment plan. Discussed with Dr. Juana Powers, input appreciated. Discharge Medications:  See after visit summary for reconciled discharge medications provided to patient and family. ** Please Note: "This note has been constructed using a voice recognition system. Therefore there may be syntax, spelling, and/or grammatical errors.  Please call if you have any questions. "**

## 2023-08-24 NOTE — ED NOTES
15:10 - Called [facility] and cancelled screening request as the patient was cleared by hospital psychiatry.

## 2023-08-24 NOTE — PROGRESS NOTES
Progress Note - Shweta Larson. 26 y.o. male MRN: [id number]  Unit/Bed#: 913 [facility] 408-01 Encounter: [id number]        I came to see the patient this afternoon for continuity of care as requested by the patient. Patient reports having had improvements in his mood over the course of this hospitalization. He reports that the Abilify has been helpful in improving his mood. He reports that his mood is "great". He reports having good sleep and good appetite. He reports "I have not felt this good in a long time". He denies feeling depressed. He states that during the initial evaluation he was feeling "sick" and depressed though states this is improved now. When discussing what patient had stated on the evaluation that his recent overdoses were intentional patient stated he had said that because he felt sick and depressed. He adamantly stated that the overdoses were "accidental" and that they were not suicide attempts. Patient denies current suicidal or homicidal ideation, plan, or intent. He states that he has "too much to live for" and that he feels "blessed". He reports that he has plans to start a job tomorrow morning and that he is going to be staying with someone starting tonight and states that he is looking forward to be working again. He states that he wants to continue taking Abilify for his mood.        Behavior since last time seen:  improved  Sleep: Good  Appetite: Good  Medication side effects: Denies  ROS: All systems were reviewed and are negative    Mental Status Evaluation:  Appearance:  age appropriate and casually dressed   Behavior:  Calm, cooperative   Speech:  Normal rate, normal volume   Mood:  "Great"   Affect:   Euthymic, smiling, brightens   Language: naming objects and repeating phrases   Thought Process:  goal directed and Organized   Associations: intact associations   Thought Content:  No delusions elicited   Perceptual Disturbances: Denies auditory or visual hallucinations   Risk Potential:  Denies current suicidal or homicidal ideation, plan, or intent   Sensorium:  person, place, time/date and situation   Memory:  recent and remote memory grossly intact   Cognition:  recent and remote memory grossly intact   Consciousness:  alert and awake    Attention: attention span and concentration were age appropriate   Intellect: within normal limits   Fund of Knowledge: awareness of current events: Fair, past history: Fair and vocabulary: Fair   Insight:  Moderate   Judgment: fair   Muscle Strength and Tone:  Not assessed   Gait/Station: normal gait/station   Motor Activity: no abnormal movements         Assessment/Plan  Taj Mercado is a 26 y.o. male with past medical history of bipolar 1 disorder, polysubstance abuse, previous overdoses who presented to the ED and was admitted for SIRS. He was found to have sepsis due to bacteremia. Patient was seen for psychiatric evaluation and was started on Abilify 5 mg daily with plan for inpatient psychiatric hospitalization. I came to see the patient this morning for continuity of care as requested by the patient. Patient reports having had improvements in his mood over the course of this hospitalization. He reports that the Abilify has been helpful in improving his mood. He reports that his mood is "great". He reports having good sleep and good appetite. He reports "I have not felt this good in a long time". He denies feeling depressed. He states that during the initial evaluation he was feeling "sick" and depressed though states this is improved now. When discussing what patient had stated on the evaluation that his recent overdoses were intentional patient stated he had said that because he felt sick and depressed. He adamantly stated that the overdoses were "accidental" and that they were not suicide attempts. Patient denies current suicidal or homicidal ideation, plan, or intent.   He states that he has "too much to live for" and that he feels "blessed". He reports that he has plans to start a job tomorrow morning and that he is going to be staying with someone starting tonight and states that he is looking forward to be working again. He states that he wants to continue taking Abilify for his mood. Patient's mood and psychosis have improved over his hospitalization compared to when last seen. Patient is goal oriented, future focused, and reports motivation to return to work as well as continue the Abilify. Patient states that he does not want to go to a psychiatric hospital.  He reports that he is feeling better and that he wants to follow-up with psychiatry outpatient. Patient called family guidance on speaker phone to schedule an appointment and was given a number for the intake department. Patient called intake department for family guidance on speaker phone to schedule an appointment and was told to call Monday morning at 9 AM to schedule an appointment. Patient stated that he made a note to remind himself to call Monday morning 8/28/2023 at 9 AM to make an appointment to be seen for continued outpatient psychiatry and substance abuse treatment and that he will call then.       Diagnosis:  Mood disorder unspecified  Psychotic disorder unspecified      Recommended Treatment:   Continue medical management  Patient states that does not want to go to a psychiatric hospital  Patient does not meet criteria for involuntary inpatient psychiatric hospitalization  He reports that he is feeling better and that he wants to follow-up with psychiatry outpatient  Patient called family guidance on speaker to schedule an appointment and was told to call Monday morning 8/28/2023 at 9 AM to make an appointment to be seen for outpatient psychiatry and substance abuse treatment and patient stated that he will call then  Continue Abilify 5 mg daily for mood and psychosis  Discussed with the primary team  Patient is for discharge today    Medications: continue current psychiatric medications      Risks, benefits and possible side effects of Medications:   Risks, benefits, and possible side effects of medications have been previously explained to the patient. No new medication changes today. Labs: I have personally reviewed all pertinent laboratory results. I have personally reviewed all pertinent laboratory/tests results.   Labs in last 72 hours:   Recent Labs     08/24/23  0558   WBC 9.06   RBC 4.36   HGB 13.0   HCT 40.2      RDW 14.0   SODIUM 139   K 4.1      CO2 27   BUN 17   CREATININE 0.68   GLUC 97   CALCIUM 8.7     EKG   Lab Results   Component Value Date    VENTRATE 78 08/15/2023    ATRIALRATE 78 08/15/2023    PRINT 142 08/15/2023    QRSDINT 116 08/15/2023    QTINT 400 08/15/2023    QTCINT 456 08/15/2023    PAXIS -25 08/15/2023    QRSAXIS 79 08/15/2023    TWAVEAXIS 73 08/15/2023         Micah Morton DO

## 2023-08-24 NOTE — ED NOTES
8/24/23 @ [id number]:  PES notified that patient will be medically cleared and will need assessment for treatment. PES discussed with Dr. Uma Hooks and he will see patient and determine level of care needed. PES discussed possible "warm handoff to [facility] program."  PES will continue to monitor.   Wm Munoz MS

## 2023-08-24 NOTE — PLAN OF CARE
Problem: PAIN - ADULT  Goal: Verbalizes/displays adequate comfort level or baseline comfort level  Description: Interventions:  - Encourage patient to monitor pain and request assistance  - Assess pain using appropriate pain scale  - Administer analgesics based on type and severity of pain and evaluate response  - Implement non-pharmacological measures as appropriate and evaluate response  - Consider cultural and social influences on pain and pain management  - Notify physician/advanced practitioner if interventions unsuccessful or patient reports new pain  Outcome: Progressing     Problem: INFECTION - ADULT  Goal: Absence or prevention of progression during hospitalization  Description: INTERVENTIONS:  - Assess and monitor for signs and symptoms of infection  - Monitor lab/diagnostic results  - Monitor all insertion sites, i.e. indwelling lines, tubes, and drains  - Monitor endotracheal if appropriate and nasal secretions for changes in amount and color  - [place] appropriate cooling/warming therapies per order  - Administer medications as ordered  - Instruct and encourage patient and family to use good hand hygiene technique  - Identify and instruct in appropriate isolation precautions for identified infection/condition  Outcome: Progressing  Goal: Absence of fever/infection during neutropenic period  Description: INTERVENTIONS:  - Monitor WBC    Outcome: Progressing     Problem: SAFETY ADULT  Goal: Patient will remain free of falls  Description: INTERVENTIONS:  - Educate patient/family on patient safety including physical limitations  - Instruct patient to call for assistance with activity   - Consult OT/PT to assist with strengthening/mobility   - Keep Call bell within reach  - Keep bed low and locked with side rails adjusted as appropriate  - Keep care items and personal belongings within reach  - Initiate and maintain comfort rounds  - Make Fall Risk Sign visible to staff  - Offer Toileting every 2 Hours, in advance of need  - Initiate/Maintain bed alarm  - Obtain necessary fall risk management equipment: fall risk sign on door  - Apply yellow socks and bracelet for high fall risk patients  - Consider moving patient to room near nurses station  Outcome: Progressing  Goal: Maintain or return to baseline ADL function  Description: INTERVENTIONS:  -  Assess patient's ability to carry out ADLs; assess patient's baseline for ADL function and identify physical deficits which impact ability to perform ADLs (bathing, care of mouth/teeth, toileting, grooming, dressing, etc.)  - Assess/evaluate cause of self-care deficits   - Assess range of motion  - Assess patient's mobility; develop plan if impaired  - Assess patient's need for assistive devices and provide as appropriate  - Encourage maximum independence but intervene and supervise when necessary  - Involve family in performance of ADLs  - Assess for home care needs following discharge   - Consider OT consult to assist with ADL evaluation and planning for discharge  - Provide patient education as appropriate  Outcome: Progressing  Goal: Maintains/Returns to pre admission functional level  Description: INTERVENTIONS:  - Perform BMAT or MOVE assessment daily.   - Set and communicate daily mobility goal to care team and patient/family/caregiver. - Collaborate with rehabilitation services on mobility goals if consulted  - Perform Range of Motion 2 times a day. - Reposition patient every 2 hours.   - Dangle patient 2 times a day  - Stand patient 2 times a day  - Ambulate patient 2 times a day  - Out of bed to chair 3 times a day   - Out of bed for meals 3 times a day  - Out of bed for toileting  - Record patient progress and toleration of activity level   Outcome: Progressing     Problem: DISCHARGE PLANNING  Goal: Discharge to home or other facility with appropriate resources  Description: INTERVENTIONS:  - Identify barriers to discharge w/patient and caregiver  - Arrange for needed discharge resources and transportation as appropriate  - Identify discharge learning needs (meds, wound care, etc.)  - Arrange for interpretive services to assist at discharge as needed  - Refer to Case Management Department for coordinating discharge planning if the patient needs post-hospital services based on physician/advanced practitioner order or complex needs related to functional status, cognitive ability, or social support system  Outcome: Progressing     Problem: Knowledge Deficit  Goal: Patient/family/caregiver demonstrates understanding of disease process, treatment plan, medications, and discharge instructions  Description: Complete learning assessment and assess knowledge base.   Interventions:  - Provide teaching at level of understanding  - Provide teaching via preferred learning methods  Outcome: Progressing     Problem: METABOLIC, FLUID AND ELECTROLYTES - ADULT  Goal: Electrolytes maintained within normal limits  Description: INTERVENTIONS:  - Monitor labs and assess patient for signs and symptoms of electrolyte imbalances  - Administer electrolyte replacement as ordered  - Monitor response to electrolyte replacements, including repeat lab results as appropriate  - Instruct patient on fluid and nutrition as appropriate  Outcome: Progressing  Goal: Fluid balance maintained  Description: INTERVENTIONS:  - Monitor labs   - Monitor I/O and WT  - Instruct patient on fluid and nutrition as appropriate  - Assess for signs & symptoms of volume excess or deficit  Outcome: Progressing

## 2023-08-24 NOTE — CASE MANAGEMENT
Case Management Progress Note    Patient name Jacqui Santo. Location 4 [place] 408/4 [place] 408-* MRN [id number]  : 1990 Date 2023       LOS (days): 9  Geometric Mean LOS (GMLOS) (days):   Days to GMLOS:        OBJECTIVE:      Current admission status: Inpatient  Preferred Pharmacy:   [address] [place] [address] #[id number] Louisail Amada, [address] [facility] [address] ([address])  [address] [facility] ([address])  [address] [facility] [address]  Phone: [phone] Fax: [phone]    Primary Care Provider: Anisha Bruno    Primary Insurance: Juliet SHULTZ  Secondary Insurance:     PROGRESS NOTE:    Per attending, plan is for patient to be medically cleared today. Disposition is pending psychiatric assessment, crisis to be consulted if inpatient psych admission is needed.

## 2023-08-24 NOTE — INCIDENTAL FINDINGS
The following findings require follow up:  Radiographic finding   Finding: Echocardiogram- noted to have ejection fraction 40% indicating decreased and systolic function of the heart.    Follow up required: yes   Follow up should be done within 1 month(s)    Please notify the following clinician to assist with the follow up:   Vin Anthony and cardiology

## 2023-08-25 NOTE — UTILIZATION REVIEW
NOTIFICATION OF ADMISSION DISCHARGE   This is a Notification of Discharge from [address] [place] [address]. Please be advised that this patient has been discharge from our facility. Below you will find the admission and discharge date and time including the patient’s disposition. UTILIZATION REVIEW CONTACT:  Stefano Sherman  Utilization   Network Utilization Review Department  Phone: [phone] x carefully listen to the prompts. All voicemails are confidential.  Email: [email or web address]     ADMISSION INFORMATION  PRESENTATION DATE: 8/14/2023 12:26 PM  OBERVATION ADMISSION DATE: 8/14/23  INPATIENT ADMISSION DATE: 8/15/23  3:06 PM   DISCHARGE DATE: 8/24/2023  3:46 PM   DISPOSITION:Home/Self Care    IMPORTANT INFORMATION:  Send all requests for admission clinical reviews, approved or denied determinations and any other requests to dedicated fax number below belonging to the campus where the patient is receiving treatment.  List of dedicated fax numbers:  [place] DENIALS (Administrative/Medical Necessity) [phone] [address] [facility] (Maternity/NICU/Pediatrics) [phone]   [facility] [phone]   [facility] [phone] [address] [phone]   [address] [place] [address] [phone]   [facility] [phone]   [address] [place], [place] [address]e [address] [place] [address] [phone]   [address] [place] [address] [phone]   [address] [place] [address] [phone] [address] [phone]

## 2023-09-21 ENCOUNTER — APPOINTMENT (EMERGENCY)
Dept: RADIOLOGY | Facility: HOSPITAL | Age: 33
End: 2023-09-21
Payer: COMMERCIAL

## 2023-09-21 ENCOUNTER — HOSPITAL ENCOUNTER (EMERGENCY)
Facility: HOSPITAL | Age: 33
Discharge: HOME | End: 2023-09-22
Attending: EMERGENCY MEDICINE
Payer: COMMERCIAL

## 2023-09-21 DIAGNOSIS — S00.83XA CONTUSION OF FOREHEAD, INITIAL ENCOUNTER: ICD-10-CM

## 2023-09-21 DIAGNOSIS — Z59.00 HOMELESS: ICD-10-CM

## 2023-09-21 DIAGNOSIS — T40.1X1A HEROIN OVERDOSE (HCC): Primary | ICD-10-CM

## 2023-09-21 PROCEDURE — G1004 CDSM NDSC: HCPCS

## 2023-09-21 PROCEDURE — 70450 CT HEAD/BRAIN W/O DYE: CPT

## 2023-09-21 PROCEDURE — 72125 CT NECK SPINE W/O DYE: CPT

## 2023-09-21 PROCEDURE — 99284 EMERGENCY DEPT VISIT MOD MDM: CPT

## 2023-09-21 RX ORDER — ONDANSETRON 2 MG/ML
4 INJECTION INTRAMUSCULAR; INTRAVENOUS ONCE
Status: DISCONTINUED | OUTPATIENT
Start: 2023-09-21 | End: 2023-09-22

## 2023-09-21 RX ORDER — ONDANSETRON 2 MG/ML
4 INJECTION INTRAMUSCULAR; INTRAVENOUS ONCE
Status: COMPLETED | OUTPATIENT
Start: 2023-09-21 | End: 2023-09-22

## 2023-09-21 RX ORDER — NALOXONE HYDROCHLORIDE 1 MG/ML
3 INJECTION PARENTERAL ONCE
Status: COMPLETED | OUTPATIENT
Start: 2023-09-21 | End: 2023-09-22

## 2023-09-21 SDOH — ECONOMIC STABILITY - HOUSING INSECURITY: HOMELESSNESS UNSPECIFIED: Z59.00

## 2023-09-22 VITALS
HEART RATE: 87 BPM | WEIGHT: 160 LBS | OXYGEN SATURATION: 98 % | TEMPERATURE: 98.5 F | BODY MASS INDEX: 22.96 KG/M2 | SYSTOLIC BLOOD PRESSURE: 110 MMHG | RESPIRATION RATE: 16 BRPM | DIASTOLIC BLOOD PRESSURE: 57 MMHG

## 2023-09-22 LAB
2HR DELTA HS TROPONIN: -7 NG/L
ALBUMIN SERPL BCP-MCNC: 4.4 G/DL (ref 3.5–5)
ALP SERPL-CCNC: 92 U/L (ref 34–104)
ALT SERPL W P-5'-P-CCNC: 800 U/L (ref 7–52)
AMPHETAMINES SERPL QL SCN: POSITIVE
ANION GAP SERPL CALCULATED.3IONS-SCNC: 7 MMOL/L
APAP SERPL-MCNC: <10 UG/ML (ref 10–20)
AST SERPL W P-5'-P-CCNC: 542 U/L (ref 13–39)
ATRIAL RATE: 84 BPM
BACTERIA UR QL AUTO: ABNORMAL /HPF
BARBITURATES UR QL: NEGATIVE
BASOPHILS # BLD AUTO: 0.05 THOUSANDS/ÂΜL (ref 0–0.1)
BASOPHILS NFR BLD AUTO: 0 % (ref 0–1)
BENZODIAZ UR QL: NEGATIVE
BILIRUB SERPL-MCNC: 0.33 MG/DL (ref 0.2–1)
BILIRUB UR QL STRIP: NEGATIVE
BUN SERPL-MCNC: 19 MG/DL (ref 5–25)
CALCIUM SERPL-MCNC: 9 MG/DL (ref 8.4–10.2)
CARDIAC TROPONIN I PNL SERPL HS: 21 NG/L
CARDIAC TROPONIN I PNL SERPL HS: 28 NG/L
CHLORIDE SERPL-SCNC: 102 MMOL/L (ref 96–108)
CK SERPL-CCNC: 420 U/L (ref 39–308)
CLARITY UR: CLEAR
CO2 SERPL-SCNC: 31 MMOL/L (ref 21–32)
COCAINE UR QL: NEGATIVE
COLOR UR: YELLOW
CREAT SERPL-MCNC: 1.08 MG/DL (ref 0.6–1.3)
EOSINOPHIL # BLD AUTO: 0.01 THOUSAND/ÂΜL (ref 0–0.61)
EOSINOPHIL NFR BLD AUTO: 0 % (ref 0–6)
ERYTHROCYTE [DISTWIDTH] IN BLOOD BY AUTOMATED COUNT: 14 % (ref 11.6–15.1)
ETHANOL SERPL-MCNC: <10 MG/DL
GFR SERPL CREATININE-BSD FRML MDRD: 88 ML/MIN/1.73SQ M
GLUCOSE SERPL-MCNC: 91 MG/DL (ref 65–140)
GLUCOSE UR STRIP-MCNC: NEGATIVE MG/DL
HCT VFR BLD AUTO: 46.1 % (ref 36.5–49.3)
HGB BLD-MCNC: 15 G/DL (ref 12–17)
HGB UR QL STRIP.AUTO: NEGATIVE
IMM GRANULOCYTES # BLD AUTO: 0.06 THOUSAND/UL (ref 0–0.2)
IMM GRANULOCYTES NFR BLD AUTO: 0 % (ref 0–2)
KETONES UR STRIP-MCNC: NEGATIVE MG/DL
LEUKOCYTE ESTERASE UR QL STRIP: NEGATIVE
LYMPHOCYTES # BLD AUTO: 1.13 THOUSANDS/ÂΜL (ref 0.6–4.47)
LYMPHOCYTES NFR BLD AUTO: 8 % (ref 14–44)
MCH RBC QN AUTO: 30.4 PG (ref 26.8–34.3)
MCHC RBC AUTO-ENTMCNC: 32.5 G/DL (ref 31.4–37.4)
MCV RBC AUTO: 94 FL (ref 82–98)
METHADONE UR QL: NEGATIVE
MONOCYTES # BLD AUTO: 0.66 THOUSAND/ÂΜL (ref 0.17–1.22)
MONOCYTES NFR BLD AUTO: 5 % (ref 4–12)
MUCOUS THREADS UR QL AUTO: ABNORMAL
NEUTROPHILS # BLD AUTO: 12.75 THOUSANDS/ÂΜL (ref 1.85–7.62)
NEUTS SEG NFR BLD AUTO: 87 % (ref 43–75)
NITRITE UR QL STRIP: NEGATIVE
NON-SQ EPI CELLS URNS QL MICRO: ABNORMAL /HPF
NRBC BLD AUTO-RTO: 0 /100 WBCS
OPIATES UR QL SCN: NEGATIVE
OXYCODONE+OXYMORPHONE UR QL SCN: NEGATIVE
P AXIS: 25 DEGREES
PCP UR QL: NEGATIVE
PH UR STRIP.AUTO: 5.5 [PH]
PLATELET # BLD AUTO: 207 THOUSANDS/UL (ref 149–390)
PMV BLD AUTO: 10.3 FL (ref 8.9–12.7)
POTASSIUM SERPL-SCNC: 4.9 MMOL/L (ref 3.5–5.3)
PR INTERVAL: 128 MS
PROT SERPL-MCNC: 7.2 G/DL (ref 6.4–8.4)
PROT UR STRIP-MCNC: ABNORMAL MG/DL
QRS AXIS: 45 DEGREES
QRSD INTERVAL: 116 MS
QT INTERVAL: 382 MS
QTC INTERVAL: 451 MS
RBC # BLD AUTO: 4.93 MILLION/UL (ref 3.88–5.62)
RBC #/AREA URNS AUTO: ABNORMAL /HPF
SALICYLATES SERPL-MCNC: <5 MG/DL (ref 3–20)
SODIUM SERPL-SCNC: 140 MMOL/L (ref 135–147)
SP GR UR STRIP.AUTO: >=1.03 (ref 1–1.03)
T WAVE AXIS: 47 DEGREES
THC UR QL: POSITIVE
UROBILINOGEN UR QL STRIP.AUTO: 0.2 E.U./DL
VENTRICULAR RATE: 84 BPM
WBC # BLD AUTO: 14.66 THOUSAND/UL (ref 4.31–10.16)
WBC #/AREA URNS AUTO: ABNORMAL /HPF

## 2023-09-22 PROCEDURE — 36415 COLL VENOUS BLD VENIPUNCTURE: CPT | Performed by: EMERGENCY MEDICINE

## 2023-09-22 PROCEDURE — 84484 ASSAY OF TROPONIN QUANT: CPT | Performed by: EMERGENCY MEDICINE

## 2023-09-22 PROCEDURE — 99284 EMERGENCY DEPT VISIT MOD MDM: CPT | Performed by: EMERGENCY MEDICINE

## 2023-09-22 PROCEDURE — 82550 ASSAY OF CK (CPK): CPT | Performed by: EMERGENCY MEDICINE

## 2023-09-22 PROCEDURE — 93005 ELECTROCARDIOGRAM TRACING: CPT

## 2023-09-22 PROCEDURE — 96361 HYDRATE IV INFUSION ADD-ON: CPT

## 2023-09-22 PROCEDURE — 80179 DRUG ASSAY SALICYLATE: CPT | Performed by: EMERGENCY MEDICINE

## 2023-09-22 PROCEDURE — 80307 DRUG TEST PRSMV CHEM ANLYZR: CPT | Performed by: EMERGENCY MEDICINE

## 2023-09-22 PROCEDURE — 81001 URINALYSIS AUTO W/SCOPE: CPT | Performed by: EMERGENCY MEDICINE

## 2023-09-22 PROCEDURE — 82077 ASSAY SPEC XCP UR&BREATH IA: CPT | Performed by: EMERGENCY MEDICINE

## 2023-09-22 PROCEDURE — 96374 THER/PROPH/DIAG INJ IV PUSH: CPT

## 2023-09-22 PROCEDURE — 80053 COMPREHEN METABOLIC PANEL: CPT | Performed by: EMERGENCY MEDICINE

## 2023-09-22 PROCEDURE — 85025 COMPLETE CBC W/AUTO DIFF WBC: CPT | Performed by: EMERGENCY MEDICINE

## 2023-09-22 PROCEDURE — 93010 ELECTROCARDIOGRAM REPORT: CPT | Performed by: INTERNAL MEDICINE

## 2023-09-22 PROCEDURE — 80143 DRUG ASSAY ACETAMINOPHEN: CPT | Performed by: EMERGENCY MEDICINE

## 2023-09-22 RX ADMIN — SODIUM CHLORIDE 1000 ML: 0.9 INJECTION, SOLUTION INTRAVENOUS at 01:55

## 2023-09-22 RX ADMIN — ONDANSETRON 4 MG: 2 INJECTION INTRAMUSCULAR; INTRAVENOUS at 02:06

## 2023-09-22 NOTE — ED NOTES
Pt refusing IV, cervical collar and bloodwork.  Dr Vale Husbands aware     Mario Laughlin RN  09/22/23 Myron Reno RN  09/22/23 8623

## 2023-09-22 NOTE — ED NOTES
Pt continuing to refuse IV, cervical collar, and bloodwork.  Dr Baltazar Lozada aware     Jose Carlos Sy, RN  09/22/23 [address], RN  09/22/23 0643

## 2023-09-22 NOTE — ED NOTES
Patient transported to [address] [facility] [address], RN  09/22/23 0005

## 2023-09-22 NOTE — ED NOTES
FAISAL at bedside     Glenda Moore, [address]  09/22/23 [address] [place] [address]ORIN [address]  09/22/23 4533

## 2023-09-22 NOTE — ED NOTES
Spoke to Alonso Lundberg, pt still wanting help and they're trying to get him in to [facility], [address] [place] [address]  09/22/23 6823

## 2023-09-22 NOTE — ED PROVIDER NOTES
History  Chief Complaint   Patient presents with   • Overdose - Accidental     Patient found unresponsive on a bench, 6mg of narcan delivered IN by police, patient vomiting in trash can in room and reports a headache, refusing IV at this time does not want detox     66-year-old homeless male brought to the ED for evaluation of overdose. Patient was found unresponsive with his face down lying on a park bench. Patient was given 6 mg of intranasal Narcan with arousal.  Subsequently patient brought to the ED for further evaluation. Patient has history of heroin overdose in the past.  Patient is currently requesting inpatient detox. Patient complains of nausea upon initial arrival to the ED. History provided by:  Patient      Prior to Admission Medications   Prescriptions Last Dose Informant Patient Reported? Taking? ARIPiprazole (ABILIFY) 5 mg tablet   No No   Sig: Take 1 tablet (5 mg total) by mouth daily Do not start before August 25, 2023. nicotine (NICODERM CQ) 14 mg/24hr TD 24 hr patch   No No   Sig: Place 1 patch on the skin over 24 hours daily Do not start before August 25, 2023. Facility-Administered Medications: None       Past Medical History:   Diagnosis Date   • ADHD (attention deficit hyperactivity disorder)    • Anxiety     last assessed 3/23/15   • Bipolar 1 disorder ([address])    • Genital warts     last assessed 12/7/15, resolved 11/1/17    • Psychiatric disorder        History reviewed. No pertinent surgical history. Family History   Problem Relation Age of Onset   • No Known Problems Father      I have reviewed and agree with the history as documented.     E-Cigarette/Vaping   • E-Cigarette Use Never User      E-Cigarette/Vaping Substances   • Nicotine No    • THC No    • CBD No    • Flavoring No    • Other No    • Unknown No      Social History     Tobacco Use   • Smoking status: Every Day     Packs/day: 1.50     Types: Cigarettes   • Smokeless tobacco: Never   Vaping Use   • Vaping Use: Never used   Substance Use Topics   • Alcohol use: Yes     Comment: occ. • Drug use: Yes     Types: Heroin, Marijuana, Methamphetamines, Fentanyl     Comment: states was "shooting up steroids" 3 months ago (3/4/19 states this was 3 years ago)       Review of Systems   Constitutional: Negative for chills and fever. HENT: Negative for ear pain and sore throat. Eyes: Negative for pain and visual disturbance. Respiratory: Negative for cough and shortness of breath. Cardiovascular: Negative for chest pain and palpitations. Gastrointestinal: Positive for nausea. Negative for abdominal pain and vomiting. Genitourinary: Negative for dysuria and hematuria. Musculoskeletal: Negative for arthralgias and back pain. Skin: Negative for color change and rash. Neurological: Negative for seizures and syncope. All other systems reviewed and are negative. Physical Exam  Physical Exam  Vitals and nursing note reviewed. Constitutional:       General: He is not in acute distress. Appearance: He is well-developed. Comments: Disheveled appearance   HENT:      Head: Normocephalic. Comments: Contusion noted across forehead  Eyes:      Extraocular Movements: Extraocular movements intact. Conjunctiva/sclera: Conjunctivae normal.      Pupils: Pupils are equal, round, and reactive to light. Cardiovascular:      Rate and Rhythm: Normal rate and regular rhythm. Heart sounds: No murmur heard. Pulmonary:      Effort: Pulmonary effort is normal. No respiratory distress. Breath sounds: Normal breath sounds. Comments: Lungs are clear to auscultation bilateral.  Abdominal:      Palpations: Abdomen is soft. Tenderness: There is no abdominal tenderness. Musculoskeletal:         General: No swelling. Cervical back: Neck supple. Skin:     General: Skin is warm and dry. Capillary Refill: Capillary refill takes less than 2 seconds.    Neurological:      General: No focal deficit present. Mental Status: He is alert and oriented to person, place, and time. Psychiatric:         Mood and Affect: Mood normal.         Vital Signs  ED Triage Vitals [09/21/23 2327]   Temp Pulse Respirations Blood Pressure SpO2   -- (!) 110 18 (!) 155/107 100 %      Temp src Heart Rate Source Patient Position - Orthostatic VS BP Location FiO2 (%)   -- Monitor Sitting Right arm --      Pain Score       --           Vitals:    09/21/23 2327   BP: (!) 155/107   Pulse: (!) 110   Patient Position - Orthostatic VS: Sitting         Visual Acuity      ED Medications  Medications   sodium chloride 0.9 % bolus 1,000 mL (1,000 mL Intravenous New Bag 9/22/23 0155)   ondansetron (ZOFRAN) injection 4 mg (has no administration in time range)   naloxone (FOR EMS ONLY) (NARCAN) 2 MG/2ML injection 6 mg (0 mg Does not apply Given to EMS 9/22/23 0000)       Diagnostic Studies  Results Reviewed     Procedure Component Value Units Date/Time    CK [[id number]]     Lab Status: No result Specimen: Blood     UA w Reflex to Microscopic w Reflex to Culture [[id number]] Collected: 09/22/23 0155    Lab Status: In process Specimen: Urine, Clean Catch Updated: 09/22/23 0159    Rapid drug screen, urine [[id number]] Collected: 09/22/23 0155    Lab Status:  In process Specimen: Urine, Clean Catch Updated: 09/22/23 0159    CBC and differential [[id number]]  (Abnormal) Collected: 09/22/23 0151    Lab Status: Final result Specimen: Blood from Arm, Right Updated: 09/22/23 0158     WBC 14.66 Thousand/uL      RBC 4.93 Million/uL      Hemoglobin 15.0 g/dL      Hematocrit 46.1 %      MCV 94 fL      MCH 30.4 pg      MCHC 32.5 g/dL      RDW 14.0 %      MPV 10.3 fL      Platelets 911 Thousands/uL      nRBC 0 /100 WBCs      Neutrophils Relative 87 %      Immat GRANS % 0 %      Lymphocytes Relative 8 %      Monocytes Relative 5 %      Eosinophils Relative 0 %      Basophils Relative 0 %      Neutrophils Absolute 12.75 Thousands/µL      Immature Grans Absolute 0.06 Thousand/uL      Lymphocytes Absolute 1.13 Thousands/µL      Monocytes Absolute 0.66 Thousand/µL      Eosinophils Absolute 0.01 Thousand/µL      Basophils Absolute 0.05 Thousands/µL     Ethanol [[id number]] Collected: 09/22/23 0151    Lab Status: In process Specimen: Blood from Arm, Right Updated: 09/22/23 0155    Comprehensive metabolic panel [[id number]] Collected: 09/22/23 0151    Lab Status: In process Specimen: Blood from Arm, Right Updated: 80/03/51 9473    Salicylate level [[id number]] Collected: 09/22/23 0151    Lab Status: In process Specimen: Blood from Arm, Right Updated: 09/22/23 0155    Acetaminophen level-If concentration is detectable, please discuss with medical  on call. [[id number]] Collected: 09/22/23 0151    Lab Status: In process Specimen: Blood from Arm, Right Updated: 09/22/23 0155    HS Troponin 0hr (reflex protocol) [[id number]] Collected: 09/22/23 0151    Lab Status: In process Specimen: Blood from Arm, Right Updated: 09/22/23 0155                 CT head without contrast   Final Result by Opal Weiss MD (09/22 0125)      No acute intracranial abnormality. Workstation performed: [id number]         CT spine cervical without contrast   Final Result by Opal Weiss MD (09/22 0127)      No cervical spine fracture or traumatic malalignment. Workstation performed: [id number]                    Procedures  Procedures         ED Course  ED Course as of 09/22/23 0205   Fri Sep 22, 2023   0109 Patient was evaluated by [facility] staff. At this time patient is agreeable to inpatient detox. Patient will be kept in our emergency department overnight. [facility] staff will try to find placement in the morning. Medical Decision Making  Obtain blood work, CT head, CT C-spine, UA, UDS  Consult [facility] staff for inpatient detox placement    Radiology results were unremarkable.   Patient initially refused blood work. Patient was then agreeable to have blood work done. Blood work is currently pending. Patient was seen by [facility] staff who will arrange for inpatient detox placement in the morning. Patient agreeable to stay in the emergency department until the morning when inpatient detox is arranged. Care patient signed out to the next attending we will continue to monitor patient until an inpatient detox bed is available. Next attending will also follow-up with blood work. Amount and/or Complexity of Data Reviewed  Labs: ordered. Radiology: ordered. Risk  Prescription drug management. Disposition  Final diagnoses:   Heroin overdose ([address])   Contusion of forehead, initial encounter   Homeless     Time reflects when diagnosis was documented in both MDM as applicable and the Disposition within this note     Time User Action Codes Description Comment    9/22/2023  2:02 AM Jeremy Shi Add [T40.1X1A] Heroin overdose ([address])     9/22/2023  2:02 AM Lauren Cuadra Add [S00.83XA] Contusion of forehead, initial encounter     9/22/2023  2:02 AM Venecia [address] [place] [address] [Z59.00] Homeless       ED Disposition     None      Follow-up Information    None         Patient's Medications   Discharge Prescriptions    No medications on file       No discharge procedures on file.     PDMP Review     None          ED Provider  Electronically Signed by           Jeremy Shi DO  09/22/23 0205

## 2023-09-22 NOTE — ED NOTES
Elías Burk [place], called at [phone] option 3, on cordless phone for patient intake questions. Patient provided with cordless phone.  Primary RN aware     Romy Martin RN  09/22/23 7750

## 2023-09-22 NOTE — ED NOTES
9/22/23 @ 0715:  PES notified that patient was awaiting substance abuse treatment. Maria, [address] [facility]    1003: PES spoke to [facility] from [facility] @ [phone], and patient is going to [address] [place] [address] for detox, then [facility] for rehab. [facility] will set up [address] ride and call back with time. [address], [address] [place]: [facility] called and reported that patient will be picked up in a few minutes by a black [employer]. Ride arrived and patient was transported.   [address], [place]

## 2023-12-12 ENCOUNTER — HOSPITAL ENCOUNTER (EMERGENCY)
Facility: HOSPITAL | Age: 33
Discharge: HOME | End: 2023-12-12
Attending: EMERGENCY MEDICINE
Payer: COMMERCIAL

## 2023-12-12 VITALS
RESPIRATION RATE: 18 BRPM | SYSTOLIC BLOOD PRESSURE: 116 MMHG | DIASTOLIC BLOOD PRESSURE: 79 MMHG | OXYGEN SATURATION: 99 % | TEMPERATURE: 97.8 F | HEART RATE: 84 BPM

## 2023-12-12 DIAGNOSIS — F19.10 DRUG ABUSE (HCC): Primary | ICD-10-CM

## 2023-12-12 PROCEDURE — 99284 EMERGENCY DEPT VISIT MOD MDM: CPT

## 2023-12-12 PROCEDURE — 99285 EMERGENCY DEPT VISIT HI MDM: CPT | Performed by: EMERGENCY MEDICINE

## 2023-12-12 NOTE — ED NOTES
"This RN with Dr. Obrien entered the room to go over why pt was being seen today. Pt stated that he wanted rehab and to go back to [facility]. Dr. Obrien explained the process of how the hospital admits people to outside facilities. When  Explained that we have to do blood work to medically clear him. Pt stated \"I have never had that done before when I am here, they always just drop me off at [facility]. I want to speak to brooklynn the crisis worker\" Dr. Obrien again explained the process in which pt currently refused blood work at this time.     Martina Bruce RN  12/12/23 4724    "

## 2023-12-12 NOTE — ED CARE HANDOFF
Emergency Department Sign Out Note        Patient seen by FAISAL mane for discharge provided outpatient resources.                            ED Course as of 12/12/23 0959   Tue Dec 12, 2023   0702 No psych consults. Wants to rehab for heroin abuse. Needs to see crisis.     Procedures  Medical Decision Making          Disposition  Final diagnoses:   Drug abuse (HCC)     Time reflects when diagnosis was documented in both MDM as applicable and the Disposition within this note       Time User Action Codes Description Comment    12/12/2023  9:59 AM Sandra Davis Add [F19.10] Drug abuse (HCC)           ED Disposition       ED Disposition   Discharge    Condition   Stable    Date/Time   Tue Dec 12, 2023  9:59 AM    Comment   Rolando Díaz Jr. discharge to home/self care.                   Follow-up Information       Follow up With Specialties Details Why Contact Info Additional Information    Ulysses Agpaoa  Schedule an appointment as soon as possible for a visit   [address] [facility] [address]  [phone]       [facility] Emergency Department Emergency Medicine  If symptoms worsen [address] [facility] [address]  [phone] Critical access hospital Emergency Department, [address] [place] [address], [phone]          Patient's Medications   Discharge Prescriptions    No medications on file     No discharge procedures on file.       ED Provider  Electronically Signed by     Sandra Davis DO  12/12/23 1000

## 2023-12-12 NOTE — ED PROVIDER NOTES
"History  Chief Complaint   Patient presents with    Drug Problem     Pt comes into ED asking for rehab and to go to [facility]. Pt states he's on and off of herion. Pt last drug use was 2 days ago. Pt states currently homeless. Pt was found in our vestibule \" sleeping\" Our security stated if he wasn't planning on being seen pt can't sleep in vestibule. Pt shortly after checked himself in     33 year old male, hx of bipolar and substance abuse    Found sleeping in the vestibule.  Woken up by security.  Was told he has to either leave or check in.  Checked in and said he wasn't feeling well.  Said he last did heroin two days ago, by snorting.  Wants to go to [facility], specifically [facility].    I told him we need to check blood work for screening before we can have him assessed and screened to be sent for rehab. He is refusing. He told me that we dont need to do that and that has never been done before in the past. Stated \"I'll talk to Augustin when he gets here, he knows me.\"        Prior to Admission Medications   Prescriptions Last Dose Informant Patient Reported? Taking?   ARIPiprazole (ABILIFY) 5 mg tablet   No No   Sig: Take 1 tablet (5 mg total) by mouth daily Do not start before August 25, 2023.   nicotine (NICODERM CQ) 14 mg/24hr TD 24 hr patch   No No   Sig: Place 1 patch on the skin over 24 hours daily Do not start before August 25, 2023.      Facility-Administered Medications: None       Past Medical History:   Diagnosis Date    ADHD (attention deficit hyperactivity disorder)     Anxiety     last assessed 3/23/15    Bipolar 1 disorder (HCC)     Genital warts     last assessed 12/7/15, resolved 11/1/17     Psychiatric disorder        History reviewed. No pertinent surgical history.    Family History   Problem Relation Age of Onset    No Known Problems Father      I have reviewed and agree with the history as documented.    E-Cigarette/Vaping    E-Cigarette Use Never User      E-Cigarette/Vaping " "Substances    Nicotine No     THC No     CBD No     Flavoring No     Other No     Unknown No      Social History     Tobacco Use    Smoking status: Every Day     Packs/day: 1.50     Types: Cigarettes    Smokeless tobacco: Never   Vaping Use    Vaping Use: Never used   Substance Use Topics    Alcohol use: Yes     Comment: occ.    Drug use: Yes     Types: Heroin, Marijuana, Methamphetamines, Fentanyl     Comment: states was \"shooting up steroids\" 3 months ago (3/4/19 states this was 3 years ago)       Review of Systems   Constitutional:  Negative for chills and fever.   HENT:  Negative for hearing loss.    Eyes:  Negative for visual disturbance.   Respiratory:  Negative for shortness of breath.    Cardiovascular:  Negative for chest pain.   Gastrointestinal:  Negative for abdominal pain, constipation, diarrhea, nausea and vomiting.   Genitourinary:  Negative for difficulty urinating.   Musculoskeletal:  Negative for myalgias.   Skin:  Negative for rash.   Neurological:  Negative for dizziness.   Psychiatric/Behavioral:  Negative for agitation.    All other systems reviewed and are negative.      Physical Exam  Physical Exam  Vitals and nursing note reviewed.   Constitutional:       General: He is not in acute distress.     Appearance: Normal appearance. He is not ill-appearing.   HENT:      Head: Normocephalic and atraumatic.      Right Ear: External ear normal.      Left Ear: External ear normal.      Nose: Nose normal. No congestion.      Mouth/Throat:      Mouth: Mucous membranes are moist.      Pharynx: Oropharynx is clear. No oropharyngeal exudate.   Eyes:      Extraocular Movements: Extraocular movements intact.      Conjunctiva/sclera: Conjunctivae normal.      Pupils: Pupils are equal, round, and reactive to light.   Cardiovascular:      Rate and Rhythm: Normal rate and regular rhythm.      Pulses: Normal pulses.      Heart sounds: Normal heart sounds.   Pulmonary:      Effort: Pulmonary effort is normal. No " respiratory distress.      Breath sounds: Normal breath sounds.   Abdominal:      General: Abdomen is flat. Bowel sounds are normal. There is no distension.      Palpations: Abdomen is soft.   Musculoskeletal:         General: No swelling. Normal range of motion.      Cervical back: Normal range of motion. No rigidity.   Skin:     General: Skin is warm and dry.      Capillary Refill: Capillary refill takes less than 2 seconds.   Neurological:      General: No focal deficit present.      Mental Status: He is alert and oriented to person, place, and time. Mental status is at baseline.      Cranial Nerves: No cranial nerve deficit.      Motor: No weakness.      Gait: Gait normal.   Psychiatric:         Behavior: Behavior is uncooperative.         Thought Content: Thought content does not include homicidal or suicidal ideation. Thought content does not include homicidal or suicidal plan.         Vital Signs  ED Triage Vitals [12/12/23 0616]   Temperature Pulse Respirations Blood Pressure SpO2   97.8 °F (36.6 °C) 84 18 116/79 99 %      Temp Source Heart Rate Source Patient Position - Orthostatic VS BP Location FiO2 (%)   Oral Monitor -- -- --      Pain Score       --           Vitals:    12/12/23 0616   BP: 116/79   Pulse: 84         Visual Acuity      ED Medications  Medications - No data to display    Diagnostic Studies  Results Reviewed       None                   No orders to display              Procedures  Procedures         ED Course                                             Medical Decision Making  33 year old male wants drug rehab  No SI or HI right now   No signs of withdrawal from any medications.  ED warm hand off order placed after I discussed case with our crisis worker.  Dispo pending [facility]/Warm handoff             Disposition  Final diagnoses:   Drug abuse (HCC)     Time reflects when diagnosis was documented in both MDM as applicable and the Disposition within this note       Time User Action Codes  Description Comment    12/12/2023  9:59 AM Susan Sandra Add [F19.10] Drug abuse (HCC)           ED Disposition       ED Disposition   Discharge    Condition   Stable    Date/Time   Tue Dec 12, 2023  9:59 AM    Comment   Rolando Díaz Jr. discharge to home/self care.                   Follow-up Information       Follow up With Specialties Details Why Contact Info Additional Information    Ulysses Agpaoa  Schedule an appointment as soon as possible for a visit   [address] [facility] [address]  [phone]       [facility] Emergency Department Emergency Medicine  If symptoms worsen [address] [facility] [address]  [phone] [facility] Emergency Department, [address] [place], [place], [address]            Discharge Medication List as of 12/12/2023  9:59 AM        CONTINUE these medications which have NOT CHANGED    Details   ARIPiprazole (ABILIFY) 5 mg tablet Take 1 tablet (5 mg total) by mouth daily Do not start before August 25, 2023., Starting Fri 8/25/2023, Print      nicotine (NICODERM CQ) 14 mg/24hr TD 24 hr patch Place 1 patch on the skin over 24 hours daily Do not start before August 25, 2023., Starting Fri 8/25/2023, Print             No discharge procedures on file.    PDMP Review       None            ED Provider  Electronically Signed by             Micah Riddle MD  12/12/23 2622

## 2023-12-12 NOTE — ED NOTES
"12/12/23 @ 0715:  ED MD provided update on patient and his request for drug rehab.  PES suggested placing an order for \"warm handoff.\"  PES will continue to monitor.  Maria MS    0900: Melodie from [facility] arrived and was updated.  Maria MS    1015: Melodie from [facility] reports that patient has a bed at [facility] tomorrow.  Patient will be discharged from the ED and arrive at [facility] office tomorrow afternoon, at which point, [facility] will provide transport via [facility] to [facility].  Patient was provided sandwich and something to drink.      MS Maria  "

## 2023-12-24 ENCOUNTER — APPOINTMENT (EMERGENCY)
Dept: RADIOLOGY | Facility: HOSPITAL | Age: 33
End: 2023-12-24
Payer: COMMERCIAL

## 2023-12-24 ENCOUNTER — HOSPITAL ENCOUNTER (EMERGENCY)
Facility: HOSPITAL | Age: 33
Discharge: HOME | End: 2023-12-24
Attending: EMERGENCY MEDICINE
Payer: COMMERCIAL

## 2023-12-24 VITALS
RESPIRATION RATE: 13 BRPM | TEMPERATURE: 98.7 F | OXYGEN SATURATION: 95 % | SYSTOLIC BLOOD PRESSURE: 118 MMHG | DIASTOLIC BLOOD PRESSURE: 57 MMHG | HEART RATE: 90 BPM

## 2023-12-24 DIAGNOSIS — Z59.00 HOMELESS: ICD-10-CM

## 2023-12-24 DIAGNOSIS — F19.10 POLYSUBSTANCE ABUSE (HCC): Primary | ICD-10-CM

## 2023-12-24 DIAGNOSIS — L03.116 CELLULITIS OF BOTH FEET: ICD-10-CM

## 2023-12-24 DIAGNOSIS — L03.115 CELLULITIS OF BOTH FEET: ICD-10-CM

## 2023-12-24 LAB
2HR DELTA HS TROPONIN: -2 NG/L
ALBUMIN SERPL BCP-MCNC: 3.8 G/DL (ref 3.5–5)
ALP SERPL-CCNC: 89 U/L (ref 34–104)
ALT SERPL W P-5'-P-CCNC: 56 U/L (ref 7–52)
AMPHETAMINES SERPL QL SCN: POSITIVE
ANION GAP SERPL CALCULATED.3IONS-SCNC: 6 MMOL/L
APTT PPP: 34 SECONDS (ref 23–37)
AST SERPL W P-5'-P-CCNC: 37 U/L (ref 13–39)
BARBITURATES UR QL: NEGATIVE
BASOPHILS # BLD AUTO: 0.1 THOUSANDS/ÂΜL (ref 0–0.1)
BASOPHILS NFR BLD AUTO: 1 % (ref 0–1)
BENZODIAZ UR QL: POSITIVE
BILIRUB SERPL-MCNC: 0.49 MG/DL (ref 0.2–1)
BILIRUB UR QL STRIP: NEGATIVE
BUN SERPL-MCNC: 32 MG/DL (ref 5–25)
CALCIUM SERPL-MCNC: 8.3 MG/DL (ref 8.4–10.2)
CARDIAC TROPONIN I PNL SERPL HS: 6 NG/L
CARDIAC TROPONIN I PNL SERPL HS: 8 NG/L
CHLORIDE SERPL-SCNC: 105 MMOL/L (ref 96–108)
CK SERPL-CCNC: 347 U/L (ref 39–308)
CLARITY UR: CLEAR
CO2 SERPL-SCNC: 29 MMOL/L (ref 21–32)
COCAINE UR QL: POSITIVE
COLOR UR: YELLOW
CREAT SERPL-MCNC: 0.96 MG/DL (ref 0.6–1.3)
CRP SERPL QL: 22.3 MG/L
EOSINOPHIL # BLD AUTO: 0.38 THOUSAND/ÂΜL (ref 0–0.61)
EOSINOPHIL NFR BLD AUTO: 4 % (ref 0–6)
ERYTHROCYTE [DISTWIDTH] IN BLOOD BY AUTOMATED COUNT: 13.8 % (ref 11.6–15.1)
ERYTHROCYTE [SEDIMENTATION RATE] IN BLOOD: 12 MM/HOUR (ref 0–14)
ETHANOL SERPL-MCNC: <10 MG/DL
FLUAV RNA RESP QL NAA+PROBE: NEGATIVE
FLUBV RNA RESP QL NAA+PROBE: NEGATIVE
GFR SERPL CREATININE-BSD FRML MDRD: 101 ML/MIN/1.73SQ M
GLUCOSE SERPL-MCNC: 104 MG/DL (ref 65–140)
GLUCOSE SERPL-MCNC: 114 MG/DL (ref 65–140)
GLUCOSE SERPL-MCNC: 56 MG/DL (ref 65–140)
GLUCOSE SERPL-MCNC: 56 MG/DL (ref 65–140)
GLUCOSE UR STRIP-MCNC: NEGATIVE MG/DL
HCT VFR BLD AUTO: 36.4 % (ref 36.5–49.3)
HGB BLD-MCNC: 12.1 G/DL (ref 12–17)
HGB UR QL STRIP.AUTO: NEGATIVE
IMM GRANULOCYTES # BLD AUTO: 0.03 THOUSAND/UL (ref 0–0.2)
IMM GRANULOCYTES NFR BLD AUTO: 0 % (ref 0–2)
INR PPP: 0.94 (ref 0.84–1.19)
KETONES UR STRIP-MCNC: NEGATIVE MG/DL
LACTATE SERPL-SCNC: 0.9 MMOL/L (ref 0.5–2)
LEUKOCYTE ESTERASE UR QL STRIP: NEGATIVE
LYMPHOCYTES # BLD AUTO: 2.39 THOUSANDS/ÂΜL (ref 0.6–4.47)
LYMPHOCYTES NFR BLD AUTO: 26 % (ref 14–44)
MAGNESIUM SERPL-MCNC: 2 MG/DL (ref 1.9–2.7)
MCH RBC QN AUTO: 31.3 PG (ref 26.8–34.3)
MCHC RBC AUTO-ENTMCNC: 33.2 G/DL (ref 31.4–37.4)
MCV RBC AUTO: 94 FL (ref 82–98)
METHADONE UR QL: NEGATIVE
MONOCYTES # BLD AUTO: 0.89 THOUSAND/ÂΜL (ref 0.17–1.22)
MONOCYTES NFR BLD AUTO: 10 % (ref 4–12)
NEUTROPHILS # BLD AUTO: 5.41 THOUSANDS/ÂΜL (ref 1.85–7.62)
NEUTS SEG NFR BLD AUTO: 59 % (ref 43–75)
NITRITE UR QL STRIP: NEGATIVE
NRBC BLD AUTO-RTO: 0 /100 WBCS
OPIATES UR QL SCN: POSITIVE
OXYCODONE+OXYMORPHONE UR QL SCN: NEGATIVE
PCP UR QL: NEGATIVE
PH UR STRIP.AUTO: 6 [PH]
PLATELET # BLD AUTO: 220 THOUSANDS/UL (ref 149–390)
PMV BLD AUTO: 9.9 FL (ref 8.9–12.7)
POTASSIUM SERPL-SCNC: 3.9 MMOL/L (ref 3.5–5.3)
PROT SERPL-MCNC: 6.3 G/DL (ref 6.4–8.4)
PROT UR STRIP-MCNC: NEGATIVE MG/DL
PROTHROMBIN TIME: 12.8 SECONDS (ref 11.6–14.5)
RBC # BLD AUTO: 3.87 MILLION/UL (ref 3.88–5.62)
RSV RNA RESP QL NAA+PROBE: NEGATIVE
SARS-COV-2 RNA RESP QL NAA+PROBE: NEGATIVE
SODIUM SERPL-SCNC: 140 MMOL/L (ref 135–147)
SP GR UR STRIP.AUTO: >=1.03 (ref 1–1.03)
THC UR QL: POSITIVE
TSH SERPL DL<=0.05 MIU/L-ACNC: 1.19 UIU/ML (ref 0.45–4.5)
UROBILINOGEN UR QL STRIP.AUTO: 0.2 E.U./DL
WBC # BLD AUTO: 9.2 THOUSAND/UL (ref 4.31–10.16)

## 2023-12-24 PROCEDURE — 93005 ELECTROCARDIOGRAM TRACING: CPT

## 2023-12-24 PROCEDURE — 96361 HYDRATE IV INFUSION ADD-ON: CPT

## 2023-12-24 PROCEDURE — 85730 THROMBOPLASTIN TIME PARTIAL: CPT | Performed by: EMERGENCY MEDICINE

## 2023-12-24 PROCEDURE — 82948 REAGENT STRIP/BLOOD GLUCOSE: CPT

## 2023-12-24 PROCEDURE — 84443 ASSAY THYROID STIM HORMONE: CPT | Performed by: EMERGENCY MEDICINE

## 2023-12-24 PROCEDURE — 0241U HB NFCT DS VIR RESP RNA 4 TRGT: CPT | Performed by: EMERGENCY MEDICINE

## 2023-12-24 PROCEDURE — 82550 ASSAY OF CK (CPK): CPT | Performed by: EMERGENCY MEDICINE

## 2023-12-24 PROCEDURE — 86140 C-REACTIVE PROTEIN: CPT | Performed by: EMERGENCY MEDICINE

## 2023-12-24 PROCEDURE — 82077 ASSAY SPEC XCP UR&BREATH IA: CPT | Performed by: EMERGENCY MEDICINE

## 2023-12-24 PROCEDURE — 81003 URINALYSIS AUTO W/O SCOPE: CPT | Performed by: EMERGENCY MEDICINE

## 2023-12-24 PROCEDURE — 85025 COMPLETE CBC W/AUTO DIFF WBC: CPT | Performed by: EMERGENCY MEDICINE

## 2023-12-24 PROCEDURE — 96374 THER/PROPH/DIAG INJ IV PUSH: CPT

## 2023-12-24 PROCEDURE — 85652 RBC SED RATE AUTOMATED: CPT | Performed by: EMERGENCY MEDICINE

## 2023-12-24 PROCEDURE — 84484 ASSAY OF TROPONIN QUANT: CPT | Performed by: EMERGENCY MEDICINE

## 2023-12-24 PROCEDURE — 80053 COMPREHEN METABOLIC PANEL: CPT | Performed by: EMERGENCY MEDICINE

## 2023-12-24 PROCEDURE — 71045 X-RAY EXAM CHEST 1 VIEW: CPT

## 2023-12-24 PROCEDURE — 80307 DRUG TEST PRSMV CHEM ANLYZR: CPT | Performed by: EMERGENCY MEDICINE

## 2023-12-24 PROCEDURE — 99285 EMERGENCY DEPT VISIT HI MDM: CPT | Performed by: EMERGENCY MEDICINE

## 2023-12-24 PROCEDURE — 87040 BLOOD CULTURE FOR BACTERIA: CPT | Performed by: EMERGENCY MEDICINE

## 2023-12-24 PROCEDURE — 73630 X-RAY EXAM OF FOOT: CPT

## 2023-12-24 PROCEDURE — 83735 ASSAY OF MAGNESIUM: CPT | Performed by: EMERGENCY MEDICINE

## 2023-12-24 PROCEDURE — 36415 COLL VENOUS BLD VENIPUNCTURE: CPT | Performed by: EMERGENCY MEDICINE

## 2023-12-24 PROCEDURE — 83605 ASSAY OF LACTIC ACID: CPT | Performed by: EMERGENCY MEDICINE

## 2023-12-24 PROCEDURE — 99285 EMERGENCY DEPT VISIT HI MDM: CPT

## 2023-12-24 PROCEDURE — 85610 PROTHROMBIN TIME: CPT | Performed by: EMERGENCY MEDICINE

## 2023-12-24 RX ORDER — CEPHALEXIN 500 MG/1
500 CAPSULE ORAL EVERY 8 HOURS SCHEDULED
Qty: 21 CAPSULE | Refills: 0 | Status: SHIPPED | OUTPATIENT
Start: 2023-12-24 | End: 2023-12-31

## 2023-12-24 RX ORDER — CEPHALEXIN 500 MG/1
500 CAPSULE ORAL ONCE
Status: COMPLETED | OUTPATIENT
Start: 2023-12-24 | End: 2023-12-24

## 2023-12-24 RX ORDER — DEXTROSE MONOHYDRATE 25 G/50ML
50 INJECTION, SOLUTION INTRAVENOUS ONCE
Status: COMPLETED | OUTPATIENT
Start: 2023-12-24 | End: 2023-12-24

## 2023-12-24 RX ADMIN — CEPHALEXIN 500 MG: 500 CAPSULE ORAL at 19:35

## 2023-12-24 RX ADMIN — SODIUM CHLORIDE 1000 ML: 0.9 INJECTION, SOLUTION INTRAVENOUS at 17:57

## 2023-12-24 RX ADMIN — SODIUM CHLORIDE 1000 ML: 0.9 INJECTION, SOLUTION INTRAVENOUS at 16:21

## 2023-12-24 RX ADMIN — DEXTROSE MONOHYDRATE 50 ML: 25 INJECTION, SOLUTION INTRAVENOUS at 16:05

## 2023-12-24 SDOH — ECONOMIC STABILITY - HOUSING INSECURITY: HOMELESSNESS UNSPECIFIED: Z59.00

## 2023-12-24 NOTE — ED NOTES
"Patient is not allowing blood work or administration of dextrose.  \"It's going to be vice versa\", patient rolling eyes, getting very irritable.     Florencia Liu RN  12/24/23 5961    "

## 2023-12-24 NOTE — ED PROVIDER NOTES
History  Chief Complaint   Patient presents with    Altered Mental Status     Patient found bin street with AMS     23-year-old male with past history of homelessness, ADHD, bipolar 1 disorder, anxiety, polysubstance abuse including alcohol, presents to the ED via EMS for agitation and altered mental status.  Patient found to be hypoglycemic in the emergency department.  Patient is complaining of itchy skin all over.  Patient was found walking on the street not acting right.  Patient is agitated however is following commands.  Patient states he did not take anything today.      History provided by:  Patient and EMS personnel  History limited by:  Acuity of condition   used: No    Altered Mental Status      Prior to Admission Medications   Prescriptions Last Dose Informant Patient Reported? Taking?   ARIPiprazole (ABILIFY) 5 mg tablet   No No   Sig: Take 1 tablet (5 mg total) by mouth daily Do not start before August 25, 2023.   nicotine (NICODERM CQ) 14 mg/24hr TD 24 hr patch   No No   Sig: Place 1 patch on the skin over 24 hours daily Do not start before August 25, 2023.      Facility-Administered Medications: None       Past Medical History:   Diagnosis Date    ADHD (attention deficit hyperactivity disorder)     Anxiety     last assessed 3/23/15    Bipolar 1 disorder (HCC)     Genital warts     last assessed 12/7/15, resolved 11/1/17     Psychiatric disorder        History reviewed. No pertinent surgical history.    Family History   Problem Relation Age of Onset    No Known Problems Father      I have reviewed and agree with the history as documented.    E-Cigarette/Vaping    E-Cigarette Use Never User      E-Cigarette/Vaping Substances    Nicotine No     THC No     CBD No     Flavoring No     Other No     Unknown No      Social History     Tobacco Use    Smoking status: Every Day     Current packs/day: 1.50     Types: Cigarettes    Smokeless tobacco: Never   Vaping Use    Vaping status: Never  "Used   Substance Use Topics    Alcohol use: Yes     Comment: occ.    Drug use: Yes     Types: Heroin, Marijuana, Methamphetamines, Fentanyl     Comment: states was \"shooting up steroids\" 3 months ago (3/4/19 states this was 3 years ago)       Review of Systems   Unable to perform ROS: Acuity of condition       Physical Exam  Physical Exam  Vitals and nursing note reviewed.   Constitutional:       General: He is not in acute distress.     Appearance: He is well-developed.      Comments: Disheveled appearance  Patient appears very agitated   HENT:      Head: Normocephalic and atraumatic.      Mouth/Throat:      Mouth: Mucous membranes are dry.   Eyes:      Conjunctiva/sclera: Conjunctivae normal.   Cardiovascular:      Rate and Rhythm: Normal rate and regular rhythm.      Heart sounds: No murmur heard.  Pulmonary:      Effort: Pulmonary effort is normal. No respiratory distress.      Breath sounds: Normal breath sounds.   Abdominal:      Palpations: Abdomen is soft.      Tenderness: There is no abdominal tenderness.   Musculoskeletal:         General: No swelling.      Cervical back: Neck supple.   Skin:     General: Skin is warm and dry.      Capillary Refill: Capillary refill takes less than 2 seconds.      Comments: Scratch edward noted throughout the body.  Patient complains of itching.  Pulses intact to bilateral lower extremities.  Erythematous rash noted to bilateral feet.  Discoloration noted to right great toe.  Please see picture below for clarification.   Neurological:      Mental Status: He is alert.   Psychiatric:      Comments: Agitated mood           Vital Signs  ED Triage Vitals   Temperature Pulse Respirations Blood Pressure SpO2   12/24/23 1518 12/24/23 1518 12/24/23 1518 12/24/23 1518 12/24/23 1522   99.2 °F (37.3 °C) (!) 120 (!) 24 122/59 94 %      Temp Source Heart Rate Source Patient Position - Orthostatic VS BP Location FiO2 (%)   12/24/23 1518 12/24/23 1518 12/24/23 1518 12/24/23 1518 --   Oral " Monitor Lying Right arm       Pain Score       12/24/23 1939       6           Vitals:    12/24/23 1730 12/24/23 1845 12/24/23 1939 12/24/23 2030   BP: 140/63  118/57    Pulse: (!) 106 96 89 90   Patient Position - Orthostatic VS:   Lying          Visual Acuity      ED Medications  Medications   sodium chloride 0.9 % bolus 1,000 mL (0 mL Intravenous Stopped 12/24/23 1745)   dextrose 50 % IV solution 50 mL (50 mL Intravenous Given 12/24/23 1605)   sodium chloride 0.9 % bolus 1,000 mL (0 mL Intravenous Stopped 12/24/23 1857)   cephalexin (KEFLEX) capsule 500 mg (500 mg Oral Given 12/24/23 1935)       Diagnostic Studies  Results Reviewed       Procedure Component Value Units Date/Time    Fingerstick Glucose (POCT) [[id number]]  (Normal) Collected: 12/24/23 1824    Lab Status: Final result Updated: 12/24/23 1825     POC Glucose 104 mg/dl     UA w Reflex to Microscopic w Reflex to Culture [[id number]] Collected: 12/24/23 1746    Lab Status: Final result Specimen: Urine, Clean Catch Updated: 12/24/23 1820     Color, UA Yellow     Clarity, UA Clear     Specific Gravity, UA >=1.030     pH, UA 6.0     Leukocytes, UA Negative     Nitrite, UA Negative     Protein, UA Negative mg/dl      Glucose, UA Negative mg/dl      Ketones, UA Negative mg/dl      Urobilinogen, UA 0.2 E.U./dl      Bilirubin, UA Negative     Occult Blood, UA Negative    HS Troponin I 2hr [[id number]]  (Normal) Collected: 12/24/23 1749    Lab Status: Final result Specimen: Blood from Arm, Left Updated: 12/24/23 1820     hs TnI 2hr 6 ng/L      Delta 2hr hsTnI -2 ng/L     Rapid drug screen, urine [[id number]]  (Abnormal) Collected: 12/24/23 1746    Lab Status: Final result Specimen: Urine, Clean Catch Updated: 12/24/23 1816     Amph/Meth UR Positive     Barbiturate Ur Negative     Benzodiazepine Urine Positive     Cocaine Urine Positive     Methadone Urine Negative     Opiate Urine Positive     PCP Ur Negative     THC Urine Positive     Oxycodone Urine Negative     Narrative:      Presumptive report. If requested, specimen will be sent to reference lab for confirmation.  FOR MEDICAL PURPOSES ONLY.   IF CONFIRMATION NEEDED PLEASE CONTACT THE LAB WITHIN 5 DAYS.    Drug Screen Cutoff Levels:  AMPHETAMINE/METHAMPHETAMINES  1000 ng/mL  BARBITURATES     200 ng/mL  BENZODIAZEPINES     200 ng/mL  COCAINE      300 ng/mL  METHADONE      300 ng/mL  OPIATES      300 ng/mL  PHENCYCLIDINE     25 ng/mL  THC       50 ng/mL  OXYCODONE      100 ng/mL    FLU/RSV/COVID - if FLU/RSV clinically relevant [[id number]]  (Normal) Collected: 12/24/23 1608    Lab Status: Final result Specimen: Nares from Nose Updated: 12/24/23 1726     SARS-CoV-2 Negative     INFLUENZA A PCR Negative     INFLUENZA B PCR Negative     RSV PCR Negative    Narrative:      FOR PEDIATRIC PATIENTS - copy/paste COVID Guidelines URL to browser: [email or web address]    SARS-CoV-2 assay is a Nucleic Acid Amplification assay intended for the  qualitative detection of nucleic acid from SARS-CoV-2 in nasopharyngeal  swabs. Results are for the presumptive identification of SARS-CoV-2 RNA.    Positive results are indicative of infection with SARS-CoV-2, the virus  causing COVID-19, but do not rule out bacterial infection or co-infection  with other viruses. Laboratories within the [place] and its  territories are required to report all positive results to the appropriate  public health authorities. Negative results do not preclude SARS-CoV-2  infection and should not be used as the sole basis for treatment or other  patient management decisions. Negative results must be combined with  clinical observations, patient history, and epidemiological information.  This test has not been FDA cleared or approved.    This test has been authorized by FDA under an Emergency Use Authorization  (EUA). This test is only authorized for the duration of time the  declaration that circumstances  exist justifying the authorization of the  emergency use of an in vitro diagnostic tests for detection of SARS-CoV-2  virus and/or diagnosis of COVID-19 infection under section 564(b)(1) of  the Act, 21 U.S.C. 360bbb-3(b)(1), unless the authorization is terminated  or revoked sooner. The test has been validated but independent review by FDA  and CLIA is pending.    Test performed using [employer] GeneXpert: This RT-PCR assay targets N2,  a region unique to SARS-CoV-2. A conserved region in the E-gene was chosen  for pan-Sarbecovirus detection which includes SARS-CoV-2.    According to CMS-2020-01-R, this platform meets the definition of high-throughput technology.    TSH, 3rd generation with Free T4 reflex [[id number]]  (Normal) Collected: 12/24/23 1608    Lab Status: Final result Specimen: Blood from Arm, Left Updated: 12/24/23 1649     TSH 3RD GENERATON 1.193 uIU/mL     Fingerstick Glucose (POCT) [[id number]]  (Normal) Collected: 12/24/23 1646    Lab Status: Final result Updated: 12/24/23 1647     POC Glucose 114 mg/dl     C-reactive protein [[id number]]  (Abnormal) Collected: 12/24/23 1545    Lab Status: Final result Specimen: Blood from Arm, Left Updated: 12/24/23 1635     CRP 22.3 mg/L     Comprehensive metabolic panel [[id number]]  (Abnormal) Collected: 12/24/23 1545    Lab Status: Final result Specimen: Blood from Arm, Left Updated: 12/24/23 1624     Sodium 140 mmol/L      Potassium 3.9 mmol/L      Chloride 105 mmol/L      CO2 29 mmol/L      ANION GAP 6 mmol/L      BUN 32 mg/dL      Creatinine 0.96 mg/dL      Glucose 56 mg/dL      Calcium 8.3 mg/dL      AST 37 U/L      ALT 56 U/L      Alkaline Phosphatase 89 U/L      Total Protein 6.3 g/dL      Albumin 3.8 g/dL      Total Bilirubin 0.49 mg/dL      eGFR 101 ml/min/1.73sq m     Narrative:      National Kidney Disease Foundation guidelines for Chronic Kidney Disease (CKD):     Stage 1 with normal or high GFR (GFR > 90 mL/min/1.73 square meters)    Stage 2 Mild CKD (GFR  = 60-89 mL/min/1.73 square meters)    Stage 3A Moderate CKD (GFR = 45-59 mL/min/1.73 square meters)    Stage 3B Moderate CKD (GFR = 30-44 mL/min/1.73 square meters)    Stage 4 Severe CKD (GFR = 15-29 mL/min/1.73 square meters)    Stage 5 End Stage CKD (GFR <15 mL/min/1.73 square meters)  Note: GFR calculation is accurate only with a steady state creatinine    Magnesium [[id number]]  (Normal) Collected: 12/24/23 1545    Lab Status: Final result Specimen: Blood from Arm, Left Updated: 12/24/23 1624     Magnesium 2.0 mg/dL     CK [[id number]]  (Abnormal) Collected: 12/24/23 1545    Lab Status: Final result Specimen: Blood from Arm, Left Updated: 12/24/23 1624     Total  U/L     Sedimentation rate, automated [[id number]]  (Normal) Collected: 12/24/23 1608    Lab Status: Final result Specimen: Blood from Arm, Left Updated: 12/24/23 1623     Sed Rate 12 mm/hour     HS Troponin 0hr (reflex protocol) [[id number]]  (Normal) Collected: 12/24/23 1545    Lab Status: Final result Specimen: Blood from Arm, Left Updated: 12/24/23 1622     hs TnI 0hr 8 ng/L     Ethanol [[id number]]  (Normal) Collected: 12/24/23 1545    Lab Status: Final result Specimen: Blood from Arm, Left Updated: 12/24/23 1622     Ethanol Lvl <10 mg/dL     Lactic acid, plasma (w/reflex if result > 2.0) [[id number]]  (Normal) Collected: 12/24/23 1546    Lab Status: Final result Specimen: Blood from Arm, Left Updated: 12/24/23 1622     LACTIC ACID 0.9 mmol/L     Narrative:      Result may be elevated if tourniquet was used during collection.    Protime-INR [[id number]]  (Normal) Collected: 12/24/23 1545    Lab Status: Final result Specimen: Blood from Arm, Left Updated: 12/24/23 1611     Protime 12.8 seconds      INR 0.94    APTT [[id number]]  (Normal) Collected: 12/24/23 1545    Lab Status: Final result Specimen: Blood from Arm, Left Updated: 12/24/23 1611     PTT 34 seconds     CBC and differential [[id number]]  (Abnormal) Collected: 12/24/23 1545    Lab Status:  Final result Specimen: Blood from Arm, Left Updated: 12/24/23 1558     WBC 9.20 Thousand/uL      RBC 3.87 Million/uL      Hemoglobin 12.1 g/dL      Hematocrit 36.4 %      MCV 94 fL      MCH 31.3 pg      MCHC 33.2 g/dL      RDW 13.8 %      MPV 9.9 fL      Platelets 220 Thousands/uL      nRBC 0 /100 WBCs      Neutrophils Relative 59 %      Immat GRANS % 0 %      Lymphocytes Relative 26 %      Monocytes Relative 10 %      Eosinophils Relative 4 %      Basophils Relative 1 %      Neutrophils Absolute 5.41 Thousands/µL      Immature Grans Absolute 0.03 Thousand/uL      Lymphocytes Absolute 2.39 Thousands/µL      Monocytes Absolute 0.89 Thousand/µL      Eosinophils Absolute 0.38 Thousand/µL      Basophils Absolute 0.10 Thousands/µL     Blood culture #1 [[id number]] Collected: 12/24/23 1545    Lab Status: In process Specimen: Blood from Arm, Left Updated: 12/24/23 1554    Fingerstick Glucose (POCT) [[id number]]  (Abnormal) Collected: 12/24/23 1517    Lab Status: Final result Updated: 12/24/23 1518     POC Glucose 56 mg/dl                    XR chest 1 view portable   ED Interpretation by Kathy Cuadra DO (12/24 7185)   Rotated view and motion artifact noted.  No obvious intrathoracic abnormalities noted compared to previous study.  Formal radiology reading pending.      XR foot 3+ views RIGHT   ED Interpretation by Kathy Cuadra DO (12/24 1858)   No acute osseous abnormalities noted.  Formal radiology reading pending.      Final Result by Connie Trevizo MD (12/24 1904)      No acute osseous abnormality.      No radiographic evidence of osteomyelitis.      Punctate radiopaque foreign bodies projecting over the right 3rd and 4th nailbeds and 2 additional punctate radiopaque foreign bodies in the 4th webspace.         Workstation performed: [id number]                    Procedures  ECG 12 Lead Documentation Only    Date/Time: 12/24/2023 5:13 PM    Performed by: Kathy Cuadra DO  Authorized by: Kathy Cuadra DO     Indications / Diagnosis:  Altered mental status  ECG reviewed by me, the ED Provider: yes    Patient location:  ED  Previous ECG:     Previous ECG:  Compared to current    Similarity:  No change    Comparison to cardiac monitor: Yes    Interpretation:     Interpretation: abnormal    Comments:      Sinus rhythm, rate 111, normal axis, normal intervals, no acute ST/T wave otherwise noted, sinus tachycardia noted with occasional PVCs that are essentially unchanged from previous study.           ED Course  ED Course as of 12/24/23 2255   Sun Dec 24, 2023   1520 POC Glucose(!): 56  IV dextrose ordered   1607 Patient extremely agitated at bedside.  Patient intermittently refusing blood work.  Security was with me at bedside while nurse finished taking blood work and lab samples.   1720 Patient reexamined at bedside.  The discoloration of his toe improved significantly while in the emergency department.   2045 Patient ambulated without difficulty.  Patient is requesting to be discharged.  Patient be discharged home on antibiotic and follow-up to podiatry.                               SBIRT 22yo+      Flowsheet Row Most Recent Value   Initial Alcohol Screen: US AUDIT-C     1. How often do you have a drink containing alcohol? 0 Filed at: 12/24/2023 1521   Audit-C Score 0 Filed at: 12/24/2023 1521                      Medical Decision Making  Obtain septic workup, UA, UDS, blood alcohol level, chest x-ray, EKG  Give IV fluids, dextrose and continue to monitor patient for any worsening symptoms    Patient's lab work was essentially unremarkable.  CRP was mildly elevated however that is very nonspecific result.  Patient initially had some discoloration to the right great toe.  Color almost completely improved while in the emergency department.  Patient states that he was walking in wet shoes earlier today which may have contributed to the discoloration.  X-ray of foot did not show any acute abnormalities except for some  punctate foreign bodies.  Patient is able to walk without any difficulty.  Patient is known to be homeless.  Patient does have redness to bilateral feet.  Patient empirically placed on antibiotic.  Patient tested positive for multiple illicit substances on his UDS.  Patient later on admitted to using drugs.  Patient stated that he takes what ever he can find on the street.  At this time patient is discharged with follow-up to PCP as well as podiatry.  Close return instructions given to return to the ER for any worsening symptoms.  Patient agrees with discharge plan.  Patient well appearing at time of discharge.    Please Note: Flu[employer] Direct voice recognition software may have been used in the creation of this document. Wrong words or sound a like substitutions may have occurred due to the inherent limitations of the voice software.         Amount and/or Complexity of Data Reviewed  Labs: ordered. Decision-making details documented in ED Course.  Radiology: ordered and independent interpretation performed. Decision-making details documented in ED Course.  ECG/medicine tests: ordered and independent interpretation performed. Decision-making details documented in ED Course.    Risk  Prescription drug management.             Disposition  Final diagnoses:   Polysubstance abuse (HCC)   Cellulitis of both feet   Homeless     Time reflects when diagnosis was documented in both MDM as applicable and the Disposition within this note       Time User Action Codes Description Comment    12/24/2023  8:45 PM Ferdous, Komaira Add [F19.10] Polysubstance abuse (HCC)     12/24/2023  8:45 PM Ferdous, Komaira Add [L03.119] Cellulitis of lower extremity     12/24/2023  8:46 PM Ferdous, Komaira Remove [L03.119] Cellulitis of lower extremity     12/24/2023  8:46 PM Ferdous, Komaira Add [L03.115,  L03.116] Cellulitis of both feet     12/24/2023  8:47 PM Ferdous, Komaira Add [Z59.00] Homeless           ED Disposition       ED Disposition    Discharge    Condition   Stable    Date/Time   Sun Dec 24, 2023 1835    Comment   Rolando Díaz Jr. discharge to home/self care.                   Follow-up Information       Follow up With Specialties Details Why Contact Info Additional Information    Ulysses Agpaoa  In 3 days  [address] [facility] [address]  [phone]       [facility]   [address]   [facility] [address]  [phone] [facility] [address] [place] [address] [phone]            Discharge Medication List as of 12/24/2023  8:47 PM        START taking these medications    Details   cephalexin (KEFLEX) 500 mg capsule Take 1 capsule (500 mg total) by mouth every 8 (eight) hours for 7 days, Starting Sun 12/24/2023, Until Sun 12/31/2023, Normal           CONTINUE these medications which have NOT CHANGED    Details   ARIPiprazole (ABILIFY) 5 mg tablet Take 1 tablet (5 mg total) by mouth daily Do not start before August 25, 2023., Starting Fri 8/25/2023, Print      nicotine (NICODERM CQ) 14 mg/24hr TD 24 hr patch Place 1 patch on the skin over 24 hours daily Do not start before August 25, 2023., Starting Fri 8/25/2023, Print             No discharge procedures on file.    PDMP Review       None            ED Provider  Electronically Signed by             Kathy Cuadra DO  12/24/23 3628

## 2023-12-25 LAB
ATRIAL RATE: 111 BPM
P AXIS: 12 DEGREES
PR INTERVAL: 124 MS
QRS AXIS: 59 DEGREES
QRSD INTERVAL: 96 MS
QT INTERVAL: 342 MS
QTC INTERVAL: 465 MS
T WAVE AXIS: 48 DEGREES
VENTRICULAR RATE: 111 BPM

## 2023-12-29 ENCOUNTER — APPOINTMENT (EMERGENCY)
Dept: RADIOLOGY | Facility: HOSPITAL | Age: 33
End: 2023-12-29
Payer: COMMERCIAL

## 2023-12-29 ENCOUNTER — HOSPITAL ENCOUNTER (EMERGENCY)
Facility: HOSPITAL | Age: 33
Discharge: HOME | End: 2023-12-29
Attending: EMERGENCY MEDICINE
Payer: COMMERCIAL

## 2023-12-29 VITALS
TEMPERATURE: 98.4 F | RESPIRATION RATE: 24 BRPM | OXYGEN SATURATION: 96 % | HEART RATE: 127 BPM | SYSTOLIC BLOOD PRESSURE: 126 MMHG | DIASTOLIC BLOOD PRESSURE: 85 MMHG

## 2023-12-29 DIAGNOSIS — F19.10 POLYSUBSTANCE ABUSE (HCC): ICD-10-CM

## 2023-12-29 DIAGNOSIS — F99 PSYCHIATRIC DISORDER: Primary | ICD-10-CM

## 2023-12-29 DIAGNOSIS — R07.89 NON-CARDIAC CHEST PAIN: ICD-10-CM

## 2023-12-29 LAB
ANION GAP SERPL CALCULATED.3IONS-SCNC: 9 MMOL/L
ATRIAL RATE: 116 BPM
BASOPHILS # BLD AUTO: 0.11 THOUSANDS/ÂΜL (ref 0–0.1)
BASOPHILS NFR BLD AUTO: 1 % (ref 0–1)
BUN SERPL-MCNC: 26 MG/DL (ref 5–25)
CALCIUM SERPL-MCNC: 8.4 MG/DL (ref 8.4–10.2)
CARDIAC TROPONIN I PNL SERPL HS: 8 NG/L
CHLORIDE SERPL-SCNC: 106 MMOL/L (ref 96–108)
CO2 SERPL-SCNC: 23 MMOL/L (ref 21–32)
CREAT SERPL-MCNC: 1.08 MG/DL (ref 0.6–1.3)
EOSINOPHIL # BLD AUTO: 0.32 THOUSAND/ÂΜL (ref 0–0.61)
EOSINOPHIL NFR BLD AUTO: 3 % (ref 0–6)
ERYTHROCYTE [DISTWIDTH] IN BLOOD BY AUTOMATED COUNT: 13.7 % (ref 11.6–15.1)
GFR SERPL CREATININE-BSD FRML MDRD: 88 ML/MIN/1.73SQ M
GLUCOSE SERPL-MCNC: 77 MG/DL (ref 65–140)
HCT VFR BLD AUTO: 36 % (ref 36.5–49.3)
HGB BLD-MCNC: 12.2 G/DL (ref 12–17)
IMM GRANULOCYTES # BLD AUTO: 0.03 THOUSAND/UL (ref 0–0.2)
IMM GRANULOCYTES NFR BLD AUTO: 0 % (ref 0–2)
LYMPHOCYTES # BLD AUTO: 2.98 THOUSANDS/ÂΜL (ref 0.6–4.47)
LYMPHOCYTES NFR BLD AUTO: 29 % (ref 14–44)
MCH RBC QN AUTO: 31.4 PG (ref 26.8–34.3)
MCHC RBC AUTO-ENTMCNC: 33.9 G/DL (ref 31.4–37.4)
MCV RBC AUTO: 93 FL (ref 82–98)
MONOCYTES # BLD AUTO: 0.92 THOUSAND/ÂΜL (ref 0.17–1.22)
MONOCYTES NFR BLD AUTO: 9 % (ref 4–12)
NEUTROPHILS # BLD AUTO: 6.02 THOUSANDS/ÂΜL (ref 1.85–7.62)
NEUTS SEG NFR BLD AUTO: 58 % (ref 43–75)
NRBC BLD AUTO-RTO: 0 /100 WBCS
P AXIS: 43 DEGREES
PLATELET # BLD AUTO: 264 THOUSANDS/UL (ref 149–390)
PMV BLD AUTO: 9.3 FL (ref 8.9–12.7)
POTASSIUM SERPL-SCNC: 3.7 MMOL/L (ref 3.5–5.3)
PR INTERVAL: 118 MS
QRS AXIS: 72 DEGREES
QRSD INTERVAL: 96 MS
QT INTERVAL: 364 MS
QTC INTERVAL: 505 MS
RBC # BLD AUTO: 3.88 MILLION/UL (ref 3.88–5.62)
SODIUM SERPL-SCNC: 138 MMOL/L (ref 135–147)
T WAVE AXIS: 72 DEGREES
VENTRICULAR RATE: 116 BPM
WBC # BLD AUTO: 10.38 THOUSAND/UL (ref 4.31–10.16)

## 2023-12-29 PROCEDURE — 36415 COLL VENOUS BLD VENIPUNCTURE: CPT | Performed by: PHYSICIAN ASSISTANT

## 2023-12-29 PROCEDURE — 99283 EMERGENCY DEPT VISIT LOW MDM: CPT

## 2023-12-29 PROCEDURE — 84484 ASSAY OF TROPONIN QUANT: CPT | Performed by: PHYSICIAN ASSISTANT

## 2023-12-29 PROCEDURE — 93005 ELECTROCARDIOGRAM TRACING: CPT

## 2023-12-29 PROCEDURE — 99285 EMERGENCY DEPT VISIT HI MDM: CPT | Performed by: PHYSICIAN ASSISTANT

## 2023-12-29 PROCEDURE — 85025 COMPLETE CBC W/AUTO DIFF WBC: CPT | Performed by: PHYSICIAN ASSISTANT

## 2023-12-29 PROCEDURE — 80048 BASIC METABOLIC PNL TOTAL CA: CPT | Performed by: PHYSICIAN ASSISTANT

## 2023-12-29 NOTE — ED NOTES
"12/29/23 @ 0825:  PES discussed patient with ED PA and a \"warm handoff\" was requested.  MS Maria    0939: Alysia from [facility] arrived and is completing assessment for services.  MS Maria    1030: Alysia reports that patient declined all services offered.  PES notified ED PA.  MS Maria  "

## 2023-12-29 NOTE — DISCHARGE INSTRUCTIONS
Discussed return emergency department for any newly developing or worsening signs or symptoms.  Patient understood all instructions prior to discharge and plan agreed upon by patient and myself. Discussed overall common severe and commonly common side effects of prescription medication and advised review of all prescription package inserts for personal review prior to taking prescribed medications.

## 2023-12-29 NOTE — ED PROVIDER NOTES
"History  Chief Complaint   Patient presents with    Addiction Problem     Pt.  Came in with  ems after police found him wandering barefoot on  peoples porches. States pt. Will not state what substances that he has taken but  is twitching and wont allow  staff to really do anything.  Pt. States he has no medical issues at this time and is homeless and doesn't    know why he was brought here.      HPI    Patient presents from EMS after he was found to have been wandering by police onto peoples porches.  He states that he was apprehended by police and states that he was upset that the grabbed him and restrained him.  He states that he has some pain on the bottom of his feet but that he had been walking barefoot outside.      Specifically denies any suicidal or homicidal ideation.  He states \"I love people \"I dont want to hurt anybody \" but these motherfuckers came over in a truck, and hugged me and I didn't like it.\"   He states his feet hurt and that he \"feels like it has worms in it\" but otherwise states he has no pain.  Prior to this and yesterday into this morning he states he was having chest pain but this has been gone since approximately 3 hours ago.      Prior to Admission Medications   Prescriptions Last Dose Informant Patient Reported? Taking?   ARIPiprazole (ABILIFY) 5 mg tablet   No No   Sig: Take 1 tablet (5 mg total) by mouth daily Do not start before August 25, 2023.   cephalexin (KEFLEX) 500 mg capsule   No No   Sig: Take 1 capsule (500 mg total) by mouth every 8 (eight) hours for 7 days   nicotine (NICODERM CQ) 14 mg/24hr TD 24 hr patch   No No   Sig: Place 1 patch on the skin over 24 hours daily Do not start before August 25, 2023.      Facility-Administered Medications: None       Past Medical History:   Diagnosis Date    ADHD (attention deficit hyperactivity disorder)     Anxiety     last assessed 3/23/15    Bipolar 1 disorder (HCC)     Genital warts     last assessed 12/7/15, resolved 11/1/17     " "Psychiatric disorder        No past surgical history on file.    Family History   Problem Relation Age of Onset    No Known Problems Father      I have reviewed and agree with the history as documented.    E-Cigarette/Vaping    E-Cigarette Use Never User      E-Cigarette/Vaping Substances    Nicotine No     THC No     CBD No     Flavoring No     Other No     Unknown No      Social History     Tobacco Use    Smoking status: Every Day     Current packs/day: 1.50     Types: Cigarettes    Smokeless tobacco: Never   Vaping Use    Vaping status: Never Used   Substance Use Topics    Alcohol use: Yes     Comment: occ.    Drug use: Yes     Types: Heroin, Marijuana, Methamphetamines, Fentanyl     Comment: states was \"shooting up steroids\" 3 months ago (3/4/19 states this was 3 years ago)       Review of Systems   Constitutional:  Negative for chills and fever.   HENT:  Negative for ear pain and sore throat.    Eyes:  Negative for pain and visual disturbance.   Respiratory:  Negative for cough and shortness of breath.    Cardiovascular:  Negative for chest pain and palpitations.   Gastrointestinal:  Negative for abdominal pain and vomiting.   Genitourinary:  Negative for dysuria and hematuria.   Musculoskeletal:  Negative for arthralgias and back pain.   Skin:  Negative for color change and rash.   Neurological:  Negative for seizures and syncope.   All other systems reviewed and are negative.      Physical Exam  Physical Exam  Vitals and nursing note reviewed.   Constitutional:       General: He is not in acute distress.     Appearance: He is well-developed.   HENT:      Head: Normocephalic and atraumatic.   Eyes:      Conjunctiva/sclera: Conjunctivae normal.   Cardiovascular:      Rate and Rhythm: Normal rate and regular rhythm.      Heart sounds: No murmur heard.  Pulmonary:      Effort: Pulmonary effort is normal. No respiratory distress.      Breath sounds: Normal breath sounds.   Abdominal:      Palpations: Abdomen is " soft.      Tenderness: There is no abdominal tenderness.   Musculoskeletal:         General: No swelling.      Cervical back: Neck supple.   Skin:     General: Skin is warm and dry.      Capillary Refill: Capillary refill takes less than 2 seconds.   Neurological:      Mental Status: He is alert.   Psychiatric:         Attention and Perception: He is inattentive. He does not perceive auditory or visual hallucinations.         Mood and Affect: Mood is anxious.         Speech: Speech is rapid and pressured.         Behavior: Behavior is not agitated, slowed, aggressive, withdrawn, hyperactive or combative.         Thought Content: Thought content is not paranoid or delusional. Thought content does not include homicidal ideation. Thought content does not include homicidal plan.         Vital Signs  ED Triage Vitals [12/29/23 0736]   Temperature Pulse Respirations Blood Pressure SpO2   98.4 °F (36.9 °C) (!) 127 (!) 24 126/85 96 %      Temp Source Heart Rate Source Patient Position - Orthostatic VS BP Location FiO2 (%)   Tympanic Monitor Sitting Left arm --      Pain Score       --           Vitals:    12/29/23 0736   BP: 126/85   Pulse: (!) 127   Patient Position - Orthostatic VS: Sitting         Visual Acuity      ED Medications  Medications - No data to display    Diagnostic Studies  Results Reviewed       Procedure Component Value Units Date/Time    Basic metabolic panel [[id number]]  (Abnormal) Collected: 12/29/23 0829    Lab Status: Final result Specimen: Blood from Arm, Left Updated: 12/29/23 0900     Sodium 138 mmol/L      Potassium 3.7 mmol/L      Chloride 106 mmol/L      CO2 23 mmol/L      ANION GAP 9 mmol/L      BUN 26 mg/dL      Creatinine 1.08 mg/dL      Glucose 77 mg/dL      Calcium 8.4 mg/dL      eGFR 88 ml/min/1.73sq m     Narrative:      National Kidney Disease Foundation guidelines for Chronic Kidney Disease (CKD):     Stage 1 with normal or high GFR (GFR > 90 mL/min/1.73 square meters)    Stage 2 Mild  CKD (GFR = 60-89 mL/min/1.73 square meters)    Stage 3A Moderate CKD (GFR = 45-59 mL/min/1.73 square meters)    Stage 3B Moderate CKD (GFR = 30-44 mL/min/1.73 square meters)    Stage 4 Severe CKD (GFR = 15-29 mL/min/1.73 square meters)    Stage 5 End Stage CKD (GFR <15 mL/min/1.73 square meters)  Note: GFR calculation is accurate only with a steady state creatinine    HS Troponin 0hr (reflex protocol) [[id number]]  (Normal) Collected: 12/29/23 0829    Lab Status: Final result Specimen: Blood from Arm, Left Updated: 12/29/23 0900     hs TnI 0hr 8 ng/L     CBC and differential [[id number]]  (Abnormal) Collected: 12/29/23 0829    Lab Status: Final result Specimen: Blood from Arm, Left Updated: 12/29/23 0836     WBC 10.38 Thousand/uL      RBC 3.88 Million/uL      Hemoglobin 12.2 g/dL      Hematocrit 36.0 %      MCV 93 fL      MCH 31.4 pg      MCHC 33.9 g/dL      RDW 13.7 %      MPV 9.3 fL      Platelets 264 Thousands/uL      nRBC 0 /100 WBCs      Neutrophils Relative 58 %      Immat GRANS % 0 %      Lymphocytes Relative 29 %      Monocytes Relative 9 %      Eosinophils Relative 3 %      Basophils Relative 1 %      Neutrophils Absolute 6.02 Thousands/µL      Immature Grans Absolute 0.03 Thousand/uL      Lymphocytes Absolute 2.98 Thousands/µL      Monocytes Absolute 0.92 Thousand/µL      Eosinophils Absolute 0.32 Thousand/µL      Basophils Absolute 0.11 Thousands/µL                    No orders to display              Procedures  ECG 12 Lead Documentation Only    Date/Time: 12/29/2023 8:18 AM    Performed by: Yossi Kendall PA-C  Authorized by: Yossi Kendall PA-C    Indications / Diagnosis:  Tachycardia  ECG reviewed by me, the ED Provider: no    Patient location:  ED  Previous ECG:     Previous ECG:  Unavailable    Comparison to cardiac monitor: Yes    Interpretation:     Interpretation: non-specific    Rate:     ECG rate:  116    ECG rate assessment: tachycardic    Rhythm:     Rhythm: sinus rhythm     Ectopy:     Ectopy: PVCs    QRS:     QRS axis:  Normal  Conduction:     Conduction: normal    ST segments:     ST segments:  Normal  T waves:     T waves: normal    Comments:      My interpretation: Sinus tachycardia with PVCs.  No STEMI.  Otherwise normal EKG.             ED Course  ED Course as of 12/30/23 1715   Fri Dec 29, 2023   0825 ORP pending warm handoff   1104 Patient poses no mentions or indicators for self harm or harm to others.  He declines services here for drug and alcohol services per warm handoff.  Provided referrals to outpatient psych and drug and alcohol if patient changes his mind.  Pt is pain free and comfortable.  He finished breakfast here in ED.  He is ready for and would like to be discharged.      Discussed return emergency department for any newly developing or worsening signs or symptoms.  Patient understood all instructions prior to discharge and plan agreed upon by patient and myself. Discussed overall common severe and commonly common side effects of prescription medication and advised review of all prescription package inserts for personal review prior to taking prescribed medications.               HEART Risk Score      Flowsheet Row Most Recent Value   Heart Score Risk Calculator    History 0 Filed at: 12/29/2023 1057   ECG 1 Filed at: 12/29/2023 1057   Age 0 Filed at: 12/29/2023 1057   Risk Factors 1 Filed at: 12/29/2023 1057   Troponin 0 Filed at: 12/29/2023 1057   HEART Score 2 Filed at: 12/29/2023 1057                          SBIRT 20yo+      Flowsheet Row Most Recent Value   Initial Alcohol Screen: US AUDIT-C     1. How often do you have a drink containing alcohol? 0 Filed at: 12/29/2023 0739   2. How many drinks containing alcohol do you have on a typical day you are drinking?  0 Filed at: 12/29/2023 0739   3a. Male UNDER 65: How often do you have five or more drinks on one occasion? 0 Filed at: 12/29/2023 0739   3b. FEMALE Any Age, or MALE 65+: How often do you have 4 or  more drinks on one occassion? 0 Filed at: 12/29/2023 0739   Audit-C Score 0 Filed at: 12/29/2023 0739   AP: How many times in the past year have you...    Used an illegal drug or used a prescription medication for non-medical reasons? Never Filed at: 12/29/2023 0739                      Medical Decision Making  Patient presents to the ED with no specific complaints having been escorted into ED by police.  No otherwise concerns.  Patient states he does not wish to have any interventions at this time.  [facility] offered services.  Patient is not a danger to self or others.      Amount and/or Complexity of Data Reviewed  Labs: ordered.  Radiology: ordered.             Disposition  Final diagnoses:   Psychiatric disorder - Chronic   Polysubstance abuse (HCC) - History of   Non-cardiac chest pain     Time reflects when diagnosis was documented in both MDM as applicable and the Disposition within this note       Time User Action Codes Description Comment    12/29/2023 10:56 AM Yossi Kendall Add [F99] Psychiatric disorder     12/29/2023 10:56 AM Yossi Kendall Add [F19.10] Polysubstance abuse (HCC)     12/29/2023 10:57 AM Yossi Kendall Modify [F99] Psychiatric disorder Chronic    12/29/2023 10:57 AM Yossi Kendall Add [R07.89] Non-cardiac chest pain     12/29/2023 11:01 AM Yossi Kendall Modify [F19.10] Polysubstance abuse (HCC) History of          ED Disposition       ED Disposition   Discharge    Condition   Stable    Date/Time   Fri Dec 29, 2023 1054    Comment   Rolando Díaz Jr. discharge to home/self care.                   Follow-up Information       Follow up With Specialties Details Why Contact Info Additional Information    Ulysses Agpaoa  Call today Call today for follow up. [address] [facility] [address]  [phone]       Critical access hospital Emergency Department Emergency Medicine Go to  If symptoms worsen [address] [facility] [address]  [phone] [facility]  [facility], [address] [facility], [place], [place], [address]    [facility]  Call today Call today for outpatient drug and alcohol intake. [phone]     [facility] Call today Call today for follow up [address] [facility] [address]  [facility] [address] [phone] [facility] [place], [address] [facility] [facility] [address], [place], [place], [address], [phone]            Discharge Medication List as of 12/29/2023 11:03 AM        CONTINUE these medications which have NOT CHANGED    Details   ARIPiprazole (ABILIFY) 5 mg tablet Take 1 tablet (5 mg total) by mouth daily Do not start before August 25, 2023., Starting Fri 8/25/2023, Print      cephalexin (KEFLEX) 500 mg capsule Take 1 capsule (500 mg total) by mouth every 8 (eight) hours for 7 days, Starting Sun 12/24/2023, Until Sun 12/31/2023, Normal      nicotine (NICODERM CQ) 14 mg/24hr TD 24 hr patch Place 1 patch on the skin over 24 hours daily Do not start before August 25, 2023., Starting Fri 8/25/2023, Print                 PDMP Review       None            ED Provider  Electronically Signed by             Yossi Kendall PA-C  12/30/23 1717       Yossi Kendall PA-C  12/30/23 171

## 2023-12-30 LAB — BACTERIA BLD CULT: NORMAL

## 2023-12-30 NOTE — ED CARE HANDOFF
[facility] Warm Handoff Outcome Note    Patient name Rolando Díaz Jr.  Location [facility] Pool Room/[facility] MRN [id number]  Age: 33 y.o.          Plan Type:  Warm Handoff                                                                                    Plan Date: 12/30/2023  Service:  ED Warm Handoff      Substance Use History:  Polysubstance    Warm Handoff Update:  Client refused [facility] services.    Warm Handoff Outcome: Patient Refused

## 2023-12-31 ENCOUNTER — HOSPITAL ENCOUNTER (EMERGENCY)
Facility: HOSPITAL | Age: 33
Discharge: HOME | End: 2024-01-01
Attending: EMERGENCY MEDICINE
Payer: COMMERCIAL

## 2023-12-31 DIAGNOSIS — R45.1 AGITATION: Primary | ICD-10-CM

## 2023-12-31 DIAGNOSIS — F15.91 HISTORY OF METHAMPHETAMINE USE: ICD-10-CM

## 2023-12-31 LAB
ALBUMIN SERPL BCP-MCNC: 4 G/DL (ref 3.5–5)
ALP SERPL-CCNC: 84 U/L (ref 34–104)
ALT SERPL W P-5'-P-CCNC: 27 U/L (ref 7–52)
AMORPH URATE CRY URNS QL MICRO: ABNORMAL /HPF
AMPHETAMINES SERPL QL SCN: POSITIVE
ANION GAP SERPL CALCULATED.3IONS-SCNC: 12 MMOL/L
AST SERPL W P-5'-P-CCNC: 30 U/L (ref 13–39)
BACTERIA UR QL AUTO: ABNORMAL /HPF
BARBITURATES UR QL: NEGATIVE
BASOPHILS # BLD AUTO: 0.05 THOUSANDS/ÂΜL (ref 0–0.1)
BASOPHILS NFR BLD AUTO: 1 % (ref 0–1)
BENZODIAZ UR QL: NEGATIVE
BILIRUB SERPL-MCNC: 0.5 MG/DL (ref 0.2–1)
BILIRUB UR QL STRIP: NEGATIVE
BUN SERPL-MCNC: 16 MG/DL (ref 5–25)
CALCIUM SERPL-MCNC: 8 MG/DL (ref 8.4–10.2)
CHLORIDE SERPL-SCNC: 101 MMOL/L (ref 96–108)
CK SERPL-CCNC: 234 U/L (ref 39–308)
CLARITY UR: CLEAR
CO2 SERPL-SCNC: 22 MMOL/L (ref 21–32)
COCAINE UR QL: NEGATIVE
COLOR UR: ABNORMAL
CREAT SERPL-MCNC: 0.87 MG/DL (ref 0.6–1.3)
EOSINOPHIL # BLD AUTO: 0.16 THOUSAND/ÂΜL (ref 0–0.61)
EOSINOPHIL NFR BLD AUTO: 3 % (ref 0–6)
ERYTHROCYTE [DISTWIDTH] IN BLOOD BY AUTOMATED COUNT: 13.3 % (ref 11.6–15.1)
ETHANOL SERPL-MCNC: <10 MG/DL
GFR SERPL CREATININE-BSD FRML MDRD: 111 ML/MIN/1.73SQ M
GLUCOSE SERPL-MCNC: 116 MG/DL (ref 65–140)
GLUCOSE UR STRIP-MCNC: NEGATIVE MG/DL
HCT VFR BLD AUTO: 38.3 % (ref 36.5–49.3)
HGB BLD-MCNC: 12.9 G/DL (ref 12–17)
HGB UR QL STRIP.AUTO: NEGATIVE
HYALINE CASTS #/AREA URNS LPF: ABNORMAL /LPF
IMM GRANULOCYTES # BLD AUTO: 0.03 THOUSAND/UL (ref 0–0.2)
IMM GRANULOCYTES NFR BLD AUTO: 1 % (ref 0–2)
KETONES UR STRIP-MCNC: NEGATIVE MG/DL
LEUKOCYTE ESTERASE UR QL STRIP: NEGATIVE
LYMPHOCYTES # BLD AUTO: 1.66 THOUSANDS/ÂΜL (ref 0.6–4.47)
LYMPHOCYTES NFR BLD AUTO: 29 % (ref 14–44)
MCH RBC QN AUTO: 31.6 PG (ref 26.8–34.3)
MCHC RBC AUTO-ENTMCNC: 33.7 G/DL (ref 31.4–37.4)
MCV RBC AUTO: 94 FL (ref 82–98)
METHADONE UR QL: NEGATIVE
MONOCYTES # BLD AUTO: 0.77 THOUSAND/ÂΜL (ref 0.17–1.22)
MONOCYTES NFR BLD AUTO: 13 % (ref 4–12)
NEUTROPHILS # BLD AUTO: 3.13 THOUSANDS/ÂΜL (ref 1.85–7.62)
NEUTS SEG NFR BLD AUTO: 53 % (ref 43–75)
NITRITE UR QL STRIP: NEGATIVE
NON-SQ EPI CELLS URNS QL MICRO: ABNORMAL /HPF
NRBC BLD AUTO-RTO: 0 /100 WBCS
OPIATES UR QL SCN: NEGATIVE
OTHER STN SPEC: ABNORMAL
OXYCODONE+OXYMORPHONE UR QL SCN: NEGATIVE
PCP UR QL: NEGATIVE
PH UR STRIP.AUTO: 6 [PH]
PLATELET # BLD AUTO: 238 THOUSANDS/UL (ref 149–390)
PMV BLD AUTO: 9.4 FL (ref 8.9–12.7)
POTASSIUM SERPL-SCNC: 4.1 MMOL/L (ref 3.5–5.3)
PROT SERPL-MCNC: 6.7 G/DL (ref 6.4–8.4)
PROT UR STRIP-MCNC: ABNORMAL MG/DL
RBC # BLD AUTO: 4.08 MILLION/UL (ref 3.88–5.62)
RBC #/AREA URNS AUTO: ABNORMAL /HPF
SODIUM SERPL-SCNC: 135 MMOL/L (ref 135–147)
SP GR UR STRIP.AUTO: 1.01 (ref 1–1.03)
THC UR QL: POSITIVE
UROBILINOGEN UR QL STRIP.AUTO: 0.2 E.U./DL
WBC # BLD AUTO: 5.8 THOUSAND/UL (ref 4.31–10.16)
WBC #/AREA URNS AUTO: ABNORMAL /HPF

## 2023-12-31 PROCEDURE — 99291 CRITICAL CARE FIRST HOUR: CPT | Performed by: EMERGENCY MEDICINE

## 2023-12-31 PROCEDURE — 82550 ASSAY OF CK (CPK): CPT | Performed by: EMERGENCY MEDICINE

## 2023-12-31 PROCEDURE — 36415 COLL VENOUS BLD VENIPUNCTURE: CPT | Performed by: EMERGENCY MEDICINE

## 2023-12-31 PROCEDURE — 96372 THER/PROPH/DIAG INJ SC/IM: CPT

## 2023-12-31 PROCEDURE — 82077 ASSAY SPEC XCP UR&BREATH IA: CPT | Performed by: EMERGENCY MEDICINE

## 2023-12-31 PROCEDURE — 80053 COMPREHEN METABOLIC PANEL: CPT | Performed by: EMERGENCY MEDICINE

## 2023-12-31 PROCEDURE — 99285 EMERGENCY DEPT VISIT HI MDM: CPT

## 2023-12-31 PROCEDURE — 80307 DRUG TEST PRSMV CHEM ANLYZR: CPT | Performed by: EMERGENCY MEDICINE

## 2023-12-31 PROCEDURE — 85025 COMPLETE CBC W/AUTO DIFF WBC: CPT | Performed by: EMERGENCY MEDICINE

## 2023-12-31 PROCEDURE — 81001 URINALYSIS AUTO W/SCOPE: CPT | Performed by: EMERGENCY MEDICINE

## 2023-12-31 RX ORDER — HALOPERIDOL 5 MG/ML
10 INJECTION INTRAMUSCULAR ONCE
Status: COMPLETED | OUTPATIENT
Start: 2023-12-31 | End: 2023-12-31

## 2023-12-31 RX ORDER — LORAZEPAM 2 MG/ML
4 INJECTION INTRAMUSCULAR ONCE
Status: COMPLETED | OUTPATIENT
Start: 2023-12-31 | End: 2023-12-31

## 2023-12-31 RX ADMIN — HALOPERIDOL LACTATE 10 MG: 5 INJECTION, SOLUTION INTRAMUSCULAR at 17:20

## 2023-12-31 RX ADMIN — LORAZEPAM 4 MG: 2 INJECTION INTRAMUSCULAR; INTRAVENOUS at 17:19

## 2023-12-31 NOTE — ED PROVIDER NOTES
History  Chief Complaint   Patient presents with    Psychiatric Evaluation     Patient found rolling on a lawn, running against traffic. Brought in by police, hallucinating, not following direction. Naked waist up.     HPI  Patient is a 33-year-old male history of bipolar disorder, methamphetamine abuse presenting for evaluation of agitation, altered mental status.  Patient found by police running around in someone's lawn, rolling on the ground.  Patient pressured, emotionally labile, yelling, agitated.  Patient aggressive towards staff, refusing to cooperate or answer questions.  Prior to Admission Medications   Prescriptions Last Dose Informant Patient Reported? Taking?   ARIPiprazole (ABILIFY) 5 mg tablet   No No   Sig: Take 1 tablet (5 mg total) by mouth daily Do not start before August 25, 2023.   cephalexin (KEFLEX) 500 mg capsule   No No   Sig: Take 1 capsule (500 mg total) by mouth every 8 (eight) hours for 7 days   nicotine (NICODERM CQ) 14 mg/24hr TD 24 hr patch   No No   Sig: Place 1 patch on the skin over 24 hours daily Do not start before August 25, 2023.      Facility-Administered Medications: None       Past Medical History:   Diagnosis Date    ADHD (attention deficit hyperactivity disorder)     Anxiety     last assessed 3/23/15    Bipolar 1 disorder (HCC)     Genital warts     last assessed 12/7/15, resolved 11/1/17     Psychiatric disorder        History reviewed. No pertinent surgical history.    Family History   Problem Relation Age of Onset    No Known Problems Father      I have reviewed and agree with the history as documented.    E-Cigarette/Vaping    E-Cigarette Use Never User      E-Cigarette/Vaping Substances    Nicotine No     THC No     CBD No     Flavoring No     Other No     Unknown No      Social History     Tobacco Use    Smoking status: Every Day     Current packs/day: 1.50     Types: Cigarettes    Smokeless tobacco: Never   Vaping Use    Vaping status: Never Used   Substance Use  "Topics    Alcohol use: Yes     Comment: occ.    Drug use: Yes     Types: Heroin, Marijuana, Methamphetamines, Fentanyl     Comment: states was \"shooting up steroids\" 3 months ago (3/4/19 states this was 3 years ago)       Review of Systems   Unable to perform ROS: Mental status change       Physical Exam  Physical Exam  Vitals and nursing note reviewed.   Constitutional:       General: He is not in acute distress.     Appearance: He is well-developed. He is not diaphoretic.      Comments: Awake, agitated, thrashing around with arms and legs, fighting staff   HENT:      Head: Normocephalic and atraumatic.      Comments: Moist mucous membranes     Right Ear: External ear normal.      Left Ear: External ear normal.      Nose: Nose normal.      Mouth/Throat:      Pharynx: No oropharyngeal exudate.   Eyes:      Conjunctiva/sclera: Conjunctivae normal.      Pupils: Pupils are equal, round, and reactive to light.   Cardiovascular:      Rate and Rhythm: Normal rate and regular rhythm.      Heart sounds: Normal heart sounds. No murmur heard.     No friction rub. No gallop.      Comments: Sinus tachycardia rate of 120.  No murmurs rubs or gallops.  Extremities warm and well-perfused without mottling  Pulmonary:      Effort: Pulmonary effort is normal. No respiratory distress.      Breath sounds: Normal breath sounds. No wheezing.      Comments: Tachypneic to respiratory rate in the 20s  Chest:      Chest wall: No tenderness.   Abdominal:      General: Bowel sounds are normal. There is no distension.      Palpations: Abdomen is soft. There is no mass.      Tenderness: There is no abdominal tenderness. There is no guarding or rebound.   Musculoskeletal:         General: No deformity.   Skin:     General: Skin is warm and dry.      Capillary Refill: Capillary refill takes less than 2 seconds.   Neurological:      Mental Status: He is alert and oriented to person, place, and time.      Comments: Awake, speaking complete sentences " which are pressured and nonsensical, moving all extremities equally   Psychiatric:         Behavior: Behavior normal.         Vital Signs  ED Triage Vitals   Temperature Pulse Respirations Blood Pressure SpO2   12/31/23 1737 12/31/23 1715 12/31/23 1715 12/31/23 1737 12/31/23 1715   100 °F (37.8 °C) (!) 118 20 122/59 100 %      Temp Source Heart Rate Source Patient Position - Orthostatic VS BP Location FiO2 (%)   12/31/23 2108 12/31/23 1715 12/31/23 1805 12/31/23 1805 --   Temporal Monitor Lying Left arm       Pain Score       --                  Vitals:    12/31/23 1859 12/31/23 1922 12/31/23 2049 12/31/23 2108   BP: 107/55 105/52 111/61 116/66   Pulse: 99 92 94 87   Patient Position - Orthostatic VS:  Lying Lying Lying         Visual Acuity      ED Medications  Medications   LORazepam (ATIVAN) injection 4 mg (4 mg Intramuscular Given 12/31/23 1719)   haloperidol lactate (HALDOL) injection 10 mg (10 mg Intramuscular Given 12/31/23 1720)       Diagnostic Studies  Results Reviewed       Procedure Component Value Units Date/Time    Urinalysis with microscopic [[id number]]  (Abnormal) Collected: 12/31/23 1946    Lab Status: Final result Specimen: Urine, Clean Catch Updated: 12/31/23 2056     Color, UA Light Yellow     Clarity, UA Clear     Specific Gravity, UA 1.015     pH, UA 6.0     Leukocytes, UA Negative     Nitrite, UA Negative     Protein, UA Trace mg/dl      Glucose, UA Negative mg/dl      Ketones, UA Negative mg/dl      Urobilinogen, UA 0.2 E.U./dl      Bilirubin, UA Negative     Occult Blood, UA Negative     RBC, UA 0-1 /hpf      WBC, UA 0-1 /hpf      Epithelial Cells Occasional /hpf      Bacteria, UA Occasional /hpf      Hyaline Casts, UA 0-1 /lpf      AMORPH URATES Occasional /hpf      OTHER OBSERVATIONS Sperm Present    Rapid drug screen, urine [[id number]]  (Abnormal) Collected: 12/31/23 1946    Lab Status: Final result Specimen: Urine, Clean Catch Updated: 12/31/23 2012     Amph/Meth UR Positive      Barbiturate Ur Negative     Benzodiazepine Urine Negative     Cocaine Urine Negative     Methadone Urine Negative     Opiate Urine Negative     PCP Ur Negative     THC Urine Positive     Oxycodone Urine Negative    Narrative:      Presumptive report. If requested, specimen will be sent to reference lab for confirmation.  FOR MEDICAL PURPOSES ONLY.   IF CONFIRMATION NEEDED PLEASE CONTACT THE LAB WITHIN 5 DAYS.    Drug Screen Cutoff Levels:  AMPHETAMINE/METHAMPHETAMINES  1000 ng/mL  BARBITURATES     200 ng/mL  BENZODIAZEPINES     200 ng/mL  COCAINE      300 ng/mL  METHADONE      300 ng/mL  OPIATES      300 ng/mL  PHENCYCLIDINE     25 ng/mL  THC       50 ng/mL  OXYCODONE      100 ng/mL    Comprehensive metabolic panel [[id number]]  (Abnormal) Collected: 12/31/23 1732    Lab Status: Final result Specimen: Blood from Arm, Left Updated: 12/31/23 1757     Sodium 135 mmol/L      Potassium 4.1 mmol/L      Chloride 101 mmol/L      CO2 22 mmol/L      ANION GAP 12 mmol/L      BUN 16 mg/dL      Creatinine 0.87 mg/dL      Glucose 116 mg/dL      Calcium 8.0 mg/dL      AST 30 U/L      ALT 27 U/L      Alkaline Phosphatase 84 U/L      Total Protein 6.7 g/dL      Albumin 4.0 g/dL      Total Bilirubin 0.50 mg/dL      eGFR 111 ml/min/1.73sq m     Narrative:      National Kid[employer] Foundation guidelines for Chronic Kidney Disease (CKD):     Stage 1 with normal or high GFR (GFR > 90 mL/min/1.73 square meters)    Stage 2 Mild CKD (GFR = 60-89 mL/min/1.73 square meters)    Stage 3A Moderate CKD (GFR = 45-59 mL/min/1.73 square meters)    Stage 3B Moderate CKD (GFR = 30-44 mL/min/1.73 square meters)    Stage 4 Severe CKD (GFR = 15-29 mL/min/1.73 square meters)    Stage 5 End Stage CKD (GFR <15 mL/min/1.73 square meters)  Note: GFR calculation is accurate only with a steady state creatinine    CK [[id number]]  (Normal) Collected: 12/31/23 1732    Lab Status: Final result Specimen: Blood from Arm, Left Updated: 12/31/23 1757     Total   U/L     Ethanol [[id number]]  (Normal) Collected: 12/31/23 1732    Lab Status: Final result Specimen: Blood from Arm, Left Updated: 12/31/23 1756     Ethanol Lvl <10 mg/dL     CBC and differential [[id number]]  (Abnormal) Collected: 12/31/23 1732    Lab Status: Final result Specimen: Blood from Arm, Left Updated: 12/31/23 1738     WBC 5.80 Thousand/uL      RBC 4.08 Million/uL      Hemoglobin 12.9 g/dL      Hematocrit 38.3 %      MCV 94 fL      MCH 31.6 pg      MCHC 33.7 g/dL      RDW 13.3 %      MPV 9.4 fL      Platelets 238 Thousands/uL      nRBC 0 /100 WBCs      Neutrophils Relative 53 %      Immat GRANS % 1 %      Lymphocytes Relative 29 %      Monocytes Relative 13 %      Eosinophils Relative 3 %      Basophils Relative 1 %      Neutrophils Absolute 3.13 Thousands/µL      Immature Grans Absolute 0.03 Thousand/uL      Lymphocytes Absolute 1.66 Thousands/µL      Monocytes Absolute 0.77 Thousand/µL      Eosinophils Absolute 0.16 Thousand/µL      Basophils Absolute 0.05 Thousands/µL                    No orders to display              Procedures  CriticalCare Time    Date/Time: 12/31/2023 5:50 PM    Performed by: Issa Yu MD  Authorized by: Issa Yu MD    Critical care provider statement:     Critical care time (minutes):  34    Critical care was necessary to treat or prevent imminent or life-threatening deterioration of the following conditions:  CNS failure or compromise    Critical care was time spent personally by me on the following activities:  Blood draw for specimens, ordering and performing treatments and interventions, re-evaluation of patient's condition, evaluation of patient's response to treatment, examination of patient and review of old charts           ED Course                                             Medical Decision Making  I obtained history from the police.  Patient unable to provide history secondary to mental status.  Patient escalating behavior, becoming aggressive  with staff, placed in 4-point restraints and sedated with Ativan, Haldol.  Patient ultimately able to safely be removed from restraints however remains mildly agitated.  Urine drug screen positive for methamphetamine.  Lab work otherwise unremarkable.  Medically cleared for psychiatric evaluation, signed out to Dr. Bauer pending crisis eval.    Amount and/or Complexity of Data Reviewed  Labs: ordered.    Risk  Prescription drug management.  Decision regarding hospitalization.             Disposition  Final diagnoses:   Agitation   History of methamphetamine use     Time reflects when diagnosis was documented in both MDM as applicable and the Disposition within this note       Time User Action Codes Description Comment    12/31/2023  8:41 PM Issa Yu [R45.1] Agitation     12/31/2023  8:41 PM Issa Yu [F15.91] History of methamphetamine use           ED Disposition       ED Disposition   Transfer to Behavioral Health Condition   --    Date/Time   Sun Dec 31, 2023  7:28 PM    Comment   Rolando Díaz Jr. should be transferred out to  and has been medically cleared.               Follow-up Information    None         Patient's Medications   Discharge Prescriptions    No medications on file       No discharge procedures on file.    PDMP Review       None            ED Provider  Electronically Signed by             Issa Yu MD  12/31/23 9608

## 2023-12-31 NOTE — RESTRAINT FACE TO FACE
Restraint Face to Face   Rolando Díaz Jr. 33 y.o. male MRN: [id number]  Unit/Bed#: ED HW1 Encounter: [id number]      Physical Evaluation-agitated, thrashing around, speaking nonsensically   Purpose for Restraints/ Seclusion High risk for self harm and High risk for causing significant disruption of treatment environment   Patient's reaction to the intervention-arguing with staff, fighting restraints  Patient's medical condition-agitated likely secondary to methamphetamine intoxication  Patient's Behavioral condition-agitated  Restraints to be Continued

## 2024-01-01 VITALS
DIASTOLIC BLOOD PRESSURE: 80 MMHG | TEMPERATURE: 98.3 F | SYSTOLIC BLOOD PRESSURE: 136 MMHG | HEART RATE: 80 BPM | OXYGEN SATURATION: 98 % | RESPIRATION RATE: 20 BRPM

## 2024-01-01 NOTE — ED NOTES
1/1/24 @ 1300:  Patient, for the most part, has been sleeping all day, therefore, PES has been able to assess patient needs.  PES will continue to monitor.  Maria MS

## 2024-01-01 NOTE — ED NOTES
"15:45 - patient awake - asked what happened last night - \"I'm not sure - I was upset\".  Mood now is \"happy but tired\".  PES asked the patient if he wanted mental health or D/A help - \"no\".  The patient denied any current lethality or having any hallucinations.  Patient is requesting d/c - ER Attending was advised.    UDS was + amphetamines and Thc. - patient said be used \"days ago\".    Dx: Amphetamine and cannabis use d/o.  "

## 2024-01-02 ENCOUNTER — HOSPITAL ENCOUNTER (EMERGENCY)
Facility: HOSPITAL | Age: 34
Discharge: HOME | End: 2024-01-02
Attending: EMERGENCY MEDICINE
Payer: COMMERCIAL

## 2024-01-02 VITALS
HEART RATE: 104 BPM | DIASTOLIC BLOOD PRESSURE: 61 MMHG | RESPIRATION RATE: 18 BRPM | SYSTOLIC BLOOD PRESSURE: 116 MMHG | OXYGEN SATURATION: 100 % | TEMPERATURE: 98 F

## 2024-01-02 DIAGNOSIS — T50.901A OVERDOSE: Primary | ICD-10-CM

## 2024-01-02 PROCEDURE — 99284 EMERGENCY DEPT VISIT MOD MDM: CPT

## 2024-01-02 PROCEDURE — 99284 EMERGENCY DEPT VISIT MOD MDM: CPT | Performed by: PHYSICIAN ASSISTANT

## 2024-01-02 RX ORDER — NALOXONE HYDROCHLORIDE 1 MG/ML
2 INJECTION INTRAMUSCULAR; INTRAVENOUS; SUBCUTANEOUS ONCE
Status: DISCONTINUED | OUTPATIENT
Start: 2024-01-02 | End: 2024-01-02

## 2024-01-02 RX ORDER — NALOXONE HYDROCHLORIDE 1 MG/ML
2 INJECTION INTRAMUSCULAR; INTRAVENOUS; SUBCUTANEOUS ONCE
Status: COMPLETED | OUTPATIENT
Start: 2024-01-02 | End: 2024-01-02

## 2024-01-02 NOTE — ED PROVIDER NOTES
"History  Chief Complaint   Patient presents with    Overdose - Accidental     Pt brought by BLS for overdose, pt found unresponsive.  Given 2 rounds of narcan in the field.  Pt presents to ED awake, lethargic but responsive and follows simple commands      Patient is a 33-year-old male with past medical history significant for opiate addiction, ADHD, bipolar disorder, who presents for evaluation of AMS related to opiate overdose.  Per EMS pt was found unconscious and unresponsive.  Two doses of narcan were administered and pt became responsive and interactive.  Pt states \"I think if fu*ked up again, Im sorry, I wont do it again\".  Discussed admission to drug rehab or ORIP evaluation.  Pt declines.  Pt denies suicidal intention.        Heroin Overdose - Intentional  Associated symptoms: no abdominal pain, no chest pain, no cough, no ear pain, no fever, no rash, no shortness of breath, no sore throat and no vomiting        Prior to Admission Medications   Prescriptions Last Dose Informant Patient Reported? Taking?   ARIPiprazole (ABILIFY) 5 mg tablet   No No   Sig: Take 1 tablet (5 mg total) by mouth daily Do not start before August 25, 2023.   nicotine (NICODERM CQ) 14 mg/24hr TD 24 hr patch   No No   Sig: Place 1 patch on the skin over 24 hours daily Do not start before August 25, 2023.      Facility-Administered Medications: None       Past Medical History:   Diagnosis Date    ADHD (attention deficit hyperactivity disorder)     Anxiety     last assessed 3/23/15    Bipolar 1 disorder (HCC)     Genital warts     last assessed 12/7/15, resolved 11/1/17     Psychiatric disorder        History reviewed. No pertinent surgical history.    Family History   Problem Relation Age of Onset    No Known Problems Father      I have reviewed and agree with the history as documented.    E-Cigarette/Vaping    E-Cigarette Use Never User      E-Cigarette/Vaping Substances    Nicotine No     THC No     CBD No     Flavoring No     Other " "No     Unknown No      Social History     Tobacco Use    Smoking status: Every Day     Current packs/day: 1.50     Types: Cigarettes    Smokeless tobacco: Never   Vaping Use    Vaping status: Never Used   Substance Use Topics    Alcohol use: Yes     Comment: occ.    Drug use: Yes     Types: Heroin, Marijuana, Methamphetamines, Fentanyl     Comment: states was \"shooting up steroids\" 3 months ago (3/4/19 states this was 3 years ago)       Review of Systems   Constitutional: Negative.  Negative for chills and fever.   HENT: Negative.  Negative for ear pain and sore throat.    Eyes: Negative.  Negative for pain and visual disturbance.   Respiratory: Negative.  Negative for cough and shortness of breath.    Cardiovascular: Negative.  Negative for chest pain and palpitations.   Gastrointestinal: Negative.  Negative for abdominal pain and vomiting.   Endocrine: Negative.    Genitourinary: Negative.  Negative for dysuria and hematuria.   Musculoskeletal: Negative.  Negative for arthralgias and back pain.   Skin: Negative.  Negative for color change and rash.   Allergic/Immunologic: Negative.    Neurological: Negative.  Negative for seizures and syncope.        AMS   Hematological: Negative.    Psychiatric/Behavioral: Negative.     All other systems reviewed and are negative.      Physical Exam  Physical Exam  Vitals and nursing note reviewed.   Constitutional:       General: He is not in acute distress.     Appearance: He is well-developed.      Comments: cachectic   HENT:      Head: Normocephalic and atraumatic.      Right Ear: External ear normal.      Left Ear: External ear normal.      Nose: Nose normal.      Mouth/Throat:      Mouth: Mucous membranes are moist.   Eyes:      General: No scleral icterus.     Conjunctiva/sclera: Conjunctivae normal.      Pupils: Pupils are equal, round, and reactive to light.   Cardiovascular:      Rate and Rhythm: Normal rate and regular rhythm.      Heart sounds: No murmur " heard.  Pulmonary:      Effort: Pulmonary effort is normal. No respiratory distress.      Breath sounds: Normal breath sounds.   Abdominal:      General: Abdomen is flat. Bowel sounds are normal.      Palpations: Abdomen is soft.      Tenderness: There is no abdominal tenderness.   Musculoskeletal:         General: No swelling.      Cervical back: Normal range of motion and neck supple.   Skin:     General: Skin is warm and dry.      Capillary Refill: Capillary refill takes less than 2 seconds.      Coloration: Skin is not pale.      Findings: No rash.   Neurological:      General: No focal deficit present.      Mental Status: He is alert and oriented to person, place, and time.      Cranial Nerves: No cranial nerve deficit.      Sensory: No sensory deficit.      Motor: No weakness.      Coordination: Coordination normal.      Gait: Gait normal.      Deep Tendon Reflexes: Reflexes normal.   Psychiatric:         Mood and Affect: Mood normal.         Behavior: Behavior normal.         Vital Signs  ED Triage Vitals   Temperature Pulse Respirations Blood Pressure SpO2   01/02/24 1603 01/02/24 1552 01/02/24 1552 01/02/24 1554 01/02/24 1552   98 °F (36.7 °C) (!) 115 18 159/91 100 %      Temp Source Heart Rate Source Patient Position - Orthostatic VS BP Location FiO2 (%)   01/02/24 1603 01/02/24 1552 01/02/24 1552 01/02/24 1552 --   Oral Monitor Lying Left arm       Pain Score       --                  Vitals:    01/02/24 1552 01/02/24 1554 01/02/24 1615 01/02/24 1630   BP:  159/91 145/73 116/61   Pulse: (!) 115  104 104   Patient Position - Orthostatic VS: Lying  Lying Lying         Visual Acuity      ED Medications  Medications   naloxone (NARCAN) 0.04 mg/mL syringe **ADS Override Pull** (  Return to Cabinet 1/2/24 1609)   naloxone (NARCAN) intranasal 2 mg (0 mg Nasal Given to EMS 1/2/24 1615)       Diagnostic Studies  Results Reviewed       None                   No orders to display               Procedures  Procedures         ED Course                                             Medical Decision Making  Problems Addressed:  Overdose: acute illness or injury     Details: Accidental overdose of heroin  Patient responded well to Narcan x 2  Patient was observed and remained at baseline  Patient declines evaluation for drug and alcohol counseling  Patient discharged from hospital    Risk  Prescription drug management.             Disposition  Final diagnoses:   Overdose     Time reflects when diagnosis was documented in both MDM as applicable and the Disposition within this note       Time User Action Codes Description Comment    1/2/2024  4:48 PM Kellen Harris [T50.901A] Overdose           ED Disposition       ED Disposition   Discharge    Condition   Stable    Date/Time   Tue Jan 2, 2024  4:48 PM    Comment   Rolando Díaz Jr. discharge to home/self care.                   Follow-up Information       Follow up With Specialties Details Why Contact Info Additional Information    Ulysses Silvia  Go to  As needed [address] [facility] [address]  [phone]       [facility] Emergency Department Emergency Medicine Go to  If symptoms worsen [address] [facility] [address]  [phone] [facility] Emergency Department, [address], [place], [address]            Discharge Medication List as of 1/2/2024  4:48 PM        CONTINUE these medications which have NOT CHANGED    Details   ARIPiprazole (ABILIFY) 5 mg tablet Take 1 tablet (5 mg total) by mouth daily Do not start before August 25, 2023., Starting Fri 8/25/2023, Print      nicotine (NICODERM CQ) 14 mg/24hr TD 24 hr patch Place 1 patch on the skin over 24 hours daily Do not start before August 25, 2023., Starting Fri 8/25/2023, Print             No discharge procedures on file.    PDMP Review       None            ED Provider  Electronically Signed by             Kellen Cherry  KASANDRA Harris  01/06/24 0912

## 2024-01-17 ENCOUNTER — APPOINTMENT (EMERGENCY)
Dept: RADIOLOGY | Facility: HOSPITAL | Age: 34
End: 2024-01-17
Payer: COMMERCIAL

## 2024-01-17 ENCOUNTER — HOSPITAL ENCOUNTER (EMERGENCY)
Facility: HOSPITAL | Age: 34
Discharge: HOME | End: 2024-01-19
Attending: EMERGENCY MEDICINE
Payer: COMMERCIAL

## 2024-01-17 DIAGNOSIS — R45.1 AGITATION: ICD-10-CM

## 2024-01-17 DIAGNOSIS — F19.10 POLYSUBSTANCE ABUSE (HCC): ICD-10-CM

## 2024-01-17 DIAGNOSIS — F12.10 MARIJUANA ABUSE: ICD-10-CM

## 2024-01-17 DIAGNOSIS — F19.11 HISTORY OF DRUG ABUSE (HCC): ICD-10-CM

## 2024-01-17 DIAGNOSIS — D72.829 LEUKOCYTOSIS: ICD-10-CM

## 2024-01-17 DIAGNOSIS — F15.10 METHAMPHETAMINE ABUSE (HCC): Primary | ICD-10-CM

## 2024-01-17 LAB
2HR DELTA HS TROPONIN: 3 NG/L
ALBUMIN SERPL BCP-MCNC: 4.4 G/DL (ref 3.5–5)
ALP SERPL-CCNC: 90 U/L (ref 34–104)
ALT SERPL W P-5'-P-CCNC: 20 U/L (ref 7–52)
AMPHETAMINES SERPL QL SCN: POSITIVE
ANION GAP SERPL CALCULATED.3IONS-SCNC: 13 MMOL/L
AST SERPL W P-5'-P-CCNC: 32 U/L (ref 13–39)
BACTERIA UR QL AUTO: ABNORMAL /HPF
BARBITURATES UR QL: NEGATIVE
BASOPHILS # BLD AUTO: 0.04 THOUSANDS/ÂΜL (ref 0–0.1)
BASOPHILS # BLD AUTO: 0.11 THOUSANDS/ÂΜL (ref 0–0.1)
BASOPHILS NFR BLD AUTO: 0 % (ref 0–1)
BASOPHILS NFR BLD AUTO: 0 % (ref 0–1)
BENZODIAZ UR QL: NEGATIVE
BILIRUB SERPL-MCNC: 0.93 MG/DL (ref 0.2–1)
BILIRUB UR QL STRIP: ABNORMAL
BUN SERPL-MCNC: 30 MG/DL (ref 5–25)
CALCIUM SERPL-MCNC: 8.8 MG/DL (ref 8.4–10.2)
CARDIAC TROPONIN I PNL SERPL HS: 10 NG/L
CARDIAC TROPONIN I PNL SERPL HS: 7 NG/L
CHLORIDE SERPL-SCNC: 102 MMOL/L (ref 96–108)
CK SERPL-CCNC: 309 U/L (ref 39–308)
CLARITY UR: ABNORMAL
CO2 SERPL-SCNC: 23 MMOL/L (ref 21–32)
COCAINE UR QL: NEGATIVE
COLOR UR: YELLOW
CREAT SERPL-MCNC: 1.2 MG/DL (ref 0.6–1.3)
EOSINOPHIL # BLD AUTO: 0.01 THOUSAND/ÂΜL (ref 0–0.61)
EOSINOPHIL # BLD AUTO: 0.08 THOUSAND/ÂΜL (ref 0–0.61)
EOSINOPHIL NFR BLD AUTO: 0 % (ref 0–6)
EOSINOPHIL NFR BLD AUTO: 0 % (ref 0–6)
ERYTHROCYTE [DISTWIDTH] IN BLOOD BY AUTOMATED COUNT: 12.7 % (ref 11.6–15.1)
ERYTHROCYTE [DISTWIDTH] IN BLOOD BY AUTOMATED COUNT: 12.9 % (ref 11.6–15.1)
ETHANOL SERPL-MCNC: <10 MG/DL
FLUAV RNA RESP QL NAA+PROBE: NEGATIVE
FLUBV RNA RESP QL NAA+PROBE: NEGATIVE
GFR SERPL CREATININE-BSD FRML MDRD: 77 ML/MIN/1.73SQ M
GLUCOSE SERPL-MCNC: 53 MG/DL (ref 65–140)
GLUCOSE UR STRIP-MCNC: NEGATIVE MG/DL
HCT VFR BLD AUTO: 35.8 % (ref 36.5–49.3)
HCT VFR BLD AUTO: 38.1 % (ref 36.5–49.3)
HGB BLD-MCNC: 11.9 G/DL (ref 12–17)
HGB BLD-MCNC: 13.2 G/DL (ref 12–17)
HGB UR QL STRIP.AUTO: ABNORMAL
IMM GRANULOCYTES # BLD AUTO: 0.07 THOUSAND/UL (ref 0–0.2)
IMM GRANULOCYTES # BLD AUTO: 0.23 THOUSAND/UL (ref 0–0.2)
IMM GRANULOCYTES NFR BLD AUTO: 0 % (ref 0–2)
IMM GRANULOCYTES NFR BLD AUTO: 1 % (ref 0–2)
KETONES UR STRIP-MCNC: ABNORMAL MG/DL
LACTATE SERPL-SCNC: 0.7 MMOL/L (ref 0.5–2)
LEUKOCYTE ESTERASE UR QL STRIP: NEGATIVE
LYMPHOCYTES # BLD AUTO: 1.39 THOUSANDS/ÂΜL (ref 0.6–4.47)
LYMPHOCYTES # BLD AUTO: 4.02 THOUSANDS/ÂΜL (ref 0.6–4.47)
LYMPHOCYTES NFR BLD AUTO: 12 % (ref 14–44)
LYMPHOCYTES NFR BLD AUTO: 6 % (ref 14–44)
MAGNESIUM SERPL-MCNC: 2.5 MG/DL (ref 1.9–2.7)
MCH RBC QN AUTO: 31.2 PG (ref 26.8–34.3)
MCH RBC QN AUTO: 32.1 PG (ref 26.8–34.3)
MCHC RBC AUTO-ENTMCNC: 33.2 G/DL (ref 31.4–37.4)
MCHC RBC AUTO-ENTMCNC: 34.6 G/DL (ref 31.4–37.4)
MCV RBC AUTO: 93 FL (ref 82–98)
MCV RBC AUTO: 94 FL (ref 82–98)
METHADONE UR QL: NEGATIVE
MONOCYTES # BLD AUTO: 1.23 THOUSAND/ÂΜL (ref 0.17–1.22)
MONOCYTES # BLD AUTO: 1.32 THOUSAND/ÂΜL (ref 0.17–1.22)
MONOCYTES NFR BLD AUTO: 4 % (ref 4–12)
MONOCYTES NFR BLD AUTO: 6 % (ref 4–12)
MUCOUS THREADS UR QL AUTO: ABNORMAL
NEUTROPHILS # BLD AUTO: 19.01 THOUSANDS/ÂΜL (ref 1.85–7.62)
NEUTROPHILS # BLD AUTO: 27.92 THOUSANDS/ÂΜL (ref 1.85–7.62)
NEUTS SEG NFR BLD AUTO: 83 % (ref 43–75)
NEUTS SEG NFR BLD AUTO: 88 % (ref 43–75)
NITRITE UR QL STRIP: NEGATIVE
NON-SQ EPI CELLS URNS QL MICRO: ABNORMAL /HPF
NRBC BLD AUTO-RTO: 0 /100 WBCS
NRBC BLD AUTO-RTO: 0 /100 WBCS
OPIATES UR QL SCN: NEGATIVE
OTHER STN SPEC: ABNORMAL
OXYCODONE+OXYMORPHONE UR QL SCN: NEGATIVE
PCP UR QL: NEGATIVE
PH UR STRIP.AUTO: 6 [PH]
PLATELET # BLD AUTO: 221 THOUSANDS/UL (ref 149–390)
PLATELET # BLD AUTO: 309 THOUSANDS/UL (ref 149–390)
PMV BLD AUTO: 10.1 FL (ref 8.9–12.7)
PMV BLD AUTO: 9.4 FL (ref 8.9–12.7)
POTASSIUM SERPL-SCNC: 3.7 MMOL/L (ref 3.5–5.3)
PROT SERPL-MCNC: 7 G/DL (ref 6.4–8.4)
PROT UR STRIP-MCNC: ABNORMAL MG/DL
RBC # BLD AUTO: 3.82 MILLION/UL (ref 3.88–5.62)
RBC # BLD AUTO: 4.11 MILLION/UL (ref 3.88–5.62)
RBC #/AREA URNS AUTO: ABNORMAL /HPF
RSV RNA RESP QL NAA+PROBE: NEGATIVE
S PYO DNA THROAT QL NAA+PROBE: NOT DETECTED
SARS-COV-2 RNA RESP QL NAA+PROBE: NEGATIVE
SODIUM SERPL-SCNC: 138 MMOL/L (ref 135–147)
SP GR UR STRIP.AUTO: >=1.03 (ref 1–1.03)
THC UR QL: POSITIVE
UROBILINOGEN UR QL STRIP.AUTO: 0.2 E.U./DL
WBC # BLD AUTO: 21.75 THOUSAND/UL (ref 4.31–10.16)
WBC # BLD AUTO: 33.13 THOUSAND/UL (ref 4.31–10.16)
WBC #/AREA URNS AUTO: ABNORMAL /HPF

## 2024-01-17 PROCEDURE — 93005 ELECTROCARDIOGRAM TRACING: CPT

## 2024-01-17 PROCEDURE — 96361 HYDRATE IV INFUSION ADD-ON: CPT

## 2024-01-17 PROCEDURE — 71260 CT THORAX DX C+: CPT

## 2024-01-17 PROCEDURE — 96372 THER/PROPH/DIAG INJ SC/IM: CPT

## 2024-01-17 PROCEDURE — 99291 CRITICAL CARE FIRST HOUR: CPT | Performed by: EMERGENCY MEDICINE

## 2024-01-17 PROCEDURE — 82550 ASSAY OF CK (CPK): CPT | Performed by: EMERGENCY MEDICINE

## 2024-01-17 PROCEDURE — 80053 COMPREHEN METABOLIC PANEL: CPT | Performed by: EMERGENCY MEDICINE

## 2024-01-17 PROCEDURE — 84484 ASSAY OF TROPONIN QUANT: CPT | Performed by: EMERGENCY MEDICINE

## 2024-01-17 PROCEDURE — 99285 EMERGENCY DEPT VISIT HI MDM: CPT

## 2024-01-17 PROCEDURE — 74177 CT ABD & PELVIS W/CONTRAST: CPT

## 2024-01-17 PROCEDURE — 96374 THER/PROPH/DIAG INJ IV PUSH: CPT

## 2024-01-17 PROCEDURE — 83605 ASSAY OF LACTIC ACID: CPT | Performed by: EMERGENCY MEDICINE

## 2024-01-17 PROCEDURE — 82077 ASSAY SPEC XCP UR&BREATH IA: CPT | Performed by: EMERGENCY MEDICINE

## 2024-01-17 PROCEDURE — 83735 ASSAY OF MAGNESIUM: CPT | Performed by: EMERGENCY MEDICINE

## 2024-01-17 PROCEDURE — 87651 STREP A DNA AMP PROBE: CPT | Performed by: EMERGENCY MEDICINE

## 2024-01-17 PROCEDURE — 85025 COMPLETE CBC W/AUTO DIFF WBC: CPT | Performed by: EMERGENCY MEDICINE

## 2024-01-17 PROCEDURE — 36415 COLL VENOUS BLD VENIPUNCTURE: CPT | Performed by: EMERGENCY MEDICINE

## 2024-01-17 PROCEDURE — 80307 DRUG TEST PRSMV CHEM ANLYZR: CPT | Performed by: EMERGENCY MEDICINE

## 2024-01-17 PROCEDURE — 81001 URINALYSIS AUTO W/SCOPE: CPT | Performed by: EMERGENCY MEDICINE

## 2024-01-17 PROCEDURE — 0241U HB NFCT DS VIR RESP RNA 4 TRGT: CPT | Performed by: EMERGENCY MEDICINE

## 2024-01-17 PROCEDURE — 99292 CRITICAL CARE ADDL 30 MIN: CPT | Performed by: EMERGENCY MEDICINE

## 2024-01-17 RX ORDER — LORAZEPAM 2 MG/ML
2 INJECTION INTRAMUSCULAR ONCE
Status: DISCONTINUED | OUTPATIENT
Start: 2024-01-17 | End: 2024-01-17

## 2024-01-17 RX ORDER — DEXTROSE MONOHYDRATE 25 G/50ML
50 INJECTION, SOLUTION INTRAVENOUS ONCE
Status: COMPLETED | OUTPATIENT
Start: 2024-01-17 | End: 2024-01-17

## 2024-01-17 RX ORDER — LORAZEPAM 2 MG/ML
2 INJECTION INTRAMUSCULAR ONCE
Status: COMPLETED | OUTPATIENT
Start: 2024-01-17 | End: 2024-01-17

## 2024-01-17 RX ORDER — OLANZAPINE 10 MG/2ML
10 INJECTION, POWDER, FOR SOLUTION INTRAMUSCULAR ONCE
Status: COMPLETED | OUTPATIENT
Start: 2024-01-17 | End: 2024-01-17

## 2024-01-17 RX ORDER — KETAMINE HCL IN NACL, ISO-OSM 100MG/10ML
0.5 SYRINGE (ML) INJECTION ONCE
Status: DISCONTINUED | OUTPATIENT
Start: 2024-01-17 | End: 2024-01-18

## 2024-01-17 RX ADMIN — LORAZEPAM 2 MG: 2 INJECTION INTRAMUSCULAR; INTRAVENOUS at 19:24

## 2024-01-17 RX ADMIN — SODIUM CHLORIDE 1000 ML: 0.9 INJECTION, SOLUTION INTRAVENOUS at 20:00

## 2024-01-17 RX ADMIN — OLANZAPINE 10 MG: 10 INJECTION, POWDER, FOR SOLUTION INTRAMUSCULAR at 19:21

## 2024-01-17 RX ADMIN — IOHEXOL 100 ML: 350 INJECTION, SOLUTION INTRAVENOUS at 21:43

## 2024-01-17 RX ADMIN — SODIUM CHLORIDE 1000 ML: 0.9 INJECTION, SOLUTION INTRAVENOUS at 22:56

## 2024-01-17 RX ADMIN — DEXTROSE MONOHYDRATE 50 ML: 25 INJECTION, SOLUTION INTRAVENOUS at 20:21

## 2024-01-17 NOTE — Clinical Note
Rolando Díaz Jr. should be transferred out to [facility] and has been medically cleared.

## 2024-01-17 NOTE — ED NOTES
Unable to get VS. Pt is too agitated, keep moving arms. MD made aware.      Sharron Lara, RN  01/17/24 4058

## 2024-01-17 NOTE — ED NOTES
Pt stripped his behavioral attires, naked. Cover him private with blanket.      Sharron Lara, LORIN  01/17/24 [id number]

## 2024-01-18 LAB
GLUCOSE SERPL-MCNC: 121 MG/DL (ref 65–140)
GLUCOSE SERPL-MCNC: 61 MG/DL (ref 65–140)

## 2024-01-18 PROCEDURE — 82948 REAGENT STRIP/BLOOD GLUCOSE: CPT

## 2024-01-18 NOTE — ED NOTES
Pt is awake, sugar checked and lunch tray is given. Appears in no distress, resp is non labored. Secure holding monitor continues.     Sharron Lara RN  01/18/24 8142
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
## 2024-01-18 NOTE — ED PROVIDER NOTES
History  Chief Complaint   Patient presents with    Agitation     Arrives BLS with police escort, pt arrived agitated, appears in no distress, reported of BL foot pain.      33-year-old male with past history of homelessness, ADHD, bipolar 1 disorder, anxiety, polysubstance abuse including alcohol, presents to the ED via EMS for psychiatric evaluation.  And was found wandering in the streets with no shoes on and walking on ice.  Subsequently EMS was called.  Patient was extremely agitated upon EMS arrival.  EMS subsequently called police.  Patient arrived to the ED via EMS with police.  Patient is agitated in the emergency department however he is listening to police.  Further history and review of systems is limited as patient is not answering questions properly.  Patient has been evaluated numerous times in our emergency department for methamphetamine abuse and other polysubstance abuse.      History provided by:  EMS personnel and police      Prior to Admission Medications   Prescriptions Last Dose Informant Patient Reported? Taking?   ARIPiprazole (ABILIFY) 5 mg tablet   No No   Sig: Take 1 tablet (5 mg total) by mouth daily Do not start before August 25, 2023.   nicotine (NICODERM CQ) 14 mg/24hr TD 24 hr patch   No No   Sig: Place 1 patch on the skin over 24 hours daily Do not start before August 25, 2023.      Facility-Administered Medications: None       Past Medical History:   Diagnosis Date    ADHD (attention deficit hyperactivity disorder)     Anxiety     last assessed 3/23/15    Bipolar 1 disorder (HCC)     Genital warts     last assessed 12/7/15, resolved 11/1/17     Psychiatric disorder        History reviewed. No pertinent surgical history.    Family History   Problem Relation Age of Onset    No Known Problems Father      I have reviewed and agree with the history as documented.    E-Cigarette/Vaping    E-Cigarette Use Never User      E-Cigarette/Vaping Substances    Nicotine No     THC No     CBD No   "   Flavoring No     Other No     Unknown No      Social History     Tobacco Use    Smoking status: Every Day     Current packs/day: 1.50     Types: Cigarettes    Smokeless tobacco: Never   Vaping Use    Vaping status: Never Used   Substance Use Topics    Alcohol use: Yes     Comment: occ.    Drug use: Yes     Types: Heroin, Marijuana, Methamphetamines, Fentanyl     Comment: states was \"shooting up steroids\" 3 months ago (3/4/19 states this was 3 years ago)       Review of Systems    Physical Exam  Physical Exam  Vitals and nursing note reviewed.   Constitutional:       General: He is not in acute distress.     Appearance: He is well-developed.      Comments: Disheveled appearance   HENT:      Head: Normocephalic and atraumatic.   Eyes:      Conjunctiva/sclera: Conjunctivae normal.   Cardiovascular:      Rate and Rhythm: Normal rate and regular rhythm.      Heart sounds: No murmur heard.  Pulmonary:      Effort: Pulmonary effort is normal. No respiratory distress.      Breath sounds: Normal breath sounds.   Abdominal:      Palpations: Abdomen is soft.      Tenderness: There is no abdominal tenderness.   Musculoskeletal:         General: No swelling.      Cervical back: Neck supple.   Skin:     General: Skin is warm and dry.      Capillary Refill: Capillary refill takes less than 2 seconds.   Neurological:      General: No focal deficit present.      Mental Status: He is alert.      Comments: Patient is spontaneously moving all extremities unable to walk from stretcher to stretcher to his exam room and bed.  No obvious focal neurodeficits noted.  Full neuroexam is limited due to agitation and behavioral disturbance.   Psychiatric:      Comments: Patient is agitated with erratic behavior however he is listening to police and          Vital Signs  ED Triage Vitals   Temperature Pulse Respirations Blood Pressure SpO2   01/17/24 2007 01/17/24 2007 01/17/24 2007 01/17/24 2007 01/17/24 2007   97.6 °F (36.4 °C) " (!) 123 20 137/71 97 %      Temp Source Heart Rate Source Patient Position - Orthostatic VS BP Location FiO2 (%)   01/17/24 2007 01/17/24 2007 01/17/24 2007 01/17/24 2007 --   Temporal Monitor Lying Left arm       Pain Score       01/17/24 2028       10 - Worst Possible Pain           Vitals:    01/17/24 2007 01/17/24 2028   BP: 137/71    Pulse: (!) 123 (!) 110   Patient Position - Orthostatic VS: Lying          Visual Acuity      ED Medications  Medications   Ketamine HCl 34 mg (34 mg Intravenous Not Given 1/17/24 2148)   sodium chloride 0.9 % bolus 1,000 mL (0 mL Intravenous Stopped 1/17/24 2050)   OLANZapine (ZyPREXA) IM injection 10 mg (10 mg Intramuscular Given 1/17/24 1921)   LORazepam (ATIVAN) injection 2 mg (2 mg Intramuscular Given 1/17/24 1924)   dextrose 50 % IV solution 50 mL (50 mL Intravenous Given 1/17/24 2021)   iohexol (OMNIPAQUE) 350 MG/ML injection (MULTI-DOSE) 100 mL (100 mL Intravenous Given 1/17/24 2143)   sodium chloride 0.9 % bolus 1,000 mL (0 mL Intravenous Stopped 1/18/24 0025)       Diagnostic Studies  Results Reviewed       Procedure Component Value Units Date/Time    HS Troponin I 2hr [[id number]]  (Normal) Collected: 01/17/24 2258    Lab Status: Final result Specimen: Blood from Line, Venous Updated: 01/17/24 2323     hs TnI 2hr 10 ng/L      Delta 2hr hsTnI 3 ng/L     CK [[id number]]  (Abnormal) Collected: 01/17/24 1838    Lab Status: Final result Specimen: Blood from Arm, Left Updated: 01/17/24 2117     Total  U/L     CBC and differential [[id number]]  (Abnormal) Collected: 01/17/24 2111    Lab Status: Final result Specimen: Blood from Arm, Right Updated: 01/17/24 2116     WBC 21.75 Thousand/uL      RBC 3.82 Million/uL      Hemoglobin 11.9 g/dL      Hematocrit 35.8 %      MCV 94 fL      MCH 31.2 pg      MCHC 33.2 g/dL      RDW 12.7 %      MPV 9.4 fL      Platelets 221 Thousands/uL      nRBC 0 /100 WBCs      Neutrophils Relative 88 %      Immat GRANS % 0 %      Lymphocytes Relative 6  %      Monocytes Relative 6 %      Eosinophils Relative 0 %      Basophils Relative 0 %      Neutrophils Absolute 19.01 Thousands/µL      Immature Grans Absolute 0.07 Thousand/uL      Lymphocytes Absolute 1.39 Thousands/µL      Monocytes Absolute 1.23 Thousand/µL      Eosinophils Absolute 0.01 Thousand/µL      Basophils Absolute 0.04 Thousands/µL     FLU/RSV/COVID - if FLU/RSV clinically relevant [[id number]]  (Normal) Collected: 01/17/24 2001    Lab Status: Final result Specimen: Nares from Nose Updated: 01/17/24 2049     SARS-CoV-2 Negative     INFLUENZA A PCR Negative     INFLUENZA B PCR Negative     RSV PCR Negative    Narrative:      FOR PEDIATRIC PATIENTS - copy/paste COVID Guidelines URL to browser: [email or web address]    SARS-CoV-2 assay is a Nucleic Acid Amplification assay intended for the  qualitative detection of nucleic acid from SARS-CoV-2 in nasopharyngeal  swabs. Results are for the presumptive identification of SARS-CoV-2 RNA.    Positive results are indicative of infection with SARS-CoV-2, the virus  causing COVID-19, but do not rule out bacterial infection or co-infection  with other viruses. Laboratories within the [place] and its  territories are required to report all positive results to the appropriate  public health authorities. Negative results do not preclude SARS-CoV-2  infection and should not be used as the sole basis for treatment or other  patient management decisions. Negative results must be combined with  clinical observations, patient history, and epidemiological information.  This test has not been FDA cleared or approved.    This test has been authorized by FDA under an Emergency Use Authorization  (EUA). This test is only authorized for the duration of time the  declaration that circumstances exist justifying the authorization of the  emergency use of an in vitro diagnostic tests for detection of SARS-CoV-2  virus  and/or diagnosis of COVID-19 infection under section 564(b)(1) of  the Act, 21 U.S.C. 360bbb-3(b)(1), unless the authorization is terminated  or revoked sooner. The test has been validated but independent review by FDA  and CLIA is pending.    Test performed using [employer] GeneXpert: This RT-PCR assay targets N2,  a region unique to SARS-CoV-2. A conserved region in the E-gene was chosen  for pan-Sarbecovirus detection which includes SARS-CoV-2.    According to CMS-2020-01-R, this platform meets the definition of high-throughput technology.    Strep A PCR [[id number]]  (Normal) Collected: 01/17/24 2001    Lab Status: Final result Specimen: Throat Updated: 01/17/24 2036     STREP A PCR Not Detected    Rapid drug screen, urine [[id number]]  (Abnormal) Collected: 01/17/24 2001    Lab Status: Final result Specimen: Urine, Catheter Updated: 01/17/24 2035     Amph/Meth UR Positive     Barbiturate Ur Negative     Benzodiazepine Urine Negative     Cocaine Urine Negative     Methadone Urine Negative     Opiate Urine Negative     PCP Ur Negative     THC Urine Positive     Oxycodone Urine Negative    Narrative:      Presumptive report. If requested, specimen will be sent to reference lab for confirmation.  FOR MEDICAL PURPOSES ONLY.   IF CONFIRMATION NEEDED PLEASE CONTACT THE LAB WITHIN 5 DAYS.    Drug Screen Cutoff Levels:  AMPHETAMINE/METHAMPHETAMINES  1000 ng/mL  BARBITURATES     200 ng/mL  BENZODIAZEPINES     200 ng/mL  COCAINE      300 ng/mL  METHADONE      300 ng/mL  OPIATES      300 ng/mL  PHENCYCLIDINE     25 ng/mL  THC       50 ng/mL  OXYCODONE      100 ng/mL    HS Troponin 0hr (reflex protocol) [[id number]]  (Normal) Collected: 01/17/24 2001    Lab Status: Final result Specimen: Blood from Arm, Left Updated: 01/17/24 2030     hs TnI 0hr 7 ng/L     Urine Microscopic [[id number]]  (Abnormal) Collected: 01/17/24 2002    Lab Status: Final result Specimen: Urine, Straight Cath Updated: 01/17/24 2027     RBC, UA 1-2 /hpf       WBC, UA 4-10 /hpf      Epithelial Cells None Seen /hpf      Bacteria, UA Occasional /hpf      OTHER OBSERVATIONS Renal Tubule Epithelial Cells Present     MUCUS THREADS Innumerable    Lactic acid, plasma (w/reflex if result > 2.0) [[id number]]  (Normal) Collected: 01/17/24 2001    Lab Status: Final result Specimen: Blood from Arm, Left Updated: 01/17/24 2027     LACTIC ACID 0.7 mmol/L     Narrative:      Result may be elevated if tourniquet was used during collection.    UA w Reflex to Microscopic w Reflex to Culture [[id number]]  (Abnormal) Collected: 01/17/24 2002    Lab Status: Final result Specimen: Urine, Straight Cath Updated: 01/17/24 2016     Color, UA Yellow     Clarity, UA Slightly Cloudy     Specific Gravity, UA >=1.030     pH, UA 6.0     Leukocytes, UA Negative     Nitrite, UA Negative     Protein, UA Trace mg/dl      Glucose, UA Negative mg/dl      Ketones, UA 15 (1+) mg/dl      Urobilinogen, UA 0.2 E.U./dl      Bilirubin, UA Small     Occult Blood, UA Trace-Intact    Magnesium [[id number]]  (Normal) Collected: 01/17/24 1838    Lab Status: Final result Specimen: Blood from Arm, Left Updated: 01/17/24 2006     Magnesium 2.5 mg/dL     Comprehensive metabolic panel [[id number]]  (Abnormal) Collected: 01/17/24 1838    Lab Status: Final result Specimen: Blood from Arm, Left Updated: 01/17/24 1904     Sodium 138 mmol/L      Potassium 3.7 mmol/L      Chloride 102 mmol/L      CO2 23 mmol/L      ANION GAP 13 mmol/L      BUN 30 mg/dL      Creatinine 1.20 mg/dL      Glucose 53 mg/dL      Calcium 8.8 mg/dL      AST 32 U/L      ALT 20 U/L      Alkaline Phosphatase 90 U/L      Total Protein 7.0 g/dL      Albumin 4.4 g/dL      Total Bilirubin 0.93 mg/dL      eGFR 77 ml/min/1.73sq m     Narrative:      National Kidney Disease Foundation guidelines for Chronic Kidney Disease (CKD):     Stage 1 with normal or high GFR (GFR > 90 mL/min/1.73 square meters)    Stage 2 Mild CKD (GFR = 60-89 mL/min/1.73 square meters)    Stage 3A  Moderate CKD (GFR = 45-59 mL/min/1.73 square meters)    Stage 3B Moderate CKD (GFR = 30-44 mL/min/1.73 square meters)    Stage 4 Severe CKD (GFR = 15-29 mL/min/1.73 square meters)    Stage 5 End Stage CKD (GFR <15 mL/min/1.73 square meters)  Note: GFR calculation is accurate only with a steady state creatinine    Ethanol [[id number]]  (Normal) Collected: 01/17/24 1838    Lab Status: Final result Specimen: Blood from Arm, Left Updated: 01/17/24 1903     Ethanol Lvl <10 mg/dL     CBC and differential [[id number]]  (Abnormal) Collected: 01/17/24 1838    Lab Status: Final result Specimen: Blood from Arm, Left Updated: 01/17/24 1855     WBC 33.13 Thousand/uL      RBC 4.11 Million/uL      Hemoglobin 13.2 g/dL      Hematocrit 38.1 %      MCV 93 fL      MCH 32.1 pg      MCHC 34.6 g/dL      RDW 12.9 %      MPV 10.1 fL      Platelets 309 Thousands/uL      nRBC 0 /100 WBCs      Neutrophils Relative 83 %      Immat GRANS % 1 %      Lymphocytes Relative 12 %      Monocytes Relative 4 %      Eosinophils Relative 0 %      Basophils Relative 0 %      Neutrophils Absolute 27.92 Thousands/µL      Immature Grans Absolute 0.23 Thousand/uL      Lymphocytes Absolute 4.02 Thousands/µL      Monocytes Absolute 1.32 Thousand/µL      Eosinophils Absolute 0.08 Thousand/µL      Basophils Absolute 0.11 Thousands/µL     Narrative:      This is an appended report.  These results have been appended to a previously verified report.                   CT chest abdomen pelvis w contrast   Final Result by Marta Rosa MD (01/18 0139)      There is a tiny focus of gas layering non-dependently within the anterior aspect of the urinary bladder. Correlation with urinalysis and urine culture and sensitivity is recommended.      There are healing fractures of right fourth and fifth anterior ribs, demonstrating callus formation. No evidence of pulmonary contusion. No pneumothorax.      Other nonemergent findings as above.            Workstation  performed: [id number]                    Procedures  ECG 12 Lead Documentation Only    Date/Time: 1/17/2024 8:16 PM    Performed by: Kathy Cuadra DO  Authorized by: Kathy Cuadra DO    Indications / Diagnosis:  Medical clearance  ECG reviewed by me, the ED Provider: yes    Patient location:  ED  Previous ECG:     Previous ECG:  Compared to current    Similarity:  Changes noted    Comparison to cardiac monitor: Yes    Interpretation:     Interpretation: abnormal    Comments:      Sinus rhythm, rate 113, normal axis, normal intervals, no acute ST/T wave abdomen is noted, sinus tachycardia, otherwise unremarkable EKG, previous EKG shows sinus tachycardia with PVCs, PVCs are no longer present on today's EKG.  CriticalCare Time    Date/Time: 1/18/2024 2:13 AM    Performed by: Kathy Cuadra DO  Authorized by: Kathy Cuadra DO    Critical care provider statement:     Critical care time (minutes):  125    Critical care time was exclusive of:  Separately billable procedures and treating other patients    Critical care was necessary to treat or prevent imminent or life-threatening deterioration of the following conditions: Psychiatric patient who was extremely agitated requiring sedation and restraint, as well as utilizing multiple ED staff, , police.    Critical care was time spent personally by me on the following activities:  Blood draw for specimens, obtaining history from patient or surrogate, development of treatment plan with patient or surrogate, discussions with consultants, evaluation of patient's response to treatment, examination of patient, review of old charts, re-evaluation of patient's condition, ordering and review of laboratory studies, ordering and review of radiographic studies, interpretation of cardiac output measurements and ordering and performing treatments and interventions           ED Course  ED Course as of 01/18/24 0214 Wed Jan 17, 2024   1935 Patient is extremely  agitated and walking around make it.  Patient is hard to control at this time.  Patient white count is significantly elevated and he will need further medical workup.  At this time Zyprexa and Ativan ordered.  Restraint is also ordered for the safety of patient and staff to get blood work done.   2117 Patient continues to be very agitated despite given Zyprexa and Ativan.  Will give some ketamine prior to patient going to CT scan.                                             Medical Decision Making  Obtain medical clearance workup  Consult our crisis worker    Patient's initial blood work showed hyperglycemia as well as significant leukocytosis.  Patient given a dose of IV dextrose.  Subsequently CT abdomen/pelvis was ordered.  Patient was extremely agitated initially in the emergency department requiring sedation and restraint to obtain blood work.  Repeat CBC showed improvement of leukocytosis.  At this time patient's leukocytosis may be secondary to his agitation and drug use.  Patient tested positive for methamphetamine as well as marijuana.  CT scan did not show any acute abnormality.  At this time patient is medically clear for inpatient psychiatric admission and evaluation.  Patient is currently pending crisis evaluation.  Care of patient signed out to the next attending will continue to monitor patient until patient is evaluated by crisis worker and then patient will be dispositioned appropriately.    Amount and/or Complexity of Data Reviewed  Labs: ordered. Decision-making details documented in ED Course.  Radiology: ordered. Decision-making details documented in ED Course.  ECG/medicine tests: ordered and independent interpretation performed. Decision-making details documented in ED Course.    Risk  Prescription drug management.  Decision regarding hospitalization.             Disposition  Final diagnoses:   Methamphetamine abuse (HCC)   Marijuana abuse   Agitation   Leukocytosis     Time reflects when  diagnosis was documented in both MDM as applicable and the Disposition within this note       Time User Action Codes Description Comment    1/18/2024  2:07 AM Kathy Cuadra [F15.10] Methamphetamine abuse (HCC)     1/18/2024  2:07 AM Kathy Cuadra Add [F12.10] Marijuana abuse     1/18/2024  2:08 AM Kathy Cuadra Add [R45.1] Agitation     1/18/2024  2:08 AM Kathy Cuadra Add [D72.829] Leukocytosis           ED Disposition       ED Disposition   Transfer to Behavioral Health Condition   --    Date/Time   Thu Jan 18, 2024  2:08 AM    Comment   Rolando Díaz Jr. should be transferred out to [facility] and has been medically cleared.               Follow-up Information    None         Patient's Medications   Discharge Prescriptions    No medications on file       No discharge procedures on file.    PDMP Review       None            ED Provider  Electronically Signed by             Kathy Cuadra DO  01/18/24 0214

## 2024-01-18 NOTE — ED NOTES
Pt dumped water all over the floor, continue to monitor.      Sharron Lara, LORIN  01/17/24 1925

## 2024-01-18 NOTE — ED NOTES
Patient transported to CT with RN, security, and 1:1 sitter     Azeem Watkins RN  01/17/24 2149       Azeem Watkins RN  01/17/24 2149

## 2024-01-18 NOTE — ED NOTES
Patient moved back to secured holding, patient made multiple threats to staff during transport to other room, and saying his eyes are connected to screens and he owns the program,      Cassidy Smith, LORIN  01/18/24 1967

## 2024-01-18 NOTE — ED NOTES
Received the pt, secure holding 1:1 continues. Pt is sleep, self repositioned, appears in no distress. Resp is non labored.      Sharron Lara, LORIN  01/18/24 8795

## 2024-01-18 NOTE — ED NOTES
1/18/24 @ 1325:  As patient had to be heavily medicated for safety to him and staff, patient has been sleeping the entire day.  PES will continue to monitor.  Maria MS

## 2024-01-18 NOTE — RESTRAINT FACE TO FACE
Restraint Face to Face   Rolando Díaz Jr. 33 y.o. male MRN: [id number]  Unit/Bed#: ED 02 Encounter: [id number]      Physical Evaluation patient agitated  Purpose for Restraints/ Seclusion High risk for self harm, High risk for causing significant disruption of treatment environment , High risk for harm to others, and high risk for flight  Patient's reaction to the intervention  Patient's medical condition  Patient's Behavioral condition  Restraints to be Continued

## 2024-01-19 VITALS
BODY MASS INDEX: 21.73 KG/M2 | OXYGEN SATURATION: 99 % | TEMPERATURE: 98 F | HEART RATE: 68 BPM | SYSTOLIC BLOOD PRESSURE: 124 MMHG | DIASTOLIC BLOOD PRESSURE: 88 MMHG | RESPIRATION RATE: 16 BRPM | WEIGHT: 151.46 LBS

## 2024-01-19 LAB
ATRIAL RATE: 113 BPM
P AXIS: 56 DEGREES
PR INTERVAL: 142 MS
QRS AXIS: 71 DEGREES
QRSD INTERVAL: 106 MS
QT INTERVAL: 340 MS
QTC INTERVAL: 466 MS
T WAVE AXIS: 72 DEGREES
VENTRICULAR RATE: 113 BPM

## 2024-01-19 NOTE — ED CARE HANDOFF
Emergency Department Sign Out Note        Sign out and transfer of care from previous provider. See Separate Emergency Department note.     The patient, Rolando Díaz Jr., was evaluated by the previous provider for psychiatric evaluation.    Workup Completed:  Medically cleared    ED Course / Workup Pending (followup):  Patient pending transport this morning                                     Procedures  Medical Decision Making  Amount and/or Complexity of Data Reviewed  Labs: ordered.  Radiology: ordered.    Risk  Prescription drug management.  Decision regarding hospitalization.            Disposition  Final diagnoses:   Methamphetamine abuse (HCC)   Marijuana abuse   Agitation   Leukocytosis     Time reflects when diagnosis was documented in both MDM as applicable and the Disposition within this note       Time User Action Codes Description Comment    1/18/2024  2:07 AM Ferdous, Komaira Add [F15.10] Methamphetamine abuse (HCC)     1/18/2024  2:07 AM Ferdous, Komaira Add [F12.10] Marijuana abuse     1/18/2024  2:08 AM Ferdous, Komaira Add [R45.1] Agitation     1/18/2024  2:08 AM Ferdous Komaira Add [D72.829] Leukocytosis           ED Disposition       ED Disposition   Transfer to Behavioral Health Condition   --    Date/Time   Thu Jan 18, 2024  2:08 AM    Comment   Rolando Díaz Jr. should be transferred out to [facility] and has been medically cleared.               Follow-up Information    None       Patient's Medications   Discharge Prescriptions    No medications on file     No discharge procedures on file.       ED Provider  Electronically Signed by     Azeem Devine MD  01/20/24 0828

## 2024-01-19 NOTE — ED NOTES
Went to speak with patient about being discharged, patient states that he has concerns over the house that he was in previously and states that he has no where to go. Pt reports that his feet have been bothering him from walking through the snow but patient has been visualized walking around the secured holding common area and to the bathrooms. Pt informed that from our stand point the patient is cleared to go home, patient stating that he does not have a home to go to. Bharat made aware and states that he will help the patient call the homeless hotline.      Isabel Candelaria RN  01/18/24 2045

## 2024-01-19 NOTE — ED NOTES
Pt given turkey sandwich RN approved sandwich unwrapped along with pretzels and cookie placed on a paper plate and ginger-sharri in a cup     Kellen Medley  01/18/24 2418

## 2024-01-19 NOTE — ED NOTES
15:25 - Lyft ordered.  Was picked up immediately.    1st [employer] texted PES - left without patient; 2nd one was ordered but patient was no longer in the lobby area of hospital.    Patient came back in and asked for a coffee and another [employer] - ER staff said no; patient said he would walk to [facility].

## 2024-01-19 NOTE — ED NOTES
Pt is awake, alert. Ate the whole tray of food. Apologized for his behaviors, appears in no distress.      Sharron Lara RN  01/18/24 2033

## 2024-01-19 NOTE — ED NOTES
"24 @ 1155:  After a lengthy conversation with patients sister, PES will ask ED MD to request a \"warm handoff,\" so [facility] can come out and try to talk patient into going to rehab for his drug use.  If patient declines, then he will have to be discharged, but not to [place].  Patient can be sent to a local shelter but not all the way to [place]; sister understood.  Sister reports that patient doesn't have any identification; PES was able to locate a [place] identification card, but it  in 2018.  PES made a copy of it, and that might suffice as proper identification, even though the picture isn't clear.  PES will continue to monitor.      Maria MS    1400: Alysia from [facility] arrived to discuss treatment options for drug rehab.  Maria MS    1500: Alysia from [facility] completed her assessment and offered detox/rehab assistance, but no matter what Alysia and this writer said, patient declined.  Patient made several phone calls to get help and then decided that he was going to a friends house in [place], [place].  PES will make LYFT arrangements.  Patient didn't know the exact address, but says, \"It's near the [employer].  PES looked it up and the address is:  [address] [facility], [place] [facility] [place]   [address].  PES will make arrangements once patient is discharged.    MS Maria  "

## 2024-01-28 ENCOUNTER — APPOINTMENT (EMERGENCY)
Dept: RADIOLOGY | Facility: HOSPITAL | Age: 34
End: 2024-01-28
Payer: COMMERCIAL

## 2024-01-28 ENCOUNTER — HOSPITAL ENCOUNTER (EMERGENCY)
Facility: HOSPITAL | Age: 34
Discharge: HOME | End: 2024-01-28
Attending: EMERGENCY MEDICINE
Payer: COMMERCIAL

## 2024-01-28 VITALS
DIASTOLIC BLOOD PRESSURE: 81 MMHG | RESPIRATION RATE: 18 BRPM | OXYGEN SATURATION: 99 % | HEART RATE: 119 BPM | TEMPERATURE: 95 F | SYSTOLIC BLOOD PRESSURE: 118 MMHG

## 2024-01-28 DIAGNOSIS — S09.90XA CLOSED HEAD INJURY, INITIAL ENCOUNTER: ICD-10-CM

## 2024-01-28 DIAGNOSIS — F19.20 POLYSUBSTANCE DEPENDENCE (HCC): Primary | ICD-10-CM

## 2024-01-28 LAB
ALBUMIN SERPL BCP-MCNC: 3.9 G/DL (ref 3.5–5)
ALP SERPL-CCNC: 82 U/L (ref 34–104)
ALT SERPL W P-5'-P-CCNC: 14 U/L (ref 7–52)
AMPHETAMINES SERPL QL SCN: POSITIVE
ANION GAP SERPL CALCULATED.3IONS-SCNC: 6 MMOL/L
AST SERPL W P-5'-P-CCNC: 18 U/L (ref 13–39)
BARBITURATES UR QL: NEGATIVE
BASOPHILS # BLD AUTO: 0.05 THOUSANDS/ÂΜL (ref 0–0.1)
BASOPHILS NFR BLD AUTO: 0 % (ref 0–1)
BENZODIAZ UR QL: NEGATIVE
BILIRUB SERPL-MCNC: 0.5 MG/DL (ref 0.2–1)
BUN SERPL-MCNC: 24 MG/DL (ref 5–25)
CALCIUM SERPL-MCNC: 8.3 MG/DL (ref 8.4–10.2)
CHLORIDE SERPL-SCNC: 102 MMOL/L (ref 96–108)
CK SERPL-CCNC: 201 U/L (ref 39–308)
CO2 SERPL-SCNC: 29 MMOL/L (ref 21–32)
COCAINE UR QL: NEGATIVE
CREAT SERPL-MCNC: 1.19 MG/DL (ref 0.6–1.3)
EOSINOPHIL # BLD AUTO: 0.24 THOUSAND/ÂΜL (ref 0–0.61)
EOSINOPHIL NFR BLD AUTO: 2 % (ref 0–6)
ERYTHROCYTE [DISTWIDTH] IN BLOOD BY AUTOMATED COUNT: 12.5 % (ref 11.6–15.1)
GFR SERPL CREATININE-BSD FRML MDRD: 78 ML/MIN/1.73SQ M
GLUCOSE SERPL-MCNC: 85 MG/DL (ref 65–140)
HCT VFR BLD AUTO: 36.6 % (ref 36.5–49.3)
HGB BLD-MCNC: 12.6 G/DL (ref 12–17)
IMM GRANULOCYTES # BLD AUTO: 0.04 THOUSAND/UL (ref 0–0.2)
IMM GRANULOCYTES NFR BLD AUTO: 0 % (ref 0–2)
LYMPHOCYTES # BLD AUTO: 2.21 THOUSANDS/ÂΜL (ref 0.6–4.47)
LYMPHOCYTES NFR BLD AUTO: 17 % (ref 14–44)
MCH RBC QN AUTO: 32.1 PG (ref 26.8–34.3)
MCHC RBC AUTO-ENTMCNC: 34.4 G/DL (ref 31.4–37.4)
MCV RBC AUTO: 93 FL (ref 82–98)
METHADONE UR QL: NEGATIVE
MONOCYTES # BLD AUTO: 0.83 THOUSAND/ÂΜL (ref 0.17–1.22)
MONOCYTES NFR BLD AUTO: 6 % (ref 4–12)
NEUTROPHILS # BLD AUTO: 9.64 THOUSANDS/ÂΜL (ref 1.85–7.62)
NEUTS SEG NFR BLD AUTO: 75 % (ref 43–75)
NRBC BLD AUTO-RTO: 0 /100 WBCS
OPIATES UR QL SCN: POSITIVE
OXYCODONE+OXYMORPHONE UR QL SCN: NEGATIVE
PCP UR QL: NEGATIVE
PLATELET # BLD AUTO: 186 THOUSANDS/UL (ref 149–390)
PMV BLD AUTO: 10 FL (ref 8.9–12.7)
POTASSIUM SERPL-SCNC: 3.9 MMOL/L (ref 3.5–5.3)
PROT SERPL-MCNC: 6.4 G/DL (ref 6.4–8.4)
RBC # BLD AUTO: 3.93 MILLION/UL (ref 3.88–5.62)
SODIUM SERPL-SCNC: 137 MMOL/L (ref 135–147)
THC UR QL: POSITIVE
WBC # BLD AUTO: 13.01 THOUSAND/UL (ref 4.31–10.16)

## 2024-01-28 PROCEDURE — 82550 ASSAY OF CK (CPK): CPT | Performed by: EMERGENCY MEDICINE

## 2024-01-28 PROCEDURE — 80053 COMPREHEN METABOLIC PANEL: CPT | Performed by: EMERGENCY MEDICINE

## 2024-01-28 PROCEDURE — 36415 COLL VENOUS BLD VENIPUNCTURE: CPT | Performed by: EMERGENCY MEDICINE

## 2024-01-28 PROCEDURE — 85025 COMPLETE CBC W/AUTO DIFF WBC: CPT | Performed by: EMERGENCY MEDICINE

## 2024-01-28 PROCEDURE — 80307 DRUG TEST PRSMV CHEM ANLYZR: CPT | Performed by: EMERGENCY MEDICINE

## 2024-01-28 PROCEDURE — 99284 EMERGENCY DEPT VISIT MOD MDM: CPT

## 2024-01-28 PROCEDURE — G1004 CDSM NDSC: HCPCS

## 2024-01-28 PROCEDURE — 70450 CT HEAD/BRAIN W/O DYE: CPT

## 2024-01-28 NOTE — ED NOTES
Pt sleeping, appears to be in no acute distress. Respirations non labored.      Sneah Breaux RN  01/28/24 7391

## 2024-01-28 NOTE — DISCHARGE INSTRUCTIONS
Return to the ER for further concerns or worsening symptoms  Follow up with your primary care physician in 1-2 days

## 2024-01-28 NOTE — ED NOTES
Pt seen, assessed and d/c by provider. Pt appeared to be in no acute distress upon discharge. Pt given clean clothes by staff and provided with pt's own wet clothes upon discharge. Pt able to ambulate well without assistance upon exiting.      Sneha Breaux RN  01/28/24 7302

## 2024-01-28 NOTE — ED NOTES
Patient is resting comfortably. Finished all of breakfast. No acute distress. Pt noted to be talking to himself and responding to internal stimuli.      Sneha Breaux RN  01/28/24 4720

## 2024-01-28 NOTE — ED PROVIDER NOTES
History  Chief Complaint   Patient presents with    Psychiatric Evaluation     Pt. Was found wondering in the street  police had 4 phone calls   about him wondering in streets and  talking to himself. Pt.   Is know Meth user and     has been here frequently  because of disturbances in the community. Pt.  Is Homeless      Patient is a 33-year-old male-with a known history of drug abuse that presents emergency department with police.  Patient was found wandering in the streets in the rain, with an ataxic gait, obviously under the influence of drugs.  Patient is awake at the time of my initial evaluation, with slurred speech, denies somatic complaints.      History provided by:  Patient  History limited by:  Psychiatric disorder and mental status change   used: No    Psychiatric Evaluation  Associated symptoms: no abdominal pain and no chest pain        Prior to Admission Medications   Prescriptions Last Dose Informant Patient Reported? Taking?   ARIPiprazole (ABILIFY) 5 mg tablet   No No   Sig: Take 1 tablet (5 mg total) by mouth daily Do not start before August 25, 2023.   nicotine (NICODERM CQ) 14 mg/24hr TD 24 hr patch   No No   Sig: Place 1 patch on the skin over 24 hours daily Do not start before August 25, 2023.      Facility-Administered Medications: None       Past Medical History:   Diagnosis Date    ADHD (attention deficit hyperactivity disorder)     Anxiety     last assessed 3/23/15    Bipolar 1 disorder (HCC)     Genital warts     last assessed 12/7/15, resolved 11/1/17     Psychiatric disorder        History reviewed. No pertinent surgical history.    Family History   Problem Relation Age of Onset    No Known Problems Father      I have reviewed and agree with the history as documented.    E-Cigarette/Vaping    E-Cigarette Use Never User      E-Cigarette/Vaping Substances    Nicotine No     THC No     CBD No     Flavoring No     Other No     Unknown No      Social History     Tobacco  "Use    Smoking status: Every Day     Current packs/day: 1.50     Types: Cigarettes    Smokeless tobacco: Never   Vaping Use    Vaping status: Never Used   Substance Use Topics    Alcohol use: Yes     Comment: occ.    Drug use: Yes     Types: Heroin, Marijuana, Methamphetamines, Fentanyl     Comment: states was \"shooting up steroids\" 3 months ago (3/4/19 states this was 3 years ago)       Review of Systems   Unable to perform ROS: Mental status change   Constitutional:  Negative for chills and fever.   Respiratory:  Negative for cough, shortness of breath and wheezing.    Cardiovascular:  Negative for chest pain and palpitations.   Gastrointestinal:  Negative for abdominal pain, constipation, diarrhea, nausea and vomiting.   Genitourinary:  Negative for dysuria, flank pain, hematuria and urgency.   Musculoskeletal:  Negative for back pain.   Skin:  Negative for color change and rash.   All other systems reviewed and are negative.      Physical Exam  Physical Exam  Vitals and nursing note reviewed.   Constitutional:       Appearance: Normal appearance. He is well-developed.   HENT:      Head: Normocephalic.      Comments: Scalp contusion noted.  No active bleeding  Eyes:      Pupils: Pupils are equal, round, and reactive to light.   Cardiovascular:      Rate and Rhythm: Normal rate and regular rhythm.      Heart sounds: Normal heart sounds.   Pulmonary:      Effort: Pulmonary effort is normal.      Breath sounds: Normal breath sounds.   Abdominal:      General: Bowel sounds are normal. There is no distension.      Palpations: Abdomen is soft. There is no mass.      Tenderness: There is no abdominal tenderness. There is no guarding or rebound.   Skin:     General: Skin is warm and dry.      Capillary Refill: Capillary refill takes less than 2 seconds.   Neurological:      Mental Status: He is alert. Mental status is at baseline.   Psychiatric:         Mood and Affect: Affect is flat.         Speech: Speech is slurred.  "        Behavior: Behavior normal.         Thought Content: Thought content normal.         Judgment: Judgment normal.         Vital Signs  ED Triage Vitals   Temperature Pulse Respirations Blood Pressure SpO2   01/28/24 0955 01/28/24 0955 01/28/24 0955 01/28/24 0955 01/28/24 1224   (!) 95 °F (35 °C) (!) 119 18 118/81 99 %      Temp src Heart Rate Source Patient Position - Orthostatic VS BP Location FiO2 (%)   -- 01/28/24 0955 -- -- --    Monitor         Pain Score       --                  Vitals:    01/28/24 0955   BP: 118/81   Pulse: (!) 119         Visual Acuity      ED Medications  Medications - No data to display    Diagnostic Studies  Results Reviewed       Procedure Component Value Units Date/Time    Comprehensive metabolic panel [[id number]]  (Abnormal) Collected: 01/28/24 1019    Lab Status: Final result Specimen: Blood from Arm, Left Updated: 01/28/24 1047     Sodium 137 mmol/L      Potassium 3.9 mmol/L      Chloride 102 mmol/L      CO2 29 mmol/L      ANION GAP 6 mmol/L      BUN 24 mg/dL      Creatinine 1.19 mg/dL      Glucose 85 mg/dL      Calcium 8.3 mg/dL      AST 18 U/L      ALT 14 U/L      Alkaline Phosphatase 82 U/L      Total Protein 6.4 g/dL      Albumin 3.9 g/dL      Total Bilirubin 0.50 mg/dL      eGFR 78 ml/min/1.73sq m     Narrative:      National Kidney Disease Foundation guidelines for Chronic Kidney Disease (CKD):     Stage 1 with normal or high GFR (GFR > 90 mL/min/1.73 square meters)    Stage 2 Mild CKD (GFR = 60-89 mL/min/1.73 square meters)    Stage 3A Moderate CKD (GFR = 45-59 mL/min/1.73 square meters)    Stage 3B Moderate CKD (GFR = 30-44 mL/min/1.73 square meters)    Stage 4 Severe CKD (GFR = 15-29 mL/min/1.73 square meters)    Stage 5 End Stage CKD (GFR <15 mL/min/1.73 square meters)  Note: GFR calculation is accurate only with a steady state creatinine    CK [[id number]]  (Normal) Collected: 01/28/24 1019    Lab Status: Final result Specimen: Blood from Arm, Left Updated: 01/28/24  1047     Total  U/L     Rapid drug screen, urine [[id number]]  (Abnormal) Collected: 01/28/24 1019    Lab Status: Final result Specimen: Urine, Clean Catch Updated: 01/28/24 1044     Amph/Meth UR Positive     Barbiturate Ur Negative     Benzodiazepine Urine Negative     Cocaine Urine Negative     Methadone Urine Negative     Opiate Urine Positive     PCP Ur Negative     THC Urine Positive     Oxycodone Urine Negative    Narrative:      Presumptive report. If requested, specimen will be sent to reference lab for confirmation.  FOR MEDICAL PURPOSES ONLY.   IF CONFIRMATION NEEDED PLEASE CONTACT THE LAB WITHIN 5 DAYS.    Drug Screen Cutoff Levels:  AMPHETAMINE/METHAMPHETAMINES  1000 ng/mL  BARBITURATES     200 ng/mL  BENZODIAZEPINES     200 ng/mL  COCAINE      300 ng/mL  METHADONE      300 ng/mL  OPIATES      300 ng/mL  PHENCYCLIDINE     25 ng/mL  THC       50 ng/mL  OXYCODONE      100 ng/mL    CBC and differential [[id number]]  (Abnormal) Collected: 01/28/24 1019    Lab Status: Final result Specimen: Blood from Arm, Left Updated: 01/28/24 1026     WBC 13.01 Thousand/uL      RBC 3.93 Million/uL      Hemoglobin 12.6 g/dL      Hematocrit 36.6 %      MCV 93 fL      MCH 32.1 pg      MCHC 34.4 g/dL      RDW 12.5 %      MPV 10.0 fL      Platelets 186 Thousands/uL      nRBC 0 /100 WBCs      Neutrophils Relative 75 %      Immat GRANS % 0 %      Lymphocytes Relative 17 %      Monocytes Relative 6 %      Eosinophils Relative 2 %      Basophils Relative 0 %      Neutrophils Absolute 9.64 Thousands/µL      Immature Grans Absolute 0.04 Thousand/uL      Lymphocytes Absolute 2.21 Thousands/µL      Monocytes Absolute 0.83 Thousand/µL      Eosinophils Absolute 0.24 Thousand/µL      Basophils Absolute 0.05 Thousands/µL                    CT head without contrast   Final Result by Jake Correa MD (01/28 1214)      No acute intracranial abnormality.                  Workstation performed: [id number]                     Procedures  CriticalCare Time    Date/Time: 1/28/2024 5:02 PM    Performed by: Dary Goldman DO  Authorized by: Dary Goldman DO    Critical care provider statement:     Critical care time (minutes):  35    Critical care time was exclusive of:  Separately billable procedures and treating other patients and teaching time    Critical care was necessary to treat or prevent imminent or life-threatening deterioration of the following conditions:  CNS failure or compromise    Critical care was time spent personally by me on the following activities:  Blood draw for specimens, obtaining history from patient or surrogate, development of treatment plan with patient or surrogate, evaluation of patient's response to treatment, examination of patient, ordering and performing treatments and interventions, ordering and review of laboratory studies, ordering and review of radiographic studies and re-evaluation of patient's condition    I assumed direction of critical care for this patient from another provider in my specialty: no             ED Course  ED Course as of 01/28/24 1704   Sun Jan 28, 2024   1441 Patient reevaluated after ED observation.  He tolerated a meal in the ED and is clinically sober.  Will discharge patient to home.                               SBIRT 22yo+      Flowsheet Row Most Recent Value   Initial Alcohol Screen: US AUDIT-C     1. How often do you have a drink containing alcohol? 0 Filed at: 01/28/2024 0941   2. How many drinks containing alcohol do you have on a typical day you are drinking?  0 Filed at: 01/28/2024 0941   3b. FEMALE Any Age, or MALE 65+: How often do you have 4 or more drinks on one occassion? 0 Filed at: 01/28/2024 0941   Audit-C Score 0 Filed at: 01/28/2024 0941   AP: How many times in the past year have you...    Used an illegal drug or used a prescription medication for non-medical reasons? Never Filed at: 01/28/2024 0941                      Medical Decision  Making  33-year-old male with known history of drug dependence, presents altered to the ED.  Labs, CT head ordered and reviewed.  No traumatic pathology identified.  Patient observed in the ED and back to his baseline.  Will discharge.    Amount and/or Complexity of Data Reviewed  Labs: ordered.  Radiology: ordered.             Disposition  Final diagnoses:   Polysubstance dependence (HCC)   Closed head injury, initial encounter     Time reflects when diagnosis was documented in both MDM as applicable and the Disposition within this note       Time User Action Codes Description Comment    1/28/2024  2:41 PM Dary Goldman [F19.20] Polysubstance dependence (HCC)     1/28/2024  2:41 PM Dary Goldman [S09.90XA] Closed head injury, initial encounter           ED Disposition       ED Disposition   Discharge    Condition   Stable    Date/Time   Sun Jan 28, 2024 1441    Comment   Rolando Díaz Jr. discharge to home/self care.                   Follow-up Information       Follow up With Specialties Details Why Contact Info    Ulysses Agpaoa  Schedule an appointment as soon as possible for a visit in 1 day for follow up [address] [place] [id number]  [phone]              Discharge Medication List as of 1/28/2024  2:41 PM        CONTINUE these medications which have NOT CHANGED    Details   ARIPiprazole (ABILIFY) 5 mg tablet Take 1 tablet (5 mg total) by mouth daily Do not start before August 25, 2023., Starting Fri 8/25/2023, Print      nicotine (NICODERM CQ) 14 mg/24hr TD 24 hr patch Place 1 patch on the skin over 24 hours daily Do not start before August 25, 2023., Starting Fri 8/25/2023, Print             No discharge procedures on file.    PDMP Review       None            ED Provider  Electronically Signed by             Dary Goldman DO  01/28/24 0393

## 2024-03-21 ENCOUNTER — HOSPITAL ENCOUNTER (EMERGENCY)
Facility: HOSPITAL | Age: 34
Discharge: HOME | End: 2024-03-22
Attending: EMERGENCY MEDICINE
Payer: COMMERCIAL

## 2024-03-21 ENCOUNTER — APPOINTMENT (EMERGENCY)
Dept: RADIOLOGY | Facility: HOSPITAL | Age: 34
End: 2024-03-21
Payer: COMMERCIAL

## 2024-03-21 DIAGNOSIS — F15.10 METHAMPHETAMINE ABUSE, EPISODIC (HCC): Primary | ICD-10-CM

## 2024-03-21 LAB
ALBUMIN SERPL BCP-MCNC: 4.2 G/DL (ref 3.5–5)
ALP SERPL-CCNC: 85 U/L (ref 34–104)
ALT SERPL W P-5'-P-CCNC: 14 U/L (ref 7–52)
ANION GAP SERPL CALCULATED.3IONS-SCNC: 5 MMOL/L (ref 4–13)
AST SERPL W P-5'-P-CCNC: 18 U/L (ref 13–39)
BASOPHILS # BLD AUTO: 0.07 THOUSANDS/ÂΜL (ref 0–0.1)
BASOPHILS NFR BLD AUTO: 1 % (ref 0–1)
BILIRUB SERPL-MCNC: 0.31 MG/DL (ref 0.2–1)
BUN SERPL-MCNC: 18 MG/DL (ref 5–25)
CALCIUM SERPL-MCNC: 8.9 MG/DL (ref 8.4–10.2)
CHLORIDE SERPL-SCNC: 107 MMOL/L (ref 96–108)
CK SERPL-CCNC: 147 U/L (ref 39–308)
CO2 SERPL-SCNC: 31 MMOL/L (ref 21–32)
CREAT SERPL-MCNC: 1.05 MG/DL (ref 0.6–1.3)
EOSINOPHIL # BLD AUTO: 0.21 THOUSAND/ÂΜL (ref 0–0.61)
EOSINOPHIL NFR BLD AUTO: 3 % (ref 0–6)
ERYTHROCYTE [DISTWIDTH] IN BLOOD BY AUTOMATED COUNT: 13.3 % (ref 11.6–15.1)
GFR SERPL CREATININE-BSD FRML MDRD: 91 ML/MIN/1.73SQ M
GLUCOSE SERPL-MCNC: 106 MG/DL (ref 65–140)
GLUCOSE SERPL-MCNC: 108 MG/DL (ref 65–140)
HCT VFR BLD AUTO: 37.9 % (ref 36.5–49.3)
HGB BLD-MCNC: 13.1 G/DL (ref 12–17)
IMM GRANULOCYTES # BLD AUTO: 0.02 THOUSAND/UL (ref 0–0.2)
IMM GRANULOCYTES NFR BLD AUTO: 0 % (ref 0–2)
LYMPHOCYTES # BLD AUTO: 2.94 THOUSANDS/ÂΜL (ref 0.6–4.47)
LYMPHOCYTES NFR BLD AUTO: 38 % (ref 14–44)
MAGNESIUM SERPL-MCNC: 2.1 MG/DL (ref 1.9–2.7)
MCH RBC QN AUTO: 31.9 PG (ref 26.8–34.3)
MCHC RBC AUTO-ENTMCNC: 34.6 G/DL (ref 31.4–37.4)
MCV RBC AUTO: 92 FL (ref 82–98)
MONOCYTES # BLD AUTO: 0.51 THOUSAND/ÂΜL (ref 0.17–1.22)
MONOCYTES NFR BLD AUTO: 7 % (ref 4–12)
NEUTROPHILS # BLD AUTO: 4.08 THOUSANDS/ÂΜL (ref 1.85–7.62)
NEUTS SEG NFR BLD AUTO: 51 % (ref 43–75)
NRBC BLD AUTO-RTO: 0 /100 WBCS
PLATELET # BLD AUTO: 238 THOUSANDS/UL (ref 149–390)
PMV BLD AUTO: 9.9 FL (ref 8.9–12.7)
POTASSIUM SERPL-SCNC: 4.2 MMOL/L (ref 3.5–5.3)
PROT SERPL-MCNC: 6.5 G/DL (ref 6.4–8.4)
RBC # BLD AUTO: 4.11 MILLION/UL (ref 3.88–5.62)
SODIUM SERPL-SCNC: 143 MMOL/L (ref 135–147)
WBC # BLD AUTO: 7.83 THOUSAND/UL (ref 4.31–10.16)

## 2024-03-21 PROCEDURE — 96375 TX/PRO/DX INJ NEW DRUG ADDON: CPT

## 2024-03-21 PROCEDURE — 85025 COMPLETE CBC W/AUTO DIFF WBC: CPT | Performed by: EMERGENCY MEDICINE

## 2024-03-21 PROCEDURE — 70450 CT HEAD/BRAIN W/O DYE: CPT

## 2024-03-21 PROCEDURE — 96374 THER/PROPH/DIAG INJ IV PUSH: CPT

## 2024-03-21 PROCEDURE — 96376 TX/PRO/DX INJ SAME DRUG ADON: CPT

## 2024-03-21 PROCEDURE — 80053 COMPREHEN METABOLIC PANEL: CPT | Performed by: EMERGENCY MEDICINE

## 2024-03-21 PROCEDURE — 36415 COLL VENOUS BLD VENIPUNCTURE: CPT | Performed by: EMERGENCY MEDICINE

## 2024-03-21 PROCEDURE — 82948 REAGENT STRIP/BLOOD GLUCOSE: CPT

## 2024-03-21 PROCEDURE — 96361 HYDRATE IV INFUSION ADD-ON: CPT

## 2024-03-21 PROCEDURE — 93005 ELECTROCARDIOGRAM TRACING: CPT

## 2024-03-21 PROCEDURE — 99291 CRITICAL CARE FIRST HOUR: CPT | Performed by: EMERGENCY MEDICINE

## 2024-03-21 PROCEDURE — 99285 EMERGENCY DEPT VISIT HI MDM: CPT

## 2024-03-21 PROCEDURE — 82550 ASSAY OF CK (CPK): CPT | Performed by: EMERGENCY MEDICINE

## 2024-03-21 PROCEDURE — 83735 ASSAY OF MAGNESIUM: CPT | Performed by: EMERGENCY MEDICINE

## 2024-03-21 RX ORDER — LORAZEPAM 2 MG/ML
2 INJECTION INTRAMUSCULAR ONCE
Status: COMPLETED | OUTPATIENT
Start: 2024-03-21 | End: 2024-03-21

## 2024-03-21 RX ORDER — OLANZAPINE 10 MG/2ML
INJECTION, POWDER, FOR SOLUTION INTRAMUSCULAR
Status: DISPENSED
Start: 2024-03-21 | End: 2024-03-22

## 2024-03-21 RX ORDER — DIPHENHYDRAMINE HYDROCHLORIDE 50 MG/ML
50 INJECTION INTRAMUSCULAR; INTRAVENOUS ONCE
Status: COMPLETED | OUTPATIENT
Start: 2024-03-21 | End: 2024-03-21

## 2024-03-21 RX ORDER — OLANZAPINE 10 MG/2ML
10 INJECTION, POWDER, FOR SOLUTION INTRAMUSCULAR ONCE
Status: DISCONTINUED | OUTPATIENT
Start: 2024-03-21 | End: 2024-03-21

## 2024-03-21 RX ORDER — DIPHENHYDRAMINE HYDROCHLORIDE 50 MG/ML
50 INJECTION INTRAMUSCULAR; INTRAVENOUS ONCE
Status: DISCONTINUED | OUTPATIENT
Start: 2024-03-21 | End: 2024-03-21

## 2024-03-21 RX ORDER — LORAZEPAM 2 MG/ML
2 INJECTION INTRAMUSCULAR ONCE
Status: DISCONTINUED | OUTPATIENT
Start: 2024-03-21 | End: 2024-03-21

## 2024-03-21 RX ORDER — DIPHENHYDRAMINE HYDROCHLORIDE 50 MG/ML
INJECTION INTRAMUSCULAR; INTRAVENOUS
Status: DISPENSED
Start: 2024-03-21 | End: 2024-03-22

## 2024-03-21 RX ADMIN — LORAZEPAM 2 MG: 2 INJECTION INTRAMUSCULAR; INTRAVENOUS at 18:04

## 2024-03-21 RX ADMIN — SODIUM CHLORIDE 1000 ML: 0.9 INJECTION, SOLUTION INTRAVENOUS at 17:11

## 2024-03-21 RX ADMIN — LORAZEPAM 2 MG: 2 INJECTION INTRAMUSCULAR; INTRAVENOUS at 16:59

## 2024-03-21 RX ADMIN — DIPHENHYDRAMINE HYDROCHLORIDE 50 MG: 50 INJECTION, SOLUTION INTRAMUSCULAR; INTRAVENOUS at 16:58

## 2024-03-21 NOTE — ED NOTES
Patient transported to CT SCAN with multiple techs, security and two RNS.      Casey Schoener, RN  03/21/24 3341

## 2024-03-21 NOTE — ED NOTES
Rounded and patient found to be asleep.  No respiratory distress noted.     Camila Godoy RN  03/21/24 7626

## 2024-03-21 NOTE — ED PROVIDER NOTES
History  Chief Complaint   Patient presents with    Altered Mental Status     Pt found wandering in the street confuse     Patient is a 33-year-old male with a known history of bipolar disorder, ADHD and polysubstance abuse that presents emergency department with EMS after he was found wandering the streets.  Patient with tangential speech, dressed in multiple layers and unable to provide a history at the time of my initial evaluation.  Patient also uncooperative.      History provided by:  Patient and EMS personnel  History limited by:  Psychiatric disorder and mental status change   used: No    Altered Mental Status  Associated symptoms: no abdominal pain, no fever, no nausea, no palpitations, no rash and no vomiting        Prior to Admission Medications   Prescriptions Last Dose Informant Patient Reported? Taking?   ARIPiprazole (ABILIFY) 5 mg tablet   No No   Sig: Take 1 tablet (5 mg total) by mouth daily Do not start before August 25, 2023.   nicotine (NICODERM CQ) 14 mg/24hr TD 24 hr patch   No No   Sig: Place 1 patch on the skin over 24 hours daily Do not start before August 25, 2023.      Facility-Administered Medications: None       Past Medical History:   Diagnosis Date    ADHD (attention deficit hyperactivity disorder)     Anxiety     last assessed 3/23/15    Bipolar 1 disorder (HCC)     Genital warts     last assessed 12/7/15, resolved 11/1/17     Psychiatric disorder        No past surgical history on file.    Family History   Problem Relation Age of Onset    No Known Problems Father      I have reviewed and agree with the history as documented.    E-Cigarette/Vaping    E-Cigarette Use Never User      E-Cigarette/Vaping Substances    Nicotine No     THC No     CBD No     Flavoring No     Other No     Unknown No      Social History     Tobacco Use    Smoking status: Every Day     Current packs/day: 1.50     Types: Cigarettes    Smokeless tobacco: Never   Vaping Use    Vaping status:  "Never Used   Substance Use Topics    Alcohol use: Yes     Comment: occ.    Drug use: Yes     Types: Heroin, Marijuana, Methamphetamines, Fentanyl     Comment: states was \"shooting up steroids\" 3 months ago (3/4/19 states this was 3 years ago)       Review of Systems   Unable to perform ROS: Mental status change   Constitutional:  Negative for chills and fever.   Respiratory:  Negative for cough, shortness of breath and wheezing.    Cardiovascular:  Negative for chest pain and palpitations.   Gastrointestinal:  Negative for abdominal pain, constipation, diarrhea, nausea and vomiting.   Genitourinary:  Negative for dysuria, flank pain, hematuria and urgency.   Musculoskeletal:  Negative for back pain.   Skin:  Negative for color change and rash.   All other systems reviewed and are negative.      Physical Exam  Physical Exam  Vitals and nursing note reviewed.   Constitutional:       Appearance: He is well-developed.   HENT:      Head: Normocephalic and atraumatic.   Eyes:      Pupils: Pupils are equal, round, and reactive to light.   Cardiovascular:      Rate and Rhythm: Normal rate and regular rhythm.      Heart sounds: Normal heart sounds.   Pulmonary:      Effort: Pulmonary effort is normal.      Breath sounds: Normal breath sounds.   Abdominal:      General: Bowel sounds are normal. There is no distension.      Palpations: Abdomen is soft. There is no mass.      Tenderness: There is no abdominal tenderness. There is no guarding or rebound.   Skin:     General: Skin is warm and dry.   Neurological:      Mental Status: He is alert. He is disoriented.      GCS: GCS eye subscore is 4. GCS verbal subscore is 4. GCS motor subscore is 6.   Psychiatric:         Mood and Affect: Mood is anxious.         Behavior: Behavior is agitated.         Thought Content: Thought content normal.         Judgment: Judgment normal.         Vital Signs  ED Triage Vitals   Temperature Pulse Respirations Blood Pressure SpO2   03/21/24 1849 " 03/21/24 1631 03/21/24 1631 03/21/24 1631 03/21/24 1631   99.7 °F (37.6 °C) (!) 118 18 134/84 98 %      Temp Source Heart Rate Source Patient Position - Orthostatic VS BP Location FiO2 (%)   03/21/24 1849 03/21/24 1631 03/22/24 0623 03/22/24 0623 --   Temporal Monitor Sitting Left arm       Pain Score       03/22/24 0212       No Pain           Vitals:    03/21/24 1631 03/21/24 2100 03/22/24 0051 03/22/24 0623   BP: 134/84   138/89   Pulse: (!) 118 98 87 89   Patient Position - Orthostatic VS:    Sitting         Visual Acuity      ED Medications  Medications   LORazepam (ATIVAN) injection 2 mg (2 mg Intravenous Given 3/21/24 1659)   diphenhydrAMINE (BENADRYL) injection 50 mg (50 mg Intravenous Given 3/21/24 1658)   sodium chloride 0.9 % bolus 1,000 mL (0 mL Intravenous Stopped 3/21/24 1848)   LORazepam (ATIVAN) injection 2 mg (2 mg Intravenous Given 3/21/24 1804)       Diagnostic Studies  Results Reviewed       Procedure Component Value Units Date/Time    Rapid drug screen, urine [[id number]]  (Abnormal) Collected: 03/22/24 0621    Lab Status: Final result Specimen: Urine, Clean Catch Updated: 03/22/24 0702     Amph/Meth UR Positive     Barbiturate Ur Negative     Benzodiazepine Urine Negative     Cocaine Urine Negative     Methadone Urine Negative     Opiate Urine Negative     PCP Ur Negative     THC Urine Positive     Oxycodone Urine Negative    Narrative:      Presumptive report. If requested, specimen will be sent to reference lab for confirmation.  FOR MEDICAL PURPOSES ONLY.   IF CONFIRMATION NEEDED PLEASE CONTACT THE LAB WITHIN 5 DAYS.    Drug Screen Cutoff Levels:  AMPHETAMINE/METHAMPHETAMINES  1000 ng/mL  BARBITURATES     200 ng/mL  BENZODIAZEPINES     200 ng/mL  COCAINE      300 ng/mL  METHADONE      300 ng/mL  OPIATES      300 ng/mL  PHENCYCLIDINE     25 ng/mL  THC       50 ng/mL  OXYCODONE      100 ng/mL    Comprehensive metabolic panel [[id number]] Collected: 03/21/24 1648    Lab Status: Final result  Specimen: Blood from Arm, Right Updated: 03/21/24 1710     Sodium 143 mmol/L      Potassium 4.2 mmol/L      Chloride 107 mmol/L      CO2 31 mmol/L      ANION GAP 5 mmol/L      BUN 18 mg/dL      Creatinine 1.05 mg/dL      Glucose 108 mg/dL      Calcium 8.9 mg/dL      AST 18 U/L      ALT 14 U/L      Alkaline Phosphatase 85 U/L      Total Protein 6.5 g/dL      Albumin 4.2 g/dL      Total Bilirubin 0.31 mg/dL      eGFR 91 ml/min/1.73sq m     Narrative:      [employer] guidelines for Chronic Kidney Disease (CKD):     Stage 1 with normal or high GFR (GFR > 90 mL/min/1.73 square meters)    Stage 2 Mild CKD (GFR = 60-89 mL/min/1.73 square meters)    Stage 3A Moderate CKD (GFR = 45-59 mL/min/1.73 square meters)    Stage 3B Moderate CKD (GFR = 30-44 mL/min/1.73 square meters)    Stage 4 Severe CKD (GFR = 15-29 mL/min/1.73 square meters)    Stage 5 End Stage CKD (GFR <15 mL/min/1.73 square meters)  Note: GFR calculation is accurate only with a steady state creatinine    Magnesium [[id number]]  (Normal) Collected: 03/21/24 1648    Lab Status: Final result Specimen: Blood from Arm, Right Updated: 03/21/24 1710     Magnesium 2.1 mg/dL     CK [[id number]]  (Normal) Collected: 03/21/24 1648    Lab Status: Final result Specimen: Blood from Arm, Right Updated: 03/21/24 1710     Total  U/L     CBC and differential [[id number]] Collected: 03/21/24 1648    Lab Status: Final result Specimen: Blood from Arm, Right Updated: 03/21/24 1654     WBC 7.83 Thousand/uL      RBC 4.11 Million/uL      Hemoglobin 13.1 g/dL      Hematocrit 37.9 %      MCV 92 fL      MCH 31.9 pg      MCHC 34.6 g/dL      RDW 13.3 %      MPV 9.9 fL      Platelets 238 Thousands/uL      nRBC 0 /100 WBCs      Neutrophils Relative 51 %      Immature Grans % 0 %      Lymphocytes Relative 38 %      Monocytes Relative 7 %      Eosinophils Relative 3 %      Basophils Relative 1 %      Neutrophils Absolute 4.08 Thousands/µL      Absolute Immature Grans  0.02 Thousand/uL      Absolute Lymphocytes 2.94 Thousands/µL      Absolute Monocytes 0.51 Thousand/µL      Eosinophils Absolute 0.21 Thousand/µL      Basophils Absolute 0.07 Thousands/µL     Fingerstick Glucose (POCT) [[id number]]  (Normal) Collected: 03/21/24 1628    Lab Status: Final result Specimen: Blood Updated: 03/21/24 1629     POC Glucose 106 mg/dl                    CT head without contrast   Final Result by Jake Correa MD (03/21 1901)      No acute intracranial abnormality.                  Workstation performed: [id number]                    Procedures  ECG 12 Lead Documentation Only    Date/Time: 3/21/2024 5:23 PM    Performed by: Dary Goldman DO  Authorized by: Dary Goldman DO    Indications / Diagnosis:  Mental status changes  ECG reviewed by me, the ED Provider: yes    Patient location:  ED  Previous ECG:     Previous ECG:  Unavailable    Comparison to cardiac monitor: Yes    Interpretation:     Interpretation: non-specific    Rate:     ECG rate:  118bpm    ECG rate assessment: tachycardic    Rhythm:     Rhythm: sinus tachycardia    Ectopy:     Ectopy: PVCs    QRS:     QRS axis:  Normal  Conduction:     Conduction: normal    ST segments:     ST segments:  Normal  T waves:     T waves: normal    CriticalCare Time    Date/Time: 3/22/2024 6:10 PM    Performed by: Dary Goldman DO  Authorized by: Dary Goldman DO    Critical care provider statement:     Critical care time (minutes):  35    Critical care time was exclusive of:  Separately billable procedures and treating other patients and teaching time    Critical care was necessary to treat or prevent imminent or life-threatening deterioration of the following conditions:  CNS failure or compromise and toxidrome    Critical care was time spent personally by me on the following activities:  Blood draw for specimens, obtaining history from patient or surrogate, development of treatment plan with patient or surrogate,  evaluation of patient's response to treatment, examination of patient, interpretation of cardiac output measurements, ordering and performing treatments and interventions, ordering and review of laboratory studies, ordering and review of radiographic studies, re-evaluation of patient's condition and review of old charts           ED Course  ED Course as of 03/22/24 1812   Thu Mar 21, 2024   1646 Patient is uncooperative.  Will administer Zyprexa/Ativan/Benadryl IM as he is refusing IV placement.   1755 Patient is flailing in CT scan.  Will readminister Ativan.   1846 Restraints ordered but not placed.  Patient is now cooperative.                                             Medical Decision Making  33-year-old male in the ED for evaluation of confusion.  Patient uncooperative and required chemical restraints to facilitate ED evaluation.  Labs, head CT ordered and reviewed.  No acute pathology identified.  Patient signed out to oncoming physician will await sobriety and make appropriate disposition.    Amount and/or Complexity of Data Reviewed  Labs: ordered.  Radiology: ordered.    Risk  Prescription drug management.             Disposition  Final diagnoses:   Methamphetamine abuse, episodic (HCC)     Time reflects when diagnosis was documented in both MDM as applicable and the Disposition within this note       Time User Action Codes Description Comment    3/22/2024  3:59 AM Grady Braden Add [F15.10] Methamphetamine abuse, episodic (HCC)           ED Disposition       ED Disposition   Discharge    Condition   Stable    Date/Time   Fri Mar 22, 2024  3:49 AM    Comment   Rolando Díaz Jr. discharge to home/self care.                   Follow-up Information       Follow up With Specialties Details Why Contact Info Ulysses Agpaoa    [address] [facility] [address]  [phone]              Discharge Medication List as of 3/22/2024  4:02 AM        CONTINUE these medications which have NOT CHANGED    Details    ARIPiprazole (ABILIFY) 5 mg tablet Take 1 tablet (5 mg total) by mouth daily Do not start before August 25, 2023., Starting Fri 8/25/2023, Print      nicotine (NICODERM CQ) 14 mg/24hr TD 24 hr patch Place 1 patch on the skin over 24 hours daily Do not start before August 25, 2023., Starting Fri 8/25/2023, Print             No discharge procedures on file.    PDMP Review       None            ED Provider  Electronically Signed by             Dary Goldman DO  03/22/24 5585

## 2024-03-22 VITALS
HEART RATE: 89 BPM | RESPIRATION RATE: 16 BRPM | OXYGEN SATURATION: 97 % | SYSTOLIC BLOOD PRESSURE: 138 MMHG | DIASTOLIC BLOOD PRESSURE: 89 MMHG | TEMPERATURE: 99.7 F

## 2024-03-22 LAB
AMPHETAMINES SERPL QL SCN: POSITIVE
BARBITURATES UR QL: NEGATIVE
BENZODIAZ UR QL: NEGATIVE
COCAINE UR QL: NEGATIVE
METHADONE UR QL: NEGATIVE
OPIATES UR QL SCN: NEGATIVE
OXYCODONE+OXYMORPHONE UR QL SCN: NEGATIVE
PCP UR QL: NEGATIVE
THC UR QL: POSITIVE

## 2024-03-22 PROCEDURE — 80307 DRUG TEST PRSMV CHEM ANLYZR: CPT | Performed by: EMERGENCY MEDICINE

## 2024-03-24 LAB
ATRIAL RATE: 118 BPM
P AXIS: 35 DEGREES
PR INTERVAL: 122 MS
QRS AXIS: 71 DEGREES
QRSD INTERVAL: 96 MS
QT INTERVAL: 338 MS
QTC INTERVAL: 473 MS
T WAVE AXIS: 70 DEGREES
VENTRICULAR RATE: 118 BPM

## 2024-03-24 PROCEDURE — 93010 ELECTROCARDIOGRAM REPORT: CPT | Performed by: INTERNAL MEDICINE

## 2024-03-31 ENCOUNTER — HOSPITAL ENCOUNTER (EMERGENCY)
Facility: HOSPITAL | Age: 34
Discharge: HOME | End: 2024-04-01
Attending: EMERGENCY MEDICINE
Payer: COMMERCIAL

## 2024-03-31 VITALS
WEIGHT: 151 LBS | OXYGEN SATURATION: 97 % | DIASTOLIC BLOOD PRESSURE: 72 MMHG | SYSTOLIC BLOOD PRESSURE: 117 MMHG | BODY MASS INDEX: 21.67 KG/M2 | HEART RATE: 90 BPM | RESPIRATION RATE: 16 BRPM | TEMPERATURE: 98 F

## 2024-03-31 DIAGNOSIS — F15.10 METHAMPHETAMINE ABUSE (HCC): Primary | ICD-10-CM

## 2024-03-31 LAB
ALBUMIN SERPL BCP-MCNC: 3.9 G/DL (ref 3.5–5)
ALP SERPL-CCNC: 75 U/L (ref 34–104)
ALT SERPL W P-5'-P-CCNC: 13 U/L (ref 7–52)
AMPHETAMINES SERPL QL SCN: POSITIVE
ANION GAP SERPL CALCULATED.3IONS-SCNC: 8 MMOL/L (ref 4–13)
AST SERPL W P-5'-P-CCNC: 17 U/L (ref 13–39)
ATRIAL RATE: 113 BPM
BACTERIA UR QL AUTO: ABNORMAL /HPF
BARBITURATES UR QL: NEGATIVE
BASOPHILS # BLD AUTO: 0.07 THOUSANDS/ÂΜL (ref 0–0.1)
BASOPHILS NFR BLD AUTO: 1 % (ref 0–1)
BENZODIAZ UR QL: NEGATIVE
BILIRUB SERPL-MCNC: 0.35 MG/DL (ref 0.2–1)
BILIRUB UR QL STRIP: NEGATIVE
BUN SERPL-MCNC: 21 MG/DL (ref 5–25)
CALCIUM SERPL-MCNC: 8.5 MG/DL (ref 8.4–10.2)
CHLORIDE SERPL-SCNC: 104 MMOL/L (ref 96–108)
CLARITY UR: CLEAR
CO2 SERPL-SCNC: 24 MMOL/L (ref 21–32)
COCAINE UR QL: NEGATIVE
COLOR UR: ABNORMAL
CREAT SERPL-MCNC: 1.03 MG/DL (ref 0.6–1.3)
EOSINOPHIL # BLD AUTO: 0.21 THOUSAND/ÂΜL (ref 0–0.61)
EOSINOPHIL NFR BLD AUTO: 2 % (ref 0–6)
ERYTHROCYTE [DISTWIDTH] IN BLOOD BY AUTOMATED COUNT: 13.3 % (ref 11.6–15.1)
ETHANOL SERPL-MCNC: <10 MG/DL
GFR SERPL CREATININE-BSD FRML MDRD: 93 ML/MIN/1.73SQ M
GLUCOSE SERPL-MCNC: 160 MG/DL (ref 65–140)
GLUCOSE UR STRIP-MCNC: NEGATIVE MG/DL
HCT VFR BLD AUTO: 38 % (ref 36.5–49.3)
HGB BLD-MCNC: 13 G/DL (ref 12–17)
HGB UR QL STRIP.AUTO: NEGATIVE
IMM GRANULOCYTES # BLD AUTO: 0.03 THOUSAND/UL (ref 0–0.2)
IMM GRANULOCYTES NFR BLD AUTO: 0 % (ref 0–2)
KETONES UR STRIP-MCNC: NEGATIVE MG/DL
LEUKOCYTE ESTERASE UR QL STRIP: NEGATIVE
LYMPHOCYTES # BLD AUTO: 3.16 THOUSANDS/ÂΜL (ref 0.6–4.47)
LYMPHOCYTES NFR BLD AUTO: 35 % (ref 14–44)
MCH RBC QN AUTO: 31.6 PG (ref 26.8–34.3)
MCHC RBC AUTO-ENTMCNC: 34.2 G/DL (ref 31.4–37.4)
MCV RBC AUTO: 93 FL (ref 82–98)
METHADONE UR QL: NEGATIVE
MONOCYTES # BLD AUTO: 0.53 THOUSAND/ÂΜL (ref 0.17–1.22)
MONOCYTES NFR BLD AUTO: 6 % (ref 4–12)
MUCOUS THREADS UR QL AUTO: ABNORMAL
NEUTROPHILS # BLD AUTO: 4.99 THOUSANDS/ÂΜL (ref 1.85–7.62)
NEUTS SEG NFR BLD AUTO: 56 % (ref 43–75)
NITRITE UR QL STRIP: NEGATIVE
NON-SQ EPI CELLS URNS QL MICRO: ABNORMAL /HPF
NRBC BLD AUTO-RTO: 0 /100 WBCS
OPIATES UR QL SCN: NEGATIVE
OXYCODONE+OXYMORPHONE UR QL SCN: NEGATIVE
P AXIS: 10 DEGREES
PCP UR QL: NEGATIVE
PH UR STRIP.AUTO: 6 [PH]
PLATELET # BLD AUTO: 226 THOUSANDS/UL (ref 149–390)
PMV BLD AUTO: 9.8 FL (ref 8.9–12.7)
POTASSIUM SERPL-SCNC: 3.9 MMOL/L (ref 3.5–5.3)
PR INTERVAL: 134 MS
PROT SERPL-MCNC: 6.3 G/DL (ref 6.4–8.4)
PROT UR STRIP-MCNC: NEGATIVE MG/DL
QRS AXIS: 72 DEGREES
QRSD INTERVAL: 98 MS
QT INTERVAL: 330 MS
QTC INTERVAL: 452 MS
RBC # BLD AUTO: 4.11 MILLION/UL (ref 3.88–5.62)
RBC #/AREA URNS AUTO: ABNORMAL /HPF
SODIUM SERPL-SCNC: 136 MMOL/L (ref 135–147)
SP GR UR STRIP.AUTO: 1.02 (ref 1–1.03)
T WAVE AXIS: 71 DEGREES
THC UR QL: POSITIVE
UROBILINOGEN UR QL STRIP.AUTO: 0.2 E.U./DL
VENTRICULAR RATE: 113 BPM
WBC # BLD AUTO: 8.99 THOUSAND/UL (ref 4.31–10.16)
WBC #/AREA URNS AUTO: ABNORMAL /HPF

## 2024-03-31 PROCEDURE — 81001 URINALYSIS AUTO W/SCOPE: CPT | Performed by: EMERGENCY MEDICINE

## 2024-03-31 PROCEDURE — 85025 COMPLETE CBC W/AUTO DIFF WBC: CPT | Performed by: EMERGENCY MEDICINE

## 2024-03-31 PROCEDURE — 93010 ELECTROCARDIOGRAM REPORT: CPT | Performed by: INTERNAL MEDICINE

## 2024-03-31 PROCEDURE — 80307 DRUG TEST PRSMV CHEM ANLYZR: CPT | Performed by: EMERGENCY MEDICINE

## 2024-03-31 PROCEDURE — 82077 ASSAY SPEC XCP UR&BREATH IA: CPT | Performed by: EMERGENCY MEDICINE

## 2024-03-31 PROCEDURE — 36415 COLL VENOUS BLD VENIPUNCTURE: CPT | Performed by: EMERGENCY MEDICINE

## 2024-03-31 PROCEDURE — 96372 THER/PROPH/DIAG INJ SC/IM: CPT

## 2024-03-31 PROCEDURE — 80053 COMPREHEN METABOLIC PANEL: CPT | Performed by: EMERGENCY MEDICINE

## 2024-03-31 PROCEDURE — 93005 ELECTROCARDIOGRAM TRACING: CPT

## 2024-03-31 PROCEDURE — 99284 EMERGENCY DEPT VISIT MOD MDM: CPT

## 2024-03-31 PROCEDURE — 99291 CRITICAL CARE FIRST HOUR: CPT | Performed by: EMERGENCY MEDICINE

## 2024-03-31 RX ORDER — HALOPERIDOL 5 MG/ML
5 INJECTION INTRAMUSCULAR ONCE
Status: COMPLETED | OUTPATIENT
Start: 2024-03-31 | End: 2024-03-31

## 2024-03-31 RX ORDER — LORAZEPAM 2 MG/ML
2 INJECTION INTRAMUSCULAR ONCE
Status: COMPLETED | OUTPATIENT
Start: 2024-03-31 | End: 2024-03-31

## 2024-03-31 RX ADMIN — LORAZEPAM 2 MG: 2 INJECTION INTRAMUSCULAR; INTRAVENOUS at 14:36

## 2024-03-31 RX ADMIN — HALOPERIDOL LACTATE 5 MG: 5 INJECTION, SOLUTION INTRAMUSCULAR at 14:36

## 2024-03-31 NOTE — RESTRAINT FACE TO FACE
Restraint Face to Face   Rolando Díaz Jr. 33 y.o. male MRN: [id number]  Unit/Bed#: ED 08 Encounter: [id number]      Physical Evaluation-disheveled, tachycardic, emotionally labile, pacing around the room  Purpose for Restraints/ Seclusion High risk for self harm, High risk for causing significant disruption of treatment environment , and high risk for flight  Patient's reaction to the intervention-moving around somewhat in restraints but not violently fighting the restraints  Patient's medical condition-apparent intoxication with methamphetamine, psychosis  Patient's Behavioral condition-agitated, psychotic  Restraints to be Continued

## 2024-03-31 NOTE — ED NOTES
Pt. Belligerent with staff, pacing in room, rambling and pressured speech     Peyton Lion RN  03/31/24 1007

## 2024-03-31 NOTE — ED NOTES
Pt is resting, eye closed. Reps is non labored. Continue to monitor     Sharron Lara RN  03/31/24 3047

## 2024-03-31 NOTE — ED PROVIDER NOTES
"History  Chief Complaint   Patient presents with    Overdose - Accidental     Pt found unresponsive in bathroom with needle in close proximity, hx of opioid abuse, 4mg narcan given in route. Pt aware of situation, time, and place.      HPI  Patient is a 33-year-old male history of ADHD, bipolar disorder, polysubstance abuse presenting for evaluation after apparent accidental overdose.  Patient admits to drug use but refuses to further elaborate on the specifics or when.  Patient noted to have multiple syringes in his possession at the scene.  Patient found unresponsive in the bathroom of the wall wall, was apparently briefly shaking, treated with 4 mg intranasal Narcan and rapidly regained consciousness.  Patient now awake, interactive but labile, tangential, repeatedly stating that \"everyone is out to get him\", unwilling or unable to answer more specific questions regarding the situation or questions of orientation.  Prior to Admission Medications   Prescriptions Last Dose Informant Patient Reported? Taking?   ARIPiprazole (ABILIFY) 5 mg tablet   No No   Sig: Take 1 tablet (5 mg total) by mouth daily Do not start before August 25, 2023.   nicotine (NICODERM CQ) 14 mg/24hr TD 24 hr patch   No No   Sig: Place 1 patch on the skin over 24 hours daily Do not start before August 25, 2023.      Facility-Administered Medications: None       Past Medical History:   Diagnosis Date    ADHD (attention deficit hyperactivity disorder)     Anxiety     last assessed 3/23/15    Bipolar 1 disorder (HCC)     Genital warts     last assessed 12/7/15, resolved 11/1/17     Psychiatric disorder        History reviewed. No pertinent surgical history.    Family History   Problem Relation Age of Onset    No Known Problems Father      I have reviewed and agree with the history as documented.    E-Cigarette/Vaping    E-Cigarette Use Never User      E-Cigarette/Vaping Substances    Nicotine No     THC No     CBD No     Flavoring No     Other No " "    Unknown No      Social History     Tobacco Use    Smoking status: Every Day     Current packs/day: 1.50     Types: Cigarettes    Smokeless tobacco: Never   Vaping Use    Vaping status: Never Used   Substance Use Topics    Alcohol use: Yes     Comment: occ.    Drug use: Yes     Types: Heroin, Marijuana, Methamphetamines, Fentanyl     Comment: states was \"shooting up steroids\" 3 months ago (3/4/19 states this was 3 years ago)       Review of Systems   Constitutional:  Negative for chills, fatigue and fever.   Respiratory:  Negative for cough.    Gastrointestinal:  Negative for diarrhea, nausea and vomiting.   Musculoskeletal:  Negative for arthralgias and myalgias.   Neurological:  Negative for weakness, numbness and headaches.   Psychiatric/Behavioral:  Negative for confusion.    All other systems reviewed and are negative.      Physical Exam  Physical Exam  Vitals and nursing note reviewed.   Constitutional:       General: He is not in acute distress.     Appearance: Normal appearance. He is not ill-appearing, toxic-appearing or diaphoretic.      Comments: Disheveled appearing but nontoxic nondistressed   HENT:      Head: Normocephalic and atraumatic.      Comments: Pupils midrange, sluggish.  No external signs of trauma     Right Ear: External ear normal.      Left Ear: External ear normal.   Eyes:      General:         Right eye: No discharge.         Left eye: No discharge.   Cardiovascular:      Comments: Sinus tachycardia rate of 115.  No murmurs rubs or gallops.  Extremities warm and well-perfused without mottling  Pulmonary:      Effort: No respiratory distress.      Comments: No increased work of breathing.  Speaking in complete sentences.  Lungs clear to auscultation bilaterally without wheezes, rales, rhonchi.  Satting 94% on room air indicating adequate oxygenation  Abdominal:      General: There is no distension.      Comments: Abdomen soft, nontender, nondistended without rigidity, rebound, guarding "   Musculoskeletal:         General: No deformity.      Cervical back: Normal range of motion.   Skin:     Findings: No lesion or rash.   Neurological:      Mental Status: He is alert and oriented to person, place, and time. Mental status is at baseline.      Comments: Awake, alert, interactive, moving all extremities equally.  Denies SI/HI/AH/VH   Psychiatric:         Mood and Affect: Mood and affect normal.         Vital Signs  ED Triage Vitals   Temperature Pulse Respirations Blood Pressure SpO2   03/31/24 1400 03/31/24 1357 03/31/24 1357 03/31/24 1357 03/31/24 1357   98.4 °F (36.9 °C) (!) 115 18 144/96 98 %      Temp Source Heart Rate Source Patient Position - Orthostatic VS BP Location FiO2 (%)   03/31/24 1400 03/31/24 1357 03/31/24 1505 03/31/24 1505 --   Oral Monitor Lying Right arm       Pain Score       03/31/24 1357       No Pain           Vitals:    03/31/24 1400 03/31/24 1505 03/31/24 1915 03/31/24 2130   BP: 144/96 129/69  117/72   Pulse: (!) 112 99 100 90   Patient Position - Orthostatic VS:  Lying  Lying         Visual Acuity      ED Medications  Medications   haloperidol lactate (HALDOL) injection 5 mg (5 mg Intramuscular Given 3/31/24 1436)   LORazepam (ATIVAN) injection 2 mg (2 mg Intramuscular Given 3/31/24 1436)       Diagnostic Studies  Results Reviewed       Procedure Component Value Units Date/Time    Rapid drug screen, urine [[id number]]  (Abnormal) Collected: 03/31/24 1855    Lab Status: Final result Specimen: Urine, Clean Catch Updated: 03/31/24 1926     Amph/Meth UR Positive     Barbiturate Ur Negative     Benzodiazepine Urine Negative     Cocaine Urine Negative     Methadone Urine Negative     Opiate Urine Negative     PCP Ur Negative     THC Urine Positive     Oxycodone Urine Negative    Narrative:      Presumptive report. If requested, specimen will be sent to reference lab for confirmation.  FOR MEDICAL PURPOSES ONLY.   IF CONFIRMATION NEEDED PLEASE CONTACT THE LAB WITHIN 5 DAYS.    Drug  Screen Cutoff Levels:  AMPHETAMINE/METHAMPHETAMINES  1000 ng/mL  BARBITURATES     200 ng/mL  BENZODIAZEPINES     200 ng/mL  COCAINE      300 ng/mL  METHADONE      300 ng/mL  OPIATES      300 ng/mL  PHENCYCLIDINE     25 ng/mL  THC       50 ng/mL  OXYCODONE      100 ng/mL    Urinalysis with microscopic [[id number]]  (Abnormal) Collected: 03/31/24 1855    Lab Status: Final result Specimen: Urine, Clean Catch Updated: 03/31/24 1920     Color, UA Light Yellow     Clarity, UA Clear     Specific Gravity, UA 1.020     pH, UA 6.0     Leukocytes, UA Negative     Nitrite, UA Negative     Protein, UA Negative mg/dl      Glucose, UA Negative mg/dl      Ketones, UA Negative mg/dl      Urobilinogen, UA 0.2 E.U./dl      Bilirubin, UA Negative     Occult Blood, UA Negative     RBC, UA 1-2 /hpf      WBC, UA 1-2 /hpf      Epithelial Cells None Seen /hpf      Bacteria, UA Occasional /hpf      MUCUS THREADS Occasional    Comprehensive metabolic panel [[id number]]  (Abnormal) Collected: 03/31/24 1443    Lab Status: Final result Specimen: Blood from Arm, Left Updated: 03/31/24 1509     Sodium 136 mmol/L      Potassium 3.9 mmol/L      Chloride 104 mmol/L      CO2 24 mmol/L      ANION GAP 8 mmol/L      BUN 21 mg/dL      Creatinine 1.03 mg/dL      Glucose 160 mg/dL      Calcium 8.5 mg/dL      AST 17 U/L      ALT 13 U/L      Alkaline Phosphatase 75 U/L      Total Protein 6.3 g/dL      Albumin 3.9 g/dL      Total Bilirubin 0.35 mg/dL      eGFR 93 ml/min/1.73sq m     Narrative:      National Kid[employer] Foundation guidelines for Chronic Kidney Disease (CKD):     Stage 1 with normal or high GFR (GFR > 90 mL/min/1.73 square meters)    Stage 2 Mild CKD (GFR = 60-89 mL/min/1.73 square meters)    Stage 3A Moderate CKD (GFR = 45-59 mL/min/1.73 square meters)    Stage 3B Moderate CKD (GFR = 30-44 mL/min/1.73 square meters)    Stage 4 Severe CKD (GFR = 15-29 mL/min/1.73 square meters)    Stage 5 End Stage CKD (GFR <15 mL/min/1.73 square meters)  Note:  GFR calculation is accurate only with a steady state creatinine    Ethanol [[id number]]  (Normal) Collected: 03/31/24 1443    Lab Status: Final result Specimen: Blood from Arm, Left Updated: 03/31/24 1508     Ethanol Lvl <10 mg/dL     CBC and differential [[id number]] Collected: 03/31/24 1443    Lab Status: Final result Specimen: Blood from Arm, Left Updated: 03/31/24 1449     WBC 8.99 Thousand/uL      RBC 4.11 Million/uL      Hemoglobin 13.0 g/dL      Hematocrit 38.0 %      MCV 93 fL      MCH 31.6 pg      MCHC 34.2 g/dL      RDW 13.3 %      MPV 9.8 fL      Platelets 226 Thousands/uL      nRBC 0 /100 WBCs      Neutrophils Relative 56 %      Immature Grans % 0 %      Lymphocytes Relative 35 %      Monocytes Relative 6 %      Eosinophils Relative 2 %      Basophils Relative 1 %      Neutrophils Absolute 4.99 Thousands/µL      Absolute Immature Grans 0.03 Thousand/uL      Absolute Lymphocytes 3.16 Thousands/µL      Absolute Monocytes 0.53 Thousand/µL      Eosinophils Absolute 0.21 Thousand/µL      Basophils Absolute 0.07 Thousands/µL                    No orders to display              Procedures  CriticalCare Time    Date/Time: 3/31/2024 2:45 PM    Performed by: Issa Yu MD  Authorized by: Issa Yu MD    Critical care provider statement:     Critical care time (minutes):  35    Critical care time was exclusive of:  Separately billable procedures and treating other patients and teaching time    Critical care was necessary to treat or prevent imminent or life-threatening deterioration of the following conditions:  Toxidrome (Agitation, methamphetamine intoxication)    Critical care was time spent personally by me on the following activities:  Blood draw for specimens, obtaining history from patient or surrogate, development of treatment plan with patient or surrogate, examination of patient, evaluation of patient's response to treatment, review of old charts, re-evaluation of patient's condition  "and ordering and review of laboratory studies           ED Course                               SBIRT 22yo+      Flowsheet Row Most Recent Value   Initial Alcohol Screen: US AUDIT-C     1. How often do you have a drink containing alcohol? 0 Filed at: 03/31/2024 1359   2. How many drinks containing alcohol do you have on a typical day you are drinking?  0 Filed at: 03/31/2024 1359   3a. Male UNDER 65: How often do you have five or more drinks on one occasion? 0 Filed at: 03/31/2024 1359   3b. FEMALE Any Age, or MALE 65+: How often do you have 4 or more drinks on one occassion? 0 Filed at: 03/31/2024 1359   Audit-C Score 0 Filed at: 03/31/2024 1359   AP: How many times in the past year have you...    Used an illegal drug or used a prescription medication for non-medical reasons? Daily or Almost Daily Filed at: 03/31/2024 1408   DAST-10: In the past 12 months...    1. Have you used drugs other than those required for medical reasons? 1 Filed at: 03/31/2024 1408   2. Do you use more than one drug at a time? 1 Filed at: 03/31/2024 1408   3. Have you had medical problems as a result of your drug use (e.g., memory loss, hepatitis, convulsions, bleeding, etc.)? 1 Filed at: 03/31/2024 1408   4. Have you had \"blackouts\" or \"flashbacks\" as a result of drug use?YesNo 1 Filed at: 03/31/2024 1408   5. Do you ever feel bad or guilty about your drug use? 1 Filed at: 03/31/2024 1408   6. Does your spouse (or parent) ever complain about your involvement with drugs? 1 Filed at: 03/31/2024 1408   7. Have you neglected your family because of your use of drugs? 1 Filed at: 03/31/2024 1408   8. Have you engaged in illegal activities in order to obtain drugs? 1 Filed at: 03/31/2024 1408   9. Have you ever experienced withdrawal symptoms (felt sick) when you stopped taking drugs? 1 Filed at: 03/31/2024 1408   10. Are you always able to stop using drugs when you want to? 1 Filed at: 03/31/2024 1408   DAST-10 Score 10 Filed at: 03/31/2024 " 1408                      Medical Decision Making  I obtained history from the patient.  Patient with long history of polysubstance abuse, suspect component of both amphetamine and opiate overdose.  Patient overall well-appearing after Narcan administration, awake, alert but labile and tangential, rapidly escalating the situation.  I ordered and independently interpreted an EKG which demonstrates sinus tachycardia rate of 113 without ST or T wave abnormalities.  I offered drug counseling but patient not desiring.  Given patient's lability, aggression, rapid escalation of the situation with inability to redirect, patient treated with Haldol, Ativan, briefly placed in locked limb restraints.  On reassessment, patient resting comfortably and restraints removed.  On subsequent reassessment patient drowsy but arousable.  Signed out with plan to discharge when more awake.    Amount and/or Complexity of Data Reviewed  Labs: ordered.    Risk  Prescription drug management.             Disposition  Final diagnoses:   Methamphetamine abuse (HCC)     Time reflects when diagnosis was documented in both MDM as applicable and the Disposition within this note       Time User Action Codes Description Comment    3/31/2024  9:18 PM Issa Yu Add [F15.10] Methamphetamine abuse (HCC)           ED Disposition       ED Disposition   Discharge    Condition   Stable    Date/Time   Sun Mar 31, 2024 2118    Comment   Rolando Díaz Jr. discharge to home/self care.                   Follow-up Information       Follow up With Specialties Details Why Contact Info Additional Information    [facility] Emergency Department Emergency Medicine  If symptoms worsen [address] [facility] [address]  [phone] [facility] Emergency Department, [address], [place], [address]            Discharge Medication List as of 3/31/2024  9:19 PM        CONTINUE these medications which have  NOT CHANGED    Details   ARIPiprazole (ABILIFY) 5 mg tablet Take 1 tablet (5 mg total) by mouth daily Do not start before August 25, 2023., Starting Fri 8/25/2023, Print      nicotine (NICODERM CQ) 14 mg/24hr TD 24 hr patch Place 1 patch on the skin over 24 hours daily Do not start before August 25, 2023., Starting Fri 8/25/2023, Print             No discharge procedures on file.    PDMP Review       None            ED Provider  Electronically Signed by             Issa Yu MD  04/03/24 1678

## 2024-03-31 NOTE — ED NOTES
1:1 monitoring ongoing, o2 98% on room air, HR: 100, pt resting. Will continue to monitor.      Jake Cespedes RN  03/31/24 6629

## 2024-03-31 NOTE — ED NOTES
1:1 remains in progress, o2 98% on room air, HR: 98, pt resting comfortably.      Jake Cespedes RN  03/31/24 1916

## 2024-04-01 NOTE — DISCHARGE INSTRUCTIONS
If you have any thoughts of hurting yourself or hurting anyone else or you want counseling regarding your drug use, return to emergency department.

## 2024-04-01 NOTE — ED NOTES
Pt resting in bed comfortably, respirations even and unlabored.      Azeem Watkins RN  04/01/24 0114

## 2024-04-01 NOTE — ED NOTES
"Pt woken up to be discharged after seen safely ambulating to bathroom and back to stretcher multiple times in past hour. Patient upset on being woken up stating that \"every single person in this ER is a fucking scumbag.\" Patient was provided with a turkey sandwich and water bottle prior to discharge. Patient then told this RN \"You're gonna get what's coming because you obviously don't care about your patients.\" Patient was reminded that this RN had just gotten him a turkey sandwich and water bottle despite being called a \"fucking scumbag.\" Patient then abruptly left ER steady under their own power refusing exit vital signs.      Azeem Watkins RN  04/01/24 0226    "

## 2024-04-01 NOTE — ED NOTES
Pt resting comfortably, 1:1 remains in progress, o2 98% HR: 95.      Jake Cespedes RN  03/31/24 2010

## 2024-04-01 NOTE — ED NOTES
Patient resting comfortably in bed respirations even and unlabored with vital signs within normal limits.      Azeem Watkins RN  03/31/24 9101

## 2024-04-01 NOTE — ED NOTES
Pt resting in bed comfortably, respirations even and unlabored     Azeem Watkins RN  04/01/24 7594

## 2024-07-15 ENCOUNTER — HOSPITAL ENCOUNTER (EMERGENCY)
Facility: HOSPITAL | Age: 34
Discharge: HOME | End: 2024-07-15
Attending: EMERGENCY MEDICINE | Admitting: EMERGENCY MEDICINE
Payer: COMMERCIAL

## 2024-07-15 VITALS
RESPIRATION RATE: 15 BRPM | TEMPERATURE: 98.2 F | SYSTOLIC BLOOD PRESSURE: 110 MMHG | HEART RATE: 100 BPM | OXYGEN SATURATION: 99 % | DIASTOLIC BLOOD PRESSURE: 60 MMHG

## 2024-07-15 DIAGNOSIS — F19.10 POLYSUBSTANCE ABUSE (HCC): Primary | ICD-10-CM

## 2024-07-15 DIAGNOSIS — T50.901A ACCIDENTAL OVERDOSE, INITIAL ENCOUNTER: ICD-10-CM

## 2024-07-15 PROCEDURE — 93005 ELECTROCARDIOGRAM TRACING: CPT

## 2024-07-15 PROCEDURE — 99284 EMERGENCY DEPT VISIT MOD MDM: CPT

## 2024-07-15 RX ORDER — NALOXONE HYDROCHLORIDE 1 MG/ML
2 INJECTION PARENTERAL ONCE
Status: COMPLETED | OUTPATIENT
Start: 2024-07-15 | End: 2024-07-15

## 2024-07-15 RX ADMIN — NALOXONE HYDROCHLORIDE 4 MG: 4 SPRAY NASAL at 07:09

## 2024-07-15 NOTE — ED NOTES
Pt resting in bed, appears to be in no acute distress. Respirations are noted to be regular rate. Call bell in reach. Pt 98% on RA, , RR 12.     Mariola Ordonez RN  07/15/24 0679

## 2024-07-15 NOTE — ED CARE HANDOFF
[facility] Warm Handoff Outcome Note    Patient name Rolando Díaz Jr.  Location ED 05/ED 05 MRN [id number]  Age: 33 y.o.          Plan Type:  Warm Handoff                                                                                    Plan Date: 7/15/2024  Service:  ED Warm Handoff      Substance Use History:  Polysubstance     Warm Handoff Update:  Warm Hand to Catch     Warm Handoff Outcome: Patient Refused

## 2024-07-15 NOTE — ED NOTES
Pt provided community narcan by Provider. Pt did ask where his belongings went that he had PTA- RN advised that he was brought in by [facility] and PD was also involved per RN report. RN provided pt with [facility]'s phone number for him to f/u with them. This RN offered to call anyone for him, he declined. He admits he is homeless, he also declined a LYFT. Pt was provided a lunch box and water, he is eating and drinking at this time. Pt to be DCd.     Mariola Ordonez RN  07/15/24 3456

## 2024-07-15 NOTE — ED NOTES
Pt provided additional ice chips. Call bell in reach, he denies having needs. Pt in no acute distress.     Mariola Ordonez RN  07/15/24 0694

## 2024-07-15 NOTE — ED CARE HANDOFF
[facility] Warm Handoff Outcome Note    Patient name Rolando Díaz Jr.  Location ED 05/ED 05 MRN [id number]  Age: 33 y.o.          Plan Type:  Warm Handoff                                                                                    Plan Date: 7/15/2024  Service:  ED Warm Handoff      Substance Use History:  Meth    Warm Handoff Update:  Pt d/c home; ED gave pt shelter info and [facility] phone number    Warm Handoff Outcome: Treatment Related Resources

## 2024-07-15 NOTE — ED NOTES
Pt was provided shelter info and [facility] contact info. Pt was DCd, ambulated out of unit with steady gait.      Mariola Ordonez RN  07/15/24 4691

## 2024-07-15 NOTE — ED PROVIDER NOTES
History  Chief Complaint   Patient presents with    Overdose - Accidental     Pt presents to the ED via EMS and police after being found with apparent overdose.     32 y/o male with history of bipolar disorder and polysubstance abuse presents to the ED via EMS in police after being found after a suspected accidental overdose.  The patient states that the last thing he remembers is hanging out around [facility], however per EMS and police report the patient was [place] [address], somnolent and difficult to arouse.  Police were on scene first and administered intranasal Narcan 2 mg x 2 doses (4mg) and after EMS arrived they administered an additional intranasal Narcan 2 mg; patient received a total of 6 mg intranasal Narcan and by the time he arrived to the ED he was fully awake, answering questions and following commands.  The patient denies taking any illicit substances tonight.  Per review of the medical records, the patient has a history of polysubstance abuse most recently he was seen in the ED at [facility] 4 months ago and his urine tox screen at that time showed THC and methamphetamines.  He denies any current physical symptoms or complaints.  No SI, HI, or hallucinations.        Prior to Admission Medications   Prescriptions Last Dose Informant Patient Reported? Taking?   ARIPiprazole (ABILIFY) 5 mg tablet   No No   Sig: Take 1 tablet (5 mg total) by mouth daily Do not start before August 25, 2023.   nicotine (NICODERM CQ) 14 mg/24hr TD 24 hr patch   No No   Sig: Place 1 patch on the skin over 24 hours daily Do not start before August 25, 2023.      Facility-Administered Medications: None       Past Medical History:   Diagnosis Date    ADHD (attention deficit hyperactivity disorder)     Anxiety     last assessed 3/23/15    Bipolar 1 disorder (HCC)     Genital warts     last assessed 12/7/15, resolved 11/1/17     Psychiatric disorder        History reviewed. No pertinent  "surgical history.    Family History   Problem Relation Age of Onset    No Known Problems Father      I have reviewed and agree with the history as documented.    E-Cigarette/Vaping    E-Cigarette Use Never User      E-Cigarette/Vaping Substances    Nicotine No     THC No     CBD No     Flavoring No     Other No     Unknown No      Social History     Tobacco Use    Smoking status: Every Day     Current packs/day: 1.50     Types: Cigarettes    Smokeless tobacco: Never   Vaping Use    Vaping status: Never Used   Substance Use Topics    Alcohol use: Yes     Comment: occ.    Drug use: Yes     Types: Heroin, Marijuana, Methamphetamines, Fentanyl     Comment: states was \"shooting up steroids\" 3 months ago (3/4/19 states this was 3 years ago)       Review of Systems   Constitutional:  Negative for chills and fever.   HENT:  Negative for congestion, rhinorrhea and sore throat.    Respiratory:  Negative for cough and shortness of breath.    Cardiovascular:  Negative for chest pain and palpitations.   Gastrointestinal:  Negative for abdominal pain, diarrhea, nausea and vomiting.   Genitourinary:  Negative for dysuria and hematuria.   Musculoskeletal:  Negative for back pain and neck pain.   Neurological:  Negative for weakness, light-headedness, numbness and headaches.   All other systems reviewed and are negative.      Physical Exam  Physical Exam  Vitals and nursing note reviewed.   Constitutional:       General: He is not in acute distress.     Appearance: Normal appearance. He is normal weight.   HENT:      Head: Normocephalic and atraumatic.      Right Ear: External ear normal.      Left Ear: External ear normal.      Nose: Nose normal.      Mouth/Throat:      Mouth: Mucous membranes are moist.      Pharynx: Oropharynx is clear. No oropharyngeal exudate or posterior oropharyngeal erythema.   Eyes:      Extraocular Movements: Extraocular movements intact.      Conjunctiva/sclera: Conjunctivae normal.      Pupils: Pupils " are equal, round, and reactive to light.   Cardiovascular:      Rate and Rhythm: Normal rate and regular rhythm.      Pulses: Normal pulses.      Heart sounds: Normal heart sounds.   Pulmonary:      Effort: Pulmonary effort is normal. No respiratory distress.      Breath sounds: Normal breath sounds. No wheezing or rales.   Abdominal:      General: Abdomen is flat. Bowel sounds are normal. There is no distension.      Palpations: Abdomen is soft.      Tenderness: There is no abdominal tenderness. There is no right CVA tenderness, left CVA tenderness or guarding.   Musculoskeletal:         General: No swelling or tenderness. Normal range of motion.      Cervical back: Normal range of motion and neck supple. No tenderness.   Skin:     General: Skin is warm and dry.   Neurological:      General: No focal deficit present.      Mental Status: He is alert and oriented to person, place, and time.         Vital Signs  ED Triage Vitals [07/15/24 0500]   Temperature Pulse Respirations Blood Pressure SpO2   98.2 °F (36.8 °C) 105 18 118/73 98 %      Temp Source Heart Rate Source Patient Position - Orthostatic VS BP Location FiO2 (%)   Oral Monitor Sitting Left arm --      Pain Score       --           Vitals:    07/15/24 0500 07/15/24 0600   BP: 118/73    Pulse: 105 101   Patient Position - Orthostatic VS: Sitting          Visual Acuity      ED Medications  Medications   naloxone (FOR EMS ONLY) (NARCAN) 2 MG/2ML injection 4 mg (0 mg Does not apply Given to EMS 7/15/24 0445)   naloxone nasal- Given to patient by provider at discharge. (NARCAN) 4 mg/0.1 mL nasal spray 4 mg (4 mg Does not apply Given by Other 7/15/24 0709)       Diagnostic Studies  Results Reviewed       None                   No orders to display              Procedures  Procedures         ED Course  ED Course as of 07/15/24 0716   Mon Jul 15, 2024   5347 Procedure Note: EKG  Date/Time: 07/15/24 4:51 AM   Interpreted by: Wale Gannon MD  Indications /  Diagnosis: Overdose  ECG reviewed by me, the ED Physician: yes   The EKG demonstrates:  Rhythm: sinus tachycardia 105 bpm  Intervals: normal intervals  Axis: normal axis  QRS/Blocks: normal QRS  ST Changes: No acute ST-T wave changes.  No STEMI.  No significant change compared to prior study from 3/31/2024.   0711 Patient is at his baseline.  No recurrence of somnolence or respiratory depression.  I provided the patient with community dispensed Narcan and instruction on use.  Warm handoff order placed, and the patient can be discharged once he discusses with [facility].                                               Medical Decision Making  34 y/o male with history of bipolar disorder and polysubstance abuse presents to the ED via EMS in police after being found after a suspected accidental overdose.  The patient states that the [facility] [place] per [facility] and police report the patient was [place] [address], somnolent and difficult to arouse.  Police were on scene first and administered intranasal Narcan 2 mg x 2 doses (4mg) and after EMS arrived they administered an additional intranasal Narcan 2 mg; patient received a total of 6 mg intranasal Narcan and by the time he arrived to the ED he was fully awake, answering questions and following commands.  The patient denies taking any illicit substances tonight.  Per review of the medical records, the patient has a history of polysubstance abuse most recently he was seen in the ED at [facility] 4 months ago and his urine tox screen at that time showed THC and methamphetamines.  He denies any current physical symptoms or complaints.  No SI, HI, or hallucinations.    Vital signs reviewed.  See physical exam documentation for exam findings.  Patient with accidental overdose and polysubstance abuse, reversed with Narcan.  Will monitor the patient in the ED to see if there is any recurrence of  overdose symptoms or respiratory depression.  ECG obtained and shows sinus tachycardia but otherwise no acute abnormalities on my interpretation. Patient is at his baseline.  No recurrence of somnolence or respiratory depression.  I provided the patient with community dispensed Narcan and instruction on use.  Warm handoff order placed, and the patient can be discharged once he discusses with [facility].    Risk  Prescription drug management.                 Disposition  Final diagnoses:   Polysubstance abuse (HCC)   Accidental overdose, initial encounter     Time reflects when diagnosis was documented in both MDM as applicable and the Disposition within this note       Time User Action Codes Description Comment    7/15/2024  7:03 AM Wale Gannon [F19.10] Polysubstance abuse (HCC)     7/15/2024  7:03 AM Wale Gannon [T50.901A] Accidental overdose, initial encounter           ED Disposition       ED Disposition   Discharge    Condition   Stable    Date/Time   Mon Jul 15, 2024  7:11 AM    Comment   Rolando Díaz Jr. discharge to home/self care.                   Follow-up Information       Follow up With Specialties Details Why Contact Info Additional Information    Ulysses Agpaoa  Call in 1 week For follow-up [address] [facility] [address]  [phone]       [facility] Emergency Department Emergency Medicine Go to  If symptoms worsen [address] [place] [id number] [phone] [facility] Emergency Department, [address] [place] [phone]            Patient's Medications   Discharge Prescriptions    No medications on file       No discharge procedures on file.    PDMP Review       None            ED Provider  Electronically Signed by             Wale Gannon MD  07/15/24 0760

## 2024-07-16 LAB
ATRIAL RATE: 105 BPM
P AXIS: 20 DEGREES
PR INTERVAL: 122 MS
QRS AXIS: 72 DEGREES
QRSD INTERVAL: 110 MS
QT INTERVAL: 354 MS
QTC INTERVAL: 467 MS
T WAVE AXIS: 59 DEGREES
VENTRICULAR RATE: 105 BPM

## 2024-07-16 PROCEDURE — 93010 ELECTROCARDIOGRAM REPORT: CPT | Performed by: INTERNAL MEDICINE

## 2024-07-17 ENCOUNTER — HOSPITAL ENCOUNTER (EMERGENCY)
Facility: HOSPITAL | Age: 34
Discharge: HOME | End: 2024-07-17
Attending: EMERGENCY MEDICINE
Payer: COMMERCIAL

## 2024-07-17 VITALS
DIASTOLIC BLOOD PRESSURE: 51 MMHG | HEART RATE: 102 BPM | RESPIRATION RATE: 20 BRPM | SYSTOLIC BLOOD PRESSURE: 95 MMHG | OXYGEN SATURATION: 97 % | TEMPERATURE: 99.2 F

## 2024-07-17 DIAGNOSIS — T50.901A ACCIDENTAL DRUG OVERDOSE, INITIAL ENCOUNTER: ICD-10-CM

## 2024-07-17 DIAGNOSIS — F19.90 POLYSUBSTANCE USE DISORDER: Primary | ICD-10-CM

## 2024-07-17 LAB
2HR DELTA HS TROPONIN: 1 NG/L
ALBUMIN SERPL BCG-MCNC: 4 G/DL (ref 3.5–5)
ALP SERPL-CCNC: 62 U/L (ref 34–104)
ALT SERPL W P-5'-P-CCNC: 19 U/L (ref 7–52)
AMPHETAMINES SERPL QL SCN: POSITIVE
ANION GAP SERPL CALCULATED.3IONS-SCNC: 6 MMOL/L (ref 4–13)
AST SERPL W P-5'-P-CCNC: 16 U/L (ref 13–39)
BARBITURATES UR QL: NEGATIVE
BASOPHILS # BLD AUTO: 0.05 THOUSANDS/ÂΜL (ref 0–0.1)
BASOPHILS NFR BLD AUTO: 1 % (ref 0–1)
BENZODIAZ UR QL: NEGATIVE
BILIRUB SERPL-MCNC: 0.32 MG/DL (ref 0.2–1)
BUN SERPL-MCNC: 18 MG/DL (ref 5–25)
CALCIUM SERPL-MCNC: 8.6 MG/DL (ref 8.4–10.2)
CARDIAC TROPONIN I PNL SERPL HS: 6 NG/L
CARDIAC TROPONIN I PNL SERPL HS: 7 NG/L
CHLORIDE SERPL-SCNC: 105 MMOL/L (ref 96–108)
CK SERPL-CCNC: 73 U/L (ref 39–308)
CO2 SERPL-SCNC: 29 MMOL/L (ref 21–32)
COCAINE UR QL: NEGATIVE
CREAT SERPL-MCNC: 1.23 MG/DL (ref 0.6–1.3)
EOSINOPHIL # BLD AUTO: 0.2 THOUSAND/ÂΜL (ref 0–0.61)
EOSINOPHIL NFR BLD AUTO: 2 % (ref 0–6)
ERYTHROCYTE [DISTWIDTH] IN BLOOD BY AUTOMATED COUNT: 12.9 % (ref 11.6–15.1)
FENTANYL UR QL SCN: POSITIVE
GFR SERPL CREATININE-BSD FRML MDRD: 75 ML/MIN/1.73SQ M
GLUCOSE SERPL-MCNC: 116 MG/DL (ref 65–140)
HCT VFR BLD AUTO: 40.1 % (ref 36.5–49.3)
HGB BLD-MCNC: 13.3 G/DL (ref 12–17)
HYDROCODONE UR QL SCN: NEGATIVE
IMM GRANULOCYTES # BLD AUTO: 0.03 THOUSAND/UL (ref 0–0.2)
IMM GRANULOCYTES NFR BLD AUTO: 0 % (ref 0–2)
LYMPHOCYTES # BLD AUTO: 1.79 THOUSANDS/ÂΜL (ref 0.6–4.47)
LYMPHOCYTES NFR BLD AUTO: 18 % (ref 14–44)
MAGNESIUM SERPL-MCNC: 2.5 MG/DL (ref 1.9–2.7)
MCH RBC QN AUTO: 31.3 PG (ref 26.8–34.3)
MCHC RBC AUTO-ENTMCNC: 33.2 G/DL (ref 31.4–37.4)
MCV RBC AUTO: 94 FL (ref 82–98)
METHADONE UR QL: NEGATIVE
MONOCYTES # BLD AUTO: 0.43 THOUSAND/ÂΜL (ref 0.17–1.22)
MONOCYTES NFR BLD AUTO: 4 % (ref 4–12)
NEUTROPHILS # BLD AUTO: 7.38 THOUSANDS/ÂΜL (ref 1.85–7.62)
NEUTS SEG NFR BLD AUTO: 75 % (ref 43–75)
NRBC BLD AUTO-RTO: 0 /100 WBCS
OPIATES UR QL SCN: NEGATIVE
OXYCODONE+OXYMORPHONE UR QL SCN: NEGATIVE
PCP UR QL: NEGATIVE
PLATELET # BLD AUTO: 275 THOUSANDS/UL (ref 149–390)
PMV BLD AUTO: 9.9 FL (ref 8.9–12.7)
POTASSIUM SERPL-SCNC: 4.2 MMOL/L (ref 3.5–5.3)
PROT SERPL-MCNC: 6.8 G/DL (ref 6.4–8.4)
RBC # BLD AUTO: 4.25 MILLION/UL (ref 3.88–5.62)
SODIUM SERPL-SCNC: 140 MMOL/L (ref 135–147)
THC UR QL: POSITIVE
WBC # BLD AUTO: 9.88 THOUSAND/UL (ref 4.31–10.16)

## 2024-07-17 PROCEDURE — 96361 HYDRATE IV INFUSION ADD-ON: CPT

## 2024-07-17 PROCEDURE — 36415 COLL VENOUS BLD VENIPUNCTURE: CPT | Performed by: EMERGENCY MEDICINE

## 2024-07-17 PROCEDURE — 84484 ASSAY OF TROPONIN QUANT: CPT | Performed by: EMERGENCY MEDICINE

## 2024-07-17 PROCEDURE — 93005 ELECTROCARDIOGRAM TRACING: CPT

## 2024-07-17 PROCEDURE — 99284 EMERGENCY DEPT VISIT MOD MDM: CPT

## 2024-07-17 PROCEDURE — 85025 COMPLETE CBC W/AUTO DIFF WBC: CPT | Performed by: EMERGENCY MEDICINE

## 2024-07-17 PROCEDURE — 99285 EMERGENCY DEPT VISIT HI MDM: CPT | Performed by: EMERGENCY MEDICINE

## 2024-07-17 PROCEDURE — 96360 HYDRATION IV INFUSION INIT: CPT

## 2024-07-17 PROCEDURE — 82550 ASSAY OF CK (CPK): CPT | Performed by: EMERGENCY MEDICINE

## 2024-07-17 PROCEDURE — 80053 COMPREHEN METABOLIC PANEL: CPT | Performed by: EMERGENCY MEDICINE

## 2024-07-17 PROCEDURE — 80307 DRUG TEST PRSMV CHEM ANLYZR: CPT | Performed by: EMERGENCY MEDICINE

## 2024-07-17 PROCEDURE — 83735 ASSAY OF MAGNESIUM: CPT | Performed by: EMERGENCY MEDICINE

## 2024-07-17 RX ORDER — NALOXONE HYDROCHLORIDE 0.4 MG/ML
0.4 INJECTION, SOLUTION INTRAMUSCULAR; INTRAVENOUS; SUBCUTANEOUS ONCE
Status: DISCONTINUED | OUTPATIENT
Start: 2024-07-17 | End: 2024-07-17 | Stop reason: HOSPADM

## 2024-07-17 RX ORDER — LORAZEPAM 2 MG/ML
0.5 INJECTION INTRAMUSCULAR ONCE
Status: DISCONTINUED | OUTPATIENT
Start: 2024-07-17 | End: 2024-07-17

## 2024-07-17 RX ADMIN — NALOXONE HYDROCHLORIDE 4 MG: 4 SPRAY NASAL at 21:02

## 2024-07-17 RX ADMIN — SODIUM CHLORIDE 1000 ML: 0.9 INJECTION, SOLUTION INTRAVENOUS at 17:39

## 2024-07-17 NOTE — ED PROVIDER NOTES
History  Chief Complaint   Patient presents with    Overdose - Accidental     Pt was found unresponsive outside of a restaurant [place]. Pt unsure what he took.        33-year-old male brought in for possible drug overdose.  Patient was found unresponsive outside of a restaurant.  Patient unsure what he took.  Patient well-known to this department was seen in the emergency department on Sunday for overdose.  Drug screen at that time was positive for THC and amphetamines      History provided by:  Patient and medical records   used: No    Overdose - Accidental  Ingested substance:  Illicit drugs  Illicit drug type:  Unable to specify  Witnesses present: yes    Called poison control: no    Incident location:  Unable to specify  Context: intentional ingestion and recreational    Associated symptoms: altered mental status and slurred speech    Associated symptoms: no abdominal pain, no agitation, no chest pain, no cough and no headaches    Risk factors: similar prior episodes        Prior to Admission Medications   Prescriptions Last Dose Informant Patient Reported? Taking?   ARIPiprazole (ABILIFY) 5 mg tablet   No No   Sig: Take 1 tablet (5 mg total) by mouth daily Do not start before August 25, 2023.   nicotine (NICODERM CQ) 14 mg/24hr TD 24 hr patch   No No   Sig: Place 1 patch on the skin over 24 hours daily Do not start before August 25, 2023.      Facility-Administered Medications: None       Past Medical History:   Diagnosis Date    ADHD (attention deficit hyperactivity disorder)     Anxiety     last assessed 3/23/15    Bipolar 1 disorder (HCC)     Genital warts     last assessed 12/7/15, resolved 11/1/17     Psychiatric disorder        No past surgical history on file.    Family History   Problem Relation Age of Onset    No Known Problems Father      I have reviewed and agree with the history as documented.    E-Cigarette/Vaping    E-Cigarette Use Never User      E-Cigarette/Vaping  "Substances    Nicotine No     THC No     CBD No     Flavoring No     Other No     Unknown No      Social History     Tobacco Use    Smoking status: Every Day     Current packs/day: 1.50     Types: Cigarettes    Smokeless tobacco: Never   Vaping Use    Vaping status: Never Used   Substance Use Topics    Alcohol use: Yes     Comment: occ.    Drug use: Yes     Types: Heroin, Marijuana, Methamphetamines, Fentanyl     Comment: states was \"shooting up steroids\" 3 months ago (3/4/19 states this was 3 years ago)       Review of Systems   Constitutional:  Negative for activity change and appetite change.   HENT:  Negative for congestion and facial swelling.    Eyes:  Negative for discharge and redness.   Respiratory:  Negative for cough and wheezing.    Cardiovascular:  Negative for chest pain and leg swelling.   Gastrointestinal:  Negative for abdominal distention, abdominal pain and blood in stool.   Endocrine: Negative for cold intolerance and polydipsia.   Genitourinary:  Negative for difficulty urinating and hematuria.   Musculoskeletal:  Negative for arthralgias and gait problem.   Skin:  Negative for color change and rash.   Allergic/Immunologic: Negative for food allergies and immunocompromised state.   Neurological:  Negative for dizziness, seizures and headaches.   Hematological:  Negative for adenopathy. Does not bruise/bleed easily.   Psychiatric/Behavioral:  Negative for agitation, confusion and decreased concentration.    All other systems reviewed and are negative.      Physical Exam  Physical Exam  Vitals and nursing note reviewed.   Constitutional:       General: He is sleeping. He is not in acute distress.     Appearance: He is well-developed.      Comments: Patient has poor personal hygiene and is homeless.   HENT:      Head: Normocephalic and atraumatic.      Mouth/Throat:      Dentition: Abnormal dentition. Dental caries present.   Eyes:      Conjunctiva/sclera: Conjunctivae normal.   Cardiovascular:     "  Rate and Rhythm: Normal rate and regular rhythm.      Heart sounds: No murmur heard.  Pulmonary:      Effort: Pulmonary effort is normal. No respiratory distress.      Breath sounds: Normal breath sounds.   Abdominal:      Palpations: Abdomen is soft.      Tenderness: There is no abdominal tenderness.   Musculoskeletal:         General: No swelling.      Cervical back: Neck supple.   Skin:     General: Skin is warm and dry.      Capillary Refill: Capillary refill takes less than 2 seconds.   Neurological:      Mental Status: He is easily aroused.   Psychiatric:         Mood and Affect: Mood normal.         Vital Signs  ED Triage Vitals [07/17/24 1719]   Temperature Pulse Respirations Blood Pressure SpO2   99.2 °F (37.3 °C) (!) 106 18 102/55 (!) 89 %      Temp Source Heart Rate Source Patient Position - Orthostatic VS BP Location FiO2 (%)   Oral Monitor Lying Right arm --      Pain Score       No Pain           Vitals:    07/17/24 1719 07/17/24 1845   BP: 102/55 95/51   Pulse: (!) 106 102   Patient Position - Orthostatic VS: Lying Lying         Visual Acuity      ED Medications  Medications   sodium chloride 0.9 % bolus 1,000 mL (1,000 mL Intravenous Not Given 7/17/24 2103)   naloxone (NARCAN) injection 0.4 mg (0.4 mg Intravenous Not Given 7/17/24 2043)   sodium chloride 0.9 % bolus 1,000 mL (0 mL Intravenous Stopped 7/17/24 2103)   naloxone nasal- Given to patient by provider at discharge. (NARCAN) 4 mg/0.1 mL nasal spray 4 mg (4 mg Does not apply Given by Other 7/17/24 2102)       Diagnostic Studies  Results Reviewed       Procedure Component Value Units Date/Time    HS Troponin I 2hr [[id number]]  (Normal) Collected: 07/17/24 2003    Lab Status: Final result Specimen: Blood from Arm, Right Updated: 07/17/24 2032     hs TnI 2hr 7 ng/L      Delta 2hr hsTnI 1 ng/L     Rapid drug screen, urine [[id number]]  (Abnormal) Collected: 07/17/24 1926    Lab Status: Final result Specimen: Urine, Catheter Updated: 07/17/24 1947      Amph/Meth UR Positive     Barbiturate Ur Negative     Benzodiazepine Urine Negative     Cocaine Urine Negative     Methadone Urine Negative     Opiate Urine Negative     PCP Ur Negative     THC Urine Positive     Oxycodone Urine Negative     Fentanyl Urine Positive     HYDROCODONE URINE Negative    Narrative:      Presumptive report. If requested, specimen will be sent to reference lab for confirmation.  FOR MEDICAL PURPOSES ONLY.   IF CONFIRMATION NEEDED PLEASE CONTACT THE LAB WITHIN 5 DAYS.    Drug Screen Cutoff Levels:  AMPHETAMINE/METHAMPHETAMINES  1000 ng/mL  BARBITURATES     200 ng/mL  BENZODIAZEPINES     200 ng/mL  COCAINE      300 ng/mL  METHADONE      300 ng/mL  OPIATES      300 ng/mL  PHENCYCLIDINE     25 ng/mL  THC       50 ng/mL  OXYCODONE      100 ng/mL  FENTANYL      5 ng/mL  HYDROCODONE     300 ng/mL    HS Troponin I 4hr [[id number]]     Lab Status: No result Specimen: Blood     HS Troponin 0hr (reflex protocol) [[id number]]  (Normal) Collected: 07/17/24 1737    Lab Status: Final result Specimen: Blood from Arm, Right Updated: 07/17/24 1807     hs TnI 0hr 6 ng/L     Comprehensive metabolic panel [[id number]] Collected: 07/17/24 1737    Lab Status: Final result Specimen: Blood from Arm, Right Updated: 07/17/24 1803     Sodium 140 mmol/L      Potassium 4.2 mmol/L      Chloride 105 mmol/L      CO2 29 mmol/L      ANION GAP 6 mmol/L      BUN 18 mg/dL      Creatinine 1.23 mg/dL      Glucose 116 mg/dL      Calcium 8.6 mg/dL      AST 16 U/L      ALT 19 U/L      Alkaline Phosphatase 62 U/L      Total Protein 6.8 g/dL      Albumin 4.0 g/dL      Total Bilirubin 0.32 mg/dL      eGFR 75 ml/min/1.73sq m     Narrative:      National Kidney Disease Foundation guidelines for Chronic Kidney Disease (CKD):     Stage 1 with normal or high GFR (GFR > 90 mL/min/1.73 square meters)    Stage 2 Mild CKD (GFR = 60-89 mL/min/1.73 square meters)    Stage 3A Moderate CKD (GFR = 45-59 mL/min/1.73 square meters)    Stage 3B  Moderate CKD (GFR = 30-44 mL/min/1.73 square meters)    Stage 4 Severe CKD (GFR = 15-29 mL/min/1.73 square meters)    Stage 5 End Stage CKD (GFR <15 mL/min/1.73 square meters)  Note: GFR calculation is accurate only with a steady state creatinine    Magnesium [[id number]]  (Normal) Collected: 07/17/24 1737    Lab Status: Final result Specimen: Blood from Arm, Right Updated: 07/17/24 1803     Magnesium 2.5 mg/dL     CK [[id number]]  (Normal) Collected: 07/17/24 1737    Lab Status: Final result Specimen: Blood from Arm, Right Updated: 07/17/24 1803     Total CK 73 U/L     CBC and differential [[id number]] Collected: 07/17/24 1737    Lab Status: Final result Specimen: Blood from Arm, Right Updated: 07/17/24 1743     WBC 9.88 Thousand/uL      RBC 4.25 Million/uL      Hemoglobin 13.3 g/dL      Hematocrit 40.1 %      MCV 94 fL      MCH 31.3 pg      MCHC 33.2 g/dL      RDW 12.9 %      MPV 9.9 fL      Platelets 275 Thousands/uL      nRBC 0 /100 WBCs      Segmented % 75 %      Immature Grans % 0 %      Lymphocytes % 18 %      Monocytes % 4 %      Eosinophils Relative 2 %      Basophils Relative 1 %      Absolute Neutrophils 7.38 Thousands/µL      Absolute Immature Grans 0.03 Thousand/uL      Absolute Lymphocytes 1.79 Thousands/µL      Absolute Monocytes 0.43 Thousand/µL      Eosinophils Absolute 0.20 Thousand/µL      Basophils Absolute 0.05 Thousands/µL                    No orders to display              Procedures  ECG 12 Lead Documentation Only    Date/Time: 7/17/2024 5:21 PM    Performed by: Camila Roy DO  Authorized by: Camila Roy DO    Patient location:  ED  Rate:     ECG rate:  103  Rhythm:     Rhythm: sinus tachycardia             ED Course  ED Course as of 07/17/24 2122 Wed Jul 17, 2024 2044 Patient awake alert and oriented offered him more IV Narcan here which she refused.  Patient is willing to take the community Narcan intranasal spray                                               Medical  Decision Making  Differential diagnosis includes but is not limited to polysubstance use disorder, methamphetamine use, opiate use, accidental overdose,    Problems Addressed:  Accidental drug overdose, initial encounter: acute illness or injury  Polysubstance use disorder: chronic illness or injury with exacerbation, progression, or side effects of treatment    Amount and/or Complexity of Data Reviewed  Labs: ordered. Decision-making details documented in ED Course.    Risk  Prescription drug management.  Risk Details: Patient was given prescription for Narcan.  Patient was offered rehab but he however he refused                 Disposition  Final diagnoses:   Polysubstance use disorder   Accidental drug overdose, initial encounter     Time reflects when diagnosis was documented in both MDM as applicable and the Disposition within this note       Time User Action Codes Description Comment    7/17/2024  8:44 PM Camila Roy Add [F19.90] Polysubstance use disorder     7/17/2024  8:44 PM Camila Roy Add [T50.901A] Accidental drug overdose, initial encounter           ED Disposition       ED Disposition   Discharge    Condition   Stable    Date/Time   Wed Jul 17, 2024  8:44 PM    Comment   Rolando Díaz Jr. discharge to home/self care.                   Follow-up Information       Follow up With Specialties Details Why Contact Info Additional Information    [address] [id number] [phone] [address], [id number] [phone]            Discharge Medication List as of 7/17/2024  8:45 PM        CONTINUE these medications which have NOT CHANGED    Details   ARIPiprazole (ABILIFY) 5 mg tablet Take 1 tablet (5 mg total) by mouth daily Do not start before August 25, 2023., Starting Fri 8/25/2023, Print      nicotine (NICODERM CQ) 14 mg/24hr TD 24 hr patch Place 1 patch on the  skin over 24 hours daily Do not start before August 25, 2023., Starting Fri 8/25/2023, Print             No discharge procedures on file.    PDMP Review       None            ED Provider  Electronically Signed by             Camila Roy,   07/17/24 6612

## 2024-07-18 LAB
ATRIAL RATE: 103 BPM
P AXIS: 15 DEGREES
PR INTERVAL: 128 MS
QRS AXIS: 79 DEGREES
QRSD INTERVAL: 96 MS
QT INTERVAL: 336 MS
QTC INTERVAL: 440 MS
T WAVE AXIS: 56 DEGREES
VENTRICULAR RATE: 103 BPM

## 2024-07-18 PROCEDURE — 93010 ELECTROCARDIOGRAM REPORT: CPT | Performed by: INTERNAL MEDICINE

## 2024-07-18 NOTE — ED CARE HANDOFF
[facility] Warm Handoff Outcome Note    Patient name Rolando Díaz Jr.  Location ED 13/ED 13 MRN [id number]  Age: 33 y.o.          Plan Type:  Warm Handoff                                                                                    Plan Date: 7/17/2024  Service:  ED Warm Handoff      Substance Use History:  Opiates/Meth    Warm Handoff Update:  Patient is going to follow up with [facility] in the morning to see about pursuing treatment.     Warm Handoff Outcome: Treatment Related Resources

## 2024-07-18 NOTE — ED NOTES
"Subjective:      Conchita Car is a 20 y.o. female who presents with Sinusitis            Sinusitis  This is a new problem. Episode onset: 4-5 days. The problem has been gradually worsening since onset. There has been no fever. The pain is moderate. Associated symptoms include chills, congestion, coughing (non-productive ), ear pain (bilateral ), headaches and sinus pressure. Pertinent negatives include no hoarse voice, neck pain, shortness of breath, sneezing, sore throat or swollen glands. (Yellow greenish rhinorrhea) Treatments tried: OTC cold medication, Nyquil,  The treatment provided no relief.       History reviewed. No pertinent past medical history.  History reviewed. No pertinent past surgical history.    History reviewed. No pertinent family history.    No Known Allergies    Medications, Allergies, and current problem list reviewed today in Epic    Review of Systems   Constitutional: Positive for chills and malaise/fatigue. Negative for fever.   HENT: Positive for congestion, ear pain (bilateral ) and sinus pressure. Negative for ear discharge, hoarse voice, sneezing and sore throat.    Respiratory: Positive for cough (non-productive ). Negative for sputum production, shortness of breath and wheezing.    Cardiovascular: Negative for chest pain, palpitations and leg swelling.   Gastrointestinal: Negative for nausea, vomiting, abdominal pain and diarrhea.   Musculoskeletal: Negative for myalgias and neck pain.   Neurological: Positive for headaches. Negative for dizziness, tingling, sensory change and focal weakness.     All other systems reviewed and are negative.          Objective:     /62 mmHg  Pulse 85  Temp(Src) 37.3 °C (99.2 °F)  Ht 1.64 m (5' 4.57\")  Wt 74.447 kg (164 lb 2 oz)  BMI 27.68 kg/m2  SpO2 96%     Physical Exam   Constitutional: She is oriented to person, place, and time. She appears well-developed and well-nourished. No distress.   HENT:   Head: Normocephalic and " [facility] called and reported they will call pt tomorrow about treatment.      Karyn Kirk RN  07/17/24 [id number]     atraumatic.   Right Ear: External ear and ear canal normal. Tympanic membrane is injected and bulging. A middle ear effusion is present.   Left Ear: Tympanic membrane, external ear and ear canal normal.   Nose: Mucosal edema and rhinorrhea (purulent ) present. Right sinus exhibits maxillary sinus tenderness and frontal sinus tenderness. Left sinus exhibits maxillary sinus tenderness and frontal sinus tenderness.   Mouth/Throat: Uvula is midline and oropharynx is clear and moist. No oropharyngeal exudate or posterior oropharyngeal erythema.   Eyes: Conjunctivae are normal.   Neck: Neck supple.   Cardiovascular: Normal rate, regular rhythm and normal heart sounds.  Exam reveals no gallop and no friction rub.    No murmur heard.  Pulmonary/Chest: Effort normal and breath sounds normal. No respiratory distress. She has no wheezes. She has no rales.   Lymphadenopathy:     She has no cervical adenopathy.   Neurological: She is alert and oriented to person, place, and time. No cranial nerve deficit.   Skin: Skin is warm and dry. No rash noted.   Psychiatric: She has a normal mood and affect. Her behavior is normal. Judgment and thought content normal.               Assessment/Plan:     1. Acute pansinusitis, recurrence not specified  amoxicillin-clavulanate (AUGMENTIN) 875-125 MG Tab   2. Right acute serous otitis media, recurrence not specified  amoxicillin-clavulanate (AUGMENTIN) 875-125 MG Tab         Current outpatient prescriptions:   •  amoxicillin-clavulanate (AUGMENTIN) 875-125 MG Tab, Take 1 Tab by mouth 2 times a day for 10 days., Disp: 20 Tab, Rfl: 0    - encouraged fluids, rest, humidification, nasal decongestant, Flonase.     Differential diagnoses, Supportive care, and indications for immediate follow-up discussed with patient.   Instructed to return to clinic or nearest emergency department for any change in condition, further concerns, or worsening of symptoms.    The patient demonstrated a good  understanding and agreed with the treatment plan.    Rosa Maria Anderson PA-C

## 2024-07-18 NOTE — ED NOTES
Discharge reviewed with patient. Patient verbalized understanding and no further questions at this time. Patient ambulatory off unit with steady gait.      Connie Atkins  07/17/24 [id number]

## 2024-08-05 ENCOUNTER — APPOINTMENT (INPATIENT)
Dept: RADIOLOGY | Facility: HOSPITAL | Age: 34
DRG: 020 | End: 2024-08-05
Payer: COMMERCIAL

## 2024-08-05 ENCOUNTER — HOSPITAL ENCOUNTER (INPATIENT)
Facility: HOSPITAL | Age: 34
LOS: 24 days | Discharge: NURSING, REHAB OR LONG-TERM CARE | DRG: 020 | End: 2024-08-29
Attending: STUDENT IN AN ORGANIZED HEALTH CARE EDUCATION/TRAINING PROGRAM | Admitting: STUDENT IN AN ORGANIZED HEALTH CARE EDUCATION/TRAINING PROGRAM
Payer: COMMERCIAL

## 2024-08-05 ENCOUNTER — APPOINTMENT (INPATIENT)
Dept: CT IMAGING | Facility: HOSPITAL | Age: 34
DRG: 020 | End: 2024-08-05
Payer: COMMERCIAL

## 2024-08-05 ENCOUNTER — APPOINTMENT (EMERGENCY)
Dept: CT IMAGING | Facility: HOSPITAL | Age: 34
DRG: 020 | End: 2024-08-05
Payer: COMMERCIAL

## 2024-08-05 DIAGNOSIS — S02.19XA: ICD-10-CM

## 2024-08-05 DIAGNOSIS — S02.40FA CLOSED FRACTURE OF LEFT ZYGOMATIC ARCH, INITIAL ENCOUNTER (HCC): ICD-10-CM

## 2024-08-05 DIAGNOSIS — S06.9X1A TRAUMATIC BRAIN INJURY, WITH LOSS OF CONSCIOUSNESS OF 30 MINUTES OR LESS, INITIAL ENCOUNTER (HCC): ICD-10-CM

## 2024-08-05 DIAGNOSIS — S02.402A: ICD-10-CM

## 2024-08-05 DIAGNOSIS — G08 THROMBOSIS TRANSVERSE SINUS: ICD-10-CM

## 2024-08-05 DIAGNOSIS — F31.30 BIPOLAR AFFECTIVE DISORDER, CURRENT EPISODE DEPRESSED, CURRENT EPISODE SEVERITY UNSPECIFIED (HCC): ICD-10-CM

## 2024-08-05 DIAGNOSIS — I60.9 SUBARACHNOID HEMORRHAGE (HCC): ICD-10-CM

## 2024-08-05 DIAGNOSIS — Z59.00 HOMELESS: Chronic | ICD-10-CM

## 2024-08-05 DIAGNOSIS — S06.5XAA SUBDURAL HEMATOMA, ACUTE (HCC): ICD-10-CM
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
## 2024-08-05 DIAGNOSIS — F19.90 DRUG USE: Chronic | ICD-10-CM
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
## 2024-08-05 DIAGNOSIS — S41.112A ARM LACERATION WITH COMPLICATION, LEFT, INITIAL ENCOUNTER: ICD-10-CM

## 2024-08-05 PROBLEM — R91.1 PULMONARY NODULE: Status: ACTIVE | Noted: 2024-08-05

## 2024-08-05 PROBLEM — S36.113A LIVER LACERATION: Status: ACTIVE | Noted: 2024-08-05

## 2024-08-05 PROBLEM — J96.00 ACUTE RESPIRATORY FAILURE (HCC): Status: ACTIVE | Noted: 2024-08-05

## 2024-08-05 PROBLEM — S01.81XA FACIAL LACERATION: Status: ACTIVE | Noted: 2024-08-05

## 2024-08-05 PROBLEM — G93.40 ENCEPHALOPATHY ACUTE: Status: ACTIVE | Noted: 2024-08-05
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
## 2024-08-05 LAB
ABO GROUP BLD: NORMAL
ALBUMIN SERPL BCG-MCNC: 4.3 G/DL (ref 3.5–5)
ALBUMIN SERPL BCG-MCNC: 4.3 G/DL (ref 3.5–5)
ALP SERPL-CCNC: 76 U/L (ref 34–104)
ALP SERPL-CCNC: 76 U/L (ref 34–104)
ALT SERPL W P-5'-P-CCNC: 11 U/L (ref 7–52)
ALT SERPL W P-5'-P-CCNC: 11 U/L (ref 7–52)
AMPHETAMINES SERPL QL SCN: POSITIVE
AMPHETAMINES SERPL QL SCN: POSITIVE
ANION GAP SERPL CALCULATED.3IONS-SCNC: 13 MMOL/L (ref 4–13)
ANION GAP SERPL CALCULATED.3IONS-SCNC: 13 MMOL/L (ref 4–13)
APAP SERPL-MCNC: <2 UG/ML (ref 10–20)
APAP SERPL-MCNC: <2 UG/ML (ref 10–20)
AST SERPL W P-5'-P-CCNC: 21 U/L (ref 13–39)
AST SERPL W P-5'-P-CCNC: 21 U/L (ref 13–39)
BARBITURATES UR QL: NEGATIVE
BARBITURATES UR QL: NEGATIVE
BASE EXCESS BLDA CALC-SCNC: -1.4 MMOL/L
BASE EXCESS BLDA CALC-SCNC: -1.4 MMOL/L
BASE EXCESS BLDA CALC-SCNC: 0 MMOL/L (ref -2–3)
BASE EXCESS BLDA CALC-SCNC: 0 MMOL/L (ref -2–3)
BASOPHILS # BLD AUTO: 0.08 THOUSANDS/ÂΜL (ref 0–0.1)
BASOPHILS # BLD AUTO: 0.08 THOUSANDS/ÂΜL (ref 0–0.1)
BASOPHILS NFR BLD AUTO: 1 % (ref 0–1)
BASOPHILS NFR BLD AUTO: 1 % (ref 0–1)
BENZODIAZ UR QL: POSITIVE
BENZODIAZ UR QL: POSITIVE
BILIRUB SERPL-MCNC: 0.74 MG/DL (ref 0.2–1)
BILIRUB SERPL-MCNC: 0.74 MG/DL (ref 0.2–1)
BLD GP AB SCN SERPL QL: POSITIVE
BLD GP AB SCN SERPL QL: POSITIVE
BLOOD GROUP ANTIBODIES SERPL: NORMAL
BLOOD GROUP ANTIBODIES SERPL: NORMAL
BUN SERPL-MCNC: 15 MG/DL (ref 5–25)
BUN SERPL-MCNC: 15 MG/DL (ref 5–25)
CA-I BLD-SCNC: 1.09 MMOL/L (ref 1.12–1.32)
CA-I BLD-SCNC: 1.09 MMOL/L (ref 1.12–1.32)
CALCIUM SERPL-MCNC: 9.1 MG/DL (ref 8.4–10.2)
CALCIUM SERPL-MCNC: 9.1 MG/DL (ref 8.4–10.2)
CFFMA (FUNCTIONAL FIBRINOGEN MAX AMPLITUDE): 19.2 MM (ref 15–32)
CFFMA (FUNCTIONAL FIBRINOGEN MAX AMPLITUDE): 19.2 MM (ref 15–32)
CHLORIDE SERPL-SCNC: 108 MMOL/L (ref 96–108)
CHLORIDE SERPL-SCNC: 108 MMOL/L (ref 96–108)
CKLY30: 1.6 % (ref 0–2.6)
CKLY30: 1.6 % (ref 0–2.6)
CKR(REACTION TIME): 4.5 MIN (ref 4.6–9.1)
CKR(REACTION TIME): 4.5 MIN (ref 4.6–9.1)
CO2 SERPL-SCNC: 21 MMOL/L (ref 21–32)
CO2 SERPL-SCNC: 21 MMOL/L (ref 21–32)
COCAINE UR QL: NEGATIVE
COCAINE UR QL: NEGATIVE
CREAT SERPL-MCNC: 0.93 MG/DL (ref 0.6–1.3)
CREAT SERPL-MCNC: 0.93 MG/DL (ref 0.6–1.3)
CRTMA(RAPIDTEG MAX AMPLITUDE): 57.7 MM (ref 52–70)
CRTMA(RAPIDTEG MAX AMPLITUDE): 57.7 MM (ref 52–70)
EOSINOPHIL # BLD AUTO: 0.19 THOUSAND/ÂΜL (ref 0–0.61)
EOSINOPHIL # BLD AUTO: 0.19 THOUSAND/ÂΜL (ref 0–0.61)
EOSINOPHIL NFR BLD AUTO: 2 % (ref 0–6)
EOSINOPHIL NFR BLD AUTO: 2 % (ref 0–6)
ERYTHROCYTE [DISTWIDTH] IN BLOOD BY AUTOMATED COUNT: 13.1 % (ref 11.6–15.1)
ERYTHROCYTE [DISTWIDTH] IN BLOOD BY AUTOMATED COUNT: 13.1 % (ref 11.6–15.1)
ETHANOL SERPL-MCNC: <10 MG/DL
ETHANOL SERPL-MCNC: <10 MG/DL
FENTANYL UR QL SCN: POSITIVE
FENTANYL UR QL SCN: POSITIVE
GFR SERPL CREATININE-BSD FRML MDRD: 47 ML/MIN/1.73SQ M
GFR SERPL CREATININE-BSD FRML MDRD: 47 ML/MIN/1.73SQ M
GLUCOSE SERPL-MCNC: 156 MG/DL (ref 65–140)
GLUCOSE SERPL-MCNC: 156 MG/DL (ref 65–140)
GLUCOSE SERPL-MCNC: 160 MG/DL (ref 65–140)
GLUCOSE SERPL-MCNC: 160 MG/DL (ref 65–140)
HCO3 BLDA-SCNC: 23.2 MMOL/L (ref 22–28)
HCO3 BLDA-SCNC: 23.2 MMOL/L (ref 22–28)
HCO3 BLDA-SCNC: 24.8 MMOL/L (ref 24–30)
HCO3 BLDA-SCNC: 24.8 MMOL/L (ref 24–30)
HCT VFR BLD AUTO: 40.3 % (ref 36.5–49.3)
HCT VFR BLD AUTO: 40.3 % (ref 36.5–49.3)
HCT VFR BLD CALC: 38 % (ref 36.5–49.3)
HCT VFR BLD CALC: 38 % (ref 36.5–49.3)
HGB BLD-MCNC: 11.3 G/DL (ref 12–17)
HGB BLD-MCNC: 11.3 G/DL (ref 12–17)
HGB BLD-MCNC: 13.2 G/DL (ref 12–17)
HGB BLD-MCNC: 13.2 G/DL (ref 12–17)
HGB BLDA-MCNC: 12.9 G/DL (ref 12–17)
HGB BLDA-MCNC: 12.9 G/DL (ref 12–17)
HIV 1+2 AB+HIV1 P24 AG SERPL QL IA: NORMAL
HIV 1+2 AB+HIV1 P24 AG SERPL QL IA: NORMAL
HIV1 P24 AG SER QL: NORMAL
HIV1 P24 AG SER QL: NORMAL
HOROWITZ INDEX BLDA+IHG-RTO: 40 MM[HG]
HOROWITZ INDEX BLDA+IHG-RTO: 40 MM[HG]
HYDROCODONE UR QL SCN: NEGATIVE
HYDROCODONE UR QL SCN: NEGATIVE
IMM GRANULOCYTES # BLD AUTO: 0.04 THOUSAND/UL (ref 0–0.2)
IMM GRANULOCYTES # BLD AUTO: 0.04 THOUSAND/UL (ref 0–0.2)
IMM GRANULOCYTES NFR BLD AUTO: 0 % (ref 0–2)
IMM GRANULOCYTES NFR BLD AUTO: 0 % (ref 0–2)
INR PPP: 1.12 (ref 0.85–1.19)
INR PPP: 1.12 (ref 0.85–1.19)
LACTATE SERPL-SCNC: 0.7 MMOL/L (ref 0.5–2)
LACTATE SERPL-SCNC: 0.7 MMOL/L (ref 0.5–2)
LYMPHOCYTES # BLD AUTO: 3.34 THOUSANDS/ÂΜL (ref 0.6–4.47)
LYMPHOCYTES # BLD AUTO: 3.34 THOUSANDS/ÂΜL (ref 0.6–4.47)
LYMPHOCYTES NFR BLD AUTO: 29 % (ref 14–44)
LYMPHOCYTES NFR BLD AUTO: 29 % (ref 14–44)
M AG RBC QL: NEGATIVE
M AG RBC QL: NEGATIVE
MCH RBC QN AUTO: 31.1 PG (ref 26.8–34.3)
MCH RBC QN AUTO: 31.1 PG (ref 26.8–34.3)
MCHC RBC AUTO-ENTMCNC: 32.8 G/DL (ref 31.4–37.4)
MCHC RBC AUTO-ENTMCNC: 32.8 G/DL (ref 31.4–37.4)
MCV RBC AUTO: 95 FL (ref 82–98)
MCV RBC AUTO: 95 FL (ref 82–98)
METHADONE UR QL: NEGATIVE
METHADONE UR QL: NEGATIVE
MONOCYTES # BLD AUTO: 0.63 THOUSAND/ÂΜL (ref 0.17–1.22)
MONOCYTES # BLD AUTO: 0.63 THOUSAND/ÂΜL (ref 0.17–1.22)
MONOCYTES NFR BLD AUTO: 6 % (ref 4–12)
MONOCYTES NFR BLD AUTO: 6 % (ref 4–12)
NEUTROPHILS # BLD AUTO: 7.25 THOUSANDS/ÂΜL (ref 1.85–7.62)
NEUTROPHILS # BLD AUTO: 7.25 THOUSANDS/ÂΜL (ref 1.85–7.62)
NEUTS SEG NFR BLD AUTO: 62 % (ref 43–75)
NEUTS SEG NFR BLD AUTO: 62 % (ref 43–75)
NRBC BLD AUTO-RTO: 0 /100 WBCS
NRBC BLD AUTO-RTO: 0 /100 WBCS
O2 CT BLDA-SCNC: 17.8 ML/DL (ref 16–23)
O2 CT BLDA-SCNC: 17.8 ML/DL (ref 16–23)
OPIATES UR QL SCN: NEGATIVE
OPIATES UR QL SCN: NEGATIVE
OSMOLALITY UR/SERPL-RTO: 307 MMOL/KG (ref 282–298)
OSMOLALITY UR/SERPL-RTO: 307 MMOL/KG (ref 282–298)
OXYCODONE+OXYMORPHONE UR QL SCN: NEGATIVE
OXYCODONE+OXYMORPHONE UR QL SCN: NEGATIVE
OXYHGB MFR BLDA: 98.5 % (ref 94–97)
OXYHGB MFR BLDA: 98.5 % (ref 94–97)
PCO2 BLD: 26 MMOL/L (ref 21–32)
PCO2 BLD: 26 MMOL/L (ref 21–32)
PCO2 BLD: 41 MM HG (ref 42–50)
PCO2 BLD: 41 MM HG (ref 42–50)
PCO2 BLDA: 38.6 MM HG (ref 36–44)
PCO2 BLDA: 38.6 MM HG (ref 36–44)
PCP UR QL: NEGATIVE
PCP UR QL: NEGATIVE
PEEP RESPIRATORY: 6 CM[H2O]
PEEP RESPIRATORY: 6 CM[H2O]
PH BLD: 7.39 [PH] (ref 7.3–7.4)
PH BLD: 7.39 [PH] (ref 7.3–7.4)
PH BLDA: 7.4 [PH] (ref 7.35–7.45)
PH BLDA: 7.4 [PH] (ref 7.35–7.45)
PLATELET # BLD AUTO: 251 THOUSANDS/UL (ref 149–390)
PLATELET # BLD AUTO: 251 THOUSANDS/UL (ref 149–390)
PMV BLD AUTO: 9.9 FL (ref 8.9–12.7)
PMV BLD AUTO: 9.9 FL (ref 8.9–12.7)
PO2 BLD: 54 MM HG (ref 35–45)
PO2 BLD: 54 MM HG (ref 35–45)
PO2 BLDA: 177.7 MM HG (ref 75–129)
PO2 BLDA: 177.7 MM HG (ref 75–129)
POTASSIUM BLD-SCNC: 3.6 MMOL/L (ref 3.5–5.3)
POTASSIUM BLD-SCNC: 3.6 MMOL/L (ref 3.5–5.3)
POTASSIUM SERPL-SCNC: 3.4 MMOL/L (ref 3.5–5.3)
POTASSIUM SERPL-SCNC: 3.4 MMOL/L (ref 3.5–5.3)
PROT SERPL-MCNC: 7 G/DL (ref 6.4–8.4)
PROT SERPL-MCNC: 7 G/DL (ref 6.4–8.4)
PROTHROMBIN TIME: 15.1 SECONDS (ref 12.3–15)
PROTHROMBIN TIME: 15.1 SECONDS (ref 12.3–15)
RBC # BLD AUTO: 4.24 MILLION/UL (ref 3.88–5.62)
RBC # BLD AUTO: 4.24 MILLION/UL (ref 3.88–5.62)
RH BLD: POSITIVE
SALICYLATES SERPL-MCNC: <5 MG/DL (ref 3–20)
SALICYLATES SERPL-MCNC: <5 MG/DL (ref 3–20)
SAO2 % BLD FROM PO2: 87 % (ref 60–85)
SAO2 % BLD FROM PO2: 87 % (ref 60–85)
SODIUM BLD-SCNC: 144 MMOL/L (ref 136–145)
SODIUM BLD-SCNC: 144 MMOL/L (ref 136–145)
SODIUM SERPL-SCNC: 142 MMOL/L (ref 135–147)
SODIUM SERPL-SCNC: 142 MMOL/L (ref 135–147)
SPECIMEN EXPIRATION DATE: NORMAL
SPECIMEN EXPIRATION DATE: NORMAL
SPECIMEN SOURCE: ABNORMAL
THC UR QL: POSITIVE
THC UR QL: POSITIVE
VENT AC: 18
VENT AC: 18
VENT- AC: AC
VENT- AC: AC
VT SETTING VENT: 450 ML
VT SETTING VENT: 450 ML
WBC # BLD AUTO: 11.53 THOUSAND/UL (ref 4.31–10.16)
WBC # BLD AUTO: 11.53 THOUSAND/UL (ref 4.31–10.16)

## 2024-08-05 PROCEDURE — 76705 ECHO EXAM OF ABDOMEN: CPT | Performed by: PHYSICIAN ASSISTANT

## 2024-08-05 PROCEDURE — 84295 ASSAY OF SERUM SODIUM: CPT

## 2024-08-05 PROCEDURE — 85025 COMPLETE CBC W/AUTO DIFF WBC: CPT | Performed by: STUDENT IN AN ORGANIZED HEALTH CARE EDUCATION/TRAINING PROGRAM

## 2024-08-05 PROCEDURE — 0HQCXZZ REPAIR LEFT UPPER ARM SKIN, EXTERNAL APPROACH: ICD-10-PCS | Performed by: SURGERY

## 2024-08-05 PROCEDURE — 86905 BLOOD TYPING RBC ANTIGENS: CPT

## 2024-08-05 PROCEDURE — 74177 CT ABD & PELVIS W/CONTRAST: CPT

## 2024-08-05 PROCEDURE — NC001 PR NO CHARGE: Performed by: PHYSICIAN ASSISTANT

## 2024-08-05 PROCEDURE — 85397 CLOTTING FUNCT ACTIVITY: CPT | Performed by: PHYSICIAN ASSISTANT

## 2024-08-05 PROCEDURE — 70498 CT ANGIOGRAPHY NECK: CPT

## 2024-08-05 PROCEDURE — 85576 BLOOD PLATELET AGGREGATION: CPT | Performed by: PHYSICIAN ASSISTANT

## 2024-08-05 PROCEDURE — 85018 HEMOGLOBIN: CPT | Performed by: PHYSICIAN ASSISTANT

## 2024-08-05 PROCEDURE — 31500 INSERT EMERGENCY AIRWAY: CPT | Performed by: EMERGENCY MEDICINE

## 2024-08-05 PROCEDURE — 61107 TDH PNXR IMPLT VENTR CATH: CPT | Performed by: STUDENT IN AN ORGANIZED HEALTH CARE EDUCATION/TRAINING PROGRAM

## 2024-08-05 PROCEDURE — 85014 HEMATOCRIT: CPT

## 2024-08-05 PROCEDURE — 93308 TTE F-UP OR LMTD: CPT | Performed by: PHYSICIAN ASSISTANT

## 2024-08-05 PROCEDURE — 70496 CT ANGIOGRAPHY HEAD: CPT

## 2024-08-05 PROCEDURE — 99255 IP/OBS CONSLTJ NEW/EST HI 80: CPT | Performed by: STUDENT IN AN ORGANIZED HEALTH CARE EDUCATION/TRAINING PROGRAM

## 2024-08-05 PROCEDURE — 85384 FIBRINOGEN ACTIVITY: CPT | Performed by: PHYSICIAN ASSISTANT

## 2024-08-05 PROCEDURE — 3E10X8Z IRRIGATION OF SKIN AND MUCOUS MEMBRANES USING IRRIGATING SUBSTANCE: ICD-10-PCS | Performed by: SURGERY

## 2024-08-05 PROCEDURE — 99285 EMERGENCY DEPT VISIT HI MDM: CPT

## 2024-08-05 PROCEDURE — 94002 VENT MGMT INPAT INIT DAY: CPT

## 2024-08-05 PROCEDURE — 87806 HIV AG W/HIV1&2 ANTB W/OPTIC: CPT | Performed by: PHYSICIAN ASSISTANT

## 2024-08-05 PROCEDURE — 85347 COAGULATION TIME ACTIVATED: CPT | Performed by: PHYSICIAN ASSISTANT

## 2024-08-05 PROCEDURE — 86900 BLOOD TYPING SEROLOGIC ABO: CPT | Performed by: STUDENT IN AN ORGANIZED HEALTH CARE EDUCATION/TRAINING PROGRAM

## 2024-08-05 PROCEDURE — 0HC0XZZ EXTIRPATION OF MATTER FROM SCALP SKIN, EXTERNAL APPROACH: ICD-10-PCS | Performed by: SURGERY

## 2024-08-05 PROCEDURE — 71260 CT THORAX DX C+: CPT

## 2024-08-05 PROCEDURE — 13131 CMPLX RPR F/C/C/M/N/AX/G/H/F: CPT | Performed by: SURGERY

## 2024-08-05 PROCEDURE — 82330 ASSAY OF CALCIUM: CPT

## 2024-08-05 PROCEDURE — 12032 INTMD RPR S/A/T/EXT 2.6-7.5: CPT | Performed by: SURGERY

## 2024-08-05 PROCEDURE — 83930 ASSAY OF BLOOD OSMOLALITY: CPT | Performed by: STUDENT IN AN ORGANIZED HEALTH CARE EDUCATION/TRAINING PROGRAM

## 2024-08-05 PROCEDURE — 86850 RBC ANTIBODY SCREEN: CPT | Performed by: STUDENT IN AN ORGANIZED HEALTH CARE EDUCATION/TRAINING PROGRAM

## 2024-08-05 PROCEDURE — 90471 IMMUNIZATION ADMIN: CPT

## 2024-08-05 PROCEDURE — NC001 PR NO CHARGE: Performed by: SURGERY

## 2024-08-05 PROCEDURE — 86901 BLOOD TYPING SEROLOGIC RH(D): CPT | Performed by: STUDENT IN AN ORGANIZED HEALTH CARE EDUCATION/TRAINING PROGRAM

## 2024-08-05 PROCEDURE — 82803 BLOOD GASES ANY COMBINATION: CPT

## 2024-08-05 PROCEDURE — 87522 HEPATITIS C REVRS TRNSCRPJ: CPT | Performed by: PHYSICIAN ASSISTANT

## 2024-08-05 PROCEDURE — 94760 N-INVAS EAR/PLS OXIMETRY 1: CPT

## 2024-08-05 PROCEDURE — 71045 X-RAY EXAM CHEST 1 VIEW: CPT

## 2024-08-05 PROCEDURE — 84132 ASSAY OF SERUM POTASSIUM: CPT

## 2024-08-05 PROCEDURE — 76604 US EXAM CHEST: CPT | Performed by: PHYSICIAN ASSISTANT

## 2024-08-05 PROCEDURE — 0BH17EZ INSERTION OF ENDOTRACHEAL AIRWAY INTO TRACHEA, VIA NATURAL OR ARTIFICIAL OPENING: ICD-10-PCS | Performed by: EMERGENCY MEDICINE

## 2024-08-05 PROCEDURE — 12015 RPR F/E/E/N/L/M 7.6-12.5 CM: CPT | Performed by: STUDENT IN AN ORGANIZED HEALTH CARE EDUCATION/TRAINING PROGRAM

## 2024-08-05 PROCEDURE — 5A1945Z RESPIRATORY VENTILATION, 24-96 CONSECUTIVE HOURS: ICD-10-PCS | Performed by: EMERGENCY MEDICINE

## 2024-08-05 PROCEDURE — 0HD0XZZ EXTRACTION OF SCALP SKIN, EXTERNAL APPROACH: ICD-10-PCS | Performed by: SURGERY

## 2024-08-05 PROCEDURE — 96375 TX/PRO/DX INJ NEW DRUG ADDON: CPT

## 2024-08-05 PROCEDURE — 80307 DRUG TEST PRSMV CHEM ANLYZR: CPT

## 2024-08-05 PROCEDURE — 0W320ZZ CONTROL BLEEDING IN FACE, OPEN APPROACH: ICD-10-PCS | Performed by: STUDENT IN AN ORGANIZED HEALTH CARE EDUCATION/TRAINING PROGRAM

## 2024-08-05 PROCEDURE — 82947 ASSAY GLUCOSE BLOOD QUANT: CPT

## 2024-08-05 PROCEDURE — 009630Z DRAINAGE OF CEREBRAL VENTRICLE WITH DRAINAGE DEVICE, PERCUTANEOUS APPROACH: ICD-10-PCS | Performed by: STUDENT IN AN ORGANIZED HEALTH CARE EDUCATION/TRAINING PROGRAM

## 2024-08-05 PROCEDURE — 96374 THER/PROPH/DIAG INJ IV PUSH: CPT

## 2024-08-05 PROCEDURE — 36415 COLL VENOUS BLD VENIPUNCTURE: CPT | Performed by: STUDENT IN AN ORGANIZED HEALTH CARE EDUCATION/TRAINING PROGRAM

## 2024-08-05 PROCEDURE — 99291 CRITICAL CARE FIRST HOUR: CPT | Performed by: SURGERY

## 2024-08-05 PROCEDURE — 86803 HEPATITIS C AB TEST: CPT | Performed by: PHYSICIAN ASSISTANT

## 2024-08-05 PROCEDURE — 80143 DRUG ASSAY ACETAMINOPHEN: CPT | Performed by: STUDENT IN AN ORGANIZED HEALTH CARE EDUCATION/TRAINING PROGRAM

## 2024-08-05 PROCEDURE — 70450 CT HEAD/BRAIN W/O DYE: CPT

## 2024-08-05 PROCEDURE — 87340 HEPATITIS B SURFACE AG IA: CPT | Performed by: PHYSICIAN ASSISTANT

## 2024-08-05 PROCEDURE — 82077 ASSAY SPEC XCP UR&BREATH IA: CPT | Performed by: STUDENT IN AN ORGANIZED HEALTH CARE EDUCATION/TRAINING PROGRAM

## 2024-08-05 PROCEDURE — 83605 ASSAY OF LACTIC ACID: CPT

## 2024-08-05 PROCEDURE — 90715 TDAP VACCINE 7 YRS/> IM: CPT | Performed by: STUDENT IN AN ORGANIZED HEALTH CARE EDUCATION/TRAINING PROGRAM

## 2024-08-05 PROCEDURE — 86870 RBC ANTIBODY IDENTIFICATION: CPT | Performed by: STUDENT IN AN ORGANIZED HEALTH CARE EDUCATION/TRAINING PROGRAM

## 2024-08-05 PROCEDURE — 80179 DRUG ASSAY SALICYLATE: CPT | Performed by: STUDENT IN AN ORGANIZED HEALTH CARE EDUCATION/TRAINING PROGRAM

## 2024-08-05 PROCEDURE — 0HQ1XZZ REPAIR FACE SKIN, EXTERNAL APPROACH: ICD-10-PCS | Performed by: SURGERY

## 2024-08-05 PROCEDURE — 99291 CRITICAL CARE FIRST HOUR: CPT | Performed by: STUDENT IN AN ORGANIZED HEALTH CARE EDUCATION/TRAINING PROGRAM

## 2024-08-05 PROCEDURE — 82805 BLOOD GASES W/O2 SATURATION: CPT

## 2024-08-05 PROCEDURE — 12016 RPR FE/E/EN/L/M 12.6-20.0 CM: CPT | Performed by: SURGERY

## 2024-08-05 PROCEDURE — 94150 VITAL CAPACITY TEST: CPT

## 2024-08-05 PROCEDURE — 85610 PROTHROMBIN TIME: CPT | Performed by: PHYSICIAN ASSISTANT

## 2024-08-05 PROCEDURE — 87521 HEPATITIS C PROBE&RVRS TRNSC: CPT | Performed by: PHYSICIAN ASSISTANT

## 2024-08-05 PROCEDURE — 80053 COMPREHEN METABOLIC PANEL: CPT | Performed by: STUDENT IN AN ORGANIZED HEALTH CARE EDUCATION/TRAINING PROGRAM

## 2024-08-05 RX ORDER — FENTANYL CITRATE 50 UG/ML
INJECTION, SOLUTION INTRAMUSCULAR; INTRAVENOUS CODE/TRAUMA/SEDATION MEDICATION
Status: COMPLETED | OUTPATIENT
Start: 2024-08-05 | End: 2024-08-05

## 2024-08-05 RX ORDER — MIDAZOLAM HYDROCHLORIDE 2 MG/2ML
4 INJECTION, SOLUTION INTRAMUSCULAR; INTRAVENOUS ONCE
Status: COMPLETED | OUTPATIENT
Start: 2024-08-05 | End: 2024-08-05

## 2024-08-05 RX ORDER — ETOMIDATE 2 MG/ML
INJECTION INTRAVENOUS CODE/TRAUMA/SEDATION MEDICATION
Status: COMPLETED | OUTPATIENT
Start: 2024-08-05 | End: 2024-08-05

## 2024-08-05 RX ORDER — WATER 10 ML/10ML
INJECTION INTRAMUSCULAR; INTRAVENOUS; SUBCUTANEOUS
Status: COMPLETED
Start: 2024-08-05 | End: 2024-08-05

## 2024-08-05 RX ORDER — VECURONIUM BROMIDE 1 MG/ML
INJECTION, POWDER, LYOPHILIZED, FOR SOLUTION INTRAVENOUS CODE/TRAUMA/SEDATION MEDICATION
Status: COMPLETED | OUTPATIENT
Start: 2024-08-05 | End: 2024-08-05

## 2024-08-05 RX ORDER — PROPOFOL 10 MG/ML
30 INJECTION, EMULSION INTRAVENOUS ONCE
Status: COMPLETED | OUTPATIENT
Start: 2024-08-05 | End: 2024-08-05

## 2024-08-05 RX ORDER — FENTANYL CITRATE 50 UG/ML
100 INJECTION, SOLUTION INTRAMUSCULAR; INTRAVENOUS ONCE
Status: COMPLETED | OUTPATIENT
Start: 2024-08-05 | End: 2024-08-05

## 2024-08-05 RX ORDER — SODIUM CHLORIDE, SODIUM GLUCONATE, SODIUM ACETATE, POTASSIUM CHLORIDE, MAGNESIUM CHLORIDE, SODIUM PHOSPHATE, DIBASIC, AND POTASSIUM PHOSPHATE .53; .5; .37; .037; .03; .012; .00082 G/100ML; G/100ML; G/100ML; G/100ML; G/100ML; G/100ML; G/100ML
125 INJECTION, SOLUTION INTRAVENOUS CONTINUOUS
Status: DISCONTINUED | OUTPATIENT
Start: 2024-08-05 | End: 2024-08-07

## 2024-08-05 RX ORDER — LIDOCAINE HYDROCHLORIDE AND EPINEPHRINE 10; 10 MG/ML; UG/ML
10 INJECTION, SOLUTION INFILTRATION; PERINEURAL ONCE
Status: COMPLETED | OUTPATIENT
Start: 2024-08-05 | End: 2024-08-05

## 2024-08-05 RX ORDER — CHLORHEXIDINE GLUCONATE ORAL RINSE 1.2 MG/ML
15 SOLUTION DENTAL EVERY 12 HOURS SCHEDULED
Status: DISCONTINUED | OUTPATIENT
Start: 2024-08-05 | End: 2024-08-05

## 2024-08-05 RX ORDER — CHLORHEXIDINE GLUCONATE ORAL RINSE 1.2 MG/ML
15 SOLUTION DENTAL EVERY 12 HOURS SCHEDULED
Status: DISCONTINUED | OUTPATIENT
Start: 2024-08-05 | End: 2024-08-29 | Stop reason: HOSPADM

## 2024-08-05 RX ORDER — SUCCINYLCHOLINE/SOD CL,ISO/PF 100 MG/5ML
SYRINGE (ML) INTRAVENOUS CODE/TRAUMA/SEDATION MEDICATION
Status: COMPLETED | OUTPATIENT
Start: 2024-08-05 | End: 2024-08-05

## 2024-08-05 RX ORDER — LIDOCAINE HYDROCHLORIDE AND EPINEPHRINE 10; 10 MG/ML; UG/ML
20 INJECTION, SOLUTION INFILTRATION; PERINEURAL ONCE
Status: COMPLETED | OUTPATIENT
Start: 2024-08-05 | End: 2024-08-05

## 2024-08-05 RX ORDER — LEVETIRACETAM 500 MG/5ML
500 INJECTION, SOLUTION, CONCENTRATE INTRAVENOUS EVERY 12 HOURS SCHEDULED
Status: DISCONTINUED | OUTPATIENT
Start: 2024-08-05 | End: 2024-08-07

## 2024-08-05 RX ORDER — FENTANYL CITRATE-0.9 % NACL/PF 10 MCG/ML
150 PLASTIC BAG, INJECTION (ML) INTRAVENOUS CONTINUOUS
Status: DISCONTINUED | OUTPATIENT
Start: 2024-08-05 | End: 2024-08-08

## 2024-08-05 RX ORDER — CEFAZOLIN SODIUM 1 G/50ML
SOLUTION INTRAVENOUS
Status: COMPLETED | OUTPATIENT
Start: 2024-08-05 | End: 2024-08-05

## 2024-08-05 RX ORDER — FENTANYL CITRATE 50 UG/ML
INJECTION, SOLUTION INTRAMUSCULAR; INTRAVENOUS
Status: COMPLETED
Start: 2024-08-05 | End: 2024-08-05

## 2024-08-05 RX ORDER — MIDAZOLAM HYDROCHLORIDE 1 MG/ML
1 INJECTION INTRAMUSCULAR; INTRAVENOUS ONCE
Status: COMPLETED | OUTPATIENT
Start: 2024-08-05 | End: 2024-08-05

## 2024-08-05 RX ORDER — LIDOCAINE HYDROCHLORIDE AND EPINEPHRINE 10; 10 MG/ML; UG/ML
INJECTION, SOLUTION INFILTRATION; PERINEURAL
Status: DISPENSED
Start: 2024-08-05 | End: 2024-08-06

## 2024-08-05 RX ORDER — PROPOFOL 10 MG/ML
20 INJECTION, EMULSION INTRAVENOUS ONCE
Status: COMPLETED | OUTPATIENT
Start: 2024-08-05 | End: 2024-08-05

## 2024-08-05 RX ORDER — FENTANYL CITRATE 50 UG/ML
100 INJECTION, SOLUTION INTRAMUSCULAR; INTRAVENOUS
Status: DISCONTINUED | OUTPATIENT
Start: 2024-08-05 | End: 2024-08-08

## 2024-08-05 RX ORDER — LIDOCAINE HYDROCHLORIDE AND EPINEPHRINE 10; 10 MG/ML; UG/ML
INJECTION, SOLUTION INFILTRATION; PERINEURAL
Status: COMPLETED
Start: 2024-08-05 | End: 2024-08-05

## 2024-08-05 RX ORDER — LEVETIRACETAM 500 MG/5ML
1000 INJECTION, SOLUTION, CONCENTRATE INTRAVENOUS ONCE
Status: COMPLETED | OUTPATIENT
Start: 2024-08-05 | End: 2024-08-05

## 2024-08-05 RX ORDER — PROPOFOL 10 MG/ML
5-50 INJECTION, EMULSION INTRAVENOUS
Status: DISCONTINUED | OUTPATIENT
Start: 2024-08-05 | End: 2024-08-08

## 2024-08-05 RX ORDER — VECURONIUM BROMIDE 1 MG/ML
10 INJECTION, POWDER, LYOPHILIZED, FOR SOLUTION INTRAVENOUS ONCE
Status: COMPLETED | OUTPATIENT
Start: 2024-08-05 | End: 2024-08-05

## 2024-08-05 RX ORDER — LIDOCAINE HYDROCHLORIDE 10 MG/ML
INJECTION, SOLUTION EPIDURAL; INFILTRATION; INTRACAUDAL; PERINEURAL
Status: DISCONTINUED
Start: 2024-08-05 | End: 2024-08-05 | Stop reason: WASHOUT

## 2024-08-05 RX ORDER — GINSENG 100 MG
1 CAPSULE ORAL 2 TIMES DAILY
Status: DISCONTINUED | OUTPATIENT
Start: 2024-08-05 | End: 2024-08-29 | Stop reason: HOSPADM

## 2024-08-05 RX ADMIN — LIDOCAINE HYDROCHLORIDE,EPINEPHRINE BITARTRATE 10 ML: 10; .01 INJECTION, SOLUTION INFILTRATION; PERINEURAL at 18:16

## 2024-08-05 RX ADMIN — LIDOCAINE HYDROCHLORIDE,EPINEPHRINE BITARTRATE 10 ML: 10; .01 INJECTION, SOLUTION INFILTRATION; PERINEURAL at 18:17

## 2024-08-05 RX ADMIN — FENTANYL CITRATE 100 MCG: 50 INJECTION INTRAMUSCULAR; INTRAVENOUS at 17:58

## 2024-08-05 RX ADMIN — ETOMIDATE 20 MG: 2 INJECTION, SOLUTION INTRAVENOUS at 16:29

## 2024-08-05 RX ADMIN — TETANUS TOXOID, REDUCED DIPHTHERIA TOXOID AND ACELLULAR PERTUSSIS VACCINE, ADSORBED 0.5 ML: 5; 2.5; 8; 8; 2.5 SUSPENSION INTRAMUSCULAR at 16:45

## 2024-08-05 RX ADMIN — SODIUM CHLORIDE, SODIUM GLUCONATE, SODIUM ACETATE, POTASSIUM CHLORIDE, MAGNESIUM CHLORIDE, SODIUM PHOSPHATE, DIBASIC, AND POTASSIUM PHOSPHATE 125 ML/HR: .53; .5; .37; .037; .03; .012; .00082 INJECTION, SOLUTION INTRAVENOUS at 17:45

## 2024-08-05 RX ADMIN — LEVETIRACETAM 1000 MG: 100 INJECTION, SOLUTION INTRAVENOUS at 17:01

## 2024-08-05 RX ADMIN — LIDOCAINE HYDROCHLORIDE,EPINEPHRINE BITARTRATE 20 ML: 10; .01 INJECTION, SOLUTION INFILTRATION; PERINEURAL at 18:16

## 2024-08-05 RX ADMIN — LIDOCAINE HYDROCHLORIDE AND EPINEPHRINE 20 ML: 10; 10 INJECTION, SOLUTION INFILTRATION; PERINEURAL at 18:16

## 2024-08-05 RX ADMIN — FENTANYL CITRATE 100 MCG: 50 INJECTION INTRAMUSCULAR; INTRAVENOUS at 16:38

## 2024-08-05 RX ADMIN — Medication 100 MCG/HR: at 17:43

## 2024-08-05 RX ADMIN — CEFAZOLIN SODIUM 2000 MG: 1 SOLUTION INTRAVENOUS at 16:44

## 2024-08-05 RX ADMIN — FENTANYL CITRATE 100 MCG: 50 INJECTION INTRAMUSCULAR; INTRAVENOUS at 16:50

## 2024-08-05 RX ADMIN — MIDAZOLAM HYDROCHLORIDE 4 MG: 1 INJECTION, SOLUTION INTRAMUSCULAR; INTRAVENOUS at 17:44

## 2024-08-05 RX ADMIN — PROPOFOL 20 MG: 10 INJECTION, EMULSION INTRAVENOUS at 16:32

## 2024-08-05 RX ADMIN — VECURONIUM BROMIDE 10 MG: 1 INJECTION, POWDER, LYOPHILIZED, FOR SOLUTION INTRAVENOUS at 16:39

## 2024-08-05 RX ADMIN — LEVETIRACETAM 500 MG: 100 INJECTION, SOLUTION INTRAVENOUS at 22:50

## 2024-08-05 RX ADMIN — IOHEXOL 100 ML: 350 INJECTION, SOLUTION INTRAVENOUS at 17:06

## 2024-08-05 RX ADMIN — CHLORHEXIDINE GLUCONATE 15 ML: 1.2 RINSE ORAL at 21:00

## 2024-08-05 RX ADMIN — FENTANYL CITRATE 100 MCG: 50 INJECTION, SOLUTION INTRAMUSCULAR; INTRAVENOUS at 17:46

## 2024-08-05 RX ADMIN — VECURONIUM BROMIDE 10 MG: 1 INJECTION, POWDER, LYOPHILIZED, FOR SOLUTION INTRAVENOUS at 17:59

## 2024-08-05 RX ADMIN — WATER 10 ML: 1 INJECTION INTRAMUSCULAR; INTRAVENOUS; SUBCUTANEOUS at 17:59

## 2024-08-05 RX ADMIN — PROPOFOL 30 MG: 10 INJECTION, EMULSION INTRAVENOUS at 16:38

## 2024-08-05 RX ADMIN — PROPOFOL 20 MCG/KG/MIN: 10 INJECTION, EMULSION INTRAVENOUS at 16:35

## 2024-08-05 RX ADMIN — Medication 100 MG: at 16:30

## 2024-08-05 RX ADMIN — PROPOFOL 50 MCG/KG/MIN: 10 INJECTION, EMULSION INTRAVENOUS at 23:52

## 2024-08-05 RX ADMIN — PROPOFOL 20 MG: 10 INJECTION, EMULSION INTRAVENOUS at 16:36

## 2024-08-05 RX ADMIN — FENTANYL CITRATE 100 MCG: 50 INJECTION INTRAMUSCULAR; INTRAVENOUS at 17:46

## 2024-08-05 SDOH — ECONOMIC STABILITY - HOUSING INSECURITY: HOMELESSNESS UNSPECIFIED: Z59.00
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
## 2024-08-05 NOTE — QUICK NOTE
"Pt reportedly told EMS his name is Anmol and his birthday is 7/28/2000 but on EPIC - when you search these, a patient by the name of \"Anmol Jiménez\" comes up. I attempted to call this person and he answered the phone. This is NOT the patient. Identity remains unknown  "
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
## 2024-08-05 NOTE — PROCEDURES
PreOp Diagnosis:   Intracranial hypertension following TBI     PostOp Diagnosis:  Intracranial hypertension following TBI     Procedure:  Right frontal external ventricular drain placement     Anesthesia:  Local     Surgeon:  Bimal Collado MD     Indications:  No family or relative was available to obtain consent. The procedure was done via 2 physician consent.     Operative details:  The head was shaved in the usual fashion.  Kocher's point was marked.  The head was then prepped and draped in the usual sterile fashion.  A surgical time-out was performed.  A 15 blade was then used to make a 2 cm linear incision.  Self-retaining retractor was then placed.  The periosteum was then elevated.  A hand drill was then used to make a single fred hole.  The dura was opened sharply with a ventriculostomy trocar.  Next a ventriculostomy was placed into the right frontal horn on the 1st attempt.  Brisk egress of bloody CSF was and countered.  The catheter was then tunneled posterior medially and secured in place.  The incision was closed using 3 0 nylons.  The drain was secured in place with a tension loop.     Patient tolerated the procedure well.     I was present and performed the entirety of the procedure.
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
## 2024-08-05 NOTE — PROCEDURES
Universal Protocol:  Consent: The procedure was performed in an emergent situation. Verbal consent not obtained.  Radiology Images displayed and confirmed. If images not available, report reviewed: imaging studies available  Patient identity confirmed: anonymous protocol, patient vented/unresponsive  Laceration repair    Date/Time: 8/5/2024 6:02 PM    Performed by: Marycarmen Perez MD  Authorized by: Marycarmen Perez MD  Body area: upper extremity  Location details: left upper arm  Laceration length: 6 cm  Contamination: The wound is contaminated.  Foreign bodies: unknown  Tendon involvement: none  Nerve involvement: none  Vascular damage: no  Anesthesia: local infiltration    Anesthesia:  Local Anesthetic: lidocaine 1% with epinephrine  Anesthetic total: 5 mL    Sedation:  Patient sedated: no (patient intubated, already sedated)      Wound Dehiscence:    Secondary closure or dehiscence: complex    Procedure Details:  Preparation: Patient was prepped and draped in the usual sterile fashion.  Irrigation solution: saline  Irrigation method: syringe  Amount of cleaning: extensive  Debridement: minimal  Degree of undermining: none  Skin closure: 3-0 Prolene  Subcutaneous closure: 4-0 Vicryl  Number of sutures: 12  Technique: buried suture, simple and horizontal mattress  Laceration repair approximation: part were close and parts loose.  Approximation difficulty: complex  Dressing: gauze roll, non-adhesive packing strip and 4x4 sterile gauze  Patient tolerance: patient tolerated the procedure well with no immediate complications  Cleaning details: dirt and other organic matter
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
## 2024-08-06 ENCOUNTER — APPOINTMENT (INPATIENT)
Dept: RADIOLOGY | Facility: HOSPITAL | Age: 34
DRG: 020 | End: 2024-08-06
Payer: COMMERCIAL

## 2024-08-06 ENCOUNTER — APPOINTMENT (INPATIENT)
Dept: CT IMAGING | Facility: HOSPITAL | Age: 34
DRG: 020 | End: 2024-08-06
Payer: COMMERCIAL

## 2024-08-06 LAB
ABO GROUP BLD: NORMAL
ABO GROUP BLD: NORMAL
ALBUMIN SERPL BCG-MCNC: 3.3 G/DL (ref 3.5–5)
ALBUMIN SERPL BCG-MCNC: 3.3 G/DL (ref 3.5–5)
ALP SERPL-CCNC: 56 U/L (ref 34–104)
ALP SERPL-CCNC: 56 U/L (ref 34–104)
ALT SERPL W P-5'-P-CCNC: 8 U/L (ref 7–52)
ALT SERPL W P-5'-P-CCNC: 8 U/L (ref 7–52)
ANION GAP SERPL CALCULATED.3IONS-SCNC: 4 MMOL/L (ref 4–13)
AST SERPL W P-5'-P-CCNC: 18 U/L (ref 13–39)
AST SERPL W P-5'-P-CCNC: 18 U/L (ref 13–39)
BASE EXCESS BLDA CALC-SCNC: 0.2 MMOL/L
BASE EXCESS BLDA CALC-SCNC: 0.2 MMOL/L
BILIRUB DIRECT SERPL-MCNC: 0.07 MG/DL (ref 0–0.2)
BILIRUB DIRECT SERPL-MCNC: 0.07 MG/DL (ref 0–0.2)
BILIRUB SERPL-MCNC: 0.45 MG/DL (ref 0.2–1)
BILIRUB SERPL-MCNC: 0.45 MG/DL (ref 0.2–1)
BLD GP AB SCN SERPL QL: POSITIVE
BLD GP AB SCN SERPL QL: POSITIVE
BUN SERPL-MCNC: 12 MG/DL (ref 5–25)
BUN SERPL-MCNC: 12 MG/DL (ref 5–25)
BUN SERPL-MCNC: 13 MG/DL (ref 5–25)
BUN SERPL-MCNC: 13 MG/DL (ref 5–25)
BUN SERPL-MCNC: 15 MG/DL (ref 5–25)
BUN SERPL-MCNC: 15 MG/DL (ref 5–25)
CA-I BLD-SCNC: 1.05 MMOL/L (ref 1.12–1.32)
CA-I BLD-SCNC: 1.05 MMOL/L (ref 1.12–1.32)
CA-I BLD-SCNC: 1.08 MMOL/L (ref 1.12–1.32)
CA-I BLD-SCNC: 1.08 MMOL/L (ref 1.12–1.32)
CALCIUM SERPL-MCNC: 7.6 MG/DL (ref 8.4–10.2)
CALCIUM SERPL-MCNC: 7.6 MG/DL (ref 8.4–10.2)
CALCIUM SERPL-MCNC: 7.8 MG/DL (ref 8.4–10.2)
CALCIUM SERPL-MCNC: 7.8 MG/DL (ref 8.4–10.2)
CALCIUM SERPL-MCNC: 8 MG/DL (ref 8.4–10.2)
CALCIUM SERPL-MCNC: 8 MG/DL (ref 8.4–10.2)
CHLORIDE SERPL-SCNC: 112 MMOL/L (ref 96–108)
CHLORIDE SERPL-SCNC: 112 MMOL/L (ref 96–108)
CHLORIDE SERPL-SCNC: 113 MMOL/L (ref 96–108)
CHLORIDE SERPL-SCNC: 113 MMOL/L (ref 96–108)
CHLORIDE SERPL-SCNC: 114 MMOL/L (ref 96–108)
CHLORIDE SERPL-SCNC: 114 MMOL/L (ref 96–108)
CO2 SERPL-SCNC: 26 MMOL/L (ref 21–32)
CO2 SERPL-SCNC: 26 MMOL/L (ref 21–32)
CO2 SERPL-SCNC: 27 MMOL/L (ref 21–32)
CO2 SERPL-SCNC: 27 MMOL/L (ref 21–32)
CO2 SERPL-SCNC: 28 MMOL/L (ref 21–32)
CO2 SERPL-SCNC: 28 MMOL/L (ref 21–32)
CREAT SERPL-MCNC: 0.68 MG/DL (ref 0.6–1.3)
CREAT SERPL-MCNC: 0.68 MG/DL (ref 0.6–1.3)
CREAT SERPL-MCNC: 0.7 MG/DL (ref 0.6–1.3)
CREAT SERPL-MCNC: 0.7 MG/DL (ref 0.6–1.3)
CREAT SERPL-MCNC: 0.71 MG/DL (ref 0.6–1.3)
CREAT SERPL-MCNC: 0.71 MG/DL (ref 0.6–1.3)
GFR SERPL CREATININE-BSD FRML MDRD: 120 ML/MIN/1.73SQ M
GFR SERPL CREATININE-BSD FRML MDRD: 120 ML/MIN/1.73SQ M
GFR SERPL CREATININE-BSD FRML MDRD: 54 ML/MIN/1.73SQ M
GFR SERPL CREATININE-BSD FRML MDRD: 54 ML/MIN/1.73SQ M
GFR SERPL CREATININE-BSD FRML MDRD: 55 ML/MIN/1.73SQ M
GFR SERPL CREATININE-BSD FRML MDRD: 55 ML/MIN/1.73SQ M
GLUCOSE SERPL-MCNC: 100 MG/DL (ref 65–140)
GLUCOSE SERPL-MCNC: 104 MG/DL (ref 65–140)
GLUCOSE SERPL-MCNC: 104 MG/DL (ref 65–140)
GLUCOSE SERPL-MCNC: 107 MG/DL (ref 65–140)
GLUCOSE SERPL-MCNC: 79 MG/DL (ref 65–140)
GLUCOSE SERPL-MCNC: 79 MG/DL (ref 65–140)
GLUCOSE SERPL-MCNC: 88 MG/DL (ref 65–140)
GLUCOSE SERPL-MCNC: 88 MG/DL (ref 65–140)
GLUCOSE SERPL-MCNC: 90 MG/DL (ref 65–140)
GLUCOSE SERPL-MCNC: 90 MG/DL (ref 65–140)
HBV SURFACE AG SER QL: NORMAL
HBV SURFACE AG SER QL: NORMAL
HCO3 BLDA-SCNC: 25.1 MMOL/L (ref 22–28)
HCO3 BLDA-SCNC: 25.1 MMOL/L (ref 22–28)
HCV AB SER QL: REACTIVE
HCV AB SER QL: REACTIVE
HGB BLD-MCNC: 10.3 G/DL (ref 12–17)
HGB BLD-MCNC: 10.3 G/DL (ref 12–17)
HGB BLD-MCNC: 10.8 G/DL (ref 12–17)
HGB BLD-MCNC: 10.8 G/DL (ref 12–17)
HGB BLD-MCNC: 11.1 G/DL (ref 12–17)
HGB BLD-MCNC: 11.1 G/DL (ref 12–17)
HGB BLD-MCNC: 9.9 G/DL (ref 12–17)
HGB BLD-MCNC: 9.9 G/DL (ref 12–17)
INR PPP: 1.09 (ref 0.85–1.19)
INR PPP: 1.09 (ref 0.85–1.19)
MAGNESIUM SERPL-MCNC: 2.3 MG/DL (ref 1.9–2.7)
MAGNESIUM SERPL-MCNC: 2.3 MG/DL (ref 1.9–2.7)
MAGNESIUM SERPL-MCNC: 2.4 MG/DL (ref 1.9–2.7)
MAGNESIUM SERPL-MCNC: 2.4 MG/DL (ref 1.9–2.7)
O2 CT BLDA-SCNC: 15.5 ML/DL (ref 16–23)
O2 CT BLDA-SCNC: 15.5 ML/DL (ref 16–23)
OXYHGB MFR BLDA: 97.7 % (ref 94–97)
OXYHGB MFR BLDA: 97.7 % (ref 94–97)
PCO2 BLDA: 42.2 MM HG (ref 36–44)
PCO2 BLDA: 42.2 MM HG (ref 36–44)
PH BLDA: 7.39 [PH] (ref 7.35–7.45)
PH BLDA: 7.39 [PH] (ref 7.35–7.45)
PHOSPHATE SERPL-MCNC: 3 MG/DL (ref 2.7–4.5)
PHOSPHATE SERPL-MCNC: 3 MG/DL (ref 2.7–4.5)
PHOSPHATE SERPL-MCNC: 3.1 MG/DL (ref 2.3–4.1)
PHOSPHATE SERPL-MCNC: 3.1 MG/DL (ref 2.3–4.1)
PO2 BLDA: 133.7 MM HG (ref 75–129)
PO2 BLDA: 133.7 MM HG (ref 75–129)
POTASSIUM SERPL-SCNC: 3.7 MMOL/L (ref 3.5–5.3)
POTASSIUM SERPL-SCNC: 3.7 MMOL/L (ref 3.5–5.3)
POTASSIUM SERPL-SCNC: 3.9 MMOL/L (ref 3.5–5.3)
PROT SERPL-MCNC: 5.2 G/DL (ref 6.4–8.4)
PROT SERPL-MCNC: 5.2 G/DL (ref 6.4–8.4)
PROTHROMBIN TIME: 14.8 SECONDS (ref 12.3–15)
PROTHROMBIN TIME: 14.8 SECONDS (ref 12.3–15)
RH BLD: POSITIVE
RH BLD: POSITIVE
SODIUM SERPL-SCNC: 144 MMOL/L (ref 135–147)
SPECIMEN EXPIRATION DATE: NORMAL
SPECIMEN EXPIRATION DATE: NORMAL
SPECIMEN SOURCE: ABNORMAL
SPECIMEN SOURCE: ABNORMAL
VENT- AC: AC
VENT- AC: AC

## 2024-08-06 PROCEDURE — 80048 BASIC METABOLIC PNL TOTAL CA: CPT | Performed by: PHYSICIAN ASSISTANT

## 2024-08-06 PROCEDURE — 85610 PROTHROMBIN TIME: CPT | Performed by: PHYSICIAN ASSISTANT

## 2024-08-06 PROCEDURE — 82805 BLOOD GASES W/O2 SATURATION: CPT | Performed by: PHYSICIAN ASSISTANT

## 2024-08-06 PROCEDURE — 84100 ASSAY OF PHOSPHORUS: CPT | Performed by: PHYSICIAN ASSISTANT

## 2024-08-06 PROCEDURE — 4A133J1 MONITORING OF ARTERIAL PULSE, PERIPHERAL, PERCUTANEOUS APPROACH: ICD-10-PCS | Performed by: SURGERY

## 2024-08-06 PROCEDURE — 86900 BLOOD TYPING SEROLOGIC ABO: CPT | Performed by: PHYSICIAN ASSISTANT

## 2024-08-06 PROCEDURE — 94760 N-INVAS EAR/PLS OXIMETRY 1: CPT

## 2024-08-06 PROCEDURE — 94003 VENT MGMT INPAT SUBQ DAY: CPT

## 2024-08-06 PROCEDURE — NC001 PR NO CHARGE: Performed by: STUDENT IN AN ORGANIZED HEALTH CARE EDUCATION/TRAINING PROGRAM

## 2024-08-06 PROCEDURE — 94150 VITAL CAPACITY TEST: CPT

## 2024-08-06 PROCEDURE — 71045 X-RAY EXAM CHEST 1 VIEW: CPT

## 2024-08-06 PROCEDURE — 83735 ASSAY OF MAGNESIUM: CPT | Performed by: PHYSICIAN ASSISTANT

## 2024-08-06 PROCEDURE — 86850 RBC ANTIBODY SCREEN: CPT | Performed by: PHYSICIAN ASSISTANT

## 2024-08-06 PROCEDURE — 99291 CRITICAL CARE FIRST HOUR: CPT | Performed by: STUDENT IN AN ORGANIZED HEALTH CARE EDUCATION/TRAINING PROGRAM

## 2024-08-06 PROCEDURE — 80076 HEPATIC FUNCTION PANEL: CPT | Performed by: PHYSICIAN ASSISTANT

## 2024-08-06 PROCEDURE — 80048 BASIC METABOLIC PNL TOTAL CA: CPT

## 2024-08-06 PROCEDURE — NC001 PR NO CHARGE: Performed by: PHYSICIAN ASSISTANT

## 2024-08-06 PROCEDURE — 86901 BLOOD TYPING SEROLOGIC RH(D): CPT | Performed by: PHYSICIAN ASSISTANT

## 2024-08-06 PROCEDURE — 4A133B1 MONITORING OF ARTERIAL PRESSURE, PERIPHERAL, PERCUTANEOUS APPROACH: ICD-10-PCS | Performed by: SURGERY

## 2024-08-06 PROCEDURE — 03HY32Z INSERTION OF MONITORING DEVICE INTO UPPER ARTERY, PERCUTANEOUS APPROACH: ICD-10-PCS | Performed by: SURGERY

## 2024-08-06 PROCEDURE — 83735 ASSAY OF MAGNESIUM: CPT

## 2024-08-06 PROCEDURE — 82330 ASSAY OF CALCIUM: CPT | Performed by: PHYSICIAN ASSISTANT

## 2024-08-06 PROCEDURE — 70496 CT ANGIOGRAPHY HEAD: CPT

## 2024-08-06 PROCEDURE — 82330 ASSAY OF CALCIUM: CPT | Performed by: STUDENT IN AN ORGANIZED HEALTH CARE EDUCATION/TRAINING PROGRAM

## 2024-08-06 PROCEDURE — 84100 ASSAY OF PHOSPHORUS: CPT

## 2024-08-06 PROCEDURE — 85018 HEMOGLOBIN: CPT | Performed by: PHYSICIAN ASSISTANT

## 2024-08-06 PROCEDURE — 82948 REAGENT STRIP/BLOOD GLUCOSE: CPT

## 2024-08-06 PROCEDURE — 99233 SBSQ HOSP IP/OBS HIGH 50: CPT | Performed by: PHYSICIAN ASSISTANT

## 2024-08-06 PROCEDURE — 36620 INSERTION CATHETER ARTERY: CPT | Performed by: PHYSICIAN ASSISTANT

## 2024-08-06 PROCEDURE — 85018 HEMOGLOBIN: CPT

## 2024-08-06 RX ORDER — MIDAZOLAM HYDROCHLORIDE 2 MG/2ML
2 INJECTION, SOLUTION INTRAMUSCULAR; INTRAVENOUS EVERY 4 HOURS PRN
Status: DISCONTINUED | OUTPATIENT
Start: 2024-08-06 | End: 2024-08-08

## 2024-08-06 RX ORDER — SODIUM CHLORIDE, SODIUM GLUCONATE, SODIUM ACETATE, POTASSIUM CHLORIDE, MAGNESIUM CHLORIDE, SODIUM PHOSPHATE, DIBASIC, AND POTASSIUM PHOSPHATE .53; .5; .37; .037; .03; .012; .00082 G/100ML; G/100ML; G/100ML; G/100ML; G/100ML; G/100ML; G/100ML
1000 INJECTION, SOLUTION INTRAVENOUS ONCE
Status: COMPLETED | OUTPATIENT
Start: 2024-08-06 | End: 2024-08-06

## 2024-08-06 RX ORDER — CALCIUM GLUCONATE 20 MG/ML
2 INJECTION, SOLUTION INTRAVENOUS ONCE
Status: COMPLETED | OUTPATIENT
Start: 2024-08-06 | End: 2024-08-06

## 2024-08-06 RX ORDER — POTASSIUM CHLORIDE 20MEQ/15ML
20 LIQUID (ML) ORAL ONCE
Status: COMPLETED | OUTPATIENT
Start: 2024-08-06 | End: 2024-08-06

## 2024-08-06 RX ORDER — ACETAMINOPHEN 160 MG/5ML
975 SUSPENSION ORAL EVERY 8 HOURS
Status: DISCONTINUED | OUTPATIENT
Start: 2024-08-06 | End: 2024-08-08

## 2024-08-06 RX ORDER — ACETAMINOPHEN 10 MG/ML
1000 INJECTION, SOLUTION INTRAVENOUS EVERY 8 HOURS
Status: DISCONTINUED | OUTPATIENT
Start: 2024-08-06 | End: 2024-08-06

## 2024-08-06 RX ORDER — CIPROFLOXACIN AND DEXAMETHASONE 3; 1 MG/ML; MG/ML
4 SUSPENSION/ DROPS AURICULAR (OTIC) 2 TIMES DAILY
Status: DISCONTINUED | OUTPATIENT
Start: 2024-08-06 | End: 2024-08-12

## 2024-08-06 RX ORDER — PANTOPRAZOLE SODIUM 40 MG/10ML
40 INJECTION, POWDER, LYOPHILIZED, FOR SOLUTION INTRAVENOUS
Status: DISCONTINUED | OUTPATIENT
Start: 2024-08-06 | End: 2024-08-07

## 2024-08-06 RX ORDER — METHOCARBAMOL 500 MG/1
500 TABLET, FILM COATED ORAL EVERY 6 HOURS SCHEDULED
Status: DISCONTINUED | OUTPATIENT
Start: 2024-08-06 | End: 2024-08-08

## 2024-08-06 RX ADMIN — SODIUM CHLORIDE, SODIUM GLUCONATE, SODIUM ACETATE, POTASSIUM CHLORIDE, MAGNESIUM CHLORIDE, SODIUM PHOSPHATE, DIBASIC, AND POTASSIUM PHOSPHATE 125 ML/HR: .53; .5; .37; .037; .03; .012; .00082 INJECTION, SOLUTION INTRAVENOUS at 12:29

## 2024-08-06 RX ADMIN — ACETAMINOPHEN 975 MG: 650 SUSPENSION ORAL at 17:45

## 2024-08-06 RX ADMIN — LEVETIRACETAM 500 MG: 100 INJECTION, SOLUTION INTRAVENOUS at 08:34

## 2024-08-06 RX ADMIN — SODIUM CHLORIDE, SODIUM GLUCONATE, SODIUM ACETATE, POTASSIUM CHLORIDE, MAGNESIUM CHLORIDE, SODIUM PHOSPHATE, DIBASIC, AND POTASSIUM PHOSPHATE 1000 ML: .53; .5; .37; .037; .03; .012; .00082 INJECTION, SOLUTION INTRAVENOUS at 13:34

## 2024-08-06 RX ADMIN — METHOCARBAMOL 500 MG: 500 TABLET ORAL at 17:30

## 2024-08-06 RX ADMIN — Medication 150 MCG/HR: at 19:18

## 2024-08-06 RX ADMIN — CALCIUM GLUCONATE 2 G: 20 INJECTION, SOLUTION INTRAVENOUS at 16:15

## 2024-08-06 RX ADMIN — CIPROFLOXACIN AND DEXAMETHASONE 4 DROP: 3; 1 SUSPENSION/ DROPS AURICULAR (OTIC) at 17:38

## 2024-08-06 RX ADMIN — CHLORHEXIDINE GLUCONATE 15 ML: 1.2 RINSE ORAL at 08:34

## 2024-08-06 RX ADMIN — Medication 100 MCG/HR: at 01:49

## 2024-08-06 RX ADMIN — PROPOFOL 40 MCG/KG/MIN: 10 INJECTION, EMULSION INTRAVENOUS at 12:28

## 2024-08-06 RX ADMIN — PROPOFOL 50 MCG/KG/MIN: 10 INJECTION, EMULSION INTRAVENOUS at 03:21

## 2024-08-06 RX ADMIN — PROPOFOL 50 MCG/KG/MIN: 10 INJECTION, EMULSION INTRAVENOUS at 07:22

## 2024-08-06 RX ADMIN — AMPICILLIN SODIUM AND SULBACTAM SODIUM 3 G: 100; 50 INJECTION, POWDER, FOR SOLUTION INTRAVENOUS at 06:24

## 2024-08-06 RX ADMIN — AMPICILLIN SODIUM AND SULBACTAM SODIUM 3 G: 100; 50 INJECTION, POWDER, FOR SOLUTION INTRAVENOUS at 01:30

## 2024-08-06 RX ADMIN — Medication 100 MCG/HR: at 12:29

## 2024-08-06 RX ADMIN — AMPICILLIN SODIUM AND SULBACTAM SODIUM 3 G: 100; 50 INJECTION, POWDER, FOR SOLUTION INTRAVENOUS at 11:30

## 2024-08-06 RX ADMIN — BACITRACIN 1 LARGE APPLICATION: 500 OINTMENT TOPICAL at 17:30

## 2024-08-06 RX ADMIN — CHLORHEXIDINE GLUCONATE 15 ML: 1.2 RINSE ORAL at 21:51

## 2024-08-06 RX ADMIN — AMPICILLIN SODIUM AND SULBACTAM SODIUM 3 G: 100; 50 INJECTION, POWDER, FOR SOLUTION INTRAVENOUS at 17:38

## 2024-08-06 RX ADMIN — ACETAMINOPHEN 975 MG: 650 SUSPENSION ORAL at 11:08

## 2024-08-06 RX ADMIN — IOHEXOL 100 ML: 350 INJECTION, SOLUTION INTRAVENOUS at 04:54

## 2024-08-06 RX ADMIN — PROPOFOL 25 MCG/KG/MIN: 10 INJECTION, EMULSION INTRAVENOUS at 23:26

## 2024-08-06 RX ADMIN — POTASSIUM CHLORIDE 20 MEQ: 1.5 SOLUTION ORAL at 06:24

## 2024-08-06 RX ADMIN — PANTOPRAZOLE SODIUM 40 MG: 40 INJECTION, POWDER, FOR SOLUTION INTRAVENOUS at 11:08

## 2024-08-06 RX ADMIN — SODIUM CHLORIDE, SODIUM GLUCONATE, SODIUM ACETATE, POTASSIUM CHLORIDE, MAGNESIUM CHLORIDE, SODIUM PHOSPHATE, DIBASIC, AND POTASSIUM PHOSPHATE 125 ML/HR: .53; .5; .37; .037; .03; .012; .00082 INJECTION, SOLUTION INTRAVENOUS at 01:54

## 2024-08-06 RX ADMIN — METHOCARBAMOL 500 MG: 500 TABLET ORAL at 11:08

## 2024-08-06 RX ADMIN — BACITRACIN 1 LARGE APPLICATION: 500 OINTMENT TOPICAL at 08:34

## 2024-08-06 RX ADMIN — LEVETIRACETAM 500 MG: 100 INJECTION, SOLUTION INTRAVENOUS at 21:51

## 2024-08-06 NOTE — ASSESSMENT & PLAN NOTE
Per sister, Pt. Has longstanding h/o illicit drug use of multiple agents (heroin,methemphatamine, fentanyl)  UDS + benzo, amph/meth, THC, fentanyl; Ethanol 0  Monitor for withdrawal symptoms. T/c pain management consult when stabilized to assist in pain management

## 2024-08-06 NOTE — PROCEDURES
This procedure was performed under emergency conditions, implied consent/two-physician consent.    Left facial lacerations x 3  as well as a small occipital scalp abrasion were thoroughly irrigated and prepped with Betadine.  Note that the large left facial laceration had been closed with 0 Prolene in running locking fashion in the trauma bay for hemostasis.  This suture was removed and the incision was copiously irrigated.    Longitudinal left lateral face 10 cm laceration was closed with 1 deep Vicryl for approximation/hemostasis near the superior aspect followed by 11 simple interrupted 4-0 Prolene sutures.    Left inferior cheek 2 cm laceration and left infraorbital subcentimeter laceration were both closed by Liana Eddy PA-C with 5-0 Prolene, note her documentation.  Approximately 20 cc of 1% lidocaine with epinephrine was used for anesthetic as well as for assistance with hemostasis.  Hemostasis was excellent.    We attempted to close a small approximately 1.5 cm avulsion to the left occiput.  1 foreign body of broken glass had been removed from this wound during the irrigation process.  The avulsed tissue was extremely thin and friable.  After the sutures pulled through the decision was made to debride this nonviable-appearing tissue.  Hemostasis was excellent.    All wounds were dressed with bacitracin.  There were additional abrasions on the left forehead and left chest that were not amenable to primary closure.  These were similarly irrigated and dressed with bacitracin.

## 2024-08-06 NOTE — ASSESSMENT & PLAN NOTE
CT Head: .  right subdural hematoma measuring up to 5 mm. Bilateral frontal and temporal contusions.     Plan:   Neurosurgery consulted. Now s/p right frontal EVD placement by neurosurgery. Opening pressure 9  Neuro checks, continue EVD drain, close neurosurgery follow up

## 2024-08-06 NOTE — PROGRESS NOTES
Cervical Collar Clearance:    The patient's cervical spine was cleared radiologically. Discussed with radiologist Dr. Schoenberger who confirms there is no cervical spine fractures on CTA. Cervical collar removed at this time.     Lauryn Ullrich, DO  8/6/2024 12:51 PM

## 2024-08-06 NOTE — ASSESSMENT & PLAN NOTE
Non surgical management   Some small associated pneumocephalus, but no worsening hemorrhage on repeat CT head
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
## 2024-08-06 NOTE — ASSESSMENT & PLAN NOTE
Fracture of the squamous portion of the left temporal bone with small displaced fragment measuring 5 mm. Fracture extends posteriorly through the superior aspect of the mastoid air cells, involves the posterior squamous suture and extends into the left occipital bone down to the foramen magnum. Small amount of left temporal extra-axial pneumocephaly.     Plan:  Consult OMFS  Consider prophylactic antibiotics in setting of fracture extension to sinus
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
## 2024-08-06 NOTE — ASSESSMENT & PLAN NOTE
CT Head: .  right subdural hematoma measuring up to 5 mm. Bilateral frontal and temporal contusions.     Plan:   Neurosurgery consulted. Now s/p right frontal EVD placement by neurosurgery. Opening pressure 9  Neuro checks, continue EVD drain, close neurosurgery follow up  Keppra seizure ppx x7 days

## 2024-08-06 NOTE — ASSESSMENT & PLAN NOTE
In setting of traumatic head injury    Plan: neuro checks, CAM-ICU  Optimize sleep wake cycle  Keppra seizure ppx

## 2024-08-06 NOTE — ASSESSMENT & PLAN NOTE
CTA:  The proximal aspect of left transverse sinus is patent with subsequent occlusion, adjacent to the left fracture line. Superior sagittal, right transverse and sigmoid sinuses are patent. Straight sinus and internal cerebral veins are patent.  Plan:   OMFS consulted  F/u CT venogram

## 2024-08-06 NOTE — ASSESSMENT & PLAN NOTE
CT Chest: 5 mm posterior right upper lobe nodule (307:73) 3 mm left lower lobe nodule (307:144) 6 mm left lingular nodule (307:154) 5 mm subpleural nodule at the right costophrenic angle (307:204)   Plan: Pulmonary nodules measure up to 6 mm as described.Based on current Fleischner Society 2017 Guidelines on incidental pulmonary nodule, no routine follow-up is needed if the patient is low risk.If the patient is high risk, optional follow-up chest CT at 12 months can be considered.

## 2024-08-06 NOTE — PROCEDURES
During the resuscitation phase of his trauma evaluation patient was noted to have approximately 10 cm laceration to the left face that been dressed by EMS.  The dressing was soaked with blood and after completion of primary survey, after patient had already been intubated, we turned our attention to this laceration.    We took the gauze dressing down and noted some brisk pulsatile bleeding emanating from the superior aspect of the laceration.  During this point patient began to fight his ET tube and became overall very agitated.  He was only trauma bay and patient had not yet gone to the CAT scanner.  Due to the urgency of the situation we opted to temporarily close this laceration for hemostasis.    0 Prolene was used to place 1 figure-of-eight stitch for hemostasis at the superior aspect of the incision where the pulsatile bleeding was coming from.  This resulted in excellent hemostasis.  Using a figure-of-eight as an anchor stitch, the Prolene was ran from superior to inferior.  Hemostasis was excellent.    Pending resuscitation and any necessary procedures, will plan to take this temporary suture out later this evening and irrigate, more formally close.

## 2024-08-06 NOTE — PROCEDURES
Arterial Line Insertion    Date/Time: 8/6/2024 12:24 AM    Performed by: Christina Navarrete PA-C  Authorized by: Christina Navarrete PA-C    Patient location:  Bedside and ICU  Other Assisting Provider: Yes (comment) (Resident Dr. Belle)    Universal protocol:     Test results available and properly labeled: yes      Required blood products, implants, devices, and special equipment available: yes      Patient identity confirmed:  Anonymous protocol, patient vented/unresponsive  Indications:     Indications: hemodynamic monitoring, multiple ABGs, continuous blood pressure monitoring and frequent labs / infusion    Pre-procedure details:     Skin preparation:  Chlorhexidine  Anesthesia (see MAR for exact dosages):     Anesthesia method:  None  Procedure details:     Location / Tip of Catheter:  Radial    Laterality:  Left    Long's test performed: yes      Long's test abnormal: no      Needle gauge:  18 G    Placement technique:  Seldinger    Number of attempts:  2    Successful placement: yes      Transducer: waveform confirmed    Post-procedure details:     Post-procedure:  Sterile dressing applied and sutured    CMS:  Normal    Patient tolerance of procedure:  Tolerated well, no immediate complications  Comments:      Several attempts performed on right radial w/o success.
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
## 2024-08-06 NOTE — ASSESSMENT & PLAN NOTE
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  There is some nonocclusive thrombus noted in the visualized upper cervical internal jugular vein.    Plan:  Will review further with endovascular neurosurgery  Will determine timing for anticoagulation need
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
## 2024-08-06 NOTE — ASSESSMENT & PLAN NOTE
Fracture of the squamous portion of the left temporal bone with small displaced fragment measuring 5 mm. Fracture extends posteriorly through the superior aspect of the mastoid air cells, involves the posterior squamous suture and extends into the left occipital bone down to the foramen magnum. Small amount of left temporal extra-axial pneumocephaly.     Plan:  Consult OMFS  Continue unasyn for fracture extending into sinus

## 2024-08-06 NOTE — ASSESSMENT & PLAN NOTE
CT Head: 1.2 cm posttraumatic subarachnoid hemorrhage is suspected but suboptimally evaluated due to artifact 5 mm of right to left shift.   Neurosurgery consulted. Now s/p right frontal EVD placement by neurosurgery. Opening pressure 9    Plan:   Neuro checks, continue EVD drain, close neurosurgery follow up
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
## 2024-08-06 NOTE — NUTRITION
If tube feeds are indicated, recommend Vital 1.5 @ 40 ml/hr with 1 packet of Prosource for TF BID. Water flushes of 30 ml q 4 hrs while IVF is running.     Start at 10 ml/hr, advance by 10 ml q 4 hrs as tolerated until goal rate is reached.     At goal EN regimen provides 1520 kcals (additional kcals provided by propofol), 87 g protein, and 913 ml total water from formula + flushes.

## 2024-08-06 NOTE — ASSESSMENT & PLAN NOTE
CT Abd: Within the mid right hepatic lobe there is an irregular hypodense region measuring 2.8 x 1.2 x 2 cm (306:113)This hypodensity does not extend to the liver surface. Surrounding cloudlike hyperenhancement.  Plan: serial abdominal checks, serial hemoglobin checks

## 2024-08-06 NOTE — ASSESSMENT & PLAN NOTE
CT Head: 1.2 cm posttraumatic subarachnoid hemorrhage is suspected but suboptimally evaluated due to artifact 5 mm of right to left shift.   Neurosurgery consulted. Now s/p right frontal EVD placement by neurosurgery. Opening pressure 9    Plan:   Neuro checks, continue EVD drain, close neurosurgery follow up  Keppra seizure ppx  Neuro checks q1hr  STAT CT for GCS drop >2pts  Hold chemical DVT ppx
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
## 2024-08-06 NOTE — ASSESSMENT & PLAN NOTE
CTA:  The proximal aspect of left transverse sinus is patent with subsequent occlusion, adjacent to the left fracture line. Superior sagittal, right transverse and sigmoid sinuses are patent. Straight sinus and internal cerebral veins are patent.  Plan: OMFS consulted
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
## 2024-08-06 NOTE — PROCEDURES
Universal Protocol:  Consent: The procedure was performed in an emergent situation.  Patient identity confirmed: arm band  Laceration repair    Date/Time: 8/5/2024 8:05 PM    Performed by: Liana Eddy PA-C  Authorized by: Liana Eddy PA-C  Body area: head/neck  Location details: left cheek  Laceration length: 3 cm  Foreign bodies: no foreign bodies  Tendon involvement: none  Nerve involvement: none  Anesthesia: local infiltration    Anesthesia:  Local Anesthetic: lidocaine 1% with epinephrine  Anesthetic total: 5 mL    Sedation:  Patient sedated: yes      Wound Dehiscence:  Superficial Wound Dehiscence: simple closure      Procedure Details:  Irrigation solution: saline  Irrigation method: syringe  Amount of cleaning: standard  Skin closure: 5-0 Prolene  Number of sutures: 10  Technique: simple  Approximation: close  Approximation difficulty: complex  Dressing: antibiotic ointment  Patient tolerance: patient tolerated the procedure well with no immediate complications

## 2024-08-07 ENCOUNTER — ANESTHESIA EVENT (INPATIENT)
Dept: PERIOP | Facility: HOSPITAL | Age: 34
End: 2024-08-07
Payer: COMMERCIAL

## 2024-08-07 ENCOUNTER — APPOINTMENT (INPATIENT)
Dept: CT IMAGING | Facility: HOSPITAL | Age: 34
DRG: 020 | End: 2024-08-07
Payer: COMMERCIAL

## 2024-08-07 ENCOUNTER — APPOINTMENT (INPATIENT)
Dept: NON INVASIVE DIAGNOSTICS | Facility: HOSPITAL | Age: 34
DRG: 020 | End: 2024-08-07
Payer: COMMERCIAL

## 2024-08-07 ENCOUNTER — APPOINTMENT (INPATIENT)
Dept: RADIOLOGY | Facility: HOSPITAL | Age: 34
DRG: 020 | End: 2024-08-07
Payer: COMMERCIAL

## 2024-08-07 ENCOUNTER — ANESTHESIA (INPATIENT)
Dept: PERIOP | Facility: HOSPITAL | Age: 34
End: 2024-08-07
Payer: COMMERCIAL

## 2024-08-07 LAB
ALBUMIN SERPL BCG-MCNC: 2.9 G/DL (ref 3.5–5)
ALBUMIN SERPL BCG-MCNC: 2.9 G/DL (ref 3.5–5)
ALBUMIN SERPL BCG-MCNC: 3 G/DL (ref 3.5–5)
ALBUMIN SERPL BCG-MCNC: 3 G/DL (ref 3.5–5)
ALP SERPL-CCNC: 59 U/L (ref 34–104)
ALP SERPL-CCNC: 59 U/L (ref 34–104)
ALP SERPL-CCNC: 85 U/L (ref 34–104)
ALP SERPL-CCNC: 85 U/L (ref 34–104)
ALT SERPL W P-5'-P-CCNC: 10 U/L (ref 7–52)
ALT SERPL W P-5'-P-CCNC: 10 U/L (ref 7–52)
ALT SERPL W P-5'-P-CCNC: 27 U/L (ref 7–52)
ALT SERPL W P-5'-P-CCNC: 27 U/L (ref 7–52)
ANION GAP SERPL CALCULATED.3IONS-SCNC: 2 MMOL/L (ref 4–13)
ANION GAP SERPL CALCULATED.3IONS-SCNC: 2 MMOL/L (ref 4–13)
ANION GAP SERPL CALCULATED.3IONS-SCNC: 5 MMOL/L (ref 4–13)
AORTIC ROOT: 3.1 CM
AORTIC ROOT: 3.1 CM
AORTIC VALVE MEAN VELOCITY: 9.2 M/S
AORTIC VALVE MEAN VELOCITY: 9.2 M/S
APICAL FOUR CHAMBER EJECTION FRACTION: 29 %
APICAL FOUR CHAMBER EJECTION FRACTION: 29 %
ASCENDING AORTA: 2.7 CM
ASCENDING AORTA: 2.7 CM
AST SERPL W P-5'-P-CCNC: 30 U/L (ref 13–39)
AST SERPL W P-5'-P-CCNC: 30 U/L (ref 13–39)
AST SERPL W P-5'-P-CCNC: 52 U/L (ref 13–39)
AST SERPL W P-5'-P-CCNC: 52 U/L (ref 13–39)
AV AREA BY CONTINUOUS VTI: 2.4 CM2
AV AREA BY CONTINUOUS VTI: 2.4 CM2
AV AREA PEAK VELOCITY: 2.4 CM2
AV AREA PEAK VELOCITY: 2.4 CM2
AV LVOT MEAN GRADIENT: 3 MMHG
AV LVOT MEAN GRADIENT: 3 MMHG
AV LVOT PEAK GRADIENT: 4 MMHG
AV LVOT PEAK GRADIENT: 4 MMHG
AV MEAN GRADIENT: 4 MMHG
AV MEAN GRADIENT: 4 MMHG
AV PEAK GRADIENT: 7 MMHG
AV PEAK GRADIENT: 7 MMHG
AV VALVE AREA: 2.42 CM2
AV VALVE AREA: 2.42 CM2
AV VELOCITY RATIO: 0.78
AV VELOCITY RATIO: 0.78
BASE EXCESS BLDA CALC-SCNC: 0.7 MMOL/L
BASE EXCESS BLDA CALC-SCNC: 0.7 MMOL/L
BASOPHILS # BLD AUTO: 0.02 THOUSANDS/ÂΜL (ref 0–0.1)
BASOPHILS # BLD AUTO: 0.02 THOUSANDS/ÂΜL (ref 0–0.1)
BASOPHILS # BLD AUTO: 0.03 THOUSANDS/ÂΜL (ref 0–0.1)
BASOPHILS # BLD AUTO: 0.03 THOUSANDS/ÂΜL (ref 0–0.1)
BASOPHILS NFR BLD AUTO: 0 % (ref 0–1)
BILIRUB DIRECT SERPL-MCNC: 0.3 MG/DL (ref 0–0.2)
BILIRUB DIRECT SERPL-MCNC: 0.3 MG/DL (ref 0–0.2)
BILIRUB SERPL-MCNC: 0.74 MG/DL (ref 0.2–1)
BILIRUB SERPL-MCNC: 0.74 MG/DL (ref 0.2–1)
BILIRUB SERPL-MCNC: 0.82 MG/DL (ref 0.2–1)
BILIRUB SERPL-MCNC: 0.82 MG/DL (ref 0.2–1)
BODY TEMPERATURE: 99.9 DEGREES FEHRENHEIT
BODY TEMPERATURE: 99.9 DEGREES FEHRENHEIT
BSA FOR ECHO PROCEDURE: 1.89 M2
BSA FOR ECHO PROCEDURE: 1.89 M2
BUN SERPL-MCNC: 10 MG/DL (ref 5–25)
BUN SERPL-MCNC: 10 MG/DL (ref 5–25)
BUN SERPL-MCNC: 11 MG/DL (ref 5–25)
CALCIUM ALBUM COR SERPL-MCNC: 8.4 MG/DL (ref 8.3–10.1)
CALCIUM ALBUM COR SERPL-MCNC: 8.4 MG/DL (ref 8.3–10.1)
CALCIUM SERPL-MCNC: 7.6 MG/DL (ref 8.4–10.2)
CFFMA (FUNCTIONAL FIBRINOGEN MAX AMPLITUDE): 23.3 MM (ref 15–32)
CFFMA (FUNCTIONAL FIBRINOGEN MAX AMPLITUDE): 23.3 MM (ref 15–32)
CHLORIDE SERPL-SCNC: 110 MMOL/L (ref 96–108)
CHLORIDE SERPL-SCNC: 111 MMOL/L (ref 96–108)
CHLORIDE SERPL-SCNC: 111 MMOL/L (ref 96–108)
CK SERPL-CCNC: 488 U/L (ref 39–308)
CK SERPL-CCNC: 488 U/L (ref 39–308)
CKLY30: 4.3 % (ref 0–2.6)
CKLY30: 4.3 % (ref 0–2.6)
CKR(REACTION TIME): 6.2 MIN (ref 4.6–9.1)
CKR(REACTION TIME): 6.2 MIN (ref 4.6–9.1)
CO2 SERPL-SCNC: 25 MMOL/L (ref 21–32)
CO2 SERPL-SCNC: 25 MMOL/L (ref 21–32)
CO2 SERPL-SCNC: 26 MMOL/L (ref 21–32)
CO2 SERPL-SCNC: 26 MMOL/L (ref 21–32)
CO2 SERPL-SCNC: 27 MMOL/L (ref 21–32)
CO2 SERPL-SCNC: 27 MMOL/L (ref 21–32)
CREAT SERPL-MCNC: 0.58 MG/DL (ref 0.6–1.3)
CREAT SERPL-MCNC: 0.58 MG/DL (ref 0.6–1.3)
CREAT SERPL-MCNC: 0.65 MG/DL (ref 0.6–1.3)
CREAT SERPL-MCNC: 0.65 MG/DL (ref 0.6–1.3)
CREAT SERPL-MCNC: 0.71 MG/DL (ref 0.6–1.3)
CREAT SERPL-MCNC: 0.71 MG/DL (ref 0.6–1.3)
CRTMA(RAPIDTEG MAX AMPLITUDE): 63.1 MM (ref 52–70)
CRTMA(RAPIDTEG MAX AMPLITUDE): 63.1 MM (ref 52–70)
DOP CALC AO PEAK VEL: 1.36 M/S
DOP CALC AO PEAK VEL: 1.36 M/S
DOP CALC AO VTI: 24.89 CM
DOP CALC AO VTI: 24.89 CM
DOP CALC LVOT AREA: 3.14 CM2
DOP CALC LVOT AREA: 3.14 CM2
DOP CALC LVOT CARDIAC INDEX: 2.48 L/MIN/M2
DOP CALC LVOT CARDIAC INDEX: 2.48 L/MIN/M2
DOP CALC LVOT CARDIAC OUTPUT: 4.69 L/MIN
DOP CALC LVOT CARDIAC OUTPUT: 4.69 L/MIN
DOP CALC LVOT DIAMETER: 2 CM
DOP CALC LVOT DIAMETER: 2 CM
DOP CALC LVOT PEAK VEL VTI: 19.21 CM
DOP CALC LVOT PEAK VEL VTI: 19.21 CM
DOP CALC LVOT PEAK VEL: 1.06 M/S
DOP CALC LVOT PEAK VEL: 1.06 M/S
DOP CALC LVOT STROKE INDEX: 31.7 ML/M2
DOP CALC LVOT STROKE INDEX: 31.7 ML/M2
DOP CALC LVOT STROKE VOLUME: 60.32
DOP CALC LVOT STROKE VOLUME: 60.32
E WAVE DECELERATION TIME: 165 MS
E WAVE DECELERATION TIME: 165 MS
E/A RATIO: 1.78
E/A RATIO: 1.78
EOSINOPHIL # BLD AUTO: 0.18 THOUSAND/ÂΜL (ref 0–0.61)
EOSINOPHIL # BLD AUTO: 0.18 THOUSAND/ÂΜL (ref 0–0.61)
EOSINOPHIL # BLD AUTO: 0.21 THOUSAND/ÂΜL (ref 0–0.61)
EOSINOPHIL # BLD AUTO: 0.21 THOUSAND/ÂΜL (ref 0–0.61)
EOSINOPHIL NFR BLD AUTO: 2 % (ref 0–6)
EOSINOPHIL NFR BLD AUTO: 2 % (ref 0–6)
EOSINOPHIL NFR BLD AUTO: 3 % (ref 0–6)
EOSINOPHIL NFR BLD AUTO: 3 % (ref 0–6)
ERYTHROCYTE [DISTWIDTH] IN BLOOD BY AUTOMATED COUNT: 13 % (ref 11.6–15.1)
ERYTHROCYTE [DISTWIDTH] IN BLOOD BY AUTOMATED COUNT: 13 % (ref 11.6–15.1)
ERYTHROCYTE [DISTWIDTH] IN BLOOD BY AUTOMATED COUNT: 13.4 % (ref 11.6–15.1)
ERYTHROCYTE [DISTWIDTH] IN BLOOD BY AUTOMATED COUNT: 13.4 % (ref 11.6–15.1)
FRACTIONAL SHORTENING: 19 (ref 28–44)
FRACTIONAL SHORTENING: 19 (ref 28–44)
GFR SERPL CREATININE-BSD FRML MDRD: 120 ML/MIN/1.73SQ M
GFR SERPL CREATININE-BSD FRML MDRD: 120 ML/MIN/1.73SQ M
GFR SERPL CREATININE-BSD FRML MDRD: 125 ML/MIN/1.73SQ M
GFR SERPL CREATININE-BSD FRML MDRD: 125 ML/MIN/1.73SQ M
GFR SERPL CREATININE-BSD FRML MDRD: 131 ML/MIN/1.73SQ M
GFR SERPL CREATININE-BSD FRML MDRD: 131 ML/MIN/1.73SQ M
GLUCOSE SERPL-MCNC: 114 MG/DL (ref 65–140)
GLUCOSE SERPL-MCNC: 117 MG/DL (ref 65–140)
GLUCOSE SERPL-MCNC: 117 MG/DL (ref 65–140)
GLUCOSE SERPL-MCNC: 125 MG/DL (ref 65–140)
GLUCOSE SERPL-MCNC: 125 MG/DL (ref 65–140)
GLUCOSE SERPL-MCNC: 91 MG/DL (ref 65–140)
GLUCOSE SERPL-MCNC: 91 MG/DL (ref 65–140)
HCO3 BLDA-SCNC: 25 MMOL/L (ref 22–28)
HCO3 BLDA-SCNC: 25 MMOL/L (ref 22–28)
HCT VFR BLD AUTO: 28.8 % (ref 36.5–49.3)
HCT VFR BLD AUTO: 28.8 % (ref 36.5–49.3)
HCT VFR BLD AUTO: 30.3 % (ref 36.5–49.3)
HCT VFR BLD AUTO: 30.3 % (ref 36.5–49.3)
HCV RNA SERPL NAA+PROBE-ACNC: NOT DETECTED K[IU]/ML
HCV RNA SERPL NAA+PROBE-ACNC: NOT DETECTED K[IU]/ML
HGB BLD-MCNC: 9.4 G/DL (ref 12–17)
HGB BLD-MCNC: 9.4 G/DL (ref 12–17)
HGB BLD-MCNC: 9.7 G/DL (ref 12–17)
HGB BLD-MCNC: 9.7 G/DL (ref 12–17)
HOROWITZ INDEX BLDA+IHG-RTO: 40 MM[HG]
HOROWITZ INDEX BLDA+IHG-RTO: 40 MM[HG]
IMM GRANULOCYTES # BLD AUTO: 0.03 THOUSAND/UL (ref 0–0.2)
IMM GRANULOCYTES NFR BLD AUTO: 0 % (ref 0–2)
INTERVENTRICULAR SEPTUM IN DIASTOLE (PARASTERNAL SHORT AXIS VIEW): 0.9 CM
INTERVENTRICULAR SEPTUM IN DIASTOLE (PARASTERNAL SHORT AXIS VIEW): 0.9 CM
INTERVENTRICULAR SEPTUM: 0.9 CM (ref 0.6–1.1)
INTERVENTRICULAR SEPTUM: 0.9 CM (ref 0.6–1.1)
LAAS-AP2: 12.9 CM2
LAAS-AP2: 12.9 CM2
LAAS-AP4: 14.3 CM2
LAAS-AP4: 14.3 CM2
LACTATE SERPL-SCNC: 0.5 MMOL/L (ref 0.5–2)
LACTATE SERPL-SCNC: 0.5 MMOL/L (ref 0.5–2)
LEFT ATRIUM SIZE: 3.9 CM
LEFT ATRIUM SIZE: 3.9 CM
LEFT ATRIUM VOLUME (MOD BIPLANE): 33 ML
LEFT ATRIUM VOLUME (MOD BIPLANE): 33 ML
LEFT ATRIUM VOLUME INDEX (MOD BIPLANE): 17.5 ML/M2
LEFT ATRIUM VOLUME INDEX (MOD BIPLANE): 17.5 ML/M2
LEFT INTERNAL DIMENSION IN SYSTOLE: 5.6 CM (ref 2.1–4)
LEFT INTERNAL DIMENSION IN SYSTOLE: 5.6 CM (ref 2.1–4)
LEFT VENTRICLE DIASTOLIC VOLUME (MOD BIPLANE): 126 ML
LEFT VENTRICLE DIASTOLIC VOLUME (MOD BIPLANE): 126 ML
LEFT VENTRICLE DIASTOLIC VOLUME INDEX (MOD BIPLANE): 66.7 ML/M2
LEFT VENTRICLE DIASTOLIC VOLUME INDEX (MOD BIPLANE): 66.7 ML/M2
LEFT VENTRICLE SYSTOLIC VOLUME (MOD BIPLANE): 81 ML
LEFT VENTRICLE SYSTOLIC VOLUME (MOD BIPLANE): 81 ML
LEFT VENTRICLE SYSTOLIC VOLUME INDEX (MOD BIPLANE): 42.9 ML/M2
LEFT VENTRICLE SYSTOLIC VOLUME INDEX (MOD BIPLANE): 42.9 ML/M2
LEFT VENTRICULAR INTERNAL DIMENSION IN DIASTOLE: 6.9 CM (ref 3.5–6)
LEFT VENTRICULAR INTERNAL DIMENSION IN DIASTOLE: 6.9 CM (ref 3.5–6)
LEFT VENTRICULAR POSTERIOR WALL IN END DIASTOLE: 0.9 CM
LEFT VENTRICULAR POSTERIOR WALL IN END DIASTOLE: 0.9 CM
LEFT VENTRICULAR STROKE VOLUME: 96 ML
LEFT VENTRICULAR STROKE VOLUME: 96 ML
LV EF: 36 %
LV EF: 36 %
LVSV (TEICH): 96 ML
LVSV (TEICH): 96 ML
LYMPHOCYTES # BLD AUTO: 1.15 THOUSANDS/ÂΜL (ref 0.6–4.47)
LYMPHOCYTES # BLD AUTO: 1.15 THOUSANDS/ÂΜL (ref 0.6–4.47)
LYMPHOCYTES # BLD AUTO: 1.26 THOUSANDS/ÂΜL (ref 0.6–4.47)
LYMPHOCYTES # BLD AUTO: 1.26 THOUSANDS/ÂΜL (ref 0.6–4.47)
LYMPHOCYTES NFR BLD AUTO: 13 % (ref 14–44)
LYMPHOCYTES NFR BLD AUTO: 13 % (ref 14–44)
LYMPHOCYTES NFR BLD AUTO: 15 % (ref 14–44)
LYMPHOCYTES NFR BLD AUTO: 15 % (ref 14–44)
MAGNESIUM SERPL-MCNC: 2 MG/DL (ref 1.9–2.7)
MAGNESIUM SERPL-MCNC: 2 MG/DL (ref 1.9–2.7)
MAGNESIUM SERPL-MCNC: 2.1 MG/DL (ref 1.9–2.7)
MAGNESIUM SERPL-MCNC: 2.1 MG/DL (ref 1.9–2.7)
MCH RBC QN AUTO: 31.1 PG (ref 26.8–34.3)
MCH RBC QN AUTO: 31.1 PG (ref 26.8–34.3)
MCH RBC QN AUTO: 31.8 PG (ref 26.8–34.3)
MCH RBC QN AUTO: 31.8 PG (ref 26.8–34.3)
MCHC RBC AUTO-ENTMCNC: 32 G/DL (ref 31.4–37.4)
MCHC RBC AUTO-ENTMCNC: 32 G/DL (ref 31.4–37.4)
MCHC RBC AUTO-ENTMCNC: 32.6 G/DL (ref 31.4–37.4)
MCHC RBC AUTO-ENTMCNC: 32.6 G/DL (ref 31.4–37.4)
MCV RBC AUTO: 97 FL (ref 82–98)
MONOCYTES # BLD AUTO: 0.47 THOUSAND/ÂΜL (ref 0.17–1.22)
MONOCYTES # BLD AUTO: 0.47 THOUSAND/ÂΜL (ref 0.17–1.22)
MONOCYTES # BLD AUTO: 0.49 THOUSAND/ÂΜL (ref 0.17–1.22)
MONOCYTES # BLD AUTO: 0.49 THOUSAND/ÂΜL (ref 0.17–1.22)
MONOCYTES NFR BLD AUTO: 6 % (ref 4–12)
MV E'TISSUE VEL-SEP: 9 CM/S
MV E'TISSUE VEL-SEP: 9 CM/S
MV PEAK A VEL: 0.6 M/S
MV PEAK A VEL: 0.6 M/S
MV PEAK E VEL: 107 CM/S
MV PEAK E VEL: 107 CM/S
MV STENOSIS PRESSURE HALF TIME: 48 MS
MV STENOSIS PRESSURE HALF TIME: 48 MS
MV VALVE AREA P 1/2 METHOD: 4.58
MV VALVE AREA P 1/2 METHOD: 4.58
NEUTROPHILS # BLD AUTO: 6.48 THOUSANDS/ÂΜL (ref 1.85–7.62)
NEUTROPHILS # BLD AUTO: 6.48 THOUSANDS/ÂΜL (ref 1.85–7.62)
NEUTROPHILS # BLD AUTO: 6.67 THOUSANDS/ÂΜL (ref 1.85–7.62)
NEUTROPHILS # BLD AUTO: 6.67 THOUSANDS/ÂΜL (ref 1.85–7.62)
NEUTS SEG NFR BLD AUTO: 77 % (ref 43–75)
NEUTS SEG NFR BLD AUTO: 77 % (ref 43–75)
NEUTS SEG NFR BLD AUTO: 78 % (ref 43–75)
NEUTS SEG NFR BLD AUTO: 78 % (ref 43–75)
NRBC BLD AUTO-RTO: 0 /100 WBCS
O2 CT BLDA-SCNC: 15.1 ML/DL (ref 16–23)
O2 CT BLDA-SCNC: 15.1 ML/DL (ref 16–23)
OXYHGB MFR BLDA: 98.4 % (ref 94–97)
OXYHGB MFR BLDA: 98.4 % (ref 94–97)
PCO2 BLDA: 38.9 MM HG (ref 36–44)
PCO2 BLDA: 38.9 MM HG (ref 36–44)
PEEP RESPIRATORY: 6 CM[H2O]
PEEP RESPIRATORY: 6 CM[H2O]
PH BLDA: 7.43 [PH] (ref 7.35–7.45)
PH BLDA: 7.43 [PH] (ref 7.35–7.45)
PHOSPHATE SERPL-MCNC: 3 MG/DL (ref 2.7–4.5)
PHOSPHATE SERPL-MCNC: 3 MG/DL (ref 2.7–4.5)
PHOSPHATE SERPL-MCNC: 3.6 MG/DL (ref 2.7–4.5)
PHOSPHATE SERPL-MCNC: 3.6 MG/DL (ref 2.7–4.5)
PLATELET # BLD AUTO: 148 THOUSANDS/UL (ref 149–390)
PLATELET # BLD AUTO: 148 THOUSANDS/UL (ref 149–390)
PLATELET # BLD AUTO: 160 THOUSANDS/UL (ref 149–390)
PLATELET # BLD AUTO: 160 THOUSANDS/UL (ref 149–390)
PLATELET BLD QL SMEAR: ABNORMAL
PLATELET BLD QL SMEAR: ABNORMAL
PMV BLD AUTO: 10.1 FL (ref 8.9–12.7)
PO2 BLDA: 150.6 MM HG (ref 75–129)
PO2 BLDA: 150.6 MM HG (ref 75–129)
POTASSIUM SERPL-SCNC: 3.7 MMOL/L (ref 3.5–5.3)
POTASSIUM SERPL-SCNC: 3.7 MMOL/L (ref 3.5–5.3)
POTASSIUM SERPL-SCNC: 3.8 MMOL/L (ref 3.5–5.3)
POTASSIUM SERPL-SCNC: 3.8 MMOL/L (ref 3.5–5.3)
POTASSIUM SERPL-SCNC: 4.1 MMOL/L (ref 3.5–5.3)
POTASSIUM SERPL-SCNC: 4.1 MMOL/L (ref 3.5–5.3)
PROT SERPL-MCNC: 4.9 G/DL (ref 6.4–8.4)
PROT SERPL-MCNC: 4.9 G/DL (ref 6.4–8.4)
PROT SERPL-MCNC: 5.2 G/DL (ref 6.4–8.4)
PROT SERPL-MCNC: 5.2 G/DL (ref 6.4–8.4)
RBC # BLD AUTO: 2.96 MILLION/UL (ref 3.88–5.62)
RBC # BLD AUTO: 2.96 MILLION/UL (ref 3.88–5.62)
RBC # BLD AUTO: 3.12 MILLION/UL (ref 3.88–5.62)
RBC # BLD AUTO: 3.12 MILLION/UL (ref 3.88–5.62)
RBC MORPH BLD: NORMAL
RBC MORPH BLD: NORMAL
RIGHT ATRIUM AREA SYSTOLE A4C: 9.9 CM2
RIGHT ATRIUM AREA SYSTOLE A4C: 9.9 CM2
RIGHT VENTRICLE ID DIMENSION: 3 CM
RIGHT VENTRICLE ID DIMENSION: 3 CM
SL CV LEFT ATRIUM LENGTH A2C: 4.9 CM
SL CV LEFT ATRIUM LENGTH A2C: 4.9 CM
SL CV LV EF: 30
SL CV LV EF: 30
SL CV PED ECHO LEFT VENTRICLE DIASTOLIC VOLUME (MOD BIPLANE) 2D: 247 ML
SL CV PED ECHO LEFT VENTRICLE DIASTOLIC VOLUME (MOD BIPLANE) 2D: 247 ML
SL CV PED ECHO LEFT VENTRICLE SYSTOLIC VOLUME (MOD BIPLANE) 2D: 152 ML
SL CV PED ECHO LEFT VENTRICLE SYSTOLIC VOLUME (MOD BIPLANE) 2D: 152 ML
SODIUM SERPL-SCNC: 138 MMOL/L (ref 135–147)
SODIUM SERPL-SCNC: 138 MMOL/L (ref 135–147)
SODIUM SERPL-SCNC: 140 MMOL/L (ref 135–147)
SODIUM SERPL-SCNC: 140 MMOL/L (ref 135–147)
SODIUM SERPL-SCNC: 143 MMOL/L (ref 135–147)
SODIUM SERPL-SCNC: 143 MMOL/L (ref 135–147)
SPECIMEN SOURCE: ABNORMAL
SPECIMEN SOURCE: ABNORMAL
TR MAX PG: 17 MMHG
TR MAX PG: 17 MMHG
TR PEAK VELOCITY: 2.1 M/S
TR PEAK VELOCITY: 2.1 M/S
TRICUSPID ANNULAR PLANE SYSTOLIC EXCURSION: 2 CM
TRICUSPID ANNULAR PLANE SYSTOLIC EXCURSION: 2 CM
TRICUSPID VALVE PEAK REGURGITATION VELOCITY: 2.05 M/S
TRICUSPID VALVE PEAK REGURGITATION VELOCITY: 2.05 M/S
VENT AC: 18
VENT AC: 18
VENT- AC: AC
VENT- AC: AC
VT SETTING VENT: 450 ML
VT SETTING VENT: 450 ML
WBC # BLD AUTO: 8.46 THOUSAND/UL (ref 4.31–10.16)
WBC # BLD AUTO: 8.46 THOUSAND/UL (ref 4.31–10.16)
WBC # BLD AUTO: 8.56 THOUSAND/UL (ref 4.31–10.16)
WBC # BLD AUTO: 8.56 THOUSAND/UL (ref 4.31–10.16)

## 2024-08-07 PROCEDURE — 0NSN04Z REPOSITION LEFT ZYGOMATIC BONE WITH INTERNAL FIXATION DEVICE, OPEN APPROACH: ICD-10-PCS | Performed by: DENTIST

## 2024-08-07 PROCEDURE — 99291 CRITICAL CARE FIRST HOUR: CPT | Performed by: STUDENT IN AN ORGANIZED HEALTH CARE EDUCATION/TRAINING PROGRAM

## 2024-08-07 PROCEDURE — 85576 BLOOD PLATELET AGGREGATION: CPT | Performed by: NURSE PRACTITIONER

## 2024-08-07 PROCEDURE — 82805 BLOOD GASES W/O2 SATURATION: CPT | Performed by: NURSE PRACTITIONER

## 2024-08-07 PROCEDURE — 85347 COAGULATION TIME ACTIVATED: CPT | Performed by: NURSE PRACTITIONER

## 2024-08-07 PROCEDURE — 85025 COMPLETE CBC W/AUTO DIFF WBC: CPT

## 2024-08-07 PROCEDURE — 93306 TTE W/DOPPLER COMPLETE: CPT

## 2024-08-07 PROCEDURE — 94760 N-INVAS EAR/PLS OXIMETRY 1: CPT

## 2024-08-07 PROCEDURE — 80076 HEPATIC FUNCTION PANEL: CPT

## 2024-08-07 PROCEDURE — 80053 COMPREHEN METABOLIC PANEL: CPT | Performed by: NURSE PRACTITIONER

## 2024-08-07 PROCEDURE — 83605 ASSAY OF LACTIC ACID: CPT | Performed by: NURSE PRACTITIONER

## 2024-08-07 PROCEDURE — 83735 ASSAY OF MAGNESIUM: CPT | Performed by: NURSE PRACTITIONER

## 2024-08-07 PROCEDURE — 84100 ASSAY OF PHOSPHORUS: CPT

## 2024-08-07 PROCEDURE — 94150 VITAL CAPACITY TEST: CPT

## 2024-08-07 PROCEDURE — 99232 SBSQ HOSP IP/OBS MODERATE 35: CPT | Performed by: PHYSICIAN ASSISTANT

## 2024-08-07 PROCEDURE — 80048 BASIC METABOLIC PNL TOTAL CA: CPT | Performed by: PHYSICIAN ASSISTANT

## 2024-08-07 PROCEDURE — 83735 ASSAY OF MAGNESIUM: CPT

## 2024-08-07 PROCEDURE — 82550 ASSAY OF CK (CPK): CPT | Performed by: NURSE PRACTITIONER

## 2024-08-07 PROCEDURE — 85397 CLOTTING FUNCT ACTIVITY: CPT | Performed by: NURSE PRACTITIONER

## 2024-08-07 PROCEDURE — 84100 ASSAY OF PHOSPHORUS: CPT | Performed by: NURSE PRACTITIONER

## 2024-08-07 PROCEDURE — 85025 COMPLETE CBC W/AUTO DIFF WBC: CPT | Performed by: NURSE PRACTITIONER

## 2024-08-07 PROCEDURE — 94640 AIRWAY INHALATION TREATMENT: CPT

## 2024-08-07 PROCEDURE — 71045 X-RAY EXAM CHEST 1 VIEW: CPT

## 2024-08-07 PROCEDURE — 70480 CT ORBIT/EAR/FOSSA W/O DYE: CPT

## 2024-08-07 PROCEDURE — 70450 CT HEAD/BRAIN W/O DYE: CPT

## 2024-08-07 PROCEDURE — NC001 PR NO CHARGE: Performed by: NURSE PRACTITIONER

## 2024-08-07 PROCEDURE — 85384 FIBRINOGEN ACTIVITY: CPT | Performed by: NURSE PRACTITIONER

## 2024-08-07 PROCEDURE — 82948 REAGENT STRIP/BLOOD GLUCOSE: CPT

## 2024-08-07 PROCEDURE — 93306 TTE W/DOPPLER COMPLETE: CPT | Performed by: INTERNAL MEDICINE

## 2024-08-07 RX ORDER — CEFAZOLIN SODIUM 1 G/3ML
INJECTION, POWDER, FOR SOLUTION INTRAMUSCULAR; INTRAVENOUS AS NEEDED
Status: DISCONTINUED | OUTPATIENT
Start: 2024-08-07 | End: 2024-08-07

## 2024-08-07 RX ORDER — ROCURONIUM BROMIDE 10 MG/ML
INJECTION, SOLUTION INTRAVENOUS AS NEEDED
Status: DISCONTINUED | OUTPATIENT
Start: 2024-08-07 | End: 2024-08-07

## 2024-08-07 RX ORDER — LEVALBUTEROL INHALATION SOLUTION 1.25 MG/3ML
1.25 SOLUTION RESPIRATORY (INHALATION)
Status: DISCONTINUED | OUTPATIENT
Start: 2024-08-07 | End: 2024-08-10

## 2024-08-07 RX ORDER — SODIUM CHLORIDE, SODIUM GLUCONATE, SODIUM ACETATE, POTASSIUM CHLORIDE, MAGNESIUM CHLORIDE, SODIUM PHOSPHATE, DIBASIC, AND POTASSIUM PHOSPHATE .53; .5; .37; .037; .03; .012; .00082 G/100ML; G/100ML; G/100ML; G/100ML; G/100ML; G/100ML; G/100ML
1000 INJECTION, SOLUTION INTRAVENOUS ONCE
Status: COMPLETED | OUTPATIENT
Start: 2024-08-07 | End: 2024-08-07

## 2024-08-07 RX ORDER — SODIUM CHLORIDE FOR INHALATION 3 %
4 VIAL, NEBULIZER (ML) INHALATION
Status: DISCONTINUED | OUTPATIENT
Start: 2024-08-07 | End: 2024-08-10

## 2024-08-07 RX ORDER — SODIUM CHLORIDE 9 MG/ML
INJECTION, SOLUTION INTRAVENOUS CONTINUOUS PRN
Status: DISCONTINUED | OUTPATIENT
Start: 2024-08-07 | End: 2024-08-07

## 2024-08-07 RX ORDER — SODIUM CHLORIDE, SODIUM GLUCONATE, SODIUM ACETATE, POTASSIUM CHLORIDE, MAGNESIUM CHLORIDE, SODIUM PHOSPHATE, DIBASIC, AND POTASSIUM PHOSPHATE .53; .5; .37; .037; .03; .012; .00082 G/100ML; G/100ML; G/100ML; G/100ML; G/100ML; G/100ML; G/100ML
500 INJECTION, SOLUTION INTRAVENOUS ONCE
Status: COMPLETED | OUTPATIENT
Start: 2024-08-07 | End: 2024-08-07

## 2024-08-07 RX ORDER — POLYETHYLENE GLYCOL 3350 17 G/17G
17 POWDER, FOR SOLUTION ORAL DAILY
Status: DISCONTINUED | OUTPATIENT
Start: 2024-08-07 | End: 2024-08-29 | Stop reason: HOSPADM

## 2024-08-07 RX ORDER — BUPIVACAINE HYDROCHLORIDE AND EPINEPHRINE 5; 5 MG/ML; UG/ML
INJECTION, SOLUTION PERINEURAL AS NEEDED
Status: DISCONTINUED | OUTPATIENT
Start: 2024-08-07 | End: 2024-08-07 | Stop reason: HOSPADM

## 2024-08-07 RX ORDER — SODIUM CHLORIDE, SODIUM GLUCONATE, SODIUM ACETATE, POTASSIUM CHLORIDE, MAGNESIUM CHLORIDE, SODIUM PHOSPHATE, DIBASIC, AND POTASSIUM PHOSPHATE .53; .5; .37; .037; .03; .012; .00082 G/100ML; G/100ML; G/100ML; G/100ML; G/100ML; G/100ML; G/100ML
1000 INJECTION, SOLUTION INTRAVENOUS ONCE
Status: COMPLETED | OUTPATIENT
Start: 2024-08-07 | End: 2024-08-08

## 2024-08-07 RX ORDER — LEVETIRACETAM 500 MG/1
500 TABLET ORAL EVERY 12 HOURS SCHEDULED
Status: DISCONTINUED | OUTPATIENT
Start: 2024-08-07 | End: 2024-08-08

## 2024-08-07 RX ORDER — ACETAMINOPHEN 160 MG
TABLET,DISINTEGRATING ORAL AS NEEDED
Status: DISCONTINUED | OUTPATIENT
Start: 2024-08-07 | End: 2024-08-07 | Stop reason: HOSPADM

## 2024-08-07 RX ORDER — HEPARIN SODIUM 5000 [USP'U]/ML
5000 INJECTION, SOLUTION INTRAVENOUS; SUBCUTANEOUS EVERY 8 HOURS SCHEDULED
Status: DISCONTINUED | OUTPATIENT
Start: 2024-08-07 | End: 2024-08-09

## 2024-08-07 RX ORDER — CALCIUM GLUCONATE 20 MG/ML
2 INJECTION, SOLUTION INTRAVENOUS ONCE
Status: COMPLETED | OUTPATIENT
Start: 2024-08-07 | End: 2024-08-07

## 2024-08-07 RX ORDER — MAGNESIUM HYDROXIDE 1200 MG/15ML
LIQUID ORAL AS NEEDED
Status: DISCONTINUED | OUTPATIENT
Start: 2024-08-07 | End: 2024-08-07 | Stop reason: HOSPADM

## 2024-08-07 RX ORDER — DEXMEDETOMIDINE HYDROCHLORIDE 4 UG/ML
.1-1.2 INJECTION, SOLUTION INTRAVENOUS
Status: DISCONTINUED | OUTPATIENT
Start: 2024-08-07 | End: 2024-08-10

## 2024-08-07 RX ORDER — AMOXICILLIN 250 MG
2 CAPSULE ORAL 2 TIMES DAILY
Status: DISCONTINUED | OUTPATIENT
Start: 2024-08-07 | End: 2024-08-29 | Stop reason: HOSPADM

## 2024-08-07 RX ORDER — MIDAZOLAM HYDROCHLORIDE 2 MG/2ML
INJECTION, SOLUTION INTRAMUSCULAR; INTRAVENOUS CONTINUOUS PRN
Status: DISCONTINUED | OUTPATIENT
Start: 2024-08-07 | End: 2024-08-07

## 2024-08-07 RX ADMIN — Medication 150 MCG/HR: at 07:20

## 2024-08-07 RX ADMIN — PROPOFOL 15 MCG/KG/MIN: 10 INJECTION, EMULSION INTRAVENOUS at 15:29

## 2024-08-07 RX ADMIN — SODIUM CHLORIDE, SODIUM GLUCONATE, SODIUM ACETATE, POTASSIUM CHLORIDE, MAGNESIUM CHLORIDE, SODIUM PHOSPHATE, DIBASIC, AND POTASSIUM PHOSPHATE 125 ML/HR: .53; .5; .37; .037; .03; .012; .00082 INJECTION, SOLUTION INTRAVENOUS at 00:00

## 2024-08-07 RX ADMIN — AMPICILLIN SODIUM AND SULBACTAM SODIUM 3 G: 100; 50 INJECTION, POWDER, FOR SOLUTION INTRAVENOUS at 12:04

## 2024-08-07 RX ADMIN — SODIUM CHLORIDE: 0.9 INJECTION, SOLUTION INTRAVENOUS at 16:12

## 2024-08-07 RX ADMIN — PANTOPRAZOLE SODIUM 40 MG: 40 INJECTION, POWDER, FOR SOLUTION INTRAVENOUS at 08:55

## 2024-08-07 RX ADMIN — LEVALBUTEROL HYDROCHLORIDE 1.25 MG: 1.25 SOLUTION RESPIRATORY (INHALATION) at 20:03

## 2024-08-07 RX ADMIN — SODIUM CHLORIDE, SODIUM GLUCONATE, SODIUM ACETATE, POTASSIUM CHLORIDE, MAGNESIUM CHLORIDE, SODIUM PHOSPHATE, DIBASIC, AND POTASSIUM PHOSPHATE 1000 ML: .53; .5; .37; .037; .03; .012; .00082 INJECTION, SOLUTION INTRAVENOUS at 08:59

## 2024-08-07 RX ADMIN — NOREPINEPHRINE BITARTRATE 4 MCG: 1 INJECTION, SOLUTION, CONCENTRATE INTRAVENOUS at 16:25

## 2024-08-07 RX ADMIN — ROCURONIUM BROMIDE 20 MG: 10 INJECTION INTRAVENOUS at 16:27

## 2024-08-07 RX ADMIN — ACETAMINOPHEN 975 MG: 650 SUSPENSION ORAL at 18:46

## 2024-08-07 RX ADMIN — PROPOFOL 30 MCG/KG/MIN: 10 INJECTION, EMULSION INTRAVENOUS at 21:14

## 2024-08-07 RX ADMIN — PROPOFOL 25 MCG/KG/MIN: 10 INJECTION, EMULSION INTRAVENOUS at 06:26

## 2024-08-07 RX ADMIN — SENNOSIDES, DOCUSATE SODIUM 2 TABLET: 8.6; 5 TABLET ORAL at 12:02

## 2024-08-07 RX ADMIN — SODIUM CHLORIDE, SODIUM GLUCONATE, SODIUM ACETATE, POTASSIUM CHLORIDE, MAGNESIUM CHLORIDE, SODIUM PHOSPHATE, DIBASIC, AND POTASSIUM PHOSPHATE 1000 ML: .53; .5; .37; .037; .03; .012; .00082 INJECTION, SOLUTION INTRAVENOUS at 21:40

## 2024-08-07 RX ADMIN — SODIUM CHLORIDE SOLN NEBU 3% 4 ML: 3 NEBU SOLN at 20:03

## 2024-08-07 RX ADMIN — ROCURONIUM BROMIDE 30 MG: 10 INJECTION INTRAVENOUS at 16:14

## 2024-08-07 RX ADMIN — Medication 150 MCG/HR: at 14:10

## 2024-08-07 RX ADMIN — PROPOFOL 100 MG: 10 INJECTION, EMULSION INTRAVENOUS at 17:53

## 2024-08-07 RX ADMIN — NOREPINEPHRINE BITARTRATE 2 MCG/MIN: 1 INJECTION INTRAVENOUS at 21:14

## 2024-08-07 RX ADMIN — Medication 150 MCG/HR: at 20:29

## 2024-08-07 RX ADMIN — METHOCARBAMOL 500 MG: 500 TABLET ORAL at 01:00

## 2024-08-07 RX ADMIN — Medication 150 MCG/HR: at 00:46

## 2024-08-07 RX ADMIN — PROPOFOL 30 MG: 10 INJECTION, EMULSION INTRAVENOUS at 17:55

## 2024-08-07 RX ADMIN — HEPARIN SODIUM 5000 UNITS: 5000 INJECTION INTRAVENOUS; SUBCUTANEOUS at 21:14

## 2024-08-07 RX ADMIN — PERFLUTREN 1.5 ML/MIN: 6.52 INJECTION, SUSPENSION INTRAVENOUS at 13:30

## 2024-08-07 RX ADMIN — DEXMEDETOMIDINE HYDROCHLORIDE 0.7 MCG/KG/HR: 4 INJECTION, SOLUTION INTRAVENOUS at 20:26

## 2024-08-07 RX ADMIN — MIDAZOLAM HYDROCHLORIDE 2 MG: 1 INJECTION, SOLUTION INTRAMUSCULAR; INTRAVENOUS at 12:36

## 2024-08-07 RX ADMIN — LEVETIRACETAM 500 MG: 100 INJECTION, SOLUTION INTRAVENOUS at 08:55

## 2024-08-07 RX ADMIN — CALCIUM GLUCONATE 2 G: 20 INJECTION, SOLUTION INTRAVENOUS at 09:49

## 2024-08-07 RX ADMIN — METHOCARBAMOL 500 MG: 500 TABLET ORAL at 12:02

## 2024-08-07 RX ADMIN — BACITRACIN 1 LARGE APPLICATION: 500 OINTMENT TOPICAL at 08:55

## 2024-08-07 RX ADMIN — ACETAMINOPHEN 975 MG: 650 SUSPENSION ORAL at 03:45

## 2024-08-07 RX ADMIN — METHOCARBAMOL 500 MG: 500 TABLET ORAL at 06:26

## 2024-08-07 RX ADMIN — NOREPINEPHRINE BITARTRATE 2 MCG/MIN: 1 INJECTION INTRAVENOUS at 08:56

## 2024-08-07 RX ADMIN — Medication 10 MG: at 14:13

## 2024-08-07 RX ADMIN — LEVETIRACETAM 500 MG: 500 TABLET, FILM COATED ORAL at 20:27

## 2024-08-07 RX ADMIN — CIPROFLOXACIN AND DEXAMETHASONE 4 DROP: 3; 1 SUSPENSION/ DROPS AURICULAR (OTIC) at 08:58

## 2024-08-07 RX ADMIN — AMPICILLIN SODIUM AND SULBACTAM SODIUM 3 G: 100; 50 INJECTION, POWDER, FOR SOLUTION INTRAVENOUS at 06:26

## 2024-08-07 RX ADMIN — CHLORHEXIDINE GLUCONATE 15 ML: 1.2 RINSE ORAL at 20:27

## 2024-08-07 RX ADMIN — MIDAZOLAM HYDROCHLORIDE 2 MG: 1 INJECTION, SOLUTION INTRAMUSCULAR; INTRAVENOUS at 08:55

## 2024-08-07 RX ADMIN — ACETAMINOPHEN 975 MG: 650 SUSPENSION ORAL at 10:14

## 2024-08-07 RX ADMIN — CHLORHEXIDINE GLUCONATE 15 ML: 1.2 RINSE ORAL at 08:55

## 2024-08-07 RX ADMIN — SODIUM CHLORIDE, SODIUM GLUCONATE, SODIUM ACETATE, POTASSIUM CHLORIDE, MAGNESIUM CHLORIDE, SODIUM PHOSPHATE, DIBASIC, AND POTASSIUM PHOSPHATE 500 ML: .53; .5; .37; .037; .03; .012; .00082 INJECTION, SOLUTION INTRAVENOUS at 00:44

## 2024-08-07 RX ADMIN — CEFAZOLIN 2000 MG: 1 INJECTION, POWDER, FOR SOLUTION INTRAMUSCULAR; INTRAVENOUS at 16:23

## 2024-08-07 RX ADMIN — SODIUM CHLORIDE, SODIUM GLUCONATE, SODIUM ACETATE, POTASSIUM CHLORIDE, MAGNESIUM CHLORIDE, SODIUM PHOSPHATE, DIBASIC, AND POTASSIUM PHOSPHATE 125 ML/HR: .53; .5; .37; .037; .03; .012; .00082 INJECTION, SOLUTION INTRAVENOUS at 05:12

## 2024-08-07 RX ADMIN — DEXMEDETOMIDINE HYDROCHLORIDE 0.2 MCG/KG/HR: 4 INJECTION, SOLUTION INTRAVENOUS at 12:02

## 2024-08-07 RX ADMIN — AMPICILLIN SODIUM AND SULBACTAM SODIUM 3 G: 100; 50 INJECTION, POWDER, FOR SOLUTION INTRAVENOUS at 01:00
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
## 2024-08-07 NOTE — ASSESSMENT & PLAN NOTE
CT Head: Fractures of the left orbit and left zygoma.     Plan:   OMFS consulted & following. Pending clearance by neuro / trauma (subdural hematoma) patient to be taken to OR for reduction of zygomatic fracture and repair of complex facial lacerations   Unasyn 3g IV TID   head of bed elevated  Sinus precautions: 4 weeks: no nose blowing, avoid putting pressure on sinus area, avoid strenuous activity/straining, try to sneeze with mouth open. 2 weeks: no straws, spitting, smoking. Use OTC Afrin BID 2 sprays/nostril 3 days maximum as needed, OTC decongestant (e.g. Sudafed) or Antihistamine (e.g. Claritin-D) as needed, and saline nasal spray as needed.   F/U: Follow up in clinic within 1-2 weeks of discharge., Patient should see general dentist for general oral care.

## 2024-08-07 NOTE — ADDENDUM NOTE
Addendum  created 08/07/24 1918 by Ever Graves CRNA    Narcotic reconciliation edited, Orders acknowledged in Narrator
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
## 2024-08-07 NOTE — ASSESSMENT & PLAN NOTE
CTA:  The proximal aspect of left transverse sinus is patent with subsequent occlusion, adjacent to the left fracture line. Superior sagittal, right transverse and sigmoid sinuses are patent. Straight sinus and internal cerebral veins are patent.  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  There is some nonocclusive thrombus noted in the visualized upper cervical internal jugular vein.     Plan:  Will review further with endovascular neurosurgery  Will determine timing for anticoagulation need  F/u am CT Head
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
## 2024-08-07 NOTE — ASSESSMENT & PLAN NOTE
CT Head: 1.2 cm posttraumatic subarachnoid hemorrhage is suspected but suboptimally evaluated due to artifact 5 mm of right to left shift.   Neurosurgery consulted. Now s/p right frontal EVD placement by neurosurgery. Opening pressure 9    Plan:   Neurosurgery following. s/p right frontal EVD placement by neurosurgery 8/5. Opening pressure 9  Q1hr neurological checks.   STAT CT head for decline in GCS >2 points in 1 hour.   Neurosurgery reviewing CTV further with endovascular neurosurgery team   EVD in place @ 10mmHg above the tragus  Hold chemical DVT ppx  Keppra for seizure ppx x7days

## 2024-08-07 NOTE — ASSESSMENT & PLAN NOTE
CT Head: .  right subdural hematoma measuring up to 5 mm. Bilateral frontal and temporal contusions.     Plan:   Neurosurgery following. s/p right frontal EVD placement by neurosurgery 8/5. Opening pressure 9  Q1hr neurological checks.   STAT CT head for decline in GCS >2 points in 1 hour.   Neurosurgery reviewing CTV further with endovascular neurosurgery team   EVD in place @ 10mmHg above the tragus  Hold chemical DVT ppx  Keppra for seizure ppx x7 days

## 2024-08-07 NOTE — ASSESSMENT & PLAN NOTE
CT Abd: Within the mid right hepatic lobe there is an irregular hypodense region measuring 2.8 x 1.2 x 2 cm (306:113)This hypodensity does not extend to the liver surface. Surrounding cloudlike hyperenhancement.  now thought to be a hemangioma on comparative imaging   Plan: serial abdominal checks, serial hemoglobin checks

## 2024-08-07 NOTE — PROGRESS NOTES
[facility]  Interval Progress Note: Critical Care  Name: Anmol Villegas I  MRN: [id number]  Unit/Bed#: ICU 13 I Date of Admission: 8/5/2024   Date of Service: 8/7/2024 I Hospital Day: 2    Interval Events:  Patient returned from OR s/p Debridement of left facial wound and ORIF of left zygomatic fracture.       Pertinent New Data:   HR 67, /61 (83), RR 18, SpO2 100%    Arterial Line  Ilana /55  Arterial Line BP  Min: 102/51  Max: 133/73   MAP 72 mmHg  Arterial Line MAP (mmHg)  Min: 67 mmHg  Max: 93 mmHg     Neuro Invasive Monitoring   Most Recent  Min/Max in 24hrs    GCS 6  [place] Coma Scale Score  Min: 6  Max: 6    ICP 10 mmHg   ICP Mean (mmHg)  Min: -12 mmHg  Max: 17 mmHg    CPP (cuff) 68  CPP Cuff-Calculated (mmHg)  Min: 58  Max: 128    CPP(ilana) 62  CPP Ilana-Calculated (mmHg)  Min: 56  Max: 85   CVP   No data recorded     Physical Exam  Eyes:      General: No scleral icterus.        Right eye: No discharge.         Left eye: No discharge.      Pupils: Pupils are unequal.      Right eye: Pupil is sluggish. Pupil is round.      Left eye: Pupil is sluggish. Pupil is round.      Comments: L pupil 2 mm, R pupil 1 mm   Skin:     General: Skin is warm, dry and not mottled extremities.      Capillary Refill: Capillary refill takes less than 2 seconds.      Coloration: Skin is not pale.      Findings: No rash or wound.   HENT:      Head: Normocephalic. Abrasion and laceration present.        Right Ear: No drainage.      Left Ear: No drainage.      Nose: No nasal deformity, nasal tenderness, congestion, rhinorrhea, epistaxis or nasogastric tube.      Mouth/Throat:      Mouth: Mucous membranes are moist. No injury or angioedema.      Dentition: Normal dentition.      Pharynx: No oropharyngeal exudate.   Neck:      Vascular: No JVD.   no midline tenderness Cardiovascular:      Rate and Rhythm: Normal rate and regular rhythm.      Pulses: No decreased pulses.      Heart sounds: No  murmur heard.     No friction rub. No gallop.   Musculoskeletal:         General: No swelling, tenderness, deformity or signs of injury. Normal range of motion.      Right lower leg: No edema.      Left lower leg: No edema.   Abdominal: General: Abdomen is flat. There is no distension.      Palpations: Abdomen is soft.      Tenderness: There is no abdominal tenderness.   Constitutional:       General: He is not in acute distress.     Appearance: He is well-developed and well-nourished. He is not ill-appearing or toxic-appearing.      Interventions: He is sedated, intubated and restrained.   Pulmonary:      Effort: No tachypnea, accessory muscle usage, respiratory distress or accessory muscle usage. He is intubated.      Breath sounds: No decreased breath sounds, wheezing, rhonchi or rales.      Comments: Lungs coarse R > L.  Patient resting on ACVC settings   Neurological:      Mental Status: He is lethargic.      GCS: GCS eye subscore is 1. GCS verbal subscore is 1. GCS motor subscore is 3.      Comments: Patient s/p ORIF of left zygomatic fracture and left washout left facial abrasion.    Genitourinary/Anorectal:     Comments: Dark kasia urine   Cintron present.       Post-op labs pending.   chest xray and CT headI have personally reviewed pertinent reports.   and I have personally reviewed pertinent films in PACS  Post-op CXR ordered to evaluate ETT placement.     Assessment and Plan  S/p ORIF Left zygomatic fracture, washout/debridement of left facial/head wound.  Continue Unasyn per OMFS  Wound care per OMFS  Check post-op endpoints  Check CXR for ETT placement -- R GGO   Start xopenex/hypertonic saline nebs q6h  Continue ACVC settings    Billing Level:  Critical Care Time Statement: Upon my evaluation, this patient had a high probability of imminent or life-threatening deterioration due to R SDH, multiple SAH, zygoma fracture, facial laceration, which required my direct attention, intervention, and personal  management.  I spent a total of 20 minutes directly providing critical care services, including interpretation of complex medical databases, complex medical decision making (to support/prevent further life-threatening deterioration)., interpretation of hemodynamic data, and titration of continuous IV medications (drips). This time is exclusive of procedures, teaching, family meetings, and any prior time recorded by providers other than myself.      SIGNATURE: SHAREE Garcia

## 2024-08-07 NOTE — OP NOTE
OPERATIVE REPORT  PATIENT NAME: Anmol Meza Jr.    :  1990  MRN: [id number]  Pt Location: AN OR ROOM 02    SURGERY DATE: 2024    Surgeons and Role:     * Ildefonso Carmen DMD - Primary     * Marycarmen Cash - Assisting     * Tabatha Rosenthal - Assisting    Preop Diagnosis:  Closed fracture of left zygomatic arch, initial encounter ([facility]) [S02.40FA]    Post-Op Diagnosis Codes:     * Closed fracture of left zygomatic arch, initial encounter ([facility]) [S02.40FA]    Procedure(s):  Open reduction left arch fracture  Debridement wound left zygomatic, malar, temporal area  Complicated wound closure 15 cm left face extending from the left commissure of the lip to the left temporal region    Specimen(s):  No specimen    Estimated Blood Loss:   Minimal    Drains:  NG/OG/Enteral Tube Orogastric Center mouth (Active)   Placement Reverification Auscultation 24 1400   Site Assessment Clean;Dry;Intact 24 0800   External Tube Length (cm) 67 cm 24 1400   Status Tube feed infusing 24 1400   Drainage Appearance Brown;Dark red 24 0700   Intake (mL) 0 mL 24 1400   Output (mL) 0 mL 24 2000   Number of days: 2       Urethral Catheter (Active)   Reasons to continue Urinary Catheter  Accurate I&O assessment in critically ill patients (48 hr. max) 24 0800   Goal for Removal Voiding trial when ambulation improves 24 0800   Site Assessment Clean;Skin intact 24 0800   Cintron Care Done 24 2100   Collection Container Standard drainage bag 24 0800   Securement Method Securing device (Describe) 24 0800   Output (mL) 250 mL 24 1410   Number of days: 2       Ventriculostomy/Subdural Ventricular drainage catheter Right Frontal region (Active)   Drain Status Open/CSF collection chamber 24 1200   Level At external auditory meatus 24 1200   Status Open to drain 24 1200   Drainage Clear 24 1207   Output (mL) 10 mL 24 1207   Drain  Level (mmHg) 10 mmHg 08/07/24 0800   CSF Color Clear 08/07/24 1200   Dressing Status Clean;Dry;Intact 08/07/24 1200   Number of days: 2       Anesthesia Type:   General    Operative Indications:  Closed fracture of left zygomatic arch, initial encounter ([facility]) [S02.40FA]      Operative Findings:  Significant amount of debris removed from the left facial wound      Complications:   None    Procedure and Technique:  Patient is intubated from the extent of his neurologic injuries and intracranial injuries.  Patient was transferred from the ICU directly to the OR.  A conversation was held with his family members regarding the need for surgery.  They consented to surgery.    The patient was brought into the operating room by the anesthesia team and the patient was placed in a supine position where the patient remained for the rest of the case.  Care was then handed back to the OMFS team.    Patient was draped in sterile manner timeout was performed in which the patient was correctly identified by name medical record number as well as a site of the procedure be performed.     Antibiotic premed 2 g Ancef    Patient was given local anesthesia.  The dosages are in the OR record and can be reviewed should this be required.    Attention was first drawn to the laceration.  The tacking sutures were removed.  There was noted to be significant mount of gravel type debris in the wound.  This was thoroughly irrigated and debrided.  Hemostasis was then obtained.  Blunt dissection was then performed inferiorly to the left zygomatic arch.  Then medially displaced arch was identified and reduced in a lateral manner.  Good approximation was noted via palpation as well as visual inspection.    Attention was then placed to wound management.  Additional irrigation debridement was performed close inspection revealed all foreign bodies to be absent.  The temporalis muscle was rotated in a inferior and lateral position in order to provide  additional coverage of what appears to be avulsed masseter muscle.  The wound was closed in multiple layers with the periosteal and the muscle layers closed with 3-0 Vicryl plus suture.  The cutaneous layer was closed with 5-0 fast-absorbing gut suture.     I was present for the entire procedure.    Patient Disposition:  PACU , hemodynamically stable, and extubated and stable        SIGNATURE: Ildefonso Carmen DMD  DATE: August 7, 2024  TIME: 4:58 PM

## 2024-08-07 NOTE — ANESTHESIA POSTPROCEDURE EVALUATION
Post-Op Assessment Note    CV Status:  Stable (Intubated. Sedated.)  Pain scale: Intubated. Sedated. Unable to assess.    Pain control: Intubated. Sedated.       Post-procedure mental status: Intubated. Sedated.   Hydration Status:  Euvolemic   PONV Controlled:  Controlled   Airway Patency:  Patent  Airway: intubated     Post Op Vitals Reviewed: Yes    No anethesia notable event occurred.    Staff: CRNA               ABP  119/63 with IV pressor   Temp      Pulse  63   Resp 18   SpO2 100% via ventilator

## 2024-08-07 NOTE — ASSESSMENT & PLAN NOTE
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  There is some nonocclusive thrombus noted in the visualized upper cervical internal jugular vein.    Plan:  Continue to monitor clinically   Recommend STAT CT head for any change in neurological status   Continue with aggressive hydration  Fluids 125cc/hour   Would ensure + volume status   Will determine possible need for AP/AC regimen after OMFS has provided clearance following surgical intervention

## 2024-08-08 PROBLEM — H65.92 MIDDLE EAR EFFUSION, LEFT: Status: ACTIVE | Noted: 2024-08-08

## 2024-08-08 LAB
ALBUMIN SERPL BCG-MCNC: 2.9 G/DL (ref 3.5–5)
ALBUMIN SERPL BCG-MCNC: 2.9 G/DL (ref 3.5–5)
ALP SERPL-CCNC: 83 U/L (ref 34–104)
ALP SERPL-CCNC: 83 U/L (ref 34–104)
ALT SERPL W P-5'-P-CCNC: 24 U/L (ref 7–52)
ALT SERPL W P-5'-P-CCNC: 24 U/L (ref 7–52)
ANION GAP SERPL CALCULATED.3IONS-SCNC: 3 MMOL/L (ref 4–13)
ANION GAP SERPL CALCULATED.3IONS-SCNC: 3 MMOL/L (ref 4–13)
AST SERPL W P-5'-P-CCNC: 38 U/L (ref 13–39)
AST SERPL W P-5'-P-CCNC: 38 U/L (ref 13–39)
ATRIAL RATE: 75 BPM
ATRIAL RATE: 75 BPM
BASOPHILS # BLD AUTO: 0.02 THOUSANDS/ÂΜL (ref 0–0.1)
BASOPHILS # BLD AUTO: 0.02 THOUSANDS/ÂΜL (ref 0–0.1)
BASOPHILS NFR BLD AUTO: 0 % (ref 0–1)
BASOPHILS NFR BLD AUTO: 0 % (ref 0–1)
BILIRUB SERPL-MCNC: 0.49 MG/DL (ref 0.2–1)
BILIRUB SERPL-MCNC: 0.49 MG/DL (ref 0.2–1)
BUN SERPL-MCNC: 8 MG/DL (ref 5–25)
BUN SERPL-MCNC: 8 MG/DL (ref 5–25)
CALCIUM ALBUM COR SERPL-MCNC: 8.6 MG/DL (ref 8.3–10.1)
CALCIUM ALBUM COR SERPL-MCNC: 8.6 MG/DL (ref 8.3–10.1)
CALCIUM SERPL-MCNC: 7.7 MG/DL (ref 8.4–10.2)
CALCIUM SERPL-MCNC: 7.7 MG/DL (ref 8.4–10.2)
CHLORIDE SERPL-SCNC: 111 MMOL/L (ref 96–108)
CHLORIDE SERPL-SCNC: 111 MMOL/L (ref 96–108)
CK SERPL-CCNC: 401 U/L (ref 39–308)
CK SERPL-CCNC: 401 U/L (ref 39–308)
CO2 SERPL-SCNC: 27 MMOL/L (ref 21–32)
CO2 SERPL-SCNC: 27 MMOL/L (ref 21–32)
CREAT SERPL-MCNC: 0.57 MG/DL (ref 0.6–1.3)
CREAT SERPL-MCNC: 0.57 MG/DL (ref 0.6–1.3)
EOSINOPHIL # BLD AUTO: 0.24 THOUSAND/ÂΜL (ref 0–0.61)
EOSINOPHIL # BLD AUTO: 0.24 THOUSAND/ÂΜL (ref 0–0.61)
EOSINOPHIL NFR BLD AUTO: 3 % (ref 0–6)
EOSINOPHIL NFR BLD AUTO: 3 % (ref 0–6)
ERYTHROCYTE [DISTWIDTH] IN BLOOD BY AUTOMATED COUNT: 12.9 % (ref 11.6–15.1)
ERYTHROCYTE [DISTWIDTH] IN BLOOD BY AUTOMATED COUNT: 12.9 % (ref 11.6–15.1)
GFR SERPL CREATININE-BSD FRML MDRD: 132 ML/MIN/1.73SQ M
GFR SERPL CREATININE-BSD FRML MDRD: 132 ML/MIN/1.73SQ M
GLUCOSE SERPL-MCNC: 108 MG/DL (ref 65–140)
GLUCOSE SERPL-MCNC: 108 MG/DL (ref 65–140)
GLUCOSE SERPL-MCNC: 112 MG/DL (ref 65–140)
GLUCOSE SERPL-MCNC: 112 MG/DL (ref 65–140)
GLUCOSE SERPL-MCNC: 122 MG/DL (ref 65–140)
GLUCOSE SERPL-MCNC: 122 MG/DL (ref 65–140)
GLUCOSE SERPL-MCNC: 123 MG/DL (ref 65–140)
GLUCOSE SERPL-MCNC: 123 MG/DL (ref 65–140)
GLUCOSE SERPL-MCNC: 124 MG/DL (ref 65–140)
GLUCOSE SERPL-MCNC: 124 MG/DL (ref 65–140)
GLUCOSE SERPL-MCNC: 127 MG/DL (ref 65–140)
GLUCOSE SERPL-MCNC: 127 MG/DL (ref 65–140)
HCT VFR BLD AUTO: 29.5 % (ref 36.5–49.3)
HCT VFR BLD AUTO: 29.5 % (ref 36.5–49.3)
HGB BLD-MCNC: 9.6 G/DL (ref 12–17)
HGB BLD-MCNC: 9.6 G/DL (ref 12–17)
IMM GRANULOCYTES # BLD AUTO: 0.04 THOUSAND/UL (ref 0–0.2)
IMM GRANULOCYTES # BLD AUTO: 0.04 THOUSAND/UL (ref 0–0.2)
IMM GRANULOCYTES NFR BLD AUTO: 1 % (ref 0–2)
IMM GRANULOCYTES NFR BLD AUTO: 1 % (ref 0–2)
LYMPHOCYTES # BLD AUTO: 1.49 THOUSANDS/ÂΜL (ref 0.6–4.47)
LYMPHOCYTES # BLD AUTO: 1.49 THOUSANDS/ÂΜL (ref 0.6–4.47)
LYMPHOCYTES NFR BLD AUTO: 17 % (ref 14–44)
LYMPHOCYTES NFR BLD AUTO: 17 % (ref 14–44)
MAGNESIUM SERPL-MCNC: 2 MG/DL (ref 1.9–2.7)
MAGNESIUM SERPL-MCNC: 2 MG/DL (ref 1.9–2.7)
MCH RBC QN AUTO: 31.2 PG (ref 26.8–34.3)
MCH RBC QN AUTO: 31.2 PG (ref 26.8–34.3)
MCHC RBC AUTO-ENTMCNC: 32.5 G/DL (ref 31.4–37.4)
MCHC RBC AUTO-ENTMCNC: 32.5 G/DL (ref 31.4–37.4)
MCV RBC AUTO: 96 FL (ref 82–98)
MCV RBC AUTO: 96 FL (ref 82–98)
MONOCYTES # BLD AUTO: 0.5 THOUSAND/ÂΜL (ref 0.17–1.22)
MONOCYTES # BLD AUTO: 0.5 THOUSAND/ÂΜL (ref 0.17–1.22)
MONOCYTES NFR BLD AUTO: 6 % (ref 4–12)
MONOCYTES NFR BLD AUTO: 6 % (ref 4–12)
NEUTROPHILS # BLD AUTO: 6.36 THOUSANDS/ÂΜL (ref 1.85–7.62)
NEUTROPHILS # BLD AUTO: 6.36 THOUSANDS/ÂΜL (ref 1.85–7.62)
NEUTS SEG NFR BLD AUTO: 73 % (ref 43–75)
NEUTS SEG NFR BLD AUTO: 73 % (ref 43–75)
NRBC BLD AUTO-RTO: 0 /100 WBCS
NRBC BLD AUTO-RTO: 0 /100 WBCS
P AXIS: 12 DEGREES
P AXIS: 12 DEGREES
PHOSPHATE SERPL-MCNC: 3.3 MG/DL (ref 2.7–4.5)
PHOSPHATE SERPL-MCNC: 3.3 MG/DL (ref 2.7–4.5)
PLATELET # BLD AUTO: 159 THOUSANDS/UL (ref 149–390)
PLATELET # BLD AUTO: 159 THOUSANDS/UL (ref 149–390)
PMV BLD AUTO: 10.3 FL (ref 8.9–12.7)
PMV BLD AUTO: 10.3 FL (ref 8.9–12.7)
POTASSIUM SERPL-SCNC: 3.8 MMOL/L (ref 3.5–5.3)
POTASSIUM SERPL-SCNC: 3.8 MMOL/L (ref 3.5–5.3)
PR INTERVAL: 130 MS
PR INTERVAL: 130 MS
PROT SERPL-MCNC: 5.2 G/DL (ref 6.4–8.4)
PROT SERPL-MCNC: 5.2 G/DL (ref 6.4–8.4)
QRS AXIS: 82 DEGREES
QRS AXIS: 82 DEGREES
QRSD INTERVAL: 100 MS
QRSD INTERVAL: 100 MS
QT INTERVAL: 412 MS
QT INTERVAL: 412 MS
QTC INTERVAL: 460 MS
QTC INTERVAL: 460 MS
RBC # BLD AUTO: 3.08 MILLION/UL (ref 3.88–5.62)
RBC # BLD AUTO: 3.08 MILLION/UL (ref 3.88–5.62)
SODIUM SERPL-SCNC: 141 MMOL/L (ref 135–147)
SODIUM SERPL-SCNC: 141 MMOL/L (ref 135–147)
T WAVE AXIS: 43 DEGREES
T WAVE AXIS: 43 DEGREES
VENTRICULAR RATE: 75 BPM
VENTRICULAR RATE: 75 BPM
WBC # BLD AUTO: 8.65 THOUSAND/UL (ref 4.31–10.16)
WBC # BLD AUTO: 8.65 THOUSAND/UL (ref 4.31–10.16)

## 2024-08-08 PROCEDURE — 94003 VENT MGMT INPAT SUBQ DAY: CPT

## 2024-08-08 PROCEDURE — 94150 VITAL CAPACITY TEST: CPT

## 2024-08-08 PROCEDURE — 94760 N-INVAS EAR/PLS OXIMETRY 1: CPT

## 2024-08-08 PROCEDURE — 93010 ELECTROCARDIOGRAM REPORT: CPT | Performed by: INTERNAL MEDICINE

## 2024-08-08 PROCEDURE — 80053 COMPREHEN METABOLIC PANEL: CPT | Performed by: NURSE PRACTITIONER

## 2024-08-08 PROCEDURE — 94664 DEMO&/EVAL PT USE INHALER: CPT

## 2024-08-08 PROCEDURE — 85025 COMPLETE CBC W/AUTO DIFF WBC: CPT | Performed by: NURSE PRACTITIONER

## 2024-08-08 PROCEDURE — 82948 REAGENT STRIP/BLOOD GLUCOSE: CPT

## 2024-08-08 PROCEDURE — 99232 SBSQ HOSP IP/OBS MODERATE 35: CPT | Performed by: PHYSICIAN ASSISTANT

## 2024-08-08 PROCEDURE — 94640 AIRWAY INHALATION TREATMENT: CPT

## 2024-08-08 PROCEDURE — 84100 ASSAY OF PHOSPHORUS: CPT | Performed by: NURSE PRACTITIONER

## 2024-08-08 PROCEDURE — 93005 ELECTROCARDIOGRAM TRACING: CPT

## 2024-08-08 PROCEDURE — 99291 CRITICAL CARE FIRST HOUR: CPT | Performed by: STUDENT IN AN ORGANIZED HEALTH CARE EDUCATION/TRAINING PROGRAM

## 2024-08-08 PROCEDURE — 99221 1ST HOSP IP/OBS SF/LOW 40: CPT | Performed by: OTOLARYNGOLOGY

## 2024-08-08 PROCEDURE — 83735 ASSAY OF MAGNESIUM: CPT | Performed by: NURSE PRACTITIONER

## 2024-08-08 PROCEDURE — 82550 ASSAY OF CK (CPK): CPT | Performed by: NURSE PRACTITIONER

## 2024-08-08 RX ORDER — SODIUM CHLORIDE, SODIUM GLUCONATE, SODIUM ACETATE, POTASSIUM CHLORIDE, MAGNESIUM CHLORIDE, SODIUM PHOSPHATE, DIBASIC, AND POTASSIUM PHOSPHATE .53; .5; .37; .037; .03; .012; .00082 G/100ML; G/100ML; G/100ML; G/100ML; G/100ML; G/100ML; G/100ML
75 INJECTION, SOLUTION INTRAVENOUS CONTINUOUS
Status: DISCONTINUED | OUTPATIENT
Start: 2024-08-08 | End: 2024-08-10

## 2024-08-08 RX ORDER — ACETAMINOPHEN 10 MG/ML
1000 INJECTION, SOLUTION INTRAVENOUS EVERY 6 HOURS
Status: DISCONTINUED | OUTPATIENT
Start: 2024-08-08 | End: 2024-08-10

## 2024-08-08 RX ORDER — METHOCARBAMOL 100 MG/ML
500 INJECTION, SOLUTION INTRAMUSCULAR; INTRAVENOUS EVERY 8 HOURS SCHEDULED
Status: COMPLETED | OUTPATIENT
Start: 2024-08-08 | End: 2024-08-09

## 2024-08-08 RX ORDER — ONDANSETRON 2 MG/ML
4 INJECTION INTRAMUSCULAR; INTRAVENOUS EVERY 8 HOURS PRN
Status: DISCONTINUED | OUTPATIENT
Start: 2024-08-08 | End: 2024-08-29 | Stop reason: HOSPADM

## 2024-08-08 RX ORDER — LEVETIRACETAM 500 MG/5ML
500 INJECTION, SOLUTION, CONCENTRATE INTRAVENOUS EVERY 12 HOURS SCHEDULED
Status: DISCONTINUED | OUTPATIENT
Start: 2024-08-08 | End: 2024-08-12

## 2024-08-08 RX ORDER — POTASSIUM CHLORIDE 14.9 MG/ML
20 INJECTION INTRAVENOUS ONCE
Status: COMPLETED | OUTPATIENT
Start: 2024-08-08 | End: 2024-08-08

## 2024-08-08 RX ADMIN — POTASSIUM CHLORIDE 20 MEQ: 14.9 INJECTION, SOLUTION INTRAVENOUS at 07:32

## 2024-08-08 RX ADMIN — SODIUM CHLORIDE, SODIUM GLUCONATE, SODIUM ACETATE, POTASSIUM CHLORIDE, MAGNESIUM CHLORIDE, SODIUM PHOSPHATE, DIBASIC, AND POTASSIUM PHOSPHATE 125 ML/HR: .53; .5; .37; .037; .03; .012; .00082 INJECTION, SOLUTION INTRAVENOUS at 20:56

## 2024-08-08 RX ADMIN — HEPARIN SODIUM 5000 UNITS: 5000 INJECTION INTRAVENOUS; SUBCUTANEOUS at 21:26

## 2024-08-08 RX ADMIN — ACETAMINOPHEN 975 MG: 650 SUSPENSION ORAL at 01:49

## 2024-08-08 RX ADMIN — DEXMEDETOMIDINE HYDROCHLORIDE 0.7 MCG/KG/HR: 4 INJECTION, SOLUTION INTRAVENOUS at 03:34

## 2024-08-08 RX ADMIN — SODIUM CHLORIDE SOLN NEBU 3% 4 ML: 3 NEBU SOLN at 14:03

## 2024-08-08 RX ADMIN — LEVALBUTEROL HYDROCHLORIDE 1.25 MG: 1.25 SOLUTION RESPIRATORY (INHALATION) at 14:03

## 2024-08-08 RX ADMIN — AMPICILLIN SODIUM AND SULBACTAM SODIUM 3 G: 100; 50 INJECTION, POWDER, FOR SOLUTION INTRAVENOUS at 05:45

## 2024-08-08 RX ADMIN — CHLORHEXIDINE GLUCONATE 15 ML: 1.2 RINSE ORAL at 20:54

## 2024-08-08 RX ADMIN — SODIUM CHLORIDE SOLN NEBU 3% 4 ML: 3 NEBU SOLN at 01:09

## 2024-08-08 RX ADMIN — SODIUM CHLORIDE, SODIUM GLUCONATE, SODIUM ACETATE, POTASSIUM CHLORIDE, MAGNESIUM CHLORIDE, SODIUM PHOSPHATE, DIBASIC, AND POTASSIUM PHOSPHATE 125 ML/HR: .53; .5; .37; .037; .03; .012; .00082 INJECTION, SOLUTION INTRAVENOUS at 12:28

## 2024-08-08 RX ADMIN — LEVALBUTEROL HYDROCHLORIDE 1.25 MG: 1.25 SOLUTION RESPIRATORY (INHALATION) at 19:51

## 2024-08-08 RX ADMIN — LEVETIRACETAM 500 MG: 100 INJECTION, SOLUTION INTRAVENOUS at 21:26

## 2024-08-08 RX ADMIN — AMPICILLIN SODIUM AND SULBACTAM SODIUM 3 G: 100; 50 INJECTION, POWDER, FOR SOLUTION INTRAVENOUS at 18:06

## 2024-08-08 RX ADMIN — DEXMEDETOMIDINE HYDROCHLORIDE 1 MCG/KG/HR: 4 INJECTION, SOLUTION INTRAVENOUS at 20:54

## 2024-08-08 RX ADMIN — BACITRACIN 1 LARGE APPLICATION: 500 OINTMENT TOPICAL at 08:35

## 2024-08-08 RX ADMIN — CHLORHEXIDINE GLUCONATE 15 ML: 1.2 RINSE ORAL at 08:35

## 2024-08-08 RX ADMIN — POLYETHYLENE GLYCOL 3350 17 G: 17 POWDER, FOR SOLUTION ORAL at 08:35

## 2024-08-08 RX ADMIN — LEVALBUTEROL HYDROCHLORIDE 1.25 MG: 1.25 SOLUTION RESPIRATORY (INHALATION) at 07:56

## 2024-08-08 RX ADMIN — ACETAMINOPHEN 975 MG: 650 SUSPENSION ORAL at 10:23

## 2024-08-08 RX ADMIN — AMPICILLIN SODIUM AND SULBACTAM SODIUM 3 G: 100; 50 INJECTION, POWDER, FOR SOLUTION INTRAVENOUS at 12:29

## 2024-08-08 RX ADMIN — TRIMETHOBENZAMIDE HYDROCHLORIDE 200 MG: 100 INJECTION INTRAMUSCULAR at 13:35

## 2024-08-08 RX ADMIN — LEVETIRACETAM 500 MG: 500 TABLET, FILM COATED ORAL at 08:35

## 2024-08-08 RX ADMIN — ACETAMINOPHEN 1000 MG: 10 INJECTION INTRAVENOUS at 18:06

## 2024-08-08 RX ADMIN — ONDANSETRON 4 MG: 2 INJECTION INTRAMUSCULAR; INTRAVENOUS at 11:54

## 2024-08-08 RX ADMIN — CIPROFLOXACIN AND DEXAMETHASONE 4 DROP: 3; 1 SUSPENSION/ DROPS AURICULAR (OTIC) at 08:35

## 2024-08-08 RX ADMIN — SODIUM CHLORIDE SOLN NEBU 3% 4 ML: 3 NEBU SOLN at 07:56

## 2024-08-08 RX ADMIN — HEPARIN SODIUM 5000 UNITS: 5000 INJECTION INTRAVENOUS; SUBCUTANEOUS at 05:45

## 2024-08-08 RX ADMIN — Medication 150 MCG/HR: at 09:02

## 2024-08-08 RX ADMIN — BACITRACIN 1 LARGE APPLICATION: 500 OINTMENT TOPICAL at 18:06

## 2024-08-08 RX ADMIN — METHOCARBAMOL 500 MG: 500 TABLET ORAL at 00:31

## 2024-08-08 RX ADMIN — METHOCARBAMOL 500 MG: 100 INJECTION INTRAMUSCULAR; INTRAVENOUS at 22:03

## 2024-08-08 RX ADMIN — CIPROFLOXACIN AND DEXAMETHASONE 4 DROP: 3; 1 SUSPENSION/ DROPS AURICULAR (OTIC) at 18:07

## 2024-08-08 RX ADMIN — LEVALBUTEROL HYDROCHLORIDE 1.25 MG: 1.25 SOLUTION RESPIRATORY (INHALATION) at 01:09

## 2024-08-08 RX ADMIN — DEXMEDETOMIDINE HYDROCHLORIDE 1.2 MCG/KG/HR: 4 INJECTION, SOLUTION INTRAVENOUS at 16:08

## 2024-08-08 RX ADMIN — SENNOSIDES, DOCUSATE SODIUM 2 TABLET: 8.6; 5 TABLET ORAL at 08:35

## 2024-08-08 RX ADMIN — AMPICILLIN SODIUM AND SULBACTAM SODIUM 3 G: 100; 50 INJECTION, POWDER, FOR SOLUTION INTRAVENOUS at 00:31

## 2024-08-08 RX ADMIN — PROPOFOL 30 MCG/KG/MIN: 10 INJECTION, EMULSION INTRAVENOUS at 03:34

## 2024-08-08 RX ADMIN — Medication 150 MCG/HR: at 02:25

## 2024-08-08 RX ADMIN — HEPARIN SODIUM 5000 UNITS: 5000 INJECTION INTRAVENOUS; SUBCUTANEOUS at 13:36

## 2024-08-08 RX ADMIN — SODIUM CHLORIDE SOLN NEBU 3% 4 ML: 3 NEBU SOLN at 19:51

## 2024-08-08 RX ADMIN — METHOCARBAMOL 500 MG: 500 TABLET ORAL at 05:45

## 2024-08-08 RX ADMIN — Medication 10 MG: at 08:35

## 2024-08-08 NOTE — ASSESSMENT & PLAN NOTE
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  There is some nonocclusive thrombus noted in the visualized upper cervical internal jugular vein.    Plan:  Continue to monitor clinically   Recommend STAT CT head for any change in neurological status   Continue with aggressive hydration  Fluids 125cc/hour   Would ensure + volume status   Plan for CT head tomorrow   If CT head stable will ensure clearance from OMFS and determine plan with endovascular neurosurgery

## 2024-08-08 NOTE — QUICK NOTE
Sedation held for extubation. Patient following commands and alert. Very agitated. Dr. Ullrich at bedside. Extubated at this time.   Audible voice. Oriented. On RA without distress. Medical team reorienting patient at this time.     SHAREE Bah

## 2024-08-08 NOTE — ASSESSMENT & PLAN NOTE
Fracture of the squamous portion of the left temporal bone with small displaced fragment measuring 5 mm. Fracture extends posteriorly through the superior aspect of the mastoid air cells, involves the posterior squamous suture and extends into the left occipital bone down to the foramen magnum. Small amount of left temporal extra-axial pneumocephaly.     Plan:  Consult OMFS  Continue unasyn for fracture extending into sinus (Total 7 days)

## 2024-08-08 NOTE — ASSESSMENT & PLAN NOTE
CT Abd: Within the mid right hepatic lobe there is an irregular hypodense region measuring 2.8 x 1.2 x 2 cm (306:113)This hypodensity does not extend to the liver surface. Surrounding cloudlike hyperenhancement.  Re-eval - thought to be a hemangioma on comparative imaging   Plan: serial abdominal checks, serial hemoglobin checks were stable
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
## 2024-08-08 NOTE — PLAN OF CARE
Problem: SAFETY,RESTRAINT: NV/NON-SELF DESTRUCTIVE BEHAVIOR  Goal: Remains free of harm/injury (restraint for non violent/non self-detsructive behavior)  Description: INTERVENTIONS:  - Instruct patient/family regarding restraint use   - Assess and monitor physiologic and psychological status   - Provide interventions and comfort measures to meet assessed patient needs   - Identify and implement measures to help patient regain control  - Assess readiness for release of restraint   Outcome: Progressing  Goal: Returns to optimal restraint-free functioning  Description: INTERVENTIONS:  - Assess the patient's behavior and symptoms that indicate continued need for restraint  - Identify and implement measures to help patient regain control  - Assess readiness for release of restraint   Outcome: Progressing     Problem: Prexisting or High Potential for Compromised Skin Integrity  Goal: Skin integrity is maintained or improved  Description: INTERVENTIONS:  - Identify patients at risk for skin breakdown  - Assess and monitor skin integrity  - Assess and monitor nutrition and hydration status  - Monitor labs   - Assess for incontinence   - Turn and reposition patient  - Assist with mobility/ambulation  - Relieve pressure over bony prominences  - Avoid friction and shearing  - Provide appropriate hygiene as needed including keeping skin clean and dry  - Evaluate need for skin moisturizer/barrier cream  - Collaborate with interdisciplinary team   - Patient/family teaching  - Consider wound care consult   Outcome: Progressing     Problem: PAIN - ADULT  Goal: Verbalizes/displays adequate comfort level or baseline comfort level  Description: Interventions:  - Encourage patient to monitor pain and request assistance  - Assess pain using appropriate pain scale  - Administer analgesics based on type and severity of pain and evaluate response  - Implement non-pharmacological measures as appropriate and evaluate response  - Consider  cultural and social influences on pain and pain management  - Notify physician/advanced practitioner if interventions unsuccessful or patient reports new pain  Outcome: Progressing     Problem: INFECTION - ADULT  Goal: Absence or prevention of progression during hospitalization  Description: INTERVENTIONS:  - Assess and monitor for signs and symptoms of infection  - Monitor lab/diagnostic results  - Monitor all insertion sites, i.e. indwelling lines, tubes, and drains  - Monitor endotracheal if appropriate and nasal secretions for changes in amount and color  - [place] appropriate cooling/warming therapies per order  - Administer medications as ordered  - Instruct and encourage patient and family to use good hand hygiene technique  - Identify and instruct in appropriate isolation precautions for identified infection/condition  Outcome: Progressing  Goal: Absence of fever/infection during neutropenic period  Description: INTERVENTIONS:  - Monitor WBC    Outcome: Progressing     Problem: SAFETY ADULT  Goal: Patient will remain free of falls  Description: INTERVENTIONS:  - Educate patient/family on patient safety including physical limitations  - Instruct patient to call for assistance with activity   - Consult OT/PT to assist with strengthening/mobility   - Keep Call bell within reach  - Keep bed low and locked with side rails adjusted as appropriate  - Keep care items and personal belongings within reach  - Initiate and maintain comfort rounds  - Make Fall Risk Sign visible to staff  - Apply yellow socks and bracelet for high fall risk patients  - Consider moving patient to room near nurses station  Outcome: Progressing  Goal: Maintain or return to baseline ADL function  Description: INTERVENTIONS:  -  Assess patient's ability to carry out ADLs; assess patient's baseline for ADL function and identify physical deficits which impact ability to perform ADLs (bathing, care of mouth/teeth, toileting, grooming, dressing,  etc.)  - Assess/evaluate cause of self-care deficits   - Assess range of motion  - Assess patient's mobility; develop plan if impaired  - Assess patient's need for assistive devices and provide as appropriate  - Encourage maximum independence but intervene and supervise when necessary  - Involve family in performance of ADLs  - Assess for home care needs following discharge   - Consider OT consult to assist with ADL evaluation and planning for discharge  - Provide patient education as appropriate  Outcome: Progressing  Goal: Maintains/Returns to pre admission functional level  Description: INTERVENTIONS:  - Perform AM-PAC 6 Click Basic Mobility/ Daily Activity assessment daily.  - Set and communicate daily mobility goal to care team and patient/family/caregiver.   - Collaborate with rehabilitation services on mobility goals if consulted  - Record patient progress and toleration of activity level   Outcome: Progressing     Problem: DISCHARGE PLANNING  Goal: Discharge to home or other facility with appropriate resources  Description: INTERVENTIONS:  - Identify barriers to discharge w/patient and caregiver  - Arrange for needed discharge resources and transportation as appropriate  - Identify discharge learning needs (meds, wound care, etc.)  - Arrange for interpretive services to assist at discharge as needed  - Refer to Case Management Department for coordinating discharge planning if the patient needs post-hospital services based on physician/advanced practitioner order or complex needs related to functional status, cognitive ability, or social support system  Outcome: Progressing     Problem: Knowledge Deficit  Goal: Patient/family/caregiver demonstrates understanding of disease process, treatment plan, medications, and discharge instructions  Description: Complete learning assessment and assess knowledge base.  Interventions:  - Provide teaching at level of understanding  - Provide teaching via preferred learning  methods  Outcome: Progressing

## 2024-08-08 NOTE — RESPIRATORY THERAPY NOTE
08/08/24 1102   Respiratory Assessment   Assessment Type Assess only   General Appearance Awake   Respiratory Pattern Dyspnea at rest   Chest Assessment Chest expansion symmetrical   Bilateral Breath Sounds Clear;Diminished   Resp Comments Called by RN to extubate patient. Upon arrival to room patient was already extubated. Per RN, patient was extubated by Trauma MD Dr. Ullrich. Patient was assessed post extubation by RT, BBS clear diminished, no stridor heard. Patient placed on NC 3L.   Vent Information   Ventilator End Yes

## 2024-08-08 NOTE — RESPIRATORY THERAPY NOTE
RT Ventilator Management Note  Anmol Meza Jr. 34 y.o. male MRN: [id number]  Unit/Bed#: ICU 13 Encounter: [id number]      Daily Screen         8/7/2024  0831 8/8/2024  0748          Patient safety screen outcome:: Failed Failed      Not Ready for Weaning due to:: Underline problem not resolved Underline problem not resolved                Physical Exam:   Assessment Type: Assess only  General Appearance: Sedated  Respiratory Pattern: Assisted  Chest Assessment: Chest expansion symmetrical  Bilateral Breath Sounds: Clear, Diminished  Suction: ET Tube      Resp Comments: Patient has positve cuff leak. Patient recieved on 18/450/+6/40%. Settings match orders.     08/08/24 0748   Respiratory Assessment   Assessment Type Assess only   General Appearance Sedated   Respiratory Pattern Assisted   Chest Assessment Chest expansion symmetrical   Bilateral Breath Sounds Clear;Diminished   Suction ET Tube   Resp Comments Patient has positve cuff leak. Patient recieved on 18/450/+6/40%. Settings match orders.   Vent Information   Vent ID [id number]   Vent type Hawk C6   Hawk Vent Mode (S)CMV   $ Vital Capacity Mech/Peak Flow Yes   $ Pulse Oximetry Spot Check Charge Completed   SpO2 100 %   (S)CMV Settings   Resp Rate (BPM) 18 BPM   VT (mL) 450 mL   FIO2 (%) 40 %   PEEP (cmH2O) 6 cmH2O   I:E Ratio 1:2.3   Insp Time (%) 1 %   Flow Trigger (LPM) 3   Humidification Heater   Heater Temperature (Set) 98.6 °F (37 °C)   (S)CMV Actuals   Resp Rate (BPM) 18 BPM   VT (mL) 456   MV 8.2   MAP (cmH2O) 9.8 cmH2O   Peak Pressure (cmH2O) 19 cmH2O   I:E Ratio (Obs) 1:2.3   Insp Resistance 12   Heater Temperature (Obs) 98.6 °F (37 °C)   Static Compliance (mL/cmH20) 63.3 mL/cmH2O   Plateau Pressure (cm H2O) 21.9 cm H2O   (S)CMV Alarms   High Peak Pressure (cmH2O) 40   Low Pressure (cmH2O) 8 cm H2O   High Resp Rate (BPM) 40 BPM   Low Resp Rate (BPM) 8 BPM   High MV (L/min) 20 L/min   Low MV (L/min) 4 L/min   High VT (mL) 800 mL   Low VT (mL)  250 mL   Maintenance   Alarm (pink) cable attached No   Resuscitation bag with peep valve at bedside Yes   Water bag changed No   Circuit changed No   Daily Screen   Patient safety screen outcome: Failed   Not Ready for Weaning due to: Underline problem not resolved   [REMOVED] ETT  Oral 7.5 mm   Removal Date/Time: 08/07/24 1733  Placement Date/Time: 08/05/24 1629   Mask Ventilation: Ventilated by mask (1)  Preoxygenated: Yes  Type: Oral  Tube Size: 7.5 mm  Location: Oral  Insertion: Atraumatic  Secured at (cm): 23 lip   Secured at (cm) 27   Measured from Teeth   Secured Location Center   Repositioned Center to Right   Secured by Commercial tube easton   Site Condition Dry;Cool   Cuff Pressure (color) Green   HI-LO Suction  Intermittent suction   HI-LO Secretions Scant   HI-LO Intervention Patent

## 2024-08-08 NOTE — RESPIRATORY THERAPY NOTE
08/08/24 1405   Inhalation Therapy Tx   $ Inhalation Therapy Performed Yes   $ Pulse Oximetry Spot Check Charge Completed   SpO2 97 %   Pre-Treatment Pulse 69   Pre-Treatment Respirations 18   Duration 15   Breath Sounds Pre-Treatment Bilateral Coarse;Rhonchi   Breath Sounds Post-Treatment Bilateral Rhonchi;Coarse   Post-Treatment Pulse 70   Post-Treatment Respirations 18   Delivery Source UDN;Oxygen   Position High Gee's   Treatment Tolerance Tolerated poorly   Resp Comments Patient could not tolerate nebulizer. Took it off about 1-2 minutes into. Patient felt like the nebulizer gave him a headache.

## 2024-08-08 NOTE — ASSESSMENT & PLAN NOTE
CTA:  The proximal aspect of left transverse sinus is patent with subsequent occlusion, adjacent to the left fracture line. Superior sagittal, right transverse and sigmoid sinuses are patent. Straight sinus and internal cerebral veins are patent.  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  There is some nonocclusive thrombus noted in the visualized upper cervical internal jugular vein.     Plan:  Will review further with endovascular neurosurgery  Will determine timing for anticoagulation need  F/u am CT Head - Completed
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
## 2024-08-08 NOTE — PLAN OF CARE
Problem: SAFETY,RESTRAINT: NV/NON-SELF DESTRUCTIVE BEHAVIOR  Goal: Remains free of harm/injury (restraint for non violent/non self-detsructive behavior)  Description: INTERVENTIONS:  - Instruct patient/family regarding restraint use   - Assess and monitor physiologic and psychological status   - Provide interventions and comfort measures to meet assessed patient needs   - Identify and implement measures to help patient regain control  - Assess readiness for release of restraint   8/7/2024 2127 by Marycarmen Cunha RN  Outcome: Progressing  8/7/2024 2127 by Marycarmen Cunha RN  Outcome: Progressing  Goal: Returns to optimal restraint-free functioning  Description: INTERVENTIONS:  - Assess the patient's behavior and symptoms that indicate continued need for restraint  - Identify and implement measures to help patient regain control  - Assess readiness for release of restraint   8/7/2024 2127 by Marycarmen Cunha RN  Outcome: Progressing  8/7/2024 2127 by Marycarmen Cunha RN  Outcome: Progressing     Problem: Prexisting or High Potential for Compromised Skin Integrity  Goal: Skin integrity is maintained or improved  Description: INTERVENTIONS:  - Identify patients at risk for skin breakdown  - Assess and monitor skin integrity  - Assess and monitor nutrition and hydration status  - Monitor labs   - Assess for incontinence   - Turn and reposition patient  - Assist with mobility/ambulation  - Relieve pressure over bony prominences  - Avoid friction and shearing  - Provide appropriate hygiene as needed including keeping skin clean and dry  - Evaluate need for skin moisturizer/barrier cream  - Collaborate with interdisciplinary team   - Patient/family teaching  - Consider wound care consult   8/7/2024 2127 by Marycarmen Cunha RN  Outcome: Progressing  8/7/2024 2127 by Marycarmen Cunha RN  Outcome: Progressing     Problem: PAIN - ADULT  Goal: Verbalizes/displays adequate comfort level or baseline comfort level  Description:  Interventions:  - Encourage patient to monitor pain and request assistance  - Assess pain using appropriate pain scale  - Administer analgesics based on type and severity of pain and evaluate response  - Implement non-pharmacological measures as appropriate and evaluate response  - Consider cultural and social influences on pain and pain management  - Notify physician/advanced practitioner if interventions unsuccessful or patient reports new pain  Outcome: Progressing     Problem: INFECTION - ADULT  Goal: Absence or prevention of progression during hospitalization  Description: INTERVENTIONS:  - Assess and monitor for signs and symptoms of infection  - Monitor lab/diagnostic results  - Monitor all insertion sites, i.e. indwelling lines, tubes, and drains  - Monitor endotracheal if appropriate and nasal secretions for changes in amount and color  - [place] appropriate cooling/warming therapies per order  - Administer medications as ordered  - Instruct and encourage patient and family to use good hand hygiene technique  - Identify and instruct in appropriate isolation precautions for identified infection/condition  Outcome: Progressing  Goal: Absence of fever/infection during neutropenic period  Description: INTERVENTIONS:  - Monitor WBC    Outcome: Progressing     Problem: SAFETY ADULT  Goal: Patient will remain free of falls  Description: INTERVENTIONS:  - Educate patient/family on patient safety including physical limitations  - Instruct patient to call for assistance with activity   - Consult OT/PT to assist with strengthening/mobility   - Keep Call bell within reach  - Keep bed low and locked with side rails adjusted as appropriate  - Keep care items and personal belongings within reach  - Initiate and maintain comfort rounds  - Make Fall Risk Sign visible to staff  - Offer Toileting every  Hours, in advance of need  - Initiate/Maintain alarm  - Obtain necessary fall risk management equipment:   - Apply yellow  socks and bracelet for high fall risk patients  - Consider moving patient to room near nurses station  Outcome: Progressing  Goal: Maintain or return to baseline ADL function  Description: INTERVENTIONS:  -  Assess patient's ability to carry out ADLs; assess patient's baseline for ADL function and identify physical deficits which impact ability to perform ADLs (bathing, care of mouth/teeth, toileting, grooming, dressing, etc.)  - Assess/evaluate cause of self-care deficits   - Assess range of motion  - Assess patient's mobility; develop plan if impaired  - Assess patient's need for assistive devices and provide as appropriate  - Encourage maximum independence but intervene and supervise when necessary  - Involve family in performance of ADLs  - Assess for home care needs following discharge   - Consider OT consult to assist with ADL evaluation and planning for discharge  - Provide patient education as appropriate  Outcome: Progressing  Goal: Maintains/Returns to pre admission functional level  Description: INTERVENTIONS:  - Perform AM-PAC 6 Click Basic Mobility/ Daily Activity assessment daily.  - Set and communicate daily mobility goal to care team and patient/family/caregiver.   - Collaborate with rehabilitation services on mobility goals if consulted  - Perform Range of Motion  times a day.  - Reposition patient every  hours.  - Dangle patient  times a day  - Stand patient  times a day  - Ambulate patient  times a day  - Out of bed to chair  times a day   - Out of bed for meals times a day  - Out of bed for toileting  - Record patient progress and toleration of activity level   Outcome: Progressing     Problem: DISCHARGE PLANNING  Goal: Discharge to home or other facility with appropriate resources  Description: INTERVENTIONS:  - Identify barriers to discharge w/patient and caregiver  - Arrange for needed discharge resources and transportation as appropriate  - Identify discharge learning needs (meds, wound care,  etc.)  - Arrange for interpretive services to assist at discharge as needed  - Refer to Case Management Department for coordinating discharge planning if the patient needs post-hospital services based on physician/advanced practitioner order or complex needs related to functional status, cognitive ability, or social support system  Outcome: Progressing     Problem: Knowledge Deficit  Goal: Patient/family/caregiver demonstrates understanding of disease process, treatment plan, medications, and discharge instructions  Description: Complete learning assessment and assess knowledge base.  Interventions:  - Provide teaching at level of understanding  - Provide teaching via preferred learning methods  Outcome: Progressing

## 2024-08-09 ENCOUNTER — APPOINTMENT (INPATIENT)
Dept: CT IMAGING | Facility: HOSPITAL | Age: 34
DRG: 020 | End: 2024-08-09
Payer: COMMERCIAL

## 2024-08-09 ENCOUNTER — APPOINTMENT (INPATIENT)
Dept: NON INVASIVE DIAGNOSTICS | Facility: HOSPITAL | Age: 34
DRG: 020 | End: 2024-08-09
Payer: COMMERCIAL

## 2024-08-09 LAB
ANION GAP SERPL CALCULATED.3IONS-SCNC: 7 MMOL/L (ref 4–13)
ANION GAP SERPL CALCULATED.3IONS-SCNC: 7 MMOL/L (ref 4–13)
APICAL FOUR CHAMBER EJECTION FRACTION: 39 %
APICAL FOUR CHAMBER EJECTION FRACTION: 39 %
APTT PPP: 30 SECONDS (ref 23–34)
APTT PPP: 30 SECONDS (ref 23–34)
APTT PPP: 32 SECONDS (ref 23–34)
APTT PPP: 32 SECONDS (ref 23–34)
BSA FOR ECHO PROCEDURE: 1.89 M2
BSA FOR ECHO PROCEDURE: 1.89 M2
BUN SERPL-MCNC: 9 MG/DL (ref 5–25)
BUN SERPL-MCNC: 9 MG/DL (ref 5–25)
CA-I BLD-SCNC: 1.04 MMOL/L (ref 1.12–1.32)
CA-I BLD-SCNC: 1.04 MMOL/L (ref 1.12–1.32)
CALCIUM SERPL-MCNC: 7.7 MG/DL (ref 8.4–10.2)
CALCIUM SERPL-MCNC: 7.7 MG/DL (ref 8.4–10.2)
CHLORIDE SERPL-SCNC: 109 MMOL/L (ref 96–108)
CHLORIDE SERPL-SCNC: 109 MMOL/L (ref 96–108)
CO2 SERPL-SCNC: 23 MMOL/L (ref 21–32)
CO2 SERPL-SCNC: 23 MMOL/L (ref 21–32)
CREAT SERPL-MCNC: 0.58 MG/DL (ref 0.6–1.3)
CREAT SERPL-MCNC: 0.58 MG/DL (ref 0.6–1.3)
ERYTHROCYTE [DISTWIDTH] IN BLOOD BY AUTOMATED COUNT: 12.4 % (ref 11.6–15.1)
ERYTHROCYTE [DISTWIDTH] IN BLOOD BY AUTOMATED COUNT: 12.4 % (ref 11.6–15.1)
GFR SERPL CREATININE-BSD FRML MDRD: 131 ML/MIN/1.73SQ M
GFR SERPL CREATININE-BSD FRML MDRD: 131 ML/MIN/1.73SQ M
GLUCOSE SERPL-MCNC: 100 MG/DL (ref 65–140)
GLUCOSE SERPL-MCNC: 100 MG/DL (ref 65–140)
GLUCOSE SERPL-MCNC: 116 MG/DL (ref 65–140)
GLUCOSE SERPL-MCNC: 116 MG/DL (ref 65–140)
GLUCOSE SERPL-MCNC: 80 MG/DL (ref 65–140)
GLUCOSE SERPL-MCNC: 80 MG/DL (ref 65–140)
HCT VFR BLD AUTO: 29.4 % (ref 36.5–49.3)
HCT VFR BLD AUTO: 29.4 % (ref 36.5–49.3)
HGB BLD-MCNC: 9.7 G/DL (ref 12–17)
HGB BLD-MCNC: 9.7 G/DL (ref 12–17)
LEFT VENTRICLE DIASTOLIC VOLUME (MOD BIPLANE): 196 ML
LEFT VENTRICLE DIASTOLIC VOLUME (MOD BIPLANE): 196 ML
LEFT VENTRICLE DIASTOLIC VOLUME INDEX (MOD BIPLANE): 103.7 ML/M2
LEFT VENTRICLE DIASTOLIC VOLUME INDEX (MOD BIPLANE): 103.7 ML/M2
LEFT VENTRICLE SYSTOLIC VOLUME (MOD BIPLANE): 104 ML
LEFT VENTRICLE SYSTOLIC VOLUME (MOD BIPLANE): 104 ML
LEFT VENTRICLE SYSTOLIC VOLUME INDEX (MOD BIPLANE): 55 ML/M2
LEFT VENTRICLE SYSTOLIC VOLUME INDEX (MOD BIPLANE): 55 ML/M2
LV EF: 47 %
LV EF: 47 %
MAGNESIUM SERPL-MCNC: 1.9 MG/DL (ref 1.9–2.7)
MAGNESIUM SERPL-MCNC: 1.9 MG/DL (ref 1.9–2.7)
MCH RBC QN AUTO: 30.9 PG (ref 26.8–34.3)
MCH RBC QN AUTO: 30.9 PG (ref 26.8–34.3)
MCHC RBC AUTO-ENTMCNC: 33 G/DL (ref 31.4–37.4)
MCHC RBC AUTO-ENTMCNC: 33 G/DL (ref 31.4–37.4)
MCV RBC AUTO: 94 FL (ref 82–98)
MCV RBC AUTO: 94 FL (ref 82–98)
PHOSPHATE SERPL-MCNC: 3.4 MG/DL (ref 2.7–4.5)
PHOSPHATE SERPL-MCNC: 3.4 MG/DL (ref 2.7–4.5)
PLATELET # BLD AUTO: 176 THOUSANDS/UL (ref 149–390)
PLATELET # BLD AUTO: 176 THOUSANDS/UL (ref 149–390)
PMV BLD AUTO: 11 FL (ref 8.9–12.7)
PMV BLD AUTO: 11 FL (ref 8.9–12.7)
POTASSIUM SERPL-SCNC: 4 MMOL/L (ref 3.5–5.3)
POTASSIUM SERPL-SCNC: 4 MMOL/L (ref 3.5–5.3)
RBC # BLD AUTO: 3.14 MILLION/UL (ref 3.88–5.62)
RBC # BLD AUTO: 3.14 MILLION/UL (ref 3.88–5.62)
SL CV LV EF: 43
SL CV LV EF: 43
SODIUM SERPL-SCNC: 139 MMOL/L (ref 135–147)
SODIUM SERPL-SCNC: 139 MMOL/L (ref 135–147)
WBC # BLD AUTO: 7.87 THOUSAND/UL (ref 4.31–10.16)
WBC # BLD AUTO: 7.87 THOUSAND/UL (ref 4.31–10.16)

## 2024-08-09 PROCEDURE — 82948 REAGENT STRIP/BLOOD GLUCOSE: CPT

## 2024-08-09 PROCEDURE — 99255 IP/OBS CONSLTJ NEW/EST HI 80: CPT

## 2024-08-09 PROCEDURE — 70450 CT HEAD/BRAIN W/O DYE: CPT

## 2024-08-09 PROCEDURE — 93308 TTE F-UP OR LMTD: CPT

## 2024-08-09 PROCEDURE — 85027 COMPLETE CBC AUTOMATED: CPT

## 2024-08-09 PROCEDURE — 94640 AIRWAY INHALATION TREATMENT: CPT

## 2024-08-09 PROCEDURE — 83735 ASSAY OF MAGNESIUM: CPT | Performed by: NURSE PRACTITIONER

## 2024-08-09 PROCEDURE — 80048 BASIC METABOLIC PNL TOTAL CA: CPT | Performed by: NURSE PRACTITIONER

## 2024-08-09 PROCEDURE — 97163 PT EVAL HIGH COMPLEX 45 MIN: CPT

## 2024-08-09 PROCEDURE — 97167 OT EVAL HIGH COMPLEX 60 MIN: CPT

## 2024-08-09 PROCEDURE — 82330 ASSAY OF CALCIUM: CPT | Performed by: NURSE PRACTITIONER

## 2024-08-09 PROCEDURE — 99233 SBSQ HOSP IP/OBS HIGH 50: CPT | Performed by: STUDENT IN AN ORGANIZED HEALTH CARE EDUCATION/TRAINING PROGRAM

## 2024-08-09 PROCEDURE — 93308 TTE F-UP OR LMTD: CPT | Performed by: INTERNAL MEDICINE

## 2024-08-09 PROCEDURE — 84100 ASSAY OF PHOSPHORUS: CPT | Performed by: NURSE PRACTITIONER

## 2024-08-09 PROCEDURE — 99232 SBSQ HOSP IP/OBS MODERATE 35: CPT | Performed by: PHYSICIAN ASSISTANT

## 2024-08-09 PROCEDURE — 94760 N-INVAS EAR/PLS OXIMETRY 1: CPT

## 2024-08-09 PROCEDURE — 85730 THROMBOPLASTIN TIME PARTIAL: CPT | Performed by: STUDENT IN AN ORGANIZED HEALTH CARE EDUCATION/TRAINING PROGRAM

## 2024-08-09 PROCEDURE — 85730 THROMBOPLASTIN TIME PARTIAL: CPT

## 2024-08-09 RX ORDER — OXYCODONE HYDROCHLORIDE 5 MG/1
5 TABLET ORAL EVERY 4 HOURS PRN
Status: DISCONTINUED | OUTPATIENT
Start: 2024-08-09 | End: 2024-08-17

## 2024-08-09 RX ORDER — HEPARIN SODIUM 10000 [USP'U]/100ML
3-15 INJECTION, SOLUTION INTRAVENOUS
Status: DISCONTINUED | OUTPATIENT
Start: 2024-08-09 | End: 2024-08-11

## 2024-08-09 RX ORDER — HYDROMORPHONE HCL IN WATER/PF 6 MG/30 ML
0.2 PATIENT CONTROLLED ANALGESIA SYRINGE INTRAVENOUS EVERY 4 HOURS PRN
Status: DISCONTINUED | OUTPATIENT
Start: 2024-08-09 | End: 2024-08-14

## 2024-08-09 RX ADMIN — HYDROMORPHONE HYDROCHLORIDE 0.2 MG: 0.2 INJECTION, SOLUTION INTRAMUSCULAR; INTRAVENOUS; SUBCUTANEOUS at 12:17

## 2024-08-09 RX ADMIN — AMPICILLIN SODIUM AND SULBACTAM SODIUM 3 G: 100; 50 INJECTION, POWDER, FOR SOLUTION INTRAVENOUS at 05:42

## 2024-08-09 RX ADMIN — LEVALBUTEROL HYDROCHLORIDE 1.25 MG: 1.25 SOLUTION RESPIRATORY (INHALATION) at 14:11

## 2024-08-09 RX ADMIN — DEXMEDETOMIDINE HYDROCHLORIDE 0.6 MCG/KG/HR: 4 INJECTION, SOLUTION INTRAVENOUS at 08:46

## 2024-08-09 RX ADMIN — AMPICILLIN SODIUM AND SULBACTAM SODIUM 3 G: 100; 50 INJECTION, POWDER, FOR SOLUTION INTRAVENOUS at 18:03

## 2024-08-09 RX ADMIN — CHLORHEXIDINE GLUCONATE 15 ML: 1.2 RINSE ORAL at 08:23

## 2024-08-09 RX ADMIN — SODIUM CHLORIDE SOLN NEBU 3% 4 ML: 3 NEBU SOLN at 07:32

## 2024-08-09 RX ADMIN — SODIUM CHLORIDE SOLN NEBU 3% 4 ML: 3 NEBU SOLN at 20:05

## 2024-08-09 RX ADMIN — HEPARIN SODIUM 8 UNITS/KG/HR: 10000 INJECTION, SOLUTION INTRAVENOUS at 14:29

## 2024-08-09 RX ADMIN — CIPROFLOXACIN AND DEXAMETHASONE 4 DROP: 3; 1 SUSPENSION/ DROPS AURICULAR (OTIC) at 08:24

## 2024-08-09 RX ADMIN — LEVETIRACETAM 500 MG: 100 INJECTION, SOLUTION INTRAVENOUS at 20:46

## 2024-08-09 RX ADMIN — DEXMEDETOMIDINE HYDROCHLORIDE 1 MCG/KG/HR: 4 INJECTION, SOLUTION INTRAVENOUS at 02:05

## 2024-08-09 RX ADMIN — BACITRACIN 1 LARGE APPLICATION: 500 OINTMENT TOPICAL at 18:05

## 2024-08-09 RX ADMIN — AMPICILLIN SODIUM AND SULBACTAM SODIUM 3 G: 100; 50 INJECTION, POWDER, FOR SOLUTION INTRAVENOUS at 00:23

## 2024-08-09 RX ADMIN — ACETAMINOPHEN 1000 MG: 10 INJECTION INTRAVENOUS at 05:42

## 2024-08-09 RX ADMIN — HYDROMORPHONE HYDROCHLORIDE 0.2 MG: 0.2 INJECTION, SOLUTION INTRAMUSCULAR; INTRAVENOUS; SUBCUTANEOUS at 15:47

## 2024-08-09 RX ADMIN — SODIUM CHLORIDE SOLN NEBU 3% 4 ML: 3 NEBU SOLN at 14:11

## 2024-08-09 RX ADMIN — ACETAMINOPHEN 1000 MG: 10 INJECTION INTRAVENOUS at 00:23

## 2024-08-09 RX ADMIN — LEVETIRACETAM 500 MG: 100 INJECTION, SOLUTION INTRAVENOUS at 08:23

## 2024-08-09 RX ADMIN — CIPROFLOXACIN AND DEXAMETHASONE 4 DROP: 3; 1 SUSPENSION/ DROPS AURICULAR (OTIC) at 18:05

## 2024-08-09 RX ADMIN — HYDROMORPHONE HYDROCHLORIDE 0.2 MG: 0.2 INJECTION, SOLUTION INTRAMUSCULAR; INTRAVENOUS; SUBCUTANEOUS at 17:49

## 2024-08-09 RX ADMIN — METHOCARBAMOL 500 MG: 100 INJECTION INTRAMUSCULAR; INTRAVENOUS at 13:17

## 2024-08-09 RX ADMIN — ACETAMINOPHEN 1000 MG: 10 INJECTION INTRAVENOUS at 17:39

## 2024-08-09 RX ADMIN — ONDANSETRON 4 MG: 2 INJECTION INTRAMUSCULAR; INTRAVENOUS at 11:46

## 2024-08-09 RX ADMIN — HEPARIN SODIUM 5000 UNITS: 5000 INJECTION INTRAVENOUS; SUBCUTANEOUS at 13:05

## 2024-08-09 RX ADMIN — LEVALBUTEROL HYDROCHLORIDE 1.25 MG: 1.25 SOLUTION RESPIRATORY (INHALATION) at 07:32

## 2024-08-09 RX ADMIN — OXYCODONE HYDROCHLORIDE 5 MG: 5 TABLET ORAL at 09:10

## 2024-08-09 RX ADMIN — AMPICILLIN SODIUM AND SULBACTAM SODIUM 3 G: 100; 50 INJECTION, POWDER, FOR SOLUTION INTRAVENOUS at 12:17

## 2024-08-09 RX ADMIN — LEVALBUTEROL HYDROCHLORIDE 1.25 MG: 1.25 SOLUTION RESPIRATORY (INHALATION) at 20:05

## 2024-08-09 RX ADMIN — SODIUM CHLORIDE, SODIUM GLUCONATE, SODIUM ACETATE, POTASSIUM CHLORIDE, MAGNESIUM CHLORIDE, SODIUM PHOSPHATE, DIBASIC, AND POTASSIUM PHOSPHATE 125 ML/HR: .53; .5; .37; .037; .03; .012; .00082 INJECTION, SOLUTION INTRAVENOUS at 05:23

## 2024-08-09 RX ADMIN — OXYCODONE HYDROCHLORIDE 5 MG: 5 TABLET ORAL at 16:49

## 2024-08-09 RX ADMIN — HEPARIN SODIUM 5000 UNITS: 5000 INJECTION INTRAVENOUS; SUBCUTANEOUS at 05:22

## 2024-08-09 RX ADMIN — METHOCARBAMOL 500 MG: 100 INJECTION INTRAMUSCULAR; INTRAVENOUS at 05:22

## 2024-08-09 RX ADMIN — CHLORHEXIDINE GLUCONATE 15 ML: 1.2 RINSE ORAL at 20:46

## 2024-08-09 RX ADMIN — ACETAMINOPHEN 1000 MG: 10 INJECTION INTRAVENOUS at 11:46

## 2024-08-09 RX ADMIN — BACITRACIN 1 LARGE APPLICATION: 500 OINTMENT TOPICAL at 08:23

## 2024-08-09 RX ADMIN — DEXMEDETOMIDINE HYDROCHLORIDE 0.4 MCG/KG/HR: 4 INJECTION, SOLUTION INTRAVENOUS at 20:52
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
## 2024-08-09 NOTE — PLAN OF CARE
Problem: PAIN - ADULT  Goal: Verbalizes/displays adequate comfort level or baseline comfort level  Description: Interventions:  - Encourage patient to monitor pain and request assistance  - Assess pain using appropriate pain scale  - Administer analgesics based on type and severity of pain and evaluate response  - Implement non-pharmacological measures as appropriate and evaluate response  - Consider cultural and social influences on pain and pain management  - Notify physician/advanced practitioner if interventions unsuccessful or patient reports new pain  Outcome: Progressing     Problem: SAFETY,RESTRAINT: NV/NON-SELF DESTRUCTIVE BEHAVIOR  Goal: Remains free of harm/injury (restraint for non violent/non self-detsructive behavior)  Description: INTERVENTIONS:  - Instruct patient/family regarding restraint use   - Assess and monitor physiologic and psychological status   - Provide interventions and comfort measures to meet assessed patient needs   - Identify and implement measures to help patient regain control  - Assess readiness for release of restraint   Outcome: Progressing  Goal: Returns to optimal restraint-free functioning  Description: INTERVENTIONS:  - Assess the patient's behavior and symptoms that indicate continued need for restraint  - Identify and implement measures to help patient regain control  - Assess readiness for release of restraint   Outcome: Progressing     Problem: SAFETY ADULT  Goal: Patient will remain free of falls  Description: INTERVENTIONS:  - Educate patient/family on patient safety including physical limitations  - Instruct patient to call for assistance with activity   - Consult OT/PT to assist with strengthening/mobility   - Keep Call bell within reach  - Keep bed low and locked with side rails adjusted as appropriate  - Keep care items and personal belongings within reach  - Initiate and maintain comfort rounds  - Make Fall Risk Sign visible to staff  - Apply yellow socks and  bracelet for high fall risk patients  - Consider moving patient to room near nurses station  Outcome: Progressing  Goal: Maintain or return to baseline ADL function  Description: INTERVENTIONS:  -  Assess patient's ability to carry out ADLs; assess patient's baseline for ADL function and identify physical deficits which impact ability to perform ADLs (bathing, care of mouth/teeth, toileting, grooming, dressing, etc.)  - Assess/evaluate cause of self-care deficits   - Assess range of motion  - Assess patient's mobility; develop plan if impaired  - Assess patient's need for assistive devices and provide as appropriate  - Encourage maximum independence but intervene and supervise when necessary  - Involve family in performance of ADLs  - Assess for home care needs following discharge   - Consider OT consult to assist with ADL evaluation and planning for discharge  - Provide patient education as appropriate  Outcome: Progressing  Goal: Maintains/Returns to pre admission functional level  Description: INTERVENTIONS:  - Perform AM-PAC 6 Click Basic Mobility/ Daily Activity assessment daily.  - Set and communicate daily mobility goal to care team and patient/family/caregiver.   - Collaborate with rehabilitation services on mobility goals if consulted  - Out of bed for toileting  - Record patient progress and toleration of activity level   Outcome: Progressing     Problem: DISCHARGE PLANNING  Goal: Discharge to home or other facility with appropriate resources  Description: INTERVENTIONS:  - Identify barriers to discharge w/patient and caregiver  - Arrange for needed discharge resources and transportation as appropriate  - Identify discharge learning needs (meds, wound care, etc.)  - Arrange for interpretive services to assist at discharge as needed  - Refer to Case Management Department for coordinating discharge planning if the patient needs post-hospital services based on physician/advanced practitioner order or complex  needs related to functional status, cognitive ability, or social support system  Outcome: Progressing
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
## 2024-08-09 NOTE — ASSESSMENT & PLAN NOTE
CT Head: 1.2 cm posttraumatic subarachnoid hemorrhage is suspected but suboptimally evaluated due to artifact 5 mm of right to left shift.   Neurosurgery consulted. Now s/p right frontal EVD placement by neurosurgery. Opening pressure 9    Plan:   Neurosurgery following. s/p right frontal EVD placement by neurosurgery 8/5. Opening pressure 9  Q1hr neurological checks.   STAT CT head for decline in GCS >2 points in 1 hour.   Neurosurgery reviewing CTV further with endovascular neurosurgery team   EVD in place @ 10mmHg above the tragus  Hold chemical DVT ppx, plan to start heparin gtt per [facility] protocol today or tomorrow  Keppra for seizure ppx x7days

## 2024-08-09 NOTE — ASSESSMENT & PLAN NOTE
Continue Ciprodex gtt  ENT will need to be consulted, appreciate recommendations  No acute surgical ENT intervention  Outpatient follow-up for audiogram
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
## 2024-08-09 NOTE — ASSESSMENT & PLAN NOTE
CT Head: Fractures of the left orbit and left zygoma.   OMFS consulted & following.   8/7: S/P ORIF of zygomatic fracture and repair of complex facial lacerations     Plan:   Continue Unasyn 3g IV TID   Head of bed elevated  Sinus precautions: 4 weeks: no nose blowing, avoid putting pressure on sinus area, avoid strenuous activity/straining, try to sneeze with mouth open. 2 weeks: no straws, spitting, smoking. Use OTC Afrin BID 2 sprays/nostril 3 days maximum as needed, OTC decongestant (e.g. Sudafed) or Antihistamine (e.g. Claritin-D) as needed, and saline nasal spray as needed.   F/U: Follow up in clinic within 1-2 weeks of discharge., Patient should see general dentist for general oral care.   Analgesia as indicated

## 2024-08-09 NOTE — ASSESSMENT & PLAN NOTE
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  There is some nonocclusive thrombus noted in the visualized upper cervical internal jugular vein.    Plan:  Continue to monitor clinically   Recommend STAT CT head for any change in neurological status   Continue with aggressive hydration  Fluids 125cc/hour   Recommend restart ecmo protocol without bolus   Plan for STAT CT head when therapeutic on gtt  Once repeat CT head stable and therapeutic on hep gtt can transition to ASC low, but this should be cleared with neurosurgery prior.

## 2024-08-09 NOTE — ASSESSMENT & PLAN NOTE
Fracture of the squamous portion of the left temporal bone with small displaced fragment measuring 5 mm. Fracture extends posteriorly through the superior aspect of the mastoid air cells, involves the posterior squamous suture and extends into the left occipital bone down to the foramen magnum. Small amount of left temporal extra-axial pneumocephaly.     Plan:  Consult OMFS, appreciate recommendations  Continue unasyn for fracture extending into sinus (Total 7 days)

## 2024-08-09 NOTE — ASSESSMENT & PLAN NOTE
Per sister, Pt. Has longstanding h/o illicit drug use of multiple agents (heroin,methemphatamine, fentanyl)  UDS + benzo, amph/meth, THC, fentanyl; Ethanol 0  Monitor for withdrawal symptoms. T/C pain management consult when stabilized to assist in pain management.

## 2024-08-09 NOTE — ASSESSMENT & PLAN NOTE
CT Head: .  right subdural hematoma measuring up to 5 mm. Bilateral frontal and temporal contusions.     Plan:   Neurosurgery following. s/p right frontal EVD placement by neurosurgery 8/5. Opening pressure 9  Q1hr neurological checks.   STAT CT head for decline in GCS >2 points in 1 hour.   Neurosurgery reviewing CTV further with endovascular neurosurgery team   EVD in place @ 10mmHg above the tragus  Hold chemical DVT ppx, plan to start heparin gtt per ECMO protocol today or tomorrow  Keppra for seizure ppx x7 days
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
## 2024-08-09 NOTE — PLAN OF CARE
Problem: OCCUPATIONAL THERAPY ADULT  Goal: Performs self-care activities at highest level of function for planned discharge setting.  See evaluation for individualized goals.  Description: Treatment Interventions: ADL retraining, Functional transfer training, Patient/family training, Equipment evaluation/education, Neuromuscular reeducation, Compensatory technique education, Continued evaluation, Visual perceptual retraining, Cognitive reorientation, Endurance training, UE strengthening/ROM (MoCA as appropriate)          See flowsheet documentation for full assessment, interventions and recommendations.   Note: Limitation: Decreased ADL status, Decreased UE strength, Decreased Safe judgement during ADL, Decreased cognition, Decreased endurance, Decreased self-care trans, Decreased high-level ADLs, Visual deficit (vestibular sx, balance, standing tolerance, functional reach, global mental status, memory, attention, insight/judgement)  Prognosis: Fair  Assessment: Patient is a 34 y.o. male seen for OT evaluation at [facility] following admission on 8/5/2024  s/p Subdural hematoma, acute (HCC). Please see above for comprehensive list of comorbidities and significant PMHx impacting functional performance.  Upon initial evaluation, pt appears to be performing below baseline functional status.   Occupational performance is affected by the following deficits: endurance , impaired coordination , decreased balance , decreased standing tolerance for self care tasks , decreased trunk control , decreased activity tolerance , attention to task, impaired judgement and problem solving , impaired safety awareness , (+) pain , and impaired global mental status, visual perceptual deficits. Personal/Environmental factors impacting D/C include: homelessness , assistance for ADLs and mobility, TBI. Supporting factors include: support system available Patient would benefit from OT services within the acute care setting  to maximize level of functional independence in the following areas self-care transfers, functional mobility, formal cognitive evaluation, and ADLs.  From OT standpoint, recommendation at time of D/C would be Level 1: maximum resource intensity . (TBI)     Rehab Resource Intensity Level, OT: I (Maximum Resource Intensity) (TBI)     Faith Hess, OT

## 2024-08-09 NOTE — ASSESSMENT & PLAN NOTE
CT Abd: Within the mid right hepatic lobe there is an irregular hypodense region measuring 2.8 x 1.2 x 2 cm (306:113)This hypodensity does not extend to the liver surface. Surrounding cloudlike hyperenhancement.  Re-eval - thought to be a hemangioma on comparative imaging     Plan:   Serial abdominal checks, serial hemoglobin checks were stable  Daily CBC

## 2024-08-09 NOTE — ASSESSMENT & PLAN NOTE
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  There is some nonocclusive thrombus noted in the visualized upper cervical internal jugular vein.    Plan:  Continue to monitor clinically   Recommend STAT CT head for any change in neurological status   Continue with aggressive hydration  Fluids 125cc/hour   CT head stable from a hemorrhage standpoint   Will plan to initiate a low dose emco protocol heparin gtt   Plan for Ct head when therapeutic
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
## 2024-08-09 NOTE — ASSESSMENT & PLAN NOTE
Non surgical management   Some small associated pneumocephalus, but no worsening hemorrhage on repeat CT head  ENT consulted and will plan to follow up outpatient as well
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
## 2024-08-09 NOTE — PLAN OF CARE
Problem: SAFETY,RESTRAINT: NV/NON-SELF DESTRUCTIVE BEHAVIOR  Goal: Remains free of harm/injury (restraint for non violent/non self-detsructive behavior)  Description: INTERVENTIONS:  - Instruct patient/family regarding restraint use   - Assess and monitor physiologic and psychological status   - Provide interventions and comfort measures to meet assessed patient needs   - Identify and implement measures to help patient regain control  - Assess readiness for release of restraint   Outcome: Progressing  Goal: Returns to optimal restraint-free functioning  Description: INTERVENTIONS:  - Assess the patient's behavior and symptoms that indicate continued need for restraint  - Identify and implement measures to help patient regain control  - Assess readiness for release of restraint   Outcome: Progressing     Problem: Prexisting or High Potential for Compromised Skin Integrity  Goal: Skin integrity is maintained or improved  Description: INTERVENTIONS:  - Identify patients at risk for skin breakdown  - Assess and monitor skin integrity  - Assess and monitor nutrition and hydration status  - Monitor labs   - Assess for incontinence   - Turn and reposition patient  - Assist with mobility/ambulation  - Relieve pressure over bony prominences  - Avoid friction and shearing  - Provide appropriate hygiene as needed including keeping skin clean and dry  - Evaluate need for skin moisturizer/barrier cream  - Collaborate with interdisciplinary team   - Patient/family teaching  - Consider wound care consult   Outcome: Progressing     Problem: PAIN - ADULT  Goal: Verbalizes/displays adequate comfort level or baseline comfort level  Description: Interventions:  - Encourage patient to monitor pain and request assistance  - Assess pain using appropriate pain scale  - Administer analgesics based on type and severity of pain and evaluate response  - Implement non-pharmacological measures as appropriate and evaluate response  - Consider  cultural and social influences on pain and pain management  - Notify physician/advanced practitioner if interventions unsuccessful or patient reports new pain  Outcome: Progressing     Problem: INFECTION - ADULT  Goal: Absence or prevention of progression during hospitalization  Description: INTERVENTIONS:  - Assess and monitor for signs and symptoms of infection  - Monitor lab/diagnostic results  - Monitor all insertion sites, i.e. indwelling lines, tubes, and drains  - Monitor endotracheal if appropriate and nasal secretions for changes in amount and color  - [place] appropriate cooling/warming therapies per order  - Administer medications as ordered  - Instruct and encourage patient and family to use good hand hygiene technique  - Identify and instruct in appropriate isolation precautions for identified infection/condition  Outcome: Progressing  Goal: Absence of fever/infection during neutropenic period  Description: INTERVENTIONS:  - Monitor WBC    Outcome: Progressing     Problem: SAFETY ADULT  Goal: Patient will remain free of falls  Description: INTERVENTIONS:  - Educate patient/family on patient safety including physical limitations  - Instruct patient to call for assistance with activity   - Consult OT/PT to assist with strengthening/mobility   - Keep Call bell within reach  - Keep bed low and locked with side rails adjusted as appropriate  - Keep care items and personal belongings within reach  - Initiate and maintain comfort rounds  - Make Fall Risk Sign visible to staff  - Offer Toileting every  Hours, in advance of need  - Initiate/Maintain alarm  - Obtain necessary fall risk management equipment:   - Apply yellow socks and bracelet for high fall risk patients  - Consider moving patient to room near nurses station  Outcome: Progressing  Goal: Maintain or return to baseline ADL function  Description: INTERVENTIONS:  -  Assess patient's ability to carry out ADLs; assess patient's baseline for ADL  function and identify physical deficits which impact ability to perform ADLs (bathing, care of mouth/teeth, toileting, grooming, dressing, etc.)  - Assess/evaluate cause of self-care deficits   - Assess range of motion  - Assess patient's mobility; develop plan if impaired  - Assess patient's need for assistive devices and provide as appropriate  - Encourage maximum independence but intervene and supervise when necessary  - Involve family in performance of ADLs  - Assess for home care needs following discharge   - Consider OT consult to assist with ADL evaluation and planning for discharge  - Provide patient education as appropriate  Outcome: Progressing  Goal: Maintains/Returns to pre admission functional level  Description: INTERVENTIONS:  - Perform AM-PAC 6 Click Basic Mobility/ Daily Activity assessment daily.  - Set and communicate daily mobility goal to care team and patient/family/caregiver.   - Collaborate with rehabilitation services on mobility goals if consulted  - Perform Range of Motion  times a day.  - Reposition patient every  hours.  - Dangle patient  times a day  - Stand patient  times a day  - Ambulate patient  times a day  - Out of bed to chair  times a day   - Out of bed for meal times a day  - Out of bed for toileting  - Record patient progress and toleration of activity level   Outcome: Progressing     Problem: DISCHARGE PLANNING  Goal: Discharge to home or other facility with appropriate resources  Description: INTERVENTIONS:  - Identify barriers to discharge w/patient and caregiver  - Arrange for needed discharge resources and transportation as appropriate  - Identify discharge learning needs (meds, wound care, etc.)  - Arrange for interpretive services to assist at discharge as needed  - Refer to Case Management Department for coordinating discharge planning if the patient needs post-hospital services based on physician/advanced practitioner order or complex needs related to functional  status, cognitive ability, or social support system  Outcome: Progressing     Problem: Knowledge Deficit  Goal: Patient/family/caregiver demonstrates understanding of disease process, treatment plan, medications, and discharge instructions  Description: Complete learning assessment and assess knowledge base.  Interventions:  - Provide teaching at level of understanding  - Provide teaching via preferred learning methods  Outcome: Progressing

## 2024-08-10 ENCOUNTER — APPOINTMENT (INPATIENT)
Dept: CT IMAGING | Facility: HOSPITAL | Age: 34
DRG: 020 | End: 2024-08-10
Payer: COMMERCIAL

## 2024-08-10 PROBLEM — J96.00 ACUTE RESPIRATORY FAILURE (HCC): Status: RESOLVED | Noted: 2024-08-05 | Resolved: 2024-08-10

## 2024-08-10 LAB
ANION GAP SERPL CALCULATED.3IONS-SCNC: 8 MMOL/L (ref 4–13)
ANION GAP SERPL CALCULATED.3IONS-SCNC: 8 MMOL/L (ref 4–13)
APTT PPP: 126 SECONDS (ref 23–34)
APTT PPP: 126 SECONDS (ref 23–34)
APTT PPP: 31 SECONDS (ref 23–34)
APTT PPP: 31 SECONDS (ref 23–34)
APTT PPP: 32 SECONDS (ref 23–34)
APTT PPP: 33 SECONDS (ref 23–34)
APTT PPP: 34 SECONDS (ref 23–34)
APTT PPP: 34 SECONDS (ref 23–34)
BUN SERPL-MCNC: 11 MG/DL (ref 5–25)
BUN SERPL-MCNC: 11 MG/DL (ref 5–25)
CA-I BLD-SCNC: 1.06 MMOL/L (ref 1.12–1.32)
CA-I BLD-SCNC: 1.06 MMOL/L (ref 1.12–1.32)
CALCIUM SERPL-MCNC: 7.8 MG/DL (ref 8.4–10.2)
CALCIUM SERPL-MCNC: 7.8 MG/DL (ref 8.4–10.2)
CHLORIDE SERPL-SCNC: 105 MMOL/L (ref 96–108)
CHLORIDE SERPL-SCNC: 105 MMOL/L (ref 96–108)
CO2 SERPL-SCNC: 22 MMOL/L (ref 21–32)
CO2 SERPL-SCNC: 22 MMOL/L (ref 21–32)
CREAT SERPL-MCNC: 0.59 MG/DL (ref 0.6–1.3)
CREAT SERPL-MCNC: 0.59 MG/DL (ref 0.6–1.3)
ERYTHROCYTE [DISTWIDTH] IN BLOOD BY AUTOMATED COUNT: 12.2 % (ref 11.6–15.1)
ERYTHROCYTE [DISTWIDTH] IN BLOOD BY AUTOMATED COUNT: 12.2 % (ref 11.6–15.1)
GFR SERPL CREATININE-BSD FRML MDRD: 130 ML/MIN/1.73SQ M
GFR SERPL CREATININE-BSD FRML MDRD: 130 ML/MIN/1.73SQ M
GLUCOSE SERPL-MCNC: 101 MG/DL (ref 65–140)
GLUCOSE SERPL-MCNC: 101 MG/DL (ref 65–140)
GLUCOSE SERPL-MCNC: 104 MG/DL (ref 65–140)
GLUCOSE SERPL-MCNC: 104 MG/DL (ref 65–140)
GLUCOSE SERPL-MCNC: 105 MG/DL (ref 65–140)
GLUCOSE SERPL-MCNC: 105 MG/DL (ref 65–140)
GLUCOSE SERPL-MCNC: 107 MG/DL (ref 65–140)
GLUCOSE SERPL-MCNC: 107 MG/DL (ref 65–140)
GLUCOSE SERPL-MCNC: 95 MG/DL (ref 65–140)
GLUCOSE SERPL-MCNC: 95 MG/DL (ref 65–140)
GLUCOSE SERPL-MCNC: 97 MG/DL (ref 65–140)
GLUCOSE SERPL-MCNC: 97 MG/DL (ref 65–140)
GLUCOSE SERPL-MCNC: 99 MG/DL (ref 65–140)
GLUCOSE SERPL-MCNC: 99 MG/DL (ref 65–140)
HCT VFR BLD AUTO: 30 % (ref 36.5–49.3)
HCT VFR BLD AUTO: 30 % (ref 36.5–49.3)
HGB BLD-MCNC: 10.3 G/DL (ref 12–17)
HGB BLD-MCNC: 10.3 G/DL (ref 12–17)
MAGNESIUM SERPL-MCNC: 1.8 MG/DL (ref 1.9–2.7)
MAGNESIUM SERPL-MCNC: 1.8 MG/DL (ref 1.9–2.7)
MCH RBC QN AUTO: 31.3 PG (ref 26.8–34.3)
MCH RBC QN AUTO: 31.3 PG (ref 26.8–34.3)
MCHC RBC AUTO-ENTMCNC: 34.3 G/DL (ref 31.4–37.4)
MCHC RBC AUTO-ENTMCNC: 34.3 G/DL (ref 31.4–37.4)
MCV RBC AUTO: 91 FL (ref 82–98)
MCV RBC AUTO: 91 FL (ref 82–98)
PHOSPHATE SERPL-MCNC: 2.8 MG/DL (ref 2.7–4.5)
PHOSPHATE SERPL-MCNC: 2.8 MG/DL (ref 2.7–4.5)
PLATELET # BLD AUTO: 190 THOUSANDS/UL (ref 149–390)
PLATELET # BLD AUTO: 190 THOUSANDS/UL (ref 149–390)
PMV BLD AUTO: 10.6 FL (ref 8.9–12.7)
PMV BLD AUTO: 10.6 FL (ref 8.9–12.7)
POTASSIUM SERPL-SCNC: 3.8 MMOL/L (ref 3.5–5.3)
POTASSIUM SERPL-SCNC: 3.8 MMOL/L (ref 3.5–5.3)
RBC # BLD AUTO: 3.29 MILLION/UL (ref 3.88–5.62)
RBC # BLD AUTO: 3.29 MILLION/UL (ref 3.88–5.62)
SODIUM SERPL-SCNC: 135 MMOL/L (ref 135–147)
SODIUM SERPL-SCNC: 135 MMOL/L (ref 135–147)
WBC # BLD AUTO: 9.25 THOUSAND/UL (ref 4.31–10.16)
WBC # BLD AUTO: 9.25 THOUSAND/UL (ref 4.31–10.16)

## 2024-08-10 PROCEDURE — 84100 ASSAY OF PHOSPHORUS: CPT | Performed by: PHYSICIAN ASSISTANT

## 2024-08-10 PROCEDURE — 94760 N-INVAS EAR/PLS OXIMETRY 1: CPT

## 2024-08-10 PROCEDURE — 85730 THROMBOPLASTIN TIME PARTIAL: CPT | Performed by: STUDENT IN AN ORGANIZED HEALTH CARE EDUCATION/TRAINING PROGRAM

## 2024-08-10 PROCEDURE — 99233 SBSQ HOSP IP/OBS HIGH 50: CPT | Performed by: STUDENT IN AN ORGANIZED HEALTH CARE EDUCATION/TRAINING PROGRAM

## 2024-08-10 PROCEDURE — 99233 SBSQ HOSP IP/OBS HIGH 50: CPT | Performed by: PHYSICIAN ASSISTANT

## 2024-08-10 PROCEDURE — 82948 REAGENT STRIP/BLOOD GLUCOSE: CPT

## 2024-08-10 PROCEDURE — 82330 ASSAY OF CALCIUM: CPT | Performed by: PHYSICIAN ASSISTANT

## 2024-08-10 PROCEDURE — 85027 COMPLETE CBC AUTOMATED: CPT | Performed by: PHYSICIAN ASSISTANT

## 2024-08-10 PROCEDURE — 85730 THROMBOPLASTIN TIME PARTIAL: CPT | Performed by: PHYSICIAN ASSISTANT

## 2024-08-10 PROCEDURE — 94640 AIRWAY INHALATION TREATMENT: CPT

## 2024-08-10 PROCEDURE — 70450 CT HEAD/BRAIN W/O DYE: CPT

## 2024-08-10 PROCEDURE — 83735 ASSAY OF MAGNESIUM: CPT | Performed by: PHYSICIAN ASSISTANT

## 2024-08-10 PROCEDURE — 80048 BASIC METABOLIC PNL TOTAL CA: CPT | Performed by: PHYSICIAN ASSISTANT

## 2024-08-10 PROCEDURE — 85730 THROMBOPLASTIN TIME PARTIAL: CPT | Performed by: SURGERY

## 2024-08-10 RX ORDER — METHOCARBAMOL 500 MG/1
500 TABLET, FILM COATED ORAL EVERY 6 HOURS SCHEDULED
Status: DISCONTINUED | OUTPATIENT
Start: 2024-08-10 | End: 2024-08-14

## 2024-08-10 RX ORDER — SODIUM CHLORIDE FOR INHALATION 3 %
4 VIAL, NEBULIZER (ML) INHALATION
Status: DISCONTINUED | OUTPATIENT
Start: 2024-08-10 | End: 2024-08-10

## 2024-08-10 RX ORDER — ACETAMINOPHEN 10 MG/ML
1000 INJECTION, SOLUTION INTRAVENOUS EVERY 8 HOURS
Status: DISCONTINUED | OUTPATIENT
Start: 2024-08-11 | End: 2024-08-11

## 2024-08-10 RX ORDER — MAGNESIUM SULFATE 1 G/100ML
1 INJECTION INTRAVENOUS ONCE
Status: COMPLETED | OUTPATIENT
Start: 2024-08-10 | End: 2024-08-10

## 2024-08-10 RX ORDER — LEVALBUTEROL INHALATION SOLUTION 1.25 MG/3ML
1.25 SOLUTION RESPIRATORY (INHALATION)
Status: DISCONTINUED | OUTPATIENT
Start: 2024-08-10 | End: 2024-08-10

## 2024-08-10 RX ORDER — DEXMEDETOMIDINE HYDROCHLORIDE 4 UG/ML
.1-.7 INJECTION, SOLUTION INTRAVENOUS
Status: DISCONTINUED | OUTPATIENT
Start: 2024-08-10 | End: 2024-08-12

## 2024-08-10 RX ORDER — GABAPENTIN 100 MG/1
100 CAPSULE ORAL 3 TIMES DAILY
Status: DISCONTINUED | OUTPATIENT
Start: 2024-08-10 | End: 2024-08-17

## 2024-08-10 RX ADMIN — GABAPENTIN 100 MG: 100 CAPSULE ORAL at 20:54

## 2024-08-10 RX ADMIN — HYDROMORPHONE HYDROCHLORIDE 0.2 MG: 0.2 INJECTION, SOLUTION INTRAMUSCULAR; INTRAVENOUS; SUBCUTANEOUS at 02:02

## 2024-08-10 RX ADMIN — AMPICILLIN SODIUM AND SULBACTAM SODIUM 3 G: 100; 50 INJECTION, POWDER, FOR SOLUTION INTRAVENOUS at 00:19

## 2024-08-10 RX ADMIN — METHOCARBAMOL TABLETS 500 MG: 500 TABLET, COATED ORAL at 20:54

## 2024-08-10 RX ADMIN — CHLORHEXIDINE GLUCONATE 15 ML: 1.2 RINSE ORAL at 20:54

## 2024-08-10 RX ADMIN — DEXMEDETOMIDINE HYDROCHLORIDE 0.6 MCG/KG/HR: 4 INJECTION, SOLUTION INTRAVENOUS at 22:49

## 2024-08-10 RX ADMIN — LEVETIRACETAM 500 MG: 100 INJECTION, SOLUTION INTRAVENOUS at 20:54

## 2024-08-10 RX ADMIN — CIPROFLOXACIN AND DEXAMETHASONE 4 DROP: 3; 1 SUSPENSION/ DROPS AURICULAR (OTIC) at 08:35

## 2024-08-10 RX ADMIN — BACITRACIN 1 LARGE APPLICATION: 500 OINTMENT TOPICAL at 17:02

## 2024-08-10 RX ADMIN — CIPROFLOXACIN AND DEXAMETHASONE 4 DROP: 3; 1 SUSPENSION/ DROPS AURICULAR (OTIC) at 17:00

## 2024-08-10 RX ADMIN — GABAPENTIN 100 MG: 100 CAPSULE ORAL at 15:36

## 2024-08-10 RX ADMIN — HYDROMORPHONE HYDROCHLORIDE 0.2 MG: 0.2 INJECTION, SOLUTION INTRAMUSCULAR; INTRAVENOUS; SUBCUTANEOUS at 11:31

## 2024-08-10 RX ADMIN — OXYCODONE HYDROCHLORIDE 5 MG: 5 TABLET ORAL at 16:57

## 2024-08-10 RX ADMIN — SODIUM CHLORIDE, SODIUM GLUCONATE, SODIUM ACETATE, POTASSIUM CHLORIDE, MAGNESIUM CHLORIDE, SODIUM PHOSPHATE, DIBASIC, AND POTASSIUM PHOSPHATE 75 ML/HR: .53; .5; .37; .037; .03; .012; .00082 INJECTION, SOLUTION INTRAVENOUS at 06:02

## 2024-08-10 RX ADMIN — BACITRACIN 1 LARGE APPLICATION: 500 OINTMENT TOPICAL at 08:37

## 2024-08-10 RX ADMIN — Medication 10 MG: at 08:36

## 2024-08-10 RX ADMIN — ACETAMINOPHEN 1000 MG: 10 INJECTION INTRAVENOUS at 17:41

## 2024-08-10 RX ADMIN — AMPICILLIN SODIUM AND SULBACTAM SODIUM 3 G: 100; 50 INJECTION, POWDER, FOR SOLUTION INTRAVENOUS at 18:08

## 2024-08-10 RX ADMIN — ACETAMINOPHEN 1000 MG: 10 INJECTION INTRAVENOUS at 11:57

## 2024-08-10 RX ADMIN — DEXMEDETOMIDINE HYDROCHLORIDE 0.6 MCG/KG/HR: 4 INJECTION, SOLUTION INTRAVENOUS at 06:01

## 2024-08-10 RX ADMIN — SENNOSIDES, DOCUSATE SODIUM 2 TABLET: 8.6; 5 TABLET ORAL at 08:36

## 2024-08-10 RX ADMIN — SODIUM CHLORIDE SOLN NEBU 3% 4 ML: 3 NEBU SOLN at 07:16

## 2024-08-10 RX ADMIN — ONDANSETRON 4 MG: 2 INJECTION INTRAMUSCULAR; INTRAVENOUS at 13:40

## 2024-08-10 RX ADMIN — MAGNESIUM SULFATE HEPTAHYDRATE 1 G: 1 INJECTION, SOLUTION INTRAVENOUS at 06:49

## 2024-08-10 RX ADMIN — AMPICILLIN SODIUM AND SULBACTAM SODIUM 3 G: 100; 50 INJECTION, POWDER, FOR SOLUTION INTRAVENOUS at 12:16

## 2024-08-10 RX ADMIN — CHLORHEXIDINE GLUCONATE 15 ML: 1.2 RINSE ORAL at 08:36

## 2024-08-10 RX ADMIN — HYDROMORPHONE HYDROCHLORIDE 0.2 MG: 0.2 INJECTION, SOLUTION INTRAMUSCULAR; INTRAVENOUS; SUBCUTANEOUS at 17:42

## 2024-08-10 RX ADMIN — SENNOSIDES, DOCUSATE SODIUM 2 TABLET: 8.6; 5 TABLET ORAL at 17:00

## 2024-08-10 RX ADMIN — DEXMEDETOMIDINE HYDROCHLORIDE 0.4 MCG/KG/HR: 4 INJECTION, SOLUTION INTRAVENOUS at 12:00

## 2024-08-10 RX ADMIN — DEXMEDETOMIDINE HYDROCHLORIDE 0.6 MCG/KG/HR: 4 INJECTION, SOLUTION INTRAVENOUS at 16:39

## 2024-08-10 RX ADMIN — HEPARIN SODIUM 15 UNITS/KG/HR: 10000 INJECTION, SOLUTION INTRAVENOUS at 16:41

## 2024-08-10 RX ADMIN — LEVALBUTEROL HYDROCHLORIDE 1.25 MG: 1.25 SOLUTION RESPIRATORY (INHALATION) at 07:16

## 2024-08-10 RX ADMIN — OXYCODONE HYDROCHLORIDE 5 MG: 5 TABLET ORAL at 13:40

## 2024-08-10 RX ADMIN — OXYCODONE HYDROCHLORIDE 5 MG: 5 TABLET ORAL at 00:41

## 2024-08-10 RX ADMIN — HYDROMORPHONE HYDROCHLORIDE 0.2 MG: 0.2 INJECTION, SOLUTION INTRAMUSCULAR; INTRAVENOUS; SUBCUTANEOUS at 14:11

## 2024-08-10 RX ADMIN — POLYETHYLENE GLYCOL 3350 17 G: 17 POWDER, FOR SOLUTION ORAL at 08:36

## 2024-08-10 RX ADMIN — AMPICILLIN SODIUM AND SULBACTAM SODIUM 3 G: 100; 50 INJECTION, POWDER, FOR SOLUTION INTRAVENOUS at 06:01

## 2024-08-10 RX ADMIN — LEVALBUTEROL HYDROCHLORIDE 1.25 MG: 1.25 SOLUTION RESPIRATORY (INHALATION) at 13:42

## 2024-08-10 RX ADMIN — GABAPENTIN 100 MG: 100 CAPSULE ORAL at 12:16

## 2024-08-10 RX ADMIN — SODIUM CHLORIDE SOLN NEBU 3% 4 ML: 3 NEBU SOLN at 13:42

## 2024-08-10 RX ADMIN — OXYCODONE HYDROCHLORIDE 5 MG: 5 TABLET ORAL at 08:37

## 2024-08-10 RX ADMIN — ACETAMINOPHEN 1000 MG: 10 INJECTION INTRAVENOUS at 06:02

## 2024-08-10 RX ADMIN — LEVETIRACETAM 500 MG: 100 INJECTION, SOLUTION INTRAVENOUS at 08:37
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
## 2024-08-10 NOTE — PLAN OF CARE
Problem: SAFETY,RESTRAINT: NV/NON-SELF DESTRUCTIVE BEHAVIOR  Goal: Remains free of harm/injury (restraint for non violent/non self-detsructive behavior)  Description: INTERVENTIONS:  - Instruct patient/family regarding restraint use   - Assess and monitor physiologic and psychological status   - Provide interventions and comfort measures to meet assessed patient needs   - Identify and implement measures to help patient regain control  - Assess readiness for release of restraint   Outcome: Progressing  Goal: Returns to optimal restraint-free functioning  Description: INTERVENTIONS:  - Assess the patient's behavior and symptoms that indicate continued need for restraint  - Identify and implement measures to help patient regain control  - Assess readiness for release of restraint   Outcome: Progressing     Problem: Prexisting or High Potential for Compromised Skin Integrity  Goal: Skin integrity is maintained or improved  Description: INTERVENTIONS:  - Identify patients at risk for skin breakdown  - Assess and monitor skin integrity  - Assess and monitor nutrition and hydration status  - Monitor labs   - Assess for incontinence   - Turn and reposition patient  - Assist with mobility/ambulation  - Relieve pressure over bony prominences  - Avoid friction and shearing  - Provide appropriate hygiene as needed including keeping skin clean and dry  - Evaluate need for skin moisturizer/barrier cream  - Collaborate with interdisciplinary team   - Patient/family teaching  - Consider wound care consult   Outcome: Progressing     Problem: PAIN - ADULT  Goal: Verbalizes/displays adequate comfort level or baseline comfort level  Description: Interventions:  - Encourage patient to monitor pain and request assistance  - Assess pain using appropriate pain scale  - Administer analgesics based on type and severity of pain and evaluate response  - Implement non-pharmacological measures as appropriate and evaluate response  - Consider  cultural and social influences on pain and pain management  - Notify physician/advanced practitioner if interventions unsuccessful or patient reports new pain  Outcome: Progressing     Problem: INFECTION - ADULT  Goal: Absence or prevention of progression during hospitalization  Description: INTERVENTIONS:  - Assess and monitor for signs and symptoms of infection  - Monitor lab/diagnostic results  - Monitor all insertion sites, i.e. indwelling lines, tubes, and drains  - Monitor endotracheal if appropriate and nasal secretions for changes in amount and color  - [place] appropriate cooling/warming therapies per order  - Administer medications as ordered  - Instruct and encourage patient and family to use good hand hygiene technique  - Identify and instruct in appropriate isolation precautions for identified infection/condition  Outcome: Progressing  Goal: Absence of fever/infection during neutropenic period  Description: INTERVENTIONS:  - Monitor WBC    Outcome: Progressing     Problem: SAFETY ADULT  Goal: Patient will remain free of falls  Description: INTERVENTIONS:  - Educate patient/family on patient safety including physical limitations  - Instruct patient to call for assistance with activity   - Consult OT/PT to assist with strengthening/mobility   - Keep Call bell within reach  - Keep bed low and locked with side rails adjusted as appropriate  - Keep care items and personal belongings within reach  - Initiate and maintain comfort rounds  - Make Fall Risk Sign visible to staff  - Offer Toileting every  Hours, in advance of need  - Initiate/Maintain alarm  - Obtain necessary fall risk management equipment:   - Apply yellow socks and bracelet for high fall risk patients  - Consider moving patient to room near nurses station  Outcome: Progressing  Goal: Maintain or return to baseline ADL function  Description: INTERVENTIONS:  -  Assess patient's ability to carry out ADLs; assess patient's baseline for ADL  function and identify physical deficits which impact ability to perform ADLs (bathing, care of mouth/teeth, toileting, grooming, dressing, etc.)  - Assess/evaluate cause of self-care deficits   - Assess range of motion  - Assess patient's mobility; develop plan if impaired  - Assess patient's need for assistive devices and provide as appropriate  - Encourage maximum independence but intervene and supervise when necessary  - Involve family in performance of ADLs  - Assess for home care needs following discharge   - Consider OT consult to assist with ADL evaluation and planning for discharge  - Provide patient education as appropriate  Outcome: Progressing  Goal: Maintains/Returns to pre admission functional level  Description: INTERVENTIONS:  - Perform AM-PAC 6 Click Basic Mobility/ Daily Activity assessment daily.  - Set and communicate daily mobility goal to care team and patient/family/caregiver.   - Collaborate with rehabilitation services on mobility goals if consulted  - Perform Range of Motion  times a day.  - Reposition patient every  hours.  - Dangle patient  times a day  - Stand patient  times a day  - Ambulate patient  times a day  - Out of bed to chair  times a day   - Out of bed for meals  times a day  - Out of bed for toileting  - Record patient progress and toleration of activity level   Outcome: Progressing     Problem: DISCHARGE PLANNING  Goal: Discharge to home or other facility with appropriate resources  Description: INTERVENTIONS:  - Identify barriers to discharge w/patient and caregiver  - Arrange for needed discharge resources and transportation as appropriate  - Identify discharge learning needs (meds, wound care, etc.)  - Arrange for interpretive services to assist at discharge as needed  - Refer to Case Management Department for coordinating discharge planning if the patient needs post-hospital services based on physician/advanced practitioner order or complex needs related to functional  status, cognitive ability, or social support system  Outcome: Progressing     Problem: Knowledge Deficit  Goal: Patient/family/caregiver demonstrates understanding of disease process, treatment plan, medications, and discharge instructions  Description: Complete learning assessment and assess knowledge base.  Interventions:  - Provide teaching at level of understanding  - Provide teaching via preferred learning methods  Outcome: Progressing     Problem: Nutrition/Hydration-ADULT  Goal: Nutrient/Hydration intake appropriate for improving, restoring or maintaining nutritional needs  Description: Monitor and assess patient's nutrition/hydration status for malnutrition. Collaborate with interdisciplinary team and initiate plan and interventions as ordered.  Monitor patient's weight and dietary intake as ordered or per policy. Utilize nutrition screening tool and intervene as necessary. Determine patient's food preferences and provide high-protein, high-caloric foods as appropriate.     INTERVENTIONS:  - Monitor oral intake, urinary output, labs, and treatment plans  - Assess nutrition and hydration status and recommend course of action  - Evaluate amount of meals eaten  - Assist patient with eating if necessary   - Allow adequate time for meals  - Recommend/ encourage appropriate diets, oral nutritional supplements, and vitamin/mineral supplements  - Order, calculate, and assess calorie counts as needed  - Recommend, monitor, and adjust tube feedings and TPN/PPN based on assessed needs  - Assess need for intravenous fluids  - Provide specific nutrition/hydration education as appropriate  - Include patient/family/caregiver in decisions related to nutrition  Outcome: Progressing
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
## 2024-08-10 NOTE — ASSESSMENT & PLAN NOTE
CT Head: .  right subdural hematoma measuring up to 5 mm. Bilateral frontal and temporal contusions.     Plan:   Neurosurgery following. s/p right frontal EVD placement by neurosurgery 8/5. Opening pressure 9  Q1hr neurological checks.   STAT CT head for decline in GCS >2 points in 1 hour.   Neurosurgery reviewing CTV further with endovascular neurosurgery team   EVD in place @ 10mmHg above the tragus  Hold chemical DVT ppx, continue heparin gtt per ECMO protocol  Keppra for seizure ppx x7 days

## 2024-08-10 NOTE — ASSESSMENT & PLAN NOTE
CTA:  The proximal aspect of left transverse sinus is patent with subsequent occlusion, adjacent to the left fracture line. Superior sagittal, right transverse and sigmoid sinuses are patent. Straight sinus and internal cerebral veins are patent.  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  There is some nonocclusive thrombus noted in the visualized upper cervical internal jugular vein.     Plan:  Endovascular neurosurgery following  Continue heparin ECMO protocol
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
## 2024-08-11 ENCOUNTER — APPOINTMENT (INPATIENT)
Dept: CT IMAGING | Facility: HOSPITAL | Age: 34
DRG: 020 | End: 2024-08-11
Payer: COMMERCIAL

## 2024-08-11 LAB
ANION GAP SERPL CALCULATED.3IONS-SCNC: 6 MMOL/L (ref 4–13)
ANION GAP SERPL CALCULATED.3IONS-SCNC: 6 MMOL/L (ref 4–13)
APTT PPP: 32 SECONDS (ref 23–34)
APTT PPP: 32 SECONDS (ref 23–34)
APTT PPP: 34 SECONDS (ref 23–34)
APTT PPP: 34 SECONDS (ref 23–34)
APTT PPP: 35 SECONDS (ref 23–34)
APTT PPP: 35 SECONDS (ref 23–34)
BUN SERPL-MCNC: 14 MG/DL (ref 5–25)
BUN SERPL-MCNC: 14 MG/DL (ref 5–25)
CA-I BLD-SCNC: 1.08 MMOL/L (ref 1.12–1.32)
CA-I BLD-SCNC: 1.08 MMOL/L (ref 1.12–1.32)
CALCIUM SERPL-MCNC: 7.6 MG/DL (ref 8.4–10.2)
CALCIUM SERPL-MCNC: 7.6 MG/DL (ref 8.4–10.2)
CHLORIDE SERPL-SCNC: 105 MMOL/L (ref 96–108)
CHLORIDE SERPL-SCNC: 105 MMOL/L (ref 96–108)
CO2 SERPL-SCNC: 23 MMOL/L (ref 21–32)
CO2 SERPL-SCNC: 23 MMOL/L (ref 21–32)
CREAT SERPL-MCNC: 0.68 MG/DL (ref 0.6–1.3)
CREAT SERPL-MCNC: 0.68 MG/DL (ref 0.6–1.3)
ERYTHROCYTE [DISTWIDTH] IN BLOOD BY AUTOMATED COUNT: 12.3 % (ref 11.6–15.1)
ERYTHROCYTE [DISTWIDTH] IN BLOOD BY AUTOMATED COUNT: 12.3 % (ref 11.6–15.1)
GFR SERPL CREATININE-BSD FRML MDRD: 122 ML/MIN/1.73SQ M
GFR SERPL CREATININE-BSD FRML MDRD: 122 ML/MIN/1.73SQ M
GLUCOSE SERPL-MCNC: 88 MG/DL (ref 65–140)
GLUCOSE SERPL-MCNC: 88 MG/DL (ref 65–140)
GLUCOSE SERPL-MCNC: 91 MG/DL (ref 65–140)
GLUCOSE SERPL-MCNC: 91 MG/DL (ref 65–140)
GLUCOSE SERPL-MCNC: 98 MG/DL (ref 65–140)
GLUCOSE SERPL-MCNC: 98 MG/DL (ref 65–140)
HCT VFR BLD AUTO: 31.4 % (ref 36.5–49.3)
HCT VFR BLD AUTO: 31.4 % (ref 36.5–49.3)
HGB BLD-MCNC: 10.8 G/DL (ref 12–17)
HGB BLD-MCNC: 10.8 G/DL (ref 12–17)
MAGNESIUM SERPL-MCNC: 2.1 MG/DL (ref 1.9–2.7)
MAGNESIUM SERPL-MCNC: 2.1 MG/DL (ref 1.9–2.7)
MCH RBC QN AUTO: 30.8 PG (ref 26.8–34.3)
MCH RBC QN AUTO: 30.8 PG (ref 26.8–34.3)
MCHC RBC AUTO-ENTMCNC: 34.4 G/DL (ref 31.4–37.4)
MCHC RBC AUTO-ENTMCNC: 34.4 G/DL (ref 31.4–37.4)
MCV RBC AUTO: 90 FL (ref 82–98)
MCV RBC AUTO: 90 FL (ref 82–98)
PHOSPHATE SERPL-MCNC: 3.3 MG/DL (ref 2.7–4.5)
PHOSPHATE SERPL-MCNC: 3.3 MG/DL (ref 2.7–4.5)
PLATELET # BLD AUTO: 235 THOUSANDS/UL (ref 149–390)
PLATELET # BLD AUTO: 235 THOUSANDS/UL (ref 149–390)
PMV BLD AUTO: 10.3 FL (ref 8.9–12.7)
PMV BLD AUTO: 10.3 FL (ref 8.9–12.7)
POTASSIUM SERPL-SCNC: 4.2 MMOL/L (ref 3.5–5.3)
POTASSIUM SERPL-SCNC: 4.2 MMOL/L (ref 3.5–5.3)
RBC # BLD AUTO: 3.51 MILLION/UL (ref 3.88–5.62)
RBC # BLD AUTO: 3.51 MILLION/UL (ref 3.88–5.62)
SODIUM SERPL-SCNC: 134 MMOL/L (ref 135–147)
SODIUM SERPL-SCNC: 134 MMOL/L (ref 135–147)
WBC # BLD AUTO: 11.05 THOUSAND/UL (ref 4.31–10.16)
WBC # BLD AUTO: 11.05 THOUSAND/UL (ref 4.31–10.16)

## 2024-08-11 PROCEDURE — 80048 BASIC METABOLIC PNL TOTAL CA: CPT

## 2024-08-11 PROCEDURE — 85730 THROMBOPLASTIN TIME PARTIAL: CPT | Performed by: PHYSICIAN ASSISTANT

## 2024-08-11 PROCEDURE — 82330 ASSAY OF CALCIUM: CPT

## 2024-08-11 PROCEDURE — 85027 COMPLETE CBC AUTOMATED: CPT

## 2024-08-11 PROCEDURE — 99232 SBSQ HOSP IP/OBS MODERATE 35: CPT | Performed by: SURGERY

## 2024-08-11 PROCEDURE — 84100 ASSAY OF PHOSPHORUS: CPT

## 2024-08-11 PROCEDURE — 83735 ASSAY OF MAGNESIUM: CPT

## 2024-08-11 PROCEDURE — 82948 REAGENT STRIP/BLOOD GLUCOSE: CPT

## 2024-08-11 PROCEDURE — 70450 CT HEAD/BRAIN W/O DYE: CPT

## 2024-08-11 RX ORDER — ACETAMINOPHEN 325 MG/1
650 TABLET ORAL EVERY 6 HOURS SCHEDULED
Status: DISCONTINUED | OUTPATIENT
Start: 2024-08-11 | End: 2024-08-12

## 2024-08-11 RX ADMIN — AMPICILLIN SODIUM AND SULBACTAM SODIUM 3 G: 100; 50 INJECTION, POWDER, FOR SOLUTION INTRAVENOUS at 00:32

## 2024-08-11 RX ADMIN — GABAPENTIN 100 MG: 100 CAPSULE ORAL at 15:06

## 2024-08-11 RX ADMIN — HYDROMORPHONE HYDROCHLORIDE 0.2 MG: 0.2 INJECTION, SOLUTION INTRAMUSCULAR; INTRAVENOUS; SUBCUTANEOUS at 07:57

## 2024-08-11 RX ADMIN — OXYCODONE HYDROCHLORIDE 5 MG: 5 TABLET ORAL at 15:07

## 2024-08-11 RX ADMIN — CIPROFLOXACIN AND DEXAMETHASONE 4 DROP: 3; 1 SUSPENSION/ DROPS AURICULAR (OTIC) at 17:00

## 2024-08-11 RX ADMIN — HYDROMORPHONE HYDROCHLORIDE 0.2 MG: 0.2 INJECTION, SOLUTION INTRAMUSCULAR; INTRAVENOUS; SUBCUTANEOUS at 03:09

## 2024-08-11 RX ADMIN — LEVETIRACETAM 500 MG: 100 INJECTION, SOLUTION INTRAVENOUS at 20:18

## 2024-08-11 RX ADMIN — ACETAMINOPHEN 650 MG: 325 TABLET, FILM COATED ORAL at 17:00

## 2024-08-11 RX ADMIN — METHOCARBAMOL TABLETS 500 MG: 500 TABLET, COATED ORAL at 05:30

## 2024-08-11 RX ADMIN — SENNOSIDES, DOCUSATE SODIUM 2 TABLET: 8.6; 5 TABLET ORAL at 08:20

## 2024-08-11 RX ADMIN — GABAPENTIN 100 MG: 100 CAPSULE ORAL at 08:20

## 2024-08-11 RX ADMIN — AMPICILLIN SODIUM AND SULBACTAM SODIUM 3 G: 100; 50 INJECTION, POWDER, FOR SOLUTION INTRAVENOUS at 17:47

## 2024-08-11 RX ADMIN — POLYETHYLENE GLYCOL 3350 17 G: 17 POWDER, FOR SOLUTION ORAL at 08:20

## 2024-08-11 RX ADMIN — OXYCODONE HYDROCHLORIDE 5 MG: 5 TABLET ORAL at 11:19

## 2024-08-11 RX ADMIN — ONDANSETRON 4 MG: 2 INJECTION INTRAMUSCULAR; INTRAVENOUS at 22:51

## 2024-08-11 RX ADMIN — OXYCODONE HYDROCHLORIDE 5 MG: 5 TABLET ORAL at 00:33

## 2024-08-11 RX ADMIN — HYDROMORPHONE HYDROCHLORIDE 0.2 MG: 0.2 INJECTION, SOLUTION INTRAMUSCULAR; INTRAVENOUS; SUBCUTANEOUS at 18:03

## 2024-08-11 RX ADMIN — AMPICILLIN SODIUM AND SULBACTAM SODIUM 3 G: 100; 50 INJECTION, POWDER, FOR SOLUTION INTRAVENOUS at 12:04

## 2024-08-11 RX ADMIN — HYDROMORPHONE HYDROCHLORIDE 0.2 MG: 0.2 INJECTION, SOLUTION INTRAMUSCULAR; INTRAVENOUS; SUBCUTANEOUS at 22:17

## 2024-08-11 RX ADMIN — HYDROMORPHONE HYDROCHLORIDE 0.2 MG: 0.2 INJECTION, SOLUTION INTRAMUSCULAR; INTRAVENOUS; SUBCUTANEOUS at 14:02

## 2024-08-11 RX ADMIN — BACITRACIN 1 LARGE APPLICATION: 500 OINTMENT TOPICAL at 08:20

## 2024-08-11 RX ADMIN — Medication 10 MG: at 08:20

## 2024-08-11 RX ADMIN — LEVETIRACETAM 500 MG: 100 INJECTION, SOLUTION INTRAVENOUS at 08:20

## 2024-08-11 RX ADMIN — TRIMETHOBENZAMIDE HYDROCHLORIDE 200 MG: 100 INJECTION INTRAMUSCULAR at 16:51

## 2024-08-11 RX ADMIN — CIPROFLOXACIN AND DEXAMETHASONE 4 DROP: 3; 1 SUSPENSION/ DROPS AURICULAR (OTIC) at 08:20

## 2024-08-11 RX ADMIN — GABAPENTIN 100 MG: 100 CAPSULE ORAL at 20:18

## 2024-08-11 RX ADMIN — OXYCODONE HYDROCHLORIDE 5 MG: 5 TABLET ORAL at 20:15

## 2024-08-11 RX ADMIN — METHOCARBAMOL TABLETS 500 MG: 500 TABLET, COATED ORAL at 17:00

## 2024-08-11 RX ADMIN — ACETAMINOPHEN 1000 MG: 10 INJECTION INTRAVENOUS at 03:00

## 2024-08-11 RX ADMIN — BACITRACIN 1 LARGE APPLICATION: 500 OINTMENT TOPICAL at 17:00

## 2024-08-11 RX ADMIN — ONDANSETRON 4 MG: 2 INJECTION INTRAMUSCULAR; INTRAVENOUS at 15:21

## 2024-08-11 RX ADMIN — SENNOSIDES, DOCUSATE SODIUM 2 TABLET: 8.6; 5 TABLET ORAL at 17:00

## 2024-08-11 RX ADMIN — CHLORHEXIDINE GLUCONATE 15 ML: 1.2 RINSE ORAL at 20:18

## 2024-08-11 RX ADMIN — ACETAMINOPHEN 650 MG: 325 TABLET, FILM COATED ORAL at 23:39

## 2024-08-11 RX ADMIN — METHOCARBAMOL TABLETS 500 MG: 500 TABLET, COATED ORAL at 11:19

## 2024-08-11 RX ADMIN — AMPICILLIN SODIUM AND SULBACTAM SODIUM 3 G: 100; 50 INJECTION, POWDER, FOR SOLUTION INTRAVENOUS at 23:39

## 2024-08-11 RX ADMIN — CHLORHEXIDINE GLUCONATE 15 ML: 1.2 RINSE ORAL at 08:20

## 2024-08-11 RX ADMIN — DEXMEDETOMIDINE HYDROCHLORIDE 0.4 MCG/KG/HR: 4 INJECTION, SOLUTION INTRAVENOUS at 08:20

## 2024-08-11 RX ADMIN — ACETAMINOPHEN 650 MG: 325 TABLET, FILM COATED ORAL at 11:19

## 2024-08-11 RX ADMIN — METHOCARBAMOL TABLETS 500 MG: 500 TABLET, COATED ORAL at 23:39

## 2024-08-11 RX ADMIN — AMPICILLIN SODIUM AND SULBACTAM SODIUM 3 G: 100; 50 INJECTION, POWDER, FOR SOLUTION INTRAVENOUS at 05:39

## 2024-08-11 RX ADMIN — METHOCARBAMOL TABLETS 500 MG: 500 TABLET, COATED ORAL at 00:33

## 2024-08-11 NOTE — ASSESSMENT & PLAN NOTE
CT Head: 1.2 cm posttraumatic subarachnoid hemorrhage is suspected but suboptimally evaluated due to artifact 5 mm of right to left shift.   Neurosurgery consulted. Now s/p right frontal EVD placement by neurosurgery. Opening pressure 9    Plan:   Neurosurgery following. s/p right frontal EVD placement by neurosurgery 8/5. Opening pressure 9  Q1hr neurological checks  STAT CT head for decline in GCS >2 points in 1 hour.   Neurosurgery reviewing CTV further with endovascular neurosurgery team   EVD in place @ 10mmHg above the tragus  Hold chemical DVT ppx, continue heparin gtt per EMCO protocol   Keppra for seizure ppx x7days

## 2024-08-11 NOTE — PLAN OF CARE
Problem: SAFETY,RESTRAINT: NV/NON-SELF DESTRUCTIVE BEHAVIOR  Goal: Remains free of harm/injury (restraint for non violent/non self-detsructive behavior)  Description: INTERVENTIONS:  - Instruct patient/family regarding restraint use   - Assess and monitor physiologic and psychological status   - Provide interventions and comfort measures to meet assessed patient needs   - Identify and implement measures to help patient regain control  - Assess readiness for release of restraint   Outcome: Progressing  Goal: Returns to optimal restraint-free functioning  Description: INTERVENTIONS:  - Assess the patient's behavior and symptoms that indicate continued need for restraint  - Identify and implement measures to help patient regain control  - Assess readiness for release of restraint   Outcome: Progressing     Problem: Prexisting or High Potential for Compromised Skin Integrity  Goal: Skin integrity is maintained or improved  Description: INTERVENTIONS:  - Identify patients at risk for skin breakdown  - Assess and monitor skin integrity  - Assess and monitor nutrition and hydration status  - Monitor labs   - Assess for incontinence   - Turn and reposition patient  - Assist with mobility/ambulation  - Relieve pressure over bony prominences  - Avoid friction and shearing  - Provide appropriate hygiene as needed including keeping skin clean and dry  - Evaluate need for skin moisturizer/barrier cream  - Collaborate with interdisciplinary team   - Patient/family teaching  - Consider wound care consult   Outcome: Progressing     Problem: PAIN - ADULT  Goal: Verbalizes/displays adequate comfort level or baseline comfort level  Description: Interventions:  - Encourage patient to monitor pain and request assistance  - Assess pain using appropriate pain scale  - Administer analgesics based on type and severity of pain and evaluate response  - Implement non-pharmacological measures as appropriate and evaluate response  - Consider  cultural and social influences on pain and pain management  - Notify physician/advanced practitioner if interventions unsuccessful or patient reports new pain  Outcome: Progressing     Problem: INFECTION - ADULT  Goal: Absence or prevention of progression during hospitalization  Description: INTERVENTIONS:  - Assess and monitor for signs and symptoms of infection  - Monitor lab/diagnostic results  - Monitor all insertion sites, i.e. indwelling lines, tubes, and drains  - Monitor endotracheal if appropriate and nasal secretions for changes in amount and color  - [place] appropriate cooling/warming therapies per order  - Administer medications as ordered  - Instruct and encourage patient and family to use good hand hygiene technique  - Identify and instruct in appropriate isolation precautions for identified infection/condition  Outcome: Progressing  Goal: Absence of fever/infection during neutropenic period  Description: INTERVENTIONS:  - Monitor WBC    Outcome: Progressing     Problem: SAFETY ADULT  Goal: Patient will remain free of falls  Description: INTERVENTIONS:  - Educate patient/family on patient safety including physical limitations  - Instruct patient to call for assistance with activity   - Consult OT/PT to assist with strengthening/mobility   - Keep Call bell within reach  - Keep bed low and locked with side rails adjusted as appropriate  - Keep care items and personal belongings within reach  - Initiate and maintain comfort rounds  - Make Fall Risk Sign visible to staff  - Offer Toileting every  Hours, in advance of need  - Initiate/Maintain alarm  - Obtain necessary fall risk management equipment:   - Apply yellow socks and bracelet for high fall risk patients  - Consider moving patient to room near nurses station  Outcome: Progressing  Goal: Maintain or return to baseline ADL function  Description: INTERVENTIONS:  -  Assess patient's ability to carry out ADLs; assess patient's baseline for ADL  function and identify physical deficits which impact ability to perform ADLs (bathing, care of mouth/teeth, toileting, grooming, dressing, etc.)  - Assess/evaluate cause of self-care deficits   - Assess range of motion  - Assess patient's mobility; develop plan if impaired  - Assess patient's need for assistive devices and provide as appropriate  - Encourage maximum independence but intervene and supervise when necessary  - Involve family in performance of ADLs  - Assess for home care needs following discharge   - Consider OT consult to assist with ADL evaluation and planning for discharge  - Provide patient education as appropriate  Outcome: Progressing  Goal: Maintains/Returns to pre admission functional level  Description: INTERVENTIONS:  - Perform AM-PAC 6 Click Basic Mobility/ Daily Activity assessment daily.  - Set and communicate daily mobility goal to care team and patient/family/caregiver.   - Collaborate with rehabilitation services on mobility goals if consulted  - Perform Range of Motion  times a day.  - Reposition patient every  hours.  - Dangle patient  times a day  - Stand patient  times a day  - Ambulate patient  times a day  - Out of bed to chair  times a day   - Out of bed for meals times a day  - Out of bed for toileting  - Record patient progress and toleration of activity level   Outcome: Progressing     Problem: DISCHARGE PLANNING  Goal: Discharge to home or other facility with appropriate resources  Description: INTERVENTIONS:  - Identify barriers to discharge w/patient and caregiver  - Arrange for needed discharge resources and transportation as appropriate  - Identify discharge learning needs (meds, wound care, etc.)  - Arrange for interpretive services to assist at discharge as needed  - Refer to Case Management Department for coordinating discharge planning if the patient needs post-hospital services based on physician/advanced practitioner order or complex needs related to functional  status, cognitive ability, or social support system  Outcome: Progressing     Problem: Knowledge Deficit  Goal: Patient/family/caregiver demonstrates understanding of disease process, treatment plan, medications, and discharge instructions  Description: Complete learning assessment and assess knowledge base.  Interventions:  - Provide teaching at level of understanding  - Provide teaching via preferred learning methods  Outcome: Progressing     Problem: Nutrition/Hydration-ADULT  Goal: Nutrient/Hydration intake appropriate for improving, restoring or maintaining nutritional needs  Description: Monitor and assess patient's nutrition/hydration status for malnutrition. Collaborate with interdisciplinary team and initiate plan and interventions as ordered.  Monitor patient's weight and dietary intake as ordered or per policy. Utilize nutrition screening tool and intervene as necessary. Determine patient's food preferences and provide high-protein, high-caloric foods as appropriate.     INTERVENTIONS:  - Monitor oral intake, urinary output, labs, and treatment plans  - Assess nutrition and hydration status and recommend course of action  - Evaluate amount of meals eaten  - Assist patient with eating if necessary   - Allow adequate time for meals  - Recommend/ encourage appropriate diets, oral nutritional supplements, and vitamin/mineral supplements  - Order, calculate, and assess calorie counts as needed  - Recommend, monitor, and adjust tube feedings and TPN/PPN based on assessed needs  - Assess need for intravenous fluids  - Provide specific nutrition/hydration education as appropriate  - Include patient/family/caregiver in decisions related to nutrition  Outcome: Progressing

## 2024-08-11 NOTE — ASSESSMENT & PLAN NOTE
CT Abd: Within the mid right hepatic lobe there is an irregular hypodense region measuring 2.8 x 1.2 x 2 cm (306:113)This hypodensity does not extend to the liver surface. Surrounding cloudlike hyperenhancement.  Re-eval - thought to be a hemangioma on comparative imaging     Plan:   Serial abdominal checks, serial hemoglobin checks were stable  Daily CBC   What Type Of Note Output Would You Prefer (Optional)?: Bullet Format Hpi Title: Evaluation of Skin Lesions How Severe Are Your Spot(S)?: moderate Additional History: White bumps on face, groin lesion,head lesion,chest spots\\nDeclines chaperone
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
## 2024-08-12 ENCOUNTER — APPOINTMENT (INPATIENT)
Dept: CT IMAGING | Facility: HOSPITAL | Age: 34
DRG: 020 | End: 2024-08-12
Payer: COMMERCIAL

## 2024-08-12 PROBLEM — R51.9 HEADACHE: Status: ACTIVE | Noted: 2024-08-12

## 2024-08-12 LAB
ANION GAP SERPL CALCULATED.3IONS-SCNC: 8 MMOL/L (ref 4–13)
ANION GAP SERPL CALCULATED.3IONS-SCNC: 8 MMOL/L (ref 4–13)
APTT PPP: 28 SECONDS (ref 23–34)
APTT PPP: 28 SECONDS (ref 23–34)
APTT PPP: 29 SECONDS (ref 23–34)
BUN SERPL-MCNC: 10 MG/DL (ref 5–25)
BUN SERPL-MCNC: 10 MG/DL (ref 5–25)
CA-I BLD-SCNC: 1.06 MMOL/L (ref 1.12–1.32)
CA-I BLD-SCNC: 1.06 MMOL/L (ref 1.12–1.32)
CALCIUM SERPL-MCNC: 7.9 MG/DL (ref 8.4–10.2)
CALCIUM SERPL-MCNC: 7.9 MG/DL (ref 8.4–10.2)
CHLORIDE SERPL-SCNC: 104 MMOL/L (ref 96–108)
CHLORIDE SERPL-SCNC: 104 MMOL/L (ref 96–108)
CO2 SERPL-SCNC: 22 MMOL/L (ref 21–32)
CO2 SERPL-SCNC: 22 MMOL/L (ref 21–32)
CREAT SERPL-MCNC: 0.63 MG/DL (ref 0.6–1.3)
CREAT SERPL-MCNC: 0.63 MG/DL (ref 0.6–1.3)
ERYTHROCYTE [DISTWIDTH] IN BLOOD BY AUTOMATED COUNT: 12.3 % (ref 11.6–15.1)
GFR SERPL CREATININE-BSD FRML MDRD: 126 ML/MIN/1.73SQ M
GFR SERPL CREATININE-BSD FRML MDRD: 126 ML/MIN/1.73SQ M
GLUCOSE SERPL-MCNC: 104 MG/DL (ref 65–140)
GLUCOSE SERPL-MCNC: 104 MG/DL (ref 65–140)
GLUCOSE SERPL-MCNC: 111 MG/DL (ref 65–140)
GLUCOSE SERPL-MCNC: 111 MG/DL (ref 65–140)
GLUCOSE SERPL-MCNC: 114 MG/DL (ref 65–140)
GLUCOSE SERPL-MCNC: 114 MG/DL (ref 65–140)
GLUCOSE SERPL-MCNC: 94 MG/DL (ref 65–140)
GLUCOSE SERPL-MCNC: 94 MG/DL (ref 65–140)
GLUCOSE SERPL-MCNC: 97 MG/DL (ref 65–140)
GLUCOSE SERPL-MCNC: 97 MG/DL (ref 65–140)
HCT VFR BLD AUTO: 33 % (ref 36.5–49.3)
HCT VFR BLD AUTO: 33 % (ref 36.5–49.3)
HCT VFR BLD AUTO: 34.1 % (ref 36.5–49.3)
HCT VFR BLD AUTO: 34.1 % (ref 36.5–49.3)
HGB BLD-MCNC: 11.3 G/DL (ref 12–17)
HGB BLD-MCNC: 11.3 G/DL (ref 12–17)
HGB BLD-MCNC: 11.8 G/DL (ref 12–17)
HGB BLD-MCNC: 11.8 G/DL (ref 12–17)
MAGNESIUM SERPL-MCNC: 2 MG/DL (ref 1.9–2.7)
MAGNESIUM SERPL-MCNC: 2 MG/DL (ref 1.9–2.7)
MCH RBC QN AUTO: 30.6 PG (ref 26.8–34.3)
MCH RBC QN AUTO: 30.6 PG (ref 26.8–34.3)
MCH RBC QN AUTO: 31 PG (ref 26.8–34.3)
MCH RBC QN AUTO: 31 PG (ref 26.8–34.3)
MCHC RBC AUTO-ENTMCNC: 34.2 G/DL (ref 31.4–37.4)
MCHC RBC AUTO-ENTMCNC: 34.2 G/DL (ref 31.4–37.4)
MCHC RBC AUTO-ENTMCNC: 34.6 G/DL (ref 31.4–37.4)
MCHC RBC AUTO-ENTMCNC: 34.6 G/DL (ref 31.4–37.4)
MCV RBC AUTO: 89 FL (ref 82–98)
MCV RBC AUTO: 89 FL (ref 82–98)
MCV RBC AUTO: 90 FL (ref 82–98)
MCV RBC AUTO: 90 FL (ref 82–98)
PHOSPHATE SERPL-MCNC: 2.9 MG/DL (ref 2.7–4.5)
PHOSPHATE SERPL-MCNC: 2.9 MG/DL (ref 2.7–4.5)
PLATELET # BLD AUTO: 261 THOUSANDS/UL (ref 149–390)
PLATELET # BLD AUTO: 261 THOUSANDS/UL (ref 149–390)
PLATELET # BLD AUTO: 292 THOUSANDS/UL (ref 149–390)
PLATELET # BLD AUTO: 292 THOUSANDS/UL (ref 149–390)
PMV BLD AUTO: 10.2 FL (ref 8.9–12.7)
PMV BLD AUTO: 10.2 FL (ref 8.9–12.7)
PMV BLD AUTO: 10.3 FL (ref 8.9–12.7)
PMV BLD AUTO: 10.3 FL (ref 8.9–12.7)
POTASSIUM SERPL-SCNC: 3.2 MMOL/L (ref 3.5–5.3)
POTASSIUM SERPL-SCNC: 3.2 MMOL/L (ref 3.5–5.3)
RBC # BLD AUTO: 3.69 MILLION/UL (ref 3.88–5.62)
RBC # BLD AUTO: 3.69 MILLION/UL (ref 3.88–5.62)
RBC # BLD AUTO: 3.81 MILLION/UL (ref 3.88–5.62)
RBC # BLD AUTO: 3.81 MILLION/UL (ref 3.88–5.62)
SODIUM SERPL-SCNC: 134 MMOL/L (ref 135–147)
SODIUM SERPL-SCNC: 134 MMOL/L (ref 135–147)
WBC # BLD AUTO: 10.41 THOUSAND/UL (ref 4.31–10.16)
WBC # BLD AUTO: 10.41 THOUSAND/UL (ref 4.31–10.16)
WBC # BLD AUTO: 10.9 THOUSAND/UL (ref 4.31–10.16)
WBC # BLD AUTO: 10.9 THOUSAND/UL (ref 4.31–10.16)

## 2024-08-12 PROCEDURE — 85027 COMPLETE CBC AUTOMATED: CPT | Performed by: PHYSICIAN ASSISTANT

## 2024-08-12 PROCEDURE — 99233 SBSQ HOSP IP/OBS HIGH 50: CPT | Performed by: SURGERY

## 2024-08-12 PROCEDURE — 80048 BASIC METABOLIC PNL TOTAL CA: CPT | Performed by: PHYSICIAN ASSISTANT

## 2024-08-12 PROCEDURE — 97116 GAIT TRAINING THERAPY: CPT

## 2024-08-12 PROCEDURE — 85730 THROMBOPLASTIN TIME PARTIAL: CPT

## 2024-08-12 PROCEDURE — 85730 THROMBOPLASTIN TIME PARTIAL: CPT | Performed by: STUDENT IN AN ORGANIZED HEALTH CARE EDUCATION/TRAINING PROGRAM

## 2024-08-12 PROCEDURE — 0WP1X0Z REMOVAL OF DRAINAGE DEVICE FROM CRANIAL CAVITY, EXTERNAL APPROACH: ICD-10-PCS | Performed by: STUDENT IN AN ORGANIZED HEALTH CARE EDUCATION/TRAINING PROGRAM

## 2024-08-12 PROCEDURE — 85730 THROMBOPLASTIN TIME PARTIAL: CPT | Performed by: NURSE PRACTITIONER

## 2024-08-12 PROCEDURE — 84100 ASSAY OF PHOSPHORUS: CPT | Performed by: PHYSICIAN ASSISTANT

## 2024-08-12 PROCEDURE — 99232 SBSQ HOSP IP/OBS MODERATE 35: CPT | Performed by: PHYSICIAN ASSISTANT

## 2024-08-12 PROCEDURE — 85027 COMPLETE CBC AUTOMATED: CPT

## 2024-08-12 PROCEDURE — 70450 CT HEAD/BRAIN W/O DYE: CPT

## 2024-08-12 PROCEDURE — 97530 THERAPEUTIC ACTIVITIES: CPT

## 2024-08-12 PROCEDURE — 83735 ASSAY OF MAGNESIUM: CPT | Performed by: PHYSICIAN ASSISTANT

## 2024-08-12 PROCEDURE — 82330 ASSAY OF CALCIUM: CPT | Performed by: PHYSICIAN ASSISTANT

## 2024-08-12 PROCEDURE — 82948 REAGENT STRIP/BLOOD GLUCOSE: CPT

## 2024-08-12 RX ORDER — ACETAMINOPHEN 325 MG/1
650 TABLET ORAL EVERY 8 HOURS PRN
Status: DISCONTINUED | OUTPATIENT
Start: 2024-08-12 | End: 2024-08-15

## 2024-08-12 RX ORDER — HEPARIN SODIUM 10000 [USP'U]/100ML
3-16 INJECTION, SOLUTION INTRAVENOUS
Status: DISCONTINUED | OUTPATIENT
Start: 2024-08-12 | End: 2024-08-13

## 2024-08-12 RX ORDER — CIPROFLOXACIN AND DEXAMETHASONE 3; 1 MG/ML; MG/ML
4 SUSPENSION/ DROPS AURICULAR (OTIC) 2 TIMES DAILY
Status: COMPLETED | OUTPATIENT
Start: 2024-08-12 | End: 2024-08-12

## 2024-08-12 RX ORDER — CALCIUM GLUCONATE 20 MG/ML
2 INJECTION, SOLUTION INTRAVENOUS ONCE
Status: COMPLETED | OUTPATIENT
Start: 2024-08-12 | End: 2024-08-12

## 2024-08-12 RX ORDER — QUETIAPINE FUMARATE 25 MG/1
12.5 TABLET, FILM COATED ORAL
Status: DISCONTINUED | OUTPATIENT
Start: 2024-08-12 | End: 2024-08-27

## 2024-08-12 RX ORDER — POTASSIUM CHLORIDE 1500 MG/1
40 TABLET, EXTENDED RELEASE ORAL EVERY 4 HOURS
Status: COMPLETED | OUTPATIENT
Start: 2024-08-12 | End: 2024-08-12

## 2024-08-12 RX ORDER — BUTALBITAL, ACETAMINOPHEN AND CAFFEINE 50; 325; 40 MG/1; MG/1; MG/1
1 TABLET ORAL EVERY 4 HOURS PRN
Status: DISCONTINUED | OUTPATIENT
Start: 2024-08-12 | End: 2024-08-15

## 2024-08-12 RX ADMIN — POLYETHYLENE GLYCOL 3350 17 G: 17 POWDER, FOR SOLUTION ORAL at 08:12

## 2024-08-12 RX ADMIN — GABAPENTIN 100 MG: 100 CAPSULE ORAL at 16:31

## 2024-08-12 RX ADMIN — QUETIAPINE FUMARATE 12.5 MG: 25 TABLET ORAL at 21:49

## 2024-08-12 RX ADMIN — AMPICILLIN SODIUM AND SULBACTAM SODIUM 3 G: 100; 50 INJECTION, POWDER, FOR SOLUTION INTRAVENOUS at 18:11

## 2024-08-12 RX ADMIN — POTASSIUM CHLORIDE 40 MEQ: 1500 TABLET, EXTENDED RELEASE ORAL at 08:12

## 2024-08-12 RX ADMIN — BACITRACIN 1 LARGE APPLICATION: 500 OINTMENT TOPICAL at 17:15

## 2024-08-12 RX ADMIN — ACETAMINOPHEN 650 MG: 325 TABLET, FILM COATED ORAL at 05:20

## 2024-08-12 RX ADMIN — METHOCARBAMOL TABLETS 500 MG: 500 TABLET, COATED ORAL at 12:35

## 2024-08-12 RX ADMIN — BUTALBITAL, ACETAMINOPHEN, AND CAFFEINE 1 TABLET: 325; 50; 40 TABLET ORAL at 10:17

## 2024-08-12 RX ADMIN — HYDROMORPHONE HYDROCHLORIDE 0.2 MG: 0.2 INJECTION, SOLUTION INTRAMUSCULAR; INTRAVENOUS; SUBCUTANEOUS at 09:14

## 2024-08-12 RX ADMIN — POTASSIUM CHLORIDE 40 MEQ: 1500 TABLET, EXTENDED RELEASE ORAL at 10:17

## 2024-08-12 RX ADMIN — METHOCARBAMOL TABLETS 500 MG: 500 TABLET, COATED ORAL at 05:20

## 2024-08-12 RX ADMIN — Medication 10 MG: at 08:12

## 2024-08-12 RX ADMIN — CHLORHEXIDINE GLUCONATE 15 ML: 1.2 RINSE ORAL at 21:49

## 2024-08-12 RX ADMIN — CIPROFLOXACIN AND DEXAMETHASONE 4 DROP: 3; 1 SUSPENSION/ DROPS AURICULAR (OTIC) at 08:13

## 2024-08-12 RX ADMIN — HYDROMORPHONE HYDROCHLORIDE 0.2 MG: 0.2 INJECTION, SOLUTION INTRAMUSCULAR; INTRAVENOUS; SUBCUTANEOUS at 22:16

## 2024-08-12 RX ADMIN — BUTALBITAL, ACETAMINOPHEN, AND CAFFEINE 1 TABLET: 325; 50; 40 TABLET ORAL at 16:31

## 2024-08-12 RX ADMIN — METHOCARBAMOL TABLETS 500 MG: 500 TABLET, COATED ORAL at 17:15

## 2024-08-12 RX ADMIN — AMPICILLIN SODIUM AND SULBACTAM SODIUM 3 G: 100; 50 INJECTION, POWDER, FOR SOLUTION INTRAVENOUS at 12:35

## 2024-08-12 RX ADMIN — TRIMETHOBENZAMIDE HYDROCHLORIDE 200 MG: 100 INJECTION INTRAMUSCULAR at 08:13

## 2024-08-12 RX ADMIN — SENNOSIDES, DOCUSATE SODIUM 2 TABLET: 8.6; 5 TABLET ORAL at 08:13

## 2024-08-12 RX ADMIN — AMPICILLIN SODIUM AND SULBACTAM SODIUM 3 G: 100; 50 INJECTION, POWDER, FOR SOLUTION INTRAVENOUS at 06:28

## 2024-08-12 RX ADMIN — HEPARIN SODIUM 8 UNITS/KG/HR: 10000 INJECTION, SOLUTION INTRAVENOUS at 13:44

## 2024-08-12 RX ADMIN — ACETAMINOPHEN 650 MG: 325 TABLET, FILM COATED ORAL at 13:49

## 2024-08-12 RX ADMIN — ACETAMINOPHEN 650 MG: 325 TABLET, FILM COATED ORAL at 21:49

## 2024-08-12 RX ADMIN — GABAPENTIN 100 MG: 100 CAPSULE ORAL at 08:12

## 2024-08-12 RX ADMIN — CALCIUM GLUCONATE 2 G: 20 INJECTION, SOLUTION INTRAVENOUS at 08:30

## 2024-08-12 RX ADMIN — OXYCODONE HYDROCHLORIDE 5 MG: 5 TABLET ORAL at 03:59

## 2024-08-12 RX ADMIN — ONDANSETRON 4 MG: 2 INJECTION INTRAMUSCULAR; INTRAVENOUS at 21:16

## 2024-08-12 RX ADMIN — CHLORHEXIDINE GLUCONATE 15 ML: 1.2 RINSE ORAL at 08:12

## 2024-08-12 RX ADMIN — LEVETIRACETAM 500 MG: 100 INJECTION, SOLUTION INTRAVENOUS at 08:12

## 2024-08-12 RX ADMIN — BACITRACIN 1 LARGE APPLICATION: 500 OINTMENT TOPICAL at 08:12

## 2024-08-12 RX ADMIN — OXYCODONE HYDROCHLORIDE 5 MG: 5 TABLET ORAL at 17:14

## 2024-08-12 RX ADMIN — SENNOSIDES, DOCUSATE SODIUM 2 TABLET: 8.6; 5 TABLET ORAL at 17:15

## 2024-08-12 RX ADMIN — CIPROFLOXACIN AND DEXAMETHASONE 4 DROP: 3; 1 SUSPENSION/ DROPS AURICULAR (OTIC) at 17:15

## 2024-08-12 NOTE — ASSESSMENT & PLAN NOTE
Pt continuous complain of headache, not quantifying  Likely secondary to SDH, hemorrhagic contusion and vasogenic edema  Continue Acetaminophen 650 q8h PRN mild pain/fever  Start Fioricet -40 q4h PRN Headache  Oxycodone 2.5mg PO q4h PRN moderate pain  Oxycodone 5mg PO q4h PRN severe pain  Calming environment.

## 2024-08-12 NOTE — ASSESSMENT & PLAN NOTE
CT Head: Fractures of the left orbit and left zygoma.   OMFS consulted & following.   8/7: S/P ORIF of zygomatic fracture and repair of complex facial lacerations.    Plan:   Continue Unasyn 3g IV TID x 7 days  Head of bed elevated  Sinus precautions: 4 weeks: no nose blowing, avoid putting pressure on sinus area, avoid strenuous activity/straining, try to sneeze with mouth open. 2 weeks: no straws, spitting, smoking. Use OTC Afrin BID 2 sprays/nostril 3 days maximum as needed, OTC decongestant (e.g. Sudafed) or Antihistamine (e.g. Claritin-D) as needed, and saline nasal spray as needed.   F/U: Follow up in clinic within 1-2 weeks of discharge., Patient should see general dentist for general oral care.   Analgesia as indicated.
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
## 2024-08-12 NOTE — ASSESSMENT & PLAN NOTE
CTA:  The proximal aspect of left transverse sinus is patent with subsequent occlusion, adjacent to the left fracture line. Superior sagittal, right transverse and sigmoid sinuses are patent. Straight sinus and internal cerebral veins are patent.  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  There is some nonocclusive thrombus noted in the visualized upper cervical internal jugular vein.     Plan:  Endovascular neurosurgery following  Heparin gtt stopped d/t evolving hemorrhagic contusions

## 2024-08-12 NOTE — ASSESSMENT & PLAN NOTE
Per sister, Pt. Has longstanding h/o illicit drug use of multiple agents (heroin,methemphatamine, fentanyl)  UDS + benzo, amph/meth, THC, fentanyl; Ethanol 0  Monitor for withdrawal symptoms.   Continue precedex gtt for agitation  T/C pain management consult when stabilized to assist in pain management.

## 2024-08-12 NOTE — UTILIZATION REVIEW
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 08/10/24  1812 08/10/24  1155 08/10/24  0606 08/10/24  0023 08/10/24  0012 08/09/24  1812 08/09/24  0547   POC GLUCOSE mg/dl 97 94 88 91 105 95 107 104 101 97 100 80     Results from last 7 days   Lab Units 08/12/24  0401 08/11/24  0530 08/10/24  0421 08/09/24  0414 08/08/24  0420 08/07/24  1841 08/07/24  0756 08/07/24  0101 08/06/24  1526 08/06/24  0716 08/06/24  0058 08/05/24  1641   GLUCOSE RANDOM mg/dL 104 98 99 116 122 114 117 91 100 104 107 156*     Results from last 7 days   Lab Units 08/05/24  1648   OSMOLALITY, SERUM mmol/*     Results from last 7 days   Lab Units 08/07/24  1840 08/06/24  0511 08/05/24  2104   PH ART  7.426 7.393 7.397   PCO2 ART mm Hg 38.9 42.2 38.6   PO2 ART mm Hg 150.6* 133.7* 177.7*   HCO3 ART mmol/L 25.0 25.1 23.2   BASE EXC ART mmol/L 0.7 0.2 -1.4   O2 CONTENT ART mL/dL 15.1* 15.5* 17.8   O2 HGB, ARTERIAL % 98.4* 97.7* 98.5*   ABG SOURCE  Line, Arterial Line, Arterial Radial, Right     Results from last 7 days   Lab Units 08/05/24  1639   PH, SOURAV I-STAT  7.391   PCO2, SOURAV ISTAT mm HG 41.0*   PO2, SOURAV ISTAT mm HG 54.0*   HCO3, SOURAV ISTAT mmol/L 24.8   I STAT BASE EXC mmol/L 0   I STAT O2 SAT % 87*     Results from last 7 days   Lab Units 08/08/24  0420 08/07/24  1957   CK TOTAL U/L 401* 488*     Results from  last 7 days   Lab Units 08/11/24  0530 08/11/24  0300 08/11/24  0107 08/09/24  1428 08/06/24  0511 08/05/24  1743   PROTIME seconds  --   --   --   --  14.8 15.1*   INR   --   --   --   --  1.09 1.12   PTT seconds 35* 34 32   < >  --   --     < > = values in this interval not displayed.     Results from last 7 days   Lab Units 08/07/24  1919 08/05/24  1744   LACTIC ACID mmol/L 0.5 0.7     Results from last 7 days   Lab Units 08/05/24  1743   HEP B S AG  Non-reactive   HEP C AB  Reactive*     Results from last 7 days   Lab Units 08/05/24  1648   OSMOLALITY, SERUM mmol/*     Results from last 7 days   Lab Units 08/05/24  1815   AMPH/METH  Positive*   BARBITURATE UR  Negative   BENZODIAZEPINE UR  Positive*   COCAINE UR  Negative   METHADONE URINE  Negative   OPIATE UR  Negative   PCP UR  Negative   THC UR  Positive*     Results from last 7 days   Lab Units 08/05/24  1648 08/05/24  1641   ETHANOL LVL mg/dL  --  <10   ACETAMINOPHEN LVL ug/mL <2*  --    SALICYLATE LVL mg/dL <5  --      Medications:   Scheduled Medications:  ampicillin-sulbactam, 3 g, Intravenous, Q6H  bacitracin topical ointment, 1 Application, Topical, BID  chlorhexidine, 15 mL, Mouth/Throat, Q12H JOHANNA  ciprofloxacin-dexamethasone, 4 drop, Left Ear, BID  gabapentin, 100 mg, Oral, TID  methocarbamol, 500 mg, Oral, Q6H JOHANNA  omeprazole (PRILOSEC) suspension 2 mg/mL, 10 mg, Oral, Daily  polyethylene glycol, 17 g, Oral, Daily  QUEtiapine, 12.5 mg, Oral, HS  senna-docusate sodium, 2 tablet, Oral, BID    acetaminophen (TYLENOL) tablet 650 mg  Dose: 650 mg  Freq: Every 6 hours scheduled Route: PO  Start: 08/11/24 1200 End: 08/12/24 1008  calcium gluconate 2 g in sodium chloride 0.9% 100 mL (premix)  Dose: 2 g  Freq: Once Route: IV  Last Dose: Stopped (08/12/24 0952)  Start: 08/12/24 0815 End: 08/12/24 0952   ciprofloxacin-dexamethasone (CIPRODEX) 0.3-0.1 % otic suspension 4 drop  Dose: 4 drop  Freq: 2 times daily Route: LEFT EAR  Start: 08/06/24 1800 End:  08/12/24 1159  levETIRAcetam (KEPPRA) injection 500 mg  Dose: 500 mg  Freq: Every 12 hours scheduled Route: IV  Start: 08/08/24 2115 End: 08/12/24 1158  potassium chloride (Klor-Con M20) CR tablet 40 mEq  Dose: 40 mEq  Freq: Every 4 hours Route: PO  Start: 08/12/24 0700 End: 08/12/24 1017    Continuous IV Infusions:  dexmedeTOMIDine (Precedex) 400 mcg in sodium chloride 0.9% 100 mL  Rate: 1.8-12.6 mL/hr Dose: 0.1-0.7 mcg/kg/hr  Weight Dosing Info: 72.1 kg  Freq: Titrated Route: IV  Last Dose: Stopped (08/12/24 0731)  Start: 08/10/24 1645 End: 08/12/24 1009     PRN Meds:  acetaminophen, 650 mg, Oral, Q8H PRN  butalbital-acetaminophen-caffeine, 1 tablet, Oral, Q4H PRN x 1 8/12  HYDROmorphone, 0.2 mg, Intravenous, Q4H PRN x 1 8/12  ondansetron, 4 mg, Intravenous, Q8H PRN  oxyCODONE, 2.5 mg, Oral, Q4H PRN   Or  oxyCODONE, 5 mg, Oral, Q4H PRN x 1 8/12  trimethobenzamide, 200 mg, Intramuscular, Q6H PRN      Discharge Plan:  to be determined.     Network Utilization Review Department  ATTENTION: Please call with any questions or concerns to [phone] and carefully listen to the prompts so that you are directed to the right person. All voicemails are confidential.   For Discharge needs, contact Care Management DC Support Team at [phone] opt. [phone]  Send all requests for admission clinical reviews, approved or denied determinations and any other requests to dedicated fax number below belonging to the campus where the patient is receiving treatment. List of dedicated fax numbers for the Facilities:  FACILITY NAME UR FAX NUMBER   ADMISSION DENIALS (Administrative/Medical Necessity) [phone]   DISCHARGE SUPPORT TEAM (NETWORK) [phone]   PARENT CHILD HEALTH (Maternity/NICU/Pediatrics) [phone]   [facility] [phone]   [facility] [phone]   [facility], [facility] [phone]   [facility] [phone]   [facility]  [facility] [phone]   [facility] [phone]   [facility] [phone]   [facility] [phone]   [facility] [phone]   [facility] [phone]   [facility] [phone]   [facility] [phone]

## 2024-08-12 NOTE — ASSESSMENT & PLAN NOTE
CT Abd: Within the mid right hepatic lobe there is an irregular hypodense region measuring 2.8 x 1.2 x 2 cm (306:113)This hypodensity does not extend to the liver surface. Surrounding cloudlike hyperenhancement.  Re-eval - thought to be a hemangioma on comparative imaging   ABD exam remains unchanged.    Plan:   Serial abdominal checks, serial hemoglobin checks were stable  Daily CBC.

## 2024-08-12 NOTE — ASSESSMENT & PLAN NOTE
Continue Ciprodex gtts  ENT will need to be consulted, appreciate recommendations  No acute surgical ENT intervention.  Outpatient follow-up for audiogram.
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
## 2024-08-12 NOTE — ASSESSMENT & PLAN NOTE
Fracture of the squamous portion of the left temporal bone with small displaced fragment measuring 5 mm. Fracture extends posteriorly through the superior aspect of the mastoid air cells, involves the posterior squamous suture and extends into the left occipital bone down to the foramen magnum. Small amount of left temporal extra-axial pneumocephaly.     Plan:  Consult OMFS, appreciate recommendations.  Continue unasyn for fracture extending into sinus (Total 7 days).  Analgesia as indicated.

## 2024-08-12 NOTE — ASSESSMENT & PLAN NOTE
CT Head: 1.2 cm posttraumatic subarachnoid hemorrhage is suspected but suboptimally evaluated due to artifact 5 mm of right to left shift.   Neurosurgery consulted. Now s/p right frontal EVD placement by neurosurgery. Opening pressure 9  CTH (8/10) - Evolving acute hemorrhagic brain contusions in bilateral frontal lobes (right worse than left) and bilateral temporal lobes with surrounding vasogenic edema (worse in right frontal lobe) and adjacent small volume subarachnoid hemorrhage.   Heparin stopped  CTH (8/11) - Unchanged acute hemorrhagic brain contusions in bilateral frontal lobes (right worse than left) and bilateral temporal lobes with surrounding vasogenic edema (worse in right frontal lobe) and adjacent small volume subarachnoid hemorrhage.    Plan:   Neurosurgery following. s/p right frontal EVD placement by neurosurgery 8/5. Opening pressure 9  Q1hr neurological checks  STAT CT head for decline in GCS >2 points in 1 hour.   Neurosurgery reviewing CTV further with endovascular neurosurgery team   EVD in place @ 10mmHg above the tragus  Hold chemical DVT ppx, Hold heparin gtt  Keppra for seizure ppx x7days
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
## 2024-08-12 NOTE — PHYSICAL THERAPY NOTE
"                                                                                  PHYSICAL THERAPY TREATMENT NOTE          Patient Name: Anmlo Meza Jr.  Today's Date: 24 1511   PT Last Visit   PT Visit Date 24   Pain Assessment   Pain Assessment Tool 0-10   Pain Score 10 - Worst Possible Pain   Pain Location/Orientation Location: Head   Pain Onset/Description Onset: Ongoing;Frequency: Constant/Continuous   Effect of Pain on Daily Activities limits comfort, tolerance to willigness to participation in OOB activity   Patient's Stated Pain Goal No pain   Hospital Pain Intervention(s) Repositioned;Ambulation/increased activity   Multiple Pain Sites Yes   Pain 2   Pain Score 2 Worst Possible Pain  (reports worse with mobilization)   Pain Location/Orientation 2 Location: Back   Pain Onset/Description 2 Onset: Ongoing   Patient's Stated Pain Goal 2 No pain   Hospital Pain Intervention(s) 2 Repositioned;Ambulation/increased activity   Restrictions/Precautions   Weight Bearing Precautions Per Order No   Other Precautions Impulsive;Cognitive;Chair Alarm;Bed Alarm;Multiple lines;Telemetry;Fall Risk;Pain   General   Additional Pertinent History s/p EVD cath removal 2024   Family/Caregiver Present No   Cognition   Overall Cognitive Status Impaired   Attention Attends with cues to redirect   Orientation Level Oriented X4  (m/yr, grossly to date)   Memory Decreased recall of recent events;Decreased recall of precautions;Decreased short term memory   Following Commands Follows one step commands with increased time or repetition   Comments Pt ID by wristband, name, .   Subjective   Subjective Pt agreeable to work with PT however intially requested 30 more minutes to rest. Perseveration of phrases \"I don't feel good\" \"my head hurts.\"   Bed Mobility   Supine to Sit 6  Modified independent   Additional items Increased time required;Verbal cues   Sit to Supine 6  Modified independent   Additional " "items Increased time required;Verbal cues   Additional Comments seated EOB pt reports \"I don't feel well.\"   Transfers   Sit to Stand 4  Minimal assistance   Additional items Assist x 1;Increased time required;Verbal cues  (from EOB x2)   Stand to Sit 4  Minimal assistance   Additional items Assist x 1;Increased time required;Verbal cues  (vc for controlled descent for safety and to assist with pain in back reported with uncontrolled descent to bed with first stand>sit. pt demonstrated carryover during second stand>sit.)   Ambulation/Elevation   Gait pattern Improper Weight shift;Decreased foot clearance;Inconsistent bryan;Short stride;Ataxia;Excessively slow;Decreased heel strike;Decreased toe off;Step through pattern  (lateral trunk sway)   Gait Assistance 4  Minimal assist   Additional items Assist x 1;Verbal cues  (requires mod Ax1 for balance when navigating objects in room, increased lateral sway with)   Assistive Device None   Distance 20 ft x2  (EOB within room x2; directional change)   Additional items   (unable to assess at this time due to dec tolerance to activity)   Balance   Static Sitting Fair +   Dynamic Sitting Fair   Static Standing Fair -  (observed postural sway with standing)   Dynamic Standing Poor +  (variable unilateral or b/l UE reaching to surfaces for stability)   Ambulatory Poor  (increased lateral trunk sway with mod Ax1 to correct)   Endurance Deficit   Endurance Deficit Yes   Activity Tolerance   Activity Tolerance Patient limited by fatigue;Patient limited by pain;Treatment limited secondary to medical complications (Comment)   Medical Staff Made Aware Care coordinated with OT Faith, PT Olegario.   Nurse Made Aware spoke with MAC Hodge pre/post and WOLFGANG Jara   Assessment   Prognosis Fair   Problem List Decreased strength;Decreased endurance;Impaired balance;Decreased mobility;Decreased coordination;Decreased cognition;Impaired judgement;Decreased safety awareness;Decreased skin " integrity;Pain   Assessment Pt agreeable to PT treatment session today. Pt reports headache and not feeling well t/o session however was able to supine>sit transfer mod I without observed LOB and minor c/o dizziness. Pt sit>stand from EOB with min Ax1 and ambulated within room and return to sit at EOB for 20 ft with min Ax1. Pt did require mod Ax1 intermittently to correct increased trunk sway and prevent LOB. During ambulation pt consistently reaches for surfaces with unilateral or b/l UE in order to increase stability, especially when navigating around objects within room. Pt stand>sit at EOB for rest break with reported back pain due to decreased control with descent to sit EOB bed. Pt agreeable to ambulate again for 20 ft around room with min Ax1 with  mod Ax1 for balance. Pt returned to EOB and performed sit>supine mod I. Pt is slowly progressing toward goals however is limited by pain and decreased tolerance to OOB activity. PT recommendation at discharge is level I vs level III/no needs pending progression of mobility.   Goals   Patient Goals to lay down and feel better   [facility] Expiration Date 08/23/24   Short Term Goal #1 In 14 days pt will be able to: 1. Demonstrate ability to perform all aspects of bed mobility independently to improve functional safety.  2. Perform functional transfers independently to facilitate safe return to previous living environment.  3.  Ambulate 150 ft with LRAD independently with stable vitals to improve safety with household distances and reduce fall risk.  4. Improve LE strength grades by 1 to increase ease of functional mobility with transfers and gait. 5. Pt will demonstrate improved balance by one grade in order to decrease risk of falls. 6. Climb at least 3 steps with unilateral HR independently to simulate entrance to community structures and resources.   PT Treatment Day 2   Plan   Treatment/Interventions Functional transfer training;LE  strengthening/ROM;Elevations;Therapeutic exercise;Endurance training;Patient/family training;Equipment eval/education;Bed mobility;Gait training;Spoke to nursing;Spoke to case management;OT;Family   PT Frequency 3-5x/wk   Discharge Recommendation   Rehab Resource Intensity Level, PT I (Maximum Resource Intensity)  (vs Level III/no needs pending progression of mobility)   AM-PAC Basic Mobility Inpatient   Turning in Flat Bed Without Bedrails 4   Lying on Back to Sitting on Edge of Flat Bed Without Bedrails 4   Moving Bed to Chair 3   Standing Up From Chair Using Arms 3   Walk in Room 3   Climb 3-5 Stairs With Railing 2   Basic Mobility Inpatient Raw Score 19   Basic Mobility Standardized Score 42.48   [facility] Highest Level Of Mobility   -HLM Goal 6: Walk 10 steps or more   -HLM Achieved 7: Walk 25 feet or more   End of Consult   Patient Position at End of Consult Supine;All needs within reach;Bed/Chair alarm activated  (MAC Hodge and RN Estefani made aware of continued c/o headache and back pain)     The patient's AM-PAC Basic Mobility Inpatient Short Form Raw Score is 19. A Raw score of greater than or equal to 16 suggests the patient may benefit from discharge to home. Please also refer to the recommendation of the Physical Therapist for safe discharge planning.    Latha Salinas, SPT

## 2024-08-12 NOTE — TREATMENT PLAN
Discussed with Dr Goldberg, images were reviewed. Dr Goldberg suggests  restarting Heparin gtt without bolus and raising EVD to 15 and monitor Output and ICP and work towards weaning EVD. Call with questions or concerns.

## 2024-08-12 NOTE — QUICK NOTE
EVD cath was removed today, site secured with nylon suture no post removal bleeding or CSF leak. Okay to restart heparin gtt. Repeat CT in the AM

## 2024-08-12 NOTE — PLAN OF CARE
Problem: SAFETY,RESTRAINT: NV/NON-SELF DESTRUCTIVE BEHAVIOR  Goal: Remains free of harm/injury (restraint for non violent/non self-detsructive behavior)  Description: INTERVENTIONS:  - Instruct patient/family regarding restraint use   - Assess and monitor physiologic and psychological status   - Provide interventions and comfort measures to meet assessed patient needs   - Identify and implement measures to help patient regain control  - Assess readiness for release of restraint   Outcome: Progressing  Goal: Returns to optimal restraint-free functioning  Description: INTERVENTIONS:  - Assess the patient's behavior and symptoms that indicate continued need for restraint  - Identify and implement measures to help patient regain control  - Assess readiness for release of restraint   Outcome: Progressing     Problem: Prexisting or High Potential for Compromised Skin Integrity  Goal: Skin integrity is maintained or improved  Description: INTERVENTIONS:  - Identify patients at risk for skin breakdown  - Assess and monitor skin integrity  - Assess and monitor nutrition and hydration status  - Monitor labs   - Assess for incontinence   - Turn and reposition patient  - Assist with mobility/ambulation  - Relieve pressure over bony prominences  - Avoid friction and shearing  - Provide appropriate hygiene as needed including keeping skin clean and dry  - Evaluate need for skin moisturizer/barrier cream  - Collaborate with interdisciplinary team   - Patient/family teaching  - Consider wound care consult   Outcome: Progressing     Problem: PAIN - ADULT  Goal: Verbalizes/displays adequate comfort level or baseline comfort level  Description: Interventions:  - Encourage patient to monitor pain and request assistance  - Assess pain using appropriate pain scale  - Administer analgesics based on type and severity of pain and evaluate response  - Implement non-pharmacological measures as appropriate and evaluate response  - Consider  cultural and social influences on pain and pain management  - Notify physician/advanced practitioner if interventions unsuccessful or patient reports new pain  Outcome: Progressing     Problem: INFECTION - ADULT  Goal: Absence or prevention of progression during hospitalization  Description: INTERVENTIONS:  - Assess and monitor for signs and symptoms of infection  - Monitor lab/diagnostic results  - Monitor all insertion sites, i.e. indwelling lines, tubes, and drains  - Monitor endotracheal if appropriate and nasal secretions for changes in amount and color  - [place] appropriate cooling/warming therapies per order  - Administer medications as ordered  - Instruct and encourage patient and family to use good hand hygiene technique  - Identify and instruct in appropriate isolation precautions for identified infection/condition  Outcome: Progressing  Goal: Absence of fever/infection during neutropenic period  Description: INTERVENTIONS:  - Monitor WBC    Outcome: Progressing     Problem: SAFETY ADULT  Goal: Patient will remain free of falls  Description: INTERVENTIONS:  - Educate patient/family on patient safety including physical limitations  - Instruct patient to call for assistance with activity   - Consult OT/PT to assist with strengthening/mobility   - Keep Call bell within reach  - Keep bed low and locked with side rails adjusted as appropriate  - Keep care items and personal belongings within reach  - Initiate and maintain comfort rounds  - Make Fall Risk Sign visible to staff  - Offer Toileting every  Hours, in advance of need  - Initiate/Maintain alarm  - Obtain necessary fall risk management equipment:   - Apply yellow socks and bracelet for high fall risk patients  - Consider moving patient to room near nurses station  Outcome: Progressing  Goal: Maintain or return to baseline ADL function  Description: INTERVENTIONS:  -  Assess patient's ability to carry out ADLs; assess patient's baseline for ADL  function and identify physical deficits which impact ability to perform ADLs (bathing, care of mouth/teeth, toileting, grooming, dressing, etc.)  - Assess/evaluate cause of self-care deficits   - Assess range of motion  - Assess patient's mobility; develop plan if impaired  - Assess patient's need for assistive devices and provide as appropriate  - Encourage maximum independence but intervene and supervise when necessary  - Involve family in performance of ADLs  - Assess for home care needs following discharge   - Consider OT consult to assist with ADL evaluation and planning for discharge  - Provide patient education as appropriate  Outcome: Progressing  Goal: Maintains/Returns to pre admission functional level  Description: INTERVENTIONS:  - Perform AM-PAC 6 Click Basic Mobility/ Daily Activity assessment daily.  - Set and communicate daily mobility goal to care team and patient/family/caregiver.   - Collaborate with rehabilitation services on mobility goals if consulted  - Perform Range of Motion  times a day.  - Reposition patient every  hours.  - Dangle patient  times a day  - Stand patient  times a day  - Ambulate patient  times a day  - Out of bed to chair  times a day   - Out of bed for meal times a day  - Out of bed for toileting  - Record patient progress and toleration of activity level   Outcome: Progressing     Problem: DISCHARGE PLANNING  Goal: Discharge to home or other facility with appropriate resources  Description: INTERVENTIONS:  - Identify barriers to discharge w/patient and caregiver  - Arrange for needed discharge resources and transportation as appropriate  - Identify discharge learning needs (meds, wound care, etc.)  - Arrange for interpretive services to assist at discharge as needed  - Refer to Case Management Department for coordinating discharge planning if the patient needs post-hospital services based on physician/advanced practitioner order or complex needs related to functional  status, cognitive ability, or social support system  Outcome: Progressing     Problem: Knowledge Deficit  Goal: Patient/family/caregiver demonstrates understanding of disease process, treatment plan, medications, and discharge instructions  Description: Complete learning assessment and assess knowledge base.  Interventions:  - Provide teaching at level of understanding  - Provide teaching via preferred learning methods  Outcome: Progressing     Problem: Nutrition/Hydration-ADULT  Goal: Nutrient/Hydration intake appropriate for improving, restoring or maintaining nutritional needs  Description: Monitor and assess patient's nutrition/hydration status for malnutrition. Collaborate with interdisciplinary team and initiate plan and interventions as ordered.  Monitor patient's weight and dietary intake as ordered or per policy. Utilize nutrition screening tool and intervene as necessary. Determine patient's food preferences and provide high-protein, high-caloric foods as appropriate.     INTERVENTIONS:  - Monitor oral intake, urinary output, labs, and treatment plans  - Assess nutrition and hydration status and recommend course of action  - Evaluate amount of meals eaten  - Assist patient with eating if necessary   - Allow adequate time for meals  - Recommend/ encourage appropriate diets, oral nutritional supplements, and vitamin/mineral supplements  - Order, calculate, and assess calorie counts as needed  - Recommend, monitor, and adjust tube feedings and TPN/PPN based on assessed needs  - Assess need for intravenous fluids  - Provide specific nutrition/hydration education as appropriate  - Include patient/family/caregiver in decisions related to nutrition  Outcome: Progressing

## 2024-08-12 NOTE — PLAN OF CARE
Problem: OCCUPATIONAL THERAPY ADULT  Goal: Performs self-care activities at highest level of function for planned discharge setting.  See evaluation for individualized goals.  Description: Treatment Interventions: ADL retraining, Functional transfer training, Patient/family training, Equipment evaluation/education, Neuromuscular reeducation, Compensatory technique education, Continued evaluation, Visual perceptual retraining, Cognitive reorientation, Endurance training, UE strengthening/ROM (MoCA as appropriate)          See flowsheet documentation for full assessment, interventions and recommendations.   Outcome: Progressing  Note: Limitation: Decreased ADL status, Decreased UE strength, Decreased Safe judgement during ADL, Decreased cognition, Decreased endurance, Decreased self-care trans, Decreased high-level ADLs, Visual deficit (vestibular sx, balance, standing tolerance, functional reach, global mental status, memory, attention, insight/judgement)  Prognosis: Fair  Assessment: Patient seen for OT treatment on 8/12/2024 s/p admission for Subdural hematoma, acute (HCC) Patient agreeable to OT session. Patient participated in self-care transfers, bed mobility , and functional mobility with intervention focus on maximizing functional independence and activity tolerance. Anmol Meza Jr. is showing improvements in mobility,  but is continuing to perform below baseline due to the following deficits: decreased functional reach , decreased trunk control , decreased activity tolerance , impaired memory , attention to task, impaired judgement and problem solving , and (+) pain . Personal factors continuing to impact D/C include: decreased caregiver status  and Assistance needed for ADLs and functional mobility From OT standpoint, patient would benefit from skilled intervention to maximize independence with ADLs and functional mobility. Goals remain appropriate, continue POC. At this time, recommending D/C to: Level  1: maximum resource intensity     Rehab Resource Intensity Level, OT: I (Maximum Resource Intensity)     Faith Hess, OT

## 2024-08-12 NOTE — QUICK NOTE
Noted sutures in large L temporal longitudinal laceration are s/p removal. Incision healing well. Remainder of the sutures in face (below L eye and over L lateral mandible) removed in routine fashion. All lacerations healing well. Patient tolerated the procedure with minimal difficulty (and assistance of nursing aide) and expresses appreciation for the care that he has received thus far.

## 2024-08-12 NOTE — PLAN OF CARE
Problem: PHYSICAL THERAPY ADULT  Goal: Performs mobility at highest level of function for planned discharge setting.  See evaluation for individualized goals.  Description: Treatment/Interventions: Functional transfer training, LE strengthening/ROM, Therapeutic exercise, Equipment eval/education, Bed mobility, Gait training, Spoke to case management, Spoke to nursing, OT, Patient/family training, Endurance training          See flowsheet documentation for full assessment, interventions and recommendations.  Outcome: Progressing  Note: Prognosis: Fair  Problem List: Decreased strength, Decreased endurance, Impaired balance, Decreased mobility, Decreased coordination, Decreased cognition, Impaired judgement, Decreased safety awareness, Decreased skin integrity, Pain  Assessment: Pt agreeable to PT treatment session today. Pt reports headache and not feeling well t/o session however was able to supine>sit transfer mod I without observed LOB and minor c/o dizziness. Pt sit>stand from EOB with min Ax1 and ambulated within room and return to sit at EOB for 20 ft with min Ax1. Pt did require mod Ax1 intermittently to correct increased trunk sway and prevent LOB. During ambulation pt consistently reaches for surfaces with unilateral or b/l UE in order to increase stability, especially when navigating around objects within room. Pt stand>sit at EOB for rest break with reported back pain due to decreased control with descent to sit EOB bed. Pt agreeable to ambulate again for 20 ft around room with min Ax1 with  mod Ax1 for balance. Pt returned to EOB and performed sit>supine mod I. Pt is slowly progressing toward goals however is limited by pain and decreased tolerance to OOB activity. PT recommendation at discharge is level I vs level III/no needs pending progression of mobility.        Rehab Resource Intensity Level, PT: I (Maximum Resource Intensity) (vs Level III/no needs pending progression of mobility)    See  flowsheet documentation for full assessment.

## 2024-08-12 NOTE — ASSESSMENT & PLAN NOTE
CT Head: .  right subdural hematoma measuring up to 5 mm. Bilateral frontal and temporal contusions.   [facility] (8/10) - Evolving acute hemorrhagic brain contusions in bilateral frontal lobes (right worse than left) and bilateral temporal lobes with surrounding vasogenic edema (worse in right frontal lobe) and adjacent small volume subarachnoid hemorrhage.   Heparin stopped  [facility] (8/11) - Unchanged acute hemorrhagic brain contusions in bilateral frontal lobes (right worse than left) and bilateral temporal lobes with surrounding vasogenic edema (worse in right frontal lobe) and adjacent small volume subarachnoid hemorrhage.    Plan:   Neurosurgery following. s/p right frontal EVD placement by neurosurgery 8/5. Opening pressure 9  Q1hr neurological checks.   STAT CT head for decline in GCS >2 points in 1 hour.   Neurosurgery reviewing CTV further with endovascular neurosurgery team   EVD in place @ 10mmHg above the tragus   Hold chemical DVT ppx, continue to hold heparin gtt  Keppra for seizure ppx x7 days  Will discuss with Neurosurgery plan for EVD removal.

## 2024-08-13 ENCOUNTER — APPOINTMENT (INPATIENT)
Dept: CT IMAGING | Facility: HOSPITAL | Age: 34
DRG: 020 | End: 2024-08-13
Payer: COMMERCIAL

## 2024-08-13 PROBLEM — F19.90 DRUG USE: Chronic | Status: ACTIVE | Noted: 2024-08-05

## 2024-08-13 PROBLEM — R91.1 PULMONARY NODULE: Chronic | Status: ACTIVE | Noted: 2024-08-05

## 2024-08-13 PROBLEM — Z59.00 HOMELESS: Chronic | Status: ACTIVE | Noted: 2024-08-05

## 2024-08-13 LAB
ANION GAP SERPL CALCULATED.3IONS-SCNC: 6 MMOL/L (ref 4–13)
ANION GAP SERPL CALCULATED.3IONS-SCNC: 6 MMOL/L (ref 4–13)
ANION GAP SERPL CALCULATED.3IONS-SCNC: 8 MMOL/L (ref 4–13)
ANION GAP SERPL CALCULATED.3IONS-SCNC: 8 MMOL/L (ref 4–13)
APTT PPP: 18 SECONDS (ref 23–34)
APTT PPP: 18 SECONDS (ref 23–34)
APTT PPP: 28 SECONDS (ref 23–34)
APTT PPP: 28 SECONDS (ref 23–34)
APTT PPP: 29 SECONDS (ref 23–34)
APTT PPP: 36 SECONDS (ref 23–34)
APTT PPP: 36 SECONDS (ref 23–34)
APTT PPP: 82 SECONDS (ref 23–34)
APTT PPP: 82 SECONDS (ref 23–34)
BUN SERPL-MCNC: 8 MG/DL (ref 5–25)
BUN SERPL-MCNC: 8 MG/DL (ref 5–25)
BUN SERPL-MCNC: 9 MG/DL (ref 5–25)
BUN SERPL-MCNC: 9 MG/DL (ref 5–25)
CA-I BLD-SCNC: 0.98 MMOL/L (ref 1.12–1.32)
CA-I BLD-SCNC: 0.98 MMOL/L (ref 1.12–1.32)
CALCIUM SERPL-MCNC: 6.5 MG/DL (ref 8.4–10.2)
CALCIUM SERPL-MCNC: 6.5 MG/DL (ref 8.4–10.2)
CALCIUM SERPL-MCNC: 8.8 MG/DL (ref 8.4–10.2)
CALCIUM SERPL-MCNC: 8.8 MG/DL (ref 8.4–10.2)
CHLORIDE SERPL-SCNC: 105 MMOL/L (ref 96–108)
CHLORIDE SERPL-SCNC: 105 MMOL/L (ref 96–108)
CHLORIDE SERPL-SCNC: 112 MMOL/L (ref 96–108)
CHLORIDE SERPL-SCNC: 112 MMOL/L (ref 96–108)
CO2 SERPL-SCNC: 17 MMOL/L (ref 21–32)
CO2 SERPL-SCNC: 17 MMOL/L (ref 21–32)
CO2 SERPL-SCNC: 22 MMOL/L (ref 21–32)
CO2 SERPL-SCNC: 22 MMOL/L (ref 21–32)
CREAT SERPL-MCNC: 0.47 MG/DL (ref 0.6–1.3)
CREAT SERPL-MCNC: 0.47 MG/DL (ref 0.6–1.3)
CREAT SERPL-MCNC: 0.63 MG/DL (ref 0.6–1.3)
CREAT SERPL-MCNC: 0.63 MG/DL (ref 0.6–1.3)
ERYTHROCYTE [DISTWIDTH] IN BLOOD BY AUTOMATED COUNT: 14 % (ref 11.6–15.1)
ERYTHROCYTE [DISTWIDTH] IN BLOOD BY AUTOMATED COUNT: 14 % (ref 11.6–15.1)
GFR SERPL CREATININE-BSD FRML MDRD: 126 ML/MIN/1.73SQ M
GFR SERPL CREATININE-BSD FRML MDRD: 126 ML/MIN/1.73SQ M
GFR SERPL CREATININE-BSD FRML MDRD: 143 ML/MIN/1.73SQ M
GFR SERPL CREATININE-BSD FRML MDRD: 143 ML/MIN/1.73SQ M
GLUCOSE SERPL-MCNC: 104 MG/DL (ref 65–140)
GLUCOSE SERPL-MCNC: 96 MG/DL (ref 65–140)
GLUCOSE SERPL-MCNC: 96 MG/DL (ref 65–140)
HCT VFR BLD AUTO: 34.7 % (ref 36.5–49.3)
HCT VFR BLD AUTO: 34.7 % (ref 36.5–49.3)
HEPARIN CF II AG PPP IA-MCNC: 0.12 IU/ML
HEPARIN CF II AG PPP IA-MCNC: 0.12 IU/ML
HGB BLD-MCNC: 11.8 G/DL (ref 12–17)
HGB BLD-MCNC: 11.8 G/DL (ref 12–17)
MAGNESIUM SERPL-MCNC: 1.8 MG/DL (ref 1.9–2.7)
MAGNESIUM SERPL-MCNC: 1.8 MG/DL (ref 1.9–2.7)
MCH RBC QN AUTO: 31.3 PG (ref 26.8–34.3)
MCH RBC QN AUTO: 31.3 PG (ref 26.8–34.3)
MCHC RBC AUTO-ENTMCNC: 34 G/DL (ref 31.4–37.4)
MCHC RBC AUTO-ENTMCNC: 34 G/DL (ref 31.4–37.4)
MCV RBC AUTO: 92 FL (ref 82–98)
MCV RBC AUTO: 92 FL (ref 82–98)
PHOSPHATE SERPL-MCNC: 3 MG/DL (ref 2.7–4.5)
PHOSPHATE SERPL-MCNC: 3 MG/DL (ref 2.7–4.5)
PLATELET # BLD AUTO: 272 THOUSANDS/UL (ref 149–390)
PLATELET # BLD AUTO: 272 THOUSANDS/UL (ref 149–390)
PMV BLD AUTO: 11.1 FL (ref 8.9–12.7)
PMV BLD AUTO: 11.1 FL (ref 8.9–12.7)
POTASSIUM SERPL-SCNC: 3.9 MMOL/L (ref 3.5–5.3)
POTASSIUM SERPL-SCNC: 3.9 MMOL/L (ref 3.5–5.3)
POTASSIUM SERPL-SCNC: 4.8 MMOL/L (ref 3.5–5.3)
POTASSIUM SERPL-SCNC: 4.8 MMOL/L (ref 3.5–5.3)
RBC # BLD AUTO: 3.77 MILLION/UL (ref 3.88–5.62)
RBC # BLD AUTO: 3.77 MILLION/UL (ref 3.88–5.62)
SODIUM SERPL-SCNC: 135 MMOL/L (ref 135–147)
WBC # BLD AUTO: 11.82 THOUSAND/UL (ref 4.31–10.16)
WBC # BLD AUTO: 11.82 THOUSAND/UL (ref 4.31–10.16)

## 2024-08-13 PROCEDURE — 99233 SBSQ HOSP IP/OBS HIGH 50: CPT | Performed by: NEUROLOGICAL SURGERY

## 2024-08-13 PROCEDURE — 80048 BASIC METABOLIC PNL TOTAL CA: CPT

## 2024-08-13 PROCEDURE — 70450 CT HEAD/BRAIN W/O DYE: CPT

## 2024-08-13 PROCEDURE — 99232 SBSQ HOSP IP/OBS MODERATE 35: CPT | Performed by: SURGERY

## 2024-08-13 PROCEDURE — 80048 BASIC METABOLIC PNL TOTAL CA: CPT | Performed by: PHYSICIAN ASSISTANT

## 2024-08-13 PROCEDURE — 84100 ASSAY OF PHOSPHORUS: CPT

## 2024-08-13 PROCEDURE — 85730 THROMBOPLASTIN TIME PARTIAL: CPT | Performed by: NURSE PRACTITIONER

## 2024-08-13 PROCEDURE — 85027 COMPLETE CBC AUTOMATED: CPT

## 2024-08-13 PROCEDURE — 82330 ASSAY OF CALCIUM: CPT

## 2024-08-13 PROCEDURE — 83735 ASSAY OF MAGNESIUM: CPT

## 2024-08-13 PROCEDURE — 85520 HEPARIN ASSAY: CPT | Performed by: PHYSICIAN ASSISTANT

## 2024-08-13 PROCEDURE — 85730 THROMBOPLASTIN TIME PARTIAL: CPT | Performed by: PHYSICIAN ASSISTANT

## 2024-08-13 PROCEDURE — 82948 REAGENT STRIP/BLOOD GLUCOSE: CPT

## 2024-08-13 PROCEDURE — 85730 THROMBOPLASTIN TIME PARTIAL: CPT | Performed by: STUDENT IN AN ORGANIZED HEALTH CARE EDUCATION/TRAINING PROGRAM

## 2024-08-13 RX ORDER — HEPARIN SODIUM 10000 [USP'U]/100ML
3-24 INJECTION, SOLUTION INTRAVENOUS
Status: DISCONTINUED | OUTPATIENT
Start: 2024-08-13 | End: 2024-08-14

## 2024-08-13 RX ORDER — CALCIUM GLUCONATE 20 MG/ML
2 INJECTION, SOLUTION INTRAVENOUS ONCE
Status: COMPLETED | OUTPATIENT
Start: 2024-08-13 | End: 2024-08-13

## 2024-08-13 RX ORDER — MAGNESIUM SULFATE HEPTAHYDRATE 40 MG/ML
2 INJECTION, SOLUTION INTRAVENOUS ONCE
Status: COMPLETED | OUTPATIENT
Start: 2024-08-13 | End: 2024-08-13

## 2024-08-13 RX ADMIN — METHOCARBAMOL TABLETS 500 MG: 500 TABLET, COATED ORAL at 23:03

## 2024-08-13 RX ADMIN — METHOCARBAMOL TABLETS 500 MG: 500 TABLET, COATED ORAL at 17:26

## 2024-08-13 RX ADMIN — OXYCODONE HYDROCHLORIDE 5 MG: 5 TABLET ORAL at 23:03

## 2024-08-13 RX ADMIN — ONDANSETRON 4 MG: 2 INJECTION INTRAMUSCULAR; INTRAVENOUS at 21:59

## 2024-08-13 RX ADMIN — BUTALBITAL, ACETAMINOPHEN, AND CAFFEINE 1 TABLET: 325; 50; 40 TABLET ORAL at 15:40

## 2024-08-13 RX ADMIN — CALCIUM GLUCONATE 2 G: 20 INJECTION, SOLUTION INTRAVENOUS at 07:53

## 2024-08-13 RX ADMIN — GABAPENTIN 100 MG: 100 CAPSULE ORAL at 08:17

## 2024-08-13 RX ADMIN — MAGNESIUM SULFATE HEPTAHYDRATE 2 G: 40 INJECTION, SOLUTION INTRAVENOUS at 07:53

## 2024-08-13 RX ADMIN — Medication 10 MG: at 08:17

## 2024-08-13 RX ADMIN — HEPARIN SODIUM 22 UNITS/KG/HR: 10000 INJECTION, SOLUTION INTRAVENOUS at 18:33

## 2024-08-13 RX ADMIN — BUTALBITAL, ACETAMINOPHEN, AND CAFFEINE 1 TABLET: 325; 50; 40 TABLET ORAL at 07:12

## 2024-08-13 RX ADMIN — HYDROMORPHONE HYDROCHLORIDE 0.2 MG: 0.2 INJECTION, SOLUTION INTRAMUSCULAR; INTRAVENOUS; SUBCUTANEOUS at 18:33

## 2024-08-13 RX ADMIN — ONDANSETRON 4 MG: 2 INJECTION INTRAMUSCULAR; INTRAVENOUS at 07:48

## 2024-08-13 RX ADMIN — BACITRACIN 1 LARGE APPLICATION: 500 OINTMENT TOPICAL at 17:26

## 2024-08-13 RX ADMIN — BACITRACIN 1 LARGE APPLICATION: 500 OINTMENT TOPICAL at 08:17

## 2024-08-13 RX ADMIN — GABAPENTIN 100 MG: 100 CAPSULE ORAL at 15:09

## 2024-08-13 RX ADMIN — METHOCARBAMOL TABLETS 500 MG: 500 TABLET, COATED ORAL at 11:09

## 2024-08-13 RX ADMIN — HYDROMORPHONE HYDROCHLORIDE 0.2 MG: 0.2 INJECTION, SOLUTION INTRAMUSCULAR; INTRAVENOUS; SUBCUTANEOUS at 04:57

## 2024-08-13 RX ADMIN — OXYCODONE HYDROCHLORIDE 5 MG: 5 TABLET ORAL at 07:03

## 2024-08-13 RX ADMIN — OXYCODONE HYDROCHLORIDE 5 MG: 5 TABLET ORAL at 02:13

## 2024-08-13 RX ADMIN — CHLORHEXIDINE GLUCONATE 15 ML: 1.2 RINSE ORAL at 08:17

## 2024-08-13 RX ADMIN — OXYCODONE HYDROCHLORIDE 5 MG: 5 TABLET ORAL at 15:09

## 2024-08-13 RX ADMIN — HYDROMORPHONE HYDROCHLORIDE 0.2 MG: 0.2 INJECTION, SOLUTION INTRAMUSCULAR; INTRAVENOUS; SUBCUTANEOUS at 13:44

## 2024-08-13 RX ADMIN — HYDROMORPHONE HYDROCHLORIDE 0.2 MG: 0.2 INJECTION, SOLUTION INTRAMUSCULAR; INTRAVENOUS; SUBCUTANEOUS at 08:19
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
## 2024-08-13 NOTE — ASSESSMENT & PLAN NOTE
CT Head: .  right SDH measuring up to 5 mm. Bilateral frontal and temporal contusions.   CTH (8/10) - Evolving acute hemorrhagic brain contusions in b/l frontal lobes (R>L) and b/l temporal lobes with surrounding vasogenic edema (worse in right frontal lobe) and adjacent small volume SAH.   CTH (8/11) - Unchanged acute hemorrhagic brain contusions in b/l frontal lobes (R>L) and b/l temporal lobes with surrounding vasogenic edema (worse in right frontal lobe) and adjacent small volume SAH.  NSx following. s/p right frontal EVD placement by neurosurgery 8/5. Opening pressure 9  Q4H neuro checks.   STAT CT head for decline in GCS >2 points in 1 hour.   Neurosurgery reviewing CTV further with endovascular neurosurgery team   8/12 EVD removed   Keppra completed x7 days for Sz proph

## 2024-08-13 NOTE — PLAN OF CARE
Problem: Prexisting or High Potential for Compromised Skin Integrity  Goal: Skin integrity is maintained or improved  Description: INTERVENTIONS:  - Identify patients at risk for skin breakdown  - Assess and monitor skin integrity  - Assess and monitor nutrition and hydration status  - Monitor labs   - Assess for incontinence   - Turn and reposition patient  - Assist with mobility/ambulation  - Relieve pressure over bony prominences  - Avoid friction and shearing  - Provide appropriate hygiene as needed including keeping skin clean and dry  - Evaluate need for skin moisturizer/barrier cream  - Collaborate with interdisciplinary team   - Patient/family teaching  - Consider wound care consult   Outcome: Progressing     Problem: PAIN - ADULT  Goal: Verbalizes/displays adequate comfort level or baseline comfort level  Description: Interventions:  - Encourage patient to monitor pain and request assistance  - Assess pain using appropriate pain scale  - Administer analgesics based on type and severity of pain and evaluate response  - Implement non-pharmacological measures as appropriate and evaluate response  - Consider cultural and social influences on pain and pain management  - Notify physician/advanced practitioner if interventions unsuccessful or patient reports new pain  Outcome: Progressing     Problem: INFECTION - ADULT  Goal: Absence or prevention of progression during hospitalization  Description: INTERVENTIONS:  - Assess and monitor for signs and symptoms of infection  - Monitor lab/diagnostic results  - Monitor all insertion sites, i.e. indwelling lines, tubes, and drains  - Monitor endotracheal if appropriate and nasal secretions for changes in amount and color  - [place] appropriate cooling/warming therapies per order  - Administer medications as ordered  - Instruct and encourage patient and family to use good hand hygiene technique  - Identify and instruct in appropriate isolation precautions for  identified infection/condition  Outcome: Progressing  Goal: Absence of fever/infection during neutropenic period  Description: INTERVENTIONS:  - Monitor WBC    Outcome: Progressing

## 2024-08-13 NOTE — ASSESSMENT & PLAN NOTE
CT Head: Fractures of the left orbit and left zygoma.   OMFS consulted & following.   8/7: S/P ORIF of zygomatic fracture and repair of complex facial lacerations.  Head of bed elevated  Sinus precautions: 4 weeks: no nose blowing, avoid putting pressure on sinus area, avoid strenuous activity/straining, try to sneeze with mouth open. 2 weeks: no straws, spitting, smoking. Use OTC Afrin BID 2 sprays/nostril 3 days maximum as needed, OTC decongestant (e.g. Sudafed) or Antihistamine (e.g. Claritin-D) as needed, and saline nasal spray as needed.   F/U: Follow up in clinic within 1-2 weeks of discharge., Patient should see general dentist for general oral care.   Analgesia as indicated.  Completed 7 days Unasyn

## 2024-08-13 NOTE — ASSESSMENT & PLAN NOTE
Pt continuous complain of headache, not quantifying, he just monas he has a headache  Likely secondary to above head injuries  Continue Acetaminophen 650 q8h PRN mild pain/fever  Oxycodone 2.5mg PO q4h PRN moderate pain  Oxycodone 5mg PO q4h PRN severe pain  Dilaudid 0.2mg Q4H breakthrough pain  Calming environment.

## 2024-08-13 NOTE — ASSESSMENT & PLAN NOTE
In setting of traumatic head injury  Improving  Continue neuro checks.Q4H  CAM-ICU.  Optimize sleep wake cycle.  Keppra seizure ppx.completed

## 2024-08-13 NOTE — ASSESSMENT & PLAN NOTE
CTA:  The proximal aspect of left transverse sinus is patent with subsequent occlusion, adjacent to the left fracture line. Superior sagittal, right transverse and sigmoid sinuses are patent. Straight sinus and internal cerebral veins are patent.  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  There is some nonocclusive thrombus noted in the visualized upper cervical internal jugular vein.   Endovascular neurosurgery following  Heparin gtt started back on ECMO protocol

## 2024-08-13 NOTE — PLAN OF CARE
Problem: SAFETY,RESTRAINT: NV/NON-SELF DESTRUCTIVE BEHAVIOR  Goal: Remains free of harm/injury (restraint for non violent/non self-detsructive behavior)  Description: INTERVENTIONS:  - Instruct patient/family regarding restraint use   - Assess and monitor physiologic and psychological status   - Provide interventions and comfort measures to meet assessed patient needs   - Identify and implement measures to help patient regain control  - Assess readiness for release of restraint   Outcome: Progressing  Goal: Returns to optimal restraint-free functioning  Description: INTERVENTIONS:  - Assess the patient's behavior and symptoms that indicate continued need for restraint  - Identify and implement measures to help patient regain control  - Assess readiness for release of restraint   Outcome: Progressing     Problem: Prexisting or High Potential for Compromised Skin Integrity  Goal: Skin integrity is maintained or improved  Description: INTERVENTIONS:  - Identify patients at risk for skin breakdown  - Assess and monitor skin integrity  - Assess and monitor nutrition and hydration status  - Monitor labs   - Assess for incontinence   - Turn and reposition patient  - Assist with mobility/ambulation  - Relieve pressure over bony prominences  - Avoid friction and shearing  - Provide appropriate hygiene as needed including keeping skin clean and dry  - Evaluate need for skin moisturizer/barrier cream  - Collaborate with interdisciplinary team   - Patient/family teaching  - Consider wound care consult   Outcome: Progressing     Problem: PAIN - ADULT  Goal: Verbalizes/displays adequate comfort level or baseline comfort level  Description: Interventions:  - Encourage patient to monitor pain and request assistance  - Assess pain using appropriate pain scale  - Administer analgesics based on type and severity of pain and evaluate response  - Implement non-pharmacological measures as appropriate and evaluate response  - Consider  cultural and social influences on pain and pain management  - Notify physician/advanced practitioner if interventions unsuccessful or patient reports new pain  Outcome: Progressing     Problem: INFECTION - ADULT  Goal: Absence or prevention of progression during hospitalization  Description: INTERVENTIONS:  - Assess and monitor for signs and symptoms of infection  - Monitor lab/diagnostic results  - Monitor all insertion sites, i.e. indwelling lines, tubes, and drains  - Monitor endotracheal if appropriate and nasal secretions for changes in amount and color  - [place] appropriate cooling/warming therapies per order  - Administer medications as ordered  - Instruct and encourage patient and family to use good hand hygiene technique  - Identify and instruct in appropriate isolation precautions for identified infection/condition  Outcome: Progressing  Goal: Absence of fever/infection during neutropenic period  Description: INTERVENTIONS:  - Monitor WBC    Outcome: Progressing     Problem: SAFETY ADULT  Goal: Patient will remain free of falls  Description: INTERVENTIONS:  - Educate patient/family on patient safety including physical limitations  - Instruct patient to call for assistance with activity   - Consult OT/PT to assist with strengthening/mobility   - Keep Call bell within reach  - Keep bed low and locked with side rails adjusted as appropriate  - Keep care items and personal belongings within reach  - Initiate and maintain comfort rounds  - Make Fall Risk Sign visible to staff  - Offer Toileting every 2 Hours, in advance of need  - Initiate/Maintain bed alarm  - Obtain necessary fall risk management equipment  - Apply yellow socks and bracelet for high fall risk patients  - Consider moving patient to room near nurses station  Outcome: Progressing  Goal: Maintain or return to baseline ADL function  Description: INTERVENTIONS:  -  Assess patient's ability to carry out ADLs; assess patient's baseline for ADL  function and identify physical deficits which impact ability to perform ADLs (bathing, care of mouth/teeth, toileting, grooming, dressing, etc.)  - Assess/evaluate cause of self-care deficits   - Assess range of motion  - Assess patient's mobility; develop plan if impaired  - Assess patient's need for assistive devices and provide as appropriate  - Encourage maximum independence but intervene and supervise when necessary  - Involve family in performance of ADLs  - Assess for home care needs following discharge   - Consider OT consult to assist with ADL evaluation and planning for discharge  - Provide patient education as appropriate  Outcome: Progressing  Goal: Maintains/Returns to pre admission functional level  Description: INTERVENTIONS:  - Perform AM-PAC 6 Click Basic Mobility/ Daily Activity assessment daily.  - Set and communicate daily mobility goal to care team and patient/family/caregiver.   - Collaborate with rehabilitation services on mobility goals if consulted  - Perform Range of Motion 3 times a day.  - Reposition patient every 2 hours.  - Dangle patient 3 times a day  - Stand patient 3 times a day  - Ambulate patient 3 times a day  - Out of bed to chair 3 times a day   - Out of bed for meals 3 times a day  - Out of bed for toileting  - Record patient progress and toleration of activity level   Outcome: Progressing     Problem: DISCHARGE PLANNING  Goal: Discharge to home or other facility with appropriate resources  Description: INTERVENTIONS:  - Identify barriers to discharge w/patient and caregiver  - Arrange for needed discharge resources and transportation as appropriate  - Identify discharge learning needs (meds, wound care, etc.)  - Arrange for interpretive services to assist at discharge as needed  - Refer to Case Management Department for coordinating discharge planning if the patient needs post-hospital services based on physician/advanced practitioner order or complex needs related to  functional status, cognitive ability, or social support system  Outcome: Progressing     Problem: Knowledge Deficit  Goal: Patient/family/caregiver demonstrates understanding of disease process, treatment plan, medications, and discharge instructions  Description: Complete learning assessment and assess knowledge base.  Interventions:  - Provide teaching at level of understanding  - Provide teaching via preferred learning methods  Outcome: Progressing     Problem: Nutrition/Hydration-ADULT  Goal: Nutrient/Hydration intake appropriate for improving, restoring or maintaining nutritional needs  Description: Monitor and assess patient's nutrition/hydration status for malnutrition. Collaborate with interdisciplinary team and initiate plan and interventions as ordered.  Monitor patient's weight and dietary intake as ordered or per policy. Utilize nutrition screening tool and intervene as necessary. Determine patient's food preferences and provide high-protein, high-caloric foods as appropriate.     INTERVENTIONS:  - Monitor oral intake, urinary output, labs, and treatment plans  - Assess nutrition and hydration status and recommend course of action  - Evaluate amount of meals eaten  - Assist patient with eating if necessary   - Allow adequate time for meals  - Recommend/ encourage appropriate diets, oral nutritional supplements, and vitamin/mineral supplements  - Order, calculate, and assess calorie counts as needed  - Recommend, monitor, and adjust tube feedings and TPN/PPN based on assessed needs  - Assess need for intravenous fluids  - Provide specific nutrition/hydration education as appropriate  - Include patient/family/caregiver in decisions related to nutrition  Outcome: Progressing
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
## 2024-08-13 NOTE — ASSESSMENT & PLAN NOTE
CT Abd: Within the mid right hepatic lobe there is an irregular hypodense region measuring 2.8 x 1.2 x 2 cm (306:113)This hypodensity does not extend to the liver surface. Surrounding cloudlike hyperenhancement.  Re-eval - thought to be a hemangioma on comparative imaging   Abd exam w/o pain.  Daily CBC, Hgb stable.

## 2024-08-13 NOTE — ASSESSMENT & PLAN NOTE
Continue Ciprodex gtts  ENT consulted, appreciate recommendations  No acute surgical ENT intervention.  Outpatient follow-up for audiogram.

## 2024-08-13 NOTE — ASSESSMENT & PLAN NOTE
CT Chest: 5 mm posterior right upper lobe nodule (307:73) 3 mm left lower lobe nodule (307:144) 6 mm left lingular nodule (307:154) 5 mm subpleural nodule at the right costophrenic angle (307:204)   Pulmonary nodules measure up to 6 mm as described.Based on current Fleischner Society 2017 Guidelines on incidental pulmonary nodule, no routine follow-up is needed if the patient is low risk.If the patient is high risk, optional follow-up chest CT at 12 months can be considered.

## 2024-08-13 NOTE — ASSESSMENT & PLAN NOTE
Fracture of the squamous portion of the left temporal bone with small displaced fragment measuring 5 mm. Fracture extends posteriorly through the superior aspect of the mastoid air cells, involves the posterior squamous suture and extends into the left occipital bone down to the foramen magnum. Small amount of left temporal extra-axial pneumocephaly.   Consult OMFS, appreciate recommendations.  Unasyn completed for 7 days for fracture extending into sinus  Analgesia as indicated.

## 2024-08-13 NOTE — ASSESSMENT & PLAN NOTE
CT Head: 1.2 cm posttraumatic subarachnoid hemorrhage is suspected but suboptimally evaluated due to artifact 5 mm of right to left shift.   NSx consulted. s/p right frontal EVD placement, opening pressure 9  CTH (8/10) - Evolving acute hemorrhagic brain contusions in b/l frontal lobes (R>L) and b/l temporal lobes with surrounding vasogenic edema (worse in right frontal lobe) and adjacent small volume SAH.   CTH (8/11) - Unchanged acute hemorrhagic brain contusions in b/l frontal lobes (R>L) and b/l temporal lobes with surrounding vasogenic edema (worse in right frontal lobe) and adjacent small volume SAH   Q4H neuro checks  STAT CT head for decline in GCS >2 points in 1 hour.   8/12 EVD removed  Heparin gtt resumed at ECMO protocol  Keppra completed for seizure ppx x7days

## 2024-08-14 ENCOUNTER — APPOINTMENT (INPATIENT)
Dept: CT IMAGING | Facility: HOSPITAL | Age: 34
DRG: 020 | End: 2024-08-14
Payer: COMMERCIAL

## 2024-08-14 LAB
APTT PPP: 40 SECONDS (ref 23–34)
APTT PPP: 40 SECONDS (ref 23–34)
APTT PPP: 48 SECONDS (ref 23–34)
APTT PPP: 48 SECONDS (ref 23–34)
APTT PPP: 51 SECONDS (ref 23–34)
APTT PPP: 51 SECONDS (ref 23–34)
APTT PPP: 58 SECONDS (ref 23–34)
APTT PPP: 58 SECONDS (ref 23–34)

## 2024-08-14 PROCEDURE — 85730 THROMBOPLASTIN TIME PARTIAL: CPT | Performed by: PHYSICAL MEDICINE & REHABILITATION

## 2024-08-14 PROCEDURE — 70450 CT HEAD/BRAIN W/O DYE: CPT

## 2024-08-14 PROCEDURE — 99232 SBSQ HOSP IP/OBS MODERATE 35: CPT | Performed by: SURGERY

## 2024-08-14 PROCEDURE — 85730 THROMBOPLASTIN TIME PARTIAL: CPT | Performed by: STUDENT IN AN ORGANIZED HEALTH CARE EDUCATION/TRAINING PROGRAM

## 2024-08-14 RX ORDER — METHYLPREDNISOLONE 4 MG/1
24 TABLET ORAL DAILY
Status: COMPLETED | OUTPATIENT
Start: 2024-08-14 | End: 2024-08-14

## 2024-08-14 RX ORDER — NICOTINE 21 MG/24HR
14 PATCH, TRANSDERMAL 24 HOURS TRANSDERMAL ONCE
Status: COMPLETED | OUTPATIENT
Start: 2024-08-14 | End: 2024-08-15

## 2024-08-14 RX ORDER — HEPARIN SODIUM 1000 [USP'U]/ML
1950 INJECTION, SOLUTION INTRAVENOUS; SUBCUTANEOUS EVERY 6 HOURS PRN
Status: DISCONTINUED | OUTPATIENT
Start: 2024-08-14 | End: 2024-08-15

## 2024-08-14 RX ORDER — HEPARIN SODIUM 1000 [USP'U]/ML
3900 INJECTION, SOLUTION INTRAVENOUS; SUBCUTANEOUS EVERY 6 HOURS PRN
Status: DISCONTINUED | OUTPATIENT
Start: 2024-08-14 | End: 2024-08-15

## 2024-08-14 RX ORDER — METHYLPREDNISOLONE 4 MG/1
12 TABLET ORAL DAILY
Status: COMPLETED | OUTPATIENT
Start: 2024-08-17 | End: 2024-08-17

## 2024-08-14 RX ORDER — METHYLPREDNISOLONE 4 MG/1
20 TABLET ORAL DAILY
Status: COMPLETED | OUTPATIENT
Start: 2024-08-15 | End: 2024-08-15

## 2024-08-14 RX ORDER — METHYLPREDNISOLONE 4 MG/1
4 TABLET ORAL DAILY
Status: COMPLETED | OUTPATIENT
Start: 2024-08-19 | End: 2024-08-19

## 2024-08-14 RX ORDER — HEPARIN SODIUM 10000 [USP'U]/100ML
3-20 INJECTION, SOLUTION INTRAVENOUS
Status: DISCONTINUED | OUTPATIENT
Start: 2024-08-14 | End: 2024-08-15

## 2024-08-14 RX ORDER — METHYLPREDNISOLONE 4 MG/1
8 TABLET ORAL DAILY
Status: COMPLETED | OUTPATIENT
Start: 2024-08-18 | End: 2024-08-18

## 2024-08-14 RX ORDER — METHYLPREDNISOLONE 4 MG/1
16 TABLET ORAL DAILY
Status: DISPENSED | OUTPATIENT
Start: 2024-08-16 | End: 2024-08-17

## 2024-08-14 RX ORDER — METHOCARBAMOL 750 MG/1
750 TABLET, FILM COATED ORAL EVERY 6 HOURS SCHEDULED
Status: DISCONTINUED | OUTPATIENT
Start: 2024-08-14 | End: 2024-08-29 | Stop reason: HOSPADM

## 2024-08-14 RX ADMIN — ACETAMINOPHEN 650 MG: 325 TABLET, FILM COATED ORAL at 19:37

## 2024-08-14 RX ADMIN — BACITRACIN 1 LARGE APPLICATION: 500 OINTMENT TOPICAL at 10:13

## 2024-08-14 RX ADMIN — METHYLPREDNISOLONE 24 MG: 4 TABLET ORAL at 16:29

## 2024-08-14 RX ADMIN — BACITRACIN 1 LARGE APPLICATION: 500 OINTMENT TOPICAL at 17:54

## 2024-08-14 RX ADMIN — HEPARIN SODIUM 12 UNITS/KG/HR: 10000 INJECTION, SOLUTION INTRAVENOUS at 17:46

## 2024-08-14 RX ADMIN — NICOTINE 14 MG: 14 PATCH, EXTENDED RELEASE TRANSDERMAL at 20:46

## 2024-08-14 RX ADMIN — ONDANSETRON 4 MG: 2 INJECTION INTRAMUSCULAR; INTRAVENOUS at 19:38

## 2024-08-14 RX ADMIN — GABAPENTIN 100 MG: 100 CAPSULE ORAL at 16:30

## 2024-08-14 RX ADMIN — OXYCODONE HYDROCHLORIDE 5 MG: 5 TABLET ORAL at 08:07

## 2024-08-14 RX ADMIN — OXYCODONE HYDROCHLORIDE 5 MG: 5 TABLET ORAL at 03:06

## 2024-08-14 RX ADMIN — BUTALBITAL, ACETAMINOPHEN, AND CAFFEINE 1 TABLET: 325; 50; 40 TABLET ORAL at 20:46

## 2024-08-14 RX ADMIN — HEPARIN SODIUM 24 UNITS/KG/HR: 10000 INJECTION, SOLUTION INTRAVENOUS at 09:21

## 2024-08-14 RX ADMIN — OXYCODONE HYDROCHLORIDE 5 MG: 5 TABLET ORAL at 19:38

## 2024-08-14 NOTE — ASSESSMENT & PLAN NOTE
Seen on CT temporal bone  ENT consulted-no intervention at this time  Follow-up with ENT as an outpatient for audiogram

## 2024-08-14 NOTE — PLAN OF CARE
Problem: SAFETY,RESTRAINT: NV/NON-SELF DESTRUCTIVE BEHAVIOR  Goal: Remains free of harm/injury (restraint for non violent/non self-detsructive behavior)  Description: INTERVENTIONS:  - Instruct patient/family regarding restraint use   - Assess and monitor physiologic and psychological status   - Provide interventions and comfort measures to meet assessed patient needs   - Identify and implement measures to help patient regain control  - Assess readiness for release of restraint   Outcome: Progressing  Goal: Returns to optimal restraint-free functioning  Description: INTERVENTIONS:  - Assess the patient's behavior and symptoms that indicate continued need for restraint  - Identify and implement measures to help patient regain control  - Assess readiness for release of restraint   Outcome: Progressing     Problem: Prexisting or High Potential for Compromised Skin Integrity  Goal: Skin integrity is maintained or improved  Description: INTERVENTIONS:  - Identify patients at risk for skin breakdown  - Assess and monitor skin integrity  - Assess and monitor nutrition and hydration status  - Monitor labs   - Assess for incontinence   - Turn and reposition patient  - Assist with mobility/ambulation  - Relieve pressure over bony prominences  - Avoid friction and shearing  - Provide appropriate hygiene as needed including keeping skin clean and dry  - Evaluate need for skin moisturizer/barrier cream  - Collaborate with interdisciplinary team   - Patient/family teaching  - Consider wound care consult   Outcome: Progressing     Problem: PAIN - ADULT  Goal: Verbalizes/displays adequate comfort level or baseline comfort level  Description: Interventions:  - Encourage patient to monitor pain and request assistance  - Assess pain using appropriate pain scale  - Administer analgesics based on type and severity of pain and evaluate response  - Implement non-pharmacological measures as appropriate and evaluate response  - Consider  cultural and social influences on pain and pain management  - Notify physician/advanced practitioner if interventions unsuccessful or patient reports new pain  Outcome: Progressing     Problem: SAFETY ADULT  Goal: Patient will remain free of falls  Description: INTERVENTIONS:  - Educate patient/family on patient safety including physical limitations  - Instruct patient to call for assistance with activity   - Consult OT/PT to assist with strengthening/mobility   - Keep Call bell within reach  - Keep bed low and locked with side rails adjusted as appropriate  - Keep care items and personal belongings within reach  - Initiate and maintain comfort rounds  - Make Fall Risk Sign visible to staff  - Initiate/Maintain bed alarm  - Apply yellow socks and bracelet for high fall risk patients  - Consider moving patient to room near nurses station  Outcome: Progressing  Goal: Maintain or return to baseline ADL function  Description: INTERVENTIONS:  -  Assess patient's ability to carry out ADLs; assess patient's baseline for ADL function and identify physical deficits which impact ability to perform ADLs (bathing, care of mouth/teeth, toileting, grooming, dressing, etc.)  - Assess/evaluate cause of self-care deficits   - Assess range of motion  - Assess patient's mobility; develop plan if impaired  - Assess patient's need for assistive devices and provide as appropriate  - Encourage maximum independence but intervene and supervise when necessary  - Involve family in performance of ADLs  - Assess for home care needs following discharge   - Consider OT consult to assist with ADL evaluation and planning for discharge  - Provide patient education as appropriate  Outcome: Progressing  Goal: Maintains/Returns to pre admission functional level  Description: INTERVENTIONS:  - Perform AM-PAC 6 Click Basic Mobility/ Daily Activity assessment daily.  - Set and communicate daily mobility goal to care team and patient/family/caregiver.   -  Collaborate with rehabilitation services on mobility goals if consulted  - Out of bed for toileting  - Record patient progress and toleration of activity level   Outcome: Progressing     Problem: Nutrition/Hydration-ADULT  Goal: Nutrient/Hydration intake appropriate for improving, restoring or maintaining nutritional needs  Description: Monitor and assess patient's nutrition/hydration status for malnutrition. Collaborate with interdisciplinary team and initiate plan and interventions as ordered.  Monitor patient's weight and dietary intake as ordered or per policy. Utilize nutrition screening tool and intervene as necessary. Determine patient's food preferences and provide high-protein, high-caloric foods as appropriate.     INTERVENTIONS:  - Monitor oral intake, urinary output, labs, and treatment plans  - Assess nutrition and hydration status and recommend course of action  - Evaluate amount of meals eaten  - Assist patient with eating if necessary   - Allow adequate time for meals  - Recommend/ encourage appropriate diets, oral nutritional supplements, and vitamin/mineral supplements  - Order, calculate, and assess calorie counts as needed  - Recommend, monitor, and adjust tube feedings and TPN/PPN based on assessed needs  - Assess need for intravenous fluids  - Provide specific nutrition/hydration education as appropriate  - Include patient/family/caregiver in decisions related to nutrition  Outcome: Progressing     Problem: Knowledge Deficit  Goal: Patient/family/caregiver demonstrates understanding of disease process, treatment plan, medications, and discharge instructions  Description: Complete learning assessment and assess knowledge base.  Interventions:  - Provide teaching at level of understanding  - Provide teaching via preferred learning methods  Outcome: Progressing
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
## 2024-08-14 NOTE — PLAN OF CARE
Problem: Prexisting or High Potential for Compromised Skin Integrity  Goal: Skin integrity is maintained or improved  Description: INTERVENTIONS:  - Identify patients at risk for skin breakdown  - Assess and monitor skin integrity  - Assess and monitor nutrition and hydration status  - Monitor labs   - Assess for incontinence   - Turn and reposition patient  - Assist with mobility/ambulation  - Relieve pressure over bony prominences  - Avoid friction and shearing  - Provide appropriate hygiene as needed including keeping skin clean and dry  - Evaluate need for skin moisturizer/barrier cream  - Collaborate with interdisciplinary team   - Patient/family teaching  - Consider wound care consult   Outcome: Progressing     Problem: PAIN - ADULT  Goal: Verbalizes/displays adequate comfort level or baseline comfort level  Description: Interventions:  - Encourage patient to monitor pain and request assistance  - Assess pain using appropriate pain scale  - Administer analgesics based on type and severity of pain and evaluate response  - Implement non-pharmacological measures as appropriate and evaluate response  - Consider cultural and social influences on pain and pain management  - Notify physician/advanced practitioner if interventions unsuccessful or patient reports new pain  Outcome: Progressing     Problem: INFECTION - ADULT  Goal: Absence or prevention of progression during hospitalization  Description: INTERVENTIONS:  - Assess and monitor for signs and symptoms of infection  - Monitor lab/diagnostic results  - Monitor all insertion sites, i.e. indwelling lines, tubes, and drains  - Monitor endotracheal if appropriate and nasal secretions for changes in amount and color  - [place] appropriate cooling/warming therapies per order  - Administer medications as ordered  - Instruct and encourage patient and family to use good hand hygiene technique  - Identify and instruct in appropriate isolation precautions for  identified infection/condition  Outcome: Progressing  Goal: Absence of fever/infection during neutropenic period  Description: INTERVENTIONS:  - Monitor WBC    Outcome: Progressing     Problem: SAFETY ADULT  Goal: Patient will remain free of falls  Description: INTERVENTIONS:  - Educate patient/family on patient safety including physical limitations  - Instruct patient to call for assistance with activity   - Consult OT/PT to assist with strengthening/mobility   - Keep Call bell within reach  - Keep bed low and locked with side rails adjusted as appropriate  - Keep care items and personal belongings within reach  - Initiate and maintain comfort rounds  - Make Fall Risk Sign visible to staff  - Offer Toileting every  Hours, in advance of need  - Initiate/Maintain alarm  - Obtain necessary fall risk management equipment:   - Apply yellow socks and bracelet for high fall risk patients  - Consider moving patient to room near nurses station  Outcome: Progressing  Goal: Maintain or return to baseline ADL function  Description: INTERVENTIONS:  -  Assess patient's ability to carry out ADLs; assess patient's baseline for ADL function and identify physical deficits which impact ability to perform ADLs (bathing, care of mouth/teeth, toileting, grooming, dressing, etc.)  - Assess/evaluate cause of self-care deficits   - Assess range of motion  - Assess patient's mobility; develop plan if impaired  - Assess patient's need for assistive devices and provide as appropriate  - Encourage maximum independence but intervene and supervise when necessary  - Involve family in performance of ADLs  - Assess for home care needs following discharge   - Consider OT consult to assist with ADL evaluation and planning for discharge  - Provide patient education as appropriate  Outcome: Progressing  Goal: Maintains/Returns to pre admission functional level  Description: INTERVENTIONS:  - Perform AM-PAC 6 Click Basic Mobility/ Daily Activity  assessment daily.  - Set and communicate daily mobility goal to care team and patient/family/caregiver.   - Collaborate with rehabilitation services on mobility goals if consulted  - Perform Range of Motion  times a day.  - Reposition patient every  hours.  - Dangle patient  times a day  - Stand patient  times a day  - Ambulate patient  times a day  - Out of bed to chair  times a day   - Out of bed for meals  times a day  - Out of bed for toileting  - Record patient progress and toleration of activity level   Outcome: Progressing     Problem: DISCHARGE PLANNING  Goal: Discharge to home or other facility with appropriate resources  Description: INTERVENTIONS:  - Identify barriers to discharge w/patient and caregiver  - Arrange for needed discharge resources and transportation as appropriate  - Identify discharge learning needs (meds, wound care, etc.)  - Arrange for interpretive services to assist at discharge as needed  - Refer to Case Management Department for coordinating discharge planning if the patient needs post-hospital services based on physician/advanced practitioner order or complex needs related to functional status, cognitive ability, or social support system  Outcome: Progressing     Problem: Knowledge Deficit  Goal: Patient/family/caregiver demonstrates understanding of disease process, treatment plan, medications, and discharge instructions  Description: Complete learning assessment and assess knowledge base.  Interventions:  - Provide teaching at level of understanding  - Provide teaching via preferred learning methods  Outcome: Progressing     Problem: Nutrition/Hydration-ADULT  Goal: Nutrient/Hydration intake appropriate for improving, restoring or maintaining nutritional needs  Description: Monitor and assess patient's nutrition/hydration status for malnutrition. Collaborate with interdisciplinary team and initiate plan and interventions as ordered.  Monitor patient's weight and dietary intake as  ordered or per policy. Utilize nutrition screening tool and intervene as necessary. Determine patient's food preferences and provide high-protein, high-caloric foods as appropriate.     INTERVENTIONS:  - Monitor oral intake, urinary output, labs, and treatment plans  - Assess nutrition and hydration status and recommend course of action  - Evaluate amount of meals eaten  - Assist patient with eating if necessary   - Allow adequate time for meals  - Recommend/ encourage appropriate diets, oral nutritional supplements, and vitamin/mineral supplements  - Order, calculate, and assess calorie counts as needed  - Recommend, monitor, and adjust tube feedings and TPN/PPN based on assessed needs  - Assess need for intravenous fluids  - Provide specific nutrition/hydration education as appropriate  - Include patient/family/caregiver in decisions related to nutrition  Outcome: Progressing
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
## 2024-08-14 NOTE — ASSESSMENT & PLAN NOTE
In the setting of traumatic brain injury  Currently GCS 14  Continue to monitor  Assist with regulation of sleep/wake  PT/OT/OT cog
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
## 2024-08-14 NOTE — CASE MANAGEMENT
Case Management Progress Note    Patient name Anmol Meza Jr.  Location W /W -01 MRN [id number]  : 1990 Date 2024       LOS (days): 9  Geometric Mean LOS (GMLOS) (days):   Days to GMLOS:        OBJECTIVE:        Current admission status: Inpatient  Preferred Pharmacy: No Pharmacies Listed  Primary Care Provider: Ulysses Agpaoa    Primary Insurance: [employer] [facility]  Secondary Insurance:     PROGRESS NOTE:  Patient discussed in weekly LOS meeting. Assigned CM following for TBI program/acute rehab in [place] given insurance coverage. Barriers include homelessness, hx IV drug use.Will continue efforts toward safe disposition.
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
## 2024-08-14 NOTE — ASSESSMENT & PLAN NOTE
Per sister, Pt. Has longstanding h/o illicit drug use of multiple agents (heroin,methemphatamine, fentanyl)  UDS + benzo, amph/meth, THC, fentanyl; Ethanol 0  Monitor for withdrawal symptoms.

## 2024-08-14 NOTE — ASSESSMENT & PLAN NOTE
CTA:  The proximal aspect of left transverse sinus is patent with subsequent occlusion, adjacent to the left fracture line. Superior sagittal, right transverse and sigmoid sinuses are patent. Straight sinus and internal cerebral veins are patent.  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  There is some nonocclusive thrombus noted in the visualized upper cervical internal jugular vein.   Endovascular neurosurgery consulted note appreciated  Heparin drip started-patient has been unable to reach to therapeutic levels.  Discussed with pharmacy and neurosurgery.  Since patient did have 1 therapeutic PTT, will plan to have a repeat CT head completed today-if stable may consider oral anticoagulant.  Continue to monitor neuroexam closely-currently GCS 14

## 2024-08-15 LAB
ANION GAP SERPL CALCULATED.3IONS-SCNC: 8 MMOL/L (ref 4–13)
ANION GAP SERPL CALCULATED.3IONS-SCNC: 8 MMOL/L (ref 4–13)
APTT PPP: 33 SECONDS (ref 23–34)
APTT PPP: 33 SECONDS (ref 23–34)
APTT PPP: 42 SECONDS (ref 23–34)
APTT PPP: 42 SECONDS (ref 23–34)
APTT PPP: 48 SECONDS (ref 23–34)
APTT PPP: 48 SECONDS (ref 23–34)
APTT PPP: 51 SECONDS (ref 23–34)
APTT PPP: 51 SECONDS (ref 23–34)
BASOPHILS # BLD AUTO: 0.07 THOUSANDS/ÂΜL (ref 0–0.1)
BASOPHILS # BLD AUTO: 0.07 THOUSANDS/ÂΜL (ref 0–0.1)
BASOPHILS NFR BLD AUTO: 1 % (ref 0–1)
BASOPHILS NFR BLD AUTO: 1 % (ref 0–1)
BUN SERPL-MCNC: 14 MG/DL (ref 5–25)
BUN SERPL-MCNC: 14 MG/DL (ref 5–25)
CALCIUM SERPL-MCNC: 8.9 MG/DL (ref 8.4–10.2)
CALCIUM SERPL-MCNC: 8.9 MG/DL (ref 8.4–10.2)
CHLORIDE SERPL-SCNC: 103 MMOL/L (ref 96–108)
CHLORIDE SERPL-SCNC: 103 MMOL/L (ref 96–108)
CO2 SERPL-SCNC: 24 MMOL/L (ref 21–32)
CO2 SERPL-SCNC: 24 MMOL/L (ref 21–32)
CREAT SERPL-MCNC: 0.66 MG/DL (ref 0.6–1.3)
CREAT SERPL-MCNC: 0.66 MG/DL (ref 0.6–1.3)
EOSINOPHIL # BLD AUTO: 0.26 THOUSAND/ÂΜL (ref 0–0.61)
EOSINOPHIL # BLD AUTO: 0.26 THOUSAND/ÂΜL (ref 0–0.61)
EOSINOPHIL NFR BLD AUTO: 2 % (ref 0–6)
EOSINOPHIL NFR BLD AUTO: 2 % (ref 0–6)
ERYTHROCYTE [DISTWIDTH] IN BLOOD BY AUTOMATED COUNT: 13.3 % (ref 11.6–15.1)
ERYTHROCYTE [DISTWIDTH] IN BLOOD BY AUTOMATED COUNT: 13.3 % (ref 11.6–15.1)
GFR SERPL CREATININE-BSD FRML MDRD: 124 ML/MIN/1.73SQ M
GFR SERPL CREATININE-BSD FRML MDRD: 124 ML/MIN/1.73SQ M
GLUCOSE SERPL-MCNC: 99 MG/DL (ref 65–140)
GLUCOSE SERPL-MCNC: 99 MG/DL (ref 65–140)
HCT VFR BLD AUTO: 38.6 % (ref 36.5–49.3)
HCT VFR BLD AUTO: 38.6 % (ref 36.5–49.3)
HGB BLD-MCNC: 13 G/DL (ref 12–17)
HGB BLD-MCNC: 13 G/DL (ref 12–17)
IMM GRANULOCYTES # BLD AUTO: 0.07 THOUSAND/UL (ref 0–0.2)
IMM GRANULOCYTES # BLD AUTO: 0.07 THOUSAND/UL (ref 0–0.2)
IMM GRANULOCYTES NFR BLD AUTO: 1 % (ref 0–2)
IMM GRANULOCYTES NFR BLD AUTO: 1 % (ref 0–2)
INR PPP: 0.9 (ref 0.85–1.19)
INR PPP: 0.9 (ref 0.85–1.19)
LYMPHOCYTES # BLD AUTO: 3.03 THOUSANDS/ÂΜL (ref 0.6–4.47)
LYMPHOCYTES # BLD AUTO: 3.03 THOUSANDS/ÂΜL (ref 0.6–4.47)
LYMPHOCYTES NFR BLD AUTO: 27 % (ref 14–44)
LYMPHOCYTES NFR BLD AUTO: 27 % (ref 14–44)
MCH RBC QN AUTO: 30.7 PG (ref 26.8–34.3)
MCH RBC QN AUTO: 30.7 PG (ref 26.8–34.3)
MCHC RBC AUTO-ENTMCNC: 33.7 G/DL (ref 31.4–37.4)
MCHC RBC AUTO-ENTMCNC: 33.7 G/DL (ref 31.4–37.4)
MCV RBC AUTO: 91 FL (ref 82–98)
MCV RBC AUTO: 91 FL (ref 82–98)
MONOCYTES # BLD AUTO: 0.72 THOUSAND/ÂΜL (ref 0.17–1.22)
MONOCYTES # BLD AUTO: 0.72 THOUSAND/ÂΜL (ref 0.17–1.22)
MONOCYTES NFR BLD AUTO: 6 % (ref 4–12)
MONOCYTES NFR BLD AUTO: 6 % (ref 4–12)
NEUTROPHILS # BLD AUTO: 7.22 THOUSANDS/ÂΜL (ref 1.85–7.62)
NEUTROPHILS # BLD AUTO: 7.22 THOUSANDS/ÂΜL (ref 1.85–7.62)
NEUTS SEG NFR BLD AUTO: 63 % (ref 43–75)
NEUTS SEG NFR BLD AUTO: 63 % (ref 43–75)
NRBC BLD AUTO-RTO: 0 /100 WBCS
NRBC BLD AUTO-RTO: 0 /100 WBCS
PLATELET # BLD AUTO: 436 THOUSANDS/UL (ref 149–390)
PLATELET # BLD AUTO: 436 THOUSANDS/UL (ref 149–390)
PMV BLD AUTO: 9.7 FL (ref 8.9–12.7)
PMV BLD AUTO: 9.7 FL (ref 8.9–12.7)
POTASSIUM SERPL-SCNC: 4.2 MMOL/L (ref 3.5–5.3)
POTASSIUM SERPL-SCNC: 4.2 MMOL/L (ref 3.5–5.3)
PROTHROMBIN TIME: 12.8 SECONDS (ref 12.3–15)
PROTHROMBIN TIME: 12.8 SECONDS (ref 12.3–15)
RBC # BLD AUTO: 4.24 MILLION/UL (ref 3.88–5.62)
RBC # BLD AUTO: 4.24 MILLION/UL (ref 3.88–5.62)
SODIUM SERPL-SCNC: 135 MMOL/L (ref 135–147)
SODIUM SERPL-SCNC: 135 MMOL/L (ref 135–147)
WBC # BLD AUTO: 11.37 THOUSAND/UL (ref 4.31–10.16)
WBC # BLD AUTO: 11.37 THOUSAND/UL (ref 4.31–10.16)

## 2024-08-15 PROCEDURE — 99231 SBSQ HOSP IP/OBS SF/LOW 25: CPT | Performed by: PHYSICIAN ASSISTANT

## 2024-08-15 PROCEDURE — 85730 THROMBOPLASTIN TIME PARTIAL: CPT | Performed by: STUDENT IN AN ORGANIZED HEALTH CARE EDUCATION/TRAINING PROGRAM

## 2024-08-15 PROCEDURE — 85730 THROMBOPLASTIN TIME PARTIAL: CPT | Performed by: SURGERY

## 2024-08-15 PROCEDURE — 97116 GAIT TRAINING THERAPY: CPT

## 2024-08-15 PROCEDURE — 97530 THERAPEUTIC ACTIVITIES: CPT

## 2024-08-15 PROCEDURE — 80048 BASIC METABOLIC PNL TOTAL CA: CPT | Performed by: NURSE PRACTITIONER

## 2024-08-15 PROCEDURE — 85025 COMPLETE CBC W/AUTO DIFF WBC: CPT | Performed by: NURSE PRACTITIONER

## 2024-08-15 PROCEDURE — 99232 SBSQ HOSP IP/OBS MODERATE 35: CPT | Performed by: PHYSICIAN ASSISTANT

## 2024-08-15 PROCEDURE — 85610 PROTHROMBIN TIME: CPT | Performed by: PHYSICIAN ASSISTANT

## 2024-08-15 RX ORDER — HEPARIN SODIUM 10000 [USP'U]/100ML
3-30 INJECTION, SOLUTION INTRAVENOUS
Status: DISCONTINUED | OUTPATIENT
Start: 2024-08-15 | End: 2024-08-17

## 2024-08-15 RX ORDER — ACETAMINOPHEN 325 MG/1
975 TABLET ORAL EVERY 8 HOURS SCHEDULED
Status: DISCONTINUED | OUTPATIENT
Start: 2024-08-15 | End: 2024-08-23

## 2024-08-15 RX ORDER — WARFARIN SODIUM 5 MG/1
5 TABLET ORAL
Status: DISCONTINUED | OUTPATIENT
Start: 2024-08-15 | End: 2024-08-15

## 2024-08-15 RX ADMIN — BACITRACIN 1 LARGE APPLICATION: 500 OINTMENT TOPICAL at 17:12

## 2024-08-15 RX ADMIN — QUETIAPINE FUMARATE 12.5 MG: 25 TABLET ORAL at 21:23

## 2024-08-15 RX ADMIN — Medication 10 MG: at 08:15

## 2024-08-15 RX ADMIN — ACETAMINOPHEN 650 MG: 325 TABLET, FILM COATED ORAL at 08:00

## 2024-08-15 RX ADMIN — HEPARIN SODIUM 3900 UNITS: 1000 INJECTION INTRAVENOUS; SUBCUTANEOUS at 07:55

## 2024-08-15 RX ADMIN — ACETAMINOPHEN 975 MG: 325 TABLET, FILM COATED ORAL at 21:23

## 2024-08-15 RX ADMIN — ACETAMINOPHEN 975 MG: 325 TABLET, FILM COATED ORAL at 14:03

## 2024-08-15 RX ADMIN — OXYCODONE HYDROCHLORIDE 5 MG: 5 TABLET ORAL at 18:05

## 2024-08-15 RX ADMIN — HEPARIN SODIUM 20 UNITS/KG/HR: 10000 INJECTION, SOLUTION INTRAVENOUS at 10:14

## 2024-08-15 RX ADMIN — BUTALBITAL, ACETAMINOPHEN, AND CAFFEINE 1 TABLET: 325; 50; 40 TABLET ORAL at 03:10

## 2024-08-15 RX ADMIN — HEPARIN SODIUM 3900 UNITS: 1000 INJECTION INTRAVENOUS; SUBCUTANEOUS at 01:09

## 2024-08-15 RX ADMIN — BACITRACIN 1 LARGE APPLICATION: 500 OINTMENT TOPICAL at 08:03

## 2024-08-15 RX ADMIN — METHOCARBAMOL TABLETS 750 MG: 750 TABLET, COATED ORAL at 05:10

## 2024-08-15 RX ADMIN — HEPARIN SODIUM 1950 UNITS: 1000 INJECTION INTRAVENOUS; SUBCUTANEOUS at 15:02

## 2024-08-15 RX ADMIN — CHLORHEXIDINE GLUCONATE 15 ML: 1.2 RINSE ORAL at 08:01

## 2024-08-15 RX ADMIN — GABAPENTIN 100 MG: 100 CAPSULE ORAL at 21:23

## 2024-08-15 RX ADMIN — METHOCARBAMOL TABLETS 750 MG: 750 TABLET, COATED ORAL at 17:12

## 2024-08-15 RX ADMIN — SENNOSIDES, DOCUSATE SODIUM 2 TABLET: 8.6; 5 TABLET ORAL at 08:00

## 2024-08-15 RX ADMIN — OXYCODONE HYDROCHLORIDE 5 MG: 5 TABLET ORAL at 01:05

## 2024-08-15 RX ADMIN — OXYCODONE HYDROCHLORIDE 5 MG: 5 TABLET ORAL at 05:10

## 2024-08-15 RX ADMIN — METHOCARBAMOL TABLETS 750 MG: 750 TABLET, COATED ORAL at 11:24

## 2024-08-15 RX ADMIN — SENNOSIDES, DOCUSATE SODIUM 2 TABLET: 8.6; 5 TABLET ORAL at 17:12

## 2024-08-15 RX ADMIN — GABAPENTIN 100 MG: 100 CAPSULE ORAL at 16:31

## 2024-08-15 RX ADMIN — OXYCODONE HYDROCHLORIDE 5 MG: 5 TABLET ORAL at 13:43

## 2024-08-15 RX ADMIN — GABAPENTIN 100 MG: 100 CAPSULE ORAL at 08:00

## 2024-08-15 RX ADMIN — POLYETHYLENE GLYCOL 3350 17 G: 17 POWDER, FOR SOLUTION ORAL at 08:01

## 2024-08-15 RX ADMIN — METHYLPREDNISOLONE 20 MG: 4 TABLET ORAL at 08:00

## 2024-08-15 RX ADMIN — HEPARIN SODIUM 18 UNITS/KG/HR: 10000 INJECTION, SOLUTION INTRAVENOUS at 18:07

## 2024-08-15 NOTE — ASSESSMENT & PLAN NOTE
In the setting of SDH/SAH/VST  Continue on multimodal regimen including Tylenol, Oxy, gabapentin, Robaxin  May consider Medrol Dosepak  Will likely require outpatient follow-up with concussion

## 2024-08-15 NOTE — PLAN OF CARE
Problem: Prexisting or High Potential for Compromised Skin Integrity  Goal: Skin integrity is maintained or improved  Description: INTERVENTIONS:  - Identify patients at risk for skin breakdown  - Assess and monitor skin integrity  - Assess and monitor nutrition and hydration status  - Monitor labs   - Assess for incontinence   - Turn and reposition patient  - Assist with mobility/ambulation  - Relieve pressure over bony prominences  - Avoid friction and shearing  - Provide appropriate hygiene as needed including keeping skin clean and dry  - Evaluate need for skin moisturizer/barrier cream  - Collaborate with interdisciplinary team   - Patient/family teaching  - Consider wound care consult   Outcome: Progressing     Problem: PAIN - ADULT  Goal: Verbalizes/displays adequate comfort level or baseline comfort level  Description: Interventions:  - Encourage patient to monitor pain and request assistance  - Assess pain using appropriate pain scale  - Administer analgesics based on type and severity of pain and evaluate response  - Implement non-pharmacological measures as appropriate and evaluate response  - Consider cultural and social influences on pain and pain management  - Notify physician/advanced practitioner if interventions unsuccessful or patient reports new pain  Outcome: Progressing     Problem: INFECTION - ADULT  Goal: Absence or prevention of progression during hospitalization  Description: INTERVENTIONS:  - Assess and monitor for signs and symptoms of infection  - Monitor lab/diagnostic results  - Monitor all insertion sites, i.e. indwelling lines, tubes, and drains  - Monitor endotracheal if appropriate and nasal secretions for changes in amount and color  - [place] appropriate cooling/warming therapies per order  - Administer medications as ordered  - Instruct and encourage patient and family to use good hand hygiene technique  - Identify and instruct in appropriate isolation precautions for  identified infection/condition  Outcome: Progressing  Goal: Absence of fever/infection during neutropenic period  Description: INTERVENTIONS:  - Monitor WBC    Outcome: Progressing     Problem: SAFETY ADULT  Goal: Patient will remain free of falls  Description: INTERVENTIONS:  - Educate patient/family on patient safety including physical limitations  - Instruct patient to call for assistance with activity   - Consult OT/PT to assist with strengthening/mobility   - Keep Call bell within reach  - Keep bed low and locked with side rails adjusted as appropriate  - Keep care items and personal belongings within reach  - Initiate and maintain comfort rounds  - Make Fall Risk Sign visible to staff  - Offer Toileting every  Hours, in advance of need  - Initiate/Maintain alarm  - Obtain necessary fall risk management equipment:   - Apply yellow socks and bracelet for high fall risk patients  - Consider moving patient to room near nurses station  Outcome: Progressing  Goal: Maintain or return to baseline ADL function  Description: INTERVENTIONS:  -  Assess patient's ability to carry out ADLs; assess patient's baseline for ADL function and identify physical deficits which impact ability to perform ADLs (bathing, care of mouth/teeth, toileting, grooming, dressing, etc.)  - Assess/evaluate cause of self-care deficits   - Assess range of motion  - Assess patient's mobility; develop plan if impaired  - Assess patient's need for assistive devices and provide as appropriate  - Encourage maximum independence but intervene and supervise when necessary  - Involve family in performance of ADLs  - Assess for home care needs following discharge   - Consider OT consult to assist with ADL evaluation and planning for discharge  - Provide patient education as appropriate  Outcome: Progressing  Goal: Maintains/Returns to pre admission functional level  Description: INTERVENTIONS:  - Perform AM-PAC 6 Click Basic Mobility/ Daily Activity  assessment daily.  - Set and communicate daily mobility goal to care team and patient/family/caregiver.   - Collaborate with rehabilitation services on mobility goals if consulted  - Perform Range of Motion  times a day.  - Reposition patient every  hours.  - Dangle patient  times a day  - Stand patient  times a day  - Ambulate patient  times a day  - Out of bed to chair  times a day   - Out of bed for meals  times a day  - Out of bed for toileting  - Record patient progress and toleration of activity level   Outcome: Progressing     Problem: DISCHARGE PLANNING  Goal: Discharge to home or other facility with appropriate resources  Description: INTERVENTIONS:  - Identify barriers to discharge w/patient and caregiver  - Arrange for needed discharge resources and transportation as appropriate  - Identify discharge learning needs (meds, wound care, etc.)  - Arrange for interpretive services to assist at discharge as needed  - Refer to Case Management Department for coordinating discharge planning if the patient needs post-hospital services based on physician/advanced practitioner order or complex needs related to functional status, cognitive ability, or social support system  Outcome: Progressing     Problem: Knowledge Deficit  Goal: Patient/family/caregiver demonstrates understanding of disease process, treatment plan, medications, and discharge instructions  Description: Complete learning assessment and assess knowledge base.  Interventions:  - Provide teaching at level of understanding  - Provide teaching via preferred learning methods  Outcome: Progressing     Problem: Nutrition/Hydration-ADULT  Goal: Nutrient/Hydration intake appropriate for improving, restoring or maintaining nutritional needs  Description: Monitor and assess patient's nutrition/hydration status for malnutrition. Collaborate with interdisciplinary team and initiate plan and interventions as ordered.  Monitor patient's weight and dietary intake as  ordered or per policy. Utilize nutrition screening tool and intervene as necessary. Determine patient's food preferences and provide high-protein, high-caloric foods as appropriate.     INTERVENTIONS:  - Monitor oral intake, urinary output, labs, and treatment plans  - Assess nutrition and hydration status and recommend course of action  - Evaluate amount of meals eaten  - Assist patient with eating if necessary   - Allow adequate time for meals  - Recommend/ encourage appropriate diets, oral nutritional supplements, and vitamin/mineral supplements  - Order, calculate, and assess calorie counts as needed  - Recommend, monitor, and adjust tube feedings and TPN/PPN based on assessed needs  - Assess need for intravenous fluids  - Provide specific nutrition/hydration education as appropriate  - Include patient/family/caregiver in decisions related to nutrition  Outcome: Progressing

## 2024-08-15 NOTE — ASSESSMENT & PLAN NOTE
CTA:  The proximal aspect of left transverse sinus is patent with subsequent occlusion, adjacent to the left fracture line. Superior sagittal, right transverse and sigmoid sinuses are patent. Straight sinus and internal cerebral veins are patent.  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  There is some nonocclusive thrombus noted in the visualized upper cervical internal jugular vein.   Endovascular neurosurgery consulted note appreciated  Heparin drip started-patient has been unable to reach to therapeutic levels.  Discussed with pharmacy and neurosurgery.  Since patient did have 1 therapeutic PTT, will plan to have a repeat CT head completed 8/14 - if stable may consider oral anticoagulant -- appreciate NSG recommendations  Continue to monitor neuroexam closely-currently GCS 14

## 2024-08-15 NOTE — PLAN OF CARE
Problem: SAFETY,RESTRAINT: NV/NON-SELF DESTRUCTIVE BEHAVIOR  Goal: Remains free of harm/injury (restraint for non violent/non self-detsructive behavior)  Description: INTERVENTIONS:  - Instruct patient/family regarding restraint use   - Assess and monitor physiologic and psychological status   - Provide interventions and comfort measures to meet assessed patient needs   - Identify and implement measures to help patient regain control  - Assess readiness for release of restraint   Outcome: Progressing  Goal: Returns to optimal restraint-free functioning  Description: INTERVENTIONS:  - Assess the patient's behavior and symptoms that indicate continued need for restraint  - Identify and implement measures to help patient regain control  - Assess readiness for release of restraint   Outcome: Progressing     Problem: Prexisting or High Potential for Compromised Skin Integrity  Goal: Skin integrity is maintained or improved  Description: INTERVENTIONS:  - Identify patients at risk for skin breakdown  - Assess and monitor skin integrity  - Assess and monitor nutrition and hydration status  - Monitor labs   - Assess for incontinence   - Turn and reposition patient  - Assist with mobility/ambulation  - Relieve pressure over bony prominences  - Avoid friction and shearing  - Provide appropriate hygiene as needed including keeping skin clean and dry  - Evaluate need for skin moisturizer/barrier cream  - Collaborate with interdisciplinary team   - Patient/family teaching  - Consider wound care consult   Outcome: Progressing     Problem: PAIN - ADULT  Goal: Verbalizes/displays adequate comfort level or baseline comfort level  Description: Interventions:  - Encourage patient to monitor pain and request assistance  - Assess pain using appropriate pain scale  - Administer analgesics based on type and severity of pain and evaluate response  - Implement non-pharmacological measures as appropriate and evaluate response  - Consider  cultural and social influences on pain and pain management  - Notify physician/advanced practitioner if interventions unsuccessful or patient reports new pain  Outcome: Progressing     Problem: INFECTION - ADULT  Goal: Absence or prevention of progression during hospitalization  Description: INTERVENTIONS:  - Assess and monitor for signs and symptoms of infection  - Monitor lab/diagnostic results  - Monitor all insertion sites, i.e. indwelling lines, tubes, and drains  - Monitor endotracheal if appropriate and nasal secretions for changes in amount and color  - [place] appropriate cooling/warming therapies per order  - Administer medications as ordered  - Instruct and encourage patient and family to use good hand hygiene technique  - Identify and instruct in appropriate isolation precautions for identified infection/condition  Outcome: Progressing  Goal: Absence of fever/infection during neutropenic period  Description: INTERVENTIONS:  - Monitor WBC    Outcome: Progressing

## 2024-08-15 NOTE — NURSING NOTE
"1300 Patient has Q4 neuro check ordered. Patient alert and oriented x4 able to follow commands for short period of time. Pupils PERRLA. Patient vision adequate in both eyes an peripherals. Patient denied vision changes, floaters or halos. Patient denied numbness and tingling in all extremities. Patient shows no facial droop or unequal muscle strength at this time.     Patient does have inattention, and short term memory problems including episode during lunch where patient ate 100% of meal, the tray was cleared and 10 minutes after clearing tray patient stated \"I never got food tray I am hungry\" patient reoriented to time and events. Patient stated verbal understanding of events But continued to say he did not eat. Snacks provided to patient.    Doctor notified on even will continue to monitor vitals and ordered.   "
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
## 2024-08-15 NOTE — PLAN OF CARE
Problem: PHYSICAL THERAPY ADULT  Goal: Performs mobility at highest level of function for planned discharge setting.  See evaluation for individualized goals.  Description: Treatment/Interventions: Functional transfer training, LE strengthening/ROM, Therapeutic exercise, Equipment eval/education, Bed mobility, Gait training, Spoke to case management, Spoke to nursing, OT, Patient/family training, Endurance training          See flowsheet documentation for full assessment, interventions and recommendations.  Outcome: Progressing  Note: Prognosis: Fair  Problem List: Decreased strength, Decreased endurance, Impaired balance, Decreased mobility, Decreased coordination, Decreased cognition, Impaired judgement, Decreased safety awareness, Decreased skin integrity, Pain  Assessment: pt began tx session lying supine in the bed as pt was agreeable to participate in PT intervention. PT to focus on bed mobility, transfers, posture/balance with ambulation and stair trials. Progress was noted throughout PT intervention compared to previous tx sessions.  pt was able to complete all bed mobility and functional transfers w/o an AD with mod I, no LOB. pt increased activity tolerance and ambulation distance as pt ambulated 500'x1 no AD with close/ s, no LOB. pt educated on safe stair trial strategies prior to initiating steps. pt completed full flight of steps (14) with step to pattern and one hand rail to ascend/descend steps. Post tx pt in recliner with call bell, chair alarm activated, virtual 1:1 and all pt needs met        Rehab Resource Intensity Level, PT: III (Minimum Resource Intensity) (vs no needs pending progresion of mobility. Spoke to PT kristen)    See flowsheet documentation for full assessment.
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
## 2024-08-15 NOTE — PHYSICAL THERAPY NOTE
PHYSICAL THERAPY NOTE          Patient Name: Anmol Meza Jr.  Today's Date: 8/15/2024           08/15/24 1350   PT Last Visit   PT Visit Date 08/15/24   Note Type   Note Type Treatment   Pain Assessment   Pain Assessment Tool 0-10   Pain Score 10 - Worst Possible Pain   Pain Location/Orientation Location: Head   Pain Onset/Description Onset: Ongoing   Effect of Pain on Daily Activities limits comfort and activity tolerance   Patient's Stated Pain Goal No pain   Hospital Pain Intervention(s) Repositioned;Ambulation/increased activity;Emotional support;Rest   Multiple Pain Sites No   Pain Rating: FLACC (Rest) - Face 1   Pain Rating: FLACC (Rest) - Legs 1   Pain Rating: FLACC (Rest) - Activity 0   Pain Rating: FLACC (Rest) - Cry 0   Pain Rating: FLACC (Rest) - Consolability 0   Score: FLACC (Rest) 2   Pain Rating: FLACC (Activity) - Face 1   Pain Rating: FLACC (Activity) - Legs 1   Pain Rating: FLACC (Activity) - Activity 0   Pain Rating: FLACC (Activity) - Cry 0   Pain Rating: FLACC (Activity) - Consolability 1   Score: FLACC (Activity) 3   Restrictions/Precautions   Weight Bearing Precautions Per Order No   Other Precautions Impulsive;Cognitive;Chair Alarm;Bed Alarm;Multiple lines;Fall Risk;Pain   General   Chart Reviewed Yes   Response to Previous Treatment Patient with no complaints from previous session.   Family/Caregiver Present No   Cognition   Overall Cognitive Status Impaired   Arousal/Participation Alert;Responsive;Cooperative   Attention Attends with cues to redirect   Orientation Level Oriented X4   Memory Decreased short term memory;Decreased recall of recent events;Decreased recall of precautions   Following Commands Follows one step commands with increased time or repetition   Comments pt ID by name and  on wrist band   Subjective   Subjective pt was agreeable to participate in PT intervention. pt stated 10/10 head  pain ( headache)   Bed Mobility   Supine to Sit 6  Modified independent   Additional items Assist x 1;HOB elevated;Bedrails;Increased time required   Sit to Supine 6  Modified independent   Additional items Assist x 1;HOB elevated;Bedrails;Increased time required   Additional Comments pt was able to sit EOB w/o LOB prior to initiating functional transfesr w/o an AD   Transfers   Sit to Stand 6  Modified independent   Additional items Assist x 1;Increased time required;Verbal cues   Stand to Sit 6  Modified independent   Additional items Assist x 1;Increased time required;Verbal cues  (pt demonstrated good recall on hand placement while ascending/descending from EOB/recliner)   Additional Comments pt mod I for all transfers w/o an AD , no LOB   Ambulation/Elevation   Gait pattern Improper Weight shift;Decreased foot clearance;Short stride;Excessively slow;Ataxia;Step through pattern;Decreased toe off   Gait Assistance 5  Supervision   Additional items Assist x 1;Verbal cues   Assistive Device None   Distance 500'x1 no AD   Stair Management Assistance 5  Supervision   Additional items Assist x 1;Verbal cues;Increased time required   Stair Management Technique One rail L;One rail R;Step to pattern;Foreward   Number of Stairs 14   Ambulation/Elevation Additional Comments pt ascended 14 steps with R sided hand raisl and /s, no lOB. pt descended 14 steps with L sided hand rail, /s , no LOB   Balance   Static Sitting Fair +   Dynamic Sitting Fair   Static Standing Fair -   Dynamic Standing Poor +   Ambulatory Fair -  (no AD)   Endurance Deficit   Endurance Deficit Yes   Endurance Deficit Description limited due to 10/10 head pain increased headache   Activity Tolerance   Activity Tolerance Patient limited by fatigue;Patient limited by pain   Nurse Made Aware Spoke to LPN   Assessment   Prognosis Fair   Problem List Decreased strength;Decreased endurance;Impaired balance;Decreased mobility;Decreased coordination;Decreased  cognition;Impaired judgement;Decreased safety awareness;Decreased skin integrity;Pain   Assessment pt began tx session lying supine in the bed as pt was agreeable to participate in PT intervention. PT to focus on bed mobility, transfers, posture/balance with ambulation and stair trials. Progress was noted throughout PT intervention compared to previous tx sessions.  pt was able to complete all bed mobility and functional transfers w/o an AD with mod I, no LOB. pt increased activity tolerance and ambulation distance as pt ambulated 500'x1 no AD with close/ s, no LOB. pt educated on safe stair trial strategies prior to initiating steps. pt completed full flight of steps (14) with step to pattern and one hand rail to ascend/descend steps. Post tx pt in recliner with call bell, chair alarm activated, virtual 1:1 and all pt needs met   Goals   Patient Goals to have less of a headache   STG Expiration Date 08/23/24   PT Treatment Day 3   Plan   Treatment/Interventions Functional transfer training;LE strengthening/ROM;Elevations;Endurance training;Therapeutic exercise;Patient/family training;Equipment eval/education;Bed mobility;Gait training;Spoke to nursing   Progress Progressing toward goals   PT Frequency 3-5x/wk   Discharge Recommendation   Rehab Resource Intensity Level, PT III (Minimum Resource Intensity)  (vs no needs pending progresion of mobility. Spoke to PT kristen)   AM-PAC Basic Mobility Inpatient   Turning in Flat Bed Without Bedrails 4   Lying on Back to Sitting on Edge of Flat Bed Without Bedrails 4   Moving Bed to Chair 4   Standing Up From Chair Using Arms 4   Walk in Room 3   Climb 3-5 Stairs With Railing 3   Basic Mobility Inpatient Raw Score 22   Basic Mobility Standardized Score 47.4   [facility] Highest Level Of Mobility   -HLM Goal 7: Walk 25 feet or more   JH-HLM Achieved 8: Walk 250 feet ot more   Education   Education Provided Mobility training;Other  (stair trials)   Patient Demonstrates  acceptance/verbal understanding   End of Consult   Patient Position at End of Consult Bedside chair;Bed/Chair alarm activated;All needs within reach   The patient's AM-PAC Basic Mobility Inpatient Short Form Raw Score is 22. A Raw score of greater than 16 suggests the patient may benefit from discharge to home. Please also refer to the recommendation of the Physical Therapist for safe discharge planning.    Mick Corrigan
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
## 2024-08-16 LAB
ABO GROUP BLD: NORMAL
ABO GROUP BLD: NORMAL
ANION GAP SERPL CALCULATED.3IONS-SCNC: 6 MMOL/L (ref 4–13)
ANION GAP SERPL CALCULATED.3IONS-SCNC: 6 MMOL/L (ref 4–13)
APTT PPP: 46 SECONDS (ref 23–34)
APTT PPP: 46 SECONDS (ref 23–34)
APTT PPP: 48 SECONDS (ref 23–34)
APTT PPP: 48 SECONDS (ref 23–34)
APTT PPP: 56 SECONDS (ref 23–34)
APTT PPP: 56 SECONDS (ref 23–34)
BASOPHILS # BLD AUTO: 0.07 THOUSANDS/ÂΜL (ref 0–0.1)
BASOPHILS # BLD AUTO: 0.07 THOUSANDS/ÂΜL (ref 0–0.1)
BASOPHILS NFR BLD AUTO: 1 % (ref 0–1)
BASOPHILS NFR BLD AUTO: 1 % (ref 0–1)
BLD GP AB SCN SERPL QL: POSITIVE
BLD GP AB SCN SERPL QL: POSITIVE
BUN SERPL-MCNC: 15 MG/DL (ref 5–25)
BUN SERPL-MCNC: 15 MG/DL (ref 5–25)
CALCIUM SERPL-MCNC: 8.7 MG/DL (ref 8.4–10.2)
CALCIUM SERPL-MCNC: 8.7 MG/DL (ref 8.4–10.2)
CHLORIDE SERPL-SCNC: 104 MMOL/L (ref 96–108)
CHLORIDE SERPL-SCNC: 104 MMOL/L (ref 96–108)
CO2 SERPL-SCNC: 26 MMOL/L (ref 21–32)
CO2 SERPL-SCNC: 26 MMOL/L (ref 21–32)
CREAT SERPL-MCNC: 0.63 MG/DL (ref 0.6–1.3)
CREAT SERPL-MCNC: 0.63 MG/DL (ref 0.6–1.3)
EOSINOPHIL # BLD AUTO: 0.23 THOUSAND/ÂΜL (ref 0–0.61)
EOSINOPHIL # BLD AUTO: 0.23 THOUSAND/ÂΜL (ref 0–0.61)
EOSINOPHIL NFR BLD AUTO: 2 % (ref 0–6)
EOSINOPHIL NFR BLD AUTO: 2 % (ref 0–6)
ERYTHROCYTE [DISTWIDTH] IN BLOOD BY AUTOMATED COUNT: 13.4 % (ref 11.6–15.1)
ERYTHROCYTE [DISTWIDTH] IN BLOOD BY AUTOMATED COUNT: 13.4 % (ref 11.6–15.1)
GFR SERPL CREATININE-BSD FRML MDRD: 126 ML/MIN/1.73SQ M
GFR SERPL CREATININE-BSD FRML MDRD: 126 ML/MIN/1.73SQ M
GLUCOSE SERPL-MCNC: 98 MG/DL (ref 65–140)
GLUCOSE SERPL-MCNC: 98 MG/DL (ref 65–140)
HCT VFR BLD AUTO: 36.3 % (ref 36.5–49.3)
HCT VFR BLD AUTO: 36.3 % (ref 36.5–49.3)
HGB BLD-MCNC: 12 G/DL (ref 12–17)
HGB BLD-MCNC: 12 G/DL (ref 12–17)
IMM GRANULOCYTES # BLD AUTO: 0.06 THOUSAND/UL (ref 0–0.2)
IMM GRANULOCYTES # BLD AUTO: 0.06 THOUSAND/UL (ref 0–0.2)
IMM GRANULOCYTES NFR BLD AUTO: 1 % (ref 0–2)
IMM GRANULOCYTES NFR BLD AUTO: 1 % (ref 0–2)
INR PPP: 0.96 (ref 0.85–1.19)
INR PPP: 0.96 (ref 0.85–1.19)
LYMPHOCYTES # BLD AUTO: 2.42 THOUSANDS/ÂΜL (ref 0.6–4.47)
LYMPHOCYTES # BLD AUTO: 2.42 THOUSANDS/ÂΜL (ref 0.6–4.47)
LYMPHOCYTES NFR BLD AUTO: 20 % (ref 14–44)
LYMPHOCYTES NFR BLD AUTO: 20 % (ref 14–44)
MCH RBC QN AUTO: 30.8 PG (ref 26.8–34.3)
MCH RBC QN AUTO: 30.8 PG (ref 26.8–34.3)
MCHC RBC AUTO-ENTMCNC: 33.1 G/DL (ref 31.4–37.4)
MCHC RBC AUTO-ENTMCNC: 33.1 G/DL (ref 31.4–37.4)
MCV RBC AUTO: 93 FL (ref 82–98)
MCV RBC AUTO: 93 FL (ref 82–98)
MONOCYTES # BLD AUTO: 0.53 THOUSAND/ÂΜL (ref 0.17–1.22)
MONOCYTES # BLD AUTO: 0.53 THOUSAND/ÂΜL (ref 0.17–1.22)
MONOCYTES NFR BLD AUTO: 4 % (ref 4–12)
MONOCYTES NFR BLD AUTO: 4 % (ref 4–12)
NEUTROPHILS # BLD AUTO: 8.76 THOUSANDS/ÂΜL (ref 1.85–7.62)
NEUTROPHILS # BLD AUTO: 8.76 THOUSANDS/ÂΜL (ref 1.85–7.62)
NEUTS SEG NFR BLD AUTO: 72 % (ref 43–75)
NEUTS SEG NFR BLD AUTO: 72 % (ref 43–75)
NRBC BLD AUTO-RTO: 0 /100 WBCS
NRBC BLD AUTO-RTO: 0 /100 WBCS
PLATELET # BLD AUTO: 391 THOUSANDS/UL (ref 149–390)
PLATELET # BLD AUTO: 391 THOUSANDS/UL (ref 149–390)
PMV BLD AUTO: 9.6 FL (ref 8.9–12.7)
PMV BLD AUTO: 9.6 FL (ref 8.9–12.7)
POTASSIUM SERPL-SCNC: 4.3 MMOL/L (ref 3.5–5.3)
POTASSIUM SERPL-SCNC: 4.3 MMOL/L (ref 3.5–5.3)
PROTHROMBIN TIME: 13.5 SECONDS (ref 12.3–15)
PROTHROMBIN TIME: 13.5 SECONDS (ref 12.3–15)
RBC # BLD AUTO: 3.89 MILLION/UL (ref 3.88–5.62)
RBC # BLD AUTO: 3.89 MILLION/UL (ref 3.88–5.62)
RH BLD: POSITIVE
RH BLD: POSITIVE
SODIUM SERPL-SCNC: 136 MMOL/L (ref 135–147)
SODIUM SERPL-SCNC: 136 MMOL/L (ref 135–147)
SPECIMEN EXPIRATION DATE: NORMAL
SPECIMEN EXPIRATION DATE: NORMAL
WBC # BLD AUTO: 12.07 THOUSAND/UL (ref 4.31–10.16)
WBC # BLD AUTO: 12.07 THOUSAND/UL (ref 4.31–10.16)

## 2024-08-16 PROCEDURE — 86900 BLOOD TYPING SEROLOGIC ABO: CPT | Performed by: NURSE PRACTITIONER

## 2024-08-16 PROCEDURE — 99232 SBSQ HOSP IP/OBS MODERATE 35: CPT | Performed by: PHYSICIAN ASSISTANT

## 2024-08-16 PROCEDURE — 85730 THROMBOPLASTIN TIME PARTIAL: CPT | Performed by: STUDENT IN AN ORGANIZED HEALTH CARE EDUCATION/TRAINING PROGRAM

## 2024-08-16 PROCEDURE — 86901 BLOOD TYPING SEROLOGIC RH(D): CPT | Performed by: NURSE PRACTITIONER

## 2024-08-16 PROCEDURE — 80048 BASIC METABOLIC PNL TOTAL CA: CPT | Performed by: PHYSICIAN ASSISTANT

## 2024-08-16 PROCEDURE — 86850 RBC ANTIBODY SCREEN: CPT | Performed by: NURSE PRACTITIONER

## 2024-08-16 PROCEDURE — P9017 PLASMA 1 DONOR FRZ W/IN 8 HR: HCPCS

## 2024-08-16 PROCEDURE — 85025 COMPLETE CBC W/AUTO DIFF WBC: CPT | Performed by: PHYSICIAN ASSISTANT

## 2024-08-16 PROCEDURE — 85610 PROTHROMBIN TIME: CPT | Performed by: PHYSICIAN ASSISTANT

## 2024-08-16 PROCEDURE — 85300 ANTITHROMBIN III ACTIVITY: CPT | Performed by: NURSE PRACTITIONER

## 2024-08-16 PROCEDURE — 99232 SBSQ HOSP IP/OBS MODERATE 35: CPT | Performed by: SURGERY

## 2024-08-16 RX ADMIN — METHOCARBAMOL TABLETS 750 MG: 750 TABLET, COATED ORAL at 05:23

## 2024-08-16 RX ADMIN — GABAPENTIN 100 MG: 100 CAPSULE ORAL at 17:33

## 2024-08-16 RX ADMIN — QUETIAPINE FUMARATE 12.5 MG: 25 TABLET ORAL at 21:48

## 2024-08-16 RX ADMIN — BACITRACIN 1 LARGE APPLICATION: 500 OINTMENT TOPICAL at 17:32

## 2024-08-16 RX ADMIN — ACETAMINOPHEN 975 MG: 325 TABLET, FILM COATED ORAL at 14:15

## 2024-08-16 RX ADMIN — OXYCODONE HYDROCHLORIDE 5 MG: 5 TABLET ORAL at 11:13

## 2024-08-16 RX ADMIN — SENNOSIDES, DOCUSATE SODIUM 2 TABLET: 8.6; 5 TABLET ORAL at 17:33

## 2024-08-16 RX ADMIN — METHOCARBAMOL TABLETS 750 MG: 750 TABLET, COATED ORAL at 12:23

## 2024-08-16 RX ADMIN — OXYCODONE HYDROCHLORIDE 5 MG: 5 TABLET ORAL at 02:17

## 2024-08-16 RX ADMIN — OXYCODONE HYDROCHLORIDE 5 MG: 5 TABLET ORAL at 17:33

## 2024-08-16 RX ADMIN — ACETAMINOPHEN 975 MG: 325 TABLET, FILM COATED ORAL at 05:23

## 2024-08-16 RX ADMIN — GABAPENTIN 100 MG: 100 CAPSULE ORAL at 21:48

## 2024-08-16 RX ADMIN — HEPARIN SODIUM 20 UNITS/KG/HR: 10000 INJECTION, SOLUTION INTRAVENOUS at 05:27

## 2024-08-16 RX ADMIN — ACETAMINOPHEN 975 MG: 325 TABLET, FILM COATED ORAL at 21:48

## 2024-08-16 RX ADMIN — HEPARIN SODIUM 24 UNITS/KG/HR: 10000 INJECTION, SOLUTION INTRAVENOUS at 19:28

## 2024-08-16 RX ADMIN — METHOCARBAMOL TABLETS 750 MG: 750 TABLET, COATED ORAL at 17:33

## 2024-08-16 NOTE — ASSESSMENT & PLAN NOTE
CTA:  The proximal aspect of left transverse sinus is patent with subsequent occlusion, adjacent to the left fracture line. Superior sagittal, right transverse and sigmoid sinuses are patent. Straight sinus and internal cerebral veins are patent.  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  There is some nonocclusive thrombus noted in the visualized upper cervical internal jugular vein.   Endovascular neurosurgery consulted note appreciated  Patient was started on heparin drip to treat thrombus-plan for repeat head scan once PTT is therapeutic x 2.  Due to patient having difficulty reaching therapeutic PTT, there is also discussion of starting Coumadin now that CT head has been stable 8/13-14--will need clarification from neurosurgery regarding plan.  Continue to monitor neuroexam closely-currently GCS 14
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
## 2024-08-16 NOTE — ASSESSMENT & PLAN NOTE
- See assessment and plan for subdural hematoma   Coagulation: Bleeding disorder either through use of anticoagulants or underlying clinical condition(s)

## 2024-08-16 NOTE — OCCUPATIONAL THERAPY NOTE
Occupational Therapy Cancel Note        Patient Name: Anmol Meza Jr.  Today's Date: 8/16/2024 08/16/24 1154   OT Last Visit   OT Visit Date 08/16/24   Note Type   Note Type Cancelled Session   Cancel Reasons Refusal       OT attempted to see pt for OT treatment however upon arrival, pt declining reporting 10/10 headache. Offered to downgrade functional activity to meet just right challenge however pt continuing to decline 2* pain. Will continue to follow on caseload and see when able.    Tabatha Ramirez MS, OTR/L

## 2024-08-16 NOTE — PLAN OF CARE
Problem: Prexisting or High Potential for Compromised Skin Integrity  Goal: Skin integrity is maintained or improved  Description: INTERVENTIONS:  - Identify patients at risk for skin breakdown  - Assess and monitor skin integrity  - Assess and monitor nutrition and hydration status  - Monitor labs   - Assess for incontinence   - Turn and reposition patient  - Assist with mobility/ambulation  - Relieve pressure over bony prominences  - Avoid friction and shearing  - Provide appropriate hygiene as needed including keeping skin clean and dry  - Evaluate need for skin moisturizer/barrier cream  - Collaborate with interdisciplinary team   - Patient/family teaching  - Consider wound care consult   Outcome: Progressing     Problem: PAIN - ADULT  Goal: Verbalizes/displays adequate comfort level or baseline comfort level  Description: Interventions:  - Encourage patient to monitor pain and request assistance  - Assess pain using appropriate pain scale  - Administer analgesics based on type and severity of pain and evaluate response  - Implement non-pharmacological measures as appropriate and evaluate response  - Consider cultural and social influences on pain and pain management  - Notify physician/advanced practitioner if interventions unsuccessful or patient reports new pain  Outcome: Progressing     Problem: INFECTION - ADULT  Goal: Absence or prevention of progression during hospitalization  Description: INTERVENTIONS:  - Assess and monitor for signs and symptoms of infection  - Monitor lab/diagnostic results  - Monitor all insertion sites, i.e. indwelling lines, tubes, and drains  - Monitor endotracheal if appropriate and nasal secretions for changes in amount and color  - [place] appropriate cooling/warming therapies per order  - Administer medications as ordered  - Instruct and encourage patient and family to use good hand hygiene technique  - Identify and instruct in appropriate isolation precautions for  identified infection/condition  Outcome: Progressing  Goal: Absence of fever/infection during neutropenic period  Description: INTERVENTIONS:  - Monitor WBC    Outcome: Progressing     Problem: SAFETY ADULT  Goal: Patient will remain free of falls  Description: INTERVENTIONS:  - Educate patient/family on patient safety including physical limitations  - Instruct patient to call for assistance with activity   - Consult OT/PT to assist with strengthening/mobility   - Keep Call bell within reach  - Keep bed low and locked with side rails adjusted as appropriate  - Keep care items and personal belongings within reach  - Initiate and maintain comfort rounds  - Make Fall Risk Sign visible to staff  - Offer Toileting every  Hours, in advance of need  - Initiate/Maintain alarm  - Obtain necessary fall risk management equipment:   - Apply yellow socks and bracelet for high fall risk patients  - Consider moving patient to room near nurses station  Outcome: Progressing  Goal: Maintain or return to baseline ADL function  Description: INTERVENTIONS:  -  Assess patient's ability to carry out ADLs; assess patient's baseline for ADL function and identify physical deficits which impact ability to perform ADLs (bathing, care of mouth/teeth, toileting, grooming, dressing, etc.)  - Assess/evaluate cause of self-care deficits   - Assess range of motion  - Assess patient's mobility; develop plan if impaired  - Assess patient's need for assistive devices and provide as appropriate  - Encourage maximum independence but intervene and supervise when necessary  - Involve family in performance of ADLs  - Assess for home care needs following discharge   - Consider OT consult to assist with ADL evaluation and planning for discharge  - Provide patient education as appropriate  Outcome: Progressing  Goal: Maintains/Returns to pre admission functional level  Description: INTERVENTIONS:  - Perform AM-PAC 6 Click Basic Mobility/ Daily Activity  assessment daily.  - Set and communicate daily mobility goal to care team and patient/family/caregiver.   - Collaborate with rehabilitation services on mobility goals if consulted  - Perform Range of Motion  times a day.  - Reposition patient every  hours.  - Dangle patient  times a day  - Stand patient  times a day  - Ambulate patient  times a day  - Out of bed to chair  times a day   - Out of bed for meals times a day  - Out of bed for toileting  - Record patient progress and toleration of activity level   Outcome: Progressing     Problem: DISCHARGE PLANNING  Goal: Discharge to home or other facility with appropriate resources  Description: INTERVENTIONS:  - Identify barriers to discharge w/patient and caregiver  - Arrange for needed discharge resources and transportation as appropriate  - Identify discharge learning needs (meds, wound care, etc.)  - Arrange for interpretive services to assist at discharge as needed  - Refer to Case Management Department for coordinating discharge planning if the patient needs post-hospital services based on physician/advanced practitioner order or complex needs related to functional status, cognitive ability, or social support system  Outcome: Progressing     Problem: Knowledge Deficit  Goal: Patient/family/caregiver demonstrates understanding of disease process, treatment plan, medications, and discharge instructions  Description: Complete learning assessment and assess knowledge base.  Interventions:  - Provide teaching at level of understanding  - Provide teaching via preferred learning methods  Outcome: Progressing     Problem: Nutrition/Hydration-ADULT  Goal: Nutrient/Hydration intake appropriate for improving, restoring or maintaining nutritional needs  Description: Monitor and assess patient's nutrition/hydration status for malnutrition. Collaborate with interdisciplinary team and initiate plan and interventions as ordered.  Monitor patient's weight and dietary intake as  ordered or per policy. Utilize nutrition screening tool and intervene as necessary. Determine patient's food preferences and provide high-protein, high-caloric foods as appropriate.     INTERVENTIONS:  - Monitor oral intake, urinary output, labs, and treatment plans  - Assess nutrition and hydration status and recommend course of action  - Evaluate amount of meals eaten  - Assist patient with eating if necessary   - Allow adequate time for meals  - Recommend/ encourage appropriate diets, oral nutritional supplements, and vitamin/mineral supplements  - Order, calculate, and assess calorie counts as needed  - Recommend, monitor, and adjust tube feedings and TPN/PPN based on assessed needs  - Assess need for intravenous fluids  - Provide specific nutrition/hydration education as appropriate  - Include patient/family/caregiver in decisions related to nutrition  Outcome: Progressing
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
## 2024-08-16 NOTE — ASSESSMENT & PLAN NOTE
In the setting of SDH/SAH/VST  Continue on multimodal regimen including Tylenol, Oxy, gabapentin, Robaxin  Started on Medrol Dosepak-headache improved  Will likely require outpatient follow-up with concussion

## 2024-08-17 ENCOUNTER — APPOINTMENT (INPATIENT)
Dept: CT IMAGING | Facility: HOSPITAL | Age: 34
DRG: 020 | End: 2024-08-17
Payer: COMMERCIAL

## 2024-08-17 LAB
ABO GROUP BLD BPU: NORMAL
ABO GROUP BLD BPU: NORMAL
APTT PPP: 71 SECONDS (ref 23–34)
APTT PPP: 71 SECONDS (ref 23–34)
APTT PPP: 79 SECONDS (ref 23–34)
APTT PPP: 79 SECONDS (ref 23–34)
BPU ID: NORMAL
BPU ID: NORMAL
DEPRECATED AT III PPP: 128 % OF NORMAL (ref 92–136)
DEPRECATED AT III PPP: 128 % OF NORMAL (ref 92–136)
UNIT DISPENSE STATUS: NORMAL
UNIT DISPENSE STATUS: NORMAL
UNIT PRODUCT CODE: NORMAL
UNIT PRODUCT CODE: NORMAL
UNIT PRODUCT VOLUME: 280 ML
UNIT PRODUCT VOLUME: 280 ML
UNIT RH: NORMAL
UNIT RH: NORMAL

## 2024-08-17 PROCEDURE — 99233 SBSQ HOSP IP/OBS HIGH 50: CPT | Performed by: SURGERY

## 2024-08-17 PROCEDURE — 97530 THERAPEUTIC ACTIVITIES: CPT

## 2024-08-17 PROCEDURE — 85730 THROMBOPLASTIN TIME PARTIAL: CPT | Performed by: STUDENT IN AN ORGANIZED HEALTH CARE EDUCATION/TRAINING PROGRAM

## 2024-08-17 PROCEDURE — 70496 CT ANGIOGRAPHY HEAD: CPT

## 2024-08-17 RX ORDER — GABAPENTIN 300 MG/1
300 CAPSULE ORAL 3 TIMES DAILY
Status: DISCONTINUED | OUTPATIENT
Start: 2024-08-18 | End: 2024-08-29 | Stop reason: HOSPADM

## 2024-08-17 RX ORDER — OXYCODONE HYDROCHLORIDE 5 MG/1
5 TABLET ORAL EVERY 4 HOURS PRN
Status: DISCONTINUED | OUTPATIENT
Start: 2024-08-17 | End: 2024-08-18

## 2024-08-17 RX ORDER — HYDROMORPHONE HCL IN WATER/PF 6 MG/30 ML
0.2 PATIENT CONTROLLED ANALGESIA SYRINGE INTRAVENOUS ONCE
Status: DISCONTINUED | OUTPATIENT
Start: 2024-08-17 | End: 2024-08-18

## 2024-08-17 RX ADMIN — METHOCARBAMOL TABLETS 750 MG: 750 TABLET, COATED ORAL at 17:17

## 2024-08-17 RX ADMIN — METHOCARBAMOL TABLETS 750 MG: 750 TABLET, COATED ORAL at 11:02

## 2024-08-17 RX ADMIN — OXYCODONE HYDROCHLORIDE 5 MG: 5 TABLET ORAL at 11:17

## 2024-08-17 RX ADMIN — OXYCODONE HYDROCHLORIDE 5 MG: 5 TABLET ORAL at 20:24

## 2024-08-17 RX ADMIN — ACETAMINOPHEN 975 MG: 325 TABLET, FILM COATED ORAL at 05:06

## 2024-08-17 RX ADMIN — HEPARIN SODIUM 24 UNITS/KG/HR: 10000 INJECTION, SOLUTION INTRAVENOUS at 10:58

## 2024-08-17 RX ADMIN — BACITRACIN 1 LARGE APPLICATION: 500 OINTMENT TOPICAL at 09:14

## 2024-08-17 RX ADMIN — GABAPENTIN 100 MG: 100 CAPSULE ORAL at 09:12

## 2024-08-17 RX ADMIN — OXYCODONE HYDROCHLORIDE 5 MG: 5 TABLET ORAL at 03:26

## 2024-08-17 RX ADMIN — CHLORHEXIDINE GLUCONATE 15 ML: 1.2 RINSE ORAL at 20:24

## 2024-08-17 RX ADMIN — APIXABAN 5 MG: 5 TABLET, FILM COATED ORAL at 17:16

## 2024-08-17 RX ADMIN — SENNOSIDES, DOCUSATE SODIUM 2 TABLET: 8.6; 5 TABLET ORAL at 17:16

## 2024-08-17 RX ADMIN — GABAPENTIN 100 MG: 100 CAPSULE ORAL at 20:24

## 2024-08-17 RX ADMIN — GABAPENTIN 100 MG: 100 CAPSULE ORAL at 15:15

## 2024-08-17 RX ADMIN — QUETIAPINE FUMARATE 12.5 MG: 25 TABLET ORAL at 21:14

## 2024-08-17 RX ADMIN — METHYLPREDNISOLONE 12 MG: 4 TABLET ORAL at 09:17

## 2024-08-17 RX ADMIN — ACETAMINOPHEN 975 MG: 325 TABLET, FILM COATED ORAL at 14:41

## 2024-08-17 RX ADMIN — ACETAMINOPHEN 975 MG: 325 TABLET, FILM COATED ORAL at 21:14

## 2024-08-17 RX ADMIN — IOHEXOL 85 ML: 350 INJECTION, SOLUTION INTRAVENOUS at 09:46

## 2024-08-17 RX ADMIN — OXYCODONE HYDROCHLORIDE 5 MG: 5 TABLET ORAL at 15:14

## 2024-08-17 RX ADMIN — SENNOSIDES, DOCUSATE SODIUM 2 TABLET: 8.6; 5 TABLET ORAL at 09:12

## 2024-08-17 RX ADMIN — METHOCARBAMOL TABLETS 750 MG: 750 TABLET, COATED ORAL at 05:06

## 2024-08-17 RX ADMIN — BACITRACIN 1 LARGE APPLICATION: 500 OINTMENT TOPICAL at 17:16
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
## 2024-08-17 NOTE — PLAN OF CARE
Problem: SAFETY,RESTRAINT: NV/NON-SELF DESTRUCTIVE BEHAVIOR  Goal: Remains free of harm/injury (restraint for non violent/non self-detsructive behavior)  Description: INTERVENTIONS:  - Instruct patient/family regarding restraint use   - Assess and monitor physiologic and psychological status   - Provide interventions and comfort measures to meet assessed patient needs   - Identify and implement measures to help patient regain control  - Assess readiness for release of restraint   Outcome: Progressing  Goal: Returns to optimal restraint-free functioning  Description: INTERVENTIONS:  - Assess the patient's behavior and symptoms that indicate continued need for restraint  - Identify and implement measures to help patient regain control  - Assess readiness for release of restraint   Outcome: Progressing     Problem: Prexisting or High Potential for Compromised Skin Integrity  Goal: Skin integrity is maintained or improved  Description: INTERVENTIONS:  - Identify patients at risk for skin breakdown  - Assess and monitor skin integrity  - Assess and monitor nutrition and hydration status  - Monitor labs   - Assess for incontinence   - Turn and reposition patient  - Assist with mobility/ambulation  - Relieve pressure over bony prominences  - Avoid friction and shearing  - Provide appropriate hygiene as needed including keeping skin clean and dry  - Evaluate need for skin moisturizer/barrier cream  - Collaborate with interdisciplinary team   - Patient/family teaching  - Consider wound care consult   Outcome: Progressing     Problem: PAIN - ADULT  Goal: Verbalizes/displays adequate comfort level or baseline comfort level  Description: Interventions:  - Encourage patient to monitor pain and request assistance  - Assess pain using appropriate pain scale  - Administer analgesics based on type and severity of pain and evaluate response  - Implement non-pharmacological measures as appropriate and evaluate response  - Consider  cultural and social influences on pain and pain management  - Notify physician/advanced practitioner if interventions unsuccessful or patient reports new pain  Outcome: Progressing     Problem: INFECTION - ADULT  Goal: Absence or prevention of progression during hospitalization  Description: INTERVENTIONS:  - Assess and monitor for signs and symptoms of infection  - Monitor lab/diagnostic results  - Monitor all insertion sites, i.e. indwelling lines, tubes, and drains  - Monitor endotracheal if appropriate and nasal secretions for changes in amount and color  - [place] appropriate cooling/warming therapies per order  - Administer medications as ordered  - Instruct and encourage patient and family to use good hand hygiene technique  - Identify and instruct in appropriate isolation precautions for identified infection/condition  Outcome: Progressing  Goal: Absence of fever/infection during neutropenic period  Description: INTERVENTIONS:  - Monitor WBC    Outcome: Progressing     Problem: SAFETY ADULT  Goal: Patient will remain free of falls  Description: INTERVENTIONS:  - Educate patient/family on patient safety including physical limitations  - Instruct patient to call for assistance with activity   - Consult OT/PT to assist with strengthening/mobility   - Keep Call bell within reach  - Keep bed low and locked with side rails adjusted as appropriate  - Keep care items and personal belongings within reach  - Initiate and maintain comfort rounds  - Make Fall Risk Sign visible to staff  - Offer Toileting every  Hours, in advance of need  - Initiate/Maintain alarm  - Obtain necessary fall risk management equipment:   - Apply yellow socks and bracelet for high fall risk patients  - Consider moving patient to room near nurses station  Outcome: Progressing  Goal: Maintain or return to baseline ADL function  Description: INTERVENTIONS:  -  Assess patient's ability to carry out ADLs; assess patient's baseline for ADL  function and identify physical deficits which impact ability to perform ADLs (bathing, care of mouth/teeth, toileting, grooming, dressing, etc.)  - Assess/evaluate cause of self-care deficits   - Assess range of motion  - Assess patient's mobility; develop plan if impaired  - Assess patient's need for assistive devices and provide as appropriate  - Encourage maximum independence but intervene and supervise when necessary  - Involve family in performance of ADLs  - Assess for home care needs following discharge   - Consider OT consult to assist with ADL evaluation and planning for discharge  - Provide patient education as appropriate  Outcome: Progressing  Goal: Maintains/Returns to pre admission functional level  Description: INTERVENTIONS:  - Perform AM-PAC 6 Click Basic Mobility/ Daily Activity assessment daily.  - Set and communicate daily mobility goal to care team and patient/family/caregiver.   - Collaborate with rehabilitation services on mobility goals if consulted  - Perform Range of Motion  times a day.  - Reposition patient every  hours.  - Dangle patient  times a day  - Stand patient  times a day  - Ambulate patient  times a day  - Out of bed to chair  times a day   - Out of bed for meals  times a day  - Out of bed for toileting  - Record patient progress and toleration of activity level   Outcome: Progressing     Problem: DISCHARGE PLANNING  Goal: Discharge to home or other facility with appropriate resources  Description: INTERVENTIONS:  - Identify barriers to discharge w/patient and caregiver  - Arrange for needed discharge resources and transportation as appropriate  - Identify discharge learning needs (meds, wound care, etc.)  - Arrange for interpretive services to assist at discharge as needed  - Refer to Case Management Department for coordinating discharge planning if the patient needs post-hospital services based on physician/advanced practitioner order or complex needs related to functional  status, cognitive ability, or social support system  Outcome: Progressing     Problem: Knowledge Deficit  Goal: Patient/family/caregiver demonstrates understanding of disease process, treatment plan, medications, and discharge instructions  Description: Complete learning assessment and assess knowledge base.  Interventions:  - Provide teaching at level of understanding  - Provide teaching via preferred learning methods  Outcome: Progressing     Problem: Nutrition/Hydration-ADULT  Goal: Nutrient/Hydration intake appropriate for improving, restoring or maintaining nutritional needs  Description: Monitor and assess patient's nutrition/hydration status for malnutrition. Collaborate with interdisciplinary team and initiate plan and interventions as ordered.  Monitor patient's weight and dietary intake as ordered or per policy. Utilize nutrition screening tool and intervene as necessary. Determine patient's food preferences and provide high-protein, high-caloric foods as appropriate.     INTERVENTIONS:  - Monitor oral intake, urinary output, labs, and treatment plans  - Assess nutrition and hydration status and recommend course of action  - Evaluate amount of meals eaten  - Assist patient with eating if necessary   - Allow adequate time for meals  - Recommend/ encourage appropriate diets, oral nutritional supplements, and vitamin/mineral supplements  - Order, calculate, and assess calorie counts as needed  - Recommend, monitor, and adjust tube feedings and TPN/PPN based on assessed needs  - Assess need for intravenous fluids  - Provide specific nutrition/hydration education as appropriate  - Include patient/family/caregiver in decisions related to nutrition  Outcome: Progressing

## 2024-08-17 NOTE — PHYSICAL THERAPY NOTE
PHYSICAL THERAPY NOTE          Patient Name: Anmol Meza Jr.  Today's Date: 24 1302   PT Last Visit   PT Visit Date 24   Note Type   Note Type Treatment   Pain Assessment   Pain Assessment Tool 0-10   Pain Score 10 - Worst Possible Pain   Pain Location/Orientation Location: Head   Pain Onset/Description Onset: Ongoing   Effect of Pain on Daily Activities limits comfort and activity tolerance   Patient's Stated Pain Goal No pain   Hospital Pain Intervention(s) Repositioned;Ambulation/increased activity;Rest   Multiple Pain Sites No   Pain Rating: FLACC (Rest) - Face 1   Pain Rating: FLACC (Rest) - Legs 1   Pain Rating: FLACC (Rest) - Activity 0   Pain Rating: FLACC (Rest) - Cry 0   Pain Rating: FLACC (Rest) - Consolability 0   Score: FLACC (Rest) 2   Pain Rating: FLACC (Activity) - Face 1   Pain Rating: FLACC (Activity) - Legs 1   Pain Rating: FLACC (Activity) - Activity 0   Pain Rating: FLACC (Activity) - Cry 0   Pain Rating: FLACC (Activity) - Consolability 1   Score: FLACC (Activity) 3   Pain 2   Pain Score 2 Worst Possible Pain   Restrictions/Precautions   Weight Bearing Precautions Per Order No   Other Precautions Cognitive;Chair Alarm;Bed Alarm;Impulsive;Multiple lines;Fall Risk;Pain   General   Chart Reviewed Yes   Response to Previous Treatment Patient with no complaints from previous session.   Family/Caregiver Present No   Cognition   Overall Cognitive Status Impaired   Arousal/Participation Alert;Responsive   Attention Attends with cues to redirect   Orientation Level Oriented to person;Oriented to place;Oriented to situation   Memory Decreased short term memory;Decreased recall of recent events;Decreased recall of precautions   Following Commands Follows one step commands without difficulty   Comments pt ID by name and    Subjective   Subjective pt refusing further ambulation and stair  trials in todays tx session pt only ambulated to and from bathroom and completed TE activitie while seated EOB   Bed Mobility   Supine to Sit 6  Modified independent   Additional items Assist x 1;HOB elevated;Bedrails;Increased time required   Sit to Supine 6  Modified independent   Additional items Assist x 1;HOB elevated;Bedrails;Increased time required   Additional Comments pt was able to sit EOB w/o LOB while performinf TE activities   Transfers   Sit to Stand 6  Modified independent   Additional items Assist x 1;Increased time required   Stand to Sit 6  Modified independent   Additional items Assist x 1;Increased time required   Additional Comments pt was able to complete transfesr from seated EOB and standard toiler with mod I no LOB   Ambulation/Elevation   Gait pattern Decreased foot clearance;Short stride;Excessively slow   Gait Assistance 5  Supervision   Additional items Assist x 1;Verbal cues   Assistive Device Other (Comment)  (IV Pole)   Distance 15'x2 IV pole   Stair Management Assistance Not tested  (pt refused stair traisl in todays tx session)   Ambulation/Elevation Additional Comments pt stated he cant do any more PT intervention due to 10/10 headache. RN made aware   Balance   Static Sitting Fair +   Dynamic Sitting Fair   Static Standing Fair -   Dynamic Standing Poor +   Ambulatory Fair -   Endurance Deficit   Endurance Deficit Yes   Endurance Deficit Description limited activity tolerance   Activity Tolerance   Activity Tolerance Patient limited by pain;Other (Comment)  (10/10 headache)   Nurse Made Aware Spoke to RN   Exercises   Hip Abduction Sitting;15 reps;AROM;Bilateral   Hip Adduction Sitting;15 reps;AROM;Bilateral   Knee AROM Long Arc Quad Sitting;15 reps;AROM;Bilateral   Ankle Pumps Sitting;20 reps;AROM;Bilateral   Marching Sitting;10 reps;AROM;Bilateral   Assessment   Prognosis Fair   Problem List Decreased strength;Decreased endurance;Impaired balance;Decreased mobility;Decreased  coordination;Decreased cognition;Impaired judgement;Decreased safety awareness;Decreased skin integrity;Pain   Assessment pt was limited in todays tx session due to 10/10 headache as pt refused further ambulation and did not want to leave the room. pt was limited to 15'x2 of ambulation with IV pole utilized to increase safety and balance. pt was able to progress with static/dynamic sitting balance as pt sat EOB w/o LOB while perorming TE activities. RN made aware of pt headache and pt returned to supine. pt was able to complete sit<>supien transfer with mod I and repsotion himself towards HOB. Post tx pt in bed with call bell, 1:1 virtual and all pt needs met   Goals   Patient Goals to get back to bed   STG Expiration Date 08/23/24   PT Treatment Day 4   Plan   Treatment/Interventions Functional transfer training;LE strengthening/ROM;Elevations;Therapeutic exercise;Endurance training;Cognitive reorientation;Patient/family training;Equipment eval/education;Bed mobility;Gait training;Spoke to nursing   Progress No functional improvements   PT Frequency 3-5x/wk   Discharge Recommendation   Rehab Resource Intensity Level, PT III (Minimum Resource Intensity)   AM-PAC Basic Mobility Inpatient   Turning in Flat Bed Without Bedrails 4   Lying on Back to Sitting on Edge of Flat Bed Without Bedrails 4   Moving Bed to Chair 3   Standing Up From Chair Using Arms 4   Walk in Room 3   Climb 3-5 Stairs With Railing 3   Basic Mobility Inpatient Raw Score 21   Basic Mobility Standardized Score 45.55   [facility] Highest Level Of Mobility   -HLM Goal 6: Walk 10 steps or more   -HLM Achieved 6: Walk 10 steps or more   Education   Education Provided Mobility training;Assistive device   Patient Demonstrates acceptance/verbal understanding   End of Consult   Patient Position at End of Consult Supine   The patient's AM-PAC Basic Mobility Inpatient Short Form Raw Score is 21. A Raw score of greater than 16 suggests the patient may  benefit from discharge to home. Please also refer to the recommendation of the Physical Therapist for safe discharge planning.    Mick Corrigan

## 2024-08-17 NOTE — PLAN OF CARE
Problem: PHYSICAL THERAPY ADULT  Goal: Performs mobility at highest level of function for planned discharge setting.  See evaluation for individualized goals.  Description: Treatment/Interventions: Functional transfer training, LE strengthening/ROM, Therapeutic exercise, Equipment eval/education, Bed mobility, Gait training, Spoke to case management, Spoke to nursing, OT, Patient/family training, Endurance training          See flowsheet documentation for full assessment, interventions and recommendations.  Outcome: Not Progressing  Note: Prognosis: Fair  Problem List: Decreased strength, Decreased endurance, Impaired balance, Decreased mobility, Decreased coordination, Decreased cognition, Impaired judgement, Decreased safety awareness, Decreased skin integrity, Pain  Assessment: pt was limited in todays tx session due to 10/10 headache as pt refused further ambulation and did not want to leave the room. pt was limited to 15'x2 of ambulation with IV pole utilized to increase safety and balance. pt was able to progress with static/dynamic sitting balance as pt sat EOB w/o LOB while perorming TE activities. RN made aware of pt headache and pt returned to supine. pt was able to complete sit<>supien transfer with mod I and repsotion himself towards HOB. Post tx pt in bed with call bell, 1:1 virtual and all pt needs met        Rehab Resource Intensity Level, PT: III (Minimum Resource Intensity)    See flowsheet documentation for full assessment.

## 2024-08-17 NOTE — ASSESSMENT & PLAN NOTE
CTA:  The proximal aspect of left transverse sinus is patent with subsequent occlusion, adjacent to the left fracture line. Superior sagittal, right transverse and sigmoid sinuses are patent. Straight sinus and internal cerebral veins are patent.  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  There is some nonocclusive thrombus noted in the visualized upper cervical internal jugular vein.   Endovascular neurosurgery consulted note appreciated  PTT therapeutic x2. Stat repeat CTH and Venogram pending. Plan to transition to Eliquis if stable.   Continue to monitor neuroexam closely-currently GCS 14

## 2024-08-17 NOTE — PLAN OF CARE
Problem: Prexisting or High Potential for Compromised Skin Integrity  Goal: Skin integrity is maintained or improved  Description: INTERVENTIONS:  - Identify patients at risk for skin breakdown  - Assess and monitor skin integrity  - Assess and monitor nutrition and hydration status  - Monitor labs   - Assess for incontinence   - Turn and reposition patient  - Assist with mobility/ambulation  - Relieve pressure over bony prominences  - Avoid friction and shearing  - Provide appropriate hygiene as needed including keeping skin clean and dry  - Evaluate need for skin moisturizer/barrier cream  - Collaborate with interdisciplinary team   - Patient/family teaching  - Consider wound care consult   Outcome: Progressing     Problem: PAIN - ADULT  Goal: Verbalizes/displays adequate comfort level or baseline comfort level  Description: Interventions:  - Encourage patient to monitor pain and request assistance  - Assess pain using appropriate pain scale  - Administer analgesics based on type and severity of pain and evaluate response  - Implement non-pharmacological measures as appropriate and evaluate response  - Consider cultural and social influences on pain and pain management  - Notify physician/advanced practitioner if interventions unsuccessful or patient reports new pain  Outcome: Progressing     Problem: INFECTION - ADULT  Goal: Absence or prevention of progression during hospitalization  Description: INTERVENTIONS:  - Assess and monitor for signs and symptoms of infection  - Monitor lab/diagnostic results  - Monitor all insertion sites, i.e. indwelling lines, tubes, and drains  - Monitor endotracheal if appropriate and nasal secretions for changes in amount and color  - [place] appropriate cooling/warming therapies per order  - Administer medications as ordered  - Instruct and encourage patient and family to use good hand hygiene technique  - Identify and instruct in appropriate isolation precautions for  identified infection/condition  Outcome: Progressing  Goal: Absence of fever/infection during neutropenic period  Description: INTERVENTIONS:  - Monitor WBC    Outcome: Progressing     Problem: SAFETY ADULT  Goal: Patient will remain free of falls  Description: INTERVENTIONS:  - Educate patient/family on patient safety including physical limitations  - Instruct patient to call for assistance with activity   - Consult OT/PT to assist with strengthening/mobility   - Keep Call bell within reach  - Keep bed low and locked with side rails adjusted as appropriate  - Keep care items and personal belongings within reach  - Initiate and maintain comfort rounds  - Make Fall Risk Sign visible to staff  - Offer Toileting every  Hours, in advance of need  - Initiate/Maintain alarm  - Obtain necessary fall risk management equipment:   - Apply yellow socks and bracelet for high fall risk patients  - Consider moving patient to room near nurses station  Outcome: Progressing  Goal: Maintain or return to baseline ADL function  Description: INTERVENTIONS:  -  Assess patient's ability to carry out ADLs; assess patient's baseline for ADL function and identify physical deficits which impact ability to perform ADLs (bathing, care of mouth/teeth, toileting, grooming, dressing, etc.)  - Assess/evaluate cause of self-care deficits   - Assess range of motion  - Assess patient's mobility; develop plan if impaired  - Assess patient's need for assistive devices and provide as appropriate  - Encourage maximum independence but intervene and supervise when necessary  - Involve family in performance of ADLs  - Assess for home care needs following discharge   - Consider OT consult to assist with ADL evaluation and planning for discharge  - Provide patient education as appropriate  Outcome: Progressing  Goal: Maintains/Returns to pre admission functional level  Description: INTERVENTIONS:  - Perform AM-PAC 6 Click Basic Mobility/ Daily Activity  assessment daily.  - Set and communicate daily mobility goal to care team and patient/family/caregiver.   - Collaborate with rehabilitation services on mobility goals if consulted  - Perform Range of Motion  times a day.  - Reposition patient every  hours.  - Dangle patient  times a day  - Stand patient  times a day  - Ambulate patient  times a day  - Out of bed to chair  times a day   - Out of bed for meals times a day  - Out of bed for toileting  - Record patient progress and toleration of activity level   Outcome: Progressing     Problem: DISCHARGE PLANNING  Goal: Discharge to home or other facility with appropriate resources  Description: INTERVENTIONS:  - Identify barriers to discharge w/patient and caregiver  - Arrange for needed discharge resources and transportation as appropriate  - Identify discharge learning needs (meds, wound care, etc.)  - Arrange for interpretive services to assist at discharge as needed  - Refer to Case Management Department for coordinating discharge planning if the patient needs post-hospital services based on physician/advanced practitioner order or complex needs related to functional status, cognitive ability, or social support system  Outcome: Progressing     Problem: Knowledge Deficit  Goal: Patient/family/caregiver demonstrates understanding of disease process, treatment plan, medications, and discharge instructions  Description: Complete learning assessment and assess knowledge base.  Interventions:  - Provide teaching at level of understanding  - Provide teaching via preferred learning methods  Outcome: Progressing     Problem: Nutrition/Hydration-ADULT  Goal: Nutrient/Hydration intake appropriate for improving, restoring or maintaining nutritional needs  Description: Monitor and assess patient's nutrition/hydration status for malnutrition. Collaborate with interdisciplinary team and initiate plan and interventions as ordered.  Monitor patient's weight and dietary intake as  ordered or per policy. Utilize nutrition screening tool and intervene as necessary. Determine patient's food preferences and provide high-protein, high-caloric foods as appropriate.     INTERVENTIONS:  - Monitor oral intake, urinary output, labs, and treatment plans  - Assess nutrition and hydration status and recommend course of action  - Evaluate amount of meals eaten  - Assist patient with eating if necessary   - Allow adequate time for meals  - Recommend/ encourage appropriate diets, oral nutritional supplements, and vitamin/mineral supplements  - Order, calculate, and assess calorie counts as needed  - Recommend, monitor, and adjust tube feedings and TPN/PPN based on assessed needs  - Assess need for intravenous fluids  - Provide specific nutrition/hydration education as appropriate  - Include patient/family/caregiver in decisions related to nutrition  Outcome: Progressing
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
## 2024-08-17 NOTE — ARC ADMISSION
Referral received for consideration of patient for inpatient acute rehab. Will review patient's case and update CM as able.    Reviewed patient's case - patient is recommended for dedicated brain injury rehab at this time. Please notify with any changes.

## 2024-08-18 ENCOUNTER — APPOINTMENT (INPATIENT)
Dept: CT IMAGING | Facility: HOSPITAL | Age: 34
DRG: 020 | End: 2024-08-18
Payer: COMMERCIAL

## 2024-08-18 PROCEDURE — 99232 SBSQ HOSP IP/OBS MODERATE 35: CPT | Performed by: SURGERY

## 2024-08-18 PROCEDURE — NC001 PR NO CHARGE: Performed by: STUDENT IN AN ORGANIZED HEALTH CARE EDUCATION/TRAINING PROGRAM

## 2024-08-18 PROCEDURE — 70450 CT HEAD/BRAIN W/O DYE: CPT

## 2024-08-18 RX ORDER — OXYCODONE HYDROCHLORIDE 5 MG/1
5 TABLET ORAL EVERY 4 HOURS PRN
Status: DISCONTINUED | OUTPATIENT
Start: 2024-08-18 | End: 2024-08-29 | Stop reason: HOSPADM

## 2024-08-18 RX ORDER — NICOTINE 21 MG/24HR
14 PATCH, TRANSDERMAL 24 HOURS TRANSDERMAL DAILY
Status: DISCONTINUED | OUTPATIENT
Start: 2024-08-18 | End: 2024-08-29 | Stop reason: HOSPADM

## 2024-08-18 RX ADMIN — CHLORHEXIDINE GLUCONATE 15 ML: 1.2 RINSE ORAL at 09:38

## 2024-08-18 RX ADMIN — SENNOSIDES, DOCUSATE SODIUM 2 TABLET: 8.6; 5 TABLET ORAL at 09:38

## 2024-08-18 RX ADMIN — OXYCODONE HYDROCHLORIDE 7.5 MG: 5 TABLET ORAL at 02:29

## 2024-08-18 RX ADMIN — BACITRACIN 1 LARGE APPLICATION: 500 OINTMENT TOPICAL at 09:38

## 2024-08-18 RX ADMIN — POLYETHYLENE GLYCOL 3350 17 G: 17 POWDER, FOR SOLUTION ORAL at 09:39

## 2024-08-18 RX ADMIN — APIXABAN 5 MG: 5 TABLET, FILM COATED ORAL at 17:19

## 2024-08-18 RX ADMIN — ACETAMINOPHEN 975 MG: 325 TABLET, FILM COATED ORAL at 13:07

## 2024-08-18 RX ADMIN — CHLORHEXIDINE GLUCONATE 15 ML: 1.2 RINSE ORAL at 21:24

## 2024-08-18 RX ADMIN — SENNOSIDES, DOCUSATE SODIUM 2 TABLET: 8.6; 5 TABLET ORAL at 17:19

## 2024-08-18 RX ADMIN — ACETAMINOPHEN 975 MG: 325 TABLET, FILM COATED ORAL at 06:14

## 2024-08-18 RX ADMIN — METHYLPREDNISOLONE 8 MG: 4 TABLET ORAL at 09:37

## 2024-08-18 RX ADMIN — METHOCARBAMOL TABLETS 750 MG: 750 TABLET, COATED ORAL at 06:14

## 2024-08-18 RX ADMIN — GABAPENTIN 300 MG: 300 CAPSULE ORAL at 21:24

## 2024-08-18 RX ADMIN — NICOTINE 14 MG: 14 PATCH, EXTENDED RELEASE TRANSDERMAL at 15:59

## 2024-08-18 RX ADMIN — Medication 7.5 MG: at 15:53

## 2024-08-18 RX ADMIN — GABAPENTIN 300 MG: 300 CAPSULE ORAL at 09:38

## 2024-08-18 RX ADMIN — METHOCARBAMOL TABLETS 750 MG: 750 TABLET, COATED ORAL at 17:19

## 2024-08-18 RX ADMIN — ACETAMINOPHEN 975 MG: 325 TABLET, FILM COATED ORAL at 21:24

## 2024-08-18 RX ADMIN — GABAPENTIN 300 MG: 300 CAPSULE ORAL at 15:53

## 2024-08-18 RX ADMIN — METHOCARBAMOL TABLETS 750 MG: 750 TABLET, COATED ORAL at 13:07

## 2024-08-18 RX ADMIN — APIXABAN 5 MG: 5 TABLET, FILM COATED ORAL at 09:38

## 2024-08-18 RX ADMIN — BACITRACIN 1 LARGE APPLICATION: 500 OINTMENT TOPICAL at 17:19

## 2024-08-18 RX ADMIN — QUETIAPINE FUMARATE 12.5 MG: 25 TABLET ORAL at 21:24

## 2024-08-18 NOTE — QUICK NOTE
Sister, Mily, was called and updated.  She understands that we are waiting 6 PM CT head to ensure stability after starting Eliquis.  After that point he will be ready for disposition.  Case management continues to follow and is attempting to secure TBI rehab.
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
## 2024-08-18 NOTE — PLAN OF CARE
Problem: SAFETY,RESTRAINT: NV/NON-SELF DESTRUCTIVE BEHAVIOR  Goal: Remains free of harm/injury (restraint for non violent/non self-detsructive behavior)  Description: INTERVENTIONS:  - Instruct patient/family regarding restraint use   - Assess and monitor physiologic and psychological status   - Provide interventions and comfort measures to meet assessed patient needs   - Identify and implement measures to help patient regain control  - Assess readiness for release of restraint   Outcome: Progressing  Goal: Returns to optimal restraint-free functioning  Description: INTERVENTIONS:  - Assess the patient's behavior and symptoms that indicate continued need for restraint  - Identify and implement measures to help patient regain control  - Assess readiness for release of restraint   Outcome: Progressing     Problem: Prexisting or High Potential for Compromised Skin Integrity  Goal: Skin integrity is maintained or improved  Description: INTERVENTIONS:  - Identify patients at risk for skin breakdown  - Assess and monitor skin integrity  - Assess and monitor nutrition and hydration status  - Monitor labs   - Assess for incontinence   - Turn and reposition patient  - Assist with mobility/ambulation  - Relieve pressure over bony prominences  - Avoid friction and shearing  - Provide appropriate hygiene as needed including keeping skin clean and dry  - Evaluate need for skin moisturizer/barrier cream  - Collaborate with interdisciplinary team   - Patient/family teaching  - Consider wound care consult   Outcome: Progressing     Problem: PAIN - ADULT  Goal: Verbalizes/displays adequate comfort level or baseline comfort level  Description: Interventions:  - Encourage patient to monitor pain and request assistance  - Assess pain using appropriate pain scale  - Administer analgesics based on type and severity of pain and evaluate response  - Implement non-pharmacological measures as appropriate and evaluate response  - Consider  cultural and social influences on pain and pain management  - Notify physician/advanced practitioner if interventions unsuccessful or patient reports new pain  Outcome: Progressing     Problem: INFECTION - ADULT  Goal: Absence or prevention of progression during hospitalization  Description: INTERVENTIONS:  - Assess and monitor for signs and symptoms of infection  - Monitor lab/diagnostic results  - Monitor all insertion sites, i.e. indwelling lines, tubes, and drains  - Monitor endotracheal if appropriate and nasal secretions for changes in amount and color  - [place] appropriate cooling/warming therapies per order  - Administer medications as ordered  - Instruct and encourage patient and family to use good hand hygiene technique  - Identify and instruct in appropriate isolation precautions for identified infection/condition  Outcome: Progressing  Goal: Absence of fever/infection during neutropenic period  Description: INTERVENTIONS:  - Monitor WBC    Outcome: Progressing     Problem: SAFETY ADULT  Goal: Patient will remain free of falls  Description: INTERVENTIONS:  - Educate patient/family on patient safety including physical limitations  - Instruct patient to call for assistance with activity   - Consult OT/PT to assist with strengthening/mobility   - Keep Call bell within reach  - Keep bed low and locked with side rails adjusted as appropriate  - Keep care items and personal belongings within reach  - Initiate and maintain comfort rounds  - Make Fall Risk Sign visible to staff  - Offer Toileting every  Hours, in advance of need  - Initiate/Maintain alarm  - Obtain necessary fall risk management equipment:   - Apply yellow socks and bracelet for high fall risk patients  - Consider moving patient to room near nurses station  Outcome: Progressing  Goal: Maintain or return to baseline ADL function  Description: INTERVENTIONS:  -  Assess patient's ability to carry out ADLs; assess patient's baseline for ADL  function and identify physical deficits which impact ability to perform ADLs (bathing, care of mouth/teeth, toileting, grooming, dressing, etc.)  - Assess/evaluate cause of self-care deficits   - Assess range of motion  - Assess patient's mobility; develop plan if impaired  - Assess patient's need for assistive devices and provide as appropriate  - Encourage maximum independence but intervene and supervise when necessary  - Involve family in performance of ADLs  - Assess for home care needs following discharge   - Consider OT consult to assist with ADL evaluation and planning for discharge  - Provide patient education as appropriate  Outcome: Progressing  Goal: Maintains/Returns to pre admission functional level  Description: INTERVENTIONS:  - Perform AM-PAC 6 Click Basic Mobility/ Daily Activity assessment daily.  - Set and communicate daily mobility goal to care team and patient/family/caregiver.   - Collaborate with rehabilitation services on mobility goals if consulted  - Perform Range of Motion  times a day.  - Reposition patient every  hours.  - Dangle patient  times a day  - Stand patient  times a day  - Ambulate patient  times a day  - Out of bed to chair  times a day   - Out of bed for meals times a day  - Out of bed for toileting  - Record patient progress and toleration of activity level   Outcome: Progressing     Problem: DISCHARGE PLANNING  Goal: Discharge to home or other facility with appropriate resources  Description: INTERVENTIONS:  - Identify barriers to discharge w/patient and caregiver  - Arrange for needed discharge resources and transportation as appropriate  - Identify discharge learning needs (meds, wound care, etc.)  - Arrange for interpretive services to assist at discharge as needed  - Refer to Case Management Department for coordinating discharge planning if the patient needs post-hospital services based on physician/advanced practitioner order or complex needs related to functional  status, cognitive ability, or social support system  Outcome: Progressing     Problem: Knowledge Deficit  Goal: Patient/family/caregiver demonstrates understanding of disease process, treatment plan, medications, and discharge instructions  Description: Complete learning assessment and assess knowledge base.  Interventions:  - Provide teaching at level of understanding  - Provide teaching via preferred learning methods  Outcome: Progressing     Problem: Nutrition/Hydration-ADULT  Goal: Nutrient/Hydration intake appropriate for improving, restoring or maintaining nutritional needs  Description: Monitor and assess patient's nutrition/hydration status for malnutrition. Collaborate with interdisciplinary team and initiate plan and interventions as ordered.  Monitor patient's weight and dietary intake as ordered or per policy. Utilize nutrition screening tool and intervene as necessary. Determine patient's food preferences and provide high-protein, high-caloric foods as appropriate.     INTERVENTIONS:  - Monitor oral intake, urinary output, labs, and treatment plans  - Assess nutrition and hydration status and recommend course of action  - Evaluate amount of meals eaten  - Assist patient with eating if necessary   - Allow adequate time for meals  - Recommend/ encourage appropriate diets, oral nutritional supplements, and vitamin/mineral supplements  - Order, calculate, and assess calorie counts as needed  - Recommend, monitor, and adjust tube feedings and TPN/PPN based on assessed needs  - Assess need for intravenous fluids  - Provide specific nutrition/hydration education as appropriate  - Include patient/family/caregiver in decisions related to nutrition  Outcome: Progressing

## 2024-08-18 NOTE — ASSESSMENT & PLAN NOTE
CTA:  The proximal aspect of left transverse sinus is patent with subsequent occlusion, adjacent to the left fracture line. Superior sagittal, right transverse and sigmoid sinuses are patent. Straight sinus and internal cerebral veins are patent.  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  There is some nonocclusive thrombus noted in the visualized upper cervical internal jugular vein.   Endovascular neurosurgery consulted note appreciated  8/17 CT head/venogram showed stability-started on Eliquis.    Will complete CT head 24 hours after Eliquis initiation-8/18 1800  Continue to monitor neuroexam closely-currently GCS 14

## 2024-08-19 ENCOUNTER — BMR PREADMISSION ASSESSMENT (OUTPATIENT)
Dept: ADMISSIONS | Facility: REHABILITATION | Age: 34
End: 2024-08-19

## 2024-08-19 ENCOUNTER — TELEPHONE (OUTPATIENT)
Dept: NEUROSURGERY | Facility: CLINIC | Age: 34
End: 2024-08-19

## 2024-08-19 PROCEDURE — 97116 GAIT TRAINING THERAPY: CPT

## 2024-08-19 PROCEDURE — 97530 THERAPEUTIC ACTIVITIES: CPT

## 2024-08-19 PROCEDURE — 99232 SBSQ HOSP IP/OBS MODERATE 35: CPT | Performed by: SURGERY

## 2024-08-19 RX ADMIN — CHLORHEXIDINE GLUCONATE 15 ML: 1.2 RINSE ORAL at 09:17

## 2024-08-19 RX ADMIN — NICOTINE 14 MG: 14 PATCH, EXTENDED RELEASE TRANSDERMAL at 09:32

## 2024-08-19 RX ADMIN — APIXABAN 5 MG: 5 TABLET, FILM COATED ORAL at 09:17

## 2024-08-19 RX ADMIN — METHYLPREDNISOLONE 4 MG: 4 TABLET ORAL at 09:17

## 2024-08-19 RX ADMIN — Medication 7.5 MG: at 17:28

## 2024-08-19 RX ADMIN — METHOCARBAMOL TABLETS 750 MG: 750 TABLET, COATED ORAL at 23:49

## 2024-08-19 RX ADMIN — ACETAMINOPHEN 975 MG: 325 TABLET, FILM COATED ORAL at 22:01

## 2024-08-19 RX ADMIN — BACITRACIN 1 LARGE APPLICATION: 500 OINTMENT TOPICAL at 09:17

## 2024-08-19 RX ADMIN — SENNOSIDES, DOCUSATE SODIUM 2 TABLET: 8.6; 5 TABLET ORAL at 17:29

## 2024-08-19 RX ADMIN — Medication 7.5 MG: at 09:17

## 2024-08-19 RX ADMIN — GABAPENTIN 300 MG: 300 CAPSULE ORAL at 16:02

## 2024-08-19 RX ADMIN — CHLORHEXIDINE GLUCONATE 15 ML: 1.2 RINSE ORAL at 22:02

## 2024-08-19 RX ADMIN — BACITRACIN 1 LARGE APPLICATION: 500 OINTMENT TOPICAL at 17:29

## 2024-08-19 RX ADMIN — APIXABAN 5 MG: 5 TABLET, FILM COATED ORAL at 17:29

## 2024-08-19 RX ADMIN — METHOCARBAMOL TABLETS 750 MG: 750 TABLET, COATED ORAL at 11:20

## 2024-08-19 RX ADMIN — QUETIAPINE FUMARATE 12.5 MG: 25 TABLET ORAL at 22:01

## 2024-08-19 RX ADMIN — ACETAMINOPHEN 975 MG: 325 TABLET, FILM COATED ORAL at 14:47

## 2024-08-19 RX ADMIN — POLYETHYLENE GLYCOL 3350 17 G: 17 POWDER, FOR SOLUTION ORAL at 09:17

## 2024-08-19 RX ADMIN — GABAPENTIN 300 MG: 300 CAPSULE ORAL at 22:01

## 2024-08-19 RX ADMIN — SENNOSIDES, DOCUSATE SODIUM 2 TABLET: 8.6; 5 TABLET ORAL at 09:17

## 2024-08-19 RX ADMIN — GABAPENTIN 300 MG: 300 CAPSULE ORAL at 09:17

## 2024-08-19 RX ADMIN — METHOCARBAMOL TABLETS 750 MG: 750 TABLET, COATED ORAL at 17:29

## 2024-08-19 NOTE — PLAN OF CARE
Problem: PHYSICAL THERAPY ADULT  Goal: Performs mobility at highest level of function for planned discharge setting.  See evaluation for individualized goals.  Description: Treatment/Interventions: Functional transfer training, LE strengthening/ROM, Therapeutic exercise, Equipment eval/education, Bed mobility, Gait training, Spoke to case management, Spoke to nursing, OT, Patient/family training, Endurance training          See flowsheet documentation for full assessment, interventions and recommendations.  Outcome: Progressing  Note: Prognosis: Fair  Problem List: Decreased strength, Decreased endurance, Impaired balance, Decreased mobility, Decreased cognition, Impaired judgement, Decreased safety awareness, Decreased coordination, Decreased skin integrity, Pain  Assessment: pt began tx session lying supine in the bed as pt was agreeable to participate in PT intervention. pt continues to demonstrate the ability to complete all bed mobility and functional transfers with mod I and no LOB. pt does not require an AD for transfers. pt continues to demonstrate good recall on hand placement while ascending/descending in order to increase safety and balance with functional transfers. pt continues to ambulate 500'x1 no AD with /s, no LOB and complete full flight of steps with 1 hand rail, close /s, no LOB with alternating step pattern. Post tx pt in hbed with call bell, bed alarm activated, virtual 1:1 and all pt needs met.        Rehab Resource Intensity Level, PT: III (Minimum Resource Intensity)    See flowsheet documentation for full assessment.

## 2024-08-19 NOTE — PHYSICAL THERAPY NOTE
PHYSICAL THERAPY NOTE          Patient Name: Anmol Meza Jr.  Today's Date: 24 1130   PT Last Visit   PT Visit Date 24   Note Type   Note Type Treatment   Pain Assessment   Pain Assessment Tool 0-10   Pain Score No Pain   Pain Location/Orientation Location: Head  (headache)   Pain Onset/Description Onset: Ongoing   Effect of Pain on Daily Activities limits comfort and activity tolerance   Patient's Stated Pain Goal No pain   Hospital Pain Intervention(s) Repositioned;Ambulation/increased activity;Rest   Multiple Pain Sites No   Pain Rating: FLACC (Rest) - Face 0   Pain Rating: FLACC (Rest) - Legs 0   Pain Rating: FLACC (Rest) - Activity 0   Pain Rating: FLACC (Rest) - Cry 0   Pain Rating: FLACC (Rest) - Consolability 0   Score: FLACC (Rest) 0   Pain Rating: FLACC (Activity) - Face 1   Pain Rating: FLACC (Activity) - Legs 1   Pain Rating: FLACC (Activity) - Activity 0   Pain Rating: FLACC (Activity) - Cry 0   Pain Rating: FLACC (Activity) - Consolability 1   Score: FLACC (Activity) 3   Restrictions/Precautions   Weight Bearing Precautions Per Order No   Other Precautions Cognitive;Chair Alarm;Bed Alarm;Impulsive;Pain;Fall Risk   General   Chart Reviewed Yes   Response to Previous Treatment Patient with no complaints from previous session.   Family/Caregiver Present No   Cognition   Overall Cognitive Status Impaired   Arousal/Participation Alert;Responsive;Cooperative   Attention Attends with cues to redirect   Orientation Level Oriented to place;Oriented to person;Oriented to situation;Disoriented to time   Memory Decreased short term memory;Decreased recall of recent events;Decreased recall of precautions   Following Commands Follows one step commands without difficulty   Comments pt ID by name and    Subjective   Subjective pt was agreeable to participate in PT intervention but stated 10/10  headache, RN made aware   Bed Mobility   Supine to Sit 6  Modified independent   Additional items Assist x 1;Bedrails;HOB elevated;Increased time required   Sit to Supine 6  Modified independent   Additional items Assist x 1;HOB elevated;Bedrails;Increased time required   Additional Comments pt is able to sit EOB w/o LOB prior to initiating functional transfers   Transfers   Sit to Stand 6  Modified independent   Additional items Assist x 1;Increased time required   Stand to Sit 6  Modified independent   Additional items Assist x 1;HOB elevated;Bedrails;Increased time required   Stand pivot Unable to assess   Additional Comments pt continues to complete all functional transfers with mod I and no AD   Ambulation/Elevation   Gait pattern Decreased foot clearance;Short stride   Gait Assistance 5  Supervision   Additional items Assist x 1;Verbal cues   Assistive Device None   Distance 500'x1 no AD   Stair Management Assistance 5  Supervision   Additional items Assist x 1;Increased time required   Stair Management Technique One rail L;Alternating pattern;Foreward   Number of Stairs 14   Ambulation/Elevation Additional Comments pt ascended/descended 14 steps with 1 hand rail, close /s, no LOB   Balance   Static Sitting Fair +   Dynamic Sitting Fair   Static Standing Fair -   Dynamic Standing Poor +   Ambulatory Fair -  (no AD)   Endurance Deficit   Endurance Deficit No   Endurance Deficit Description pt with better activity tolerance   Activity Tolerance   Activity Tolerance Patient tolerated treatment well   Nurse Made Aware Spoke to RN   Assessment   Prognosis Fair   Problem List Decreased strength;Decreased endurance;Impaired balance;Decreased mobility;Decreased cognition;Impaired judgement;Decreased safety awareness;Decreased coordination;Decreased skin integrity;Pain   Assessment pt began tx session lying supine in the bed as pt was agreeable to participate in PT intervention. pt continues to demonstrate the ability  to complete all bed mobility and functional transfers with mod I and no LOB. pt does not require an AD for transfers. pt continues to demonstrate good recall on hand placement while ascending/descending in order to increase safety and balance with functional transfers. pt continues to ambulate 500'x1 no AD with /s, no LOB and complete full flight of steps with 1 hand rail, close /s, no LOB with alternating step pattern. Post tx pt in hbed with call bell, bed alarm activated, virtual 1:1 and all pt needs met.   Goals   Patient Goals to get back to bed and eat his lunch   STG Expiration Date 08/23/24   PT Treatment Day 5   Plan   Treatment/Interventions Functional transfer training;LE strengthening/ROM;Elevations;Therapeutic exercise;Endurance training;Cognitive reorientation;Patient/family training;Equipment eval/education;Gait training;Bed mobility;Spoke to nursing   Progress Improving as expected   PT Frequency (S)  1-2x/wk  (spoke to Nevaeh in reference to treatment frequency)   Discharge Recommendation   Rehab Resource Intensity Level, PT III (Minimum Resource Intensity)   AM-PAC Basic Mobility Inpatient   Turning in Flat Bed Without Bedrails 4   Lying on Back to Sitting on Edge of Flat Bed Without Bedrails 4   Moving Bed to Chair 3   Standing Up From Chair Using Arms 4   Walk in Room 3   Climb 3-5 Stairs With Railing 3   Basic Mobility Inpatient Raw Score 21   Basic Mobility Standardized Score 45.55   [facility] Highest Level Of Mobility   -HLM Goal 6: Walk 10 steps or more   -HLM Achieved 8: Walk 250 feet ot more   Education   Education Provided Mobility training   Patient Demonstrates acceptance/verbal understanding   End of Consult   Patient Position at End of Consult Supine   The patient's AM-PAC Basic Mobility Inpatient Short Form Raw Score is 21. A Raw score of greater than 16 suggests the patient may benefit from discharge to home. Please also refer to the recommendation of the Physical Therapist for  safe discharge planning.    Mick Corrigan
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
## 2024-08-19 NOTE — TELEPHONE ENCOUNTER
8/5/24 - 8/23/24: INPATIENT    2 WK HFU W/ [facility]  9/5/24 / 2:15 / [facility]    IMAGING:  CT HEAD    6 WK HFU AMYX W/ [facility]  9/30/24 / 9:15 / [facility]    IMAGING: CT VENOGRAM    PER  INDY Grijalva  2 week follow up with [facility] and [facility] for intracranial hemorrhages (can be at [facility] or [facility])    4-6 week follow up with Dr. George or [facility] and CTV for venous sinus thrombosis (needs to be seen at [facility])

## 2024-08-19 NOTE — PLAN OF CARE
Problem: SAFETY,RESTRAINT: NV/NON-SELF DESTRUCTIVE BEHAVIOR  Goal: Remains free of harm/injury (restraint for non violent/non self-detsructive behavior)  Description: INTERVENTIONS:  - Instruct patient/family regarding restraint use   - Assess and monitor physiologic and psychological status   - Provide interventions and comfort measures to meet assessed patient needs   - Identify and implement measures to help patient regain control  - Assess readiness for release of restraint   Outcome: Progressing  Goal: Returns to optimal restraint-free functioning  Description: INTERVENTIONS:  - Assess the patient's behavior and symptoms that indicate continued need for restraint  - Identify and implement measures to help patient regain control  - Assess readiness for release of restraint   Outcome: Progressing     Problem: Prexisting or High Potential for Compromised Skin Integrity  Goal: Skin integrity is maintained or improved  Description: INTERVENTIONS:  - Identify patients at risk for skin breakdown  - Assess and monitor skin integrity  - Assess and monitor nutrition and hydration status  - Monitor labs   - Assess for incontinence   - Turn and reposition patient  - Assist with mobility/ambulation  - Relieve pressure over bony prominences  - Avoid friction and shearing  - Provide appropriate hygiene as needed including keeping skin clean and dry  - Evaluate need for skin moisturizer/barrier cream  - Collaborate with interdisciplinary team   - Patient/family teaching  - Consider wound care consult   Outcome: Progressing     Problem: PAIN - ADULT  Goal: Verbalizes/displays adequate comfort level or baseline comfort level  Description: Interventions:  - Encourage patient to monitor pain and request assistance  - Assess pain using appropriate pain scale  - Administer analgesics based on type and severity of pain and evaluate response  - Implement non-pharmacological measures as appropriate and evaluate response  - Consider  cultural and social influences on pain and pain management  - Notify physician/advanced practitioner if interventions unsuccessful or patient reports new pain  Outcome: Progressing     Problem: INFECTION - ADULT  Goal: Absence or prevention of progression during hospitalization  Description: INTERVENTIONS:  - Assess and monitor for signs and symptoms of infection  - Monitor lab/diagnostic results  - Monitor all insertion sites, i.e. indwelling lines, tubes, and drains  - Monitor endotracheal if appropriate and nasal secretions for changes in amount and color  - Clare appropriate cooling/warming therapies per order  - Administer medications as ordered  - Instruct and encourage patient and family to use good hand hygiene technique  - Identify and instruct in appropriate isolation precautions for identified infection/condition  Outcome: Progressing  Goal: Absence of fever/infection during neutropenic period  Description: INTERVENTIONS:  - Monitor WBC    Outcome: Progressing     Problem: SAFETY ADULT  Goal: Patient will remain free of falls  Description: INTERVENTIONS:  - Educate patient/family on patient safety including physical limitations  - Instruct patient to call for assistance with activity   - Consult OT/PT to assist with strengthening/mobility   - Keep Call bell within reach  - Keep bed low and locked with side rails adjusted as appropriate  - Keep care items and personal belongings within reach  - Initiate and maintain comfort rounds  - Make Fall Risk Sign visible to staff  - Offer Toileting every  Hours, in advance of need  - Initiate/Maintain alarm  - Obtain necessary fall risk management equipment:   - Apply yellow socks and bracelet for high fall risk patients  - Consider moving patient to room near nurses station  Outcome: Progressing  Goal: Maintain or return to baseline ADL function  Description: INTERVENTIONS:  -  Assess patient's ability to carry out ADLs; assess patient's baseline for ADL  function and identify physical deficits which impact ability to perform ADLs (bathing, care of mouth/teeth, toileting, grooming, dressing, etc.)  - Assess/evaluate cause of self-care deficits   - Assess range of motion  - Assess patient's mobility; develop plan if impaired  - Assess patient's need for assistive devices and provide as appropriate  - Encourage maximum independence but intervene and supervise when necessary  - Involve family in performance of ADLs  - Assess for home care needs following discharge   - Consider OT consult to assist with ADL evaluation and planning for discharge  - Provide patient education as appropriate  Outcome: Progressing  Goal: Maintains/Returns to pre admission functional level  Description: INTERVENTIONS:  - Perform AM-PAC 6 Click Basic Mobility/ Daily Activity assessment daily.  - Set and communicate daily mobility goal to care team and patient/family/caregiver.   - Collaborate with rehabilitation services on mobility goals if consulted  - Perform Range of Motion  times a day.  - Reposition patient every  hours.  - Dangle patient  times a day  - Stand patient  times a day  - Ambulate patient  times a day  - Out of bed to chair  times a day   - Out of bed for meals  times a day  - Out of bed for toileting  - Record patient progress and toleration of activity level   Outcome: Progressing     Problem: DISCHARGE PLANNING  Goal: Discharge to home or other facility with appropriate resources  Description: INTERVENTIONS:  - Identify barriers to discharge w/patient and caregiver  - Arrange for needed discharge resources and transportation as appropriate  - Identify discharge learning needs (meds, wound care, etc.)  - Arrange for interpretive services to assist at discharge as needed  - Refer to Case Management Department for coordinating discharge planning if the patient needs post-hospital services based on physician/advanced practitioner order or complex needs related to functional  status, cognitive ability, or social support system  Outcome: Progressing     Problem: Knowledge Deficit  Goal: Patient/family/caregiver demonstrates understanding of disease process, treatment plan, medications, and discharge instructions  Description: Complete learning assessment and assess knowledge base.  Interventions:  - Provide teaching at level of understanding  - Provide teaching via preferred learning methods  Outcome: Progressing     Problem: Nutrition/Hydration-ADULT  Goal: Nutrient/Hydration intake appropriate for improving, restoring or maintaining nutritional needs  Description: Monitor and assess patient's nutrition/hydration status for malnutrition. Collaborate with interdisciplinary team and initiate plan and interventions as ordered.  Monitor patient's weight and dietary intake as ordered or per policy. Utilize nutrition screening tool and intervene as necessary. Determine patient's food preferences and provide high-protein, high-caloric foods as appropriate.     INTERVENTIONS:  - Monitor oral intake, urinary output, labs, and treatment plans  - Assess nutrition and hydration status and recommend course of action  - Evaluate amount of meals eaten  - Assist patient with eating if necessary   - Allow adequate time for meals  - Recommend/ encourage appropriate diets, oral nutritional supplements, and vitamin/mineral supplements  - Order, calculate, and assess calorie counts as needed  - Recommend, monitor, and adjust tube feedings and TPN/PPN based on assessed needs  - Assess need for intravenous fluids  - Provide specific nutrition/hydration education as appropriate  - Include patient/family/caregiver in decisions related to nutrition  Outcome: Progressing
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
## 2024-08-19 NOTE — PLAN OF CARE
Problem: SAFETY,RESTRAINT: NV/NON-SELF DESTRUCTIVE BEHAVIOR  Goal: Remains free of harm/injury (restraint for non violent/non self-detsructive behavior)  Description: INTERVENTIONS:  - Instruct patient/family regarding restraint use   - Assess and monitor physiologic and psychological status   - Provide interventions and comfort measures to meet assessed patient needs   - Identify and implement measures to help patient regain control  - Assess readiness for release of restraint   Outcome: Progressing  Goal: Returns to optimal restraint-free functioning  Description: INTERVENTIONS:  - Assess the patient's behavior and symptoms that indicate continued need for restraint  - Identify and implement measures to help patient regain control  - Assess readiness for release of restraint   Outcome: Progressing     Problem: Prexisting or High Potential for Compromised Skin Integrity  Goal: Skin integrity is maintained or improved  Description: INTERVENTIONS:  - Identify patients at risk for skin breakdown  - Assess and monitor skin integrity  - Assess and monitor nutrition and hydration status  - Monitor labs   - Assess for incontinence   - Turn and reposition patient  - Assist with mobility/ambulation  - Relieve pressure over bony prominences  - Avoid friction and shearing  - Provide appropriate hygiene as needed including keeping skin clean and dry  - Evaluate need for skin moisturizer/barrier cream  - Collaborate with interdisciplinary team   - Patient/family teaching  - Consider wound care consult   Outcome: Progressing     Problem: PAIN - ADULT  Goal: Verbalizes/displays adequate comfort level or baseline comfort level  Description: Interventions:  - Encourage patient to monitor pain and request assistance  - Assess pain using appropriate pain scale  - Administer analgesics based on type and severity of pain and evaluate response  - Implement non-pharmacological measures as appropriate and evaluate response  - Consider  cultural and social influences on pain and pain management  - Notify physician/advanced practitioner if interventions unsuccessful or patient reports new pain  Outcome: Progressing     Problem: INFECTION - ADULT  Goal: Absence or prevention of progression during hospitalization  Description: INTERVENTIONS:  - Assess and monitor for signs and symptoms of infection  - Monitor lab/diagnostic results  - Monitor all insertion sites, i.e. indwelling lines, tubes, and drains  - Monitor endotracheal if appropriate and nasal secretions for changes in amount and color  - [place] appropriate cooling/warming therapies per order  - Administer medications as ordered  - Instruct and encourage patient and family to use good hand hygiene technique  - Identify and instruct in appropriate isolation precautions for identified infection/condition  Outcome: Progressing  Goal: Absence of fever/infection during neutropenic period  Description: INTERVENTIONS:  - Monitor WBC    Outcome: Progressing     Problem: SAFETY ADULT  Goal: Patient will remain free of falls  Description: INTERVENTIONS:  - Educate patient/family on patient safety including physical limitations  - Instruct patient to call for assistance with activity   - Consult OT/PT to assist with strengthening/mobility   - Keep Call bell within reach  - Keep bed low and locked with side rails adjusted as appropriate  - Keep care items and personal belongings within reach  - Initiate and maintain comfort rounds  - Make Fall Risk Sign visible to staff  - Offer Toileting every 2 Hours, in advance of need  - Initiate/Maintain alarm  - Obtain necessary fall risk management equipment:   - Apply yellow socks and bracelet for high fall risk patients  - Consider moving patient to room near nurses station  Outcome: Progressing  Goal: Maintain or return to baseline ADL function  Description: INTERVENTIONS:  -  Assess patient's ability to carry out ADLs; assess patient's baseline for ADL  function and identify physical deficits which impact ability to perform ADLs (bathing, care of mouth/teeth, toileting, grooming, dressing, etc.)  - Assess/evaluate cause of self-care deficits   - Assess range of motion  - Assess patient's mobility; develop plan if impaired  - Assess patient's need for assistive devices and provide as appropriate  - Encourage maximum independence but intervene and supervise when necessary  - Involve family in performance of ADLs  - Assess for home care needs following discharge   - Consider OT consult to assist with ADL evaluation and planning for discharge  - Provide patient education as appropriate  Outcome: Progressing  Goal: Maintains/Returns to pre admission functional level  Description: INTERVENTIONS:  - Perform AM-PAC 6 Click Basic Mobility/ Daily Activity assessment daily.  - Set and communicate daily mobility goal to care team and patient/family/caregiver.   - Collaborate with rehabilitation services on mobility goals if consulted  - Perform Range of Motion 3 times a day.  - Reposition patient every 2 hours.  - Dangle patient 3 times a day  - Stand patient 3 times a day  - Ambulate patient 3 times a day  - Out of bed to chair 3 times a day   - Out of bed for meals 3 times a day  - Out of bed for toileting  - Record patient progress and toleration of activity level   Outcome: Progressing     Problem: DISCHARGE PLANNING  Goal: Discharge to home or other facility with appropriate resources  Description: INTERVENTIONS:  - Identify barriers to discharge w/patient and caregiver  - Arrange for needed discharge resources and transportation as appropriate  - Identify discharge learning needs (meds, wound care, etc.)  - Arrange for interpretive services to assist at discharge as needed  - Refer to Case Management Department for coordinating discharge planning if the patient needs post-hospital services based on physician/advanced practitioner order or complex needs related to  functional status, cognitive ability, or social support system  Outcome: Progressing     Problem: Knowledge Deficit  Goal: Patient/family/caregiver demonstrates understanding of disease process, treatment plan, medications, and discharge instructions  Description: Complete learning assessment and assess knowledge base.  Interventions:  - Provide teaching at level of understanding  - Provide teaching via preferred learning methods  Outcome: Progressing     Problem: Nutrition/Hydration-ADULT  Goal: Nutrient/Hydration intake appropriate for improving, restoring or maintaining nutritional needs  Description: Monitor and assess patient's nutrition/hydration status for malnutrition. Collaborate with interdisciplinary team and initiate plan and interventions as ordered.  Monitor patient's weight and dietary intake as ordered or per policy. Utilize nutrition screening tool and intervene as necessary. Determine patient's food preferences and provide high-protein, high-caloric foods as appropriate.     INTERVENTIONS:  - Monitor oral intake, urinary output, labs, and treatment plans  - Assess nutrition and hydration status and recommend course of action  - Evaluate amount of meals eaten  - Assist patient with eating if necessary   - Allow adequate time for meals  - Recommend/ encourage appropriate diets, oral nutritional supplements, and vitamin/mineral supplements  - Order, calculate, and assess calorie counts as needed  - Recommend, monitor, and adjust tube feedings and TPN/PPN based on assessed needs  - Assess need for intravenous fluids  - Provide specific nutrition/hydration education as appropriate  - Include patient/family/caregiver in decisions related to nutrition  Outcome: Progressing
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
## 2024-08-20 PROCEDURE — 97129 THER IVNTJ 1ST 15 MIN: CPT

## 2024-08-20 PROCEDURE — 97168 OT RE-EVAL EST PLAN CARE: CPT

## 2024-08-20 PROCEDURE — 99232 SBSQ HOSP IP/OBS MODERATE 35: CPT | Performed by: SURGERY

## 2024-08-20 RX ADMIN — METHOCARBAMOL TABLETS 750 MG: 750 TABLET, COATED ORAL at 12:48

## 2024-08-20 RX ADMIN — CHLORHEXIDINE GLUCONATE 15 ML: 1.2 RINSE ORAL at 08:10

## 2024-08-20 RX ADMIN — Medication 7.5 MG: at 19:29

## 2024-08-20 RX ADMIN — POLYETHYLENE GLYCOL 3350 17 G: 17 POWDER, FOR SOLUTION ORAL at 08:11

## 2024-08-20 RX ADMIN — BACITRACIN 1 LARGE APPLICATION: 500 OINTMENT TOPICAL at 17:14

## 2024-08-20 RX ADMIN — ACETAMINOPHEN 975 MG: 325 TABLET, FILM COATED ORAL at 05:07

## 2024-08-20 RX ADMIN — METHOCARBAMOL TABLETS 750 MG: 750 TABLET, COATED ORAL at 05:06

## 2024-08-20 RX ADMIN — GABAPENTIN 300 MG: 300 CAPSULE ORAL at 21:25

## 2024-08-20 RX ADMIN — SENNOSIDES, DOCUSATE SODIUM 2 TABLET: 8.6; 5 TABLET ORAL at 08:09

## 2024-08-20 RX ADMIN — BACITRACIN 1 LARGE APPLICATION: 500 OINTMENT TOPICAL at 08:10

## 2024-08-20 RX ADMIN — ACETAMINOPHEN 975 MG: 325 TABLET, FILM COATED ORAL at 21:25

## 2024-08-20 RX ADMIN — GABAPENTIN 300 MG: 300 CAPSULE ORAL at 08:09

## 2024-08-20 RX ADMIN — QUETIAPINE FUMARATE 12.5 MG: 25 TABLET ORAL at 21:25

## 2024-08-20 RX ADMIN — APIXABAN 5 MG: 5 TABLET, FILM COATED ORAL at 08:09

## 2024-08-20 RX ADMIN — APIXABAN 5 MG: 5 TABLET, FILM COATED ORAL at 17:14

## 2024-08-20 RX ADMIN — SENNOSIDES, DOCUSATE SODIUM 2 TABLET: 8.6; 5 TABLET ORAL at 17:14

## 2024-08-20 RX ADMIN — NICOTINE 14 MG: 14 PATCH, EXTENDED RELEASE TRANSDERMAL at 08:10

## 2024-08-20 RX ADMIN — METHOCARBAMOL TABLETS 750 MG: 750 TABLET, COATED ORAL at 17:14

## 2024-08-20 RX ADMIN — GABAPENTIN 300 MG: 300 CAPSULE ORAL at 15:42

## 2024-08-20 RX ADMIN — ACETAMINOPHEN 975 MG: 325 TABLET, FILM COATED ORAL at 15:43
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
## 2024-08-20 NOTE — CASE MANAGEMENT
Case Management Progress Note    Patient name Anmol Meza Jr.  Location W /W -01 MRN [id number]  : 1990 Date 2024       LOS (days): 15  Geometric Mean LOS (GMLOS) (days):   Days to GMLOS:        OBJECTIVE:        Current admission status: Inpatient  Preferred Pharmacy: No Pharmacies Listed  Primary Care Provider: Ulysses Agpaoa    Primary Insurance: [employer] [facility]  Secondary Insurance:     PROGRESS NOTE: JAYDEN spoke with sister Mily, requested email address to send TBI resources to. Mily's email is [email or web address]. JAYDEN will email [place] TBI resources to Mily
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
## 2024-08-20 NOTE — ASSESSMENT & PLAN NOTE
Per sister, Pt. Has longstanding h/o illicit drug use of multiple agents (heroin,methemphatamine, fentanyl)  UDS + benzo, amph/meth, THC, fentanyl; Ethanol 0  Monitor for withdrawal symptoms.    weight bearing as tolerated/no sports/gym/no heavy lifting/elevate extremity/no tub baths
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
## 2024-08-20 NOTE — PLAN OF CARE
Problem: SAFETY,RESTRAINT: NV/NON-SELF DESTRUCTIVE BEHAVIOR  Goal: Remains free of harm/injury (restraint for non violent/non self-detsructive behavior)  Description: INTERVENTIONS:  - Instruct patient/family regarding restraint use   - Assess and monitor physiologic and psychological status   - Provide interventions and comfort measures to meet assessed patient needs   - Identify and implement measures to help patient regain control  - Assess readiness for release of restraint   Outcome: Progressing  Goal: Returns to optimal restraint-free functioning  Description: INTERVENTIONS:  - Assess the patient's behavior and symptoms that indicate continued need for restraint  - Identify and implement measures to help patient regain control  - Assess readiness for release of restraint   Outcome: Progressing     Problem: Prexisting or High Potential for Compromised Skin Integrity  Goal: Skin integrity is maintained or improved  Description: INTERVENTIONS:  - Identify patients at risk for skin breakdown  - Assess and monitor skin integrity  - Assess and monitor nutrition and hydration status  - Monitor labs   - Assess for incontinence   - Turn and reposition patient  - Assist with mobility/ambulation  - Relieve pressure over bony prominences  - Avoid friction and shearing  - Provide appropriate hygiene as needed including keeping skin clean and dry  - Evaluate need for skin moisturizer/barrier cream  - Collaborate with interdisciplinary team   - Patient/family teaching  - Consider wound care consult   Outcome: Progressing     Problem: INFECTION - ADULT  Goal: Absence or prevention of progression during hospitalization  Description: INTERVENTIONS:  - Assess and monitor for signs and symptoms of infection  - Monitor lab/diagnostic results  - Monitor all insertion sites, i.e. indwelling lines, tubes, and drains  - Monitor endotracheal if appropriate and nasal secretions for changes in amount and color  - [place] appropriate  cooling/warming therapies per order  - Administer medications as ordered  - Instruct and encourage patient and family to use good hand hygiene technique  - Identify and instruct in appropriate isolation precautions for identified infection/condition  Outcome: Progressing  Goal: Absence of fever/infection during neutropenic period  Description: INTERVENTIONS:  - Monitor WBC    Outcome: Progressing     Problem: SAFETY ADULT  Goal: Patient will remain free of falls  Description: INTERVENTIONS:  - Educate patient/family on patient safety including physical limitations  - Instruct patient to call for assistance with activity   - Consult OT/PT to assist with strengthening/mobility   - Keep Call bell within reach  - Keep bed low and locked with side rails adjusted as appropriate  - Keep care items and personal belongings within reach  - Initiate and maintain comfort rounds  - Make Fall Risk Sign visible to staff  - Initiate/Maintain bed alarm  - Apply yellow socks and bracelet for high fall risk patients  - Consider moving patient to room near nurses station  Outcome: Progressing  Goal: Maintain or return to baseline ADL function  Description: INTERVENTIONS:  -  Assess patient's ability to carry out ADLs; assess patient's baseline for ADL function and identify physical deficits which impact ability to perform ADLs (bathing, care of mouth/teeth, toileting, grooming, dressing, etc.)  - Assess/evaluate cause of self-care deficits   - Assess range of motion  - Assess patient's mobility; develop plan if impaired  - Assess patient's need for assistive devices and provide as appropriate  - Encourage maximum independence but intervene and supervise when necessary  - Involve family in performance of ADLs  - Assess for home care needs following discharge   - Consider OT consult to assist with ADL evaluation and planning for discharge  - Provide patient education as appropriate  Outcome: Progressing  Goal: Maintains/Returns to pre  admission functional level  Description: INTERVENTIONS:  - Perform AM-PAC 6 Click Basic Mobility/ Daily Activity assessment daily.  - Set and communicate daily mobility goal to care team and patient/family/caregiver.   - Collaborate with rehabilitation services on mobility goals if consulted  - Out of bed for toileting  - Record patient progress and toleration of activity level   Outcome: Progressing     Problem: DISCHARGE PLANNING  Goal: Discharge to home or other facility with appropriate resources  Description: INTERVENTIONS:  - Identify barriers to discharge w/patient and caregiver  - Arrange for needed discharge resources and transportation as appropriate  - Identify discharge learning needs (meds, wound care, etc.)  - Arrange for interpretive services to assist at discharge as needed  - Refer to Case Management Department for coordinating discharge planning if the patient needs post-hospital services based on physician/advanced practitioner order or complex needs related to functional status, cognitive ability, or social support system  Outcome: Progressing     Problem: Knowledge Deficit  Goal: Patient/family/caregiver demonstrates understanding of disease process, treatment plan, medications, and discharge instructions  Description: Complete learning assessment and assess knowledge base.  Interventions:  - Provide teaching at level of understanding  - Provide teaching via preferred learning methods  Outcome: Progressing     Problem: Nutrition/Hydration-ADULT  Goal: Nutrient/Hydration intake appropriate for improving, restoring or maintaining nutritional needs  Description: Monitor and assess patient's nutrition/hydration status for malnutrition. Collaborate with interdisciplinary team and initiate plan and interventions as ordered.  Monitor patient's weight and dietary intake as ordered or per policy. Utilize nutrition screening tool and intervene as necessary. Determine patient's food preferences and provide  high-protein, high-caloric foods as appropriate.     INTERVENTIONS:  - Monitor oral intake, urinary output, labs, and treatment plans  - Assess nutrition and hydration status and recommend course of action  - Evaluate amount of meals eaten  - Assist patient with eating if necessary   - Allow adequate time for meals  - Recommend/ encourage appropriate diets, oral nutritional supplements, and vitamin/mineral supplements  - Order, calculate, and assess calorie counts as needed  - Recommend, monitor, and adjust tube feedings and TPN/PPN based on assessed needs  - Assess need for intravenous fluids  - Provide specific nutrition/hydration education as appropriate  - Include patient/family/caregiver in decisions related to nutrition  Outcome: Progressing

## 2024-08-20 NOTE — CASE MANAGEMENT
Case Management Progress Note    Patient name Anmol Meza Jr.  Location W /W -01 MRN [id number]  : 1990 Date 2024       LOS (days): 15  Geometric Mean LOS (GMLOS) (days):   Days to GMLOS:        OBJECTIVE:        Current admission status: Inpatient  Preferred Pharmacy: No Pharmacies Listed  Primary Care Provider: Ulysses Agpaoa    Primary Insurance: [employer] [facility]  Secondary Insurance:     PROGRESS NOTE: OT worked with patient, recommending 24/ supervision. Sister had previously stated she is unable to provide that level of supervision due to she and her spouse working. One accepting facility, [facility], choice list emailed to sister at [email or web address]. Left message for sister about attempting auth for STR at [facility].

## 2024-08-20 NOTE — PLAN OF CARE
Problem: Prexisting or High Potential for Compromised Skin Integrity  Goal: Skin integrity is maintained or improved  Description: INTERVENTIONS:  - Identify patients at risk for skin breakdown  - Assess and monitor skin integrity  - Assess and monitor nutrition and hydration status  - Monitor labs   - Assess for incontinence   - Turn and reposition patient  - Assist with mobility/ambulation  - Relieve pressure over bony prominences  - Avoid friction and shearing  - Provide appropriate hygiene as needed including keeping skin clean and dry  - Evaluate need for skin moisturizer/barrier cream  - Collaborate with interdisciplinary team   - Patient/family teaching  - Consider wound care consult   Outcome: Progressing     Problem: PAIN - ADULT  Goal: Verbalizes/displays adequate comfort level or baseline comfort level  Description: Interventions:  - Encourage patient to monitor pain and request assistance  - Assess pain using appropriate pain scale  - Administer analgesics based on type and severity of pain and evaluate response  - Implement non-pharmacological measures as appropriate and evaluate response  - Consider cultural and social influences on pain and pain management  - Notify physician/advanced practitioner if interventions unsuccessful or patient reports new pain  Outcome: Progressing     Problem: INFECTION - ADULT  Goal: Absence or prevention of progression during hospitalization  Description: INTERVENTIONS:  - Assess and monitor for signs and symptoms of infection  - Monitor lab/diagnostic results  - Monitor all insertion sites, i.e. indwelling lines, tubes, and drains  - Monitor endotracheal if appropriate and nasal secretions for changes in amount and color  - [place] appropriate cooling/warming therapies per order  - Administer medications as ordered  - Instruct and encourage patient and family to use good hand hygiene technique  - Identify and instruct in appropriate isolation precautions for  identified infection/condition  Outcome: Progressing  Goal: Absence of fever/infection during neutropenic period  Description: INTERVENTIONS:  - Monitor WBC    Outcome: Progressing     Problem: DISCHARGE PLANNING  Goal: Discharge to home or other facility with appropriate resources  Description: INTERVENTIONS:  - Identify barriers to discharge w/patient and caregiver  - Arrange for needed discharge resources and transportation as appropriate  - Identify discharge learning needs (meds, wound care, etc.)  - Arrange for interpretive services to assist at discharge as needed  - Refer to Case Management Department for coordinating discharge planning if the patient needs post-hospital services based on physician/advanced practitioner order or complex needs related to functional status, cognitive ability, or social support system  Outcome: Progressing

## 2024-08-20 NOTE — CASE MANAGEMENT
Case Management Progress Note    Patient name Anmol Meza Jr.  Location W /W -01 MRN [id number]  : 1990 Date 2024       LOS (days): 15  Geometric Mean LOS (GMLOS) (days):   Days to GMLOS:        OBJECTIVE:        Current admission status: Inpatient  Preferred Pharmacy: No Pharmacies Listed  Primary Care Provider: Ulysses Agpaoa    Primary Insurance: [employer] [facility]  Secondary Insurance:     PROGRESS NOTE:    CC informed that patient is not being recommended for rehab at this time, and after review of referrals for TBI rehabs, there are  none accepting that will accept patient's insurance.     CC then started search for possible TBI specialized adult day programs to assist. CC contacted [facility] in [place] to inquire if there are any open TBI day programs in the state of [place] since patient's insurance is [employer]. CC was advised to contact [facility][phone].    CC was able to speak with Keke who informed CC that there are no TBI day programs in [facility], patient's County of listed residency. CC was advised of programs as close to [facility]. [facility] in [facility]), [facility] in [facility], [place](which might be closer) and [facility].     Keke also mentioned that if family were to call the number reported above, they would be provided information about the [place] Fund to help pay for some services for those with TBIs.    CC will inform CM covering the case about findings to provide to patient's sister.     CC was also advised by [facility] program that patient is refusing placement to D[facility]A citing that he might be going with his uncle to [place]. [facility] also sights that [facility] will be hard to place from [place]. ROMULO to continue to follow.

## 2024-08-20 NOTE — CASE MANAGEMENT
[facility] Support Center received request for authorization from Care Manager.  Authorization request submitted for: [facility]  Facility Name: [facility] NPI: [id number]  Facility MD: Redd Hernandez NPI: [id number]  Authorization initiated by contacting insurance: [employer]   Via: Yext   Clinicals submitted via Portal attachment   Pending Reference #: [id number]     Care Manager notified: Faith Navarro     Updates to authorization status will be noted in chart. Please reach out to CM for updates on any clinical information.
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
## 2024-08-21 PROCEDURE — 99232 SBSQ HOSP IP/OBS MODERATE 35: CPT | Performed by: SURGERY

## 2024-08-21 RX ADMIN — GABAPENTIN 300 MG: 300 CAPSULE ORAL at 09:45

## 2024-08-21 RX ADMIN — ACETAMINOPHEN 975 MG: 325 TABLET, FILM COATED ORAL at 14:34

## 2024-08-21 RX ADMIN — BACITRACIN 1 LARGE APPLICATION: 500 OINTMENT TOPICAL at 17:54

## 2024-08-21 RX ADMIN — METHOCARBAMOL TABLETS 750 MG: 750 TABLET, COATED ORAL at 04:56

## 2024-08-21 RX ADMIN — Medication 7.5 MG: at 18:40

## 2024-08-21 RX ADMIN — GABAPENTIN 300 MG: 300 CAPSULE ORAL at 16:41

## 2024-08-21 RX ADMIN — GABAPENTIN 300 MG: 300 CAPSULE ORAL at 21:14

## 2024-08-21 RX ADMIN — METHOCARBAMOL TABLETS 750 MG: 750 TABLET, COATED ORAL at 17:54

## 2024-08-21 RX ADMIN — BACITRACIN 1 LARGE APPLICATION: 500 OINTMENT TOPICAL at 09:45

## 2024-08-21 RX ADMIN — NICOTINE 14 MG: 14 PATCH, EXTENDED RELEASE TRANSDERMAL at 09:46

## 2024-08-21 RX ADMIN — Medication 10 MG: at 09:58

## 2024-08-21 RX ADMIN — Medication 7.5 MG: at 09:42

## 2024-08-21 RX ADMIN — ACETAMINOPHEN 975 MG: 325 TABLET, FILM COATED ORAL at 21:14

## 2024-08-21 RX ADMIN — SENNOSIDES, DOCUSATE SODIUM 2 TABLET: 8.6; 5 TABLET ORAL at 17:54

## 2024-08-21 RX ADMIN — METHOCARBAMOL TABLETS 750 MG: 750 TABLET, COATED ORAL at 12:12

## 2024-08-21 RX ADMIN — ACETAMINOPHEN 975 MG: 325 TABLET, FILM COATED ORAL at 04:56

## 2024-08-21 RX ADMIN — Medication 7.5 MG: at 14:35

## 2024-08-21 RX ADMIN — APIXABAN 5 MG: 5 TABLET, FILM COATED ORAL at 17:54

## 2024-08-21 RX ADMIN — QUETIAPINE FUMARATE 12.5 MG: 25 TABLET ORAL at 21:14

## 2024-08-21 RX ADMIN — APIXABAN 5 MG: 5 TABLET, FILM COATED ORAL at 09:45
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
## 2024-08-21 NOTE — PLAN OF CARE
Problem: SAFETY,RESTRAINT: NV/NON-SELF DESTRUCTIVE BEHAVIOR  Goal: Remains free of harm/injury (restraint for non violent/non self-detsructive behavior)  Description: INTERVENTIONS:  - Instruct patient/family regarding restraint use   - Assess and monitor physiologic and psychological status   - Provide interventions and comfort measures to meet assessed patient needs   - Identify and implement measures to help patient regain control  - Assess readiness for release of restraint   Outcome: Progressing  Goal: Returns to optimal restraint-free functioning  Description: INTERVENTIONS:  - Assess the patient's behavior and symptoms that indicate continued need for restraint  - Identify and implement measures to help patient regain control  - Assess readiness for release of restraint   Outcome: Progressing     Problem: Prexisting or High Potential for Compromised Skin Integrity  Goal: Skin integrity is maintained or improved  Description: INTERVENTIONS:  - Identify patients at risk for skin breakdown  - Assess and monitor skin integrity  - Assess and monitor nutrition and hydration status  - Monitor labs   - Assess for incontinence   - Turn and reposition patient  - Assist with mobility/ambulation  - Relieve pressure over bony prominences  - Avoid friction and shearing  - Provide appropriate hygiene as needed including keeping skin clean and dry  - Evaluate need for skin moisturizer/barrier cream  - Collaborate with interdisciplinary team   - Patient/family teaching  - Consider wound care consult   Outcome: Progressing     Problem: PAIN - ADULT  Goal: Verbalizes/displays adequate comfort level or baseline comfort level  Description: Interventions:  - Encourage patient to monitor pain and request assistance  - Assess pain using appropriate pain scale  - Administer analgesics based on type and severity of pain and evaluate response  - Implement non-pharmacological measures as appropriate and evaluate response  - Consider  cultural and social influences on pain and pain management  - Notify physician/advanced practitioner if interventions unsuccessful or patient reports new pain  Outcome: Progressing     Problem: INFECTION - ADULT  Goal: Absence or prevention of progression during hospitalization  Description: INTERVENTIONS:  - Assess and monitor for signs and symptoms of infection  - Monitor lab/diagnostic results  - Monitor all insertion sites, i.e. indwelling lines, tubes, and drains  - Monitor endotracheal if appropriate and nasal secretions for changes in amount and color  - [place] appropriate cooling/warming therapies per order  - Administer medications as ordered  - Instruct and encourage patient and family to use good hand hygiene technique  - Identify and instruct in appropriate isolation precautions for identified infection/condition  Outcome: Progressing  Goal: Absence of fever/infection during neutropenic period  Description: INTERVENTIONS:  - Monitor WBC    Outcome: Progressing     Problem: SAFETY ADULT  Goal: Patient will remain free of falls  Description: INTERVENTIONS:  - Educate patient/family on patient safety including physical limitations  - Instruct patient to call for assistance with activity   - Consult OT/PT to assist with strengthening/mobility   - Keep Call bell within reach  - Keep bed low and locked with side rails adjusted as appropriate  - Keep care items and personal belongings within reach  - Initiate and maintain comfort rounds  - Make Fall Risk Sign visible to staff  - Offer Toileting every 2 Hours, in advance of need  - Initiate/Maintain alarm  - Obtain necessary fall risk management equipment:   - Apply yellow socks and bracelet for high fall risk patients  - Consider moving patient to room near nurses station  Outcome: Progressing  Goal: Maintain or return to baseline ADL function  Description: INTERVENTIONS:  -  Assess patient's ability to carry out ADLs; assess patient's baseline for ADL  function and identify physical deficits which impact ability to perform ADLs (bathing, care of mouth/teeth, toileting, grooming, dressing, etc.)  - Assess/evaluate cause of self-care deficits   - Assess range of motion  - Assess patient's mobility; develop plan if impaired  - Assess patient's need for assistive devices and provide as appropriate  - Encourage maximum independence but intervene and supervise when necessary  - Involve family in performance of ADLs  - Assess for home care needs following discharge   - Consider OT consult to assist with ADL evaluation and planning for discharge  - Provide patient education as appropriate  Outcome: Progressing  Goal: Maintains/Returns to pre admission functional level  Description: INTERVENTIONS:  - Perform AM-PAC 6 Click Basic Mobility/ Daily Activity assessment daily.  - Set and communicate daily mobility goal to care team and patient/family/caregiver.   - Collaborate with rehabilitation services on mobility goals if consulted  - Perform Range of Motion 3 times a day.  - Reposition patient every 2 hours.  - Dangle patient 3 times a day  - Stand patient 3 times a day  - Ambulate patient 3 times a day  - Out of bed to chair 3 times a day   - Out of bed for meals 3 times a day  - Out of bed for toileting  - Record patient progress and toleration of activity level   Outcome: Progressing     Problem: DISCHARGE PLANNING  Goal: Discharge to home or other facility with appropriate resources  Description: INTERVENTIONS:  - Identify barriers to discharge w/patient and caregiver  - Arrange for needed discharge resources and transportation as appropriate  - Identify discharge learning needs (meds, wound care, etc.)  - Arrange for interpretive services to assist at discharge as needed  - Refer to Case Management Department for coordinating discharge planning if the patient needs post-hospital services based on physician/advanced practitioner order or complex needs related to  functional status, cognitive ability, or social support system  Outcome: Progressing     Problem: Knowledge Deficit  Goal: Patient/family/caregiver demonstrates understanding of disease process, treatment plan, medications, and discharge instructions  Description: Complete learning assessment and assess knowledge base.  Interventions:  - Provide teaching at level of understanding  - Provide teaching via preferred learning methods  Outcome: Progressing     Problem: Nutrition/Hydration-ADULT  Goal: Nutrient/Hydration intake appropriate for improving, restoring or maintaining nutritional needs  Description: Monitor and assess patient's nutrition/hydration status for malnutrition. Collaborate with interdisciplinary team and initiate plan and interventions as ordered.  Monitor patient's weight and dietary intake as ordered or per policy. Utilize nutrition screening tool and intervene as necessary. Determine patient's food preferences and provide high-protein, high-caloric foods as appropriate.     INTERVENTIONS:  - Monitor oral intake, urinary output, labs, and treatment plans  - Assess nutrition and hydration status and recommend course of action  - Evaluate amount of meals eaten  - Assist patient with eating if necessary   - Allow adequate time for meals  - Recommend/ encourage appropriate diets, oral nutritional supplements, and vitamin/mineral supplements  - Order, calculate, and assess calorie counts as needed  - Recommend, monitor, and adjust tube feedings and TPN/PPN based on assessed needs  - Assess need for intravenous fluids  - Provide specific nutrition/hydration education as appropriate  - Include patient/family/caregiver in decisions related to nutrition  Outcome: Progressing

## 2024-08-21 NOTE — PLAN OF CARE
Problem: SAFETY,RESTRAINT: NV/NON-SELF DESTRUCTIVE BEHAVIOR  Goal: Remains free of harm/injury (restraint for non violent/non self-detsructive behavior)  Description: INTERVENTIONS:  - Instruct patient/family regarding restraint use   - Assess and monitor physiologic and psychological status   - Provide interventions and comfort measures to meet assessed patient needs   - Identify and implement measures to help patient regain control  - Assess readiness for release of restraint   Outcome: Progressing  Goal: Returns to optimal restraint-free functioning  Description: INTERVENTIONS:  - Assess the patient's behavior and symptoms that indicate continued need for restraint  - Identify and implement measures to help patient regain control  - Assess readiness for release of restraint   Outcome: Progressing     Problem: Prexisting or High Potential for Compromised Skin Integrity  Goal: Skin integrity is maintained or improved  Description: INTERVENTIONS:  - Identify patients at risk for skin breakdown  - Assess and monitor skin integrity  - Assess and monitor nutrition and hydration status  - Monitor labs   - Assess for incontinence   - Turn and reposition patient  - Assist with mobility/ambulation  - Relieve pressure over bony prominences  - Avoid friction and shearing  - Provide appropriate hygiene as needed including keeping skin clean and dry  - Evaluate need for skin moisturizer/barrier cream  - Collaborate with interdisciplinary team   - Patient/family teaching  - Consider wound care consult   Outcome: Progressing     Problem: PAIN - ADULT  Goal: Verbalizes/displays adequate comfort level or baseline comfort level  Description: Interventions:  - Encourage patient to monitor pain and request assistance  - Assess pain using appropriate pain scale  - Administer analgesics based on type and severity of pain and evaluate response  - Implement non-pharmacological measures as appropriate and evaluate response  - Consider  cultural and social influences on pain and pain management  - Notify physician/advanced practitioner if interventions unsuccessful or patient reports new pain  Outcome: Progressing     Problem: INFECTION - ADULT  Goal: Absence or prevention of progression during hospitalization  Description: INTERVENTIONS:  - Assess and monitor for signs and symptoms of infection  - Monitor lab/diagnostic results  - Monitor all insertion sites, i.e. indwelling lines, tubes, and drains  - Monitor endotracheal if appropriate and nasal secretions for changes in amount and color  - [place] appropriate cooling/warming therapies per order  - Administer medications as ordered  - Instruct and encourage patient and family to use good hand hygiene technique  - Identify and instruct in appropriate isolation precautions for identified infection/condition  Outcome: Progressing  Goal: Absence of fever/infection during neutropenic period  Description: INTERVENTIONS:  - Monitor WBC    Outcome: Progressing     Problem: SAFETY ADULT  Goal: Patient will remain free of falls  Description: INTERVENTIONS:  - Educate patient/family on patient safety including physical limitations  - Instruct patient to call for assistance with activity   - Consult OT/PT to assist with strengthening/mobility   - Keep Call bell within reach  - Keep bed low and locked with side rails adjusted as appropriate  - Keep care items and personal belongings within reach  - Initiate and maintain comfort rounds  - Make Fall Risk Sign visible to staff  - Offer Toileting every  Hours, in advance of need  - Initiate/Maintain alarm  - Obtain necessary fall risk management equipment:   - Apply yellow socks and bracelet for high fall risk patients  - Consider moving patient to room near nurses station  Outcome: Progressing  Goal: Maintain or return to baseline ADL function  Description: INTERVENTIONS:  -  Assess patient's ability to carry out ADLs; assess patient's baseline for ADL  function and identify physical deficits which impact ability to perform ADLs (bathing, care of mouth/teeth, toileting, grooming, dressing, etc.)  - Assess/evaluate cause of self-care deficits   - Assess range of motion  - Assess patient's mobility; develop plan if impaired  - Assess patient's need for assistive devices and provide as appropriate  - Encourage maximum independence but intervene and supervise when necessary  - Involve family in performance of ADLs  - Assess for home care needs following discharge   - Consider OT consult to assist with ADL evaluation and planning for discharge  - Provide patient education as appropriate  Outcome: Progressing  Goal: Maintains/Returns to pre admission functional level  Description: INTERVENTIONS:  - Perform AM-PAC 6 Click Basic Mobility/ Daily Activity assessment daily.  - Set and communicate daily mobility goal to care team and patient/family/caregiver.   - Collaborate with rehabilitation services on mobility goals if consulted  - Perform Range of Motion  times a day.  - Reposition patient every hours.  - Dangle patient  times a day  - Stand patient  times a day  - Ambulate patient  times a day  - Out of bed to chair  times a day   - Out of bed for meals  times a day  - Out of bed for toileting  - Record patient progress and toleration of activity level   Outcome: Progressing     Problem: DISCHARGE PLANNING  Goal: Discharge to home or other facility with appropriate resources  Description: INTERVENTIONS:  - Identify barriers to discharge w/patient and caregiver  - Arrange for needed discharge resources and transportation as appropriate  - Identify discharge learning needs (meds, wound care, etc.)  - Arrange for interpretive services to assist at discharge as needed  - Refer to Case Management Department for coordinating discharge planning if the patient needs post-hospital services based on physician/advanced practitioner order or complex needs related to functional  status, cognitive ability, or social support system  Outcome: Progressing     Problem: Knowledge Deficit  Goal: Patient/family/caregiver demonstrates understanding of disease process, treatment plan, medications, and discharge instructions  Description: Complete learning assessment and assess knowledge base.  Interventions:  - Provide teaching at level of understanding  - Provide teaching via preferred learning methods  Outcome: Progressing     Problem: Nutrition/Hydration-ADULT  Goal: Nutrient/Hydration intake appropriate for improving, restoring or maintaining nutritional needs  Description: Monitor and assess patient's nutrition/hydration status for malnutrition. Collaborate with interdisciplinary team and initiate plan and interventions as ordered.  Monitor patient's weight and dietary intake as ordered or per policy. Utilize nutrition screening tool and intervene as necessary. Determine patient's food preferences and provide high-protein, high-caloric foods as appropriate.     INTERVENTIONS:  - Monitor oral intake, urinary output, labs, and treatment plans  - Assess nutrition and hydration status and recommend course of action  - Evaluate amount of meals eaten  - Assist patient with eating if necessary   - Allow adequate time for meals  - Recommend/ encourage appropriate diets, oral nutritional supplements, and vitamin/mineral supplements  - Order, calculate, and assess calorie counts as needed  - Recommend, monitor, and adjust tube feedings and TPN/PPN based on assessed needs  - Assess need for intravenous fluids  - Provide specific nutrition/hydration education as appropriate  - Include patient/family/caregiver in decisions related to nutrition  Outcome: Progressing

## 2024-08-21 NOTE — CASE MANAGEMENT
"Support Center has received INTENT TO DENY for SNF Authorization.   Insurance: [employer]   Called in by Rep: Paola  Intent to Deny Reason: Does not meet CMS guidelines; \"needs can be met another level of care.\"  Facility: [facility]   Ref #: [id number]   Peer to Peer Phone#: [phone] Deadline: Not Provided   Appeal P#: [phone]    Please notify Discharge Support if P2P will not be completed.     Care Manager notified: Faith Navarro     Please reach out to  for updates on any clinical information.   "

## 2024-08-21 NOTE — ASSESSMENT & PLAN NOTE
Per sister, Pt. Has longstanding h/o illicit drug use of multiple agents (heroin,methemphatamine, fentanyl)  UDS + benzo, amph/meth, THC, fentanyl; Ethanol 0  Continue to monitor

## 2024-08-21 NOTE — CASE MANAGEMENT
"   Case Management Progress Note    Patient name Anmol Meza Jr.  Location W /W -01 MRN [id number]  : 1990 Date 2024       LOS (days): 16  Geometric Mean LOS (GMLOS) (days):   Days to GMLOS:        OBJECTIVE:        Current admission status: Inpatient  Preferred Pharmacy: No Pharmacies Listed  Primary Care Provider: Ulysses Agpaoa    Primary Insurance: [facility] [place]  Secondary Insurance:     PROGRESS NOTE:    Weekly Care Management Length of Stay Review     Current LOS: 16 Days    Most Recent Labs:   No results found for: \"WBC\", \"HGB\", \"HCT\", \"PLT\", \"BANDSPCT\", \"SODIUM\", \"K\", \"CL\", \"CO2\", \"BUN\", \"CREATININE\", \"GLUC\", \"ALKPHOS\", \"ALT\", \"AST\", \"AMMONIA\", \"ALB\", \"TBILI\", \"LIPASE\", \"CHOLESTEROL\", \"HDL\", \"LDLCALC\", \"TRIG\", \"HGBA1C\", \"TROPONINI\", \"INR\"    Most Recent Vitals:   Vitals:    24 1500   BP: 106/68   Pulse:    Resp: 18   Temp: 98 °F (36.7 °C)   SpO2:         Identified Barriers to Discharge/Discharge Goals/Care Management Interventions: Patient medically stable. Social issues for dc. Capacity in question after needing 24/7 care.     Intended Discharge Disposition: Referrals made to Acute rehabs denied. Patient no longer candidate reportedly for drug and alcohol now due to needing 24/7 supervision and not having capacity. Request for rehab auth and then senior care auth if rehab auth denied    Expected Discharge Date:  TBD      "
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
## 2024-08-21 NOTE — QUICK NOTE
"- Patient found not to have capacity by Neuropsychiatry  - Patient can NOT leave AMA, continue medical care  - Called patient's sister, Mily, and updated her. She states the patient has had bipolar disorder since he was little. He has been calling her and making strange comments about being in a gang and needing to leave the hospital to go to [place]. He also reportedly told her that he is feeling very isolated and depressed and \"if there was a gun, I would shoot myself\"  - Dr. Rodney and I spoke with the patient and he adamantly denies suicidal ideation or plan.  - Formal psychiatry consult placed   - Patient has been cooperative and has not expressed any intent to leave or hurt himself to medical staff.   - Continuous observation virtually at this time as he has not expressed SI. Recommend starting in person 1:1 if this changes  - Messaged nursing to move patient's room closer to the nursing station to keep a closer eye on the patient  - Notified sister that the insurance company denied SNF placement. Called Peer to Peer and left a voicemail for call back.  -  consult for the patient  - Continue to monitor  - Update/ consent patient's sister for all medical changes  "

## 2024-08-21 NOTE — CASE MANAGEMENT
Case Management Progress Note    Patient name Anmol Meza Jr.  Location W /W -01 MRN [id number]  : 1990 Date 2024       LOS (days): 16  Geometric Mean LOS (GMLOS) (days):   Days to GMLOS:        OBJECTIVE:        Current admission status: Inpatient  Preferred Pharmacy: No Pharmacies Listed  Primary Care Provider: Ulysses Agpaoa    Primary Insurance: [employer] [facility]  Secondary Insurance:     PROGRESS NOTE: CM spoke with patient's sister Mily on the phone about potential subacute rehab bed at [facility]. CM did share that this is dependent on insurance authorization. Sister is in agreement, but is concerned patient will be able to check himself out once he gets there.She requested a capacity eval. She also shared concerns about suicidal statements he has been making and stated she would like a psych eval, and that she has requested this previously. JAYDEN shared these concerns and requests with trauma AP. Auth pending for [facility] at this time

## 2024-08-22 LAB
ANION GAP SERPL CALCULATED.3IONS-SCNC: 6 MMOL/L (ref 4–13)
ANION GAP SERPL CALCULATED.3IONS-SCNC: 6 MMOL/L (ref 4–13)
BASOPHILS # BLD AUTO: 0.15 THOUSANDS/ÂΜL (ref 0–0.1)
BASOPHILS # BLD AUTO: 0.15 THOUSANDS/ÂΜL (ref 0–0.1)
BASOPHILS NFR BLD AUTO: 2 % (ref 0–1)
BASOPHILS NFR BLD AUTO: 2 % (ref 0–1)
BUN SERPL-MCNC: 19 MG/DL (ref 5–25)
BUN SERPL-MCNC: 19 MG/DL (ref 5–25)
CALCIUM SERPL-MCNC: 9.3 MG/DL (ref 8.4–10.2)
CALCIUM SERPL-MCNC: 9.3 MG/DL (ref 8.4–10.2)
CHLORIDE SERPL-SCNC: 100 MMOL/L (ref 96–108)
CHLORIDE SERPL-SCNC: 100 MMOL/L (ref 96–108)
CO2 SERPL-SCNC: 30 MMOL/L (ref 21–32)
CO2 SERPL-SCNC: 30 MMOL/L (ref 21–32)
CREAT SERPL-MCNC: 0.74 MG/DL (ref 0.6–1.3)
CREAT SERPL-MCNC: 0.74 MG/DL (ref 0.6–1.3)
EOSINOPHIL # BLD AUTO: 0.45 THOUSAND/ÂΜL (ref 0–0.61)
EOSINOPHIL # BLD AUTO: 0.45 THOUSAND/ÂΜL (ref 0–0.61)
EOSINOPHIL NFR BLD AUTO: 5 % (ref 0–6)
EOSINOPHIL NFR BLD AUTO: 5 % (ref 0–6)
ERYTHROCYTE [DISTWIDTH] IN BLOOD BY AUTOMATED COUNT: 13.2 % (ref 11.6–15.1)
ERYTHROCYTE [DISTWIDTH] IN BLOOD BY AUTOMATED COUNT: 13.2 % (ref 11.6–15.1)
GFR SERPL CREATININE-BSD FRML MDRD: 118 ML/MIN/1.73SQ M
GFR SERPL CREATININE-BSD FRML MDRD: 118 ML/MIN/1.73SQ M
GLUCOSE SERPL-MCNC: 96 MG/DL (ref 65–140)
GLUCOSE SERPL-MCNC: 96 MG/DL (ref 65–140)
HCT VFR BLD AUTO: 37 % (ref 36.5–49.3)
HCT VFR BLD AUTO: 37 % (ref 36.5–49.3)
HGB BLD-MCNC: 12.2 G/DL (ref 12–17)
HGB BLD-MCNC: 12.2 G/DL (ref 12–17)
IMM GRANULOCYTES # BLD AUTO: 0.08 THOUSAND/UL (ref 0–0.2)
IMM GRANULOCYTES # BLD AUTO: 0.08 THOUSAND/UL (ref 0–0.2)
IMM GRANULOCYTES NFR BLD AUTO: 1 % (ref 0–2)
IMM GRANULOCYTES NFR BLD AUTO: 1 % (ref 0–2)
LYMPHOCYTES # BLD AUTO: 3.49 THOUSANDS/ÂΜL (ref 0.6–4.47)
LYMPHOCYTES # BLD AUTO: 3.49 THOUSANDS/ÂΜL (ref 0.6–4.47)
LYMPHOCYTES NFR BLD AUTO: 35 % (ref 14–44)
LYMPHOCYTES NFR BLD AUTO: 35 % (ref 14–44)
MCH RBC QN AUTO: 31.3 PG (ref 26.8–34.3)
MCH RBC QN AUTO: 31.3 PG (ref 26.8–34.3)
MCHC RBC AUTO-ENTMCNC: 33 G/DL (ref 31.4–37.4)
MCHC RBC AUTO-ENTMCNC: 33 G/DL (ref 31.4–37.4)
MCV RBC AUTO: 95 FL (ref 82–98)
MCV RBC AUTO: 95 FL (ref 82–98)
MONOCYTES # BLD AUTO: 0.64 THOUSAND/ÂΜL (ref 0.17–1.22)
MONOCYTES # BLD AUTO: 0.64 THOUSAND/ÂΜL (ref 0.17–1.22)
MONOCYTES NFR BLD AUTO: 6 % (ref 4–12)
MONOCYTES NFR BLD AUTO: 6 % (ref 4–12)
NEUTROPHILS # BLD AUTO: 5.29 THOUSANDS/ÂΜL (ref 1.85–7.62)
NEUTROPHILS # BLD AUTO: 5.29 THOUSANDS/ÂΜL (ref 1.85–7.62)
NEUTS SEG NFR BLD AUTO: 51 % (ref 43–75)
NEUTS SEG NFR BLD AUTO: 51 % (ref 43–75)
NRBC BLD AUTO-RTO: 0 /100 WBCS
NRBC BLD AUTO-RTO: 0 /100 WBCS
PLATELET # BLD AUTO: 435 THOUSANDS/UL (ref 149–390)
PLATELET # BLD AUTO: 435 THOUSANDS/UL (ref 149–390)
PMV BLD AUTO: 9.3 FL (ref 8.9–12.7)
PMV BLD AUTO: 9.3 FL (ref 8.9–12.7)
POTASSIUM SERPL-SCNC: 4.4 MMOL/L (ref 3.5–5.3)
POTASSIUM SERPL-SCNC: 4.4 MMOL/L (ref 3.5–5.3)
RBC # BLD AUTO: 3.9 MILLION/UL (ref 3.88–5.62)
RBC # BLD AUTO: 3.9 MILLION/UL (ref 3.88–5.62)
SODIUM SERPL-SCNC: 136 MMOL/L (ref 135–147)
SODIUM SERPL-SCNC: 136 MMOL/L (ref 135–147)
WBC # BLD AUTO: 10.1 THOUSAND/UL (ref 4.31–10.16)
WBC # BLD AUTO: 10.1 THOUSAND/UL (ref 4.31–10.16)

## 2024-08-22 PROCEDURE — 85025 COMPLETE CBC W/AUTO DIFF WBC: CPT | Performed by: SURGERY

## 2024-08-22 PROCEDURE — 80048 BASIC METABOLIC PNL TOTAL CA: CPT | Performed by: SURGERY

## 2024-08-22 PROCEDURE — 99231 SBSQ HOSP IP/OBS SF/LOW 25: CPT | Performed by: SURGERY

## 2024-08-22 RX ADMIN — ACETAMINOPHEN 975 MG: 325 TABLET, FILM COATED ORAL at 06:22

## 2024-08-22 RX ADMIN — METHOCARBAMOL TABLETS 750 MG: 750 TABLET, COATED ORAL at 06:22

## 2024-08-22 RX ADMIN — POLYETHYLENE GLYCOL 3350 17 G: 17 POWDER, FOR SOLUTION ORAL at 09:45

## 2024-08-22 RX ADMIN — GABAPENTIN 300 MG: 300 CAPSULE ORAL at 17:19

## 2024-08-22 RX ADMIN — SENNOSIDES, DOCUSATE SODIUM 2 TABLET: 8.6; 5 TABLET ORAL at 17:19

## 2024-08-22 RX ADMIN — QUETIAPINE FUMARATE 12.5 MG: 25 TABLET ORAL at 21:37

## 2024-08-22 RX ADMIN — Medication 7.5 MG: at 09:45

## 2024-08-22 RX ADMIN — APIXABAN 5 MG: 5 TABLET, FILM COATED ORAL at 17:19

## 2024-08-22 RX ADMIN — ACETAMINOPHEN 975 MG: 325 TABLET, FILM COATED ORAL at 12:51

## 2024-08-22 RX ADMIN — ACETAMINOPHEN 975 MG: 325 TABLET, FILM COATED ORAL at 21:38

## 2024-08-22 RX ADMIN — GABAPENTIN 300 MG: 300 CAPSULE ORAL at 21:37

## 2024-08-22 RX ADMIN — NICOTINE 14 MG: 14 PATCH, EXTENDED RELEASE TRANSDERMAL at 09:46

## 2024-08-22 RX ADMIN — CHLORHEXIDINE GLUCONATE 15 ML: 1.2 RINSE ORAL at 09:45

## 2024-08-22 RX ADMIN — Medication 7.5 MG: at 17:24

## 2024-08-22 RX ADMIN — SENNOSIDES, DOCUSATE SODIUM 2 TABLET: 8.6; 5 TABLET ORAL at 09:45

## 2024-08-22 RX ADMIN — BACITRACIN 1 LARGE APPLICATION: 500 OINTMENT TOPICAL at 17:19

## 2024-08-22 RX ADMIN — METHOCARBAMOL TABLETS 750 MG: 750 TABLET, COATED ORAL at 00:22

## 2024-08-22 RX ADMIN — GABAPENTIN 300 MG: 300 CAPSULE ORAL at 09:46

## 2024-08-22 RX ADMIN — METHOCARBAMOL TABLETS 750 MG: 750 TABLET, COATED ORAL at 12:51

## 2024-08-22 RX ADMIN — METHOCARBAMOL TABLETS 750 MG: 750 TABLET, COATED ORAL at 17:19

## 2024-08-22 RX ADMIN — BACITRACIN 1 LARGE APPLICATION: 500 OINTMENT TOPICAL at 09:46

## 2024-08-22 RX ADMIN — APIXABAN 5 MG: 5 TABLET, FILM COATED ORAL at 09:45
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
## 2024-08-22 NOTE — QUICK NOTE
Patient was not willing to speak with psychiatry at this time due to headache and stating that he was tired.  Patient states that he would be willing to speak to psychiatry at a later date such as tomorrow.  Had a phone discussion with patient's sister.  Patient's sister reports that he has a long history of delusional thoughts and episodes resembling fitz, however uncertain if these were all in the context of substance use.  Patient's sister reports that he made several delusional remarks to her during this hospital stay, and at 1 point stating that he would shoot himself if he had a gun.  Patient sister states that he has been trialed on several different medications, but patient has poor medication compliance.  Patient's sister is not sure what medications worked for him, she states that he was on Abilify at 1 point but was unclear whether or not that helped.  Patient's sister states that he is resistant to psychiatric care throughout his lifetime.  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

## 2024-08-22 NOTE — PLAN OF CARE
Problem: SAFETY,RESTRAINT: NV/NON-SELF DESTRUCTIVE BEHAVIOR  Goal: Remains free of harm/injury (restraint for non violent/non self-detsructive behavior)  Description: INTERVENTIONS:  - Instruct patient/family regarding restraint use   - Assess and monitor physiologic and psychological status   - Provide interventions and comfort measures to meet assessed patient needs   - Identify and implement measures to help patient regain control  - Assess readiness for release of restraint   Outcome: Progressing  Goal: Returns to optimal restraint-free functioning  Description: INTERVENTIONS:  - Assess the patient's behavior and symptoms that indicate continued need for restraint  - Identify and implement measures to help patient regain control  - Assess readiness for release of restraint   Outcome: Progressing     Problem: Prexisting or High Potential for Compromised Skin Integrity  Goal: Skin integrity is maintained or improved  Description: INTERVENTIONS:  - Identify patients at risk for skin breakdown  - Assess and monitor skin integrity  - Assess and monitor nutrition and hydration status  - Monitor labs   - Assess for incontinence   - Turn and reposition patient  - Assist with mobility/ambulation  - Relieve pressure over bony prominences  - Avoid friction and shearing  - Provide appropriate hygiene as needed including keeping skin clean and dry  - Evaluate need for skin moisturizer/barrier cream  - Collaborate with interdisciplinary team   - Patient/family teaching  - Consider wound care consult   Outcome: Progressing     Problem: PAIN - ADULT  Goal: Verbalizes/displays adequate comfort level or baseline comfort level  Description: Interventions:  - Encourage patient to monitor pain and request assistance  - Assess pain using appropriate pain scale  - Administer analgesics based on type and severity of pain and evaluate response  - Implement non-pharmacological measures as appropriate and evaluate response  - Consider  cultural and social influences on pain and pain management  - Notify physician/advanced practitioner if interventions unsuccessful or patient reports new pain  Outcome: Progressing     Problem: INFECTION - ADULT  Goal: Absence or prevention of progression during hospitalization  Description: INTERVENTIONS:  - Assess and monitor for signs and symptoms of infection  - Monitor lab/diagnostic results  - Monitor all insertion sites, i.e. indwelling lines, tubes, and drains  - Monitor endotracheal if appropriate and nasal secretions for changes in amount and color  - [place] appropriate cooling/warming therapies per order  - Administer medications as ordered  - Instruct and encourage patient and family to use good hand hygiene technique  - Identify and instruct in appropriate isolation precautions for identified infection/condition  Outcome: Progressing  Goal: Absence of fever/infection during neutropenic period  Description: INTERVENTIONS:  - Monitor WBC    Outcome: Progressing     Problem: SAFETY ADULT  Goal: Patient will remain free of falls  Description: INTERVENTIONS:  - Educate patient/family on patient safety including physical limitations  - Instruct patient to call for assistance with activity   - Consult OT/PT to assist with strengthening/mobility   - Keep Call bell within reach  - Keep bed low and locked with side rails adjusted as appropriate  - Keep care items and personal belongings within reach  - Initiate and maintain comfort rounds  - Make Fall Risk Sign visible to staff  - Offer Toileting every  Hours, in advance of need  - Initiate/Maintain alarm  - Obtain necessary fall risk management equipment:   - Apply yellow socks and bracelet for high fall risk patients  - Consider moving patient to room near nurses station  Outcome: Progressing  Goal: Maintain or return to baseline ADL function  Description: INTERVENTIONS:  -  Assess patient's ability to carry out ADLs; assess patient's baseline for ADL  function and identify physical deficits which impact ability to perform ADLs (bathing, care of mouth/teeth, toileting, grooming, dressing, etc.)  - Assess/evaluate cause of self-care deficits   - Assess range of motion  - Assess patient's mobility; develop plan if impaired  - Assess patient's need for assistive devices and provide as appropriate  - Encourage maximum independence but intervene and supervise when necessary  - Involve family in performance of ADLs  - Assess for home care needs following discharge   - Consider OT consult to assist with ADL evaluation and planning for discharge  - Provide patient education as appropriate  Outcome: Progressing  Goal: Maintains/Returns to pre admission functional level  Description: INTERVENTIONS:  - Perform AM-PAC 6 Click Basic Mobility/ Daily Activity assessment daily.  - Set and communicate daily mobility goal to care team and patient/family/caregiver.   - Collaborate with rehabilitation services on mobility goals if consulted  - Perform Range of Motion  times a day.  - Reposition patient every  hours.  - Dangle patient  times a day  - Stand patient  times a day  - Ambulate patient  times a day  - Out of bed to chair times a day   - Out of bed for meals times a day  - Out of bed for toileting  - Record patient progress and toleration of activity level   Outcome: Progressing     Problem: DISCHARGE PLANNING  Goal: Discharge to home or other facility with appropriate resources  Description: INTERVENTIONS:  - Identify barriers to discharge w/patient and caregiver  - Arrange for needed discharge resources and transportation as appropriate  - Identify discharge learning needs (meds, wound care, etc.)  - Arrange for interpretive services to assist at discharge as needed  - Refer to Case Management Department for coordinating discharge planning if the patient needs post-hospital services based on physician/advanced practitioner order or complex needs related to functional  status, cognitive ability, or social support system  Outcome: Progressing     Problem: Knowledge Deficit  Goal: Patient/family/caregiver demonstrates understanding of disease process, treatment plan, medications, and discharge instructions  Description: Complete learning assessment and assess knowledge base.  Interventions:  - Provide teaching at level of understanding  - Provide teaching via preferred learning methods  Outcome: Progressing     Problem: Nutrition/Hydration-ADULT  Goal: Nutrient/Hydration intake appropriate for improving, restoring or maintaining nutritional needs  Description: Monitor and assess patient's nutrition/hydration status for malnutrition. Collaborate with interdisciplinary team and initiate plan and interventions as ordered.  Monitor patient's weight and dietary intake as ordered or per policy. Utilize nutrition screening tool and intervene as necessary. Determine patient's food preferences and provide high-protein, high-caloric foods as appropriate.     INTERVENTIONS:  - Monitor oral intake, urinary output, labs, and treatment plans  - Assess nutrition and hydration status and recommend course of action  - Evaluate amount of meals eaten  - Assist patient with eating if necessary   - Allow adequate time for meals  - Recommend/ encourage appropriate diets, oral nutritional supplements, and vitamin/mineral supplements  - Order, calculate, and assess calorie counts as needed  - Recommend, monitor, and adjust tube feedings and TPN/PPN based on assessed needs  - Assess need for intravenous fluids  - Provide specific nutrition/hydration education as appropriate  - Include patient/family/caregiver in decisions related to nutrition  Outcome: Progressing

## 2024-08-22 NOTE — PLAN OF CARE
Problem: Prexisting or High Potential for Compromised Skin Integrity  Goal: Skin integrity is maintained or improved  Description: INTERVENTIONS:  - Identify patients at risk for skin breakdown  - Assess and monitor skin integrity  - Assess and monitor nutrition and hydration status  - Monitor labs   - Assess for incontinence   - Turn and reposition patient  - Assist with mobility/ambulation  - Relieve pressure over bony prominences  - Avoid friction and shearing  - Provide appropriate hygiene as needed including keeping skin clean and dry  - Evaluate need for skin moisturizer/barrier cream  - Collaborate with interdisciplinary team   - Patient/family teaching  - Consider wound care consult   Outcome: Progressing     Problem: PAIN - ADULT  Goal: Verbalizes/displays adequate comfort level or baseline comfort level  Description: Interventions:  - Encourage patient to monitor pain and request assistance  - Assess pain using appropriate pain scale  - Administer analgesics based on type and severity of pain and evaluate response  - Implement non-pharmacological measures as appropriate and evaluate response  - Consider cultural and social influences on pain and pain management  - Notify physician/advanced practitioner if interventions unsuccessful or patient reports new pain  Outcome: Progressing     Problem: SAFETY ADULT  Goal: Patient will remain free of falls  Description: INTERVENTIONS:  - Educate patient/family on patient safety including physical limitations  - Instruct patient to call for assistance with activity   - Consult OT/PT to assist with strengthening/mobility   - Keep Call bell within reach  - Keep bed low and locked with side rails adjusted as appropriate  - Keep care items and personal belongings within reach  - Initiate and maintain comfort rounds  - Make Fall Risk Sign visible to staff  - Offer Toileting every  Hours, in advance of need  - Initiate/Maintain alarm  - Obtain necessary fall risk  management equipment  - Apply yellow socks and bracelet for high fall risk patients  - Consider moving patient to room near nurses station  Outcome: Progressing  Goal: Maintain or return to baseline ADL function  Description: INTERVENTIONS:  -  Assess patient's ability to carry out ADLs; assess patient's baseline for ADL function and identify physical deficits which impact ability to perform ADLs (bathing, care of mouth/teeth, toileting, grooming, dressing, etc.)  - Assess/evaluate cause of self-care deficits   - Assess range of motion  - Assess patient's mobility; develop plan if impaired  - Assess patient's need for assistive devices and provide as appropriate  - Encourage maximum independence but intervene and supervise when necessary  - Involve family in performance of ADLs  - Assess for home care needs following discharge   - Consider OT consult to assist with ADL evaluation and planning for discharge  - Provide patient education as appropriate  Outcome: Progressing     Problem: Nutrition/Hydration-ADULT  Goal: Nutrient/Hydration intake appropriate for improving, restoring or maintaining nutritional needs  Description: Monitor and assess patient's nutrition/hydration status for malnutrition. Collaborate with interdisciplinary team and initiate plan and interventions as ordered.  Monitor patient's weight and dietary intake as ordered or per policy. Utilize nutrition screening tool and intervene as necessary. Determine patient's food preferences and provide high-protein, high-caloric foods as appropriate.     INTERVENTIONS:  - Monitor oral intake, urinary output, labs, and treatment plans  - Assess nutrition and hydration status and recommend course of action  - Evaluate amount of meals eaten  - Assist patient with eating if necessary   - Allow adequate time for meals  - Recommend/ encourage appropriate diets, oral nutritional supplements, and vitamin/mineral supplements  - Order, calculate, and assess calorie  counts as needed  - Recommend, monitor, and adjust tube feedings and TPN/PPN based on assessed needs  - Assess need for intravenous fluids  - Provide specific nutrition/hydration education as appropriate  - Include patient/family/caregiver in decisions related to nutrition  Outcome: Progressing

## 2024-08-22 NOTE — ASSESSMENT & PLAN NOTE
In the setting of traumatic brain injury  Currently GCS 14  Continue to monitor  Assist with regulation of sleep/wake  PT/OT/OT cog   well nourished/BMI 26

## 2024-08-23 PROBLEM — S01.81XA FACIAL LACERATION: Status: RESOLVED | Noted: 2024-08-05 | Resolved: 2024-08-23

## 2024-08-23 PROCEDURE — 99232 SBSQ HOSP IP/OBS MODERATE 35: CPT | Performed by: SURGERY

## 2024-08-23 RX ORDER — BUTALBITAL, ACETAMINOPHEN AND CAFFEINE 50; 325; 40 MG/1; MG/1; MG/1
1 TABLET ORAL EVERY 4 HOURS PRN
Status: DISCONTINUED | OUTPATIENT
Start: 2024-08-23 | End: 2024-08-29 | Stop reason: HOSPADM

## 2024-08-23 RX ORDER — ACETAMINOPHEN 325 MG/1
650 TABLET ORAL EVERY 8 HOURS SCHEDULED
Status: DISCONTINUED | OUTPATIENT
Start: 2024-08-23 | End: 2024-08-29 | Stop reason: HOSPADM

## 2024-08-23 RX ADMIN — BACITRACIN 1 LARGE APPLICATION: 500 OINTMENT TOPICAL at 17:20

## 2024-08-23 RX ADMIN — Medication 7.5 MG: at 15:25

## 2024-08-23 RX ADMIN — ACETAMINOPHEN 650 MG: 325 TABLET, FILM COATED ORAL at 21:41

## 2024-08-23 RX ADMIN — GABAPENTIN 300 MG: 300 CAPSULE ORAL at 08:34

## 2024-08-23 RX ADMIN — METHOCARBAMOL TABLETS 750 MG: 750 TABLET, COATED ORAL at 13:45

## 2024-08-23 RX ADMIN — APIXABAN 5 MG: 5 TABLET, FILM COATED ORAL at 08:34

## 2024-08-23 RX ADMIN — CHLORHEXIDINE GLUCONATE 15 ML: 1.2 RINSE ORAL at 21:41

## 2024-08-23 RX ADMIN — Medication 7.5 MG: at 19:15

## 2024-08-23 RX ADMIN — APIXABAN 5 MG: 5 TABLET, FILM COATED ORAL at 17:20

## 2024-08-23 RX ADMIN — BUTALBITAL, ACETAMINOPHEN, AND CAFFEINE 1 TABLET: 325; 50; 40 TABLET ORAL at 21:41

## 2024-08-23 RX ADMIN — GABAPENTIN 300 MG: 300 CAPSULE ORAL at 17:20

## 2024-08-23 RX ADMIN — SENNOSIDES, DOCUSATE SODIUM 2 TABLET: 8.6; 5 TABLET ORAL at 17:20

## 2024-08-23 RX ADMIN — SENNOSIDES, DOCUSATE SODIUM 2 TABLET: 8.6; 5 TABLET ORAL at 08:34

## 2024-08-23 RX ADMIN — METHOCARBAMOL TABLETS 750 MG: 750 TABLET, COATED ORAL at 17:20

## 2024-08-23 RX ADMIN — CHLORHEXIDINE GLUCONATE 15 ML: 1.2 RINSE ORAL at 08:34

## 2024-08-23 RX ADMIN — BACITRACIN 1 LARGE APPLICATION: 500 OINTMENT TOPICAL at 08:34

## 2024-08-23 RX ADMIN — GABAPENTIN 300 MG: 300 CAPSULE ORAL at 21:41

## 2024-08-23 RX ADMIN — QUETIAPINE FUMARATE 12.5 MG: 25 TABLET ORAL at 21:41

## 2024-08-23 RX ADMIN — NICOTINE 14 MG: 14 PATCH, EXTENDED RELEASE TRANSDERMAL at 08:37

## 2024-08-23 NOTE — CASE MANAGEMENT
Received response back from half-way Auth email. Stated a clinical would need to call P#: [phone] in order to complete the [employer] Eligibility Survey. Per email, PASRR Level 1 document needs to be completed and emailed back. CM attached to email. CM Notified: Peggy HOWARD

## 2024-08-23 NOTE — ASSESSMENT & PLAN NOTE
In the setting of traumatic brain injury  Currently GCS 14  Continue to monitor  Assist with regulation of sleep/wake  PT/OT/OT cog  08/21 pt evaluated by Neuropsych, and Psychiatry   Pt deemed to have no capacity

## 2024-08-23 NOTE — CASE MANAGEMENT
[facility] received request for authorization from Care Manager.  Authorization request submitted for: retirement Care  Facility Name: [facility] NPI: [id number]  Facility MD: Redd Hernandez NPI: [id number]  Authorization initiated by contacting insurance:    Via: Email ([email or web address])  Clinicals submitted via: Email Attachment     Care Manager notified: Peggy Perrin     Updates to authorization status will be noted in chart. Please reach out to CM for updates on any clinical information.

## 2024-08-23 NOTE — PLAN OF CARE
Problem: SAFETY,RESTRAINT: NV/NON-SELF DESTRUCTIVE BEHAVIOR  Goal: Remains free of harm/injury (restraint for non violent/non self-detsructive behavior)  Description: INTERVENTIONS:  - Instruct patient/family regarding restraint use   - Assess and monitor physiologic and psychological status   - Provide interventions and comfort measures to meet assessed patient needs   - Identify and implement measures to help patient regain control  - Assess readiness for release of restraint   Outcome: Progressing  Goal: Returns to optimal restraint-free functioning  Description: INTERVENTIONS:  - Assess the patient's behavior and symptoms that indicate continued need for restraint  - Identify and implement measures to help patient regain control  - Assess readiness for release of restraint   Outcome: Progressing     Problem: Prexisting or High Potential for Compromised Skin Integrity  Goal: Skin integrity is maintained or improved  Description: INTERVENTIONS:  - Identify patients at risk for skin breakdown  - Assess and monitor skin integrity  - Assess and monitor nutrition and hydration status  - Monitor labs   - Assess for incontinence   - Turn and reposition patient  - Assist with mobility/ambulation  - Relieve pressure over bony prominences  - Avoid friction and shearing  - Provide appropriate hygiene as needed including keeping skin clean and dry  - Evaluate need for skin moisturizer/barrier cream  - Collaborate with interdisciplinary team   - Patient/family teaching  - Consider wound care consult   Outcome: Progressing     Problem: INFECTION - ADULT  Goal: Absence or prevention of progression during hospitalization  Description: INTERVENTIONS:  - Assess and monitor for signs and symptoms of infection  - Monitor lab/diagnostic results  - Monitor all insertion sites, i.e. indwelling lines, tubes, and drains  - Monitor endotracheal if appropriate and nasal secretions for changes in amount and color  - [place] appropriate  cooling/warming therapies per order  - Administer medications as ordered  - Instruct and encourage patient and family to use good hand hygiene technique  - Identify and instruct in appropriate isolation precautions for identified infection/condition  Outcome: Progressing  Goal: Absence of fever/infection during neutropenic period  Description: INTERVENTIONS:  - Monitor WBC    Outcome: Progressing     Problem: SAFETY ADULT  Goal: Patient will remain free of falls  Description: INTERVENTIONS:  - Educate patient/family on patient safety including physical limitations  - Instruct patient to call for assistance with activity   - Consult OT/PT to assist with strengthening/mobility   - Keep Call bell within reach  - Keep bed low and locked with side rails adjusted as appropriate  - Keep care items and personal belongings within reach  - Initiate and maintain comfort rounds  - Make Fall Risk Sign visible to staff  - Offer Toileting every 2 Hours, in advance of need  - Initiate/Maintain bed alarm  - Obtain necessary fall risk management equipment:   - Apply yellow socks and bracelet for high fall risk patients  - Consider moving patient to room near nurses station  Outcome: Progressing  Goal: Maintain or return to baseline ADL function  Description: INTERVENTIONS:  -  Assess patient's ability to carry out ADLs; assess patient's baseline for ADL function and identify physical deficits which impact ability to perform ADLs (bathing, care of mouth/teeth, toileting, grooming, dressing, etc.)  - Assess/evaluate cause of self-care deficits   - Assess range of motion  - Assess patient's mobility; develop plan if impaired  - Assess patient's need for assistive devices and provide as appropriate  - Encourage maximum independence but intervene and supervise when necessary  - Involve family in performance of ADLs  - Assess for home care needs following discharge   - Consider OT consult to assist with ADL evaluation and planning for  discharge  - Provide patient education as appropriate  Outcome: Progressing  Goal: Maintains/Returns to pre admission functional level  Description: INTERVENTIONS:  - Perform AM-PAC 6 Click Basic Mobility/ Daily Activity assessment daily.  - Set and communicate daily mobility goal to care team and patient/family/caregiver.   - Collaborate with rehabilitation services on mobility goals if consulted  - Perform Range of Motion 3 times a day.  - Reposition patient every 3 hours.  - Dangle patient 3 times a day  - Stand patient 3 times a day  - Ambulate patient 3 times a day  - Out of bed to chair 3 times a day   - Out of bed for meals 3 times a day  - Out of bed for toileting  - Record patient progress and toleration of activity level   Outcome: Progressing

## 2024-08-23 NOTE — PLAN OF CARE
Problem: SAFETY,RESTRAINT: NV/NON-SELF DESTRUCTIVE BEHAVIOR  Goal: Remains free of harm/injury (restraint for non violent/non self-detsructive behavior)  Description: INTERVENTIONS:  - Instruct patient/family regarding restraint use   - Assess and monitor physiologic and psychological status   - Provide interventions and comfort measures to meet assessed patient needs   - Identify and implement measures to help patient regain control  - Assess readiness for release of restraint   Outcome: Progressing  Goal: Returns to optimal restraint-free functioning  Description: INTERVENTIONS:  - Assess the patient's behavior and symptoms that indicate continued need for restraint  - Identify and implement measures to help patient regain control  - Assess readiness for release of restraint   Outcome: Progressing     Problem: Prexisting or High Potential for Compromised Skin Integrity  Goal: Skin integrity is maintained or improved  Description: INTERVENTIONS:  - Identify patients at risk for skin breakdown  - Assess and monitor skin integrity  - Assess and monitor nutrition and hydration status  - Monitor labs   - Assess for incontinence   - Turn and reposition patient  - Assist with mobility/ambulation  - Relieve pressure over bony prominences  - Avoid friction and shearing  - Provide appropriate hygiene as needed including keeping skin clean and dry  - Evaluate need for skin moisturizer/barrier cream  - Collaborate with interdisciplinary team   - Patient/family teaching  - Consider wound care consult   Outcome: Progressing     Problem: PAIN - ADULT  Goal: Verbalizes/displays adequate comfort level or baseline comfort level  Description: Interventions:  - Encourage patient to monitor pain and request assistance  - Assess pain using appropriate pain scale  - Administer analgesics based on type and severity of pain and evaluate response  - Implement non-pharmacological measures as appropriate and evaluate response  - Consider  cultural and social influences on pain and pain management  - Notify physician/advanced practitioner if interventions unsuccessful or patient reports new pain  Outcome: Progressing     Problem: INFECTION - ADULT  Goal: Absence or prevention of progression during hospitalization  Description: INTERVENTIONS:  - Assess and monitor for signs and symptoms of infection  - Monitor lab/diagnostic results  - Monitor all insertion sites, i.e. indwelling lines, tubes, and drains  - Monitor endotracheal if appropriate and nasal secretions for changes in amount and color  - [place] appropriate cooling/warming therapies per order  - Administer medications as ordered  - Instruct and encourage patient and family to use good hand hygiene technique  - Identify and instruct in appropriate isolation precautions for identified infection/condition  Outcome: Progressing  Goal: Absence of fever/infection during neutropenic period  Description: INTERVENTIONS:  - Monitor WBC    Outcome: Progressing     Problem: SAFETY ADULT  Goal: Patient will remain free of falls  Description: INTERVENTIONS:  - Educate patient/family on patient safety including physical limitations  - Instruct patient to call for assistance with activity   - Consult OT/PT to assist with strengthening/mobility   - Keep Call bell within reach  - Keep bed low and locked with side rails adjusted as appropriate  - Keep care items and personal belongings within reach  - Initiate and maintain comfort rounds  - Make Fall Risk Sign visible to staff  - Offer Toileting every  Hours, in advance of need  - Initiate/Maintain alarm  - Obtain necessary fall risk management equipment:   - Apply yellow socks and bracelet for high fall risk patients  - Consider moving patient to room near nurses station  Outcome: Progressing  Goal: Maintain or return to baseline ADL function  Description: INTERVENTIONS:  -  Assess patient's ability to carry out ADLs; assess patient's baseline for ADL  function and identify physical deficits which impact ability to perform ADLs (bathing, care of mouth/teeth, toileting, grooming, dressing, etc.)  - Assess/evaluate cause of self-care deficits   - Assess range of motion  - Assess patient's mobility; develop plan if impaired  - Assess patient's need for assistive devices and provide as appropriate  - Encourage maximum independence but intervene and supervise when necessary  - Involve family in performance of ADLs  - Assess for home care needs following discharge   - Consider OT consult to assist with ADL evaluation and planning for discharge  - Provide patient education as appropriate  Outcome: Progressing  Goal: Maintains/Returns to pre admission functional level  Description: INTERVENTIONS:  - Perform AM-PAC 6 Click Basic Mobility/ Daily Activity assessment daily.  - Set and communicate daily mobility goal to care team and patient/family/caregiver.   - Collaborate with rehabilitation services on mobility goals if consulted  - Perform Range of Motion  times a day.  - Reposition patient every  hours.  - Dangle patient  times a day  - Stand patient  times a day  - Ambulate patient  times a day  - Out of bed to chair  times a day   - Out of bed for meals times a day  - Out of bed for toileting  - Record patient progress and toleration of activity level   Outcome: Progressing     Problem: DISCHARGE PLANNING  Goal: Discharge to home or other facility with appropriate resources  Description: INTERVENTIONS:  - Identify barriers to discharge w/patient and caregiver  - Arrange for needed discharge resources and transportation as appropriate  - Identify discharge learning needs (meds, wound care, etc.)  - Arrange for interpretive services to assist at discharge as needed  - Refer to Case Management Department for coordinating discharge planning if the patient needs post-hospital services based on physician/advanced practitioner order or complex needs related to functional  status, cognitive ability, or social support system  Outcome: Progressing     Problem: Knowledge Deficit  Goal: Patient/family/caregiver demonstrates understanding of disease process, treatment plan, medications, and discharge instructions  Description: Complete learning assessment and assess knowledge base.  Interventions:  - Provide teaching at level of understanding  - Provide teaching via preferred learning methods  Outcome: Progressing     Problem: Nutrition/Hydration-ADULT  Goal: Nutrient/Hydration intake appropriate for improving, restoring or maintaining nutritional needs  Description: Monitor and assess patient's nutrition/hydration status for malnutrition. Collaborate with interdisciplinary team and initiate plan and interventions as ordered.  Monitor patient's weight and dietary intake as ordered or per policy. Utilize nutrition screening tool and intervene as necessary. Determine patient's food preferences and provide high-protein, high-caloric foods as appropriate.     INTERVENTIONS:  - Monitor oral intake, urinary output, labs, and treatment plans  - Assess nutrition and hydration status and recommend course of action  - Evaluate amount of meals eaten  - Assist patient with eating if necessary   - Allow adequate time for meals  - Recommend/ encourage appropriate diets, oral nutritional supplements, and vitamin/mineral supplements  - Order, calculate, and assess calorie counts as needed  - Recommend, monitor, and adjust tube feedings and TPN/PPN based on assessed needs  - Assess need for intravenous fluids  - Provide specific nutrition/hydration education as appropriate  - Include patient/family/caregiver in decisions related to nutrition  Outcome: Progressing
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
## 2024-08-23 NOTE — CASE MANAGEMENT
Support Center has received DENIAL for SNF Authorization.   Insurance: [employer]   Called in by Rep: Paola P#: [phone]  Denial Reason: Does not meet CMS guidelines   Facility: [facility]   Denial #: [id number]   Appeal P#: [phone]     Per rep, senior care care would be submitted via email ([email or web address]). Stated to send email with facility info, ordering info, and reasoning for senior care auth.     Care Manager notified: Peggy Perrin    Please reach out to CM for updates on any clinical information.

## 2024-08-24 PROCEDURE — 99231 SBSQ HOSP IP/OBS SF/LOW 25: CPT | Performed by: SURGERY

## 2024-08-24 RX ORDER — MAGNESIUM SULFATE HEPTAHYDRATE 40 MG/ML
2 INJECTION, SOLUTION INTRAVENOUS EVERY 24 HOURS
Status: DISPENSED | OUTPATIENT
Start: 2024-08-24 | End: 2024-08-27

## 2024-08-24 RX ORDER — DIPHENHYDRAMINE HYDROCHLORIDE 50 MG/ML
25 INJECTION INTRAMUSCULAR; INTRAVENOUS EVERY 8 HOURS SCHEDULED
Status: DISPENSED | OUTPATIENT
Start: 2024-08-24 | End: 2024-08-27

## 2024-08-24 RX ORDER — METOCLOPRAMIDE HYDROCHLORIDE 5 MG/ML
10 INJECTION INTRAMUSCULAR; INTRAVENOUS EVERY 8 HOURS SCHEDULED
Status: DISPENSED | OUTPATIENT
Start: 2024-08-24 | End: 2024-08-27

## 2024-08-24 RX ORDER — SODIUM CHLORIDE 9 MG/ML
100 INJECTION, SOLUTION INTRAVENOUS CONTINUOUS
Status: DISPENSED | OUTPATIENT
Start: 2024-08-24 | End: 2024-08-24

## 2024-08-24 RX ADMIN — SODIUM CHLORIDE 100 ML/HR: 0.9 INJECTION, SOLUTION INTRAVENOUS at 17:54

## 2024-08-24 RX ADMIN — APIXABAN 5 MG: 5 TABLET, FILM COATED ORAL at 08:48

## 2024-08-24 RX ADMIN — ACETAMINOPHEN 650 MG: 325 TABLET, FILM COATED ORAL at 22:29

## 2024-08-24 RX ADMIN — CHLORHEXIDINE GLUCONATE 15 ML: 1.2 RINSE ORAL at 22:29

## 2024-08-24 RX ADMIN — METHOCARBAMOL TABLETS 750 MG: 750 TABLET, COATED ORAL at 12:19

## 2024-08-24 RX ADMIN — SENNOSIDES, DOCUSATE SODIUM 2 TABLET: 8.6; 5 TABLET ORAL at 16:43

## 2024-08-24 RX ADMIN — METHOCARBAMOL TABLETS 750 MG: 750 TABLET, COATED ORAL at 05:16

## 2024-08-24 RX ADMIN — ACETAMINOPHEN 650 MG: 325 TABLET, FILM COATED ORAL at 14:35

## 2024-08-24 RX ADMIN — METHOCARBAMOL TABLETS 750 MG: 750 TABLET, COATED ORAL at 22:31

## 2024-08-24 RX ADMIN — METOCLOPRAMIDE 10 MG: 5 INJECTION, SOLUTION INTRAMUSCULAR; INTRAVENOUS at 22:31

## 2024-08-24 RX ADMIN — SENNOSIDES, DOCUSATE SODIUM 2 TABLET: 8.6; 5 TABLET ORAL at 08:48

## 2024-08-24 RX ADMIN — Medication 7.5 MG: at 12:22

## 2024-08-24 RX ADMIN — APIXABAN 5 MG: 5 TABLET, FILM COATED ORAL at 16:43

## 2024-08-24 RX ADMIN — METHOCARBAMOL TABLETS 750 MG: 750 TABLET, COATED ORAL at 16:42

## 2024-08-24 RX ADMIN — BUTALBITAL, ACETAMINOPHEN, AND CAFFEINE 1 TABLET: 325; 50; 40 TABLET ORAL at 08:53

## 2024-08-24 RX ADMIN — QUETIAPINE FUMARATE 12.5 MG: 25 TABLET ORAL at 22:29

## 2024-08-24 RX ADMIN — GABAPENTIN 300 MG: 300 CAPSULE ORAL at 22:29

## 2024-08-24 RX ADMIN — NICOTINE 14 MG: 14 PATCH, EXTENDED RELEASE TRANSDERMAL at 16:50

## 2024-08-24 RX ADMIN — METOCLOPRAMIDE 10 MG: 5 INJECTION, SOLUTION INTRAMUSCULAR; INTRAVENOUS at 16:39

## 2024-08-24 RX ADMIN — MAGNESIUM SULFATE HEPTAHYDRATE 2 G: 40 INJECTION, SOLUTION INTRAVENOUS at 16:44

## 2024-08-24 RX ADMIN — ACETAMINOPHEN 650 MG: 325 TABLET, FILM COATED ORAL at 05:16

## 2024-08-24 RX ADMIN — CHLORHEXIDINE GLUCONATE 15 ML: 1.2 RINSE ORAL at 08:48

## 2024-08-24 RX ADMIN — GABAPENTIN 300 MG: 300 CAPSULE ORAL at 16:38

## 2024-08-24 RX ADMIN — BACITRACIN 1 LARGE APPLICATION: 500 OINTMENT TOPICAL at 08:48

## 2024-08-24 RX ADMIN — GABAPENTIN 300 MG: 300 CAPSULE ORAL at 08:48

## 2024-08-24 RX ADMIN — BACITRACIN 1 LARGE APPLICATION: 500 OINTMENT TOPICAL at 16:42

## 2024-08-24 RX ADMIN — DIPHENHYDRAMINE HYDROCHLORIDE 25 MG: 50 INJECTION, SOLUTION INTRAMUSCULAR; INTRAVENOUS at 16:39

## 2024-08-24 RX ADMIN — DIPHENHYDRAMINE HYDROCHLORIDE 25 MG: 50 INJECTION, SOLUTION INTRAMUSCULAR; INTRAVENOUS at 22:31

## 2024-08-24 NOTE — PLAN OF CARE
Problem: SAFETY,RESTRAINT: NV/NON-SELF DESTRUCTIVE BEHAVIOR  Goal: Remains free of harm/injury (restraint for non violent/non self-detsructive behavior)  Description: INTERVENTIONS:  - Instruct patient/family regarding restraint use   - Assess and monitor physiologic and psychological status   - Provide interventions and comfort measures to meet assessed patient needs   - Identify and implement measures to help patient regain control  - Assess readiness for release of restraint   Outcome: Progressing  Goal: Returns to optimal restraint-free functioning  Description: INTERVENTIONS:  - Assess the patient's behavior and symptoms that indicate continued need for restraint  - Identify and implement measures to help patient regain control  - Assess readiness for release of restraint   Outcome: Progressing     Problem: Prexisting or High Potential for Compromised Skin Integrity  Goal: Skin integrity is maintained or improved  Description: INTERVENTIONS:  - Identify patients at risk for skin breakdown  - Assess and monitor skin integrity  - Assess and monitor nutrition and hydration status  - Monitor labs   - Assess for incontinence   - Turn and reposition patient  - Assist with mobility/ambulation  - Relieve pressure over bony prominences  - Avoid friction and shearing  - Provide appropriate hygiene as needed including keeping skin clean and dry  - Evaluate need for skin moisturizer/barrier cream  - Collaborate with interdisciplinary team   - Patient/family teaching  - Consider wound care consult   Outcome: Progressing     Problem: PAIN - ADULT  Goal: Verbalizes/displays adequate comfort level or baseline comfort level  Description: Interventions:  - Encourage patient to monitor pain and request assistance  - Assess pain using appropriate pain scale  - Administer analgesics based on type and severity of pain and evaluate response  - Implement non-pharmacological measures as appropriate and evaluate response  - Consider  cultural and social influences on pain and pain management  - Notify physician/advanced practitioner if interventions unsuccessful or patient reports new pain  Outcome: Progressing     Problem: INFECTION - ADULT  Goal: Absence or prevention of progression during hospitalization  Description: INTERVENTIONS:  - Assess and monitor for signs and symptoms of infection  - Monitor lab/diagnostic results  - Monitor all insertion sites, i.e. indwelling lines, tubes, and drains  - Monitor endotracheal if appropriate and nasal secretions for changes in amount and color  - [place] appropriate cooling/warming therapies per order  - Administer medications as ordered  - Instruct and encourage patient and family to use good hand hygiene technique  - Identify and instruct in appropriate isolation precautions for identified infection/condition  Outcome: Progressing  Goal: Absence of fever/infection during neutropenic period  Description: INTERVENTIONS:  - Monitor WBC    Outcome: Progressing     Problem: SAFETY ADULT  Goal: Patient will remain free of falls  Description: INTERVENTIONS:  - Educate patient/family on patient safety including physical limitations  - Instruct patient to call for assistance with activity   - Consult OT/PT to assist with strengthening/mobility   - Keep Call bell within reach  - Keep bed low and locked with side rails adjusted as appropriate  - Keep care items and personal belongings within reach  - Initiate and maintain comfort rounds  - Make Fall Risk Sign visible to staff  - Offer Toileting every  Hours, in advance of need  - Initiate/Maintain alarm  - Obtain necessary fall risk management equipment:   - Apply yellow socks and bracelet for high fall risk patients  - Consider moving patient to room near nurses station  Outcome: Progressing  Goal: Maintain or return to baseline ADL function  Description: INTERVENTIONS:  -  Assess patient's ability to carry out ADLs; assess patient's baseline for ADL  function and identify physical deficits which impact ability to perform ADLs (bathing, care of mouth/teeth, toileting, grooming, dressing, etc.)  - Assess/evaluate cause of self-care deficits   - Assess range of motion  - Assess patient's mobility; develop plan if impaired  - Assess patient's need for assistive devices and provide as appropriate  - Encourage maximum independence but intervene and supervise when necessary  - Involve family in performance of ADLs  - Assess for home care needs following discharge   - Consider OT consult to assist with ADL evaluation and planning for discharge  - Provide patient education as appropriate  Outcome: Progressing  Goal: Maintains/Returns to pre admission functional level  Description: INTERVENTIONS:  - Perform AM-PAC 6 Click Basic Mobility/ Daily Activity assessment daily.  - Set and communicate daily mobility goal to care team and patient/family/caregiver.   - Collaborate with rehabilitation services on mobility goals if consulted  - Perform Range of Motion  times a day.  - Reposition patient every  hours.  - Dangle patient  times a day  - Stand patient  times a day  - Ambulate patient  times a day  - Out of bed to chair  times a day   - Out of bed for meals  times a day  - Out of bed for toileting  - Record patient progress and toleration of activity level   Outcome: Progressing     Problem: DISCHARGE PLANNING  Goal: Discharge to home or other facility with appropriate resources  Description: INTERVENTIONS:  - Identify barriers to discharge w/patient and caregiver  - Arrange for needed discharge resources and transportation as appropriate  - Identify discharge learning needs (meds, wound care, etc.)  - Arrange for interpretive services to assist at discharge as needed  - Refer to Case Management Department for coordinating discharge planning if the patient needs post-hospital services based on physician/advanced practitioner order or complex needs related to functional  status, cognitive ability, or social support system  Outcome: Progressing     Problem: Knowledge Deficit  Goal: Patient/family/caregiver demonstrates understanding of disease process, treatment plan, medications, and discharge instructions  Description: Complete learning assessment and assess knowledge base.  Interventions:  - Provide teaching at level of understanding  - Provide teaching via preferred learning methods  Outcome: Progressing     Problem: Nutrition/Hydration-ADULT  Goal: Nutrient/Hydration intake appropriate for improving, restoring or maintaining nutritional needs  Description: Monitor and assess patient's nutrition/hydration status for malnutrition. Collaborate with interdisciplinary team and initiate plan and interventions as ordered.  Monitor patient's weight and dietary intake as ordered or per policy. Utilize nutrition screening tool and intervene as necessary. Determine patient's food preferences and provide high-protein, high-caloric foods as appropriate.     INTERVENTIONS:  - Monitor oral intake, urinary output, labs, and treatment plans  - Assess nutrition and hydration status and recommend course of action  - Evaluate amount of meals eaten  - Assist patient with eating if necessary   - Allow adequate time for meals  - Recommend/ encourage appropriate diets, oral nutritional supplements, and vitamin/mineral supplements  - Order, calculate, and assess calorie counts as needed  - Recommend, monitor, and adjust tube feedings and TPN/PPN based on assessed needs  - Assess need for intravenous fluids  - Provide specific nutrition/hydration education as appropriate  - Include patient/family/caregiver in decisions related to nutrition  Outcome: Progressing
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
## 2024-08-25 PROBLEM — R91.1 PULMONARY NODULE: Chronic | Status: RESOLVED | Noted: 2024-08-05 | Resolved: 2024-08-25

## 2024-08-25 PROCEDURE — 99231 SBSQ HOSP IP/OBS SF/LOW 25: CPT | Performed by: SURGERY

## 2024-08-25 RX ADMIN — BACITRACIN 1 LARGE APPLICATION: 500 OINTMENT TOPICAL at 08:15

## 2024-08-25 RX ADMIN — POLYETHYLENE GLYCOL 3350 17 G: 17 POWDER, FOR SOLUTION ORAL at 08:15

## 2024-08-25 RX ADMIN — METOCLOPRAMIDE 10 MG: 5 INJECTION, SOLUTION INTRAMUSCULAR; INTRAVENOUS at 14:35

## 2024-08-25 RX ADMIN — DIPHENHYDRAMINE HYDROCHLORIDE 25 MG: 50 INJECTION, SOLUTION INTRAMUSCULAR; INTRAVENOUS at 14:36

## 2024-08-25 RX ADMIN — NICOTINE POLACRILEX 2 MG: 2 GUM, CHEWING ORAL at 18:50

## 2024-08-25 RX ADMIN — MAGNESIUM SULFATE HEPTAHYDRATE 2 G: 40 INJECTION, SOLUTION INTRAVENOUS at 16:53

## 2024-08-25 RX ADMIN — NICOTINE 14 MG: 14 PATCH, EXTENDED RELEASE TRANSDERMAL at 08:15

## 2024-08-25 RX ADMIN — BACITRACIN 1 LARGE APPLICATION: 500 OINTMENT TOPICAL at 16:55

## 2024-08-25 RX ADMIN — SENNOSIDES, DOCUSATE SODIUM 2 TABLET: 8.6; 5 TABLET ORAL at 16:55

## 2024-08-25 RX ADMIN — GABAPENTIN 300 MG: 300 CAPSULE ORAL at 08:15

## 2024-08-25 RX ADMIN — APIXABAN 5 MG: 5 TABLET, FILM COATED ORAL at 16:55

## 2024-08-25 RX ADMIN — METHOCARBAMOL TABLETS 750 MG: 750 TABLET, COATED ORAL at 12:16

## 2024-08-25 RX ADMIN — METHOCARBAMOL TABLETS 750 MG: 750 TABLET, COATED ORAL at 06:32

## 2024-08-25 RX ADMIN — APIXABAN 5 MG: 5 TABLET, FILM COATED ORAL at 08:15

## 2024-08-25 RX ADMIN — SENNOSIDES, DOCUSATE SODIUM 2 TABLET: 8.6; 5 TABLET ORAL at 08:15

## 2024-08-25 RX ADMIN — METHOCARBAMOL TABLETS 750 MG: 750 TABLET, COATED ORAL at 16:55

## 2024-08-25 RX ADMIN — ACETAMINOPHEN 650 MG: 325 TABLET, FILM COATED ORAL at 06:32

## 2024-08-25 RX ADMIN — METOCLOPRAMIDE 10 MG: 5 INJECTION, SOLUTION INTRAMUSCULAR; INTRAVENOUS at 08:12

## 2024-08-25 RX ADMIN — GABAPENTIN 300 MG: 300 CAPSULE ORAL at 16:55

## 2024-08-25 RX ADMIN — ACETAMINOPHEN 650 MG: 325 TABLET, FILM COATED ORAL at 14:35

## 2024-08-25 RX ADMIN — DIPHENHYDRAMINE HYDROCHLORIDE 25 MG: 50 INJECTION, SOLUTION INTRAMUSCULAR; INTRAVENOUS at 08:12

## 2024-08-25 RX ADMIN — CHLORHEXIDINE GLUCONATE 15 ML: 1.2 RINSE ORAL at 08:15

## 2024-08-25 NOTE — PLAN OF CARE
Problem: SAFETY,RESTRAINT: NV/NON-SELF DESTRUCTIVE BEHAVIOR  Goal: Remains free of harm/injury (restraint for non violent/non self-detsructive behavior)  Description: INTERVENTIONS:  - Instruct patient/family regarding restraint use   - Assess and monitor physiologic and psychological status   - Provide interventions and comfort measures to meet assessed patient needs   - Identify and implement measures to help patient regain control  - Assess readiness for release of restraint   Outcome: Progressing  Goal: Returns to optimal restraint-free functioning  Description: INTERVENTIONS:  - Assess the patient's behavior and symptoms that indicate continued need for restraint  - Identify and implement measures to help patient regain control  - Assess readiness for release of restraint   Outcome: Progressing     Problem: Prexisting or High Potential for Compromised Skin Integrity  Goal: Skin integrity is maintained or improved  Description: INTERVENTIONS:  - Identify patients at risk for skin breakdown  - Assess and monitor skin integrity  - Assess and monitor nutrition and hydration status  - Monitor labs   - Assess for incontinence   - Turn and reposition patient  - Assist with mobility/ambulation  - Relieve pressure over bony prominences  - Avoid friction and shearing  - Provide appropriate hygiene as needed including keeping skin clean and dry  - Evaluate need for skin moisturizer/barrier cream  - Collaborate with interdisciplinary team   - Patient/family teaching  - Consider wound care consult   Outcome: Progressing     Problem: PAIN - ADULT  Goal: Verbalizes/displays adequate comfort level or baseline comfort level  Description: Interventions:  - Encourage patient to monitor pain and request assistance  - Assess pain using appropriate pain scale  - Administer analgesics based on type and severity of pain and evaluate response  - Implement non-pharmacological measures as appropriate and evaluate response  - Consider  cultural and social influences on pain and pain management  - Notify physician/advanced practitioner if interventions unsuccessful or patient reports new pain  Outcome: Progressing     Problem: INFECTION - ADULT  Goal: Absence or prevention of progression during hospitalization  Description: INTERVENTIONS:  - Assess and monitor for signs and symptoms of infection  - Monitor lab/diagnostic results  - Monitor all insertion sites, i.e. indwelling lines, tubes, and drains  - Monitor endotracheal if appropriate and nasal secretions for changes in amount and color  - [place] appropriate cooling/warming therapies per order  - Administer medications as ordered  - Instruct and encourage patient and family to use good hand hygiene technique  - Identify and instruct in appropriate isolation precautions for identified infection/condition  Outcome: Progressing  Goal: Absence of fever/infection during neutropenic period  Description: INTERVENTIONS:  - Monitor WBC    Outcome: Progressing     Problem: SAFETY ADULT  Goal: Patient will remain free of falls  Description: INTERVENTIONS:  - Educate patient/family on patient safety including physical limitations  - Instruct patient to call for assistance with activity   - Consult OT/PT to assist with strengthening/mobility   - Keep Call bell within reach  - Keep bed low and locked with side rails adjusted as appropriate  - Keep care items and personal belongings within reach  - Initiate and maintain comfort rounds  - Make Fall Risk Sign visible to staff  - Offer Toileting every  Hours, in advance of need  - Initiate/Maintain alarm  - Obtain necessary fall risk management equipment:   - Apply yellow socks and bracelet for high fall risk patients  - Consider moving patient to room near nurses station  Outcome: Progressing  Goal: Maintain or return to baseline ADL function  Description: INTERVENTIONS:  -  Assess patient's ability to carry out ADLs; assess patient's baseline for ADL  function and identify physical deficits which impact ability to perform ADLs (bathing, care of mouth/teeth, toileting, grooming, dressing, etc.)  - Assess/evaluate cause of self-care deficits   - Assess range of motion  - Assess patient's mobility; develop plan if impaired  - Assess patient's need for assistive devices and provide as appropriate  - Encourage maximum independence but intervene and supervise when necessary  - Involve family in performance of ADLs  - Assess for home care needs following discharge   - Consider OT consult to assist with ADL evaluation and planning for discharge  - Provide patient education as appropriate  Outcome: Progressing  Goal: Maintains/Returns to pre admission functional level  Description: INTERVENTIONS:  - Perform AM-PAC 6 Click Basic Mobility/ Daily Activity assessment daily.  - Set and communicate daily mobility goal to care team and patient/family/caregiver.   - Collaborate with rehabilitation services on mobility goals if consulted  - Perform Range of Motion  times a day.  - Reposition patient every  hours.  - Dangle patient  times a day  - Stand patient  times a day  - Ambulate patient  times a day  - Out of bed to chair times a day   - Out of bed for meals times a day  - Out of bed for toileting  - Record patient progress and toleration of activity level   Outcome: Progressing     Problem: DISCHARGE PLANNING  Goal: Discharge to home or other facility with appropriate resources  Description: INTERVENTIONS:  - Identify barriers to discharge w/patient and caregiver  - Arrange for needed discharge resources and transportation as appropriate  - Identify discharge learning needs (meds, wound care, etc.)  - Arrange for interpretive services to assist at discharge as needed  - Refer to Case Management Department for coordinating discharge planning if the patient needs post-hospital services based on physician/advanced practitioner order or complex needs related to functional  status, cognitive ability, or social support system  Outcome: Progressing     Problem: Knowledge Deficit  Goal: Patient/family/caregiver demonstrates understanding of disease process, treatment plan, medications, and discharge instructions  Description: Complete learning assessment and assess knowledge base.  Interventions:  - Provide teaching at level of understanding  - Provide teaching via preferred learning methods  Outcome: Progressing     Problem: Nutrition/Hydration-ADULT  Goal: Nutrient/Hydration intake appropriate for improving, restoring or maintaining nutritional needs  Description: Monitor and assess patient's nutrition/hydration status for malnutrition. Collaborate with interdisciplinary team and initiate plan and interventions as ordered.  Monitor patient's weight and dietary intake as ordered or per policy. Utilize nutrition screening tool and intervene as necessary. Determine patient's food preferences and provide high-protein, high-caloric foods as appropriate.     INTERVENTIONS:  - Monitor oral intake, urinary output, labs, and treatment plans  - Assess nutrition and hydration status and recommend course of action  - Evaluate amount of meals eaten  - Assist patient with eating if necessary   - Allow adequate time for meals  - Recommend/ encourage appropriate diets, oral nutritional supplements, and vitamin/mineral supplements  - Order, calculate, and assess calorie counts as needed  - Recommend, monitor, and adjust tube feedings and TPN/PPN based on assessed needs  - Assess need for intravenous fluids  - Provide specific nutrition/hydration education as appropriate  - Include patient/family/caregiver in decisions related to nutrition  Outcome: Progressing

## 2024-08-25 NOTE — ASSESSMENT & PLAN NOTE
In the setting of SDH/SAH/VST  Continue on multimodal regimen including Tylenol, Oxy, gabapentin, Robaxin  Started on Medrol Dosepak-headache improved  Will likely require outpatient follow-up with concussion  Migraine cocktail for 72 hours for persistent migraine-like headaches.
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
## 2024-08-25 NOTE — PLAN OF CARE
Problem: SAFETY,RESTRAINT: NV/NON-SELF DESTRUCTIVE BEHAVIOR  Goal: Remains free of harm/injury (restraint for non violent/non self-detsructive behavior)  Description: INTERVENTIONS:  - Instruct patient/family regarding restraint use   - Assess and monitor physiologic and psychological status   - Provide interventions and comfort measures to meet assessed patient needs   - Identify and implement measures to help patient regain control  - Assess readiness for release of restraint   8/24/2024 2011 by Adi Grayson LPN  Outcome: Progressing  8/24/2024 2011 by Adi Grayson LPN  Outcome: Progressing  Goal: Returns to optimal restraint-free functioning  Description: INTERVENTIONS:  - Assess the patient's behavior and symptoms that indicate continued need for restraint  - Identify and implement measures to help patient regain control  - Assess readiness for release of restraint   8/24/2024 2011 by Adi Grayson LPN  Outcome: Progressing  8/24/2024 2011 by Adi Grayson LPN  Outcome: Progressing

## 2024-08-26 PROCEDURE — 99232 SBSQ HOSP IP/OBS MODERATE 35: CPT | Performed by: STUDENT IN AN ORGANIZED HEALTH CARE EDUCATION/TRAINING PROGRAM

## 2024-08-26 RX ADMIN — CHLORHEXIDINE GLUCONATE 15 ML: 1.2 RINSE ORAL at 09:04

## 2024-08-26 RX ADMIN — METHOCARBAMOL TABLETS 750 MG: 750 TABLET, COATED ORAL at 18:23

## 2024-08-26 RX ADMIN — GABAPENTIN 300 MG: 300 CAPSULE ORAL at 20:43

## 2024-08-26 RX ADMIN — APIXABAN 5 MG: 5 TABLET, FILM COATED ORAL at 09:05

## 2024-08-26 RX ADMIN — Medication 7.5 MG: at 18:23

## 2024-08-26 RX ADMIN — NICOTINE 14 MG: 14 PATCH, EXTENDED RELEASE TRANSDERMAL at 09:05

## 2024-08-26 RX ADMIN — METHOCARBAMOL TABLETS 750 MG: 750 TABLET, COATED ORAL at 06:22

## 2024-08-26 RX ADMIN — METOCLOPRAMIDE 10 MG: 5 INJECTION, SOLUTION INTRAMUSCULAR; INTRAVENOUS at 06:22

## 2024-08-26 RX ADMIN — ACETAMINOPHEN 650 MG: 325 TABLET, FILM COATED ORAL at 06:22

## 2024-08-26 RX ADMIN — BACITRACIN 1 LARGE APPLICATION: 500 OINTMENT TOPICAL at 18:25

## 2024-08-26 RX ADMIN — DIPHENHYDRAMINE HYDROCHLORIDE 25 MG: 50 INJECTION, SOLUTION INTRAMUSCULAR; INTRAVENOUS at 06:22

## 2024-08-26 RX ADMIN — ACETAMINOPHEN 650 MG: 325 TABLET, FILM COATED ORAL at 20:44

## 2024-08-26 RX ADMIN — GABAPENTIN 300 MG: 300 CAPSULE ORAL at 09:05

## 2024-08-26 RX ADMIN — APIXABAN 5 MG: 5 TABLET, FILM COATED ORAL at 18:23

## 2024-08-26 RX ADMIN — SENNOSIDES, DOCUSATE SODIUM 2 TABLET: 8.6; 5 TABLET ORAL at 18:23

## 2024-08-26 RX ADMIN — ACETAMINOPHEN 650 MG: 325 TABLET, FILM COATED ORAL at 14:42

## 2024-08-26 RX ADMIN — GABAPENTIN 300 MG: 300 CAPSULE ORAL at 15:01

## 2024-08-26 RX ADMIN — BACITRACIN 1 LARGE APPLICATION: 500 OINTMENT TOPICAL at 09:06

## 2024-08-26 RX ADMIN — METHOCARBAMOL TABLETS 750 MG: 750 TABLET, COATED ORAL at 12:26

## 2024-08-26 RX ADMIN — QUETIAPINE FUMARATE 12.5 MG: 25 TABLET ORAL at 20:45

## 2024-08-26 RX ADMIN — CHLORHEXIDINE GLUCONATE 15 ML: 1.2 RINSE ORAL at 20:42

## 2024-08-26 RX ADMIN — POLYETHYLENE GLYCOL 3350 17 G: 17 POWDER, FOR SOLUTION ORAL at 09:03

## 2024-08-26 RX ADMIN — SENNOSIDES, DOCUSATE SODIUM 2 TABLET: 8.6; 5 TABLET ORAL at 09:09

## 2024-08-26 NOTE — CONSULTS
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 above. Workstation performed: [id number]     CT VENOGRAM BRAIN    Result Date: 8/6/2024  Impression: 1. Sequela of recent trauma again demonstrated, with contusions as described above, improving subarachnoid hemorrhage and nearly resolved subdural hemorrhage. 2. Left orbital floor, comminuted left ecchymotic arch, and left temporal/occipital fractures again demonstrated with overlying scalp soft tissue swelling. 3. Hypoplastic left transverse sinus occluded just beyond the torcula, and occluded left sigmoid dural venous sinus, and occluded left internal jugular vein at the level of the skull base, but there is nonocclusive thrombus noted throughout the visualized upper cervical internal jugular vein. The study was marked in EPIC for immediate notification. Workstation performed: [id number]     XR chest 1 view    Result Date: 8/6/2024  Impression: Endotracheal tube is above the ronni. No confluent pulmonary infiltrate or obvious acute traumatic injury in the chest. Computerized Assisted Algorithm (CAA) may have been used to analyze all applicable images. Workstation performed: [id number]     XR Trauma multiple (SLB/[facility] trauma bay ONLY)    Result Date: 8/5/2024  Impression:  Endotracheal tube is above the ronni. No confluent pulmonary infiltrate or obvious acute traumatic injury in the chest. Computerized Assisted Algorithm (CAA) may have been used to analyze all applicable images. Workstation performed: [id number]     CT head wo contrast    Result Date: 8/5/2024  Impression: Interval placement of right frontal ventriculostomy. Stable 5 mm right subdural hematoma. Thin parafalcine and tentorial extension not significantly changed. Stable bilateral frontal and temporal hemorrhagic contusions with adjacent subarachnoid hemorrhage. Stable 5 mm of right to left shift and diffuse sulcal effacement. Basal cisterns remain patent. Workstation performed: [id number]     No Chest XR results available for this patient.     Laboratory Results: I have personally reviewed all pertinent laboratory/tests results.  No results found for this or any previous visit (from the past 48 hour(s)).       This note was not shared with the patient due to reasonable likelihood of causing patient harm      This note has been constructed in part using a voice recognition system.   There may be translation, syntax,  or grammatical errors. If you have any questions, please contact the dictating provider.    Mandy Hawk DO  Department of Psychiatry and Behavioral Health  [facility]
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
## 2024-08-27 PROCEDURE — 97535 SELF CARE MNGMENT TRAINING: CPT

## 2024-08-27 PROCEDURE — 99255 IP/OBS CONSLTJ NEW/EST HI 80: CPT | Performed by: PSYCHIATRY & NEUROLOGY

## 2024-08-27 PROCEDURE — 99232 SBSQ HOSP IP/OBS MODERATE 35: CPT | Performed by: STUDENT IN AN ORGANIZED HEALTH CARE EDUCATION/TRAINING PROGRAM

## 2024-08-27 RX ORDER — QUETIAPINE FUMARATE 25 MG/1
50 TABLET, FILM COATED ORAL
Status: DISCONTINUED | OUTPATIENT
Start: 2024-08-27 | End: 2024-08-29 | Stop reason: HOSPADM

## 2024-08-27 RX ADMIN — ACETAMINOPHEN 650 MG: 325 TABLET, FILM COATED ORAL at 22:03

## 2024-08-27 RX ADMIN — NICOTINE 14 MG: 14 PATCH, EXTENDED RELEASE TRANSDERMAL at 09:05

## 2024-08-27 RX ADMIN — BACITRACIN 1 LARGE APPLICATION: 500 OINTMENT TOPICAL at 17:15

## 2024-08-27 RX ADMIN — Medication 7.5 MG: at 00:17

## 2024-08-27 RX ADMIN — METHOCARBAMOL TABLETS 750 MG: 750 TABLET, COATED ORAL at 06:16

## 2024-08-27 RX ADMIN — SENNOSIDES, DOCUSATE SODIUM 2 TABLET: 8.6; 5 TABLET ORAL at 17:14

## 2024-08-27 RX ADMIN — SENNOSIDES, DOCUSATE SODIUM 2 TABLET: 8.6; 5 TABLET ORAL at 09:03

## 2024-08-27 RX ADMIN — METHOCARBAMOL TABLETS 750 MG: 750 TABLET, COATED ORAL at 00:18

## 2024-08-27 RX ADMIN — GABAPENTIN 300 MG: 300 CAPSULE ORAL at 22:02

## 2024-08-27 RX ADMIN — CHLORHEXIDINE GLUCONATE 15 ML: 1.2 RINSE ORAL at 09:03

## 2024-08-27 RX ADMIN — METOCLOPRAMIDE 10 MG: 5 INJECTION, SOLUTION INTRAMUSCULAR; INTRAVENOUS at 06:16

## 2024-08-27 RX ADMIN — GABAPENTIN 300 MG: 300 CAPSULE ORAL at 09:03

## 2024-08-27 RX ADMIN — METOCLOPRAMIDE 10 MG: 5 INJECTION, SOLUTION INTRAMUSCULAR; INTRAVENOUS at 00:18

## 2024-08-27 RX ADMIN — APIXABAN 5 MG: 5 TABLET, FILM COATED ORAL at 17:14

## 2024-08-27 RX ADMIN — ACETAMINOPHEN 650 MG: 325 TABLET, FILM COATED ORAL at 06:16

## 2024-08-27 RX ADMIN — QUETIAPINE FUMARATE 50 MG: 25 TABLET ORAL at 22:03

## 2024-08-27 RX ADMIN — METHOCARBAMOL TABLETS 750 MG: 750 TABLET, COATED ORAL at 11:07

## 2024-08-27 RX ADMIN — CHLORHEXIDINE GLUCONATE 15 ML: 1.2 RINSE ORAL at 22:03

## 2024-08-27 RX ADMIN — ACETAMINOPHEN 650 MG: 325 TABLET, FILM COATED ORAL at 15:07

## 2024-08-27 RX ADMIN — METHOCARBAMOL TABLETS 750 MG: 750 TABLET, COATED ORAL at 17:15

## 2024-08-27 RX ADMIN — GABAPENTIN 300 MG: 300 CAPSULE ORAL at 15:07

## 2024-08-27 RX ADMIN — BACITRACIN 1 LARGE APPLICATION: 500 OINTMENT TOPICAL at 09:04

## 2024-08-27 RX ADMIN — DIPHENHYDRAMINE HYDROCHLORIDE 25 MG: 50 INJECTION, SOLUTION INTRAMUSCULAR; INTRAVENOUS at 06:17

## 2024-08-27 RX ADMIN — APIXABAN 5 MG: 5 TABLET, FILM COATED ORAL at 09:04

## 2024-08-27 RX ADMIN — POLYETHYLENE GLYCOL 3350 17 G: 17 POWDER, FOR SOLUTION ORAL at 09:03

## 2024-08-27 NOTE — PLAN OF CARE
Problem: OCCUPATIONAL THERAPY ADULT  Goal: Performs self-care activities at highest level of function for planned discharge setting.  See evaluation for individualized goals.  Description: Treatment Interventions: ADL retraining, Functional transfer training, Patient/family training, Equipment evaluation/education, Neuromuscular reeducation, Compensatory technique education, Continued evaluation, Visual perceptual retraining, Cognitive reorientation, Endurance training, UE strengthening/ROM (MoCA as appropriate)          See flowsheet documentation for full assessment, interventions and recommendations.   Outcome: Adequate for Discharge  Note: Limitation: Decreased ADL status, Decreased UE strength, Decreased Safe judgement during ADL, Decreased cognition, Decreased endurance, Decreased self-care trans, Decreased high-level ADLs  Prognosis: Fair  Assessment: Patient seen for OT treatment on 8/27/2024 s/p admission for Subdural hematoma, acute (HCC) Patient agreeable to OT session. Patient participated in fall prevention , self-care transfers, bed mobility , functional mobility, and ADLs with intervention focus on optimizing independence in functional mobility, self-care tasks and ADLs. Anmol Meza JrHallie is showing significant improvement in his functional status. He appears to be functioning close to/or at baseline with functional mobility and ADL tasks. Personal factors continuing to impact D/C include: homelessness . Supporting factors include: support system available and attitude towards recovery.From OT standpoint, no further acute OT needs identified at this time as patient as met all OT goals. Recommend continued active ADL participation and mobilization with hospital staff while in the hospital to increase pt’s endurance and strength upon D/C. From OT standpoint, recommend D/C Level IIII (No post-acute rehabilitation needs)  with family support when medically cleared. D/C pt from OT caseload at this  time.     Rehab Resource Intensity Level, OT: No post-acute rehabilitation needs (24 hour HOUSE supervision is recommended based off patients ACLS score)     Mer Higgins MS OTR/L   [place] Licensure# [id number]
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
## 2024-08-27 NOTE — PLAN OF CARE
Problem: SAFETY,RESTRAINT: NV/NON-SELF DESTRUCTIVE BEHAVIOR  Goal: Remains free of harm/injury (restraint for non violent/non self-detsructive behavior)  Description: INTERVENTIONS:  - Instruct patient/family regarding restraint use   - Assess and monitor physiologic and psychological status   - Provide interventions and comfort measures to meet assessed patient needs   - Identify and implement measures to help patient regain control  - Assess readiness for release of restraint   Outcome: Progressing  Goal: Returns to optimal restraint-free functioning  Description: INTERVENTIONS:  - Assess the patient's behavior and symptoms that indicate continued need for restraint  - Identify and implement measures to help patient regain control  - Assess readiness for release of restraint   Outcome: Progressing     Problem: Prexisting or High Potential for Compromised Skin Integrity  Goal: Skin integrity is maintained or improved  Description: INTERVENTIONS:  - Identify patients at risk for skin breakdown  - Assess and monitor skin integrity  - Assess and monitor nutrition and hydration status  - Monitor labs   - Assess for incontinence   - Turn and reposition patient  - Assist with mobility/ambulation  - Relieve pressure over bony prominences  - Avoid friction and shearing  - Provide appropriate hygiene as needed including keeping skin clean and dry  - Evaluate need for skin moisturizer/barrier cream  - Collaborate with interdisciplinary team   - Patient/family teaching  - Consider wound care consult   Outcome: Progressing     Problem: PAIN - ADULT  Goal: Verbalizes/displays adequate comfort level or baseline comfort level  Description: Interventions:  - Encourage patient to monitor pain and request assistance  - Assess pain using appropriate pain scale  - Administer analgesics based on type and severity of pain and evaluate response  - Implement non-pharmacological measures as appropriate and evaluate response  - Consider  cultural and social influences on pain and pain management  - Notify physician/advanced practitioner if interventions unsuccessful or patient reports new pain  Outcome: Progressing     Problem: INFECTION - ADULT  Goal: Absence or prevention of progression during hospitalization  Description: INTERVENTIONS:  - Assess and monitor for signs and symptoms of infection  - Monitor lab/diagnostic results  - Monitor all insertion sites, i.e. indwelling lines, tubes, and drains  - Monitor endotracheal if appropriate and nasal secretions for changes in amount and color  - [place] appropriate cooling/warming therapies per order  - Administer medications as ordered  - Instruct and encourage patient and family to use good hand hygiene technique  - Identify and instruct in appropriate isolation precautions for identified infection/condition  Outcome: Progressing  Goal: Absence of fever/infection during neutropenic period  Description: INTERVENTIONS:  - Monitor WBC    Outcome: Progressing     Problem: SAFETY ADULT  Goal: Patient will remain free of falls  Description: INTERVENTIONS:  - Educate patient/family on patient safety including physical limitations  - Instruct patient to call for assistance with activity   - Consult OT/PT to assist with strengthening/mobility   - Keep Call bell within reach  - Keep bed low and locked with side rails adjusted as appropriate  - Keep care items and personal belongings within reach  - Initiate and maintain comfort rounds  - Make Fall Risk Sign visible to staff  - Offer Toileting every 2 Hours, in advance of need  - Initiate/Maintain bed alarm  - Obtain necessary fall risk management equipment:   - Apply yellow socks and bracelet for high fall risk patients  - Consider moving patient to room near nurses station  Outcome: Progressing  Goal: Maintain or return to baseline ADL function  Description: INTERVENTIONS:  -  Assess patient's ability to carry out ADLs; assess patient's baseline for ADL  function and identify physical deficits which impact ability to perform ADLs (bathing, care of mouth/teeth, toileting, grooming, dressing, etc.)  - Assess/evaluate cause of self-care deficits   - Assess range of motion  - Assess patient's mobility; develop plan if impaired  - Assess patient's need for assistive devices and provide as appropriate  - Encourage maximum independence but intervene and supervise when necessary  - Involve family in performance of ADLs  - Assess for home care needs following discharge   - Consider OT consult to assist with ADL evaluation and planning for discharge  - Provide patient education as appropriate  Outcome: Progressing  Goal: Maintains/Returns to pre admission functional level  Description: INTERVENTIONS:  - Perform AM-PAC 6 Click Basic Mobility/ Daily Activity assessment daily.  - Set and communicate daily mobility goal to care team and patient/family/caregiver.   - Collaborate with rehabilitation services on mobility goals if consulted  - Perform Range of Motion 3 times a day.  - Reposition patient every 3 hours.  - Dangle patient 3 times a day  - Stand patient 3 times a day  - Ambulate patient 3 times a day  - Out of bed to chair 3 times a day   - Out of bed for meals 3 times a day  - Out of bed for toileting  - Record patient progress and toleration of activity level   Outcome: Progressing     Problem: DISCHARGE PLANNING  Goal: Discharge to home or other facility with appropriate resources  Description: INTERVENTIONS:  - Identify barriers to discharge w/patient and caregiver  - Arrange for needed discharge resources and transportation as appropriate  - Identify discharge learning needs (meds, wound care, etc.)  - Arrange for interpretive services to assist at discharge as needed  - Refer to Case Management Department for coordinating discharge planning if the patient needs post-hospital services based on physician/advanced practitioner order or complex needs related to  functional status, cognitive ability, or social support system  Outcome: Progressing     Problem: Knowledge Deficit  Goal: Patient/family/caregiver demonstrates understanding of disease process, treatment plan, medications, and discharge instructions  Description: Complete learning assessment and assess knowledge base.  Interventions:  - Provide teaching at level of understanding  - Provide teaching via preferred learning methods  Outcome: Progressing     Problem: Nutrition/Hydration-ADULT  Goal: Nutrient/Hydration intake appropriate for improving, restoring or maintaining nutritional needs  Description: Monitor and assess patient's nutrition/hydration status for malnutrition. Collaborate with interdisciplinary team and initiate plan and interventions as ordered.  Monitor patient's weight and dietary intake as ordered or per policy. Utilize nutrition screening tool and intervene as necessary. Determine patient's food preferences and provide high-protein, high-caloric foods as appropriate.     INTERVENTIONS:  - Monitor oral intake, urinary output, labs, and treatment plans  - Assess nutrition and hydration status and recommend course of action  - Evaluate amount of meals eaten  - Assist patient with eating if necessary   - Allow adequate time for meals  - Recommend/ encourage appropriate diets, oral nutritional supplements, and vitamin/mineral supplements  - Order, calculate, and assess calorie counts as needed  - Recommend, monitor, and adjust tube feedings and TPN/PPN based on assessed needs  - Assess need for intravenous fluids  - Provide specific nutrition/hydration education as appropriate  - Include patient/family/caregiver in decisions related to nutrition  Outcome: Progressing

## 2024-08-27 NOTE — PLAN OF CARE
Problem: SAFETY,RESTRAINT: NV/NON-SELF DESTRUCTIVE BEHAVIOR  Goal: Remains free of harm/injury (restraint for non violent/non self-detsructive behavior)  Description: INTERVENTIONS:  - Instruct patient/family regarding restraint use   - Assess and monitor physiologic and psychological status   - Provide interventions and comfort measures to meet assessed patient needs   - Identify and implement measures to help patient regain control  - Assess readiness for release of restraint   Outcome: Progressing  Goal: Returns to optimal restraint-free functioning  Description: INTERVENTIONS:  - Assess the patient's behavior and symptoms that indicate continued need for restraint  - Identify and implement measures to help patient regain control  - Assess readiness for release of restraint   Outcome: Progressing     Problem: Prexisting or High Potential for Compromised Skin Integrity  Goal: Skin integrity is maintained or improved  Description: INTERVENTIONS:  - Identify patients at risk for skin breakdown  - Assess and monitor skin integrity  - Assess and monitor nutrition and hydration status  - Monitor labs   - Assess for incontinence   - Turn and reposition patient  - Assist with mobility/ambulation  - Relieve pressure over bony prominences  - Avoid friction and shearing  - Provide appropriate hygiene as needed including keeping skin clean and dry  - Evaluate need for skin moisturizer/barrier cream  - Collaborate with interdisciplinary team   - Patient/family teaching  - Consider wound care consult   Outcome: Progressing     Problem: PAIN - ADULT  Goal: Verbalizes/displays adequate comfort level or baseline comfort level  Description: Interventions:  - Encourage patient to monitor pain and request assistance  - Assess pain using appropriate pain scale  - Administer analgesics based on type and severity of pain and evaluate response  - Implement non-pharmacological measures as appropriate and evaluate response  - Consider  cultural and social influences on pain and pain management  - Notify physician/advanced practitioner if interventions unsuccessful or patient reports new pain  Outcome: Progressing     Problem: INFECTION - ADULT  Goal: Absence or prevention of progression during hospitalization  Description: INTERVENTIONS:  - Assess and monitor for signs and symptoms of infection  - Monitor lab/diagnostic results  - Monitor all insertion sites, i.e. indwelling lines, tubes, and drains  - Monitor endotracheal if appropriate and nasal secretions for changes in amount and color  - [place] appropriate cooling/warming therapies per order  - Administer medications as ordered  - Instruct and encourage patient and family to use good hand hygiene technique  - Identify and instruct in appropriate isolation precautions for identified infection/condition  Outcome: Progressing  Goal: Absence of fever/infection during neutropenic period  Description: INTERVENTIONS:  - Monitor WBC    Outcome: Progressing     Problem: SAFETY ADULT  Goal: Patient will remain free of falls  Description: INTERVENTIONS:  - Educate patient/family on patient safety including physical limitations  - Instruct patient to call for assistance with activity   - Consult OT/PT to assist with strengthening/mobility   - Keep Call bell within reach  - Keep bed low and locked with side rails adjusted as appropriate  - Keep care items and personal belongings within reach  - Initiate and maintain comfort rounds  - Make Fall Risk Sign visible to staff  - Offer Toileting every  Hours, in advance of need  - Initiate/Maintain alarm  - Obtain necessary fall risk management equipment:   - Apply yellow socks and bracelet for high fall risk patients  - Consider moving patient to room near nurses station  Outcome: Progressing  Goal: Maintain or return to baseline ADL function  Description: INTERVENTIONS:  -  Assess patient's ability to carry out ADLs; assess patient's baseline for ADL  function and identify physical deficits which impact ability to perform ADLs (bathing, care of mouth/teeth, toileting, grooming, dressing, etc.)  - Assess/evaluate cause of self-care deficits   - Assess range of motion  - Assess patient's mobility; develop plan if impaired  - Assess patient's need for assistive devices and provide as appropriate  - Encourage maximum independence but intervene and supervise when necessary  - Involve family in performance of ADLs  - Assess for home care needs following discharge   - Consider OT consult to assist with ADL evaluation and planning for discharge  - Provide patient education as appropriate  Outcome: Progressing  Goal: Maintains/Returns to pre admission functional level  Description: INTERVENTIONS:  - Perform AM-PAC 6 Click Basic Mobility/ Daily Activity assessment daily.  - Set and communicate daily mobility goal to care team and patient/family/caregiver.   - Collaborate with rehabilitation services on mobility goals if consulted  - Perform Range of Motion  times a day.  - Reposition patient every  hours.  - Dangle patient  times a day  - Stand patient  times a day  - Ambulate patient  times a day  - Out of bed to chair  times a day   - Out of bed for meals  times a day  - Out of bed for toileting  - Record patient progress and toleration of activity level   Outcome: Progressing     Problem: DISCHARGE PLANNING  Goal: Discharge to home or other facility with appropriate resources  Description: INTERVENTIONS:  - Identify barriers to discharge w/patient and caregiver  - Arrange for needed discharge resources and transportation as appropriate  - Identify discharge learning needs (meds, wound care, etc.)  - Arrange for interpretive services to assist at discharge as needed  - Refer to Case Management Department for coordinating discharge planning if the patient needs post-hospital services based on physician/advanced practitioner order or complex needs related to functional  status, cognitive ability, or social support system  Outcome: Progressing     Problem: Knowledge Deficit  Goal: Patient/family/caregiver demonstrates understanding of disease process, treatment plan, medications, and discharge instructions  Description: Complete learning assessment and assess knowledge base.  Interventions:  - Provide teaching at level of understanding  - Provide teaching via preferred learning methods  Outcome: Progressing     Problem: Nutrition/Hydration-ADULT  Goal: Nutrient/Hydration intake appropriate for improving, restoring or maintaining nutritional needs  Description: Monitor and assess patient's nutrition/hydration status for malnutrition. Collaborate with interdisciplinary team and initiate plan and interventions as ordered.  Monitor patient's weight and dietary intake as ordered or per policy. Utilize nutrition screening tool and intervene as necessary. Determine patient's food preferences and provide high-protein, high-caloric foods as appropriate.     INTERVENTIONS:  - Monitor oral intake, urinary output, labs, and treatment plans  - Assess nutrition and hydration status and recommend course of action  - Evaluate amount of meals eaten  - Assist patient with eating if necessary   - Allow adequate time for meals  - Recommend/ encourage appropriate diets, oral nutritional supplements, and vitamin/mineral supplements  - Order, calculate, and assess calorie counts as needed  - Recommend, monitor, and adjust tube feedings and TPN/PPN based on assessed needs  - Assess need for intravenous fluids  - Provide specific nutrition/hydration education as appropriate  - Include patient/family/caregiver in decisions related to nutrition  Outcome: Progressing

## 2024-08-28 LAB
ANION GAP SERPL CALCULATED.3IONS-SCNC: 8 MMOL/L (ref 4–13)
ANION GAP SERPL CALCULATED.3IONS-SCNC: 8 MMOL/L (ref 4–13)
BASOPHILS # BLD AUTO: 0.08 THOUSANDS/ÂΜL (ref 0–0.1)
BASOPHILS # BLD AUTO: 0.08 THOUSANDS/ÂΜL (ref 0–0.1)
BASOPHILS NFR BLD AUTO: 1 % (ref 0–1)
BASOPHILS NFR BLD AUTO: 1 % (ref 0–1)
BUN SERPL-MCNC: 18 MG/DL (ref 5–25)
BUN SERPL-MCNC: 18 MG/DL (ref 5–25)
CALCIUM SERPL-MCNC: 9 MG/DL (ref 8.4–10.2)
CALCIUM SERPL-MCNC: 9 MG/DL (ref 8.4–10.2)
CHLORIDE SERPL-SCNC: 104 MMOL/L (ref 96–108)
CHLORIDE SERPL-SCNC: 104 MMOL/L (ref 96–108)
CO2 SERPL-SCNC: 26 MMOL/L (ref 21–32)
CO2 SERPL-SCNC: 26 MMOL/L (ref 21–32)
CREAT SERPL-MCNC: 0.72 MG/DL (ref 0.6–1.3)
CREAT SERPL-MCNC: 0.72 MG/DL (ref 0.6–1.3)
EOSINOPHIL # BLD AUTO: 0.4 THOUSAND/ÂΜL (ref 0–0.61)
EOSINOPHIL # BLD AUTO: 0.4 THOUSAND/ÂΜL (ref 0–0.61)
EOSINOPHIL NFR BLD AUTO: 4 % (ref 0–6)
EOSINOPHIL NFR BLD AUTO: 4 % (ref 0–6)
ERYTHROCYTE [DISTWIDTH] IN BLOOD BY AUTOMATED COUNT: 13.7 % (ref 11.6–15.1)
ERYTHROCYTE [DISTWIDTH] IN BLOOD BY AUTOMATED COUNT: 13.7 % (ref 11.6–15.1)
GFR SERPL CREATININE-BSD FRML MDRD: 120 ML/MIN/1.73SQ M
GFR SERPL CREATININE-BSD FRML MDRD: 120 ML/MIN/1.73SQ M
GLUCOSE SERPL-MCNC: 98 MG/DL (ref 65–140)
GLUCOSE SERPL-MCNC: 98 MG/DL (ref 65–140)
HCT VFR BLD AUTO: 36.3 % (ref 36.5–49.3)
HCT VFR BLD AUTO: 36.3 % (ref 36.5–49.3)
HGB BLD-MCNC: 11.8 G/DL (ref 12–17)
HGB BLD-MCNC: 11.8 G/DL (ref 12–17)
IMM GRANULOCYTES # BLD AUTO: 0.1 THOUSAND/UL (ref 0–0.2)
IMM GRANULOCYTES # BLD AUTO: 0.1 THOUSAND/UL (ref 0–0.2)
IMM GRANULOCYTES NFR BLD AUTO: 1 % (ref 0–2)
IMM GRANULOCYTES NFR BLD AUTO: 1 % (ref 0–2)
LYMPHOCYTES # BLD AUTO: 2.67 THOUSANDS/ÂΜL (ref 0.6–4.47)
LYMPHOCYTES # BLD AUTO: 2.67 THOUSANDS/ÂΜL (ref 0.6–4.47)
LYMPHOCYTES NFR BLD AUTO: 24 % (ref 14–44)
LYMPHOCYTES NFR BLD AUTO: 24 % (ref 14–44)
MCH RBC QN AUTO: 30.9 PG (ref 26.8–34.3)
MCH RBC QN AUTO: 30.9 PG (ref 26.8–34.3)
MCHC RBC AUTO-ENTMCNC: 32.5 G/DL (ref 31.4–37.4)
MCHC RBC AUTO-ENTMCNC: 32.5 G/DL (ref 31.4–37.4)
MCV RBC AUTO: 95 FL (ref 82–98)
MCV RBC AUTO: 95 FL (ref 82–98)
MONOCYTES # BLD AUTO: 0.75 THOUSAND/ÂΜL (ref 0.17–1.22)
MONOCYTES # BLD AUTO: 0.75 THOUSAND/ÂΜL (ref 0.17–1.22)
MONOCYTES NFR BLD AUTO: 7 % (ref 4–12)
MONOCYTES NFR BLD AUTO: 7 % (ref 4–12)
NEUTROPHILS # BLD AUTO: 7.38 THOUSANDS/ÂΜL (ref 1.85–7.62)
NEUTROPHILS # BLD AUTO: 7.38 THOUSANDS/ÂΜL (ref 1.85–7.62)
NEUTS SEG NFR BLD AUTO: 63 % (ref 43–75)
NEUTS SEG NFR BLD AUTO: 63 % (ref 43–75)
NRBC BLD AUTO-RTO: 0 /100 WBCS
NRBC BLD AUTO-RTO: 0 /100 WBCS
PLATELET # BLD AUTO: 310 THOUSANDS/UL (ref 149–390)
PLATELET # BLD AUTO: 310 THOUSANDS/UL (ref 149–390)
PMV BLD AUTO: 9.5 FL (ref 8.9–12.7)
PMV BLD AUTO: 9.5 FL (ref 8.9–12.7)
POTASSIUM SERPL-SCNC: 4.2 MMOL/L (ref 3.5–5.3)
POTASSIUM SERPL-SCNC: 4.2 MMOL/L (ref 3.5–5.3)
RBC # BLD AUTO: 3.82 MILLION/UL (ref 3.88–5.62)
RBC # BLD AUTO: 3.82 MILLION/UL (ref 3.88–5.62)
SODIUM SERPL-SCNC: 138 MMOL/L (ref 135–147)
SODIUM SERPL-SCNC: 138 MMOL/L (ref 135–147)
WBC # BLD AUTO: 11.38 THOUSAND/UL (ref 4.31–10.16)
WBC # BLD AUTO: 11.38 THOUSAND/UL (ref 4.31–10.16)

## 2024-08-28 PROCEDURE — 99232 SBSQ HOSP IP/OBS MODERATE 35: CPT | Performed by: PSYCHIATRY & NEUROLOGY

## 2024-08-28 PROCEDURE — 85025 COMPLETE CBC W/AUTO DIFF WBC: CPT | Performed by: PHYSICIAN ASSISTANT

## 2024-08-28 PROCEDURE — 80048 BASIC METABOLIC PNL TOTAL CA: CPT | Performed by: PHYSICIAN ASSISTANT

## 2024-08-28 PROCEDURE — 99232 SBSQ HOSP IP/OBS MODERATE 35: CPT | Performed by: STUDENT IN AN ORGANIZED HEALTH CARE EDUCATION/TRAINING PROGRAM

## 2024-08-28 RX ADMIN — METHOCARBAMOL TABLETS 750 MG: 750 TABLET, COATED ORAL at 22:31

## 2024-08-28 RX ADMIN — GABAPENTIN 300 MG: 300 CAPSULE ORAL at 08:55

## 2024-08-28 RX ADMIN — METHOCARBAMOL TABLETS 750 MG: 750 TABLET, COATED ORAL at 15:30

## 2024-08-28 RX ADMIN — METHOCARBAMOL TABLETS 750 MG: 750 TABLET, COATED ORAL at 09:02

## 2024-08-28 RX ADMIN — CHLORHEXIDINE GLUCONATE 15 ML: 1.2 RINSE ORAL at 22:31

## 2024-08-28 RX ADMIN — SENNOSIDES, DOCUSATE SODIUM 2 TABLET: 8.6; 5 TABLET ORAL at 08:55

## 2024-08-28 RX ADMIN — GABAPENTIN 300 MG: 300 CAPSULE ORAL at 22:31

## 2024-08-28 RX ADMIN — GABAPENTIN 300 MG: 300 CAPSULE ORAL at 15:30

## 2024-08-28 RX ADMIN — Medication 10 MG: at 09:45

## 2024-08-28 RX ADMIN — APIXABAN 5 MG: 5 TABLET, FILM COATED ORAL at 17:07

## 2024-08-28 RX ADMIN — ACETAMINOPHEN 650 MG: 325 TABLET, FILM COATED ORAL at 17:07

## 2024-08-28 RX ADMIN — POLYETHYLENE GLYCOL 3350 17 G: 17 POWDER, FOR SOLUTION ORAL at 08:55

## 2024-08-28 RX ADMIN — QUETIAPINE FUMARATE 50 MG: 25 TABLET ORAL at 22:31

## 2024-08-28 RX ADMIN — SENNOSIDES, DOCUSATE SODIUM 2 TABLET: 8.6; 5 TABLET ORAL at 17:07

## 2024-08-28 RX ADMIN — APIXABAN 5 MG: 5 TABLET, FILM COATED ORAL at 08:56

## 2024-08-28 NOTE — PLAN OF CARE
Problem: SAFETY,RESTRAINT: NV/NON-SELF DESTRUCTIVE BEHAVIOR  Goal: Remains free of harm/injury (restraint for non violent/non self-detsructive behavior)  Description: INTERVENTIONS:  - Instruct patient/family regarding restraint use   - Assess and monitor physiologic and psychological status   - Provide interventions and comfort measures to meet assessed patient needs   - Identify and implement measures to help patient regain control  - Assess readiness for release of restraint   Outcome: Progressing  Goal: Returns to optimal restraint-free functioning  Description: INTERVENTIONS:  - Assess the patient's behavior and symptoms that indicate continued need for restraint  - Identify and implement measures to help patient regain control  - Assess readiness for release of restraint   Outcome: Progressing     Problem: Prexisting or High Potential for Compromised Skin Integrity  Goal: Skin integrity is maintained or improved  Description: INTERVENTIONS:  - Identify patients at risk for skin breakdown  - Assess and monitor skin integrity  - Assess and monitor nutrition and hydration status  - Monitor labs   - Assess for incontinence   - Turn and reposition patient  - Assist with mobility/ambulation  - Relieve pressure over bony prominences  - Avoid friction and shearing  - Provide appropriate hygiene as needed including keeping skin clean and dry  - Evaluate need for skin moisturizer/barrier cream  - Collaborate with interdisciplinary team   - Patient/family teaching  - Consider wound care consult   Outcome: Progressing     Problem: PAIN - ADULT  Goal: Verbalizes/displays adequate comfort level or baseline comfort level  Description: Interventions:  - Encourage patient to monitor pain and request assistance  - Assess pain using appropriate pain scale  - Administer analgesics based on type and severity of pain and evaluate response  - Implement non-pharmacological measures as appropriate and evaluate response  - Consider  cultural and social influences on pain and pain management  - Notify physician/advanced practitioner if interventions unsuccessful or patient reports new pain  Outcome: Progressing

## 2024-08-28 NOTE — CASE MANAGEMENT
Case Management Progress Note    Patient name Anmol Meza Jr.  Location W /W -01 MRN [id number]  : 1990 Date 2024       LOS (days): 23  Geometric Mean LOS (GMLOS) (days):   Days to GMLOS:        OBJECTIVE:        Current admission status: Inpatient  Preferred Pharmacy: No Pharmacies Listed  Primary Care Provider: Ulysses Agpaoa    Primary Insurance: [employer] [facility]  Secondary Insurance:     PROGRESS NOTE:    Weekly Care Management Length of Stay Review     Current LOS: 23 Days    Most Recent Labs:     Lab Results   Component Value Date/Time    WBC 11.38 (H) 2024 04:54 AM    HGB 11.8 (L) 2024 04:54 AM    HCT 36.3 (L) 2024 04:54 AM     2024 04:54 AM    SODIUM 138 2024 04:54 AM    K 4.2 2024 04:54 AM     2024 04:54 AM    CO2 26 2024 04:54 AM    BUN 18 2024 04:54 AM    CREATININE 0.72 2024 04:54 AM    GLUC 98 2024 04:54 AM       Most Recent Vitals:   Vitals:    24 0800   BP:    Pulse:    Resp:    Temp:    SpO2: 100%        Identified Barriers to Discharge/Discharge Goals/Care Management Interventions: Subdural hematoma, neuropsych eval -no capacity    Intended Discharge Disposition: awaiting [place] insurance determination for [facility] auth    Expected Discharge Date:  48hrs
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
## 2024-08-28 NOTE — CASE MANAGEMENT
"Contacted insurance email group handling half-way auth request to check status of case. No update from CM side at this time. CM Notified: Peggy DOMINGUEZ.     08/28 @ 1000am - Per email group, \"Please have the contact person call me around after 11:45 am to redo the MLT[employer] Eligibility Survey. Call me at [phone] or you can provide me with the contact's name and phone #. Survey needs to be redone.\"     Per CM, \"I will reach out to the provider and request they call to complete another survey\"...\"I am having the provider complete another MLTSS survey as they requested.\" Email response sent to insurance email group.     08/28 @ 1100am - Received call from Yi @ [employer] (P#: [phone]) stating auth cannot be expedited since survey needs to be redone and patient may be approvable following survey. CM Notified.   "

## 2024-08-28 NOTE — CASE MANAGEMENT
Case Management Progress Note    Patient name Anmol Meza Jr.  Location W /W -01 MRN [id number]  : 1990 Date 2024       LOS (days): 23  Geometric Mean LOS (GMLOS) (days):   Days to GMLOS:        OBJECTIVE:        Current admission status: Inpatient  Preferred Pharmacy: No Pharmacies Listed  Primary Care Provider: Ulysses Agpaoa    Primary Insurance: [employer] [facility]  Secondary Insurance:     PROGRESS NOTE:    Request has been made from CM DC Support for escalation of auth for prison care.  The insurance co explained they needed another MLTSS Survey completed as appeal was done and additional clinical had been sent to them (capacity eval).      CM requested Trauma completed this with pt as the survey must be completed by a provider and pt.  Trauma AP has contacted them and left a message with request for a return call.     Per insurance, escalation cannot be completed.  CM will continue to follow to assist with needs and planning.
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
## 2024-08-28 NOTE — PLAN OF CARE
Problem: SAFETY,RESTRAINT: NV/NON-SELF DESTRUCTIVE BEHAVIOR  Goal: Remains free of harm/injury (restraint for non violent/non self-detsructive behavior)  Description: INTERVENTIONS:  - Instruct patient/family regarding restraint use   - Assess and monitor physiologic and psychological status   - Provide interventions and comfort measures to meet assessed patient needs   - Identify and implement measures to help patient regain control  - Assess readiness for release of restraint   Outcome: Progressing  Goal: Returns to optimal restraint-free functioning  Description: INTERVENTIONS:  - Assess the patient's behavior and symptoms that indicate continued need for restraint  - Identify and implement measures to help patient regain control  - Assess readiness for release of restraint   Outcome: Progressing     Problem: Prexisting or High Potential for Compromised Skin Integrity  Goal: Skin integrity is maintained or improved  Description: INTERVENTIONS:  - Identify patients at risk for skin breakdown  - Assess and monitor skin integrity  - Assess and monitor nutrition and hydration status  - Monitor labs   - Assess for incontinence   - Turn and reposition patient  - Assist with mobility/ambulation  - Relieve pressure over bony prominences  - Avoid friction and shearing  - Provide appropriate hygiene as needed including keeping skin clean and dry  - Evaluate need for skin moisturizer/barrier cream  - Collaborate with interdisciplinary team   - Patient/family teaching  - Consider wound care consult   Outcome: Progressing     Problem: PAIN - ADULT  Goal: Verbalizes/displays adequate comfort level or baseline comfort level  Description: Interventions:  - Encourage patient to monitor pain and request assistance  - Assess pain using appropriate pain scale  - Administer analgesics based on type and severity of pain and evaluate response  - Implement non-pharmacological measures as appropriate and evaluate response  - Consider  cultural and social influences on pain and pain management  - Notify physician/advanced practitioner if interventions unsuccessful or patient reports new pain  Outcome: Progressing     Problem: INFECTION - ADULT  Goal: Absence or prevention of progression during hospitalization  Description: INTERVENTIONS:  - Assess and monitor for signs and symptoms of infection  - Monitor lab/diagnostic results  - Monitor all insertion sites, i.e. indwelling lines, tubes, and drains  - Monitor endotracheal if appropriate and nasal secretions for changes in amount and color  - Glady appropriate cooling/warming therapies per order  - Administer medications as ordered  - Instruct and encourage patient and family to use good hand hygiene technique  - Identify and instruct in appropriate isolation precautions for identified infection/condition  Outcome: Progressing  Goal: Absence of fever/infection during neutropenic period  Description: INTERVENTIONS:  - Monitor WBC    Outcome: Progressing     Problem: SAFETY ADULT  Goal: Patient will remain free of falls  Description: INTERVENTIONS:  - Educate patient/family on patient safety including physical limitations  - Instruct patient to call for assistance with activity   - Consult OT/PT to assist with strengthening/mobility   - Keep Call bell within reach  - Keep bed low and locked with side rails adjusted as appropriate  - Keep care items and personal belongings within reach  - Initiate and maintain comfort rounds  - Make Fall Risk Sign visible to staff  - Apply yellow socks and bracelet for high fall risk patients  - Consider moving patient to room near nurses station  Outcome: Progressing  Goal: Maintain or return to baseline ADL function  Description: INTERVENTIONS:  -  Assess patient's ability to carry out ADLs; assess patient's baseline for ADL function and identify physical deficits which impact ability to perform ADLs (bathing, care of mouth/teeth, toileting, grooming, dressing,  etc.)  - Assess/evaluate cause of self-care deficits   - Assess range of motion  - Assess patient's mobility; develop plan if impaired  - Assess patient's need for assistive devices and provide as appropriate  - Encourage maximum independence but intervene and supervise when necessary  - Involve family in performance of ADLs  - Assess for home care needs following discharge   - Consider OT consult to assist with ADL evaluation and planning for discharge  - Provide patient education as appropriate  Outcome: Progressing  Goal: Maintains/Returns to pre admission functional level  Description: INTERVENTIONS:  - Perform AM-PAC 6 Click Basic Mobility/ Daily Activity assessment daily.  - Set and communicate daily mobility goal to care team and patient/family/caregiver.   - Collaborate with rehabilitation services on mobility goals if consulted  - Out of bed for toileting  - Record patient progress and toleration of activity level   Outcome: Progressing     Problem: DISCHARGE PLANNING  Goal: Discharge to home or other facility with appropriate resources  Description: INTERVENTIONS:  - Identify barriers to discharge w/patient and caregiver  - Arrange for needed discharge resources and transportation as appropriate  - Identify discharge learning needs (meds, wound care, etc.)  - Arrange for interpretive services to assist at discharge as needed  - Refer to Case Management Department for coordinating discharge planning if the patient needs post-hospital services based on physician/advanced practitioner order or complex needs related to functional status, cognitive ability, or social support system  Outcome: Progressing     Problem: Knowledge Deficit  Goal: Patient/family/caregiver demonstrates understanding of disease process, treatment plan, medications, and discharge instructions  Description: Complete learning assessment and assess knowledge base.  Interventions:  - Provide teaching at level of understanding  - Provide  teaching via preferred learning methods  Outcome: Progressing     Problem: Nutrition/Hydration-ADULT  Goal: Nutrient/Hydration intake appropriate for improving, restoring or maintaining nutritional needs  Description: Monitor and assess patient's nutrition/hydration status for malnutrition. Collaborate with interdisciplinary team and initiate plan and interventions as ordered.  Monitor patient's weight and dietary intake as ordered or per policy. Utilize nutrition screening tool and intervene as necessary. Determine patient's food preferences and provide high-protein, high-caloric foods as appropriate.     INTERVENTIONS:  - Monitor oral intake, urinary output, labs, and treatment plans  - Assess nutrition and hydration status and recommend course of action  - Evaluate amount of meals eaten  - Assist patient with eating if necessary   - Allow adequate time for meals  - Recommend/ encourage appropriate diets, oral nutritional supplements, and vitamin/mineral supplements  - Order, calculate, and assess calorie counts as needed  - Recommend, monitor, and adjust tube feedings and TPN/PPN based on assessed needs  - Assess need for intravenous fluids  - Provide specific nutrition/hydration education as appropriate  - Include patient/family/caregiver in decisions related to nutrition  Outcome: Progressing

## 2024-08-29 VITALS
RESPIRATION RATE: 20 BRPM | DIASTOLIC BLOOD PRESSURE: 78 MMHG | BODY MASS INDEX: 22.68 KG/M2 | SYSTOLIC BLOOD PRESSURE: 128 MMHG | DIASTOLIC BLOOD PRESSURE: 78 MMHG | HEART RATE: 84 BPM | TEMPERATURE: 98.3 F | HEIGHT: 70 IN | OXYGEN SATURATION: 98 % | OXYGEN SATURATION: 98 % | WEIGHT: 158.4 LBS | WEIGHT: 158.4 LBS | BODY MASS INDEX: 22.68 KG/M2 | TEMPERATURE: 98.3 F | SYSTOLIC BLOOD PRESSURE: 128 MMHG | HEART RATE: 84 BPM | HEIGHT: 70 IN | RESPIRATION RATE: 20 BRPM

## 2024-08-29 PROCEDURE — 99238 HOSP IP/OBS DSCHRG MGMT 30/<: CPT

## 2024-08-29 RX ORDER — ACETAMINOPHEN 325 MG/1
650 TABLET ORAL EVERY 8 HOURS SCHEDULED
Start: 2024-08-29

## 2024-08-29 RX ORDER — QUETIAPINE FUMARATE 50 MG/1
50 TABLET, FILM COATED ORAL
Qty: 10 TABLET | Refills: 0 | Status: SHIPPED | OUTPATIENT
Start: 2024-08-29

## 2024-08-29 RX ORDER — BUTALBITAL, ACETAMINOPHEN AND CAFFEINE 50; 325; 40 MG/1; MG/1; MG/1
1 TABLET ORAL EVERY 4 HOURS PRN
Qty: 30 TABLET | Refills: 0 | Status: SHIPPED | OUTPATIENT
Start: 2024-08-29 | End: 2024-09-08

## 2024-08-29 RX ORDER — CHLORHEXIDINE GLUCONATE ORAL RINSE 1.2 MG/ML
15 SOLUTION DENTAL EVERY 12 HOURS SCHEDULED
Qty: 120 ML | Refills: 0 | Status: SHIPPED | OUTPATIENT
Start: 2024-08-29

## 2024-08-29 RX ORDER — OXYCODONE HYDROCHLORIDE 5 MG/1
TABLET ORAL
Qty: 30 TABLET | Refills: 0 | Status: SHIPPED | OUTPATIENT
Start: 2024-08-29

## 2024-08-29 RX ORDER — AMOXICILLIN 250 MG
2 CAPSULE ORAL 2 TIMES DAILY
Start: 2024-08-29

## 2024-08-29 RX ORDER — GABAPENTIN 300 MG/1
300 CAPSULE ORAL 3 TIMES DAILY
Qty: 30 CAPSULE | Refills: 0 | Status: SHIPPED | OUTPATIENT
Start: 2024-08-29

## 2024-08-29 RX ORDER — POLYETHYLENE GLYCOL 3350 17 G/17G
17 POWDER, FOR SOLUTION ORAL DAILY
Start: 2024-08-30

## 2024-08-29 RX ORDER — NICOTINE 21 MG/24HR
1 PATCH, TRANSDERMAL 24 HOURS TRANSDERMAL DAILY
Qty: 28 PATCH | Refills: 0 | Status: SHIPPED | OUTPATIENT
Start: 2024-08-30

## 2024-08-29 RX ORDER — METHOCARBAMOL 750 MG/1
750 TABLET, FILM COATED ORAL EVERY 6 HOURS SCHEDULED
Start: 2024-08-29

## 2024-08-29 RX ADMIN — GABAPENTIN 300 MG: 300 CAPSULE ORAL at 14:42

## 2024-08-29 RX ADMIN — METHOCARBAMOL TABLETS 750 MG: 750 TABLET, COATED ORAL at 09:05

## 2024-08-29 RX ADMIN — METHOCARBAMOL TABLETS 750 MG: 750 TABLET, COATED ORAL at 03:04

## 2024-08-29 RX ADMIN — BACITRACIN 1 LARGE APPLICATION: 500 OINTMENT TOPICAL at 09:04

## 2024-08-29 RX ADMIN — NICOTINE 14 MG: 14 PATCH, EXTENDED RELEASE TRANSDERMAL at 09:05

## 2024-08-29 RX ADMIN — SENNOSIDES, DOCUSATE SODIUM 2 TABLET: 8.6; 5 TABLET ORAL at 09:05

## 2024-08-29 RX ADMIN — METHOCARBAMOL TABLETS 750 MG: 750 TABLET, COATED ORAL at 14:42

## 2024-08-29 RX ADMIN — Medication 10 MG: at 10:58

## 2024-08-29 RX ADMIN — GABAPENTIN 300 MG: 300 CAPSULE ORAL at 09:04

## 2024-08-29 RX ADMIN — ACETAMINOPHEN 650 MG: 325 TABLET, FILM COATED ORAL at 09:05

## 2024-08-29 RX ADMIN — ACETAMINOPHEN 650 MG: 325 TABLET, FILM COATED ORAL at 03:04

## 2024-08-29 RX ADMIN — CHLORHEXIDINE GLUCONATE 15 ML: 1.2 RINSE ORAL at 09:04

## 2024-08-29 RX ADMIN — APIXABAN 5 MG: 5 TABLET, FILM COATED ORAL at 09:04
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
## 2024-08-29 NOTE — ASSESSMENT & PLAN NOTE
Per sister, Pt. Has longstanding h/o illicit drug use of multiple agents (heroin,methemphatamine, fentanyl)  UDS + benzo, amph/meth, THC, fentanyl; Ethanol 0  Encourage cessation  Continue to monitor

## 2024-08-29 NOTE — INCIDENTAL FINDINGS
The following findings require follow up:  Radiographic finding   Finding: Pulmonary nodules measure up to 6 mm as described.  Based on current Fleischner Society 2017 Guidelines on incidental pulmonary nodule, no routine follow-up is needed if the patient is low risk.  If the patient is high risk, optional follow-up chest CT at 12 months can be considered.   Follow up required: family doctor, optional f/u CT chest   Follow up should be done within 2 week(s)      Incidental finding results were discussed with the Patient's POA by Radha Phillip PA-C on 08/29/24.   They expressed understanding and all questions answered.

## 2024-08-29 NOTE — ASSESSMENT & PLAN NOTE
CT Abd: Within the mid right hepatic lobe there is an irregular hypodense region measuring 2.8 x 1.2 x 2 cm (306:113)This hypodensity does not extend to the liver surface. Surrounding cloudlike hyperenhancement.  Re-eval - thought to be a hemangioma on comparative imaging   Abd exam w/o pain.  Monitor Hgb as indicated, has remained stable to date.
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
## 2024-08-29 NOTE — PLAN OF CARE
Problem: Prexisting or High Potential for Compromised Skin Integrity  Goal: Skin integrity is maintained or improved  Description: INTERVENTIONS:  - Identify patients at risk for skin breakdown  - Assess and monitor skin integrity  - Assess and monitor nutrition and hydration status  - Monitor labs   - Assess for incontinence   - Turn and reposition patient  - Assist with mobility/ambulation  - Relieve pressure over bony prominences  - Avoid friction and shearing  - Provide appropriate hygiene as needed including keeping skin clean and dry  - Evaluate need for skin moisturizer/barrier cream  - Collaborate with interdisciplinary team   - Patient/family teaching  - Consider wound care consult   Outcome: Progressing     Problem: PAIN - ADULT  Goal: Verbalizes/displays adequate comfort level or baseline comfort level  Description: Interventions:  - Encourage patient to monitor pain and request assistance  - Assess pain using appropriate pain scale  - Administer analgesics based on type and severity of pain and evaluate response  - Implement non-pharmacological measures as appropriate and evaluate response  - Consider cultural and social influences on pain and pain management  - Notify physician/advanced practitioner if interventions unsuccessful or patient reports new pain  Outcome: Progressing     Problem: INFECTION - ADULT  Goal: Absence or prevention of progression during hospitalization  Description: INTERVENTIONS:  - Assess and monitor for signs and symptoms of infection  - Monitor lab/diagnostic results  - Monitor all insertion sites, i.e. indwelling lines, tubes, and drains  - Monitor endotracheal if appropriate and nasal secretions for changes in amount and color  - [place] appropriate cooling/warming therapies per order  - Administer medications as ordered  - Instruct and encourage patient and family to use good hand hygiene technique  - Identify and instruct in appropriate isolation precautions for  identified infection/condition  Outcome: Progressing  Goal: Absence of fever/infection during neutropenic period  Description: INTERVENTIONS:  - Monitor WBC    Outcome: Progressing     Problem: SAFETY ADULT  Goal: Patient will remain free of falls  Description: INTERVENTIONS:  - Educate patient/family on patient safety including physical limitations  - Instruct patient to call for assistance with activity   - Consult OT/PT to assist with strengthening/mobility   - Keep Call bell within reach  - Keep bed low and locked with side rails adjusted as appropriate  - Keep care items and personal belongings within reach  - Initiate and maintain comfort rounds  - Make Fall Risk Sign visible to staff  - Offer Toileting every  Hours, in advance of need  - Initiate/Maintain alarm  - Obtain necessary fall risk management equipment:   - Apply yellow socks and bracelet for high fall risk patients  - Consider moving patient to room near nurses station  Outcome: Progressing  Goal: Maintain or return to baseline ADL function  Description: INTERVENTIONS:  -  Assess patient's ability to carry out ADLs; assess patient's baseline for ADL function and identify physical deficits which impact ability to perform ADLs (bathing, care of mouth/teeth, toileting, grooming, dressing, etc.)  - Assess/evaluate cause of self-care deficits   - Assess range of motion  - Assess patient's mobility; develop plan if impaired  - Assess patient's need for assistive devices and provide as appropriate  - Encourage maximum independence but intervene and supervise when necessary  - Involve family in performance of ADLs  - Assess for home care needs following discharge   - Consider OT consult to assist with ADL evaluation and planning for discharge  - Provide patient education as appropriate  Outcome: Progressing  Goal: Maintains/Returns to pre admission functional level  Description: INTERVENTIONS:  - Perform AM-PAC 6 Click Basic Mobility/ Daily Activity  assessment daily.  - Set and communicate daily mobility goal to care team and patient/family/caregiver.   - Collaborate with rehabilitation services on mobility goals if consulted  - Perform Range of Motion  times a day.  - Reposition patient every  hours.  - Dangle patient  times a day  - Stand patient  times a day  - Ambulate patient  times a day  - Out of bed to chair  times a day   - Out of bed for meals  times a day  - Out of bed for toileting  - Record patient progress and toleration of activity level   Outcome: Progressing     Problem: DISCHARGE PLANNING  Goal: Discharge to home or other facility with appropriate resources  Description: INTERVENTIONS:  - Identify barriers to discharge w/patient and caregiver  - Arrange for needed discharge resources and transportation as appropriate  - Identify discharge learning needs (meds, wound care, etc.)  - Arrange for interpretive services to assist at discharge as needed  - Refer to Case Management Department for coordinating discharge planning if the patient needs post-hospital services based on physician/advanced practitioner order or complex needs related to functional status, cognitive ability, or social support system  Outcome: Progressing     Problem: Knowledge Deficit  Goal: Patient/family/caregiver demonstrates understanding of disease process, treatment plan, medications, and discharge instructions  Description: Complete learning assessment and assess knowledge base.  Interventions:  - Provide teaching at level of understanding  - Provide teaching via preferred learning methods  Outcome: Progressing     Problem: Nutrition/Hydration-ADULT  Goal: Nutrient/Hydration intake appropriate for improving, restoring or maintaining nutritional needs  Description: Monitor and assess patient's nutrition/hydration status for malnutrition. Collaborate with interdisciplinary team and initiate plan and interventions as ordered.  Monitor patient's weight and dietary intake as  ordered or per policy. Utilize nutrition screening tool and intervene as necessary. Determine patient's food preferences and provide high-protein, high-caloric foods as appropriate.     INTERVENTIONS:  - Monitor oral intake, urinary output, labs, and treatment plans  - Assess nutrition and hydration status and recommend course of action  - Evaluate amount of meals eaten  - Assist patient with eating if necessary   - Allow adequate time for meals  - Recommend/ encourage appropriate diets, oral nutritional supplements, and vitamin/mineral supplements  - Order, calculate, and assess calorie counts as needed  - Recommend, monitor, and adjust tube feedings and TPN/PPN based on assessed needs  - Assess need for intravenous fluids  - Provide specific nutrition/hydration education as appropriate  - Include patient/family/caregiver in decisions related to nutrition  Outcome: Progressing

## 2024-08-29 NOTE — CASE MANAGEMENT
"Per [facility] email group, \"Please let HORACIO HORN ([phone]) know that the authorization # [id number] is the right number to give to the LTC facility. The approval dates remain the same.  She called today to complete the 2nd eligibility and member met nursing facility level of care.\" Email group contacted for approval dates.  Notified: Peggy HOWARD & Colin MOSQUERA     "

## 2024-08-29 NOTE — CASE MANAGEMENT
[facility] has received APPROVED authorization.  Insurance: [employer]   Received via: [employer] email group ([email or web address])   Authorization received for: [employer]  Facility: [facility]   Authorization #: [id number]   Start of Care: 08/29  Next Review Date: 09/30  Submit next review to: Not Provided     Care Manager notified: Peggy HOWARD & Colin MOSQUERA     Please reach out to  for updates on any clinical information.
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
## 2024-08-29 NOTE — PLAN OF CARE
Problem: Prexisting or High Potential for Compromised Skin Integrity  Goal: Skin integrity is maintained or improved  Description: INTERVENTIONS:  - Identify patients at risk for skin breakdown  - Assess and monitor skin integrity  - Assess and monitor nutrition and hydration status  - Monitor labs   - Assess for incontinence   - Turn and reposition patient  - Assist with mobility/ambulation  - Relieve pressure over bony prominences  - Avoid friction and shearing  - Provide appropriate hygiene as needed including keeping skin clean and dry  - Evaluate need for skin moisturizer/barrier cream  - Collaborate with interdisciplinary team   - Patient/family teaching  - Consider wound care consult   Outcome: Progressing     Problem: PAIN - ADULT  Goal: Verbalizes/displays adequate comfort level or baseline comfort level  Description: Interventions:  - Encourage patient to monitor pain and request assistance  - Assess pain using appropriate pain scale  - Administer analgesics based on type and severity of pain and evaluate response  - Implement non-pharmacological measures as appropriate and evaluate response  - Consider cultural and social influences on pain and pain management  - Notify physician/advanced practitioner if interventions unsuccessful or patient reports new pain  Outcome: Progressing     Problem: INFECTION - ADULT  Goal: Absence or prevention of progression during hospitalization  Description: INTERVENTIONS:  - Assess and monitor for signs and symptoms of infection  - Monitor lab/diagnostic results  - Monitor all insertion sites, i.e. indwelling lines, tubes, and drains  - Monitor endotracheal if appropriate and nasal secretions for changes in amount and color  - [place] appropriate cooling/warming therapies per order  - Administer medications as ordered  - Instruct and encourage patient and family to use good hand hygiene technique  - Identify and instruct in appropriate isolation precautions for  identified infection/condition  Outcome: Progressing     Problem: SAFETY ADULT  Goal: Patient will remain free of falls  Description: INTERVENTIONS:  - Educate patient/family on patient safety including physical limitations  - Instruct patient to call for assistance with activity   - Consult OT/PT to assist with strengthening/mobility   - Keep Call bell within reach  - Keep bed low and locked with side rails adjusted as appropriate  - Keep care items and personal belongings within reach  - Initiate and maintain comfort rounds  - Make Fall Risk Sign visible to staff  - Offer Toileting every  Hours, in advance of need  - Initiate/Maintain alarm  - Obtain necessary fall risk management equipment:   - Apply yellow socks and bracelet for high fall risk patients  - Consider moving patient to room near nurses station  Outcome: Progressing  Goal: Maintain or return to baseline ADL function  Description: INTERVENTIONS:  -  Assess patient's ability to carry out ADLs; assess patient's baseline for ADL function and identify physical deficits which impact ability to perform ADLs (bathing, care of mouth/teeth, toileting, grooming, dressing, etc.)  - Assess/evaluate cause of self-care deficits   - Assess range of motion  - Assess patient's mobility; develop plan if impaired  - Assess patient's need for assistive devices and provide as appropriate  - Encourage maximum independence but intervene and supervise when necessary  - Involve family in performance of ADLs  - Assess for home care needs following discharge   - Consider OT consult to assist with ADL evaluation and planning for discharge  - Provide patient education as appropriate  Outcome: Progressing  Goal: Maintains/Returns to pre admission functional level  Description: INTERVENTIONS:  - Perform AM-PAC 6 Click Basic Mobility/ Daily Activity assessment daily.  - Set and communicate daily mobility goal to care team and patient/family/caregiver.   - Collaborate with  rehabilitation services on mobility goals if consulted  - Perform Range of Motion  times a day.  - Reposition patient every  hours.  - Dangle patient  times a day  - Stand patient  times a day  - Ambulate patient  times a day  - Out of bed to chair  times a day   - Out of bed for meals times a day  - Out of bed for toileting  - Record patient progress and toleration of activity level   Outcome: Progressing     Problem: Nutrition/Hydration-ADULT  Goal: Nutrient/Hydration intake appropriate for improving, restoring or maintaining nutritional needs  Description: Monitor and assess patient's nutrition/hydration status for malnutrition. Collaborate with interdisciplinary team and initiate plan and interventions as ordered.  Monitor patient's weight and dietary intake as ordered or per policy. Utilize nutrition screening tool and intervene as necessary. Determine patient's food preferences and provide high-protein, high-caloric foods as appropriate.     INTERVENTIONS:  - Monitor oral intake, urinary output, labs, and treatment plans  - Assess nutrition and hydration status and recommend course of action  - Evaluate amount of meals eaten  - Assist patient with eating if necessary   - Allow adequate time for meals  - Recommend/ encourage appropriate diets, oral nutritional supplements, and vitamin/mineral supplements  - Order, calculate, and assess calorie counts as needed  - Recommend, monitor, and adjust tube feedings and TPN/PPN based on assessed needs  - Assess need for intravenous fluids  - Provide specific nutrition/hydration education as appropriate  - Include patient/family/caregiver in decisions related to nutrition  Outcome: Progressing

## 2024-08-30 NOTE — UTILIZATION REVIEW
NOTIFICATION OF ADMISSION DISCHARGE   This is a Notification of Discharge from [facility]. Please be advised that this patient has been discharge from our facility. Below you will find the admission and discharge date and time including the patient’s disposition.   UTILIZATION REVIEW CONTACT:  Monica Lerma  Utilization   Network Utilization Review Department  Phone: [phone] x carefully listen to the prompts. All voicemails are confidential.  Email: [email or web address]     ADMISSION INFORMATION  PRESENTATION DATE: 8/5/2024  4:25 PM  OBERVATION ADMISSION DATE: N/A  INPATIENT ADMISSION DATE: 8/5/24  4:49 PM   DISCHARGE DATE: 8/29/2024  4:44 PM   [facility]    Network Utilization Review Department  ATTENTION: Please call with any questions or concerns to [phone] and carefully listen to the prompts so that you are directed to the right person. All voicemails are confidential.   For Discharge needs, contact Care Management DC Support Team at [phone] opt. 2  Send all requests for admission clinical reviews, approved or denied determinations and any other requests to dedicated fax number below belonging to the campus where the patient is receiving treatment. List of dedicated fax numbers for the Facilities:  FACILITY NAME UR FAX NUMBER   ADMISSION DENIALS (Administrative/Medical Necessity) [phone]   DISCHARGE SUPPORT TEAM ([facility]) [phone]   PARENT CHILD HEALTH (Maternity/NICU/Pediatrics) [phone]   [facility] [phone]   [facility] [phone]   [facility] [phone]   [facility] [phone]   [facility] [phone]   [facility] [phone]   [facility] [phone]   [facility]  [phone]   [facility] [phone]   Pending sale to [facility] [phone]   [facility] [phone]   [facility] [phone]

## 2024-09-05 ENCOUNTER — TELEPHONE (OUTPATIENT)
Age: 34
End: 2024-09-05

## 2024-09-05 NOTE — TELEPHONE ENCOUNTER
ANOTHER PT CALLED AND STATED THE NUMBER ON FILE ON THIS PT'S FILE IS NO LONGER THIS PT'S NUMBER, REMOVED NUMBER, WHEN PT CALLS PLEASE OBTAIN NEW PHONE NUMBER.     HAD TO REMOVE PHONE NUMBER AS THE OTHER PATIENT IS GETTING NOTIFICATION OF JITENDRA PHONE NUMBER...

## 2024-09-10 ENCOUNTER — HOSPITAL ENCOUNTER (OUTPATIENT)
Dept: RADIOLOGY | Facility: HOSPITAL | Age: 34
Discharge: HOME | End: 2024-09-10
Payer: COMMERCIAL

## 2024-09-10 DIAGNOSIS — I60.9 SUBARACHNOID HEMORRHAGE (HCC): ICD-10-CM

## 2024-09-10 DIAGNOSIS — S06.5XAA SUBDURAL HEMATOMA, ACUTE (HCC): ICD-10-CM

## 2024-09-10 PROCEDURE — 70450 CT HEAD/BRAIN W/O DYE: CPT
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
## 2024-09-12 PROBLEM — S36.113A LIVER LACERATION: Status: RESOLVED | Noted: 2024-08-05 | Resolved: 2024-09-12

## 2024-09-13 ENCOUNTER — TELEPHONE (OUTPATIENT)
Dept: NEUROSURGERY | Facility: CLINIC | Age: 34
End: 2024-09-13

## 2024-09-13 ENCOUNTER — OFFICE VISIT (OUTPATIENT)
Dept: NEUROSURGERY | Facility: CLINIC | Age: 34
End: 2024-09-13

## 2024-09-13 VITALS
HEIGHT: 70 IN | OXYGEN SATURATION: 99 % | HEART RATE: 104 BPM | BODY MASS INDEX: 24.48 KG/M2 | RESPIRATION RATE: 16 BRPM | TEMPERATURE: 98.9 F | SYSTOLIC BLOOD PRESSURE: 128 MMHG | DIASTOLIC BLOOD PRESSURE: 82 MMHG | WEIGHT: 171 LBS

## 2024-09-13 DIAGNOSIS — S06.5XAA SUBDURAL HEMATOMA, ACUTE (HCC): ICD-10-CM

## 2024-09-13 DIAGNOSIS — G08 THROMBOSIS TRANSVERSE SINUS: ICD-10-CM

## 2024-09-13 DIAGNOSIS — S02.402A ZYGOMATIC ARCH FRACTURE (HCC): Primary | ICD-10-CM

## 2024-09-13 PROCEDURE — 99024 POSTOP FOLLOW-UP VISIT: CPT | Performed by: PHYSICIAN ASSISTANT

## 2024-09-13 RX ORDER — ATOMOXETINE 40 MG/1
40 CAPSULE ORAL DAILY
COMMUNITY

## 2024-09-13 NOTE — TELEPHONE ENCOUNTER
9/13/24 Patient checked out scheduled Ct Venogram on 9/23/24 at [facility] at 8:30 am  Contacted oral surgery at [phone] for an appointment the office was closed left a detailed message requesting patient be contacted for an appointment and also left the facility phone number to be contacted. Faxed all orders,AVS and consult note to Marcelo at [phone] and advised jorge alberto at facility of appointments at [phone].
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
## 2024-10-01 ENCOUNTER — TELEPHONE (OUTPATIENT)
Dept: NEUROSURGERY | Facility: CLINIC | Age: 34
End: 2024-10-01

## 2024-10-01 NOTE — TELEPHONE ENCOUNTER
Contacted patient to reschedule appointment spoke to his sponsor Giuliano at [phone] he stated that Patient is incarcerated and will not be rescheduling his appointment at this time.

## 2025-03-18 ENCOUNTER — HOSPITAL ENCOUNTER (EMERGENCY)
Facility: HOSPITAL | Age: 35
Discharge: HOME | End: 2025-03-19
Attending: EMERGENCY MEDICINE
Payer: COMMERCIAL

## 2025-03-18 DIAGNOSIS — F15.10 METHAMPHETAMINE ABUSE (HCC): Primary | ICD-10-CM

## 2025-03-18 DIAGNOSIS — R41.82 ALTERED MENTAL STATUS: ICD-10-CM

## 2025-03-18 LAB
ALBUMIN SERPL BCG-MCNC: 3.9 G/DL (ref 3.5–5)
ALP SERPL-CCNC: 63 U/L (ref 34–104)
ALT SERPL W P-5'-P-CCNC: 10 U/L (ref 7–52)
AMORPH URATE CRY URNS QL MICRO: NORMAL
AMPHETAMINES SERPL QL SCN: POSITIVE
ANION GAP SERPL CALCULATED.3IONS-SCNC: 8 MMOL/L (ref 4–13)
AST SERPL W P-5'-P-CCNC: 13 U/L (ref 13–39)
BACTERIA UR QL AUTO: NORMAL /HPF
BARBITURATES UR QL: NEGATIVE
BASOPHILS # BLD AUTO: 0.05 THOUSANDS/ÂΜL (ref 0–0.1)
BASOPHILS NFR BLD AUTO: 1 % (ref 0–1)
BENZODIAZ UR QL: POSITIVE
BILIRUB SERPL-MCNC: 0.55 MG/DL (ref 0.2–1)
BILIRUB UR QL STRIP: NEGATIVE
BUN SERPL-MCNC: 24 MG/DL (ref 5–25)
CALCIUM SERPL-MCNC: 8.4 MG/DL (ref 8.4–10.2)
CHLORIDE SERPL-SCNC: 111 MMOL/L (ref 96–108)
CLARITY UR: CLEAR
CO2 SERPL-SCNC: 23 MMOL/L (ref 21–32)
COCAINE UR QL: NEGATIVE
COLOR UR: YELLOW
CREAT SERPL-MCNC: 1.12 MG/DL (ref 0.6–1.3)
EOSINOPHIL # BLD AUTO: 0.09 THOUSAND/ÂΜL (ref 0–0.61)
EOSINOPHIL NFR BLD AUTO: 1 % (ref 0–6)
ERYTHROCYTE [DISTWIDTH] IN BLOOD BY AUTOMATED COUNT: 13.2 % (ref 11.6–15.1)
ETHANOL SERPL-MCNC: <10 MG/DL
FENTANYL UR QL SCN: NEGATIVE
GFR SERPL CREATININE-BSD FRML MDRD: 84 ML/MIN/1.73SQ M
GLUCOSE SERPL-MCNC: 115 MG/DL (ref 65–140)
GLUCOSE UR STRIP-MCNC: NEGATIVE MG/DL
HCT VFR BLD AUTO: 36.6 % (ref 36.5–49.3)
HGB BLD-MCNC: 12.2 G/DL (ref 12–17)
HGB UR QL STRIP.AUTO: NEGATIVE
HYDROCODONE UR QL SCN: NEGATIVE
IMM GRANULOCYTES # BLD AUTO: 0.02 THOUSAND/UL (ref 0–0.2)
IMM GRANULOCYTES NFR BLD AUTO: 0 % (ref 0–2)
KETONES UR STRIP-MCNC: NEGATIVE MG/DL
LEUKOCYTE ESTERASE UR QL STRIP: NEGATIVE
LYMPHOCYTES # BLD AUTO: 1.15 THOUSANDS/ÂΜL (ref 0.6–4.47)
LYMPHOCYTES NFR BLD AUTO: 12 % (ref 14–44)
MCH RBC QN AUTO: 30.4 PG (ref 26.8–34.3)
MCHC RBC AUTO-ENTMCNC: 33.3 G/DL (ref 31.4–37.4)
MCV RBC AUTO: 91 FL (ref 82–98)
METHADONE UR QL: NEGATIVE
MONOCYTES # BLD AUTO: 0.27 THOUSAND/ÂΜL (ref 0.17–1.22)
MONOCYTES NFR BLD AUTO: 3 % (ref 4–12)
NEUTROPHILS # BLD AUTO: 8.12 THOUSANDS/ÂΜL (ref 1.85–7.62)
NEUTS SEG NFR BLD AUTO: 83 % (ref 43–75)
NITRITE UR QL STRIP: NEGATIVE
NON-SQ EPI CELLS URNS QL MICRO: NORMAL /HPF
NRBC BLD AUTO-RTO: 0 /100 WBCS
OPIATES UR QL SCN: NEGATIVE
OXYCODONE+OXYMORPHONE UR QL SCN: NEGATIVE
PCP UR QL: NEGATIVE
PH UR STRIP.AUTO: 6.5 [PH]
PLATELET # BLD AUTO: 215 THOUSANDS/UL (ref 149–390)
PMV BLD AUTO: 10.3 FL (ref 8.9–12.7)
POTASSIUM SERPL-SCNC: 3.9 MMOL/L (ref 3.5–5.3)
PROT SERPL-MCNC: 6 G/DL (ref 6.4–8.4)
PROT UR STRIP-MCNC: ABNORMAL MG/DL
RBC # BLD AUTO: 4.01 MILLION/UL (ref 3.88–5.62)
RBC #/AREA URNS AUTO: NORMAL /HPF
SODIUM SERPL-SCNC: 142 MMOL/L (ref 135–147)
SP GR UR STRIP.AUTO: >=1.03 (ref 1–1.03)
THC UR QL: POSITIVE
UROBILINOGEN UR STRIP-ACNC: <2 MG/DL
WBC # BLD AUTO: 9.7 THOUSAND/UL (ref 4.31–10.16)
WBC #/AREA URNS AUTO: NORMAL /HPF

## 2025-03-18 PROCEDURE — 93005 ELECTROCARDIOGRAM TRACING: CPT

## 2025-03-18 PROCEDURE — 82077 ASSAY SPEC XCP UR&BREATH IA: CPT | Performed by: EMERGENCY MEDICINE

## 2025-03-18 PROCEDURE — 99285 EMERGENCY DEPT VISIT HI MDM: CPT

## 2025-03-18 PROCEDURE — 99285 EMERGENCY DEPT VISIT HI MDM: CPT | Performed by: EMERGENCY MEDICINE

## 2025-03-18 PROCEDURE — 85025 COMPLETE CBC W/AUTO DIFF WBC: CPT | Performed by: EMERGENCY MEDICINE

## 2025-03-18 PROCEDURE — 80053 COMPREHEN METABOLIC PANEL: CPT | Performed by: EMERGENCY MEDICINE

## 2025-03-18 PROCEDURE — 80307 DRUG TEST PRSMV CHEM ANLYZR: CPT | Performed by: EMERGENCY MEDICINE

## 2025-03-18 PROCEDURE — 81001 URINALYSIS AUTO W/SCOPE: CPT | Performed by: EMERGENCY MEDICINE

## 2025-03-18 PROCEDURE — 36415 COLL VENOUS BLD VENIPUNCTURE: CPT | Performed by: EMERGENCY MEDICINE

## 2025-03-18 PROCEDURE — 96372 THER/PROPH/DIAG INJ SC/IM: CPT

## 2025-03-18 RX ORDER — MIDAZOLAM HYDROCHLORIDE 2 MG/2ML
4 INJECTION, SOLUTION INTRAMUSCULAR; INTRAVENOUS ONCE
Status: COMPLETED | OUTPATIENT
Start: 2025-03-18 | End: 2025-03-18

## 2025-03-18 RX ORDER — OLANZAPINE 10 MG/2ML
10 INJECTION, POWDER, FOR SOLUTION INTRAMUSCULAR ONCE
Status: COMPLETED | OUTPATIENT
Start: 2025-03-18 | End: 2025-03-18

## 2025-03-18 RX ADMIN — OLANZAPINE 10 MG: 10 INJECTION, POWDER, FOR SOLUTION INTRAMUSCULAR at 16:51

## 2025-03-18 RX ADMIN — MIDAZOLAM 4 MG: 1 INJECTION INTRAMUSCULAR; INTRAVENOUS at 16:51

## 2025-03-18 NOTE — ED PROVIDER NOTES
"Time reflects when diagnosis was documented in both MDM as applicable and the Disposition within this note       Time User Action Codes Description Comment    3/19/2025  3:45 PM Yokubaitis, Gerard Add [F15.10] Methamphetamine abuse (HCC)     3/19/2025  3:45 PM Yokubaitis, Gerard Add [R41.82] Altered mental status           ED Disposition       ED Disposition   Discharge    Condition   Stable    Date/Time   Wed Mar 19, 2025  3:45 PM    Comment   Rolando Díaz Jr. discharge to home/self care.                   Assessment & Plan       Medical Decision Making  Pulse ox 98% on room air indicating adequate oxygenation.      Differential diagnosis includes substance abuse, psychiatric disorder      Patient signed out to next provider Dr. Riddle pending sobriety.    Amount and/or Complexity of Data Reviewed  Labs: ordered.  ECG/medicine tests: ordered and independent interpretation performed.    Risk  Prescription drug management.             Medications   OLANZapine (ZyPREXA) IM injection 10 mg (10 mg Intramuscular Given 3/18/25 1651)   midazolam (VERSED) injection 4 mg (4 mg Intramuscular Given 3/18/25 1651)       ED Risk Strat Scores                                                History of Present Illness       Chief Complaint   Patient presents with    Altered Mental Status     Pt found by EMS altered and almost falling into traffic per \"[place]. \"Unable to leave patient on side of road\" EMS brought patient into ED. Pt rambling during traige       Past Medical History:   Diagnosis Date    ADHD (attention deficit hyperactivity disorder)     Anxiety     last assessed 3/23/15    Bipolar 1 disorder (HCC)     Genital warts     last assessed 12/7/15, resolved 11/1/17     Psychiatric disorder       Past Surgical History:   Procedure Laterality Date    ORIF ZYGOMATIC FRACTURE Left 8/7/2024    Procedure: OPEN REDUCTION ZYGOMATIC FRACTURE - Left;  Surgeon: Ramses Dias DMD;  Location: [facility];  Service: Maxillofacial " "   WOUND DEBRIDEMENT Left 8/7/2024    Procedure: DEBRIDEMENT FACIAL/HEAD (WASH OUT);  Surgeon: Ramses Dias DMD;  Location: [facility];  Service: Maxillofacial      Family History   Problem Relation Age of Onset    No Known Problems Father       Social History     Tobacco Use    Smoking status: Every Day    Smokeless tobacco: Never   Vaping Use    Vaping status: Never Used   Substance Use Topics    Alcohol use: Not Currently     Comment: occ.    Drug use: Not Currently     Types: Heroin, Marijuana, Methamphetamines, Fentanyl     Comment: states was \"shooting up steroids\" 3 months ago (3/4/19 states this was 3 years ago)      E-Cigarette/Vaping    E-Cigarette Use Never User       E-Cigarette/Vaping Substances    Nicotine No     THC No     CBD No     Flavoring No     Other No     Unknown No       I have reviewed and agree with the history as documented.     Patient brought in by EMS that they found him altered in the middle of the roadway.  Patient was rambling incoherently and stumbling in the road.  Patient arrives appears to be intoxicated likely methamphetamines.  Patient has pressured speech very tangential unable to answer orientation questions.  Resistant to treatment.      History provided by:  Patient   used: No    Altered Mental Status      Review of Systems   All other systems reviewed and are negative.          Objective       ED Triage Vitals [03/18/25 1634]   Temperature Pulse Blood Pressure Respirations SpO2 Patient Position - Orthostatic VS   97.8 °F (36.6 °C) 104 127/75 20 96 % Sitting      Temp Source Heart Rate Source BP Location FiO2 (%) Pain Score    Tympanic Monitor Left arm -- --      Vitals      Date and Time Temp Pulse SpO2 Resp BP Pain Score FACES Pain Rating User   03/19/25 0618 97.5 °F (36.4 °C) 69 98 % 16 100/57 -- -- SD   03/18/25 1918 97.6 °F (36.4 °C) 77 98 % 18 110/58 -- -- HANNAH   03/18/25 1634 97.8 °F (36.6 °C) 104 96 % 20 127/75 -- -- JCM            Physical " Exam  Vitals and nursing note reviewed.   Constitutional:       General: He is in acute distress.      Appearance: He is ill-appearing.   HENT:      Head: Atraumatic.   Cardiovascular:      Rate and Rhythm: Normal rate and regular rhythm.   Pulmonary:      Effort: Pulmonary effort is normal. No respiratory distress.      Breath sounds: Normal breath sounds.   Neurological:      General: No focal deficit present.      Mental Status: He is alert. He is disoriented.      Motor: No weakness.         Results Reviewed       Procedure Component Value Units Date/Time    Rapid drug screen, urine [[id number]]  (Abnormal) Collected: 03/18/25 1910    Lab Status: Final result Specimen: Urine, Other Updated: 03/18/25 1957     Amph/Meth UR Positive     Barbiturate Ur Negative     Benzodiazepine Urine Positive     Cocaine Urine Negative     Methadone Urine Negative     Opiate Urine Negative     PCP Ur Negative     THC Urine Positive     Oxycodone Urine Negative     Fentanyl Urine Negative     HYDROCODONE URINE Negative    Narrative:      Presumptive report. If requested, specimen will be sent to reference lab for confirmation.  FOR MEDICAL PURPOSES ONLY.   IF CONFIRMATION NEEDED PLEASE CONTACT THE LAB WITHIN 5 DAYS.    Drug Screen Cutoff Levels:  AMPHETAMINE/METHAMPHETAMINES  1000 ng/mL  BARBITURATES     200 ng/mL  BENZODIAZEPINES     200 ng/mL  COCAINE      300 ng/mL  METHADONE      300 ng/mL  OPIATES      300 ng/mL  PHENCYCLIDINE     25 ng/mL  THC       50 ng/mL  OXYCODONE      100 ng/mL  FENTANYL      5 ng/mL  HYDROCODONE     300 ng/mL    Urine Microscopic [[id number]] Collected: 03/18/25 1910    Lab Status: Final result Specimen: Urine, Straight Cath Updated: 03/18/25 1925     RBC, UA None Seen /hpf      WBC, UA None Seen /hpf      Epithelial Cells None Seen /hpf      Bacteria, UA None Seen /hpf      Amorphous Crystals, UA Occasional    UA (URINE) with reflex to Scope [[id number]]  (Abnormal) Collected: 03/18/25 1910    Lab  Status: Final result Specimen: Urine, Straight Cath Updated: 03/18/25 1918     Color, UA Yellow     Clarity, UA Clear     Specific Gravity, UA >=1.030     pH, UA 6.5     Leukocytes, UA Negative     Nitrite, UA Negative     Protein, UA Trace mg/dl      Glucose, UA Negative mg/dl      Ketones, UA Negative mg/dl      Urobilinogen, UA <2.0 mg/dl      Bilirubin, UA Negative     Occult Blood, UA Negative    Comprehensive metabolic panel [[id number]]  (Abnormal) Collected: 03/18/25 1717    Lab Status: Final result Specimen: Blood from Arm, Right Updated: 03/18/25 1746     Sodium 142 mmol/L      Potassium 3.9 mmol/L      Chloride 111 mmol/L      CO2 23 mmol/L      ANION GAP 8 mmol/L      BUN 24 mg/dL      Creatinine 1.12 mg/dL      Glucose 115 mg/dL      Calcium 8.4 mg/dL      AST 13 U/L      ALT 10 U/L      Alkaline Phosphatase 63 U/L      Total Protein 6.0 g/dL      Albumin 3.9 g/dL      Total Bilirubin 0.55 mg/dL      eGFR 84 ml/min/1.73sq m     Narrative:      National Kid[employer] Foundation guidelines for Chronic Kidney Disease (CKD):     Stage 1 with normal or high GFR (GFR > 90 mL/min/1.73 square meters)    Stage 2 Mild CKD (GFR = 60-89 mL/min/1.73 square meters)    Stage 3A Moderate CKD (GFR = 45-59 mL/min/1.73 square meters)    Stage 3B Moderate CKD (GFR = 30-44 mL/min/1.73 square meters)    Stage 4 Severe CKD (GFR = 15-29 mL/min/1.73 square meters)    Stage 5 End Stage CKD (GFR <15 mL/min/1.73 square meters)  Note: GFR calculation is accurate only with a steady state creatinine    Ethanol [[id number]]  (Normal) Collected: 03/18/25 1717    Lab Status: Final result Specimen: Blood from Arm, Right Updated: 03/18/25 1745     Ethanol Lvl <10 mg/dL     CBC and differential [[id number]]  (Abnormal) Collected: 03/18/25 1717    Lab Status: Final result Specimen: Blood from Arm, Right Updated: 03/18/25 1730     WBC 9.70 Thousand/uL      RBC 4.01 Million/uL      Hemoglobin 12.2 g/dL      Hematocrit 36.6 %      MCV 91 fL       MCH 30.4 pg      MCHC 33.3 g/dL      RDW 13.2 %      MPV 10.3 fL      Platelets 215 Thousands/uL      nRBC 0 /100 WBCs      Segmented % 83 %      Immature Grans % 0 %      Lymphocytes % 12 %      Monocytes % 3 %      Eosinophils Relative 1 %      Basophils Relative 1 %      Absolute Neutrophils 8.12 Thousands/µL      Absolute Immature Grans 0.02 Thousand/uL      Absolute Lymphocytes 1.15 Thousands/µL      Absolute Monocytes 0.27 Thousand/µL      Eosinophils Absolute 0.09 Thousand/µL      Basophils Absolute 0.05 Thousands/µL             No orders to display       ECG 12 Lead Documentation Only    Date/Time: 3/18/2025 5:58 PM    Performed by: Natanael Davidson DO  Authorized by: Natanael Davidson DO    ECG reviewed by me, the ED Provider: yes    Patient location:  ED  Interpretation:     Interpretation: abnormal    Rate:     ECG rate:  93    ECG rate assessment: normal    Rhythm:     Rhythm: sinus rhythm    Ectopy:     Ectopy: PVCs    ST segments:     ST segments:  Normal  T waves:     T waves: normal    Other findings:     Other findings: LVH        ED Medication and Procedure Management   Prior to Admission Medications   Prescriptions Last Dose Informant Patient Reported? Taking?   ARIPiprazole (ABILIFY) 5 mg tablet Not Taking  No No   Sig: Take 1 tablet (5 mg total) by mouth daily Do not start before August 25, 2023.   Patient not taking: Reported on 3/19/2025   ERROR: CANNOT USE RATIO BASED PRESCRIPTION MIXTURE NAMING FOR A NON-MIXTURE Not Taking  No No   Sig: Take 5 mL (10 mg total) by mouth daily   Patient not taking: Reported on 3/19/2025   OMEPRAZOLE PO Not Taking  Yes No   Sig: Take 10 mg by mouth daily   Patient not taking: Reported on 3/19/2025   QUEtiapine (SEROquel) 50 mg tablet Not Taking  No No   Sig: Take 1 tablet (50 mg total) by mouth daily at bedtime   Patient not taking: Reported on 3/19/2025   acetaminophen (TYLENOL) 325 mg tablet Not Taking  No No   Sig: Take 2 tablets (650 mg total) by mouth every 8  (eight) hours   Patient not taking: Reported on 3/19/2025   apixaban (ELIQUIS) 5 mg Not Taking  No No   Sig: Take 1 tablet (5 mg total) by mouth 2 (two) times a day   Patient not taking: Reported on 3/19/2025   atoMOXetine (STRATTERA) 40 mg capsule Not Taking  Yes No   Sig: Take 40 mg by mouth daily   Patient not taking: Reported on 3/19/2025   chlorhexidine (PERIDEX) 0.12 % solution Not Taking  No No   Sig: Apply 15 mL to the mouth or throat every 12 (twelve) hours   Patient not taking: Reported on 3/19/2025   gabapentin (NEURONTIN) 300 mg capsule Unknown  No No   Sig: Take 1 capsule (300 mg total) by mouth 3 (three) times a day   methocarbamol (ROBAXIN) 750 mg tablet Not Taking  No No   Sig: Take 1 tablet (750 mg total) by mouth every 6 (six) hours   Patient not taking: Reported on 3/19/2025   nicotine (NICODERM CQ) 14 mg/24hr TD 24 hr patch Not Taking  No No   Sig: Place 1 patch on the skin over 24 hours daily Do not start before 2023.   Patient not taking: Reported on 3/19/2025   nicotine (NICODERM CQ) 14 mg/24hr TD 24 hr patch Not Taking  No No   Sig: Place 1 patch on the skin over 24 hours daily   Patient not taking: Reported on 3/19/2025   oxyCODONE (ROXICODONE) 5 immediate release tablet   No No   Si mg to 7.5 mg PO every 4 hours as needed for moderate to severe pain. Ongoing therapy.   Patient taking differently: Take 5 mg by mouth every 6 (six) hours as needed 5 mg to 7.5 mg PO every 4 hours as needed for moderate to severe pain. Ongoing therapy.   polyethylene glycol (MIRALAX) 17 g packet Not Taking  No No   Sig: Take 17 g by mouth daily   Patient not taking: Reported on 3/19/2025   senna-docusate sodium (SENOKOT S) 8.6-50 mg per tablet Not Taking  No No   Sig: Take 2 tablets by mouth 2 (two) times a day   Patient not taking: Reported on 3/19/2025      Facility-Administered Medications: None     Discharge Medication List as of 3/19/2025  3:46 PM        CONTINUE these medications which have  NOT CHANGED    Details   acetaminophen (TYLENOL) 325 mg tablet Take 2 tablets (650 mg total) by mouth every 8 (eight) hours, Starting Thu 8/29/2024, No Print      apixaban (ELIQUIS) 5 mg Take 1 tablet (5 mg total) by mouth 2 (two) times a day, Starting Thu 8/29/2024, No Print      ARIPiprazole (ABILIFY) 5 mg tablet Take 1 tablet (5 mg total) by mouth daily Do not start before August 25, 2023., Starting Fri 8/25/2023, Print      atoMOXetine (STRATTERA) 40 mg capsule Take 40 mg by mouth daily, Historical Med      chlorhexidine (PERIDEX) 0.12 % solution Apply 15 mL to the mouth or throat every 12 (twelve) hours, Starting Thu 8/29/2024, Print      ERROR: CANNOT USE RATIO BASED PRESCRIPTION MIXTURE NAMING FOR A NON-MIXTURE Take 5 mL (10 mg total) by mouth daily, Starting Fri 8/30/2024, No Print      gabapentin (NEURONTIN) 300 mg capsule Take 1 capsule (300 mg total) by mouth 3 (three) times a day, Starting Thu 8/29/2024, Print      methocarbamol (ROBAXIN) 750 mg tablet Take 1 tablet (750 mg total) by mouth every 6 (six) hours, Starting Thu 8/29/2024, No Print      !! nicotine (NICODERM CQ) 14 mg/24hr TD 24 hr patch Place 1 patch on the skin over 24 hours daily Do not start before August 25, 2023., Starting Fri 8/25/2023, Print      !! nicotine (NICODERM CQ) 14 mg/24hr TD 24 hr patch Place 1 patch on the skin over 24 hours daily, Starting Fri 8/30/2024, Print      OMEPRAZOLE PO Take 10 mg by mouth daily, Historical Med      oxyCODONE (ROXICODONE) 5 immediate release tablet 5 mg to 7.5 mg PO every 4 hours as needed for moderate to severe pain. Ongoing therapy., Print      polyethylene glycol (MIRALAX) 17 g packet Take 17 g by mouth daily, Starting Fri 8/30/2024, No Print      QUEtiapine (SEROquel) 50 mg tablet Take 1 tablet (50 mg total) by mouth daily at bedtime, Starting Thu 8/29/2024, Print      senna-docusate sodium (SENOKOT S) 8.6-50 mg per tablet Take 2 tablets by mouth 2 (two) times a day, Starting Thu 8/29/2024, No  Print       !! - Potential duplicate medications found. Please discuss with provider.        No discharge procedures on file.  ED SEPSIS DOCUMENTATION   Time reflects when diagnosis was documented in both MDM as applicable and the Disposition within this note       Time User Action Codes Description Comment    3/19/2025  3:45 PM Gerard Villareal Add [F15.10] Methamphetamine abuse (HCC)     3/19/2025  3:45 PM Gerard Villareal Add [R41.82] Altered mental status                  Natanael Davidson DO  03/19/25 1008

## 2025-03-18 NOTE — RESTRAINT FACE TO FACE
Restraint Face to Face   Rolando Díaz Jr. 34 y.o. male MRN: [id number]  Unit/Bed#: ED CT1 Encounter: [id number]      Physical Evaluation Agitated/Confused  Purpose for Restraints/ Seclusion High risk for self harm, High risk for causing significant disruption of treatment environment , High risk for harm to others, and high risk for flight  Patient's reaction to the intervention Cursing and yelling at staff. States he is [place] and Putin will come to get you. Patient then states spider and tries to grab for a nonexistent spider he hallucinates is floating in the air  Patient's medical condition stable  Patient's Behavioral condition agitated  Restraints to be Continued

## 2025-03-18 NOTE — ED NOTES
1930:pt report received from off going RN pt is sleeping due to being medicated earlier in the shift.  Pt changed into green scrubs belongings logged by PCA.  1:1 maintained     Kellen Medley RN  03/18/25 1945

## 2025-03-19 VITALS
OXYGEN SATURATION: 98 % | HEART RATE: 69 BPM | RESPIRATION RATE: 16 BRPM | DIASTOLIC BLOOD PRESSURE: 57 MMHG | TEMPERATURE: 97.5 F | SYSTOLIC BLOOD PRESSURE: 100 MMHG

## 2025-03-19 LAB
ATRIAL RATE: 93 BPM
P AXIS: 10 DEGREES
PR INTERVAL: 142 MS
QRS AXIS: 74 DEGREES
QRSD INTERVAL: 108 MS
QT INTERVAL: 366 MS
QTC INTERVAL: 455 MS
T WAVE AXIS: 62 DEGREES
VENTRICULAR RATE: 93 BPM

## 2025-03-19 PROCEDURE — 93010 ELECTROCARDIOGRAM REPORT: CPT | Performed by: INTERNAL MEDICINE

## 2025-03-19 NOTE — ED NOTES
Received pt, resting in bed sleeping, resp is non labored. Room checked with this RN and previous RN.  obs continues.      Sharron Lara RN  03/19/25 0780

## 2025-03-19 NOTE — ED NOTES
Pt. Medications , large black garbage bag with clothing and pen knife ( from security) returned to pt. Pt. Ambulated to front lobby. Discharged per orders.      Ya Barney RN  03/19/25 0112

## 2025-03-19 NOTE — ED NOTES
Monitor summary  SR  65-75  (R)PVC  .18/.07/.46           Pt sleeping. No distress     Ya Barney RN  03/19/25 0850

## 2025-03-19 NOTE — ED NOTES
"While speaking with this pt. He states \" I dont want rehab or detox. I am fine and dont need it\" Crisis made aware.      Ya Barney RN  03/19/25 1500    "

## 2025-03-19 NOTE — ED NOTES
LIEN went in to speak with the patient to see what is going on. The patient stated he was just dealing with some bullshit yesterday and was brought here. The patient denies SI/HI/AVH/Paranoia. The patient reports good sleep and a good appetite. The patient denies having any drug issues. The patient does not want any detox or rehab. The patient does not want any homeless resources he has a place to go. The patient would like to be discharged. LIEN spoke with EDMD patient is clear for discharge       Emely HAIR

## 2025-03-19 NOTE — ED NOTES
Pt came in with a large black garbage bag and a smaller bag.      Kellen PADGETT and Martina PADGETT went through bag.    Items found:    Keys,candy/pretzels, bottle of whiskey (Dumped by Kellen PADGETT),3 toy controllers, baby wipes, multiple circuit boards, matches, deck of cards, sweatshirt, 4 socks, wrench, screwdriver, slippers, hat, phone,running phone juarez, 2 bandana, money 44.00 dollars, 4 pairs of shoes, air pump, N95, water bottle, saddle soap, wires/chargers, ear buds, smoke detector,measuring cups, lotion, scratch off, pan, light switch box, vape, cellphone, battery charger, wires, blanket, pencils, small paint bottles  pt had stick with sock wrapped around it and inside sock was a [employer] phone. all locked into locker 19             Martina Harp RN  03/19/25 3672

## (undated) DEVICE — 3M™ STERI-STRIP™ REINFORCED ADHESIVE SKIN CLOSURES, R1547, 1/2 IN X 4 IN (12 MM X 100 MM), 6 STRIPS/ENVELOPE: Brand: 3M™ STERI-STRIP™

## (undated) DEVICE — SUT PLAIN 5-0 PC-1 18 IN 1915G

## (undated) DEVICE — PACK PBDS PLASTIC HEAD AND NECK RF

## (undated) DEVICE — ELECTRODE NEEDLE MOD E-Z CLEAN 2.75IN 7CM -0013M

## (undated) DEVICE — TIBURON SPLIT SHEET: Brand: CONVERTORS

## (undated) DEVICE — SYRINGE 10ML LL

## (undated) DEVICE — GAUZE SPONGES,16 PLY: Brand: CURITY

## (undated) DEVICE — 3M™ STERI-STRIP™ REINFORCED ADHESIVE SKIN CLOSURES, R1542, 1/4 IN X 1-1/2 IN (6 MM X 38 MM), 6 STRIPS/ENVELOPE: Brand: 3M™ STERI-STRIP™

## (undated) DEVICE — CORNEAL PROTECTOR ADULT

## (undated) DEVICE — DECANTER: Brand: UNBRANDED

## (undated) DEVICE — DRAPE SURGIKIT SADDLE BAG

## (undated) DEVICE — BATTERY PACK-STERILE FOR BATTERY POWERED DRIVER

## (undated) DEVICE — GLOVE SRG BIOGEL ORTHOPEDIC 7.5